# Patient Record
Sex: FEMALE | Race: WHITE | NOT HISPANIC OR LATINO | Employment: OTHER | URBAN - METROPOLITAN AREA
[De-identification: names, ages, dates, MRNs, and addresses within clinical notes are randomized per-mention and may not be internally consistent; named-entity substitution may affect disease eponyms.]

---

## 2017-01-09 RX ORDER — BUPROPION HYDROCHLORIDE 150 MG/1
TABLET ORAL
Qty: 30 TABLET | Refills: 11 | Status: SHIPPED | OUTPATIENT
Start: 2017-01-09 | End: 2017-12-11 | Stop reason: SDUPTHER

## 2017-01-19 ENCOUNTER — PATIENT MESSAGE (OUTPATIENT)
Dept: RHEUMATOLOGY | Facility: CLINIC | Age: 67
End: 2017-01-19

## 2017-01-19 DIAGNOSIS — M05.79 RHEUMATOID ARTHRITIS INVOLVING MULTIPLE SITES WITH POSITIVE RHEUMATOID FACTOR: Primary | ICD-10-CM

## 2017-01-19 DIAGNOSIS — T50.905A DRUG-INDUCED LUPUS ERYTHEMATOSUS: ICD-10-CM

## 2017-01-19 DIAGNOSIS — M35.00 SJOGREN'S SYNDROME: ICD-10-CM

## 2017-01-19 DIAGNOSIS — L93.2 DRUG-INDUCED LUPUS ERYTHEMATOSUS: ICD-10-CM

## 2017-01-19 DIAGNOSIS — M05.79 RHEUMATOID ARTHRITIS INVOLVING MULTIPLE SITES WITH POSITIVE RHEUMATOID FACTOR: ICD-10-CM

## 2017-01-19 RX ORDER — LORAZEPAM 1 MG/1
1 TABLET ORAL EVERY 6 HOURS PRN
Qty: 60 TABLET | Refills: 1 | OUTPATIENT
Start: 2017-01-19

## 2017-01-19 RX ORDER — DICLOFENAC SODIUM 10 MG/G
2 GEL TOPICAL 4 TIMES DAILY
Qty: 100 G | Refills: 3 | Status: SHIPPED | OUTPATIENT
Start: 2017-01-19 | End: 2019-06-10

## 2017-01-19 RX ORDER — MELOXICAM 15 MG/1
15 TABLET ORAL DAILY PRN
Qty: 30 TABLET | Refills: 6 | Status: SHIPPED | OUTPATIENT
Start: 2017-01-19 | End: 2017-07-11

## 2017-01-19 RX ORDER — METHOTREXATE 2.5 MG/1
15 TABLET ORAL
Qty: 30 TABLET | Refills: 3 | Status: SHIPPED | OUTPATIENT
Start: 2017-01-19 | End: 2017-08-09 | Stop reason: SDUPTHER

## 2017-01-23 DIAGNOSIS — T50.905A DRUG-INDUCED LUPUS ERYTHEMATOSUS: ICD-10-CM

## 2017-01-23 DIAGNOSIS — M35.00 SJOGREN'S SYNDROME: ICD-10-CM

## 2017-01-23 DIAGNOSIS — M05.79 RHEUMATOID ARTHRITIS INVOLVING MULTIPLE SITES WITH POSITIVE RHEUMATOID FACTOR: ICD-10-CM

## 2017-01-23 DIAGNOSIS — L93.2 DRUG-INDUCED LUPUS ERYTHEMATOSUS: ICD-10-CM

## 2017-01-23 RX ORDER — FOLIC ACID 1 MG/1
1 TABLET ORAL DAILY
Qty: 90 TABLET | Refills: 3 | Status: SHIPPED | OUTPATIENT
Start: 2017-01-23 | End: 2017-04-17 | Stop reason: SDUPTHER

## 2017-02-07 RX ORDER — LORAZEPAM 1 MG/1
1 TABLET ORAL EVERY 6 HOURS PRN
Qty: 60 TABLET | Refills: 1 | OUTPATIENT
Start: 2017-02-07

## 2017-03-04 RX ORDER — DULOXETIN HYDROCHLORIDE 60 MG/1
CAPSULE, DELAYED RELEASE ORAL
Qty: 60 CAPSULE | Refills: 10 | OUTPATIENT
Start: 2017-03-04

## 2017-03-06 ENCOUNTER — TELEPHONE (OUTPATIENT)
Dept: RHEUMATOLOGY | Facility: CLINIC | Age: 67
End: 2017-03-06

## 2017-03-06 NOTE — TELEPHONE ENCOUNTER
Spoke with Ms. Kelly and rescheduled her appointment for 8am with Dr. Alvarez on 03/08/2017 due to a meeting with Dr. Arguelles.

## 2017-03-06 NOTE — TELEPHONE ENCOUNTER
----- Message from Marilin Adams sent at 3/6/2017  9:12 AM CST -----  Is returning nurse call. Please call back at [phone]. Thanks//cdb

## 2017-03-08 ENCOUNTER — OFFICE VISIT (OUTPATIENT)
Dept: RHEUMATOLOGY | Facility: CLINIC | Age: 67
End: 2017-03-08

## 2017-03-08 ENCOUNTER — OFFICE VISIT (OUTPATIENT)
Dept: INTERNAL MEDICINE | Facility: CLINIC | Age: 67
End: 2017-03-08
Payer: COMMERCIAL

## 2017-03-08 ENCOUNTER — LAB VISIT (OUTPATIENT)
Dept: LAB | Facility: HOSPITAL | Age: 67
End: 2017-03-08
Attending: INTERNAL MEDICINE

## 2017-03-08 VITALS
DIASTOLIC BLOOD PRESSURE: 78 MMHG | HEIGHT: 65 IN | OXYGEN SATURATION: 95 % | TEMPERATURE: 96 F | BODY MASS INDEX: 23.95 KG/M2 | WEIGHT: 143.75 LBS | HEART RATE: 84 BPM | SYSTOLIC BLOOD PRESSURE: 134 MMHG

## 2017-03-08 VITALS
DIASTOLIC BLOOD PRESSURE: 70 MMHG | HEIGHT: 65 IN | SYSTOLIC BLOOD PRESSURE: 129 MMHG | WEIGHT: 144.19 LBS | BODY MASS INDEX: 24.02 KG/M2

## 2017-03-08 DIAGNOSIS — Z00.00 ENCOUNTER FOR PREVENTIVE HEALTH EXAMINATION: ICD-10-CM

## 2017-03-08 DIAGNOSIS — M05.79 RHEUMATOID ARTHRITIS INVOLVING MULTIPLE SITES WITH POSITIVE RHEUMATOID FACTOR: Primary | ICD-10-CM

## 2017-03-08 DIAGNOSIS — E55.9 VITAMIN D DEFICIENCY DISEASE: ICD-10-CM

## 2017-03-08 DIAGNOSIS — M35.00 SJOGREN'S SYNDROME: ICD-10-CM

## 2017-03-08 DIAGNOSIS — F51.01 PRIMARY INSOMNIA: ICD-10-CM

## 2017-03-08 DIAGNOSIS — Z51.81 MEDICATION MONITORING ENCOUNTER: Chronic | ICD-10-CM

## 2017-03-08 DIAGNOSIS — F41.8 MIXED ANXIETY AND DEPRESSIVE DISORDER: ICD-10-CM

## 2017-03-08 DIAGNOSIS — E03.9 ACQUIRED HYPOTHYROIDISM: ICD-10-CM

## 2017-03-08 DIAGNOSIS — M85.80 OSTEOPENIA: ICD-10-CM

## 2017-03-08 DIAGNOSIS — M05.79 RHEUMATOID ARTHRITIS INVOLVING MULTIPLE SITES WITH POSITIVE RHEUMATOID FACTOR: ICD-10-CM

## 2017-03-08 DIAGNOSIS — M05.772 RHEUMATOID ARTHRITIS INVOLVING LEFT FOOT WITH POSITIVE RHEUMATOID FACTOR: ICD-10-CM

## 2017-03-08 DIAGNOSIS — G57.62 MORTON'S NEUROMA OF THIRD INTERSPACE OF LEFT FOOT: ICD-10-CM

## 2017-03-08 LAB
25(OH)D3+25(OH)D2 SERPL-MCNC: 66 NG/ML
ALBUMIN SERPL BCP-MCNC: 3.7 G/DL
ALP SERPL-CCNC: 56 U/L
ALT SERPL W/O P-5'-P-CCNC: 13 U/L
ANION GAP SERPL CALC-SCNC: 9 MMOL/L
AST SERPL-CCNC: 24 U/L
BASOPHILS # BLD AUTO: 0.06 K/UL
BASOPHILS NFR BLD: 1.1 %
BILIRUB SERPL-MCNC: 0.4 MG/DL
BUN SERPL-MCNC: 22 MG/DL
CALCIUM SERPL-MCNC: 9.6 MG/DL
CHLORIDE SERPL-SCNC: 100 MMOL/L
CO2 SERPL-SCNC: 32 MMOL/L
CREAT SERPL-MCNC: 0.9 MG/DL
CRP SERPL-MCNC: 6.8 MG/L
DIFFERENTIAL METHOD: ABNORMAL
EOSINOPHIL # BLD AUTO: 0.2 K/UL
EOSINOPHIL NFR BLD: 2.7 %
ERYTHROCYTE [DISTWIDTH] IN BLOOD BY AUTOMATED COUNT: 12.6 %
ERYTHROCYTE [SEDIMENTATION RATE] IN BLOOD BY WESTERGREN METHOD: 25 MM/HR
EST. GFR  (AFRICAN AMERICAN): >60 ML/MIN/1.73 M^2
EST. GFR  (NON AFRICAN AMERICAN): >60 ML/MIN/1.73 M^2
GLUCOSE SERPL-MCNC: 91 MG/DL
HCT VFR BLD AUTO: 35.9 %
HGB BLD-MCNC: 11.8 G/DL
LYMPHOCYTES # BLD AUTO: 1 K/UL
LYMPHOCYTES NFR BLD: 18.1 %
MCH RBC QN AUTO: 29.8 PG
MCHC RBC AUTO-ENTMCNC: 32.9 %
MCV RBC AUTO: 91 FL
MONOCYTES # BLD AUTO: 0.3 K/UL
MONOCYTES NFR BLD: 4.8 %
NEUTROPHILS # BLD AUTO: 4.1 K/UL
NEUTROPHILS NFR BLD: 73.3 %
PLATELET # BLD AUTO: 276 K/UL
PMV BLD AUTO: 9.6 FL
POTASSIUM SERPL-SCNC: 4.3 MMOL/L
PROT SERPL-MCNC: 7.5 G/DL
RBC # BLD AUTO: 3.96 M/UL
SODIUM SERPL-SCNC: 141 MMOL/L
WBC # BLD AUTO: 5.64 K/UL

## 2017-03-08 PROCEDURE — 20550 NJX 1 TENDON SHEATH/LIGAMENT: CPT | Mod: 51,PBBFAC,PO | Performed by: INTERNAL MEDICINE

## 2017-03-08 PROCEDURE — 85025 COMPLETE CBC W/AUTO DIFF WBC: CPT | Mod: PO

## 2017-03-08 PROCEDURE — 99396 PREV VISIT EST AGE 40-64: CPT | Mod: S$PBB,,, | Performed by: INTERNAL MEDICINE

## 2017-03-08 PROCEDURE — 36415 COLL VENOUS BLD VENIPUNCTURE: CPT | Mod: PO

## 2017-03-08 PROCEDURE — 99214 OFFICE O/P EST MOD 30 MIN: CPT | Mod: 25,S$PBB,, | Performed by: INTERNAL MEDICINE

## 2017-03-08 PROCEDURE — 80053 COMPREHEN METABOLIC PANEL: CPT | Mod: PO

## 2017-03-08 PROCEDURE — 86140 C-REACTIVE PROTEIN: CPT

## 2017-03-08 PROCEDURE — 85651 RBC SED RATE NONAUTOMATED: CPT | Mod: PO

## 2017-03-08 PROCEDURE — 82306 VITAMIN D 25 HYDROXY: CPT

## 2017-03-08 PROCEDURE — 96372 THER/PROPH/DIAG INJ SC/IM: CPT | Mod: PBBFAC,PO | Performed by: INTERNAL MEDICINE

## 2017-03-08 PROCEDURE — 20600 DRAIN/INJ JOINT/BURSA W/O US: CPT | Mod: PBBFAC,PO | Performed by: INTERNAL MEDICINE

## 2017-03-08 PROCEDURE — 99999 PR PBB SHADOW E&M-EST. PATIENT-LVL III: CPT | Mod: PBBFAC,,, | Performed by: INTERNAL MEDICINE

## 2017-03-08 PROCEDURE — 20550 NJX 1 TENDON SHEATH/LIGAMENT: CPT | Mod: 51,S$PBB,, | Performed by: INTERNAL MEDICINE

## 2017-03-08 PROCEDURE — 20600 DRAIN/INJ JOINT/BURSA W/O US: CPT | Mod: S$PBB,,, | Performed by: INTERNAL MEDICINE

## 2017-03-08 RX ORDER — CEVIMELINE HYDROCHLORIDE 30 MG/1
CAPSULE ORAL
Qty: 120 CAPSULE | Refills: 5 | Status: SHIPPED | OUTPATIENT
Start: 2017-03-08 | End: 2018-03-20 | Stop reason: SDUPTHER

## 2017-03-08 RX ORDER — METHYLPREDNISOLONE ACETATE 80 MG/ML
10 INJECTION, SUSPENSION INTRA-ARTICULAR; INTRALESIONAL; INTRAMUSCULAR; SOFT TISSUE
Status: COMPLETED | OUTPATIENT
Start: 2017-03-08 | End: 2017-03-08

## 2017-03-08 RX ORDER — BETAMETHASONE SODIUM PHOSPHATE AND BETAMETHASONE ACETATE 3; 3 MG/ML; MG/ML
0.8 INJECTION, SUSPENSION INTRA-ARTICULAR; INTRALESIONAL; INTRAMUSCULAR; SOFT TISSUE
Status: COMPLETED | OUTPATIENT
Start: 2017-03-08 | End: 2017-03-08

## 2017-03-08 RX ORDER — METHYLPREDNISOLONE ACETATE 80 MG/ML
20 INJECTION, SUSPENSION INTRA-ARTICULAR; INTRALESIONAL; INTRAMUSCULAR; SOFT TISSUE
Status: COMPLETED | OUTPATIENT
Start: 2017-03-08 | End: 2017-03-08

## 2017-03-08 RX ORDER — BETAMETHASONE SODIUM PHOSPHATE AND BETAMETHASONE ACETATE 3; 3 MG/ML; MG/ML
1.5 INJECTION, SUSPENSION INTRA-ARTICULAR; INTRALESIONAL; INTRAMUSCULAR; SOFT TISSUE
Status: COMPLETED | OUTPATIENT
Start: 2017-03-08 | End: 2017-03-08

## 2017-03-08 RX ORDER — LORAZEPAM 1 MG/1
1 TABLET ORAL EVERY 6 HOURS PRN
Qty: 60 TABLET | Refills: 1 | Status: SHIPPED | OUTPATIENT
Start: 2017-03-08 | End: 2017-04-25 | Stop reason: SDUPTHER

## 2017-03-08 RX ORDER — DULOXETIN HYDROCHLORIDE 60 MG/1
CAPSULE, DELAYED RELEASE ORAL
Qty: 360 CAPSULE | Refills: 3 | Status: SHIPPED | OUTPATIENT
Start: 2017-03-08 | End: 2018-02-26 | Stop reason: DRUGHIGH

## 2017-03-08 RX ADMIN — BETAMETHASONE ACETATE AND BETAMETHASONE SODIUM PHOSPHATE 0.78 MG: 3; 3 INJECTION, SUSPENSION INTRA-ARTICULAR; INTRALESIONAL; INTRAMUSCULAR; SOFT TISSUE at 09:03

## 2017-03-08 RX ADMIN — METHYLPREDNISOLONE ACETATE 10.4 MG: 80 INJECTION, SUSPENSION INTRALESIONAL; INTRAMUSCULAR; INTRASYNOVIAL; SOFT TISSUE at 09:03

## 2017-03-08 RX ADMIN — METHYLPREDNISOLONE ACETATE 20 MG: 80 INJECTION, SUSPENSION INTRA-ARTICULAR; INTRALESIONAL; INTRAMUSCULAR; SOFT TISSUE at 09:03

## 2017-03-08 RX ADMIN — BETAMETHASONE ACETATE AND BETAMETHASONE SODIUM PHOSPHATE 1.5 MG: 3; 3 INJECTION, SUSPENSION INTRA-ARTICULAR; INTRALESIONAL; INTRAMUSCULAR; SOFT TISSUE at 09:03

## 2017-03-08 ASSESSMENT — ROUTINE ASSESSMENT OF PATIENT INDEX DATA (RAPID3): MDHAQ FUNCTION SCORE: 0

## 2017-03-08 NOTE — PATIENT INSTRUCTIONS
Ice pack for 15min to foot if pain post shot    Good shoes    No change n other meds    Thank you for choosing [facility] for your medical care. Please take a few moments to evaluate your experience in our visit.  Have a great day.    Click the link below.    [email or web address]

## 2017-03-08 NOTE — PROGRESS NOTES
"Subjective:       Patient ID: Leticia Piña is a 66 y.o. female.    Chief Complaint: Annual Exam    HPI Comments: Here for f/u medical problems and preventive exam.  To get [id number] in 2wk, due to cortisone inj today.  No f/c/sw/cough.  No cp/sob/palp.  BMs and urine normal.  Down to 1K D3 daily.  Rheum ordered vit D level.  Anxiety doing well on wellbutrin and cymbalta, ativan rarely.    Updated/ annual due 5/16:  HM: 10/16 fluvax, 12/13 [id number], 11/07 niqevs66, 4/13 TDaP, 4/13 zoster, 8/14 BMD, 12/11 Cscope rep 10y, 9/16 MMG and Gyn Dr. Jimenez.          Review of Systems   Constitutional: Negative for appetite change, chills, diaphoresis and fever.   HENT: Negative for congestion, ear pain, rhinorrhea, sinus pressure and sore throat.    Respiratory: Negative for cough, chest tightness and shortness of breath.    Cardiovascular: Negative for chest pain and palpitations.   Gastrointestinal: Negative for blood in stool, constipation, diarrhea, nausea and vomiting.   Genitourinary: Negative for dysuria, frequency, hematuria, menstrual problem, urgency and vaginal discharge.   Musculoskeletal: Negative for arthralgias.   Skin: Negative for rash.   Neurological: Negative for dizziness and headaches.   Psychiatric/Behavioral: Negative for sleep disturbance. The patient is not nervous/anxious.        Objective:   /78 (BP Location: Right arm, Patient Position: Sitting, BP Method: Manual)  Pulse 84  Temp 96.1 °F (35.6 °C) (Tympanic)   Ht 5' 5" (1.651 m)  Wt 65.2 kg (143 lb 11.8 oz)  SpO2 95%  BMI 23.92 kg/m2    Physical Exam   Constitutional: She is oriented to person, place, and time. She appears well-developed and well-nourished.   HENT:   Right Ear: External ear normal. Tympanic membrane is not injected.   Left Ear: External ear normal. Tympanic membrane is not injected.   Mouth/Throat: Oropharynx is clear and moist.   Eyes: Conjunctivae are normal.   Neck: Normal range of motion. Neck supple. No " thyromegaly present.   Cardiovascular: Normal rate, regular rhythm and intact distal pulses.  Exam reveals no gallop and no friction rub.    No murmur heard.  Pulmonary/Chest: Effort normal and breath sounds normal. She has no wheezes. She has no rales.   Abdominal: Soft. Bowel sounds are normal. She exhibits no mass. There is no tenderness.   Musculoskeletal: She exhibits no edema.   Lymphadenopathy:     She has no cervical adenopathy.   Neurological: She is alert and oriented to person, place, and time.   Skin: Skin is warm. No rash noted.   Psychiatric: She has a normal mood and affect.     Results for JAM TRIPP (MRN [id number]) as of 3/8/2017 13:06   Ref. Range 8/22/2016 10:56   Vit D, 25-Hydroxy Latest Ref Range: 30 - 96 ng/mL >96 (A)     Assessment:       1. Rheumatoid arthritis involving multiple sites with positive rheumatoid factor    2. Vitamin D deficiency disease    3. Mixed anxiety and depressive disorder    4. Primary insomnia    5. Acquired hypothyroidism    6. Encounter for preventive health examination        Plan:       Jam was seen today for annual exam.    Diagnoses and all orders for this visit:    Rheumatoid arthritis involving multiple sites with positive rheumatoid factor- doing well with pain.    Vitamin D deficiency disease- lab pending.    Mixed anxiety and depressive disorder- doing well,cont rx.  -     duloxetine (CYMBALTA) 60 MG capsule; TAKE 2 CAPSULES BY MOUTH ONCE A DAY  -     lorazepam (ATIVAN) 1 MG tablet; Take 1 tablet (1 mg total) by mouth every 6 (six) hours as needed.    Primary insomnia- doing well.    Acquired hypothyroidism- check lab.    Encounter for preventive health examination- utd.  [id number] with Rheum.  NC results.  RTC 6mo.  -     Lipid panel; Future  -     TSH; Future  -     Cancel: Vitamin D; Future  -     Hepatitis C antibody; Future

## 2017-03-08 NOTE — MR AVS SNAPSHOT
[facility]  [address]  [place] [address]  Phone: [phone]  Fax: [phone]                  Leticia Piña   3/8/2017 11:30 AM   Office Visit    Description:  Female : 1950   Provider:  Scooter Alvarez MD   Department:  [facility] - Rheumatology           Reason for Visit     Rheumatoid Arthritis           Diagnoses this Visit        Comments    Rheumatoid arthritis involving multiple sites with positive rheumatoid factor    -  Primary     Rheumatoid arthritis involving left foot with positive rheumatoid factor         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 [phone]    2017 10:05 AM LABORATORY, [facility] [phone]      Goals (5 Years of Data)     None      Follow-Up and Disposition     Return in about 4 months (around 2017) for lsa, reg lab Pneumovax in 2weeks.       These Medications        Disp Refills Start End    cevimeline (EVOXAC) 30 mg capsule 120 capsule 5 3/8/2017     TAKE 1 CAPSULE (30 MG TOTAL) BY MOUTH 4 TIMES DAILY.    Pharmacy: [facility] #[id number] - [place] - [address] AT [facility] Ph #: [phone]         [facility]     [facility] Nurse Care Line -  Assistance  Registered nurses in the [facility] provide clinical advisement, health education, appointment booking, and other advisory services.  Call for this free service at [phone].              Medications           Message regarding Medications     Verify the changes and/or additions to your medication regime listed below are the same as discussed with your clinician today.  If any of these changes or additions are incorrect, please notify your healthcare provider.        These medications were administered today        Dose Freq    betamethasone acetate-betamethasone sodium phosphate injection 0.78 mg 0.78 mg Clinic/HOD 1 time    Sig: Inject 0.13 mLs (0.78 mg total) into the articular space one time.    Class: Normal    Route: Intra-articular    methylPREDNISolone acetate injection 10.4 mg 10.4 mg Clinic/HOD 1 time    Sig: Inject 0.13 mLs (10.4 mg total) into the articular space one time.    Class: Normal    Route: Intra-articular    betamethasone acetate-betamethasone sodium phosphate injection 1.5 mg 1.5 mg Clinic/HOD 1 time    Sig: Inject 0.25 mLs (1.5 mg total) into the articular space one time.    Class: Normal    Route: Intra-articular    methylPREDNISolone acetate injection 20 mg 20 mg Clinic/HOD 1 time    Sig: Inject 0.25 mLs (20 mg total) into the articular space one time.    Class: Normal    Route: Intra-articular      CHANGE how you are taking these medications     Start Taking Instead of    cevimeline (EVOXAC) 30 mg capsule cevimeline (EVOXAC) 30 mg capsule    Dosage:  TAKE 1 CAPSULE (30 MG TOTAL) BY MOUTH 4 TIMES DAILY. Dosage:  TAKE 1 CAPSULE (30 MG TOTAL) BY MOUTH 3 (THREE) TIMES DAILY.    Reason for Change:  Reorder            Verify that the below list of medications is an accurate representation of the medications you are currently taking.  If none reported, the list may be blank. If incorrect, please contact your healthcare provider. Carry this list with you in case of emergency.           Current Medications     buPROPion (WELLBUTRIN XL) 150 MG TB24 tablet TAKE 1 TABLET EVERY DAY    cevimeline (EVOXAC) 30 mg capsule TAKE 1 CAPSULE (30 MG TOTAL) BY MOUTH 4 TIMES DAILY.     cholecalciferol, vitamin D3, 5,000 unit/mL Drop Take 1 drop by mouth Daily.    CIMZIA 400 mg/2 mL (200 mg/mL x 2) SyKt kit INJECT 400MG SUBCUTANEOUSLY ONCE MONTHLY    doxycycline (VIBRAMYCIN) 100 MG Cap TAKE 1 CAPSULE BY MOUTH EVERY 12 HOURS AS NEEDED    duloxetine (CYMBALTA) 60 MG capsule TAKE 2 CAPSULES BY MOUTH ONCE A DAY    estradiol (ESTRACE) 1 MG tablet Take 1 tablet by mouth Daily.    folic acid (FOLVITE) 1 MG tablet Take 1 tablet (1 mg total) by mouth once daily.    glucosamine-chondroitin 500-400 mg tablet Take 1 tablet by mouth Daily.     hydroxychloroquine (PLAQUENIL) 200 mg tablet TAKE 1 TABLET BY MOUTH TWICE A DAY    IBUPROFEN ORAL Use as needed.    levothyroxine (SYNTHROID) 75 MCG tablet Take 75 mcg by mouth once daily.    lorazepam (ATIVAN) 1 MG tablet TAKE 1 TABLET BY MOUTH EVERY 6 HOURS AS NEEDED    meloxicam (MOBIC) 15 MG tablet Take 1 tablet (15 mg total) by mouth daily as needed.    methotrexate 2.5 MG Tab Take 6 tablets (15 mg total) by mouth every 7 days.    metronidazole 0.75 % Lotn APPLY EVERY DAY AS NEEDED    MULTIVITAMIN ORAL Daily.    omeprazole (PRILOSEC) 20 MG capsule Take 20 mg by mouth once daily.    PCCA MBK BASE Wax     PODIAPN 35-5-2-300 mg Cap Take 1 capsule by mouth 2 (two) times daily.    progesterone (PROMETRIUM) 100 MG capsule Take 1 capsule by mouth Daily.    trazodone (DESYREL) 50 MG tablet TAKE 3 TABLETS BY MOUTH EVERY NIGHT AT BEDTIME AS NEEDED FOR INSOMNIA    amoxicillin (AMOXIL) 875 MG tablet TAKE 1 TABLET TWICE A DAY    diclofenac sodium 1 % Gel Apply 2 g topically 4 (four) times daily.    DOCOSAHEXANOIC ACID/EPA (FISH OIL ORAL) Daily.    methylPREDNISolone (MEDROL DOSEPACK) 4 mg tablet TAKE 6 TABLETS ON DAY 1 AS DIRECTED ON PACKAGE AND DECREASE BY 1 TAB EACH DAY FOR A TOTAL OF 6 DAYS    valacyclovir (VALTREX) 1000 MG tablet Take 1 tablet (1,000 mg total) by mouth 3 (three) times daily.    VITAMIN B COMPLEX ORAL Daily.           Clinical Reference Information          "  Your Vitals Were     BP Height Weight BMI       129/70 (BP Location: Right arm, Patient Position: Sitting, BP Method: Automatic) 5' 5" (1.651 m) 65.4 kg (144 lb 2.9 oz) 23.99 kg/m2       Blood Pressure          Most Recent Value    BP  129/70      Allergies as of 3/8/2017     Erythromycin    Humira  [Adalimumab]    Sulfa (Sulfonamide Antibiotics)      Immunizations Administered on Date of Encounter - 3/8/2017     None      Orders Placed During Today's Visit     Future Labs/Procedures Expected by Expires    Pneumococcal Polysaccharide Vaccine (23 Valent) (SQ/IM)  3/22/2017 3/8/2018    Recurring Lab Work Interval Expires    C-reactive protein   3/8/2018    CBC auto differential   3/8/2018    Comprehensive metabolic panel   3/8/2018    Sedimentation rate, manual   3/8/2018      Administrations This Visit     betamethasone acetate-betamethasone sodium phosphate injection 0.78 mg     Admin Date Action Dose Route Administered By             03/08/2017 Given 0.78 mg Intra-articular Scooter Alvarez MD                    betamethasone acetate-betamethasone sodium phosphate injection 1.5 mg     Admin Date Action Dose Route Administered By             03/08/2017 Given 1.5 mg Intra-articular Scooter Alvarez MD                    methylPREDNISolone acetate injection 10.4 mg     Admin Date Action Dose Route Administered By             03/08/2017 Given 10.4 mg Intra-articular Scooter Alvarez MD                    methylPREDNISolone acetate injection 20 mg     Admin Date Action Dose Route Administered By             03/08/2017 Given 20 mg Intra-articular Scooter Alvarez MD                      Instructions    Ice pack for 15min to foot if pain post shot    Good shoes    No change n other meds    Thank you for choosing [facility] for your medical care. Please take a few moments to evaluate your experience in our visit.  Have a great day.    Click the link " below.    [email or web address]         Language Assistance Services     ATTENTION: Language assistance services are available, free of charge. Please call [phone].      ATENCIÓN: Si habla español, tiene a menchaca disposición servicios gratuitos de asistencia lingüística. Llame al [phone].     CHÚ Ý: N?u b?n nói Ti?ng Vi?t, có các d?ch v? h? tr? ngôn ng? mi?n phí dành cho b?n. G?i s? [phone].         [facility] complies with applicable Federal civil rights laws and does not discriminate on the basis of race, color, national origin, age, disability, or sex.

## 2017-03-08 NOTE — PROGRESS NOTES
RHEUMATOLOGY FOLLOWUP    CHIEF COMPLAINT:  Left foot pain and inability to walk well.    PERTINENT HISTORY:  Leticia is followed here with chronic erosive rheumatoid   arthritis.  She was last seen by Leta in the end of November.  She had come off   Cimzia 2016 because she was doing so well and she felt this to be in remission   when [place] was [place] and kept off the biologic.  She remains on   methotrexate at six a week with folic acid and Plaquenil 200 mg twice daily.    She rates morning stiffness somewhere between 30 minutes to an hour.  She has   minimal discomfort in her hands.  It is usually just around the DIPs from OA.    She has a lot of trouble with dryness.  She is using Evoxac twice daily without   that much success.  She is using her serum eyedrops regularly and has helped her   eyes a lot.  She is on hormones now for her osteopenia and has had no falls or   fractures.  She is on vitamin D.  She has trouble walking under her left first   toe.  She has got swollen bursa area.  She was supposed to go to Orthopedics,   but did not.  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  Dr. Delgado has injected in the past that   helped.  She would like me to consider that.    REVIEW OF SYSTEMS:  GENERAL:  Her weight is steady.  She has had no infections.  Immunizations are   up-to-date other than Pneumovax 23 second booster.  No fevers, chills,   fatigability, change in appetite, weight loss.  SKIN:  No recent malar rashes.  No rashes, itching or changes in color or   texture of skin, no Raynaud's.  HEAD:  No headache or recent head trauma.  EYES:  Dryness to the eyes improved with her serum drops.  She has less eye   redness now than she used to.  Visual acuity fine.  No ocular pain.  EARS:  Denies ear pain, discharge or vertigo.  NOSE:  She has had allergies in the past, allergic rhinitis.  No loss of smell.    No epistaxis or postnasal  drip.  MOUTH AND THROAT:  No major mouth issues. No hoarseness or change in voice or   oral ulcers.  No difficulty swallowing or dryness.  NODES:  Denies swollen glands.  CHEST:  Lung issues negative.  No pleurisy.  Denies shortness of breath, HAUSER,   cyanosis, wheezing, cough and sputum production.  CARDIOVASCULAR:  Cardiac issues negative.  Denies chest pain, PND, orthopnea or   reduced exercise tolerance.  ABDOMEN:  GI tract is stable.  No weight loss.  Denies nausea, vomiting,   diarrhea, abdominal pain, hematemesis or blood in stool.  ENDOCRINE:  No thyroid disease and on thyroid replacement.  Sjogren's syndrome   with positive antibodies by history.  Drug-induced lupus by history.  URINARY:  No renal disease, etc.  No flank pain, dysuria or hematuria.  PERIPHERAL VASCULAR:  No claudication or cyanosis.  NEUROLOGIC:  No numbness, weakness or tingling.    PHYSICAL EXAMINATION:  VITAL SIGNS:  Normal today.  Her blood pressure is 129/70 with height 5 feet 5   inches, pain score 0, BSA 1.7, BMI 23.9.  APPEARANCE:  Well nourished, well developed, in no acute distress.  SKIN:  Clear.  No rashes, no wounds, no ecchymosis.  Nail beds pink.  No digital   ulcers.  No nodules or tightness noted.  HEENT:  Clear.  She has adenopathy in her neck.  Normocephalic, atraumatic,   alert and oriented x3.  PERRLA.  Conjunctivae clear, sclerae nonicteric.  No   tonsillar enlargement.  No pharyngeal erythema or exudate.  No ulcers or lesions   noted.  Mucous membranes moist and pink.  Oral pharynx clear.  Neck supple, no   JVD.  Normal ROM.  Thyroid normal.  No masses or tracheal deviation.  No   cervical, axillary or inguinal lymph node enlargement.  CHEST:  Clear.  Lungs clear to auscultation bilaterally.  No dullness to   percussion.  No accessory muscle use.  No intercostal retraction noted.  CARDIOVASCULAR:  Clear.  Normal S1, S2.  No rubs, murmurs or gallops.  No   peripheral edema.  ABDOMEN:  Bowel sounds normal, abdomen soft,  not distended.  No tenderness or   masses.  No hepatosplenomegaly.  EXTREMITIES:  No clubbing, cyanosis or edema noted.  Dorsalis and pedis pulses   2+ palpable.  NEUROLOGICAL:  Cranial nerves II-XII intact.  Motor 5/5 strength major   flexors/extensors.  No tremor.  GAIT AND POSTURE:  Normal gait.  No cerebellar signs.  MUSCULOSKELETAL:  Joints on her hands with DIP bony enlargement, not tender.    PIP, MPs and wrist have normal motion.  No redness, no heat.  Elbows and   shoulders move well.  Hips and knees move well with no fluid in the knees.    Ankles move well, no fluid.  The left third and fourth toe web is sound to have   dysresthesias and numbness, consistent with a neuroma.  Under the first MTP is a   large bursa about the size of a large grape.  This has moderate tenderness.  No   redness, no heat.  Blood vessels are normal with good pulses.    LABORATORY DATA:  Today, is pending.  Her lab from November with normal white   count and platelets, hemoglobin 11.5.  Sed rate is in the 20s.  Electrolytes   good.  CRP was elevated back in the 33.  Last SSA and SSB both positive with GIOVANNA   negative.  CCP antibodies a little positive in the past.    IMPRESSION:  1.  Rheumatoid arthritis - CDAI 0, MARGARITA score 0.  2.  Medication monitoring for toxicity on methotrexate, no toxicity observed.  3.  Left first MTP metatarsal cyst, bursitis, active.  4.  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  5.  Sjogren's, still with some dryness symptoms.  6.  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  7.  Osteopenia on hormones.    PLAN:  1.  Inject the left first MTP bursa.  See below.  2.  Inject the left Leonard neuroma -- see below.  3.  Methotrexate six a week with folic acid.  Note, she is taking two forms.  4.  Maintain Plaquenil twice daily with eye checks.  5.  Increase Evoxac to four times daily and continue her eye drops.  6.  Encourage exercises, on over-the-counter vitamin D and her hormones for her    bones.  See back in four months unless there is an issue.    PROCEDURE NOTE:  1.  After verbal consent, the left dorsal third and fourth interdigital web   space was entered.  This was injected with 8 mg of Celestone Soluspan, 8 mg of   Depo-Medrol 80 and 1/8 mL of lidocaine without difficulty using a 25 5/8 needle.  2.  After verbal consent, the lower aspect of the volar first MTP on the left was   prepped. After Using local topical anesthesia, injected it with 1.5 mg of Celestone,   20 mg of Depo-Medrol, and 0.25 mL of 1% lidocaine.  No complications, tolerated   well along with ice packs as needed and appropriate shoes.          /torsten [id number] blank(s)        ELLY/RAGHU  dd: 03/08/2017 09:36:51 (CST)  td: 03/08/2017 11:18:25 (CST)  Doc ID   #[id number]  Job ID #[id number]    CC:

## 2017-03-09 ENCOUNTER — PATIENT MESSAGE (OUTPATIENT)
Dept: INTERNAL MEDICINE | Facility: CLINIC | Age: 67
End: 2017-03-09

## 2017-03-22 ENCOUNTER — TELEPHONE (OUTPATIENT)
Dept: RHEUMATOLOGY | Facility: CLINIC | Age: 67
End: 2017-03-22

## 2017-03-22 NOTE — TELEPHONE ENCOUNTER
----- Message from Marilin Melgoza sent at 3/22/2017  1:19 PM CDT -----  Patient calling to r/s her nurse visit appt on today. States she would like to come in on tomorrow. Please adv/call [phone].//thanks. cw

## 2017-03-23 ENCOUNTER — CLINICAL SUPPORT (OUTPATIENT)
Dept: RHEUMATOLOGY | Facility: CLINIC | Age: 67
End: 2017-03-23

## 2017-03-23 ENCOUNTER — LAB VISIT (OUTPATIENT)
Dept: LAB | Facility: HOSPITAL | Age: 67
End: 2017-03-23
Attending: INTERNAL MEDICINE

## 2017-03-23 DIAGNOSIS — M05.79 RHEUMATOID ARTHRITIS INVOLVING MULTIPLE SITES WITH POSITIVE RHEUMATOID FACTOR: ICD-10-CM

## 2017-03-23 DIAGNOSIS — Z00.00 ENCOUNTER FOR PREVENTIVE HEALTH EXAMINATION: ICD-10-CM

## 2017-03-23 LAB
CHOLEST/HDLC SERPL: 2.4 {RATIO}
HDL/CHOLESTEROL RATIO: 42.3 %
HDLC SERPL-MCNC: 163 MG/DL
HDLC SERPL-MCNC: 69 MG/DL
LDLC SERPL CALC-MCNC: 63 MG/DL
NONHDLC SERPL-MCNC: 94 MG/DL
TRIGL SERPL-MCNC: 155 MG/DL
TSH SERPL DL<=0.005 MIU/L-ACNC: 0.72 UIU/ML

## 2017-03-23 PROCEDURE — 86803 HEPATITIS C AB TEST: CPT

## 2017-03-23 PROCEDURE — 36415 COLL VENOUS BLD VENIPUNCTURE: CPT | Mod: PO

## 2017-03-23 PROCEDURE — 90732 PPSV23 VACC 2 YRS+ SUBQ/IM: CPT | Mod: PBBFAC,PO

## 2017-03-23 PROCEDURE — 84443 ASSAY THYROID STIM HORMONE: CPT

## 2017-03-23 PROCEDURE — 80061 LIPID PANEL: CPT

## 2017-03-23 NOTE — PROGRESS NOTES
Administered 0.5cc Pneumococcal Polysaccharide Vaccination to Right Deltoid. Pt tolerated well. No acute reaction noted to site. Pt instructed on S/S to report. Pt verbalized understanding. Pt was instructed to hold her MTX for a week then resume. Ms. Piña states understanding.     Lot:[id number]  Exp:05/22/2018  Manu: [employer]

## 2017-03-24 LAB — HCV AB SERPL QL IA: NEGATIVE

## 2017-03-28 ENCOUNTER — TELEPHONE (OUTPATIENT)
Dept: RHEUMATOLOGY | Facility: CLINIC | Age: 67
End: 2017-03-28

## 2017-03-28 NOTE — TELEPHONE ENCOUNTER
Spoke with Ms. Leticia who asked if she can receive injections in her right foot like she did in her left foot. Pain rate 7/10 with ambulation. She is currently taking Mobic 15mg and using Voltaren gel 3x daily. Please advise.

## 2017-03-28 NOTE — TELEPHONE ENCOUNTER
----- Message from Brie Geller sent at 3/28/2017 10:33 AM CDT -----  Contact: pt   Pt calling to see if she can come in to get an injection in her foot,,, please call pt back [phone]

## 2017-03-29 ENCOUNTER — PROCEDURE VISIT (OUTPATIENT)
Dept: RHEUMATOLOGY | Facility: CLINIC | Age: 67
End: 2017-03-29

## 2017-03-29 DIAGNOSIS — M05.771 RHEUMATOID ARTHRITIS INVOLVING RIGHT FOOT WITH POSITIVE RHEUMATOID FACTOR: ICD-10-CM

## 2017-03-29 DIAGNOSIS — M77.51 BURSITIS OF HEEL, RIGHT: ICD-10-CM

## 2017-03-29 DIAGNOSIS — M05.79 RHEUMATOID ARTHRITIS INVOLVING MULTIPLE SITES WITH POSITIVE RHEUMATOID FACTOR: Primary | ICD-10-CM

## 2017-03-29 PROCEDURE — 20605 DRAIN/INJ JOINT/BURSA W/O US: CPT | Mod: 51,PBBFAC,PO | Performed by: INTERNAL MEDICINE

## 2017-03-29 PROCEDURE — 20605 DRAIN/INJ JOINT/BURSA W/O US: CPT | Mod: 51,S$PBB,, | Performed by: INTERNAL MEDICINE

## 2017-03-29 PROCEDURE — 20600 DRAIN/INJ JOINT/BURSA W/O US: CPT | Mod: PBBFAC,PO | Performed by: INTERNAL MEDICINE

## 2017-03-29 RX ORDER — METHYLPREDNISOLONE ACETATE 80 MG/ML
20 INJECTION, SUSPENSION INTRA-ARTICULAR; INTRALESIONAL; INTRAMUSCULAR; SOFT TISSUE
Status: COMPLETED | OUTPATIENT
Start: 2017-03-29 | End: 2017-03-29

## 2017-03-29 RX ORDER — BETAMETHASONE SODIUM PHOSPHATE AND BETAMETHASONE ACETATE 3; 3 MG/ML; MG/ML
1 INJECTION, SUSPENSION INTRA-ARTICULAR; INTRALESIONAL; INTRAMUSCULAR; SOFT TISSUE
Status: COMPLETED | OUTPATIENT
Start: 2017-03-29 | End: 2017-03-29

## 2017-03-29 RX ORDER — METHYLPREDNISOLONE ACETATE 80 MG/ML
10 INJECTION, SUSPENSION INTRA-ARTICULAR; INTRALESIONAL; INTRAMUSCULAR; SOFT TISSUE
Status: COMPLETED | OUTPATIENT
Start: 2017-03-29 | End: 2017-03-29

## 2017-03-29 RX ADMIN — METHYLPREDNISOLONE ACETATE 10.4 MG: 80 INJECTION, SUSPENSION INTRALESIONAL; INTRAMUSCULAR; INTRASYNOVIAL; SOFT TISSUE at 01:03

## 2017-03-29 RX ADMIN — METHYLPREDNISOLONE ACETATE 20 MG: 80 INJECTION, SUSPENSION INTRA-ARTICULAR; INTRALESIONAL; INTRAMUSCULAR; SOFT TISSUE at 01:03

## 2017-03-29 RX ADMIN — BETAMETHASONE SODIUM PHOSPHATE AND BETAMETHASONE ACETATE 1.02 MG: 3; 3 INJECTION, SUSPENSION INTRA-ARTICULAR; INTRALESIONAL; INTRAMUSCULAR; SOFT TISSUE at 01:03

## 2017-03-29 RX ADMIN — BETAMETHASONE SODIUM PHOSPHATE AND BETAMETHASONE ACETATE 1.02 MG: 3; 3 INJECTION, SUSPENSION INTRA-ARTICULAR; INTRALESIONAL; INTRAMUSCULAR at 01:03

## 2017-03-29 NOTE — PROCEDURES
Small Joint Aspiration/Injection  Date/Time: 3/29/2017 12:57 PM  Performed by: RADHA RENEE  Authorized by: RADHA RENEE     Consent Done?:  Yes (Verbal)  Indications:  Pain and joint swelling  Site marked: The procedure site was marked    Timeout: Prior to procedure the correct patient, procedure, and site was verified      Location:  Fourth toe  Site:  R fourth MTP  Prep: Patient was prepped and draped in usual sterile fashion    Ultrasonic Guidance for needle placement: No  Needle size:  25 G  Approach:  Dorsal  Aspirate amount (ml):  0  Patient tolerance:  Patient tolerated the procedure well with no immediate complications     Additional Comments: Procedure note: After verbal consent was obtained.  The R 4 th MTP was prepared with sterile prep.  The skin was anesthetized with 1% ethyl chloride.  The  joint was injected with 0.5 mL of 1% lidocaine to anesthetize the area.  The joint was then injected with 1 mg celestone/soluspan 6 mg/5 mL, 10 mgL Depo-Medrol 80 mg/mL and 0.125 mL of 1% lidocaine Hemostasis was obtained.  The patient tolerated procedure well with no complications.  Patient diischarged with icing instructions

## 2017-03-29 NOTE — PROCEDURES
PROCEDURE NOTE    DATE OF PROCEDURE:  03/29/2017    CHIEF COMPLAINT:  Right heel pain, inability to walk without tenderness.    Exam shows tender calcaneal bursa with swollen rheumatoid nodule in it.    Achilles is not involved.    IMPRESSION:  Rheumatoid nodule in the calcaneal bursa with associated bursitis.    INJECTION PROCEDURE NOTE:  After verbal consent, the area was prepared.  The   right calcaneal bursa with rheumatoid nodule was then entered using 25.5-inch   needle from a lateral approach.  A 1 mg of Celestone/Soluspan and 20 mg of   Depo-Medrol as well as 1 mL of lidocaine were injected into the bursa and nodule   without difficulty.  Follow with ice packs and wear appropriate shoes with heel   cushions.  There were no complications.  The patient instructed on icing and   appropriate shoes.      ELLY/RAGHU  dd: 03/29/2017 13:09:47 (CDT)  td: 03/30/2017 03:34:22 (CDT)  Doc ID   #[id number]  Job ID #[id number]    CC:

## 2017-03-31 ENCOUNTER — PATIENT MESSAGE (OUTPATIENT)
Dept: INTERNAL MEDICINE | Facility: CLINIC | Age: 67
End: 2017-03-31

## 2017-03-31 RX ORDER — CIPROFLOXACIN 250 MG/1
250 TABLET, FILM COATED ORAL 2 TIMES DAILY
Qty: 14 TABLET | Refills: 0 | Status: SHIPPED | OUTPATIENT
Start: 2017-03-31 | End: 2017-04-07

## 2017-04-01 ENCOUNTER — NURSE TRIAGE (OUTPATIENT)
Dept: ADMINISTRATIVE | Facility: CLINIC | Age: 67
End: 2017-04-01

## 2017-04-01 NOTE — TELEPHONE ENCOUNTER
Reason for Disposition   [1] Prescription not at pharmacy AND [2] was prescribed today by PCP    Protocols used: ST MEDICATION QUESTION CALL-A-    Pt calling due to prescription called into wrong pharmacy.  Pt stated the specialty pharmacy where most of her medication is called into is closed on the weekend.  Pt wanted medication called into the [address] at [phone].  Pt called and notified.

## 2017-04-17 DIAGNOSIS — M05.79 RHEUMATOID ARTHRITIS INVOLVING MULTIPLE SITES WITH POSITIVE RHEUMATOID FACTOR: ICD-10-CM

## 2017-04-17 DIAGNOSIS — T50.905A DRUG-INDUCED LUPUS ERYTHEMATOSUS: ICD-10-CM

## 2017-04-17 DIAGNOSIS — M35.00 SJOGREN'S SYNDROME: ICD-10-CM

## 2017-04-17 DIAGNOSIS — L93.2 DRUG-INDUCED LUPUS ERYTHEMATOSUS: ICD-10-CM

## 2017-04-18 RX ORDER — FOLIC ACID 1 MG/1
TABLET ORAL
Qty: 30 TABLET | Refills: 11 | Status: SHIPPED | OUTPATIENT
Start: 2017-04-18 | End: 2018-05-21 | Stop reason: SDUPTHER

## 2017-04-25 DIAGNOSIS — M05.79 RHEUMATOID ARTHRITIS INVOLVING MULTIPLE SITES WITH POSITIVE RHEUMATOID FACTOR: ICD-10-CM

## 2017-04-25 DIAGNOSIS — L93.2 DRUG-INDUCED LUPUS ERYTHEMATOSUS: ICD-10-CM

## 2017-04-25 DIAGNOSIS — F41.8 MIXED ANXIETY AND DEPRESSIVE DISORDER: ICD-10-CM

## 2017-04-25 DIAGNOSIS — T50.905A DRUG-INDUCED LUPUS ERYTHEMATOSUS: ICD-10-CM

## 2017-04-25 DIAGNOSIS — M35.00 SJOGREN'S SYNDROME: ICD-10-CM

## 2017-04-25 RX ORDER — METHOTREXATE 2.5 MG/1
TABLET ORAL
Qty: 24 TABLET | Refills: 4 | Status: SHIPPED | OUTPATIENT
Start: 2017-04-25 | End: 2017-07-11 | Stop reason: SDUPTHER

## 2017-04-26 RX ORDER — LORAZEPAM 1 MG/1
TABLET ORAL
Qty: 60 TABLET | Refills: 1 | Status: SHIPPED | OUTPATIENT
Start: 2017-04-26 | End: 2017-06-02 | Stop reason: SDUPTHER

## 2017-06-02 DIAGNOSIS — F41.8 MIXED ANXIETY AND DEPRESSIVE DISORDER: ICD-10-CM

## 2017-06-02 RX ORDER — LORAZEPAM 1 MG/1
TABLET ORAL
Qty: 60 TABLET | Refills: 2 | Status: SHIPPED | OUTPATIENT
Start: 2017-06-02 | End: 2017-07-30 | Stop reason: SDUPTHER

## 2017-06-12 RX ORDER — HYDROXYCHLOROQUINE SULFATE 200 MG/1
TABLET, FILM COATED ORAL
Qty: 180 TABLET | Refills: 2 | Status: SHIPPED | OUTPATIENT
Start: 2017-06-12 | End: 2018-07-31 | Stop reason: SDUPTHER

## 2017-06-12 RX ORDER — TRAZODONE HYDROCHLORIDE 50 MG/1
TABLET ORAL
Qty: 270 TABLET | Refills: 2 | Status: SHIPPED | OUTPATIENT
Start: 2017-06-12 | End: 2018-03-10 | Stop reason: SDUPTHER

## 2017-06-30 ENCOUNTER — LAB VISIT (OUTPATIENT)
Dept: LAB | Facility: HOSPITAL | Age: 67
End: 2017-06-30
Attending: INTERNAL MEDICINE
Payer: MEDICARE

## 2017-06-30 ENCOUNTER — PATIENT MESSAGE (OUTPATIENT)
Dept: RHEUMATOLOGY | Facility: CLINIC | Age: 67
End: 2017-06-30

## 2017-06-30 ENCOUNTER — OFFICE VISIT (OUTPATIENT)
Dept: INTERNAL MEDICINE | Facility: CLINIC | Age: 67
End: 2017-06-30
Payer: MEDICARE

## 2017-06-30 VITALS
OXYGEN SATURATION: 97 % | HEART RATE: 77 BPM | TEMPERATURE: 98 F | DIASTOLIC BLOOD PRESSURE: 70 MMHG | WEIGHT: 142.63 LBS | HEIGHT: 66 IN | SYSTOLIC BLOOD PRESSURE: 140 MMHG | BODY MASS INDEX: 22.92 KG/M2

## 2017-06-30 DIAGNOSIS — M79.672 FOOT PAIN, BILATERAL: Primary | ICD-10-CM

## 2017-06-30 DIAGNOSIS — M79.671 FOOT PAIN, BILATERAL: ICD-10-CM

## 2017-06-30 DIAGNOSIS — Z51.81 MEDICATION MONITORING ENCOUNTER: Chronic | ICD-10-CM

## 2017-06-30 DIAGNOSIS — M79.672 FOOT PAIN, BILATERAL: ICD-10-CM

## 2017-06-30 DIAGNOSIS — M79.671 FOOT PAIN, BILATERAL: Primary | ICD-10-CM

## 2017-06-30 LAB
ALBUMIN SERPL BCP-MCNC: 3.7 G/DL
ALP SERPL-CCNC: 59 U/L
ALT SERPL W/O P-5'-P-CCNC: 14 U/L
ANION GAP SERPL CALC-SCNC: 9 MMOL/L
AST SERPL-CCNC: 21 U/L
BASOPHILS # BLD AUTO: 0.05 K/UL
BASOPHILS NFR BLD: 0.9 %
BILIRUB SERPL-MCNC: 0.3 MG/DL
BUN SERPL-MCNC: 24 MG/DL
CALCIUM SERPL-MCNC: 9.2 MG/DL
CHLORIDE SERPL-SCNC: 101 MMOL/L
CO2 SERPL-SCNC: 28 MMOL/L
CREAT SERPL-MCNC: 0.8 MG/DL
CRP SERPL-MCNC: 8.2 MG/L
DIFFERENTIAL METHOD: ABNORMAL
EOSINOPHIL # BLD AUTO: 0.2 K/UL
EOSINOPHIL NFR BLD: 2.6 %
ERYTHROCYTE [DISTWIDTH] IN BLOOD BY AUTOMATED COUNT: 12.2 %
ERYTHROCYTE [SEDIMENTATION RATE] IN BLOOD BY WESTERGREN METHOD: 14 MM/HR
EST. GFR  (AFRICAN AMERICAN): >60 ML/MIN/1.73 M^2
EST. GFR  (NON AFRICAN AMERICAN): >60 ML/MIN/1.73 M^2
GLUCOSE SERPL-MCNC: 91 MG/DL
HCT VFR BLD AUTO: 35.8 %
HGB BLD-MCNC: 11.9 G/DL
LYMPHOCYTES # BLD AUTO: 1.4 K/UL
LYMPHOCYTES NFR BLD: 23.8 %
MCH RBC QN AUTO: 30.1 PG
MCHC RBC AUTO-ENTMCNC: 33.2 %
MCV RBC AUTO: 90 FL
MONOCYTES # BLD AUTO: 0.5 K/UL
MONOCYTES NFR BLD: 8.3 %
NEUTROPHILS # BLD AUTO: 3.7 K/UL
NEUTROPHILS NFR BLD: 64.4 %
PLATELET # BLD AUTO: 284 K/UL
PMV BLD AUTO: 9.8 FL
POTASSIUM SERPL-SCNC: 4.2 MMOL/L
PROT SERPL-MCNC: 7.2 G/DL
RBC # BLD AUTO: 3.96 M/UL
SODIUM SERPL-SCNC: 138 MMOL/L
URATE SERPL-MCNC: 3.6 MG/DL
WBC # BLD AUTO: 5.76 K/UL

## 2017-06-30 PROCEDURE — 36415 COLL VENOUS BLD VENIPUNCTURE: CPT | Mod: PO

## 2017-06-30 PROCEDURE — 85025 COMPLETE CBC W/AUTO DIFF WBC: CPT | Mod: PO

## 2017-06-30 PROCEDURE — 99999 PR PBB SHADOW E&M-EST. PATIENT-LVL V: CPT | Mod: PBBFAC,,, | Performed by: PHYSICIAN ASSISTANT

## 2017-06-30 PROCEDURE — 80053 COMPREHEN METABOLIC PANEL: CPT | Mod: PO

## 2017-06-30 PROCEDURE — 99213 OFFICE O/P EST LOW 20 MIN: CPT | Mod: S$PBB,,, | Performed by: PHYSICIAN ASSISTANT

## 2017-06-30 PROCEDURE — 85651 RBC SED RATE NONAUTOMATED: CPT | Mod: PO

## 2017-06-30 PROCEDURE — 84550 ASSAY OF BLOOD/URIC ACID: CPT | Mod: PO

## 2017-06-30 PROCEDURE — 86140 C-REACTIVE PROTEIN: CPT

## 2017-06-30 RX ORDER — PREDNISONE 20 MG/1
40 TABLET ORAL DAILY
Qty: 10 TABLET | Refills: 0 | Status: SHIPPED | OUTPATIENT
Start: 2017-06-30 | End: 2017-07-05

## 2017-06-30 NOTE — PROGRESS NOTES
"  Subjective:      Patient ID: Leticia Piña is a 67 y.o. female.    Chief Complaint: Other (possible gout)    Improved with feet elevated. See's rheumatology for RA. History of foot pain but this foot pain is different.       Foot Injury    There was no injury mechanism. The pain is present in the left foot and right foot (bilateral feet). The quality of the pain is described as burning. The pain is at a severity of 7/10. The pain is moderate. The pain has been worsening since onset. Associated symptoms include muscle weakness. Pertinent negatives include no inability to bear weight, loss of motion, loss of sensation, numbness or tingling. Associated symptoms comments: burning. She reports no foreign bodies present. The symptoms are aggravated by weight bearing. She has tried elevation (mobic) for the symptoms. The treatment provided mild relief.       Review of Systems   Constitutional: Positive for activity change. Negative for unexpected weight change.   HENT: Negative for hearing loss, rhinorrhea and trouble swallowing.    Eyes: Negative for discharge and visual disturbance.   Respiratory: Negative for chest tightness and wheezing.    Cardiovascular: Negative for chest pain and palpitations.   Gastrointestinal: Negative for blood in stool, constipation, diarrhea and vomiting.   Endocrine: Negative for polydipsia and polyuria.   Genitourinary: Negative for difficulty urinating, dysuria, hematuria and menstrual problem.   Musculoskeletal: Positive for arthralgias. Negative for joint swelling and neck pain.   Neurological: Negative for tingling, weakness, numbness and headaches.   Psychiatric/Behavioral: Positive for dysphoric mood. Negative for confusion.     Objective:   BP (!) 140/70   Pulse 77   Temp 97.5 °F (36.4 °C) (Tympanic)   Ht 5' 6" (1.676 m)   Wt 64.7 kg (142 lb 10.2 oz)   SpO2 97%   BMI 23.02 kg/m²     Physical Exam   Constitutional: She appears well-developed and well-nourished. No " distress.   Cardiovascular: Intact distal pulses.    Pulses:       Dorsalis pedis pulses are 2+ on the right side, and 2+ on the left side.        Posterior tibial pulses are 2+ on the right side, and 2+ on the left side.   Musculoskeletal:        Feet:    Feet:   Right Foot:   Protective Sensation: 10 sites tested. 10 sites sensed.   Skin Integrity: Positive for erythema and callus. Negative for ulcer, blister, skin breakdown, warmth or dry skin.   Left Foot:   Protective Sensation: 10 sites tested. 10 sites sensed.   Skin Integrity: Positive for erythema and callus. Negative for ulcer, blister, skin breakdown, warmth or dry skin.   Skin: Skin is warm. Capillary refill takes less than 2 seconds. No rash noted. She is not diaphoretic.   Psychiatric: She has a normal mood and affect. Her behavior is normal. Judgment and thought content normal.   Vitals reviewed.                      Assessment:     1. Foot pain, bilateral      Plan:   Foot pain, bilateral  -     Uric acid; Future; Expected date: 06/30/2017  -     predniSONE (DELTASONE) 20 MG tablet; Take 2 tablets (40 mg total) by mouth once daily.  Dispense: 10 tablet; Refill: 0    -follow up with rheumatology as scheduled next week. Advised pt to have labs ordered by Dr. Alvarez done today as well.   -RICE    Return if symptoms worsen or fail to improve.

## 2017-06-30 NOTE — PATIENT INSTRUCTIONS
Rheumatoid Arthritis  You have rheumatoid arthritis (RA). This is a chronic disease that mainly affects the joints. Sometimes, it also affects other parts of the body. RA is an autoimmune disease. This means that the bodys immune system, which normally protects the body, causes harm instead. With RA, the immune system attacks the joints. The reason for this is unknown.  In most cases, RA affects pairs of joints on both sides of the body. These can include joints in both elbows, wrists, hands, knees, feet, or ankles. The disease often starts slowly. Early symptoms include stiffness, muscle aches, weakness, and fatigue. Over time, the joints may begin to hurt. They may also become warm, swollen, or tender. Symptoms may feel worse in the morning after a nights rest and may get better with activity.  With RA, you may have periods of active disease (when symptoms worsen) followed by periods of remission (when symptoms improve or go away). There is no known cure for RA. But medical treatment can slow or stop the progress of the disease. It can also help relieve symptoms. For advanced disease, surgery, such as joint replacement, may be the best option.  Home care  · If you were prescribed a medication, take it as directed.  · To help control swelling and pain, acetaminophen, ibuprofen, or another NSAID (non-steroidal anti-inflammatory drug) may be recommended. Note: If you have chronic liver or kidney disease or ever had a stomach ulcer or GI bleeding, tell your health care provider before taking any of these medications.  · Some persons find relief with heat (hot shower, hot bath, or heating pad). Others prefer cold (ice in a plastic bag, wrapped in a towel). Try both. Then use the method you like best. Use heat or cold for about 20 minutes, a few times a day.  · Exercise is a key part of treatment for RA. It helps reduce pain. It may also improve flexibility. Do your best to be active daily. Move your joints through  their full range of motion each morning. Avoid staying in any one position for long periods of time. Take breaks throughout the day and move around. Also, ask your health care provider or physical therapist what exercises are best for you.  · If you are overweight, lose weight. Extra weight puts stress on your joints.  · If you smoke, quit. Smoking raises the risk of other problems linked to RA.  · No herbal product or nutritional supplement has been proven to help RA. But treatments such as acupuncture and massage may help relieve pain.  · Talk to your health care provider or occupational therapist about easier ways to perform daily tasks. This may include the use of assistive devices. These are special tools that can help with things like dressing, bathing, cooking, driving, and moving or getting around.  Follow-up care  Follow up with your health care provider, or as advised.   When to seek medical advice  Call your health care provider right away if any of these occur:  · Increasing weakness, pale color of the skin, fainting  · Chest pain or shortness of breath  · Blood in vomit or stool (black or red color)  · Changes in vision  · Skin ulcers  · Fever of 101ºF (38.3ºC) or higher, or as directed by your health care provider  · New joint pain  · New rash  Resources  To learn more about RA, contact:  · Arth[employer], 444.913.9129, www.arthritis.org  · National [place] of Arthritis and Musculoskeletal and Skin Diseases (NIAMS), 163.958.2726, www.niams.nih.gov     Date Last Reviewed: 4/13/2015  © 2492-0248 [employer]. [address] [place] [address], [place] [address]. All rights reserved. This information is not intended as a substitute for professional medical care. Always follow your healthcare professional's instructions.

## 2017-07-08 ENCOUNTER — NURSE TRIAGE (OUTPATIENT)
Dept: ADMINISTRATIVE | Facility: CLINIC | Age: 67
End: 2017-07-08

## 2017-07-08 NOTE — TELEPHONE ENCOUNTER
"    Reason for Disposition   Blurred vision    Answer Assessment - Initial Assessment Questions  1. EYE DISCHARGE: "Is the discharge in one or both eyes?" "What color is it?" "How much is there?" "When did the discharge start?"      Woke with crusting on lashes on left eye, lower lid puffy yest. Now right eye looking like sclera red   2. REDNESS OF SCLERA: "Is the redness in one or both eyes?" "When did the redness start?"      Red bilat   3. EYELIDS: "Are the eyelids red or swollen?" If so, ask: "How much?"     Bottom left lid sl puffy, top maybe sl swollen, redness of bottom of left lid, tearing.   4. VISION: "Is there any difficulty seeing clearly?"      Puffiness on bottom lid, blurry due to tearing   5. PAIN: "Is there any pain? If so, ask: "How bad is it?" (Scale 1-10; or mild, moderate, severe)     - MILD (1-3): doesn't interfere with normal activities      - MODERATE (4-7): interferes with normal activities or awakens from sleep     - SEVERE (8-10): excruciating pain, unable to do any normal activities       No   6. CONTACT LENS: "Do you wear contacts?"     Out since yest   7. OTHER SYMPTOMS: "Do you have any other symptoms?" (e.g., fever, runny nose, cough)     No    Protocols used: ST EYE - PUS OR [id number]  rec UC due to red, puffy lids. Call back with questions.     "

## 2017-07-11 ENCOUNTER — OFFICE VISIT (OUTPATIENT)
Dept: RHEUMATOLOGY | Facility: CLINIC | Age: 67
End: 2017-07-11
Payer: MEDICARE

## 2017-07-11 VITALS
HEART RATE: 83 BPM | HEIGHT: 65 IN | WEIGHT: 143.31 LBS | SYSTOLIC BLOOD PRESSURE: 134 MMHG | DIASTOLIC BLOOD PRESSURE: 82 MMHG | BODY MASS INDEX: 23.88 KG/M2

## 2017-07-11 DIAGNOSIS — M35.01 SJOGREN'S SYNDROME WITH KERATOCONJUNCTIVITIS SICCA: ICD-10-CM

## 2017-07-11 DIAGNOSIS — Z51.81 MEDICATION MONITORING ENCOUNTER: Chronic | ICD-10-CM

## 2017-07-11 DIAGNOSIS — M05.771 RHEUMATOID ARTHRITIS INVOLVING RIGHT FOOT WITH POSITIVE RHEUMATOID FACTOR: ICD-10-CM

## 2017-07-11 DIAGNOSIS — M05.79 RHEUMATOID ARTHRITIS INVOLVING MULTIPLE SITES WITH POSITIVE RHEUMATOID FACTOR: Primary | ICD-10-CM

## 2017-07-11 PROBLEM — M02.9: Status: ACTIVE | Noted: 2017-03-08

## 2017-07-11 PROCEDURE — 99214 OFFICE O/P EST MOD 30 MIN: CPT | Mod: 25,S$PBB,, | Performed by: PHYSICIAN ASSISTANT

## 2017-07-11 PROCEDURE — 20600 DRAIN/INJ JOINT/BURSA W/O US: CPT | Mod: PBBFAC,PO | Performed by: PHYSICIAN ASSISTANT

## 2017-07-11 PROCEDURE — 20600 DRAIN/INJ JOINT/BURSA W/O US: CPT | Mod: S$PBB,,, | Performed by: PHYSICIAN ASSISTANT

## 2017-07-11 PROCEDURE — 99999 PR PBB SHADOW E&M-EST. PATIENT-LVL V: CPT | Mod: PBBFAC,,, | Performed by: PHYSICIAN ASSISTANT

## 2017-07-11 PROCEDURE — 99215 OFFICE O/P EST HI 40 MIN: CPT | Mod: PBBFAC,PO | Performed by: PHYSICIAN ASSISTANT

## 2017-07-11 RX ORDER — METHYLPREDNISOLONE ACETATE 80 MG/ML
8 INJECTION, SUSPENSION INTRA-ARTICULAR; INTRALESIONAL; INTRAMUSCULAR; SOFT TISSUE
Status: COMPLETED | OUTPATIENT
Start: 2017-07-11 | End: 2017-07-11

## 2017-07-11 RX ORDER — BETAMETHASONE SODIUM PHOSPHATE AND BETAMETHASONE ACETATE 3; 3 MG/ML; MG/ML
0.6 INJECTION, SUSPENSION INTRA-ARTICULAR; INTRALESIONAL; INTRAMUSCULAR; SOFT TISSUE
Status: COMPLETED | OUTPATIENT
Start: 2017-07-11 | End: 2017-07-11

## 2017-07-11 RX ADMIN — METHYLPREDNISOLONE ACETATE 8 MG: 80 INJECTION, SUSPENSION INTRALESIONAL; INTRAMUSCULAR; INTRASYNOVIAL; SOFT TISSUE at 11:07

## 2017-07-11 RX ADMIN — BETAMETHASONE SODIUM PHOSPHATE AND BETAMETHASONE ACETATE 0.6 MG: 3; 3 INJECTION, SUSPENSION INTRA-ARTICULAR; INTRALESIONAL; INTRAMUSCULAR at 11:07

## 2017-07-11 ASSESSMENT — CLINICAL DISEASE ACTIVITY INDEX (CDAI)
TENDER_JOINTS_COUNT: 3
PHYSICIAN_ASSESSMENT: 0
PATIENT_ASSESSMENT: 3
TOTAL_SCORE: 8
SWOLLEN_JOINTS_COUNT: 2

## 2017-07-11 NOTE — PROGRESS NOTES
Subjective:       Patient ID: Leticia Piña is a 67 y.o. female.    Chief Complaint: Rheumatoid Arthritis; sjogrens; and Osteopenia      Leticia is here today for rheumatology followup.  She has chronic seropositive rheumatoid arthritis, Sjogren's syndrome, osteopenia, and Vitamin D deficiency.  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  For RA, she is currently on mtx 15 mg once weekly, folic acid daily, and Plaquenil 200 mg twice daily. She was tapered off Cimzia over 1 year ago since her RA was doing so well. [facility] hand and foot x-rays done in 5/2016 showed no new erosions and x-rays were overall stable. 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  Right second IP joint has some redness and swelling, left third IP joint of her toe also developed some swelling but this seems to be better.  Leonard's neuroma left foot as well.  Some loss of metatarsal pad.  She does have orthotics in place and wide box tendon issues.  At last visit Dr. Alvarez injected the fourth MTP joint on the right foot which did help but she reports the pain has returned.  The other areas that were injected or feeling better.  She does have some pain on her right knee as well with some mild redness along the patella.  She does have some topical Voltaren gel not using this currently.  Ibuprofen intermittently.  Meloxicam causes some GI upset.  She has doxycycline that she only uses for rosacea flareups.  No recent fevers or infections.  Minimal morning stiffness. Hands hurt worse with touch. Reports her pain level today 6/10.  Mostly in her feet and right knee    For Sjogren's, she has +SSB, +SSA, +GIOVANNA 1:160 speckled in the past. She takes Evoxac twice daily. Seen by ophthalmology and prescribed plasma serum eye drops, which have worked great. Dry eyes have gotten better with plasma serum eye drops, mouth dryness is about  "the same. No parotid tenderness or swelling.     Osteopenia diagnosed by Dexa. Last scan 8/2014. TF -1.0, FN -1.8, spine -0.2. Previously, we did trial of fosamax but had GI upset and stopped. No prednisone. Currently taking estradiol, progesterone, calcium by diet only, and vit D 4000 international units daily. Last Vitamin D level was elevated at >96 in 8/2016. Is due for repeat bone density. No falls or fractures since last visit.           Review of Systems   Constitutional: Negative.  Negative for activity change, appetite change, chills, fatigue and fever.   HENT: Negative for congestion, ear pain, mouth sores, rhinorrhea, sinus pressure, sore throat and trouble swallowing.         No dry mouth   Eyes: Negative.  Negative for photophobia, pain and redness.        No swollen or red eyes, no dry eye     Respiratory: Negative for cough, choking, chest tightness, shortness of breath, wheezing and stridor.    Cardiovascular: Negative.  Negative for chest pain.   Gastrointestinal: Negative.  Negative for abdominal pain, blood in stool, diarrhea, nausea and vomiting.   Genitourinary: Negative.  Negative for dysuria, frequency, hematuria and urgency.   Musculoskeletal: Positive for arthralgias and joint swelling. Negative for back pain, gait problem, myalgias, neck pain and neck stiffness.   Skin: Negative.  Negative for color change, pallor and rash.   Neurological: Negative.  Negative for weakness.   Hematological: Negative for adenopathy.   Psychiatric/Behavioral: Negative for suicidal ideas.         Objective:     /82   Pulse 83   Ht 5' 5" (1.651 m)   Wt 65 kg (143 lb 4.8 oz)   BMI 23.85 kg/m²      Physical Exam   Constitutional: She is oriented to person, place, and time and well-developed, well-nourished, and in no distress. No distress.   HENT:   Head: Normocephalic and atraumatic.   Right Ear: External ear normal.   Left Ear: External ear normal.   Mouth/Throat: No oropharyngeal exudate.   Eyes: " Conjunctivae and EOM are normal. Pupils are equal, round, and reactive to light. No scleral icterus.   Neck: Normal range of motion. Neck supple. No thyromegaly present.   Cardiovascular: Normal rate, regular rhythm and normal heart sounds.    No murmur heard.  Pulmonary/Chest: Effort normal and breath sounds normal. She exhibits no tenderness.   Abdominal: Soft. Bowel sounds are normal.       Right Side Rheumatological Exam     Examination finds the shoulder, elbow, wrist, 1st PIP, 1st MCP, 2nd PIP, 2nd MCP, 3rd PIP, 3rd MCP, 4th PIP, 4th MCP, 5th PIP and 5th MCP normal.    The patient is tender to palpation of the knee, 4th MTP and 2nd toe IP    She has swelling of the 2nd toe IP    Left Side Rheumatological Exam     Examination finds the shoulder, elbow, wrist, knee, 1st PIP, 1st MCP, 2nd PIP, 2nd MCP, 3rd PIP, 3rd MCP, 4th PIP, 4th MCP, 5th PIP and 5th MCP normal.    The patient is tender to palpation of the 3rd toe IP.    She has swelling of the 3rd toe IP      Lymphadenopathy:     She has no cervical adenopathy.   Neurological: She is alert and oriented to person, place, and time. She displays normal reflexes. No cranial nerve deficit. She exhibits normal muscle tone. Gait normal.   Skin: Skin is warm and dry. No rash noted.     Musculoskeletal: Normal range of motion. She exhibits edema and tenderness.   DIP enlargement BL hands, no synovitis, warmth. Some erythema to DIP joints.     Negative metatarsal squeeze BL.    Right foot: Shooting pains when squeezed between the heads of the 3rd and 4th metatarsals. 5th metatarsal head TTP and enlarged, no swelling or erythema.    Left foot: enlarged 1st metatarsal plantar pad. TTP of 1st metatarsal head. No swelling or erythema, no synovitis.                  Results for orders placed or performed in visit on 06/30/17   CBC auto differential   Result Value Ref Range    WBC 5.76 3.90 - 12.70 K/uL    RBC 3.96 (L) 4.00 - 5.40 M/uL    Hemoglobin 11.9 (L) 12.0 - 16.0 g/dL     Hematocrit 35.8 (L) 37.0 - 48.5 %    MCV 90 82 - 98 fL    MCH 30.1 27.0 - 31.0 pg    MCHC 33.2 32.0 - 36.0 %    RDW 12.2 11.5 - 14.5 %    Platelets 284 150 - 350 K/uL    MPV 9.8 9.2 - 12.9 fL    Gran # 3.7 1.8 - 7.7 K/uL    Lymph # 1.4 1.0 - 4.8 K/uL    Mono # 0.5 0.3 - 1.0 K/uL    Eos # 0.2 0.0 - 0.5 K/uL    Baso # 0.05 0.00 - 0.20 K/uL    Gran% 64.4 38.0 - 73.0 %    Lymph% 23.8 18.0 - 48.0 %    Mono% 8.3 4.0 - 15.0 %    Eosinophil% 2.6 0.0 - 8.0 %    Basophil% 0.9 0.0 - 1.9 %    Differential Method Automated    Comprehensive metabolic panel   Result Value Ref Range    Sodium 138 136 - 145 mmol/L    Potassium 4.2 3.5 - 5.1 mmol/L    Chloride 101 95 - 110 mmol/L    CO2 28 23 - 29 mmol/L    Glucose 91 70 - 110 mg/dL    BUN, Bld 24 (H) 8 - 23 mg/dL    Creatinine 0.8 0.5 - 1.4 mg/dL    Calcium 9.2 8.7 - 10.5 mg/dL    Total Protein 7.2 6.0 - 8.4 g/dL    Albumin 3.7 3.5 - 5.2 g/dL    Total Bilirubin 0.3 0.1 - 1.0 mg/dL    Alkaline Phosphatase 59 55 - 135 U/L    AST 21 10 - 40 U/L    ALT 14 10 - 44 U/L    Anion Gap 9 8 - 16 mmol/L    eGFR if African American >60 >60 mL/min/1.73 m^2    eGFR if non African American >60 >60 mL/min/1.73 m^2   C-reactive protein   Result Value Ref Range    CRP 8.2 0.0 - 8.2 mg/L   Sedimentation rate, manual   Result Value Ref Range    Sed Rate 14 0 - 20 mm/Hr   Uric acid   Result Value Ref Range    Uric Acid 3.6 2.4 - 5.7 mg/dL       X-ray BL hand and foot 5/2016: no new erosions - stable    DEXA 8/6/14 TF -1.0, FN -1.8, spine -0.2 osteopenia- improved bmd  DEXA 12/31/11 total femur T score -1.1, femur neck -1.6, spine -0.6  DEXA 8/18/09 total femur T score -1.2, femur neck -1.7, spine 1.0      Assessment:       1. Rheumatoid arthritis involving multiple sites with positive rheumatoid factor    2. Rheumatoid arthritis involving right foot with positive rheumatoid factor    3. Sjogren's syndrome with keratoconjunctivitis sicca    4. Medication monitoring encounter        1. RA in remission-  Currently taking mtx 15 mg once weekly, folic acid daily, and Plaquenil 200 mg twice daily. She was tapered off Cimzia about 1 year ago since her RA was doing so well. Today she has 0 tender/0 swollen joints. MARGARITA 0,  CDAI 8. hand and foot x-ray up to date due for repeat  Most of her symptoms predominantly in her feet    2. Sjogrens - dryness of eyes and mouth. On evoxac twice daily and plasma serum eye drops prescribed by ophthalmologist.     3. 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 Osteopenia - DEXA due now. Currently taking estradiol, progesterone, calcium by diet only, and vit D 4000 international units daily. Vitamin D level elevated to >96 in 8/2016. In the past failed a trial of oral bisphosphonate secondary GI intolerance    5. Vaccination status: up to date - except pneumovax will need booster last done 2007    6. Bilateral foot pain-- Left 1st mtp bursitis - and possible santana neuroma right foot- will refer to ortho- foot/ankle specialist     7. Allergic rhinitis and URI    8. Right 4th mtp joint pain and neuroma .     Plan:         Locally inject right 4th mtp joint today  Procedure note: After verbal consent was obtained.  The right 4th mtp joint was prepared with sterile prep.  The skin was anesthetized with 1% ethyl chloride.  The right 4th  joint was injected with  0.3 ml of 1% lidocaine to anesthestize the area. The joint was then injected  with combination of 0.1 mL celestone/soluspan 30 mg/5 mL, 0.1 mL Depo-Medrol 80 mg/mL and  0.1 mL of 0.5% ropivacaine.  Hemostasis was obtained. The patient tolerated procedure well with no complications.  Patient diischarged with icing instructions      Continue taking mtx 15 mg once weekly, folic acid daily, and Plaquenil 200 mg twice daily.   Hold off adding back Biologics just yet, certainly can go back to Cimzia if needed she did well with this    Obtained a "[employer] DA looking for disease activity  Repeat her bilateral hand and foot x-rays this week  looking for any RA changes in her feet  Continue with her wide box shoes, orthotics, maybe metatarsal padding would be an extra benefit     Continue taking evoxac twice daily and plasma serum eye drops. Can also try OTC Xylimelts for dry mouth.    Dexa  scanner.  2-3 years. Repeat dexa every 2 years.   Stop taking Vitamin D daily. Vitamin D was elevated, so no need to continue taking Vitamin D.  Check Vitamin D next visit     Use Voltaren gel and iburpfen prn, consider compound topical next visit     Return to clinic in 6-8 weeks with labs were reviewed her x-rays, Vectra DA and make therapeutic decisions    Call with any questions, changes or concerns.

## 2017-07-14 ENCOUNTER — HOSPITAL ENCOUNTER (OUTPATIENT)
Dept: RADIOLOGY | Facility: HOSPITAL | Age: 67
Discharge: HOME | End: 2017-07-14
Attending: INTERNAL MEDICINE
Payer: MEDICARE

## 2017-07-14 DIAGNOSIS — M05.79 RHEUMATOID ARTHRITIS INVOLVING MULTIPLE SITES WITH POSITIVE RHEUMATOID FACTOR: ICD-10-CM

## 2017-07-14 PROCEDURE — 73130 X-RAY EXAM OF HAND: CPT | Mod: 26,RT,, | Performed by: RADIOLOGY

## 2017-07-14 PROCEDURE — 73130 X-RAY EXAM OF HAND: CPT | Mod: 26,LT,, | Performed by: RADIOLOGY

## 2017-07-14 PROCEDURE — 73630 X-RAY EXAM OF FOOT: CPT | Mod: 26,LT,, | Performed by: RADIOLOGY

## 2017-07-14 PROCEDURE — 73630 X-RAY EXAM OF FOOT: CPT | Mod: 26,RT,, | Performed by: RADIOLOGY

## 2017-07-14 PROCEDURE — 73130 X-RAY EXAM OF HAND: CPT | Mod: 50,TC,PO

## 2017-07-14 PROCEDURE — 73630 X-RAY EXAM OF FOOT: CPT | Mod: 50,TC,PO

## 2017-07-19 LAB
ANNOTATION COMMENT IMP: NORMAL
CRP RESULT: 7.2 MG/L
EGF RESULT: 110 PG/ML
GAMMA INTERFERON BACKGROUND BLD IA-ACNC: 0.07 IU/ML
L-6 RESULT: 9 PG/ML
LEPTIN RESULT: 14 NG/ML
Lab: NORMAL
M TB IFN-G BLD-IMP: NEGATIVE
M TB IFN-G CD4+ BCKGRND COR BLD-ACNC: 0.03 IU/ML
M TB IFN-G CD4+ T-CELLS BLD-ACNC: 0.1 IU/ML
MITOGEN IGNF BLD-ACNC: >10 IU/ML
MMP-1 RESULT: 4.1 NG/ML
MMP-3 RESULT: 45 NG/ML
QUANTIFERON TB GOLD (INCUBATED): NORMAL
RANGE: NORMAL
RESISTIN RESULT: 8.2 NG/ML
SAA RESULT: 2.5 UG/ML
SERVICE CMNT-IMP: NORMAL
TNF-RI RESULT: 1.4 NG/ML
VCAM-1 RESULT: 0.4 UG/ML
VECTRA(R) DA LEVEL: NORMAL
VECTRA(R) DA SCORE: 39
VEGF-A RESULT: 480 PG/ML
YKL-40 RESULT: 38 NG/ML

## 2017-07-26 ENCOUNTER — PATIENT MESSAGE (OUTPATIENT)
Dept: RHEUMATOLOGY | Facility: CLINIC | Age: 67
End: 2017-07-26

## 2017-07-26 DIAGNOSIS — M79.671 BILATERAL FOOT PAIN: ICD-10-CM

## 2017-07-26 DIAGNOSIS — M79.672 BILATERAL FOOT PAIN: ICD-10-CM

## 2017-07-26 DIAGNOSIS — M05.79 RHEUMATOID ARTHRITIS INVOLVING MULTIPLE SITES WITH POSITIVE RHEUMATOID FACTOR: Primary | ICD-10-CM

## 2017-07-26 DIAGNOSIS — M02.9: ICD-10-CM

## 2017-07-27 NOTE — TELEPHONE ENCOUNTER
Please schedule whole body bone scan  Please obtain vectra DA results from [facility] done 7/14/17    Thanks  Leta

## 2017-07-30 DIAGNOSIS — F41.8 MIXED ANXIETY AND DEPRESSIVE DISORDER: ICD-10-CM

## 2017-08-01 ENCOUNTER — PATIENT MESSAGE (OUTPATIENT)
Dept: RHEUMATOLOGY | Facility: CLINIC | Age: 67
End: 2017-08-01

## 2017-08-01 RX ORDER — LORAZEPAM 1 MG/1
TABLET ORAL
Qty: 60 TABLET | Refills: 1 | Status: SHIPPED | OUTPATIENT
Start: 2017-08-01 | End: 2017-12-06 | Stop reason: SDUPTHER

## 2017-08-04 ENCOUNTER — HOSPITAL ENCOUNTER (OUTPATIENT)
Dept: RADIOLOGY | Facility: HOSPITAL | Age: 67
Discharge: HOME | End: 2017-08-04
Attending: INTERNAL MEDICINE
Payer: MEDICARE

## 2017-08-04 DIAGNOSIS — M05.79 RHEUMATOID ARTHRITIS INVOLVING MULTIPLE SITES WITH POSITIVE RHEUMATOID FACTOR: ICD-10-CM

## 2017-08-04 DIAGNOSIS — M79.672 BILATERAL FOOT PAIN: ICD-10-CM

## 2017-08-04 DIAGNOSIS — M79.671 BILATERAL FOOT PAIN: ICD-10-CM

## 2017-08-04 DIAGNOSIS — M02.9: ICD-10-CM

## 2017-08-04 PROCEDURE — A9503 TC99M MEDRONATE: HCPCS | Mod: PO

## 2017-08-04 PROCEDURE — 78306 BONE IMAGING WHOLE BODY: CPT | Mod: 26,,, | Performed by: RADIOLOGY

## 2017-08-08 ENCOUNTER — TELEPHONE (OUTPATIENT)
Dept: RHEUMATOLOGY | Facility: CLINIC | Age: 67
End: 2017-08-08

## 2017-08-08 ENCOUNTER — PATIENT MESSAGE (OUTPATIENT)
Dept: RHEUMATOLOGY | Facility: CLINIC | Age: 67
End: 2017-08-08

## 2017-08-08 NOTE — PROGRESS NOTES
Spoke with Leticia- she wishes to go back on Cimzia as was working well and she has some rather than try Orencia first.  Bone scan showed increase uptake in hands , feet, wrist , lumbar area.  Vectra elevated at 39- need to be more agressive than just Dmard

## 2017-08-08 NOTE — TELEPHONE ENCOUNTER
Vectra at 39- moderate activity- consider moving to [facility] with prior drug induced Lupus on humira.  Dxa improved on ert- frax moderate- no changes needed

## 2017-08-09 ENCOUNTER — TELEPHONE (OUTPATIENT)
Dept: RHEUMATOLOGY | Facility: CLINIC | Age: 67
End: 2017-08-09

## 2017-08-09 DIAGNOSIS — L93.2 DRUG-INDUCED LUPUS ERYTHEMATOSUS: ICD-10-CM

## 2017-08-09 DIAGNOSIS — T50.905A DRUG-INDUCED LUPUS ERYTHEMATOSUS: ICD-10-CM

## 2017-08-09 DIAGNOSIS — M05.79 RHEUMATOID ARTHRITIS INVOLVING MULTIPLE SITES WITH POSITIVE RHEUMATOID FACTOR: ICD-10-CM

## 2017-08-09 RX ORDER — METHOTREXATE 2.5 MG/1
15 TABLET ORAL
Qty: 30 TABLET | Refills: 3 | Status: SHIPPED | OUTPATIENT
Start: 2017-08-09 | End: 2018-01-10 | Stop reason: SDUPTHER

## 2017-08-23 ENCOUNTER — PATIENT MESSAGE (OUTPATIENT)
Dept: RHEUMATOLOGY | Facility: CLINIC | Age: 67
End: 2017-08-23

## 2017-08-23 RX ORDER — MELOXICAM 15 MG/1
15 TABLET ORAL DAILY
COMMUNITY
End: 2017-08-23 | Stop reason: SDUPTHER

## 2017-08-23 RX ORDER — MELOXICAM 15 MG/1
15 TABLET ORAL DAILY
Qty: 30 TABLET | Refills: 6 | Status: SHIPPED | OUTPATIENT
Start: 2017-08-23 | End: 2018-07-31 | Stop reason: SDUPTHER

## 2017-09-18 ENCOUNTER — OFFICE VISIT (OUTPATIENT)
Dept: RHEUMATOLOGY | Facility: CLINIC | Age: 67
End: 2017-09-18
Payer: MEDICARE

## 2017-09-18 ENCOUNTER — LAB VISIT (OUTPATIENT)
Dept: LAB | Facility: HOSPITAL | Age: 67
End: 2017-09-18
Attending: INTERNAL MEDICINE
Payer: MEDICARE

## 2017-09-18 VITALS
HEIGHT: 65 IN | BODY MASS INDEX: 24.43 KG/M2 | DIASTOLIC BLOOD PRESSURE: 85 MMHG | WEIGHT: 146.63 LBS | SYSTOLIC BLOOD PRESSURE: 144 MMHG | HEART RATE: 76 BPM

## 2017-09-18 DIAGNOSIS — M05.79 RHEUMATOID ARTHRITIS INVOLVING MULTIPLE SITES WITH POSITIVE RHEUMATOID FACTOR: Primary | ICD-10-CM

## 2017-09-18 DIAGNOSIS — Z51.81 MEDICATION MONITORING ENCOUNTER: Chronic | ICD-10-CM

## 2017-09-18 DIAGNOSIS — T50.905A DRUG-INDUCED LUPUS ERYTHEMATOSUS: ICD-10-CM

## 2017-09-18 DIAGNOSIS — E55.9 VITAMIN D DEFICIENCY: ICD-10-CM

## 2017-09-18 DIAGNOSIS — M89.9 DISORDER OF BONE: ICD-10-CM

## 2017-09-18 DIAGNOSIS — M35.01 SJOGREN'S SYNDROME WITH KERATOCONJUNCTIVITIS SICCA: ICD-10-CM

## 2017-09-18 DIAGNOSIS — R21 SKIN RASH: ICD-10-CM

## 2017-09-18 DIAGNOSIS — M85.80 OSTEOPENIA, UNSPECIFIED LOCATION: ICD-10-CM

## 2017-09-18 DIAGNOSIS — M05.772 RHEUMATOID ARTHRITIS INVOLVING LEFT FOOT WITH POSITIVE RHEUMATOID FACTOR: ICD-10-CM

## 2017-09-18 DIAGNOSIS — L93.2 DRUG-INDUCED LUPUS ERYTHEMATOSUS: ICD-10-CM

## 2017-09-18 LAB
ALBUMIN SERPL BCP-MCNC: 3.7 G/DL
ALP SERPL-CCNC: 48 U/L
ALT SERPL W/O P-5'-P-CCNC: 12 U/L
ANION GAP SERPL CALC-SCNC: 11 MMOL/L
AST SERPL-CCNC: 22 U/L
BASOPHILS # BLD AUTO: 0.08 K/UL
BASOPHILS NFR BLD: 1.4 %
BILIRUB SERPL-MCNC: 0.2 MG/DL
BUN SERPL-MCNC: 18 MG/DL
CALCIUM SERPL-MCNC: 9 MG/DL
CHLORIDE SERPL-SCNC: 99 MMOL/L
CO2 SERPL-SCNC: 28 MMOL/L
CREAT SERPL-MCNC: 0.8 MG/DL
DIFFERENTIAL METHOD: ABNORMAL
EOSINOPHIL # BLD AUTO: 0.3 K/UL
EOSINOPHIL NFR BLD: 4.8 %
ERYTHROCYTE [DISTWIDTH] IN BLOOD BY AUTOMATED COUNT: 12.3 %
ERYTHROCYTE [SEDIMENTATION RATE] IN BLOOD BY WESTERGREN METHOD: 8 MM/HR
EST. GFR  (AFRICAN AMERICAN): >60 ML/MIN/1.73 M^2
EST. GFR  (NON AFRICAN AMERICAN): >60 ML/MIN/1.73 M^2
GLUCOSE SERPL-MCNC: 101 MG/DL
HCT VFR BLD AUTO: 35.7 %
HGB BLD-MCNC: 11.8 G/DL
LYMPHOCYTES # BLD AUTO: 1.9 K/UL
LYMPHOCYTES NFR BLD: 33.6 %
MCH RBC QN AUTO: 29.6 PG
MCHC RBC AUTO-ENTMCNC: 33.1 G/DL
MCV RBC AUTO: 90 FL
MONOCYTES # BLD AUTO: 0.4 K/UL
MONOCYTES NFR BLD: 7.9 %
NEUTROPHILS # BLD AUTO: 2.9 K/UL
NEUTROPHILS NFR BLD: 52.3 %
PLATELET # BLD AUTO: 293 K/UL
PMV BLD AUTO: 10.4 FL
POTASSIUM SERPL-SCNC: 3.9 MMOL/L
PROT SERPL-MCNC: 7.4 G/DL
RBC # BLD AUTO: 3.98 M/UL
SODIUM SERPL-SCNC: 138 MMOL/L
WBC # BLD AUTO: 5.59 K/UL

## 2017-09-18 PROCEDURE — 85651 RBC SED RATE NONAUTOMATED: CPT | Mod: PO

## 2017-09-18 PROCEDURE — 99215 OFFICE O/P EST HI 40 MIN: CPT | Mod: PBBFAC,PO | Performed by: INTERNAL MEDICINE

## 2017-09-18 PROCEDURE — 20600 DRAIN/INJ JOINT/BURSA W/O US: CPT | Mod: PBBFAC,PO | Performed by: INTERNAL MEDICINE

## 2017-09-18 PROCEDURE — 85025 COMPLETE CBC W/AUTO DIFF WBC: CPT | Mod: PO

## 2017-09-18 PROCEDURE — 20600 DRAIN/INJ JOINT/BURSA W/O US: CPT | Mod: S$PBB,GC,, | Performed by: INTERNAL MEDICINE

## 2017-09-18 PROCEDURE — 99214 OFFICE O/P EST MOD 30 MIN: CPT | Mod: 25,S$PBB,, | Performed by: INTERNAL MEDICINE

## 2017-09-18 PROCEDURE — 86140 C-REACTIVE PROTEIN: CPT

## 2017-09-18 PROCEDURE — 36415 COLL VENOUS BLD VENIPUNCTURE: CPT | Mod: PO

## 2017-09-18 PROCEDURE — 99999 PR PBB SHADOW E&M-EST. PATIENT-LVL V: CPT | Mod: PBBFAC,,, | Performed by: INTERNAL MEDICINE

## 2017-09-18 PROCEDURE — 80053 COMPREHEN METABOLIC PANEL: CPT | Mod: PO

## 2017-09-18 RX ORDER — METHYLPREDNISOLONE ACETATE 80 MG/ML
10 INJECTION, SUSPENSION INTRA-ARTICULAR; INTRALESIONAL; INTRAMUSCULAR; SOFT TISSUE
Status: COMPLETED | OUTPATIENT
Start: 2017-09-18 | End: 2017-09-18

## 2017-09-18 RX ORDER — BETAMETHASONE SODIUM PHOSPHATE AND BETAMETHASONE ACETATE 3; 3 MG/ML; MG/ML
1 INJECTION, SUSPENSION INTRA-ARTICULAR; INTRALESIONAL; INTRAMUSCULAR; SOFT TISSUE
Status: COMPLETED | OUTPATIENT
Start: 2017-09-18 | End: 2017-09-18

## 2017-09-18 RX ORDER — TRIAMCINOLONE ACETONIDE 5 MG/G
CREAM TOPICAL 2 TIMES DAILY
Qty: 1 TUBE | Refills: 3 | Status: SHIPPED | OUTPATIENT
Start: 2017-09-18 | End: 2019-03-05 | Stop reason: SDUPTHER

## 2017-09-18 RX ADMIN — BETAMETHASONE SODIUM PHOSPHATE AND BETAMETHASONE ACETATE 1.02 MG: 3; 3 INJECTION, SUSPENSION INTRA-ARTICULAR; INTRALESIONAL; INTRAMUSCULAR at 06:09

## 2017-09-18 RX ADMIN — METHYLPREDNISOLONE ACETATE 10.4 MG: 80 INJECTION, SUSPENSION INTRALESIONAL; INTRAMUSCULAR; INTRASYNOVIAL; SOFT TISSUE at 06:09

## 2017-09-18 ASSESSMENT — CLINICAL DISEASE ACTIVITY INDEX (CDAI)
SWOLLEN_JOINTS_COUNT: 0
TENDER_JOINTS_COUNT: 1
PHYSICIAN_ASSESSMENT: 2
PATIENT_ASSESSMENT: 2
TOTAL_SCORE: 5

## 2017-09-18 ASSESSMENT — ROUTINE ASSESSMENT OF PATIENT INDEX DATA (RAPID3): MDHAQ FUNCTION SCORE: 0

## 2017-09-18 NOTE — PROGRESS NOTES
Subjective:       Patient ID: Leticia Piña is a 67 y.o. female.    Chief Complaint: Rheumatoid Arthritis    HPI 67YF here for f/u seropositive RA, Sjogrens, Osteopenia, Vit D def. Since last visit, pt has restarted Cimzia 400mg? q 4 weeks X2 doses since 0717 with last dose about 2 weeks ago. Pt states that she feels the difference with restarting the medication and she feels much improved. Pt still currently on MTX 15mg, plaquenil 200mg BID.  AM stiffness for about 30mins--> 1hr. Pt reports developing skin rash on RLE that initial started as a red bump but has gotten bigger with flaking of skin. Pt denies itchiness, fever,chills, worsening with sun exposure, n/v, CP, SOB, abd pain, changes in bowel/bladder habits, joint erythema or swelling.      Had drug-induced lupus in the past secondary to Humira which has resolved. Uses topical Voltaren gel , ibuprofen and mobic occasionally although not using this currently. Meloxicam causes some GI upset.  She has doxycycline that she only uses for rosacea flareups    Pt with feet discomfort where in the past her Right second IP joint has some redness and swelling, left third IP joint of her toe also developed some swelling but this seems to be better as well as her right fourth MTP joint which was been painful and s/p steroid inj which provided relief last visit. Leonard's neuroma on the plantar surface as well as left foot.  Pt c/o L 5th MTP joint pain and would like injection this visit.      Previous hx  For Sjogren's, she has +SSB, +SSA, +GIOVANNA 1:160 speckled in the past. She takes Evoxac twice daily. Seen by ophthalmology and prescribed plasma serum eye drops, which have worked great. Dry eyes have gotten better with plasma serum eye drops, mouth dryness is about the same. No parotid tenderness or swelling.     RA: currently on MTX 15mg weekly + folic acid, plaquenil 200mg BID, tapered off Cimzia ~ 1yr ago since RA was doing well and no new erosions were observed  "on imaging done 05/16    Osteopenia diagnosed by Dexa. Last scan 8/2014. TF -1.0, FN -1.8, spine -0.2. Previously, we did trial of fosamax but had GI upset and stopped. No prednisone. Currently taking estradiol, progesterone, calcium by diet only, and vit D 1000 international units daily except Sunday where she does 2000U. Last Vitamin D level 66. No falls or fractures since last visit    Review of Systems   Constitutional: Negative for chills and fever.   HENT: Negative for congestion and trouble swallowing.         Dry mouth   Eyes: Negative for redness and visual disturbance.   Respiratory: Negative for cough, chest tightness and shortness of breath.    Cardiovascular: Negative for chest pain, palpitations and leg swelling.   Gastrointestinal: Negative for abdominal pain, blood in stool, constipation, diarrhea, nausea and vomiting.   Endocrine: Negative for polyuria.   Genitourinary: Negative for difficulty urinating, frequency and urgency.   Musculoskeletal: Positive for arthralgias. Negative for joint swelling.   Skin: Positive for rash. Negative for color change.   Allergic/Immunologic: Positive for immunocompromised state.   Neurological: Negative for dizziness, tremors and light-headedness.   Hematological: Does not bruise/bleed easily.   Psychiatric/Behavioral: Negative for decreased concentration and sleep disturbance.         Objective:   BP (!) 144/85   Pulse 76   Ht 5' 5" (1.651 m)   Wt 66.5 kg (146 lb 9.7 oz)   BMI 24.40 kg/m²      Physical Exam   Constitutional: She is oriented to person, place, and time and well-developed, well-nourished, and in no distress.   HENT:   Head: Normocephalic and atraumatic.   No swollen glands  No dry eyes or dry mouth   Eyes: EOM are normal. Pupils are equal, round, and reactive to light.   Neck: Normal range of motion. Neck supple.   Cardiovascular: Normal rate and regular rhythm.    Pulmonary/Chest: Effort normal and breath sounds normal.   Abdominal: Soft. Bowel " sounds are normal.       Right Side Rheumatological Exam     Examination finds the shoulder, elbow, wrist, knee, 1st PIP, 1st MCP, 2nd PIP, 2nd MCP, 3rd PIP, 3rd MCP, 4th PIP, 4th MCP, 5th PIP and 5th MCP normal.    Knee Exam     Range of Motion   The patient has normal right knee ROM.    Foot Exam   Right foot exam exhibits signs of no podagra    Range of Motion   The patient has normal right ankle ROM.    Muscle Strength (0-5 scale):  Neck Flexion:  5  Neck Extension: 5  Deltoid:  5  Biceps: 4/5   Triceps:  5  : 5/5   Iliopsoas: 5  Quadriceps:  5   Distal Lower Extremity: 5    Left Side Rheumatological Exam     Examination finds the shoulder, elbow, wrist, knee, 1st PIP, 1st MCP, 2nd PIP, 2nd MCP, 3rd PIP, 3rd MCP, 4th PIP, 4th MCP, 5th PIP and 5th MCP normal.    Knee Exam     Range of Motion   The patient has normal left knee ROM.    Foot Exam   Left foot exam exhibits signs of no podagra and no plantar fasciitis    Range of Motion   The patient has normal left ankle ROM.     Muscle Strength (0-5 scale):  Neck Flexion:  5  Neck Extension: 5  Deltoid:  5  Biceps: 5/5   Triceps:  5  :  5/5   Iliopsoas: 5  Quadriceps:  5   Distal Lower Extremity: 5      Lymphadenopathy:     She has no cervical adenopathy.   Neurological: She is alert and oriented to person, place, and time.   Skin: Skin is warm and dry. Rash noted.     Plaque like Rash on R anterior shin of Lower extremity? psoarisis   Psychiatric: Affect normal.   Musculoskeletal: Normal range of motion. She exhibits tenderness. She exhibits no edema.   L foot:   TTP on 5th metatarsal head, no redness or swelling  Metatarsalgia    R foot : no signs of synovitis    + Herberdens nodes b/l         Results for JAM TRIPP (MRN [id number]) as of 9/18/2017 17:47   Ref. Range 6/30/2017 14:24   WBC Latest Ref Range: 3.90 - 12.70 K/uL 5.76   RBC Latest Ref Range: 4.00 - 5.40 M/uL 3.96 (L)   Hemoglobin Latest Ref Range: 12.0 - 16.0 g/dL 11.9 (L)   Hematocrit  Latest Ref Range: 37.0 - 48.5 % 35.8 (L)   MCV Latest Ref Range: 82 - 98 fL 90   MCH Latest Ref Range: 27.0 - 31.0 pg 30.1   MCHC Latest Ref Range: 32.0 - 36.0 % 33.2   RDW Latest Ref Range: 11.5 - 14.5 % 12.2   Platelets Latest Ref Range: 150 - 350 K/uL 284   MPV Latest Ref Range: 9.2 - 12.9 fL 9.8   Gran% Latest Ref Range: 38.0 - 73.0 % 64.4   Gran # Latest Ref Range: 1.8 - 7.7 K/uL 3.7   Lymph% Latest Ref Range: 18.0 - 48.0 % 23.8   Lymph # Latest Ref Range: 1.0 - 4.8 K/uL 1.4   Mono% Latest Ref Range: 4.0 - 15.0 % 8.3   Mono # Latest Ref Range: 0.3 - 1.0 K/uL 0.5   Eosinophil% Latest Ref Range: 0.0 - 8.0 % 2.6   Eos # Latest Ref Range: 0.0 - 0.5 K/uL 0.2   Basophil% Latest Ref Range: 0.0 - 1.9 % 0.9   Baso # Latest Ref Range: 0.00 - 0.20 K/uL 0.05     Results for JESSIE TRIPPSHAWN DEVINE (MRN [id number]) as of 9/18/2017 17:47   Ref. Range 6/30/2017 14:24   Sodium Latest Ref Range: 136 - 145 mmol/L 138   Potassium Latest Ref Range: 3.5 - 5.1 mmol/L 4.2   Chloride Latest Ref Range: 95 - 110 mmol/L 101   CO2 Latest Ref Range: 23 - 29 mmol/L 28   Anion Gap Latest Ref Range: 8 - 16 mmol/L 9   BUN, Bld Latest Ref Range: 8 - 23 mg/dL 24 (H)   Creatinine Latest Ref Range: 0.5 - 1.4 mg/dL 0.8   eGFR if non African American Latest Ref Range: >60 mL/min/1.73 m^2 >60   eGFR if  Latest Ref Range: >60 mL/min/1.73 m^2 >60   Glucose Latest Ref Range: 70 - 110 mg/dL 91   Calcium Latest Ref Range: 8.7 - 10.5 mg/dL 9.2   Alkaline Phosphatase Latest Ref Range: 55 - 135 U/L 59   Total Protein Latest Ref Range: 6.0 - 8.4 g/dL 7.2   Albumin Latest Ref Range: 3.5 - 5.2 g/dL 3.7   Uric Acid Latest Ref Range: 2.4 - 5.7 mg/dL 3.6   Total Bilirubin Latest Ref Range: 0.1 - 1.0 mg/dL 0.3   AST Latest Ref Range: 10 - 40 U/L 21   ALT Latest Ref Range: 10 - 44 U/L 14   CRP Latest Ref Range: 0.0 - 8.2 mg/L 8.2     Results for JAM TRIPP (MRN [id number]) as of 9/18/2017 17:47   Ref. Range 8/25/2010 10:35 5/27/2015 15:23   GIOVANNA  Latest Ref Range: Neg <1:160  Negative    Anti-SSA Antibody Latest Ref Range: 0.00 - 19.99 EU  104.81 (H)   Anti-SSA Interpretation Latest Ref Range: Negative   Positive (A)   Anti-SSB Antibody Latest Ref Range: 0.00 - 19.99 EU  53.94 (H)   Anti-SSB Interpretation Latest Ref Range: Negative   Positive (A)       Results for JAM TRIPP (MRN [id number]) as of 9/18/2017 17:47   Ref. Range 5/27/2015 15:23   Cytoplasmic Neutrophilic Ab Latest Ref Range: <1:20 Titer <1:20   Perinuclear (P-ANCA) Latest Ref Range: <1:20 Titer <1:20     Results for JAM TRIPP (MRN [id number]) as of 9/18/2017 17:47   Ref. Range 5/27/2015 15:23   Complement (C-3) Latest Ref Range: 50 - 180 mg/dL 98   Complement (C-4) Latest Ref Range: 11 - 44 mg/dL 22   Results for JAM TRIPP (MRN [id number]) as of 9/18/2017 17:47   Ref. Range 9/2/2004 14:29 2/10/2009 10:14   CCP Antibodies Latest Ref Range: <5.0 U/mL  21.6 (A)   Rheumatoid Factor Latest Ref Range: 0 - 15 IU/ml Negative Negative     Results for JAM TRIPP (MRN [id number]) as of 9/18/2017 17:47   Ref. Range 3/8/2017 10:09 6/30/2017 14:24   Sed Rate Latest Ref Range: 0 - 20 mm/Hr 25 (H) 14     Results for JAM TRIPP (MRN [id number]) as of 9/18/2017 17:47   Ref. Range 3/8/2017 10:09 6/30/2017 14:24   CRP Latest Ref Range: 0.0 - 8.2 mg/L 6.8 8.2     Vectra DA : moderate disease activity    Xray hands 07/17  There are some mild degenerative changes at the interphalangeal joints bilaterally.  No obvious new erosive changes are identified.  There is degenerative change at the radiocarpal joint on the left    Xray foot 07/17  Right foot: There are stable erosive changes associated with the 5th metatarsal head.  Stable lucencies are noted within the 1st metatarsal head.  Mild degenerative changes present in the region of the midfoot.  Left foot: Stable degenerative changes involving the interphalangeal joints.  No new erosive changes are suggested    Bone scan  08/17  Mild degenerative change noted bilaterally in the shoulders, elbows, hands, hips, knees and ankles.  Degenerative changes noted in the L-spine most prominent involving the L5-S1 level.  Increased tracer localized within the L5 vertebrae, slightly greater on the RIGHT.  Consideration should be given for L-spine series.    Coned-down images of the hands and feet demonstrate increased uptake in the wrists, ankles and mid foot regions bilaterally. Slight increased uptake within the LEFT radiocarpal joint.  Multiple foci of increased uptake identified involving the MTP and IP joints of both feet as well as the MCP, first CMC and bilateral IP joints in the hands    DEXA 8/6/14 TF -1.0, FN -1.8, spine -0.2 osteopenia- improved bmd  DEXA 12/31/11 total femur T score -1.1, femur neck -1.6, spine -0.6  DEXA 8/18/09 total femur T score -1.2, femur neck -1.7, spine 1.0  Assessment:       1. Rheumatoid arthritis involving multiple sites with positive rheumatoid factor    2. Sjogren's syndrome with keratoconjunctivitis sicca    3. Drug-induced lupus erythematosus    4. Medication monitoring encounter    5. Reactive arthropathy of metatarsophalangeal (MTP) joint of lesser toe    6. Osteopenia, unspecified location    7. Rheumatoid arthritis involving left foot with positive rheumatoid factor    8. Skin rash    9. Vitamin D deficiency    10. Disorder of bone         RA     Plan:     Leticia was seen today for rheumatoid arthritis.    Diagnoses and all orders for this visit:    Rheumatoid arthritis involving multiple sites with positive rheumatoid factor  CDAI 5 with 1 tender, 0 swollen joint  Continue MTX 15mg + folic acid  Continue plaquenil 200mg BID  Continue Cimzia 400mg q monthly  Need to discuss with Vishnu regarding possibility of future  Cimzia infusions @ the office    Sjogren's syndrome with keratoconjunctivitis sicca  Continue Evoxac BID and plasma eye drops as prescribed by Optho    Drug-induced lupus erythematosus  from Humira  Resolved    Medication monitoring encounter  Needs labs today, no evidence of toxicity based on previous labs    Reactive arthropathy of metatarsophalangeal (MTP) joint of lesser toe    Osteopenia, unspecified location  Continue Vit D supplements. Currently on estradiol,progesterone.  Failed oral bisphosphonate in the past 2/2 GI issues  Repeat DEXA due    Rheumatoid arthritis involving left foot with positive rheumatoid factor-5th Mtp bursa  -     betamethasone acetate-betamethasone sodium phosphate injection 1.02 mg; Inject 0.17 mLs (1.02 mg total) into the articular space one time.  -     methylPREDNISolone acetate injection 10.4 mg; Inject 0.13 mLs (10.4 mg total) into the articular space one time.    Skin rash  psoriatic skin rash? Could be 2/2 cimzia  Trial of triamcinolone cream to affected area  If no improvement, consider referral to Derm    Vitamin D deficiency  Vit D 66. Continue Vit D supplements  -     DXA Bone Density Spine And Hip; Future    Disorder of bone   -     DXA Bone Density Spine And Hip; Future    Other orders  -     triamcinolone acetonide 0.5% (KENALOG) 0.5 % Crea; Apply topically 2 (two) times daily. Apply to affected area    Small Joint Aspiration/Injection  Date/Time: 9/18/2017 6:13 PM  Performed by: MAR SAINZ  Authorized by: MAR SAINZ     Consent Done?:  Yes (Verbal)  Indications:  Pain  Site marked: The procedure site was marked    Timeout: Prior to procedure the correct patient, procedure, and site was verified      Location:  Small toe  Site:  L small MTP  Needle size:  25 G  Aspirate amount (ml):  0  Patient tolerance:  Patient tolerated the procedure well with no immediate complications        Needs flu shot , consider giving 2 weeks after monthly Cimzia dose  RTC in 3 months with labs  I have personally reviewed the history with the patient in the room, examined the patient's joints and pertinent organ systems in the room and developed the above  Impressions and Plan. The Imp: and Plan were discussed with the patient and family(if available) before leaving the room. Supervised 5th Mtp injection EUSEBIA Alvarez MD

## 2017-09-18 NOTE — PATIENT INSTRUCTIONS
Labs today    Continue Cimzia monthly, discuss with Nurse Mares before running out of Cimzia about coming in to get Cimzia infusions    Continue Methotrexate, folic acid and plaquenil    Continue Vit D 1000U daily    Send script for Triamcinolone cream to affected area    Flu injection about 2 weeks after Cimzia dose

## 2017-09-19 PROBLEM — M05.772: Status: ACTIVE | Noted: 2017-09-19

## 2017-09-19 PROBLEM — M02.9: Status: RESOLVED | Noted: 2017-03-08 | Resolved: 2017-09-19

## 2017-09-19 LAB — CRP SERPL-MCNC: 4.2 MG/L

## 2017-09-20 NOTE — PROGRESS NOTES
Hx reviewed personally  with pt. in room, examined pt.'s joints and pertinent organ areas in room, developed  Imp/Plan as follows and discussed same with patient and/or family. RA stable on Cimzia with only flare at L 5th Mtp bursa- locally injected. Lab good.  Flu shot in 2 weeks

## 2017-10-23 ENCOUNTER — PATIENT MESSAGE (OUTPATIENT)
Dept: INTERNAL MEDICINE | Facility: CLINIC | Age: 67
End: 2017-10-23

## 2017-10-24 ENCOUNTER — TELEPHONE (OUTPATIENT)
Dept: RHEUMATOLOGY | Facility: CLINIC | Age: 67
End: 2017-10-24

## 2017-10-24 DIAGNOSIS — Z12.39 BREAST CANCER SCREENING: Primary | ICD-10-CM

## 2017-11-01 ENCOUNTER — PATIENT MESSAGE (OUTPATIENT)
Dept: INTERNAL MEDICINE | Facility: CLINIC | Age: 67
End: 2017-11-01

## 2017-11-15 NOTE — PROGRESS NOTES
"Subjective:       Patient ID: Leticia Piña is a 67 y.o. female.    Chief Complaint: Annual Exam    Here for follow up of medical problems.  Having some foot neuroma pain, not exercising due to this.  Anxiety doing very well on rxs, rare ativan.  Taking HRT per Gyn.  Has no  sx.  Willing to taper off.  No f/c/sw/cough.  Taking vit D. No cp/sob/palp.  BMs and urine normal.    Updated/ annual due 3/18:  HM: 10/17 fluvax, 12/13 [id number], 3/17 booster [id number], 4/13 TDaP, 4/13 zoster, 8/14 BMD rep sched, 12/11 Cscope rep 10y, 11/17 [facility]/Gyn Dr. Jimenez, 3/17 HCV neg.      Review of Systems   Constitutional: Negative for activity change, chills, diaphoresis, fever and unexpected weight change.   HENT: Negative for hearing loss, rhinorrhea and trouble swallowing.    Eyes: Negative for discharge and visual disturbance.   Respiratory: Negative for cough, chest tightness, shortness of breath and wheezing.    Cardiovascular: Negative for chest pain, palpitations and leg swelling.   Gastrointestinal: Negative for blood in stool, constipation, diarrhea, nausea and vomiting.   Endocrine: Negative for polydipsia and polyuria.   Genitourinary: Negative for difficulty urinating, dysuria, frequency, hematuria and menstrual problem.   Musculoskeletal: Negative for arthralgias, joint swelling and neck pain.   Neurological: Negative for weakness and headaches.   Psychiatric/Behavioral: Negative for confusion and dysphoric mood. The patient is not nervous/anxious.        Objective:   /74 (BP Location: Right arm, Patient Position: Sitting)   Pulse 108   Temp 98.5 °F (36.9 °C) (Tympanic)   Ht 5' 5" (1.651 m)   Wt 65.2 kg (143 lb 11.8 oz)   SpO2 97%   BMI 23.92 kg/m²     Physical Exam   Constitutional: She is oriented to person, place, and time. She appears well-developed.   HENT:   Mouth/Throat: Oropharynx is clear and moist.   Neck: Neck supple. Carotid bruit is not present. No thyroid mass present.   Cardiovascular: " Normal rate, regular rhythm and intact distal pulses.  Exam reveals no gallop and no friction rub.    No murmur heard.  Pulmonary/Chest: Effort normal and breath sounds normal. She has no wheezes. She has no rales.   Abdominal: Soft. Bowel sounds are normal. She exhibits no mass. There is no hepatosplenomegaly. There is no tenderness.   Musculoskeletal: She exhibits no edema.   Lymphadenopathy:     She has no cervical adenopathy.   Neurological: She is alert and oriented to person, place, and time.   Psychiatric: She has a normal mood and affect.       Assessment:       1. Acquired hypothyroidism    2. Mixed anxiety and depressive disorder    3. Rheumatoid arthritis involving left foot with positive rheumatoid factor    4. Sjogren's syndrome, with unspecified organ involvement    5. Vitamin D deficiency disease    6. Primary insomnia    7. Preventive measure        Plan:       Leticia was seen today for annual exam.    Diagnoses and all orders for this visit:    Acquired hypothyroidism- recheck 6mo.    Mixed anxiety and depressive disorder- doing well, cont rx.    Rheumatoid arthritis involving left foot with positive rheumatoid factor  Sjogren's syndrome, with unspecified organ involvement- per Rheum.    Vitamin D deficiency disease  -     Vitamin D; Future    Primary insomnia- doing well on trazodone.    Preventive measure- will do in 6mo-  -     Lipid panel; Future  -     TSH; Future  -     Vitamin D; Future

## 2017-11-16 ENCOUNTER — PATIENT MESSAGE (OUTPATIENT)
Dept: INTERNAL MEDICINE | Facility: CLINIC | Age: 67
End: 2017-11-16

## 2017-11-16 ENCOUNTER — OFFICE VISIT (OUTPATIENT)
Dept: INTERNAL MEDICINE | Facility: CLINIC | Age: 67
End: 2017-11-16
Payer: MEDICARE

## 2017-11-16 ENCOUNTER — HOSPITAL ENCOUNTER (OUTPATIENT)
Dept: RADIOLOGY | Facility: HOSPITAL | Age: 67
Discharge: HOME | End: 2017-11-16
Attending: INTERNAL MEDICINE
Payer: MEDICARE

## 2017-11-16 VITALS
HEART RATE: 108 BPM | DIASTOLIC BLOOD PRESSURE: 74 MMHG | HEIGHT: 65 IN | TEMPERATURE: 99 F | BODY MASS INDEX: 23.95 KG/M2 | OXYGEN SATURATION: 97 % | SYSTOLIC BLOOD PRESSURE: 126 MMHG | WEIGHT: 143.75 LBS

## 2017-11-16 DIAGNOSIS — Z12.39 BREAST CANCER SCREENING: ICD-10-CM

## 2017-11-16 DIAGNOSIS — Z29.9 PREVENTIVE MEASURE: ICD-10-CM

## 2017-11-16 DIAGNOSIS — M35.00 SJOGREN'S SYNDROME, WITH UNSPECIFIED ORGAN INVOLVEMENT: ICD-10-CM

## 2017-11-16 DIAGNOSIS — F51.01 PRIMARY INSOMNIA: ICD-10-CM

## 2017-11-16 DIAGNOSIS — F41.8 MIXED ANXIETY AND DEPRESSIVE DISORDER: ICD-10-CM

## 2017-11-16 DIAGNOSIS — E55.9 VITAMIN D DEFICIENCY DISEASE: ICD-10-CM

## 2017-11-16 DIAGNOSIS — M05.772 RHEUMATOID ARTHRITIS INVOLVING LEFT FOOT WITH POSITIVE RHEUMATOID FACTOR: ICD-10-CM

## 2017-11-16 DIAGNOSIS — E03.9 ACQUIRED HYPOTHYROIDISM: Primary | ICD-10-CM

## 2017-11-16 PROCEDURE — 99999 PR PBB SHADOW E&M-EST. PATIENT-LVL III: CPT | Mod: PBBFAC,,, | Performed by: INTERNAL MEDICINE

## 2017-11-16 PROCEDURE — 77063 BREAST TOMOSYNTHESIS BI: CPT | Mod: 26,,, | Performed by: RADIOLOGY

## 2017-11-16 PROCEDURE — 99213 OFFICE O/P EST LOW 20 MIN: CPT | Mod: PBBFAC,PO | Performed by: INTERNAL MEDICINE

## 2017-11-16 PROCEDURE — 77067 SCR MAMMO BI INCL CAD: CPT | Mod: 26,,, | Performed by: RADIOLOGY

## 2017-11-16 PROCEDURE — 99214 OFFICE O/P EST MOD 30 MIN: CPT | Mod: S$PBB,,, | Performed by: INTERNAL MEDICINE

## 2017-11-16 PROCEDURE — 77067 SCR MAMMO BI INCL CAD: CPT | Mod: TC

## 2017-11-16 RX ORDER — PREDNISONE 20 MG/1
60 TABLET ORAL DAILY
Qty: 3 TABLET | Refills: 0 | Status: SHIPPED | OUTPATIENT
Start: 2017-11-16 | End: 2017-11-17 | Stop reason: ALTCHOICE

## 2017-11-17 ENCOUNTER — HOSPITAL ENCOUNTER (OUTPATIENT)
Dept: RADIOLOGY | Facility: HOSPITAL | Age: 67
Discharge: HOME | End: 2017-11-17
Attending: PHYSICIAN ASSISTANT
Payer: MEDICARE

## 2017-11-17 ENCOUNTER — OFFICE VISIT (OUTPATIENT)
Dept: INTERNAL MEDICINE | Facility: CLINIC | Age: 67
End: 2017-11-17
Payer: MEDICARE

## 2017-11-17 VITALS
HEART RATE: 91 BPM | WEIGHT: 142.44 LBS | BODY MASS INDEX: 23.73 KG/M2 | OXYGEN SATURATION: 97 % | TEMPERATURE: 99 F | HEIGHT: 65 IN | DIASTOLIC BLOOD PRESSURE: 84 MMHG | SYSTOLIC BLOOD PRESSURE: 138 MMHG

## 2017-11-17 DIAGNOSIS — J20.9 ACUTE BRONCHITIS, UNSPECIFIED ORGANISM: ICD-10-CM

## 2017-11-17 DIAGNOSIS — J20.9 ACUTE BRONCHITIS, UNSPECIFIED ORGANISM: Primary | ICD-10-CM

## 2017-11-17 PROCEDURE — 99999 PR PBB SHADOW E&M-EST. PATIENT-LVL V: CPT | Mod: PBBFAC,,, | Performed by: PHYSICIAN ASSISTANT

## 2017-11-17 PROCEDURE — 99215 OFFICE O/P EST HI 40 MIN: CPT | Mod: PBBFAC,25,PO | Performed by: PHYSICIAN ASSISTANT

## 2017-11-17 PROCEDURE — 96372 THER/PROPH/DIAG INJ SC/IM: CPT | Mod: PBBFAC,PO

## 2017-11-17 PROCEDURE — 99213 OFFICE O/P EST LOW 20 MIN: CPT | Mod: 25,S$PBB,, | Performed by: PHYSICIAN ASSISTANT

## 2017-11-17 PROCEDURE — 71020 XR CHEST PA AND LATERAL: CPT | Mod: 26,,, | Performed by: RADIOLOGY

## 2017-11-17 PROCEDURE — 71020 XR CHEST PA AND LATERAL: CPT | Mod: TC,PO

## 2017-11-17 RX ORDER — BETAMETHASONE SODIUM PHOSPHATE AND BETAMETHASONE ACETATE 3; 3 MG/ML; MG/ML
6 INJECTION, SUSPENSION INTRA-ARTICULAR; INTRALESIONAL; INTRAMUSCULAR; SOFT TISSUE
Status: COMPLETED | OUTPATIENT
Start: 2017-11-17 | End: 2017-11-17

## 2017-11-17 RX ORDER — PREDNISONE 20 MG/1
40 TABLET ORAL DAILY
Qty: 6 TABLET | Refills: 0 | Status: SHIPPED | OUTPATIENT
Start: 2017-11-17 | End: 2017-11-17 | Stop reason: SDUPTHER

## 2017-11-17 RX ORDER — PREDNISONE 20 MG/1
40 TABLET ORAL DAILY
Qty: 6 TABLET | Refills: 0 | Status: SHIPPED | OUTPATIENT
Start: 2017-11-17 | End: 2017-11-20

## 2017-11-17 RX ADMIN — BETAMETHASONE ACETATE AND BETAMETHASONE SODIUM PHOSPHATE 6 MG: 3; 3 INJECTION, SUSPENSION INTRA-ARTICULAR; INTRALESIONAL; INTRAMUSCULAR; SOFT TISSUE at 10:11

## 2017-11-17 NOTE — PATIENT INSTRUCTIONS
Acute Bronchitis  Your healthcare provider has told you that you have acute bronchitis. Bronchitis is infection or inflammation of the bronchial tubes (airways in the lungs). Normally, air moves easily in and out of the airways. Bronchitis narrows the airways, making it harder for air to flow in and out of the lungs. This causes symptoms such as shortness of breath, coughing up yellow or green mucus, and wheezing. Bronchitis can be acute or chronic. Acute means the condition comes on quickly and goes away in a short time, usually within 3 to 10 days. Chronic means a condition lasts a long time and often comes back.    What causes acute bronchitis?  Acute bronchitis almost always starts as a viral respiratory infection, such as a cold or the flu. Certain factors make it more likely for a cold or flu to turn into bronchitis. These include being very young, being elderly, having a heart or lung problem, or having a weak immune system. Cigarette smoking also makes bronchitis more likely.  When bronchitis develops, the airways become swollen. The airways may also become infected with bacteria. This is known as a secondary infection.  Diagnosing acute bronchitis  Your healthcare provider will examine you and ask about your symptoms and health history. You may also have a sputum culture to test the fluid in your lungs. Chest X-rays may be done to look for infection in the lungs.  Treating acute bronchitis  Bronchitis usually clears up as the cold or flu goes away. You can help feel better faster by doing the following:  · Take medicine as directed. You may be told to take ibuprofen or other over-the-counter medicines. These help relieve inflammation in your bronchial tubes. Your healthcare provider may prescribe an inhaler to help open up the bronchial tubes. Most of the time, acute bronchitis is caused by a viral infection. Antibiotics are usually not prescribed for viral infections.  · Drink plenty of fluids, such as  water, juice, or warm soup. Fluids loosen mucus so that you can cough it up. This helps you breathe more easily. Fluids also prevent dehydration.  · Make sure you get plenty of rest.  · Do not smoke. Do not allow anyone else to smoke in your home.  Recovery and follow-up  Follow up with your doctor as you are told. You will likely feel better in a week or two. But a dry cough can linger beyond that time. Let your doctor know if you still have symptoms (other than a dry cough) after 2 weeks, or if youre prone to getting bronchial infections. Take steps to protect yourself from future infections. These steps include stopping smoking and avoiding tobacco smoke, washing your hands often, and getting a yearly flu shot.  When to call your healthcare provider  Call the healthcare provider if you have any of the following:  · Fever of 100.4°F (38.0°C) or higher, or as advised  · Symptoms that get worse, or new symptoms  · Trouble breathing  · Symptoms that dont start to improve within a week, or within 3 days of taking antibiotics   Date Last Reviewed: 12/1/2016  © 3137-5225 [employer]. [address] [place] [address]. All rights reserved. This information is not intended as a substitute for professional medical care. Always follow your healthcare professional's instructions.        Bronchitis, Viral (Adult)    You have a viral bronchitis. Bronchitis is inflammation and swelling of the lining of the lungs. This is often caused by an infection. Symptoms include a dry, hacking cough that is worse at night. The cough may bring up yellow-green mucus. You may also feel short of breath or wheeze. Other symptoms may include tiredness, chest discomfort, and chills.  Bronchitis that is caused by a virus is not treated with antibiotics. Instead, medicines may be given to help relieve symptoms. Symptoms can last up to 2 weeks, although the cough may last much longer.  This illness is contagious during the  first few days and is spread through the air by coughing and sneezing, or by direct contact (touching the sick person and then touching your own eyes, nose, or mouth).  Most viral illnesses resolve within 10 to 14 days with rest and simple home remedies, although they may sometimes last for several weeks.  Home care  · If symptoms are severe, rest at home for the first 2 to 3 days. When you go back to your usual activities, don't let yourself get too tired.  · Do not smoke. Also avoid being exposed to secondhand smoke.  · You may use over-the-counter medicine to control fever or pain, unless another pain medicine was prescribed. (Note: If you have chronic liver or kidney disease or have ever had a stomach ulcer or gastrointestinal bleeding, talk with your healthcare provider before using these medicines. Also talk to your provider if you are taking medicine to prevent blood clots.) Aspirin should never be given to anyone younger than 18 years of age who is ill with a viral infection or fever. It may cause severe liver or brain damage.  · Your appetite may be poor, so a light diet is fine. Avoid dehydration by drinking 6 to 8 glasses of fluids per day (such as water, soft drinks, sports drinks, juices, tea, or soup). Extra fluids will help loosen secretions in the nose and lungs.  · Over-the-counter cough, cold, and sore-throat medicines will not shorten the length of the illness, but they may help to reduce symptoms. (Note: Do not use decongestants if you have high blood pressure.)  Follow-up care  Follow up with your healthcare provider, or as advised. If you had an X-ray or ECG (electrocardiogram), a specialist will review it. You will be notified of any new findings that may affect your care.  Note: If you are age 65 or older, or if you have a chronic lung disease or condition that affects your immune system, or you smoke, talk to your healthcare provider about having pneumococcal vaccinations and a yearly  influenza vaccination (flu shot).  When to seek medical advice  Call your healthcare provider right away if any of these occur:  · Fever of 100.4°F (38°C) or higher  · Coughing up increased amounts of colored sputum  · Weakness, drowsiness, headache, facial pain, ear pain, or a stiff neck  Call 911, or get immediate medical care  Contact emergency services right away if any of these occur:  · Coughing up blood  · Worsening weakness, drowsiness, headache, or stiff neck  · Trouble breathing, wheezing, or pain with breathing  Date Last Reviewed: 9/13/2015  © 8516-9319 [employer]. [address] [place] [address]. All rights reserved. This information is not intended as a substitute for professional medical care. Always follow your healthcare professional's instructions.        What Is Acute Bronchitis?  Acute bronchitis is when the airways in your lungs (bronchial tubes) become red and swollen (inflamed). It is usually caused by a viral infection. But it can also occur because of a bacteria or allergen. Symptoms include a cough that produces yellow or greenish mucus and can last for days or sometimes weeks.  Inside healthy lungs    Air travels in and out of the lungs through the airways. The linings of these airways produce sticky mucus. This mucus traps particles that enter the lungs. Tiny structures called cilia then sweep the particles out of the airways.     Healthy airway: Airways are normally open. Air moves in and out easily.      Healthy cilia: Tiny, hairlike cilia sweep mucus and particles up and out of the airways.   Lungs with bronchitis  Bronchitis often occurs with a cold or the flu virus. The airways become inflamed (red and swollen). There is a deep hacking cough from the extra mucus. Other symptoms may include:  · Wheezing  · Chest discomfort  · Shortness of breath  · Mild fever  A second infection, this time due to bacteria, may then occur. And airways irritated by allergens or  smoke are more likely to get infected.        Inflamed airway: Inflammation and extra mucus narrow the airway, causing shortness of breath.      Impaired cilia: Extra mucus impairs cilia, causing congestion and wheezing. Smoking makes the problem worse.   Making a diagnosis  A physical exam, health history, and certain tests help your healthcare provider make the diagnosis.  Health history  Your healthcare provider will ask you about your symptoms.  The exam  Your provider listens to your chest for signs of congestion. He or she may also check your ears, nose, and throat.  Possible tests  · A sputum test for bacteria. This requires a sample of mucus from your lungs.  · A nasal or throat swab. This tests to see if you have a bacterial infection.  · A chest X-ray. This is done if your healthcare provider thinks you have pneumonia.  · Tests to check for an underlying condition. Other tests may be done to check for things such as allergies, asthma, or COPD (chronic obstructive pulmonary disease). You may need to see a specialist for more lung function testing.  Treating a cough  The main treatment for bronchitis is easing symptoms. Avoiding smoke, allergens, and other things that trigger coughing can often help. If the infection is bacterial, you may be given antibiotics. During the illness, it's important to get plenty of sleep. To ease symptoms:  · Dont smoke. Also avoid secondhand smoke.  · Use a humidifier. Or try breathing in steam from a hot shower. This may help loosen mucus.  · Drink a lot of water and juice. They can soothe the throat and may help thin mucus.  · Sit up or use extra pillows when in bed. This helps to lessen coughing and congestion.  · Ask your provider about using medicine. Ask about using cough medicine, pain and fever medicine, or a decongestant.  Antibiotics  Most cases of bronchitis are caused by cold or flu viruses. They dont need antibiotics to treat them, even if your mucus is thick and  green or yellow. Antibiotics dont treat viral illness and antibiotics have not been shown to have any benefit in cases of acute bronchitis. Taking antibiotics when they are not needed increases your risk of getting an infection later that is antibiotic-resistant. Antibiotics can also cause severe cases of diarrhea that require other antibiotics to treat.  It is important that you accept your healthcare provider's opinion to not use antibiotics. Your provider will prescribe antibiotics if the infection is caused by bacteria. If they are prescribed:  · Take all of the medicine. Take the medicine until it is used up, even if symptoms have improved. If you dont, the bronchitis may come back.  · Take the medicines as directed. For instance, some medicines should be taken with food.  · Ask about side effects. Ask your provider or pharmacist what side effects are common, and what to do about them.  Follow-up care  You should see your provider again in 2 to 3 weeks. By this time, symptoms should have improved. An infection that lasts longer may mean you have a more serious problem.  Prevention  · Avoid tobacco smoke. If you smoke, quit. Stay away from smoky places. Ask friends and family not to smoke around you, or in your home or car.  · Get checked for allergies.  · Ask your provider about getting a yearly flu shot. Also ask about pneumococcal or pneumonia shots.  · Wash your hands often. This helps reduce the chance of picking up viruses that cause colds and flu.  Call your healthcare provider if:  · Symptoms worsen, or you have new symptoms  · Breathing problems worsen or  become severe  · Symptoms dont get better within a week, or within 3 days of taking antibiotics   Date Last Reviewed: 2/1/2017 © 2000-2017 [employer]. [address] [place] [address], [place] [address]. All rights reserved. This information is not intended as a substitute for professional medical care. Always follow your healthcare  professional's instructions.

## 2017-11-22 ENCOUNTER — PATIENT MESSAGE (OUTPATIENT)
Dept: INTERNAL MEDICINE | Facility: CLINIC | Age: 67
End: 2017-11-22

## 2017-11-22 RX ORDER — AMOXICILLIN AND CLAVULANATE POTASSIUM 875; 125 MG/1; MG/1
1 TABLET, FILM COATED ORAL 2 TIMES DAILY
Qty: 20 TABLET | Refills: 0 | Status: SHIPPED | OUTPATIENT
Start: 2017-11-22 | End: 2017-12-02

## 2017-12-06 ENCOUNTER — OFFICE VISIT (OUTPATIENT)
Dept: RHEUMATOLOGY | Facility: CLINIC | Age: 67
End: 2017-12-06
Payer: MEDICARE

## 2017-12-06 ENCOUNTER — LAB VISIT (OUTPATIENT)
Dept: LAB | Facility: HOSPITAL | Age: 67
End: 2017-12-06
Attending: INTERNAL MEDICINE
Payer: MEDICARE

## 2017-12-06 VITALS
BODY MASS INDEX: 24.03 KG/M2 | WEIGHT: 144.38 LBS | DIASTOLIC BLOOD PRESSURE: 73 MMHG | SYSTOLIC BLOOD PRESSURE: 122 MMHG | HEART RATE: 81 BPM

## 2017-12-06 DIAGNOSIS — Z51.81 MEDICATION MONITORING ENCOUNTER: Chronic | ICD-10-CM

## 2017-12-06 DIAGNOSIS — F41.8 MIXED ANXIETY AND DEPRESSIVE DISORDER: ICD-10-CM

## 2017-12-06 DIAGNOSIS — M05.79 RHEUMATOID ARTHRITIS INVOLVING MULTIPLE SITES WITH POSITIVE RHEUMATOID FACTOR: Primary | ICD-10-CM

## 2017-12-06 DIAGNOSIS — M35.01 SJOGREN'S SYNDROME WITH KERATOCONJUNCTIVITIS SICCA: ICD-10-CM

## 2017-12-06 LAB
ALBUMIN SERPL BCP-MCNC: 3.4 G/DL
ALP SERPL-CCNC: 51 U/L
ALT SERPL W/O P-5'-P-CCNC: 18 U/L
ANION GAP SERPL CALC-SCNC: 7 MMOL/L
AST SERPL-CCNC: 22 U/L
BASOPHILS # BLD AUTO: 0.05 K/UL
BASOPHILS NFR BLD: 0.8 %
BILIRUB SERPL-MCNC: 0.4 MG/DL
BUN SERPL-MCNC: 21 MG/DL
CALCIUM SERPL-MCNC: 9 MG/DL
CHLORIDE SERPL-SCNC: 99 MMOL/L
CO2 SERPL-SCNC: 32 MMOL/L
CREAT SERPL-MCNC: 1 MG/DL
CRP SERPL-MCNC: 14.4 MG/L
DIFFERENTIAL METHOD: ABNORMAL
EOSINOPHIL # BLD AUTO: 0.3 K/UL
EOSINOPHIL NFR BLD: 4.9 %
ERYTHROCYTE [DISTWIDTH] IN BLOOD BY AUTOMATED COUNT: 12.7 %
ERYTHROCYTE [SEDIMENTATION RATE] IN BLOOD BY WESTERGREN METHOD: 15 MM/HR
EST. GFR  (AFRICAN AMERICAN): >60 ML/MIN/1.73 M^2
EST. GFR  (NON AFRICAN AMERICAN): 59 ML/MIN/1.73 M^2
GLUCOSE SERPL-MCNC: 94 MG/DL
HCT VFR BLD AUTO: 34.7 %
HGB BLD-MCNC: 11.3 G/DL
LYMPHOCYTES # BLD AUTO: 1.5 K/UL
LYMPHOCYTES NFR BLD: 26.1 %
MCH RBC QN AUTO: 29.2 PG
MCHC RBC AUTO-ENTMCNC: 32.6 G/DL
MCV RBC AUTO: 90 FL
MONOCYTES # BLD AUTO: 0.4 K/UL
MONOCYTES NFR BLD: 7.3 %
NEUTROPHILS # BLD AUTO: 3.6 K/UL
NEUTROPHILS NFR BLD: 60.9 %
PLATELET # BLD AUTO: 317 K/UL
PMV BLD AUTO: 9.3 FL
POTASSIUM SERPL-SCNC: 3.7 MMOL/L
PROT SERPL-MCNC: 6.8 G/DL
RBC # BLD AUTO: 3.87 M/UL
SODIUM SERPL-SCNC: 138 MMOL/L
WBC # BLD AUTO: 5.9 K/UL

## 2017-12-06 PROCEDURE — 80053 COMPREHEN METABOLIC PANEL: CPT | Mod: PO

## 2017-12-06 PROCEDURE — 36415 COLL VENOUS BLD VENIPUNCTURE: CPT | Mod: PO

## 2017-12-06 PROCEDURE — 85025 COMPLETE CBC W/AUTO DIFF WBC: CPT | Mod: PO

## 2017-12-06 PROCEDURE — 99999 PR PBB SHADOW E&M-EST. PATIENT-LVL IV: CPT | Mod: PBBFAC,,, | Performed by: PHYSICIAN ASSISTANT

## 2017-12-06 PROCEDURE — 99214 OFFICE O/P EST MOD 30 MIN: CPT | Mod: S$PBB,,, | Performed by: PHYSICIAN ASSISTANT

## 2017-12-06 PROCEDURE — 86140 C-REACTIVE PROTEIN: CPT

## 2017-12-06 PROCEDURE — 99214 OFFICE O/P EST MOD 30 MIN: CPT | Mod: PBBFAC,25,PO | Performed by: PHYSICIAN ASSISTANT

## 2017-12-06 PROCEDURE — 85651 RBC SED RATE NONAUTOMATED: CPT | Mod: PO

## 2017-12-06 RX ORDER — LORAZEPAM 1 MG/1
TABLET ORAL
Qty: 60 TABLET | Refills: 0 | Status: SHIPPED | OUTPATIENT
Start: 2017-12-06 | End: 2018-03-03 | Stop reason: SDUPTHER

## 2017-12-06 ASSESSMENT — ROUTINE ASSESSMENT OF PATIENT INDEX DATA (RAPID3): MDHAQ FUNCTION SCORE: 0

## 2017-12-06 NOTE — PROGRESS NOTES
Subjective:       Patient ID: Leticia Piña is a 67 y.o. female.    Chief Complaint: Rheumatoid Arthritis      Leticia is here today for rheumatology followup.  She has chronic seropositive rheumatoid arthritis, Sjogren's syndrome, osteopenia, and Vitamin D deficiency.  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 Also failed enbrel due to IE.  For RA, she is currently on mtx 15 mg once weekly, folic acid daily, and Plaquenil 200 mg twice daily. Took cimzia for about 1 year then was off for a while RA activity became moderate so dr hamilton resumed her Cimzia 400 mg Q 28 days. Doing much better. Back on for more than 6 months now. Today she denies pain, pain 0/10. No RA exacerbations, no prolonged morning stiffness. No joint swelling BL hand and foot x-rays done in 7/2017 showed no new erosions and x-rays were overall stable.  Insurance change and will not be able to get cimzia at pharmacy any longer after Jan 1st.     For Sjogren's, she has +SSB, +SSA, +GIOVANNA 1:160 speckled in the past. She takes Evoxac twice daily. Seen by ophthalmology and prescribed plasma serum eye drops, which have worked great. Dry eyes have gotten better with plasma serum eye drops, mouth dryness is about the same. No parotid tenderness or swelling.     Osteopenia diagnosed by Dexa. Last scan 8/2014. TF -1.0, FN -1.8, spine -0.2. Previously, we did trial of fosamax but had GI upset and stopped. No prednisone. Currently taking estradiol, progesterone, calcium by diet only, and vit D 2000 IU daily. Last Vitamin D level was elevated at 66 .repeat bone density today 12/6/17. No falls or fractures since last visit.           Review of Systems   Constitutional: Negative.  Negative for activity change, appetite change, chills, fatigue and fever.   HENT: Negative for congestion, ear pain, mouth sores, rhinorrhea, sinus pressure, sore throat and trouble swallowing.         No dry mouth   Eyes: Negative.  Negative for  photophobia, pain and redness.        No swollen or red eyes, no dry eye     Respiratory: Negative for cough, choking, chest tightness, shortness of breath, wheezing and stridor.    Cardiovascular: Negative.  Negative for chest pain.   Gastrointestinal: Negative.  Negative for abdominal pain, blood in stool, diarrhea, nausea and vomiting.   Genitourinary: Negative.  Negative for dysuria, frequency, hematuria and urgency.   Musculoskeletal: Negative for arthralgias, back pain, gait problem, joint swelling, myalgias, neck pain and neck stiffness.   Skin: Negative.  Negative for color change, pallor and rash.   Neurological: Negative.  Negative for weakness.   Hematological: Negative for adenopathy.   Psychiatric/Behavioral: Negative for suicidal ideas.         Objective:     /73   Pulse 81   Wt 65.5 kg (144 lb 6.4 oz)   BMI 24.03 kg/m²      Physical Exam   Constitutional: She is oriented to person, place, and time and well-developed, well-nourished, and in no distress. No distress.   HENT:   Head: Normocephalic and atraumatic.   Right Ear: External ear normal.   Left Ear: External ear normal.   Mouth/Throat: No oropharyngeal exudate.   Eyes: Conjunctivae and EOM are normal. Pupils are equal, round, and reactive to light. No scleral icterus.   Neck: Normal range of motion. Neck supple. No thyromegaly present.   Cardiovascular: Normal rate, regular rhythm and normal heart sounds.    No murmur heard.  Pulmonary/Chest: Effort normal and breath sounds normal. She exhibits no tenderness.   Abdominal: Soft. Bowel sounds are normal.       Right Side Rheumatological Exam     Examination finds the shoulder, elbow, wrist, knee, 1st PIP, 1st MCP, 2nd PIP, 2nd MCP, 3rd PIP, 3rd MCP, 4th PIP, 4th MCP, 5th PIP and 5th MCP normal.    Left Side Rheumatological Exam     Examination finds the shoulder, elbow, wrist, knee, 1st PIP, 1st MCP, 2nd PIP, 2nd MCP, 3rd PIP, 3rd MCP, 4th PIP, 4th MCP, 5th PIP and 5th MCP  normal.      Lymphadenopathy:     She has no cervical adenopathy.   Neurological: She is alert and oriented to person, place, and time. She displays normal reflexes. No cranial nerve deficit. She exhibits normal muscle tone. Gait normal.   Skin: Skin is warm and dry. No rash noted.     Musculoskeletal: Normal range of motion. She exhibits no edema or tenderness.   DIP enlargement BL hands, no synovitis, warmth.   Negative metatarsal squeeze BL.               Results for orders placed or performed in visit on 12/06/17   CBC auto differential   Result Value Ref Range    WBC 5.90 3.90 - 12.70 K/uL    RBC 3.87 (L) 4.00 - 5.40 M/uL    Hemoglobin 11.3 (L) 12.0 - 16.0 g/dL    Hematocrit 34.7 (L) 37.0 - 48.5 %    MCV 90 82 - 98 fL    MCH 29.2 27.0 - 31.0 pg    MCHC 32.6 32.0 - 36.0 g/dL    RDW 12.7 11.5 - 14.5 %    Platelets 317 150 - 350 K/uL    MPV 9.3 9.2 - 12.9 fL    Gran # 3.6 1.8 - 7.7 K/uL    Lymph # 1.5 1.0 - 4.8 K/uL    Mono # 0.4 0.3 - 1.0 K/uL    Eos # 0.3 0.0 - 0.5 K/uL    Baso # 0.05 0.00 - 0.20 K/uL    Gran% 60.9 38.0 - 73.0 %    Lymph% 26.1 18.0 - 48.0 %    Mono% 7.3 4.0 - 15.0 %    Eosinophil% 4.9 0.0 - 8.0 %    Basophil% 0.8 0.0 - 1.9 %    Differential Method Automated    Comprehensive metabolic panel   Result Value Ref Range    Sodium 138 136 - 145 mmol/L    Potassium 3.7 3.5 - 5.1 mmol/L    Chloride 99 95 - 110 mmol/L    CO2 32 (H) 23 - 29 mmol/L    Glucose 94 70 - 110 mg/dL    BUN, Bld 21 8 - 23 mg/dL    Creatinine 1.0 0.5 - 1.4 mg/dL    Calcium 9.0 8.7 - 10.5 mg/dL    Total Protein 6.8 6.0 - 8.4 g/dL    Albumin 3.4 (L) 3.5 - 5.2 g/dL    Total Bilirubin 0.4 0.1 - 1.0 mg/dL    Alkaline Phosphatase 51 (L) 55 - 135 U/L    AST 22 10 - 40 U/L    ALT 18 10 - 44 U/L    Anion Gap 7 (L) 8 - 16 mmol/L    eGFR if African American >60 >60 mL/min/1.73 m^2    eGFR if non African American 59 (A) >60 mL/min/1.73 m^2       X-ray BL hand and foot 5/2016 and 7/2017 : no new erosions - stable      12/6/15 DEXA  Total femur  mean 0.860 g/cm² with a T score -1.2  Left femur neck 0.738 g/cm² T score -2.2  L1-L4 spine 1.152 g/cm² with a T score -0.2  Osteopenia with falling bone density at the left femur neck moderate to high fracture risk    8/6/14 DEXA  Total femur mean 0.880 g/cm² with a T score -1.0  Left femur neck 0.789 g/cm² with a T score -1.8  Spine L1-L4 1.153 g/cm² with a T score -0.2  Osteopenia with low to moderate risk of fracture        Assessment:       1. Rheumatoid arthritis involving multiple sites with positive rheumatoid factor    2. Medication monitoring encounter    3. Sjogren's syndrome with keratoconjunctivitis sicca        1. RA in remission- Currently taking mtx 15 mg once weekly, folic acid daily, and Plaquenil 200 mg twice daily and  Cimzia 400 mg Q 28 days  Today she has 0 tender/0 swollen joints. MARGARITA 0,  CDAI 0.     hand and foot x-ray up to date 5/2016 and 7/2017 stable no changes     In the past failed mtx monotherapy  Failed humira and enbrel    2. Sjogrens - dryness of eyes and mouth. On evoxac twice daily and plasma serum eye drops prescribed by ophthalmologist.     3. 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 Osteopenia - DEXA slight fall at FN, stable at femur mean and spine- mod-high rask- will follow  Currently taking estradiol, progesterone, calcium by diet only, and vit D 2000 international units daily.   In the past failed a trial of oral bisphosphonate secondary GI intolerance    5. Vaccination status: up to date     6. Medication Monitoring- no current issues, no evidence of toxicity    7. Immunocompromised no issues with recurrent infections    Plan:           Continue taking mtx 15 mg once weekly, folic acid daily, and Plaquenil 200 mg twice daily.     Move her to cimizia CAM liopholised 400 mg Q 28 days (mod severe RA failed mtx monotherapy, failed humira and enbrel) this is continuation of care RA well controlled on cimzia  1st dose due 1/3/17      Repeat her bilateral hand and foot  x-rays next year     Continue taking evoxac twice daily and plasma serum eye drops. Can also try OTC Xylimelts for dry mouth.    Dexa  Scan again .  2-3 years. Repeat dexa every 2 years.   At next dexa if drops any will need to treat  For now c/w low dose vit D 1021-8686 IU daily and calcium in diet,   C/w estradiol per gyn    Use Voltaren gel and iburpfen prn    rtc Q 28 days nurse visit for cimzia 400 mg sc and Return to clinic in 16 weeks for office visit with labs    Call with any questions, changes or concerns.

## 2017-12-07 ENCOUNTER — PATIENT MESSAGE (OUTPATIENT)
Dept: RHEUMATOLOGY | Facility: CLINIC | Age: 67
End: 2017-12-07

## 2017-12-07 ASSESSMENT — CLINICAL DISEASE ACTIVITY INDEX (CDAI)
SWOLLEN_JOINTS_COUNT: 0
TENDER_JOINTS_COUNT: 0
PATIENT_ASSESSMENT: 0
PHYSICIAN_ASSESSMENT: 0
TOTAL_SCORE: 0

## 2017-12-11 RX ORDER — BUPROPION HYDROCHLORIDE 150 MG/1
TABLET ORAL
Qty: 30 TABLET | Refills: 11 | Status: SHIPPED | OUTPATIENT
Start: 2017-12-11 | End: 2018-03-02 | Stop reason: SDUPTHER

## 2017-12-21 ENCOUNTER — TELEPHONE (OUTPATIENT)
Dept: RHEUMATOLOGY | Facility: CLINIC | Age: 67
End: 2017-12-21

## 2017-12-21 NOTE — TELEPHONE ENCOUNTER
----- Message from Johanna Currie sent at 12/21/2017 10:21 AM CST -----  Contact: self [phone]  States that she needs to speak to nurse regarding rescheduling her nurse appt on 01/03/17 to 01/02/17 anytime before 2pm. Please call back at [phone]//thank you acc

## 2017-12-27 ENCOUNTER — TELEPHONE (OUTPATIENT)
Dept: RHEUMATOLOGY | Facility: CLINIC | Age: 67
End: 2017-12-27

## 2018-01-10 ENCOUNTER — CLINICAL SUPPORT (OUTPATIENT)
Dept: RHEUMATOLOGY | Facility: CLINIC | Age: 68
End: 2018-01-10
Payer: MEDICARE

## 2018-01-10 DIAGNOSIS — L93.2 DRUG-INDUCED LUPUS ERYTHEMATOSUS: ICD-10-CM

## 2018-01-10 DIAGNOSIS — M05.79 RHEUMATOID ARTHRITIS INVOLVING MULTIPLE SITES WITH POSITIVE RHEUMATOID FACTOR: Primary | ICD-10-CM

## 2018-01-10 DIAGNOSIS — M05.79 RHEUMATOID ARTHRITIS INVOLVING MULTIPLE SITES WITH POSITIVE RHEUMATOID FACTOR: ICD-10-CM

## 2018-01-10 DIAGNOSIS — T50.905A DRUG-INDUCED LUPUS ERYTHEMATOSUS: ICD-10-CM

## 2018-01-10 PROCEDURE — 99213 OFFICE O/P EST LOW 20 MIN: CPT | Mod: PBBFAC,PO

## 2018-01-10 PROCEDURE — 99999 PR PBB SHADOW E&M-EST. PATIENT-LVL III: CPT | Mod: PBBFAC,,,

## 2018-01-10 RX ORDER — METHOTREXATE 2.5 MG/1
15 TABLET ORAL
Qty: 30 TABLET | Refills: 3 | Status: SHIPPED | OUTPATIENT
Start: 2018-01-10 | End: 2018-04-20 | Stop reason: SDUPTHER

## 2018-01-10 RX ADMIN — CERTOLIZUMAB PEGOL 2 ML: KIT at 03:01

## 2018-01-10 NOTE — PROGRESS NOTES
Administered 200 mg Cimzia to right leg sub q  and 200mg Cimzia to right leg sub q. Pt tolerated well. No acute reaction noted to site. Pt instructed on S/S to report. Advised patient to wait in lobby 15 minutes after receiving injection to monitor for any reactions.  Pt verbalized understanding.     Lot: [id number]  Exp: 03/20

## 2018-01-31 DIAGNOSIS — M05.79 RHEUMATOID ARTHRITIS INVOLVING MULTIPLE SITES WITH POSITIVE RHEUMATOID FACTOR: Primary | ICD-10-CM

## 2018-02-12 RX ORDER — METRONIDAZOLE 7.5 MG/G
LOTION TOPICAL
Qty: 59 ML | Refills: 3 | Status: SHIPPED | OUTPATIENT
Start: 2018-02-12 | End: 2018-10-23 | Stop reason: SDUPTHER

## 2018-02-14 ENCOUNTER — PATIENT MESSAGE (OUTPATIENT)
Dept: RHEUMATOLOGY | Facility: CLINIC | Age: 68
End: 2018-02-14

## 2018-02-14 ENCOUNTER — TELEPHONE (OUTPATIENT)
Dept: RHEUMATOLOGY | Facility: CLINIC | Age: 68
End: 2018-02-14

## 2018-02-14 NOTE — TELEPHONE ENCOUNTER
----- Message from Rod Dorantes sent at 2/14/2018  1:34 PM CST -----  Contact: pT  Please call pt at [phone] (home)   Pt is sick and wants to reschedule an injection

## 2018-02-22 ENCOUNTER — OFFICE VISIT (OUTPATIENT)
Dept: URGENT CARE | Facility: CLINIC | Age: 68
End: 2018-02-22
Payer: MEDICARE

## 2018-02-22 ENCOUNTER — PATIENT MESSAGE (OUTPATIENT)
Dept: INTERNAL MEDICINE | Facility: CLINIC | Age: 68
End: 2018-02-22

## 2018-02-22 VITALS
TEMPERATURE: 98 F | BODY MASS INDEX: 23.98 KG/M2 | DIASTOLIC BLOOD PRESSURE: 80 MMHG | WEIGHT: 143.94 LBS | SYSTOLIC BLOOD PRESSURE: 160 MMHG | HEART RATE: 90 BPM | OXYGEN SATURATION: 99 % | HEIGHT: 65 IN

## 2018-02-22 DIAGNOSIS — R03.0 ELEVATED BLOOD PRESSURE READING: Primary | ICD-10-CM

## 2018-02-22 PROCEDURE — 99213 OFFICE O/P EST LOW 20 MIN: CPT | Mod: PBBFAC,PO | Performed by: PHYSICIAN ASSISTANT

## 2018-02-22 PROCEDURE — 99213 OFFICE O/P EST LOW 20 MIN: CPT | Mod: S$PBB,,, | Performed by: PHYSICIAN ASSISTANT

## 2018-02-22 PROCEDURE — 99999 PR PBB SHADOW E&M-EST. PATIENT-LVL III: CPT | Mod: PBBFAC,,, | Performed by: PHYSICIAN ASSISTANT

## 2018-02-23 NOTE — PATIENT INSTRUCTIONS
Low-Salt Diet  This diet removes foods that are high in salt. It also limits the amount of salt you use when cooking. It is most often used for people with high blood pressure, edema (fluid retention), and kidney, liver, or heart disease.  Table salt contains the mineral sodium. Your body needs sodium to work normally. But too much sodium can make your health problems worse. Your healthcare provider is recommending a low-salt (also called low-sodium) diet for you. Your total daily allowance of salt is 1,500 to 2,300 milligrams (mg). It is less than 1 teaspoon of table salt. This means you can have only about 500 to 700 mg of sodium at each meal. People with certain health problems should limit salt intake to the lower end of the recommended range.    When you cook, dont add much salt. If you can cook without using salt, even better. Dont add salt to your food at the table.  When shopping, read food labels. Salt is often called sodium on the label. Choose foods that are salt-free, low salt, or very low salt. Note that foods with reduced salt may not lower your salt intake enough.    Beans, potatoes, and pasta  Ok: Dry beans, split peas, lentils, potatoes, rice, macaroni, pasta, spaghetti without added salt  Avoid: Potato chips, tortilla chips, and similar products  Breads and cereals  Ok: Low-sodium breads, rolls, cereals, and cakes; low-salt crackers, matzo crackers  Avoid: Salted crackers, pretzels, popcorn, Syriac toast, pancakes, muffins  Dairy  Ok: Milk, chocolate milk, hot chocolate mix, low-salt cheeses, and yogurt  Avoid: Processed cheese and cheese spreads; Roquefort, Camembert, and cottage cheese; buttermilk, instant breakfast drink  Desserts  Ok: Ice cream, frozen yogurt, juice bars, gelatin, cookies and pies, sugar, honey, jelly, hard candy  Avoid: Most pies, cakes and cookies prepared or processed with salt; instant pudding  Drinks  Ok: Tea, coffee, fizzy (carbonated) drinks, juices  Avoid: Flavored  coffees, electrolyte replacement drinks, sports drinks  Meats  Ok: All fresh meat, fish, poultry, low-salt tuna, eggs, egg substitute  Avoid: Smoked, pickled, brine-cured, or salted meats and fish. This includes ware, chipped beef, corned beef, hot dogs, deli meats, ham, kosher meats, salt pork, sausage, canned tuna, salted codfish, smoked salmon, herring, sardines, or anchovies.  Seasonings and spices  Ok: Most seasonings are okay. Good substitutes for salt include: fresh herb blends, hot sauce, lemon, garlic, broussard, vinegar, dry mustard, parsley, cilantro, horseradish, tomato paste, regular margarine, mayonnaise, unsalted butter, cream cheese, vegetable oil, cream, low-salt salad dressing and gravy.  Avoid: Regular ketchup, relishes, pickles, soy sauce, teriyaki sauce, Worcestershire sauce, BBQ sauce, tartar sauce, meat tenderizer, chili sauce, regular gravy, regular salad dressing, salted butter  Soups  Ok: Low-salt soups and broths made with allowed foods  Avoid: Bouillon cubes, soups with smoked or salted meats, regular soup and broth  Vegetables  Ok: Most vegetables are okay; also low-salt tomato and vegetable juices  Avoid: Sauerkraut and other brine-soaked vegetables; pickles and other pickled vegetables; tomato juice, olives  Date Last Reviewed: 8/1/2016 © 2000-2017 [employer]. [address] [place] [id number]. All rights reserved. This information is not intended as a substitute for professional medical care. Always follow your healthcare professional's instructions.        Taking Your Blood Pressure  Blood pressure is the force of blood against the artery wall as it moves from the heart through the blood vessels. You can take your own blood pressure reading using a digital monitor. Take your readings the same each time, using the same arm. Take readings as often as your healthcare provider instructs.  About blood pressure monitors  Blood pressure monitors are designed for certain  ages and cases. You can find monitors for older adults, for pregnant women, and for children. Make sure the one you choose is the right one for your age and situation.  The American Heart Association recommends an automatic cuff monitor that fits on your upper arm (bicep). The cuff should fit your arm size. A cuff thats too large or too small will not give an accurate reading. Measure around your upper arm to find your size.  Monitors that attach to your finger or wrist are not as accurate as monitors for your upper arm.  Ask your healthcare provider for help in choosing a monitor. Bring your monitor to your next provider visit if you need help in using it the correct way.  The steps below are general instructions for using an automatic digital monitor.  Step 1. Relax    · Take your blood pressure at the same time every day, such as in the morning or evening, or at the time your healthcare provider recommends.  · Wait at least a half-hour after smoking, eating, or exercising. Don't drink coffee, tea, soda, or other caffeinated beverages before checking your blood pressure.  · Sit comfortably at a table with both feet on the floor. Do not cross your legs or feet. Place the monitor near you.  · Rest for a few minutes before you begin.  Step 2. Wrap the cuff    · Place your arm on the table, palm up. Your arm should be at the level of your heart. Wrap the cuff around your upper arm, just above your elbow. Its best done on bare skin, not over clothing. Most cuffs will indicate where the brachial artery (the blood vessel in the middle of the arm at the inner side of the elbow) should line up with the cuff. Look in your monitor's instruction booklet for an illustration. You can also bring your cuff to your healthcare provider and have them show you how to correctly place the cuff.  Step 3. Inflate the cuff    · Push the button that starts the pump.  · The cuff will tighten, then loosen.  · The numbers will change. When  they stop changing, your blood pressure reading will appear.  · Take 2 or 3 readings one minute apart.  Step 4. Write down the results of each reading    · Write down your blood pressure numbers for each reading. Note the date and time. Keep your results in one place, such as a notebook. Even if your monitor has a built-in memory, keep a hard copy of the readings.  · Remove the cuff from your arm. Turn off the machine.  · Bring your blood pressure records with your healthcare providers at each visit.  · If you start a new blood pressure medicine, note the day you started the new medicine. Also note the day if you change the dose of your medicine. This information goes on your blood pressure recording sheet. This will help your healthcare provider monitor how well the medicine changes are working.  · Ask your healthcare provider what numbers should prompt you to call him or her. Also ask what numbers should prompt you to get help right away.  Date Last Reviewed: 11/1/2016  © 4067-2073 [employer]. [address] [place] [address], [place] [address]. All rights reserved. This information is not intended as a substitute for professional medical care. Always follow your healthcare professional's instructions.

## 2018-02-23 NOTE — PROGRESS NOTES
"Subjective:      Patient ID: Leticia Piña is a 67 y.o. female.    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 The current episode started 1 to 4 weeks ago (1wk). Pertinent negatives include no chest pain, headaches or palpitations.     Review of Systems   Constitutional: Negative for chills, diaphoresis and fever.   Eyes: Negative for visual disturbance.   Cardiovascular: Positive for leg swelling (ankle swelling, L > R). Negative for chest pain and palpitations.   Gastrointestinal: Negative for abdominal pain, constipation, diarrhea, nausea and vomiting.   Musculoskeletal: Negative for arthralgias and myalgias.   Neurological: Negative for weakness, numbness and headaches.       Objective:   BP (!) 160/80 (BP Location: Right arm, Patient Position: Sitting, BP Method: Medium (Automatic))   Pulse 90   Temp 98 °F (36.7 °C) (Tympanic)   Ht 5' 5" (1.651 m)   Wt 65.3 kg (143 lb 15.4 oz)   SpO2 99%   BMI 23.96 kg/m²   Physical Exam   Constitutional: She appears well-developed and well-nourished. She does not appear ill. No distress.   HENT:   Head: Normocephalic and atraumatic.   Cardiovascular: Normal rate, regular rhythm and normal heart sounds.    No murmur heard.  Mild swelling to bilateral ankles   Pulmonary/Chest: Effort normal and breath sounds normal. No respiratory distress. She has no decreased breath sounds. She has no wheezes. She has no rhonchi. She has no rales.   Skin: Skin is warm and dry. No rash noted. She is not diaphoretic.   Psychiatric: She " "has a normal mood and affect. Her speech is normal and behavior is normal. Thought content normal.     Assessment:      1. Elevated blood pressure reading       Plan:   Elevated blood pressure reading    Discussed elevated blood pressure with patient and advised of the following:  Get a blood pressure monitor from your local pharmacy and check your blood pressure every morning.   Follow a diet low in SODIUM.  This includes all fast food, canned foods, processed foods, and foods with preservatives such as cheese.  Eliminate caffeine and other "energy drinks".  Drink at least 64 ounces of water a day.  Exercise for at least 30 minutes 5 days per week.    Schedule a follow up appointment to re-check your blood pressure.  Bring your blood pressure diary with you to this appointment.    Gave handout on BP.  Printed AVS and reviewed treatment plan in detail.    Discussed worsening signs/symptoms and when to return to clinic or go to ED.   Patient expresses understanding and agrees with treatment plan.     "

## 2018-02-26 ENCOUNTER — OFFICE VISIT (OUTPATIENT)
Dept: INTERNAL MEDICINE | Facility: CLINIC | Age: 68
End: 2018-02-26
Payer: MEDICARE

## 2018-02-26 VITALS
OXYGEN SATURATION: 98 % | HEIGHT: 65 IN | RESPIRATION RATE: 18 BRPM | WEIGHT: 142.88 LBS | BODY MASS INDEX: 23.81 KG/M2 | HEART RATE: 92 BPM | DIASTOLIC BLOOD PRESSURE: 82 MMHG | TEMPERATURE: 98 F | SYSTOLIC BLOOD PRESSURE: 162 MMHG

## 2018-02-26 DIAGNOSIS — R03.0 ELEVATED BLOOD PRESSURE READING: Primary | ICD-10-CM

## 2018-02-26 DIAGNOSIS — F41.8 MIXED ANXIETY AND DEPRESSIVE DISORDER: ICD-10-CM

## 2018-02-26 PROCEDURE — 99215 OFFICE O/P EST HI 40 MIN: CPT | Mod: PBBFAC,PO | Performed by: NURSE PRACTITIONER

## 2018-02-26 PROCEDURE — 99213 OFFICE O/P EST LOW 20 MIN: CPT | Mod: S$PBB,,, | Performed by: NURSE PRACTITIONER

## 2018-02-26 PROCEDURE — 99999 PR PBB SHADOW E&M-EST. PATIENT-LVL V: CPT | Mod: PBBFAC,,, | Performed by: NURSE PRACTITIONER

## 2018-02-26 RX ORDER — DULOXETIN HYDROCHLORIDE 60 MG/1
120 CAPSULE, DELAYED RELEASE ORAL DAILY
Qty: 60 CAPSULE | Refills: 0 | Status: SHIPPED | OUTPATIENT
Start: 2018-02-26 | End: 2018-10-23 | Stop reason: SDUPTHER

## 2018-02-26 NOTE — PROGRESS NOTES
Subjective:       Patient ID: Leticia Piña is a 67 y.o. female.    Chief Complaint: No chief complaint on file.    Patient present to follow up with blood pressure.  Reports she started to feeling anxious and fatigue.  Started to monitor blood pressure at home.  Blood pressure would run 150-170's/80-90's.  Reports some headaches and some vision changes.  Became a  assistance in Dec.  Has been sick with URI/strep over the pass 2 months.   Reports dealing with some personal stressors.  Report PCP had informed her that she could increase Cymbalta to 2 tabs daily.      Review of Systems   Constitutional: Negative for chills and fatigue.   Respiratory: Negative for cough and shortness of breath.    Musculoskeletal: Negative for arthralgias and gait problem.   Skin: Negative for color change and rash.   Neurological: Positive for headaches (above left eye).   Psychiatric/Behavioral: Positive for dysphoric mood. The patient is nervous/anxious.        Objective:      Physical Exam   Constitutional: She is oriented to person, place, and time. Vital signs are normal. She appears well-developed and well-nourished.   HENT:   Head: Normocephalic and atraumatic.   Neck: Normal range of motion.   Cardiovascular: Normal rate and regular rhythm.    Pulmonary/Chest: Effort normal and breath sounds normal.   Musculoskeletal: Normal range of motion.   Neurological: She is alert and oriented to person, place, and time.   Skin: Skin is warm.   Psychiatric: She has a normal mood and affect. Her behavior is normal.   Tearful at the end of visit.       Assessment:       1. Elevated blood pressure reading    2. Mixed anxiety and depressive disorder        Plan:         Elevated blood pressure reading    Mixed anxiety and depressive disorder  -     Ambulatory referral to Psychiatry  -     DULoxetine (CYMBALTA) 60 MG capsule; Take 2 capsules (120 mg total) by mouth once daily.  Dispense: 60 capsule; Refill: 0          Patient wants to try increasing Cymbalta before starting blood pressure medications.  Instructed to continue to monitor blood pressure and document.  Will schedule follow up with PCP in one week.  Recommended counseling.  Agreed to an appointment.

## 2018-03-02 ENCOUNTER — OFFICE VISIT (OUTPATIENT)
Dept: INTERNAL MEDICINE | Facility: CLINIC | Age: 68
End: 2018-03-02
Payer: MEDICARE

## 2018-03-02 VITALS
SYSTOLIC BLOOD PRESSURE: 138 MMHG | OXYGEN SATURATION: 96 % | DIASTOLIC BLOOD PRESSURE: 76 MMHG | HEIGHT: 65 IN | TEMPERATURE: 97 F | HEART RATE: 75 BPM | WEIGHT: 143.75 LBS | BODY MASS INDEX: 23.95 KG/M2

## 2018-03-02 DIAGNOSIS — M05.772 RHEUMATOID ARTHRITIS INVOLVING LEFT FOOT WITH POSITIVE RHEUMATOID FACTOR: ICD-10-CM

## 2018-03-02 DIAGNOSIS — R03.0 ELEVATED BLOOD PRESSURE READING: ICD-10-CM

## 2018-03-02 DIAGNOSIS — F41.8 MIXED ANXIETY AND DEPRESSIVE DISORDER: Primary | ICD-10-CM

## 2018-03-02 DIAGNOSIS — E03.9 ACQUIRED HYPOTHYROIDISM: ICD-10-CM

## 2018-03-02 PROCEDURE — 99214 OFFICE O/P EST MOD 30 MIN: CPT | Mod: PBBFAC,PO | Performed by: INTERNAL MEDICINE

## 2018-03-02 PROCEDURE — 99214 OFFICE O/P EST MOD 30 MIN: CPT | Mod: S$PBB,,, | Performed by: INTERNAL MEDICINE

## 2018-03-02 PROCEDURE — 99999 PR PBB SHADOW E&M-EST. PATIENT-LVL IV: CPT | Mod: PBBFAC,,, | Performed by: INTERNAL MEDICINE

## 2018-03-02 RX ORDER — BUPROPION HYDROCHLORIDE 300 MG/1
300 TABLET ORAL DAILY
Qty: 30 TABLET | Refills: 6 | Status: SHIPPED | OUTPATIENT
Start: 2018-03-02 | End: 2019-02-04 | Stop reason: SDUPTHER

## 2018-03-02 NOTE — PROGRESS NOTES
"Subjective:       Patient ID: Leticia Piña is a 67 y.o. female.    Chief Complaint: Follow-up    Here for follow up of medical problems.  Works long hours.  Works and then goes to sleep right after she gets home.  RA pain is doing well.  Tendoy weird so started checking BP.  Has been monitoring BPs, range 130-174/ .  Has no past history of HTN.  No med change.  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  No exercise.  Sleeping ok with trazodone 100mg.    Updated/ annual due 6/18:  HM: 10/17 fluvax, 12/13 [id number], 3/17 booster venocc08, 4/13 TDaP, 4/13 zoster, 8/14 BMD rep sched, 12/11 Cscope rep 10y, 11/17 [facility]/Gyn Dr. Jimenez, 3/17 HCV neg.          Review of Systems   Constitutional: Negative for chills, diaphoresis and fever.   Respiratory: Negative for cough and shortness of breath.    Cardiovascular: Negative for chest pain, palpitations and leg swelling.   Gastrointestinal: Negative for blood in stool, constipation, diarrhea, nausea and vomiting.   Genitourinary: Negative for dysuria, frequency and hematuria.   Psychiatric/Behavioral: The patient is nervous/anxious.        Objective:   /76 (BP Location: Right arm, Patient Position: Sitting, BP Method: Medium (Manual))   Pulse 75   Temp 96.5 °F (35.8 °C) (Tympanic)   Ht 5' 5" (1.651 m)   Wt 65.2 kg (143 lb 11.8 oz)   SpO2 96%   BMI 23.92 kg/m²     Physical Exam   Constitutional: She is oriented to person, place, and time. She appears well-developed.   HENT:   Mouth/Throat: Oropharynx is clear and moist.   Neck: Neck supple. Carotid bruit is not present. No thyroid mass present.   Cardiovascular: Normal rate, regular rhythm and intact distal pulses.  Exam reveals no gallop and no friction rub.    No murmur heard.  Pulmonary/Chest: Effort normal and breath sounds normal. She has no wheezes. She has no rales.   Abdominal: Soft. Bowel sounds are normal. She exhibits no mass. There is no " hepatosplenomegaly. There is no tenderness.   Musculoskeletal: She exhibits no edema.   Lymphadenopathy:     She has no cervical adenopathy.   Neurological: She is alert and oriented to person, place, and time.   Psychiatric: She has a normal mood and affect.       Assessment:       1. Mixed anxiety and depressive disorder    2. Acquired hypothyroidism    3. Rheumatoid arthritis involving left foot with positive rheumatoid factor    4. Elevated blood pressure reading        Plan:       Leticia was seen today for follow-up.    Diagnoses and all orders for this visit:    Mixed anxiety and depressive disorder- incr wellbutrin to 300, cont  cymbalta 60mg, trazodone 100 hs, try wean ativan to half hs.  Refer for counseling, refer to Psychiatry if not better in 6 weeks.  RTC 6 weeks.  -     Ambulatory consult to Psychiatry  -     buPROPion (WELLBUTRIN XL) 300 MG 24 hr tablet; Take 1 tablet (300 mg total) by mouth once daily.    Acquired hypothyroidism- clin stable.    Elev BP- monitor BPs and email to you.    Rheumatoid arthritis involving left foot with positive rheumatoid factor- doing well on meds.

## 2018-03-03 DIAGNOSIS — F41.8 MIXED ANXIETY AND DEPRESSIVE DISORDER: ICD-10-CM

## 2018-03-06 RX ORDER — DOXYCYCLINE 100 MG/1
CAPSULE ORAL
Qty: 30 CAPSULE | Refills: 4 | Status: SHIPPED | OUTPATIENT
Start: 2018-03-06 | End: 2019-02-25 | Stop reason: SDUPTHER

## 2018-03-06 RX ORDER — LORAZEPAM 1 MG/1
TABLET ORAL
Qty: 60 TABLET | Refills: 1 | Status: SHIPPED | OUTPATIENT
Start: 2018-03-06 | End: 2018-06-12 | Stop reason: SDUPTHER

## 2018-03-10 RX ORDER — TRAZODONE HYDROCHLORIDE 50 MG/1
TABLET ORAL
Qty: 270 TABLET | Refills: 2 | Status: SHIPPED | OUTPATIENT
Start: 2018-03-10 | End: 2019-05-01 | Stop reason: SDUPTHER

## 2018-03-20 RX ORDER — CEVIMELINE HYDROCHLORIDE 30 MG/1
CAPSULE ORAL
Qty: 120 CAPSULE | Refills: 5 | Status: SHIPPED | OUTPATIENT
Start: 2018-03-20 | End: 2018-10-25

## 2018-03-21 ENCOUNTER — CLINICAL SUPPORT (OUTPATIENT)
Dept: RHEUMATOLOGY | Facility: CLINIC | Age: 68
End: 2018-03-21
Payer: MEDICARE

## 2018-03-21 DIAGNOSIS — M05.79 RHEUMATOID ARTHRITIS INVOLVING MULTIPLE SITES WITH POSITIVE RHEUMATOID FACTOR: ICD-10-CM

## 2018-03-21 PROCEDURE — 99999 PR PBB SHADOW E&M-EST. PATIENT-LVL III: CPT | Mod: PBBFAC,,,

## 2018-03-21 PROCEDURE — 99213 OFFICE O/P EST LOW 20 MIN: CPT | Mod: PBBFAC,PO

## 2018-03-21 RX ADMIN — CERTOLIZUMAB PEGOL 2 ML: KIT at 03:03

## 2018-03-21 NOTE — PROGRESS NOTES
Administered 200 mg Cimzia to right leg sub q  and 200mg Cimzia to right leg sub q. Pt tolerated well. No acute reaction noted to site. Pt instructed on S/S to report. Advised patient to wait in lobby 15 minutes after receiving injection to monitor for any reactions.  Pt verbalized understanding.      Lot: [id number]  Exp: 03/20

## 2018-04-04 ENCOUNTER — OFFICE VISIT (OUTPATIENT)
Dept: RHEUMATOLOGY | Facility: CLINIC | Age: 68
End: 2018-04-04
Payer: MEDICARE

## 2018-04-04 ENCOUNTER — LAB VISIT (OUTPATIENT)
Dept: LAB | Facility: HOSPITAL | Age: 68
End: 2018-04-04
Attending: PHYSICIAN ASSISTANT
Payer: MEDICARE

## 2018-04-04 VITALS
SYSTOLIC BLOOD PRESSURE: 146 MMHG | DIASTOLIC BLOOD PRESSURE: 87 MMHG | HEART RATE: 76 BPM | WEIGHT: 140.44 LBS | HEIGHT: 65 IN | BODY MASS INDEX: 23.4 KG/M2

## 2018-04-04 DIAGNOSIS — M35.01 SJOGREN'S SYNDROME WITH KERATOCONJUNCTIVITIS SICCA: ICD-10-CM

## 2018-04-04 DIAGNOSIS — M85.89 OSTEOPENIA OF MULTIPLE SITES: ICD-10-CM

## 2018-04-04 DIAGNOSIS — M35.9 XEROSTOMIA DUE TO AUTOIMMUNE DISEASE: ICD-10-CM

## 2018-04-04 DIAGNOSIS — M05.79 RHEUMATOID ARTHRITIS INVOLVING MULTIPLE SITES WITH POSITIVE RHEUMATOID FACTOR: ICD-10-CM

## 2018-04-04 DIAGNOSIS — M05.772 RHEUMATOID ARTHRITIS INVOLVING LEFT FOOT WITH POSITIVE RHEUMATOID FACTOR: ICD-10-CM

## 2018-04-04 DIAGNOSIS — Z51.81 MEDICATION MONITORING ENCOUNTER: Chronic | ICD-10-CM

## 2018-04-04 DIAGNOSIS — E55.9 VITAMIN D DEFICIENCY DISEASE: ICD-10-CM

## 2018-04-04 DIAGNOSIS — M05.79 RHEUMATOID ARTHRITIS INVOLVING MULTIPLE SITES WITH POSITIVE RHEUMATOID FACTOR: Primary | ICD-10-CM

## 2018-04-04 DIAGNOSIS — K11.7 XEROSTOMIA DUE TO AUTOIMMUNE DISEASE: ICD-10-CM

## 2018-04-04 LAB
25(OH)D3+25(OH)D2 SERPL-MCNC: 50 NG/ML
ALBUMIN SERPL BCP-MCNC: 3.7 G/DL
ALP SERPL-CCNC: 60 U/L
ALT SERPL W/O P-5'-P-CCNC: 13 U/L
ANION GAP SERPL CALC-SCNC: 11 MMOL/L
AST SERPL-CCNC: 19 U/L
BASOPHILS # BLD AUTO: 0.05 K/UL
BASOPHILS NFR BLD: 0.8 %
BILIRUB SERPL-MCNC: 0.3 MG/DL
BUN SERPL-MCNC: 22 MG/DL
CALCIUM SERPL-MCNC: 9.3 MG/DL
CHLORIDE SERPL-SCNC: 99 MMOL/L
CO2 SERPL-SCNC: 27 MMOL/L
CREAT SERPL-MCNC: 0.8 MG/DL
CRP SERPL-MCNC: 6.7 MG/L
DIFFERENTIAL METHOD: ABNORMAL
EOSINOPHIL # BLD AUTO: 0.2 K/UL
EOSINOPHIL NFR BLD: 2.6 %
ERYTHROCYTE [DISTWIDTH] IN BLOOD BY AUTOMATED COUNT: 13.2 %
ERYTHROCYTE [SEDIMENTATION RATE] IN BLOOD BY WESTERGREN METHOD: 15 MM/HR
EST. GFR  (AFRICAN AMERICAN): >60 ML/MIN/1.73 M^2
EST. GFR  (NON AFRICAN AMERICAN): >60 ML/MIN/1.73 M^2
GLUCOSE SERPL-MCNC: 93 MG/DL
HCT VFR BLD AUTO: 35.3 %
HGB BLD-MCNC: 11.5 G/DL
LYMPHOCYTES # BLD AUTO: 1.6 K/UL
LYMPHOCYTES NFR BLD: 25.7 %
MCH RBC QN AUTO: 28 PG
MCHC RBC AUTO-ENTMCNC: 32.6 G/DL
MCV RBC AUTO: 86 FL
MONOCYTES # BLD AUTO: 0.5 K/UL
MONOCYTES NFR BLD: 8.7 %
NEUTROPHILS # BLD AUTO: 3.8 K/UL
NEUTROPHILS NFR BLD: 62.2 %
PLATELET # BLD AUTO: 322 K/UL
PMV BLD AUTO: 9.4 FL
POTASSIUM SERPL-SCNC: 4 MMOL/L
PROT SERPL-MCNC: 7.6 G/DL
RBC # BLD AUTO: 4.1 M/UL
SODIUM SERPL-SCNC: 137 MMOL/L
WBC # BLD AUTO: 6.12 K/UL

## 2018-04-04 PROCEDURE — 80053 COMPREHEN METABOLIC PANEL: CPT | Mod: PO

## 2018-04-04 PROCEDURE — 85025 COMPLETE CBC W/AUTO DIFF WBC: CPT | Mod: PO

## 2018-04-04 PROCEDURE — 99999 PR PBB SHADOW E&M-EST. PATIENT-LVL V: CPT | Mod: PBBFAC,,, | Performed by: PHYSICIAN ASSISTANT

## 2018-04-04 PROCEDURE — 99215 OFFICE O/P EST HI 40 MIN: CPT | Mod: PBBFAC,PO | Performed by: PHYSICIAN ASSISTANT

## 2018-04-04 PROCEDURE — 85651 RBC SED RATE NONAUTOMATED: CPT | Mod: PO

## 2018-04-04 PROCEDURE — 99214 OFFICE O/P EST MOD 30 MIN: CPT | Mod: S$PBB,,, | Performed by: PHYSICIAN ASSISTANT

## 2018-04-04 PROCEDURE — 36415 COLL VENOUS BLD VENIPUNCTURE: CPT | Mod: PO

## 2018-04-04 PROCEDURE — 82306 VITAMIN D 25 HYDROXY: CPT

## 2018-04-04 PROCEDURE — 86140 C-REACTIVE PROTEIN: CPT

## 2018-04-04 RX ORDER — PILOCARPINE HYDROCHLORIDE 5 MG/1
5 TABLET, FILM COATED ORAL 4 TIMES DAILY
Qty: 120 TABLET | Refills: 11 | Status: SHIPPED | OUTPATIENT
Start: 2018-04-04 | End: 2018-04-20 | Stop reason: SDUPTHER

## 2018-04-04 NOTE — PROGRESS NOTES
Subjective:       Patient ID: Letiica Piña is a 67 y.o. female.    Chief Complaint: Rheumatoid Arthritis; Osteopenia; Vitamin D Deficiency; and sjogrens      Leticia is here today for rheumatology followup.  She has chronic seropositive rheumatoid arthritis, Sjogren's syndrome, osteopenia, and Vitamin D deficiency.  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 Also failed enbrel this was IE.  For RA, she is currently on mtx 15 mg once weekly, folic acid daily, and Plaquenil 200 mg twice daily. Took cimzia for about 1 year then was off for a while RA activity became moderate so dr hamilton resumed her Cimzia 400 mg Q 28 days. Did much better with adding cimzia. Last hear insurance change moved to  CAM liopholised cimzia 400 mg Q 28 days. Last injection done 3/21/18- next dose due 4/18/18.     She is doing well. RA under good control with no recent exacerbations. No joint pain or joint swelling. No Morning stiffness. Foot pain better with full length pressure relief insoles. today she denies pain, pain rated at 0/10. BL hand and foot x-rays done in 7/2017 showed no new erosions and x-rays were overall stable.      For Sjogren's, she has +SSB, +SSA, +GIOVANNA 1:160 speckled in the past. She takes Evoxac 3-4X daily cost $160/month.  Cannot do with out it. Using otc products. Seen by ophthalmology and prescribed plasma serum eye drops, which have worked great. Dry eyes have gotten better with plasma serum eye drops, mouth dryness is about the same. No parotid tenderness or swelling.     Osteopenia diagnosed by Dexa. Last scan 8/2014. TF -1.0, FN -1.8, spine -0.2. Previously, we did trial of fosamax but had GI upset and stopped. No prednisone. Currently taking estradiol, progesterone, calcium by diet only, and vit D 2000 IU daily. Last Vitamin D level was elevated at 66 .repeat bone density 12/6/17 stable. No changes. No falls or fractures since last visit.           Review of Systems  "  Constitutional: Negative.  Negative for activity change, appetite change, chills, fatigue and fever.   HENT: Negative for congestion, ear pain, mouth sores, rhinorrhea, sinus pressure, sore throat and trouble swallowing.         + dry mouth   Eyes: Negative.  Negative for photophobia, pain and redness.        No swollen or red eyes, + dry eye     Respiratory: Negative for cough, choking, chest tightness, shortness of breath, wheezing and stridor.    Cardiovascular: Negative.  Negative for chest pain.   Gastrointestinal: Negative.  Negative for abdominal pain, blood in stool, diarrhea, nausea and vomiting.   Genitourinary: Negative.  Negative for dysuria, frequency, hematuria and urgency.   Musculoskeletal: Negative for arthralgias, back pain, gait problem, joint swelling, myalgias, neck pain and neck stiffness.   Skin: Negative.  Negative for color change, pallor and rash.   Neurological: Negative.  Negative for weakness.   Hematological: Negative for adenopathy.   Psychiatric/Behavioral: Negative for suicidal ideas.         Objective:     BP (!) 142/85   Pulse 85   Ht 5' 5" (1.651 m)   Wt 63.7 kg (140 lb 6.9 oz)   BMI 23.37 kg/m²      Physical Exam   Constitutional: She is oriented to person, place, and time and well-developed, well-nourished, and in no distress. No distress.   HENT:   Head: Normocephalic and atraumatic.   Right Ear: External ear normal.   Left Ear: External ear normal.   Mouth/Throat: Mucous membranes are not pale, dry and not cyanotic. She does not have dentures. No oral lesions. No oropharyngeal exudate.   Eyes: Conjunctivae and EOM are normal. Pupils are equal, round, and reactive to light. Right conjunctiva is not injected. Right conjunctiva has no hemorrhage. Left conjunctiva is not injected. Left conjunctiva has no hemorrhage. No scleral icterus.       Neck: Normal range of motion. Neck supple. No thyromegaly present.   Cardiovascular: Normal rate, regular rhythm and normal heart " sounds.    No murmur heard.  Pulmonary/Chest: Effort normal and breath sounds normal. She exhibits no tenderness.   Abdominal: Soft. Bowel sounds are normal.       Right Side Rheumatological Exam     Examination finds the shoulder, elbow, wrist, knee, 1st PIP, 1st MCP, 2nd PIP, 2nd MCP, 3rd PIP, 3rd MCP, 4th PIP, 4th MCP, 5th PIP and 5th MCP normal.    Left Side Rheumatological Exam     Examination finds the shoulder, elbow, wrist, knee, 1st PIP, 1st MCP, 2nd PIP, 2nd MCP, 3rd PIP, 3rd MCP, 4th PIP, 4th MCP, 5th PIP and 5th MCP normal.      Lymphadenopathy:     She has no cervical adenopathy.   Neurological: She is alert and oriented to person, place, and time. She displays normal reflexes. No cranial nerve deficit. She exhibits normal muscle tone. Gait normal.   Skin: Skin is warm and dry. No rash noted.     Musculoskeletal: Normal range of motion. She exhibits no edema or tenderness.   DIP enlargement BL hands, no synovitis, warmth.   Negative metatarsal squeeze BL.               Results for orders placed or performed in visit on 12/06/17   CBC auto differential   Result Value Ref Range    WBC 5.90 3.90 - 12.70 K/uL    RBC 3.87 (L) 4.00 - 5.40 M/uL    Hemoglobin 11.3 (L) 12.0 - 16.0 g/dL    Hematocrit 34.7 (L) 37.0 - 48.5 %    MCV 90 82 - 98 fL    MCH 29.2 27.0 - 31.0 pg    MCHC 32.6 32.0 - 36.0 g/dL    RDW 12.7 11.5 - 14.5 %    Platelets 317 150 - 350 K/uL    MPV 9.3 9.2 - 12.9 fL    Gran # (ANC) 3.6 1.8 - 7.7 K/uL    Lymph # 1.5 1.0 - 4.8 K/uL    Mono # 0.4 0.3 - 1.0 K/uL    Eos # 0.3 0.0 - 0.5 K/uL    Baso # 0.05 0.00 - 0.20 K/uL    Gran% 60.9 38.0 - 73.0 %    Lymph% 26.1 18.0 - 48.0 %    Mono% 7.3 4.0 - 15.0 %    Eosinophil% 4.9 0.0 - 8.0 %    Basophil% 0.8 0.0 - 1.9 %    Differential Method Automated    Comprehensive metabolic panel   Result Value Ref Range    Sodium 138 136 - 145 mmol/L    Potassium 3.7 3.5 - 5.1 mmol/L    Chloride 99 95 - 110 mmol/L    CO2 32 (H) 23 - 29 mmol/L    Glucose 94 70 - 110 mg/dL     BUN, Bld 21 8 - 23 mg/dL    Creatinine 1.0 0.5 - 1.4 mg/dL    Calcium 9.0 8.7 - 10.5 mg/dL    Total Protein 6.8 6.0 - 8.4 g/dL    Albumin 3.4 (L) 3.5 - 5.2 g/dL    Total Bilirubin 0.4 0.1 - 1.0 mg/dL    Alkaline Phosphatase 51 (L) 55 - 135 U/L    AST 22 10 - 40 U/L    ALT 18 10 - 44 U/L    Anion Gap 7 (L) 8 - 16 mmol/L    eGFR if African American >60 >60 mL/min/1.73 m^2    eGFR if non African American 59 (A) >60 mL/min/1.73 m^2   C-reactive protein   Result Value Ref Range    CRP 14.4 (H) 0.0 - 8.2 mg/L   Sedimentation rate, manual   Result Value Ref Range    Sed Rate 15 0 - 20 mm/Hr       X-ray BL hand and foot 5/2016 and 7/2017 : no new erosions - stable    12/2017 DEXA  Total femur mean 0.855 g/cm² with a T score -1.2  Left femur neck 0.738 g/cm² T score -2.2  L1-L4 spine 1.152 g/cm² with a T score -0.2  Osteopenia with falling bone density at the left femur neck moderate to high fracture risk      12/6/15 DEXA  Total femur mean 0.860 g/cm² with a T score -1.2  Left femur neck 0.738 g/cm² T score -2.2  L1-L4 spine 1.152 g/cm² with a T score -0.2  Osteopenia with falling bone density at the left femur neck moderate to high fracture risk    8/6/14 DEXA  Total femur mean 0.880 g/cm² with a T score -1.0  Left femur neck 0.789 g/cm² with a T score -1.8  Spine L1-L4 1.153 g/cm² with a T score -0.2  Osteopenia with low to moderate risk of fracture        Assessment:       1. Rheumatoid arthritis involving multiple sites with positive rheumatoid factor    2. Rheumatoid arthritis involving left foot with positive rheumatoid factor    3. Sjogren's syndrome with keratoconjunctivitis sicca    4. Osteopenia of multiple sites    5. Vitamin D deficiency disease        1. RA in remission- Currently taking mtx 15 mg once weekly, folic acid daily, and Plaquenil 200 mg twice daily and  Cimzia 400 mg Q 28 days  Today she has 0 tender/0 swollen joints. MARGARITA 0,  CDAI 0.     hand and foot x-ray up to date 5/2016 and 7/2017 stable no  changes     In the past failed mtx monotherapy  Failed humira and enbrel    2. Sjogrens - dryness of eyes and mouth. On evoxac 3-4 X daily and plasma serum eye drops prescribed by ophthalmologist.   evoxac poorly covered by insurance cost $160/month    3. 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 Osteopenia - DEXA stable at both FN, femur mean and spine- mod-high rask- will follow  Currently taking estradiol, progesterone, calcium by diet only, and vit D 2000 international units daily.   In the past failed a trial of oral bisphosphonate secondary GI intolerance    5. Vaccination status: up to date     6. Medication Monitoring- no current issues, no evidence of toxicity    7. Immunocompromised no issues with recurrent infections    Plan:       Continue taking mtx 15 mg once weekly, folic acid daily, and Plaquenil 200 mg twice daily.   Next visit will cut back on plaquenil to once daily if continues to do well, will decrease long term risk    C/w cimizia CAM liopholised 400 mg Q 28 days next dose due 4/18/18  Repeat her bilateral hand and foot x-rays later this year or early next year      Trial of salagen 5 mg 3-4 X daily, good Rx coupon  Call insurance and ask for Tier exception for evoxac  Can also try OTC Xylimelts for dry mouth.    C/w eye doc, yearly eye checks for plaquenil and c/w her plasma serum eye drops.   Dexa  Scan again .  2-3 years. Repeat dexa every 2 years.   At next dexa if drops any will need to treat  For now c/w low dose vit D 5931-9656 IU daily and calcium in diet,   C/w estradiol per gyn    Use Voltaren gel and iburpfen prn    rtc Q 14 days nurse visit for cimzia 400 mg sc and nurse visit Q 28 days X 3 then see dr hamilton back at her 4th injection with labs cbc, cmp, esr, crp    Call with any questions, changes or concerns.

## 2018-04-11 ENCOUNTER — PATIENT MESSAGE (OUTPATIENT)
Dept: INTERNAL MEDICINE | Facility: CLINIC | Age: 68
End: 2018-04-11

## 2018-04-12 ENCOUNTER — OFFICE VISIT (OUTPATIENT)
Dept: PSYCHIATRY | Facility: CLINIC | Age: 68
End: 2018-04-12
Payer: MEDICARE

## 2018-04-12 DIAGNOSIS — F32.A CHRONIC DEPRESSIVE DISORDER: ICD-10-CM

## 2018-04-12 DIAGNOSIS — F41.9 ANXIETY DISORDER, UNSPECIFIED TYPE: Primary | ICD-10-CM

## 2018-04-12 DIAGNOSIS — Z63.9 RELATIONSHIP PROBLEMS: ICD-10-CM

## 2018-04-12 PROCEDURE — 90791 PSYCH DIAGNOSTIC EVALUATION: CPT | Mod: PBBFAC,PO | Performed by: SOCIAL WORKER

## 2018-04-12 PROCEDURE — 90791 PSYCH DIAGNOSTIC EVALUATION: CPT | Mod: S$PBB,,, | Performed by: SOCIAL WORKER

## 2018-04-12 SDOH — SOCIAL DETERMINANTS OF HEALTH (SDOH): PROBLEM RELATED TO PRIMARY SUPPORT GROUP, UNSPECIFIED: Z63.9

## 2018-04-12 NOTE — PROGRESS NOTES
"Psychiatry Initial Visit (PhD/LCSW)  Diagnostic Interview - CPT 57468    Date: 4/12/2018    Site: [place]    Referral source:   Nicole Jasmine MD, Internal Medicine    Clinical status of patient: Outpatient    Leticia Piña, a 67 y.o. female, for initial evaluation visit.  Met with patient.    Chief complaint/reason for encounter: depression, anxiety, sleep and interpersonal    History of present illness:  67 year old  female presented for initial evaluation, with chief complaint of "I've always been kind of anxious and depressed for as long as I can remember, but it just really started getting worse since around 2009..."   Patient described a few challenging stressful events starting then, that she feels have accumulate in her life.  These include financial strain,her own worsening Rhumatoid Arthritis--diagnosed in 1994, loss of home and the family business in 2013, 's life threatening health scare starting in 2009, legal troubles related to his business, and a feeling of social abandonment from mother, sister, some friends, and daughter-in-law (which means her son and his two kids don't see her much anymore.)  Patient described in recent months worsening symptoms of sudden uncontrolled tearfulness, increased muscle tension, being distracted, pervasive ruminating worries, loss of senthil, trouble sleeping unless she takes a sleeping aid--currently Trazodone, and tendency to socially isolate.  She stated she has never been very sociable.  She now feels more rejection and scrutiny from others and finds herself withdrawing.  She said much of the negativity from some family and friends relates to 's legal trouble, having been arrested for business related fraud from a period when he was juggling inadequate customer funds and misappropriated a customer's money to cover expenses of someone else's job; all that while he was battling cancer.  Patient said she marvels that her  seems " able to stay generally optimistic and pleasant and that he has a lot of friends.  Meanwhile, she lacks much social support and stated she is not comfortable reaching out to connect with people, so she has a small support network to begin with.  She feels devastated by judgment against her  by her own sister, mother, and daughter in law, with the result that she is often unable to spend time with two of her grandchildren; she noted those children visit with the daughter-in-law's parents virtually every weekend.  Patient described a certain pressure level of social expectation--material and financial standards, and she and her  have lose their home, as well as the business, and they are living with her parents.  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  She said they have a lot of financial backlog of bills to catch up on.  Patient denied any suicidal or homicidal ideation, denied any cognitive deficit other than poor focus at times, denied  psychosis,  Mood swings, rages, or substance abuse.  Identified therapeutic goals include reducing anxiety and depression and improving coping skills; possibly finding a way to somewhat reduce social isolation, perhaps by reconnecting with old friends.      Pain: not quantified but described as moderate RA pain    Symptoms:   · Mood: depressed mood, insomnia, worthlessness/guilt, poor concentration, tearfulness and social isolation  · Anxiety: excessive anxiety/worry, restlessness/keyed up, muscle tension and social phobia  · Substance abuse: denied  · Cognitive functioning: denied  · Health behaviors: noncontributory    Psychiatric history: none    Medical history: RA--diagnosed in 1994; recent hypertension; Hypothyroidism    Family history of psychiatric illness: mother  "chronically anxious; father apparently depressed--very pessimistic and irritable.     Social history (marriage, employment, etc.):  Grew up locally, the oldest of three sisters, the other two 7 and 8 and a half years younger than she.  Raised by both parents; recalled being markedly shy her entire life; not particularly close to her sisters, who were much younger and had each other.  Lifelong active [employer]; has a home [employer].  Not currently involved in any smaller groups within the [employer].  Graduated high school and attended 2 years of college;  at age 24.  Still with , Alpesh, today.  Mother of two, a son, age 42, and daughter, age 39.  Both each have two children of their own.  Described daughter as a close source of support; also talks with  a lot, processing the struggles they have come through together.  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  Patient described recent loss of a few friend connections she had; her middle sister verbally declared she would not associate with the patient and her , due to his legal "disgrace."   Denied any  experience.  No tobacco, light to moderate wine intake.  Minimal caffeine, no recreational drugs.      Substance use:   Alcohol: occasional   Drugs: none   Tobacco: none   Caffeine: a cup of green tea most mornings.     Current medications and drug reactions (include OTC, herbal): see medication list      Strengths and liabilities: Strength: Patient accepts guidance/feedback, Strength: Patient is expressive/articulate., Strength: Patient is motivated for change., Liability: Patient lacks social skills., Liability: Patient has poor health., Liability: Patient lacks coping skills.    Current Evaluation:     Mental Status Exam:  General Appearance:  unremarkable, age appropriate, normal weight, casually dressed, neatly groomed, overweight   Speech: normal tone, normal rate, normal pitch, normal " volume      Level of Cooperation: cooperative      Thought Processes: goal-directed   Mood: anxious, depressed, sad      Thought Content: normal, no suicidality, no homicidality, delusions, or paranoia   Affect: congruent and appropriate   Orientation: Oriented x3   Memory: recent and remote memory intact   Attention Span & Concentration: intact   Fund of General Knowledge: intact and appropriate to age and level of education   Abstract Reasoning: not formally assessed   Judgment & Insight: limited     Language  intact     Diagnostic Impression - Plan:       ICD-10-CM ICD-9-CM   1. Anxiety disorder, unspecified type F41.9 300.00   2. Chronic depressive disorder F32.9 301.12   3. Relationship problems Z63.9 313.3       Plan:individual psychotherapy, consult psychiatrist for medication evaluation and medication management by physician    Return to Clinic: as scheduled    Length of Service (minutes): 45

## 2018-04-16 ENCOUNTER — PATIENT MESSAGE (OUTPATIENT)
Dept: RHEUMATOLOGY | Facility: CLINIC | Age: 68
End: 2018-04-16

## 2018-04-17 ENCOUNTER — TELEPHONE (OUTPATIENT)
Dept: RHEUMATOLOGY | Facility: CLINIC | Age: 68
End: 2018-04-17

## 2018-04-20 ENCOUNTER — TELEPHONE (OUTPATIENT)
Dept: RHEUMATOLOGY | Facility: CLINIC | Age: 68
End: 2018-04-20

## 2018-04-20 DIAGNOSIS — K11.7 XEROSTOMIA DUE TO AUTOIMMUNE DISEASE: ICD-10-CM

## 2018-04-20 DIAGNOSIS — M35.01 SJOGREN'S SYNDROME WITH KERATOCONJUNCTIVITIS SICCA: ICD-10-CM

## 2018-04-20 DIAGNOSIS — L93.2 DRUG-INDUCED LUPUS ERYTHEMATOSUS: ICD-10-CM

## 2018-04-20 DIAGNOSIS — M35.9 XEROSTOMIA DUE TO AUTOIMMUNE DISEASE: ICD-10-CM

## 2018-04-20 DIAGNOSIS — T50.905A DRUG-INDUCED LUPUS ERYTHEMATOSUS: ICD-10-CM

## 2018-04-20 DIAGNOSIS — F41.8 MIXED ANXIETY AND DEPRESSIVE DISORDER: ICD-10-CM

## 2018-04-20 DIAGNOSIS — M05.79 RHEUMATOID ARTHRITIS INVOLVING MULTIPLE SITES WITH POSITIVE RHEUMATOID FACTOR: ICD-10-CM

## 2018-04-20 RX ORDER — PILOCARPINE HYDROCHLORIDE 5 MG/1
5 TABLET, FILM COATED ORAL 4 TIMES DAILY
Qty: 120 TABLET | Refills: 11 | Status: SHIPPED | OUTPATIENT
Start: 2018-04-20 | End: 2018-10-25 | Stop reason: SDUPTHER

## 2018-04-20 RX ORDER — METHOTREXATE 2.5 MG/1
15 TABLET ORAL
Qty: 30 TABLET | Refills: 3 | Status: SHIPPED | OUTPATIENT
Start: 2018-04-20 | End: 2018-07-31 | Stop reason: SDUPTHER

## 2018-04-20 RX ORDER — LORAZEPAM 1 MG/1
1 TABLET ORAL EVERY 6 HOURS PRN
Qty: 60 TABLET | Refills: 1 | Status: CANCELLED | OUTPATIENT
Start: 2018-04-20

## 2018-04-20 NOTE — TELEPHONE ENCOUNTER
Left a message for Ms. Piña per Leta Yuen PA-C to see if she needed a refill on her Salagen. Script was sent recently. If script is needed how and where would patient like for script to go.

## 2018-04-21 DIAGNOSIS — F41.8 MIXED ANXIETY AND DEPRESSIVE DISORDER: ICD-10-CM

## 2018-04-23 DIAGNOSIS — F41.8 MIXED ANXIETY AND DEPRESSIVE DISORDER: ICD-10-CM

## 2018-04-23 RX ORDER — LORAZEPAM 1 MG/1
1 TABLET ORAL EVERY 6 HOURS PRN
Qty: 60 TABLET | Refills: 1 | Status: CANCELLED | OUTPATIENT
Start: 2018-04-23

## 2018-04-23 NOTE — TELEPHONE ENCOUNTER
Spoke with Ms. Piña who states that she did not receive the first prescription of the Salagen. Records show that the script was printed but no documentation of where it was sent to. Salagen faxed to patient's [facility].

## 2018-04-24 RX ORDER — LORAZEPAM 1 MG/1
TABLET ORAL
Qty: 60 TABLET | Refills: 1 | OUTPATIENT
Start: 2018-04-24

## 2018-05-16 ENCOUNTER — CLINICAL SUPPORT (OUTPATIENT)
Dept: RHEUMATOLOGY | Facility: CLINIC | Age: 68
End: 2018-05-16
Payer: MEDICARE

## 2018-05-16 PROCEDURE — 96372 THER/PROPH/DIAG INJ SC/IM: CPT

## 2018-05-16 RX ADMIN — CERTOLIZUMAB PEGOL 2 ML: KIT at 04:05

## 2018-05-16 NOTE — PROGRESS NOTES
Administered cimzia 200mg (lyophilized powder) to back of both arms  for total dose of cimzia 400mg. Patient tolerataed well no acute reaction noted to site. Pt instructed on s/s to report. Pt verbalized understanding.    Lot: [id number]  Exp: 5/2020

## 2018-05-21 DIAGNOSIS — M05.79 RHEUMATOID ARTHRITIS INVOLVING MULTIPLE SITES WITH POSITIVE RHEUMATOID FACTOR: ICD-10-CM

## 2018-05-21 DIAGNOSIS — T50.905A DRUG-INDUCED LUPUS ERYTHEMATOSUS: ICD-10-CM

## 2018-05-21 DIAGNOSIS — M35.01 SJOGREN'S SYNDROME WITH KERATOCONJUNCTIVITIS SICCA: ICD-10-CM

## 2018-05-21 DIAGNOSIS — L93.2 DRUG-INDUCED LUPUS ERYTHEMATOSUS: ICD-10-CM

## 2018-05-21 RX ORDER — FOLIC ACID 1 MG/1
1000 TABLET ORAL DAILY
Qty: 30 TABLET | Refills: 11 | Status: SHIPPED | OUTPATIENT
Start: 2018-05-21 | End: 2018-05-28 | Stop reason: SDUPTHER

## 2018-05-28 ENCOUNTER — PATIENT MESSAGE (OUTPATIENT)
Dept: RHEUMATOLOGY | Facility: CLINIC | Age: 68
End: 2018-05-28

## 2018-05-28 DIAGNOSIS — M05.79 RHEUMATOID ARTHRITIS INVOLVING MULTIPLE SITES WITH POSITIVE RHEUMATOID FACTOR: ICD-10-CM

## 2018-05-28 DIAGNOSIS — L93.2 DRUG-INDUCED LUPUS ERYTHEMATOSUS: ICD-10-CM

## 2018-05-28 DIAGNOSIS — T50.905A DRUG-INDUCED LUPUS ERYTHEMATOSUS: ICD-10-CM

## 2018-05-28 DIAGNOSIS — M35.01 SJOGREN'S SYNDROME WITH KERATOCONJUNCTIVITIS SICCA: ICD-10-CM

## 2018-05-28 RX ORDER — FOLIC ACID 1 MG/1
1000 TABLET ORAL DAILY
Qty: 30 TABLET | Refills: 11 | Status: SHIPPED | OUTPATIENT
Start: 2018-05-28 | End: 2018-10-25 | Stop reason: SDUPTHER

## 2018-05-29 ENCOUNTER — PATIENT OUTREACH (OUTPATIENT)
Dept: ADMINISTRATIVE | Facility: HOSPITAL | Age: 68
End: 2018-05-29

## 2018-06-05 ENCOUNTER — OFFICE VISIT (OUTPATIENT)
Dept: INTERNAL MEDICINE | Facility: CLINIC | Age: 68
End: 2018-06-05
Payer: MEDICARE

## 2018-06-05 ENCOUNTER — LAB VISIT (OUTPATIENT)
Dept: LAB | Facility: HOSPITAL | Age: 68
End: 2018-06-05
Attending: INTERNAL MEDICINE
Payer: MEDICARE

## 2018-06-05 VITALS
HEART RATE: 80 BPM | TEMPERATURE: 97 F | OXYGEN SATURATION: 96 % | BODY MASS INDEX: 24.24 KG/M2 | SYSTOLIC BLOOD PRESSURE: 138 MMHG | WEIGHT: 145.5 LBS | HEIGHT: 65 IN | DIASTOLIC BLOOD PRESSURE: 78 MMHG

## 2018-06-05 DIAGNOSIS — F41.8 MIXED ANXIETY AND DEPRESSIVE DISORDER: ICD-10-CM

## 2018-06-05 DIAGNOSIS — L93.2 DRUG-INDUCED LUPUS ERYTHEMATOSUS: ICD-10-CM

## 2018-06-05 DIAGNOSIS — T50.905A DRUG-INDUCED LUPUS ERYTHEMATOSUS: ICD-10-CM

## 2018-06-05 DIAGNOSIS — M35.00 SJOGREN'S SYNDROME, WITH UNSPECIFIED ORGAN INVOLVEMENT: ICD-10-CM

## 2018-06-05 DIAGNOSIS — M05.79 RHEUMATOID ARTHRITIS INVOLVING MULTIPLE SITES WITH POSITIVE RHEUMATOID FACTOR: ICD-10-CM

## 2018-06-05 DIAGNOSIS — Z29.9 PREVENTIVE MEASURE: ICD-10-CM

## 2018-06-05 DIAGNOSIS — E03.9 ACQUIRED HYPOTHYROIDISM: ICD-10-CM

## 2018-06-05 DIAGNOSIS — E55.9 VITAMIN D DEFICIENCY DISEASE: ICD-10-CM

## 2018-06-05 DIAGNOSIS — E03.9 ACQUIRED HYPOTHYROIDISM: Primary | ICD-10-CM

## 2018-06-05 DIAGNOSIS — M85.80 OSTEOPENIA, UNSPECIFIED LOCATION: ICD-10-CM

## 2018-06-05 LAB
25(OH)D3+25(OH)D2 SERPL-MCNC: 55 NG/ML
CHOLEST SERPL-MCNC: 171 MG/DL
CHOLEST/HDLC SERPL: 2.2 {RATIO}
HDLC SERPL-MCNC: 77 MG/DL
HDLC SERPL: 45 %
LDLC SERPL CALC-MCNC: 69 MG/DL
NONHDLC SERPL-MCNC: 94 MG/DL
TRIGL SERPL-MCNC: 125 MG/DL
TSH SERPL DL<=0.005 MIU/L-ACNC: 0.53 UIU/ML

## 2018-06-05 PROCEDURE — 99215 OFFICE O/P EST HI 40 MIN: CPT | Mod: PBBFAC,PO | Performed by: INTERNAL MEDICINE

## 2018-06-05 PROCEDURE — 82306 VITAMIN D 25 HYDROXY: CPT

## 2018-06-05 PROCEDURE — 36415 COLL VENOUS BLD VENIPUNCTURE: CPT | Mod: PO

## 2018-06-05 PROCEDURE — 84443 ASSAY THYROID STIM HORMONE: CPT

## 2018-06-05 PROCEDURE — 80061 LIPID PANEL: CPT

## 2018-06-05 PROCEDURE — 99999 PR PBB SHADOW E&M-EST. PATIENT-LVL V: CPT | Mod: PBBFAC,,, | Performed by: INTERNAL MEDICINE

## 2018-06-05 PROCEDURE — 99214 OFFICE O/P EST MOD 30 MIN: CPT | Mod: S$PBB,,, | Performed by: INTERNAL MEDICINE

## 2018-06-05 NOTE — PROGRESS NOTES
"Subjective:      Patient ID: Leticia Piña is a 68 y.o. female.    Chief Complaint: Annual Exam and Fatigue      HPI  Here for f/u medical problems and preventive exam.  Didn't see Psychiatrist.  Not exercising because feels fatigued in general.  No f/c/cough.  Episode of sweating in [place] last week, attributes to anxiety attack.  Sleeping well with trazodone.  No cp/sob/palp.  BMs mostly normal with senna.  Dryness due to Sjogrens.  Urine normal.  Taking vit D3 daily.    HM: 10/17 fluvax, 12/13 [id number], 3/17 booster [id number], 4/13 TDaP, 4/13 zoster, 8/14 BMD rep sched, 12/11 Cscope rep 10y, 11/17 [facility]/Gyn Dr. Jimenez, 3/17 HCV neg.     Review of Systems   Constitutional: Negative for activity change, appetite change, chills, diaphoresis, fever and unexpected weight change.   HENT: Negative for congestion, ear pain, hearing loss, rhinorrhea, sinus pressure, sore throat and trouble swallowing.    Eyes: Negative for discharge and visual disturbance.   Respiratory: Negative for cough, chest tightness, shortness of breath and wheezing.    Cardiovascular: Negative for chest pain and palpitations.   Gastrointestinal: Positive for constipation. Negative for blood in stool, diarrhea, nausea and vomiting.   Endocrine: Negative for polydipsia and polyuria.   Genitourinary: Negative for difficulty urinating, dysuria, frequency, hematuria, menstrual problem, urgency and vaginal discharge.   Musculoskeletal: Negative for arthralgias, joint swelling and neck pain.   Skin: Negative for rash.   Neurological: Negative for dizziness, weakness and headaches.   Psychiatric/Behavioral: Negative for confusion, dysphoric mood and sleep disturbance. The patient is not nervous/anxious.          Objective:   /78 (BP Location: Right arm, Patient Position: Sitting)   Pulse 80   Temp 97 °F (36.1 °C) (Tympanic)   Ht 5' 5" (1.651 m)   Wt 66 kg (145 lb 8.1 oz)   SpO2 96%   BMI 24.21 kg/m²     Physical Exam   Constitutional: She " is oriented to person, place, and time. She appears well-developed and well-nourished.   HENT:   Right Ear: External ear normal. Tympanic membrane is not injected.   Left Ear: External ear normal. Tympanic membrane is not injected.   Mouth/Throat: Oropharynx is clear and moist.   Eyes: Conjunctivae are normal.   Neck: Normal range of motion. Neck supple. No thyromegaly present.   Cardiovascular: Normal rate, regular rhythm and intact distal pulses.  Exam reveals no gallop and no friction rub.    No murmur heard.  Pulmonary/Chest: Effort normal and breath sounds normal. She has no wheezes. She has no rales.   Abdominal: Soft. Bowel sounds are normal. She exhibits no mass. There is no tenderness.   Musculoskeletal: She exhibits no edema.   Lymphadenopathy:     She has no cervical adenopathy.   Neurological: She is alert and oriented to person, place, and time.   Skin: Skin is warm. No rash noted.   Psychiatric: She has a normal mood and affect.         Results for JAM TRIPP (MRN [id number]) as of 6/5/2018 14:02   Ref. Range 4/4/2018 15:35 4/4/2018 15:38   WBC Latest Ref Range: 3.90 - 12.70 K/uL  6.12   RBC Latest Ref Range: 4.00 - 5.40 M/uL  4.10   Hemoglobin Latest Ref Range: 12.0 - 16.0 g/dL  11.5 (L)   Hematocrit Latest Ref Range: 37.0 - 48.5 %  35.3 (L)   MCV Latest Ref Range: 82 - 98 fL  86   MCH Latest Ref Range: 27.0 - 31.0 pg  28.0   MCHC Latest Ref Range: 32.0 - 36.0 g/dL  32.6   RDW Latest Ref Range: 11.5 - 14.5 %  13.2   Platelets Latest Ref Range: 150 - 350 K/uL  322   MPV Latest Ref Range: 9.2 - 12.9 fL  9.4   Gran% Latest Ref Range: 38.0 - 73.0 %  62.2   Gran # (ANC) Latest Ref Range: 1.8 - 7.7 K/uL  3.8   Lymph% Latest Ref Range: 18.0 - 48.0 %  25.7   Lymph # Latest Ref Range: 1.0 - 4.8 K/uL  1.6   Mono% Latest Ref Range: 4.0 - 15.0 %  8.7   Mono # Latest Ref Range: 0.3 - 1.0 K/uL  0.5   Eosinophil% Latest Ref Range: 0.0 - 8.0 %  2.6   Eos # Latest Ref Range: 0.0 - 0.5 K/uL  0.2   Basophil%  Latest Ref Range: 0.0 - 1.9 %  0.8   Baso # Latest Ref Range: 0.00 - 0.20 K/uL  0.05   Sed Rate Latest Ref Range: 0 - 20 mm/Hr  15   Sodium Latest Ref Range: 136 - 145 mmol/L  137   Potassium Latest Ref Range: 3.5 - 5.1 mmol/L  4.0   Chloride Latest Ref Range: 95 - 110 mmol/L  99   CO2 Latest Ref Range: 23 - 29 mmol/L  27   Anion Gap Latest Ref Range: 8 - 16 mmol/L  11   BUN, Bld Latest Ref Range: 8 - 23 mg/dL  22   Creatinine Latest Ref Range: 0.5 - 1.4 mg/dL  0.8   eGFR if non African American Latest Ref Range: >60 mL/min/1.73 m^2  >60   eGFR if  Latest Ref Range: >60 mL/min/1.73 m^2  >60   Glucose Latest Ref Range: 70 - 110 mg/dL  93   Calcium Latest Ref Range: 8.7 - 10.5 mg/dL  9.3   Alkaline Phosphatase Latest Ref Range: 55 - 135 U/L  60   Total Protein Latest Ref Range: 6.0 - 8.4 g/dL  7.6   Albumin Latest Ref Range: 3.5 - 5.2 g/dL  3.7   Total Bilirubin Latest Ref Range: 0.1 - 1.0 mg/dL  0.3   AST Latest Ref Range: 10 - 40 U/L  19   ALT Latest Ref Range: 10 - 44 U/L  13   CRP Latest Ref Range: 0.0 - 8.2 mg/L 6.7    Vit D, 25-Hydroxy Latest Ref Range: 30 - 96 ng/mL 50      Assessment:       1. Acquired hypothyroidism    2. Drug-induced lupus erythematosus    3. Mixed anxiety and depressive disorder    4. Osteopenia, unspecified location    5. Rheumatoid arthritis involving multiple sites with positive rheumatoid factor    6. Sjogren's syndrome, with unspecified organ involvement    7. Vitamin D deficiency disease          Plan:     Acquired hypothyroidism- check lab.    Drug-induced lupus erythematosus now resolved.    Mixed anxiety and depressive disorder- cont counseling, see Psych.  -     Ambulatory consult to Psychiatry    Osteopenia, unspecified location    Rheumatoid arthritis involving multiple sites with positive rheumatoid factor, Sjogren's syndrome,per Rheum.    Vitamin D deficiency disease- cont supplement.    RTc 4mo.

## 2018-06-06 ENCOUNTER — OFFICE VISIT (OUTPATIENT)
Dept: PSYCHIATRY | Facility: CLINIC | Age: 68
End: 2018-06-06
Payer: MEDICARE

## 2018-06-06 DIAGNOSIS — Z63.9 FAMILY CIRCUMSTANCE: ICD-10-CM

## 2018-06-06 DIAGNOSIS — F41.8 ANXIOUS DEPRESSION: Primary | ICD-10-CM

## 2018-06-06 PROCEDURE — 90834 PSYTX W PT 45 MINUTES: CPT | Mod: S$PBB,,, | Performed by: SOCIAL WORKER

## 2018-06-06 PROCEDURE — 90834 PSYTX W PT 45 MINUTES: CPT | Mod: PBBFAC,PO | Performed by: SOCIAL WORKER

## 2018-06-06 SDOH — SOCIAL DETERMINANTS OF HEALTH (SDOH): PROBLEM RELATED TO PRIMARY SUPPORT GROUP, UNSPECIFIED: Z63.9

## 2018-06-12 ENCOUNTER — PATIENT MESSAGE (OUTPATIENT)
Dept: RHEUMATOLOGY | Facility: CLINIC | Age: 68
End: 2018-06-12

## 2018-06-12 DIAGNOSIS — F41.8 MIXED ANXIETY AND DEPRESSIVE DISORDER: ICD-10-CM

## 2018-06-12 RX ORDER — LORAZEPAM 1 MG/1
1 TABLET ORAL EVERY 6 HOURS PRN
Qty: 60 TABLET | Refills: 1 | Status: SHIPPED | OUTPATIENT
Start: 2018-06-12 | End: 2018-08-18 | Stop reason: SDUPTHER

## 2018-06-13 ENCOUNTER — CLINICAL SUPPORT (OUTPATIENT)
Dept: RHEUMATOLOGY | Facility: CLINIC | Age: 68
End: 2018-06-13
Payer: MEDICARE

## 2018-06-13 PROCEDURE — 96372 THER/PROPH/DIAG INJ SC/IM: CPT | Mod: PBBFAC

## 2018-06-13 RX ADMIN — CERTOLIZUMAB PEGOL 2 ML: KIT at 03:06

## 2018-06-13 NOTE — PROGRESS NOTES
Administered cimzia 200mg (lyophilized powder) to back of both arms  for total dose of cimzia 400mg. Patient tolerataed well no acute reaction noted to site. Pt instructed on s/s to report. Pt verbalized understanding. Pt waited 15 minutes.     Lot: [id number]  Exp: 5/2020

## 2018-06-14 ENCOUNTER — PATIENT MESSAGE (OUTPATIENT)
Dept: INTERNAL MEDICINE | Facility: CLINIC | Age: 68
End: 2018-06-14

## 2018-06-14 RX ORDER — AMOXICILLIN AND CLAVULANATE POTASSIUM 875; 125 MG/1; MG/1
1 TABLET, FILM COATED ORAL 2 TIMES DAILY
Qty: 20 TABLET | Refills: 0 | Status: SHIPPED | OUTPATIENT
Start: 2018-06-14 | End: 2018-06-24

## 2018-06-20 ENCOUNTER — OFFICE VISIT (OUTPATIENT)
Dept: PSYCHIATRY | Facility: CLINIC | Age: 68
End: 2018-06-20
Payer: MEDICARE

## 2018-06-20 DIAGNOSIS — Z63.9 RELATIONSHIP PROBLEMS: ICD-10-CM

## 2018-06-20 DIAGNOSIS — F41.8 ANXIOUS DEPRESSION: Primary | ICD-10-CM

## 2018-06-20 PROCEDURE — 90834 PSYTX W PT 45 MINUTES: CPT | Mod: PBBFAC,PO | Performed by: SOCIAL WORKER

## 2018-06-20 PROCEDURE — 90834 PSYTX W PT 45 MINUTES: CPT | Mod: S$PBB,,, | Performed by: SOCIAL WORKER

## 2018-06-20 SDOH — SOCIAL DETERMINANTS OF HEALTH (SDOH): PROBLEM RELATED TO PRIMARY SUPPORT GROUP, UNSPECIFIED: Z63.9

## 2018-06-21 NOTE — PROGRESS NOTES
Individual Psychotherapy (PhD/LCSW)    6/6/2018    Site:  [facility]         Therapeutic Intervention: Met with patient.  Outpatient - Insight oriented psychotherapy 45 min - CPT code 45502 and Outpatient - Supportive psychotherapy 45 min - CPT Code 99529    Chief complaint/reason for encounter: depression, anxiety and interpersonal     Interval history and content of current session:  68 year old female patient returned for follow up psychotherapy to address anxiety and depression symptoms.  Patient presented alert and oriented, casually and neatly dressed, tearful throughout the session.  Identified primary stressors include financial, aftermath of 's legal/business problems, and feeling of estrangement from some family, notably her mother, sister, daughter in law and son.  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  Patient talked about her sister continuing to be insulting and daughter in law and son continuing to ignore her.  Reported feeling more down in mood since the end of school, thinking that work served as a welcome distraction.  She said her anxiety now feels somewhat less prominent and her depression more prominent.  Said she realizes after consideration that she has been depressed most of her life, masked by anxiety.  The couple continues to reside in a house belonging to relatives, no immediate pressure to move out; that situation is stable, but she feels bad about needing that help.  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  Patient expressed feeling trapped financially, pessimistic about their income trajectory and expenses, though they are currently meeting all of their bills.  Her medication management appointment had to be reset for July, after she missed the scheduled June appointment.  Denied any si/hi, psychosis, mood swings, or  substance abuse.  Supportive therapy provided.  Clinic follow up on 6/20/18.      Treatment plan:  · Target symptoms: depression, anxiety , adjustment, interpersonal  · Why chosen therapy is appropriate versus another modality: relevant to diagnosis, patient responds to this modality  · Outcome monitoring methods: self-report, observation  · Therapeutic intervention type: insight oriented psychotherapy, supportive psychotherapy    Risk parameters:  Patient reports no suicidal ideation  Patient reports no homicidal ideation  Patient reports no self-injurious behavior  Patient reports no violent behavior    Verbal deficits: None    Patient's response to intervention:  The patient's response to intervention is accepting.    Progress toward goals and other mental status changes:  The patient's progress toward goals is limited.    Diagnosis:     ICD-10-CM ICD-9-CM   1. 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 Family circumstance Z63.9 V61.9       Plan:  individual psychotherapy, consult psychiatrist for medication evaluation and medication management by physician    Return to clinic: as scheduled    Length of Service (minutes): 45

## 2018-07-03 ENCOUNTER — OFFICE VISIT (OUTPATIENT)
Dept: PSYCHIATRY | Facility: CLINIC | Age: 68
End: 2018-07-03
Payer: MEDICARE

## 2018-07-03 DIAGNOSIS — F41.8 ANXIETY ASSOCIATED WITH DEPRESSION: Primary | ICD-10-CM

## 2018-07-03 DIAGNOSIS — F33.0 MILD EPISODE OF RECURRENT MAJOR DEPRESSIVE DISORDER: ICD-10-CM

## 2018-07-03 PROCEDURE — 99213 OFFICE O/P EST LOW 20 MIN: CPT | Mod: S$PBB,,, | Performed by: PSYCHIATRY & NEUROLOGY

## 2018-07-03 NOTE — PROGRESS NOTES
Outpatient Psychiatry Initial Visit (MD/NP)    7/3/2018    Leticia Piña, a 68 y.o. female, presenting for initial evaluation visit. Met with patient.    Reason for Encounter: Patient complains of anxiety, depression    History of Present Illness: 68 year old F with hx of anxiety and depression x ~20 years, with onset roughly corresponding to menopause in 1997. Describes following illness and treatment course:     Perimenopause - 1997 - insomnia roblems ; start ambien.   August 2002 - emotional distress  Can't accomplish anything (decreased functioning), feeling overwhelmed, going around in circles.   September '02 - first treatment - citalopram 20 mg. Thought it it was causing dry mouth (later dx'ed sjogrens). Feels helped symptoms present all her life that she later recognized as depression. Reduced citalopram.   November '03- more perimenopausal symptoms. Started muliti-hormonal treatment for menopausal symptoms. Had temporary benefit, stopped in '04 due to negligible benefit by that time. Remained on estradiol  Eventually went back to 20 mg citalopram.   Stress and fatigue in May '06. More dryness. Early sjogren's. Stopped citalopram, tried duloxetine.   2.07-stopped menses, 3.07 - back to progest/test, off est.   Ativan helped anxiety, caused lethargy.   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 Insomnia ongoing despite ambien.   Increased ambien to 15.   In '09 added trazodone 75 mg - helped in combo with ambien  2012 - cymbalta to 90 mg.   '13 - added wellbutrin 100 mg' diagnosed with sjogren's.   '15 - wellbutrin 150, trazodone 100 mg. Stopped ambien  Had a dip in moods in early summer.   Taking 225 mg bupropion - starting about 1 month ago. 300 mg was too intense. Tolerating this lower dose.     Worrying too much about different things   Trouble relaxing   SUSAN-7 = 2      Sleep Problems   Sad Mood  Appetite and weight changes  Concentration  "problems  Guilt  Thoughts of Emptiness/Death/Suicide  Anhedonia  Anergia  Slowing/PMR    QIDS = 7    Rested on rising. Wakes only briefly. Sometimes has trazodone hangover. Doesn't interfere with functioning. Interest is good.   Anxiety fluctuates. Recently taking ativan about 1-2x/week.     Psych Hx: as above.   No avh, no hospitalization, no serious SI, elaine  One previous psych assessment in [facility] - "horrible experience". She changed meds (to escitalopram), felt terrible.    No other interactions with psychiatrist.     Social history:   When younger - low self-esteem, dad was pessimistic and fault-finding. Dad critical. No nancy abuse. No serious maltreatment or trauma. Fewer than most friends (mostly because they were outgoing; thought poorly of one's self, inhibited. Denies teasing and bullying. Above average in school. Graduated HS, went to work x 1 year then went to [employer] [place]. Started dating . Didn't graduate. 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 Gets good feedback on performance. Good relationship with .  x 43 years. 2 grown kids, 4 grandkids. Kids live locally. Doesn't see her son much. Dtr-in-law leads them to not visit with them.  lost business as late consequence of bad economy and a "misappropriation of funds" issue and  health issues. Was primary caregiver to mother-in-law (now in nursing home). Was primary caregiver to granddaughter who had medical disabilities. "my lowest point".  now working, "see good future ahead".       From PCP note: 3.2.18 - Here for follow up of medical problems. Works long hours. Works & then goes to sleep right after she gets home. RA pain is doing well. Nenana weird so started checking BP. Has been monitoring BPs, range 130-174/ . Has no past history of HTN. No med change. Only major change is started working 3 months ago,  " ", & babysits. No exercise. Sleeping ok with trazodone 100mg. Mixed anxiety & depressive disorder- incr wellbutrin to 300, cont  cymbalta 60mg, trazodone 100 hs, try wean ativan to half hs.  Refer for counseling, refer to Psychiatry if not better in 6 weeks.  RTC 6 weeks.    From psychotherapy eval    Chief complaint/reason for encounter: depression, anxiety, sleep and interpersonal     History of present illness:  67 year old  female presented for initial evaluation, with chief complaint of "I've always been kind of anxious and depressed for as long as I can remember, but it just really started getting worse since around 2009..."  Pt described a few challenging stressful events starting then, that she feels have accumulate in her life. These include financial strain,her own worsening Rhumatoid Arthritis--diagnosed in 1994, loss of home & the family business in 2013, 's life threatening health scare starting in 2009, legal troubles related to his business, & a feeling of social abandonment from mother, sister, some friends, & daughter-in-law (which means her son & his 2 kids don't see her much anymore.) Pt described in recent months worsening symptoms of sudden uncontrolled tearfulness, increased muscle tension, being distracted, pervasive ruminating worries, loss of senthil, trouble sleeping unless she takes a sleeping aid--currently Trazodone, and tendency to socially isolate. She stated she has never been very sociable. She now feels more rejection and scrutiny from others and finds herself withdrawing.  She said much of the negativity from some family and friends relates to 's legal trouble, having been arrested for business related fraud from a period when he was juggling inadequate customer funds and misappropriated a customer's money to cover expenses of someone else's job; all that while he was battling cancer. Pt said she marvels that her  seems able to stay " generally optimistic & pleasant & that he has a lot of friends. Meanwhile, she lacks much social support & stated she isn;t comfortable reaching out to connect with people, so she has a small support network to begin with. She feels devastated by judgment against her  by her own sister, mother, & daughter in law, with the result that she is often unable to spend time with two of her grandchildren; she noted those children visit with the daughter-in-law's parents virtually every weekend. Pt described a certain pressure level of social expectation- -material and financial standards, & she & her  have lose their home, as well as the business, & they are living with her parents. 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 She said they have a lot of financial backlog of bills to catch up on. Pt denied any suicidal or homicidal ideation, denied any cognitive deficit other than poor focus at times, denied psychosis , mood swings, rages, or substance abuse. Identified therapeutic goals include reducing anxiety & depression & improving coping skills; possibly finding a way to somewhat reduce social isolation, perhaps by reconnecting with old friends.       Pain: not quantified but described as moderate RA pain     Symptoms:   ? Mood: depressed mood, insomnia, worthlessness/guilt, poor concentration, tearfulness and social isolation  ? Anxiety: excessive anxiety/worry, restlessness/keyed up, muscle tension and social phobia  ? Substance abuse: denied  ? Cognitive functioning: denied  ? Health behaviors: noncontributory     Psychiatric history: none     Medical history: RA--diagnosed in 1994; recent hypertension; Hypothyroidism     Family history of psychiatric illness: mother chronically anxious (starting when older); father  "apparently depressed--very pessimistic and irritable, doesn't acknowledge problem. Suspects paternal grandfather     Social history: Grew up locally, the oldest of 3 sisters, the other two 7 & 8 & 1/2 years younger than she.  Raised by both parents; recalled being markedly shy her entire life; not particularly close to her sisters, who were much younger and had each other. Lifelong active [employer]; has a home Judaism.  Not currently involved in any smaller groups within the Judaism.  Graduated high school and attended 2 years of college;  at age 24.  Still with , Alpesh, today.  Mother of two, a son, age 42, and daughter, age 39.  Both each have two children of their own.  Described daughter as a close source of support; also talks with  a lot, processing the struggles they have come through together.  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  Patient described recent loss of a few friend connections she had; her middle sister verbally declared she would not associate with the patient and her , due to his legal "disgrace."   Denied any  experience.  No tobacco, light to moderate wine intake.  Minimal caffeine, no recreational drugs.       Substance use:   Alcohol: occasional   Drugs: none   Tobacco: none   Caffeine: a cup of green tea most mornings.     Review Of Systems:     GENERAL:  No weight gain or loss  SKIN:  No rashes or lacerations  HEAD:  No headaches  EYES:  No exophthalmos, jaundice or blindness  EARS:  No dizziness, tinnitus or hearing loss  NOSE:  No changes in smell  MOUTH & THROAT:  No dyskinetic movements or obvious goiter  CHEST:  No shortness of breath, hyperventilation or cough  CARDIOVASCULAR:  No tachycardia or chest pain  ABDOMEN:  No nausea, vomiting, pain, constipation or diarrhea  URINARY:  No frequency, dysuria or sexual dysfunction  ENDOCRINE:  No polydipsia, polyuria  MUSCULOSKELETAL:  No pain or stiffness of " the joints  NEUROLOGIC:  No weakness, sensory changes, seizures, confusion, memory loss, tremor or other abnormal movements    Current Evaluation:     Nutritional Screening: Considering the patient's height and weight, medications, medical history and preferences, should a referral be made to the dietitian? no    Constitutional  Vitals:  Most recent vital signs, dated less than 90 days prior to this appointment, were not reviewed.    There were no vitals filed for this visit.     General:  unremarkable, age appropriate     Musculoskeletal  Muscle Strength/Tone:  no tremor, no tic   Gait & Station:  non-ataxic     Psychiatric  Appearance: casually dressed & groomed;   Behavior: calm,   Cooperation: cooperative with assessment  Speech: normal rate, volume, tone  Thought Process: linear, goal-directed  Thought Content: No suicidal or homicidal ideation; no delusions  Affect: reactive  Mood: euthymic  Perceptions: No auditory or visual hallucinations  Level of Consciousness: alert throughout interview  Insight: fair  Cognition: Oriented to person, place, time, & situation  Memory: no apparent deficits to general clinical interview; not formally assessed  Attention/Concentration: no apparent deficits to general clinical interview; not formally assessed  Fund of Knowledge: average by vocabulary/education    Laboratory Data  Lab Visit on 06/05/2018   Component Date Value Ref Range Status    Cholesterol 06/05/2018 171  120 - 199 mg/dL Final    Triglycerides 06/05/2018 125  30 - 150 mg/dL Final    HDL 06/05/2018 77* 40 - 75 mg/dL Final    LDL Cholesterol 06/05/2018 69.0  63.0 - 159.0 mg/dL Final    HDL/Chol Ratio 06/05/2018 45.0  20.0 - 50.0 % Final    Total Cholesterol/HDL Ratio 06/05/2018 2.2  2.0 - 5.0 Final    Non-HDL Cholesterol 06/05/2018 94  mg/dL Final    TSH 06/05/2018 0.531  0.400 - 4.000 uIU/mL Final    Vit D, 25-Hydroxy 06/05/2018 55  30 - 96 ng/mL Final     Medications  Outpatient Encounter  Prescriptions as of 7/3/2018   Medication Sig Dispense Refill    buPROPion (WELLBUTRIN XL) 300 MG 24 hr tablet Take 1 tablet (300 mg total) by mouth once daily. 30 tablet 6    cevimeline (EVOXAC) 30 mg capsule TAKE 1 CAPSULE (30 MG TOTAL) BY MOUTH 4 TIMES DAILY. 120 capsule 5    cholecalciferol, vitamin D3, 5,000 unit/mL Drop Take 1 drop by mouth Daily.      diclofenac sodium 1 % Gel Apply 2 g topically 4 (four) times daily. 100 g 3    doxycycline (VIBRAMYCIN) 100 MG Cap TAKE 1 CAPSULE BY MOUTH EVERY 12 HOURS AS NEEDED 30 capsule 4    DULoxetine (CYMBALTA) 60 MG capsule Take 2 capsules (120 mg total) by mouth once daily. 60 capsule 0    estradiol (ESTRACE) 1 MG tablet Take 1 tablet by mouth Daily.      folic acid (FOLVITE) 1 MG tablet Take 1 tablet (1,000 mcg total) by mouth once daily. 30 tablet 11    hydroxychloroquine (PLAQUENIL) 200 mg tablet TAKE 1 TABLET BY MOUTH TWICE A  tablet 2    IBUPROFEN ORAL Use as needed.      levothyroxine (SYNTHROID) 75 MCG tablet Take 75 mcg by mouth once daily.      LORazepam (ATIVAN) 1 MG tablet Take 1 tablet (1 mg total) by mouth every 6 (six) hours as needed. 60 tablet 1    meloxicam (MOBIC) 15 MG tablet Take 1 tablet (15 mg total) by mouth once daily. 30 tablet 6    methotrexate 2.5 MG Tab Take 6 tablets (15 mg total) by mouth every 7 days. 30 tablet 3    metroNIDAZOLE 0.75 % Lotn APPLY EVERY DAY AS NEEDED 59 mL 3    MULTIVITAMIN ORAL Daily.      omeprazole (PRILOSEC) 20 MG capsule Take 20 mg by mouth once daily.      pilocarpine (SALAGEN) 5 MG Tab Take 1 tablet (5 mg total) by mouth 4 (four) times daily. 120 tablet 11    PODIAPN 35-5-2-300 mg Cap Take 1 capsule by mouth 2 (two) times daily.  10    PRASTERONE, DHEA, VAGL Place vaginally.      progesterone (PROMETRIUM) 100 MG capsule Take 1 capsule by mouth Daily.      traZODone (DESYREL) 50 MG tablet TAKE 3 TABLETS BY MOUTH EVERY NIGHT AT BEDTIME AS NEEDED FOR INSOMNIA 270 tablet 2    triamcinolone  acetonide 0.5% (KENALOG) 0.5 % Crea Apply topically 2 (two) times daily. Apply to affected area 1 Tube 3    VITAMIN B COMPLEX ORAL Daily.       Facility-Administered Encounter Medications as of 7/3/2018   Medication Dose Route Frequency Provider Last Rate Last Dose    certolizumab pegol kit 2 mL  400 mg Subcutaneous Q28 Days Rolando Silva MD   2 mL at 06/13/18 1531     Assessment - Diagnosis - Goals:     Impression: 68 year old F with chronic anxiety and depression, RA and sjogren's. Had exacerbation of illness this past spring, currently doing somewhat better following medication changes, psychotherapy.     Treatment Goals:  Specify outcomes written in observable, behavioral terms:   Minimize recurrences    Treatment Plan/Recommendations:   · Duloxetine, bupropion, lorazepam prn.   · psychoeducation including behavioral activation, psychotherapy, meds.   · Discussed risks, benefits, and alternatives to treatment plan documented above with patient. I answered all patient questions related to this plan and patient expressed understanding and agreement.     Return to Clinic: 2 months    Counseling time: 10 minutes  Total time: 50 minutes    HEIDY Long MD  Psychiatry  [facility]  [address] , [place], [place] [id number] [phone]

## 2018-07-15 PROBLEM — F33.9 RECURRENT MAJOR DEPRESSIVE DISORDER: Status: ACTIVE | Noted: 2018-07-15

## 2018-07-19 ENCOUNTER — PATIENT MESSAGE (OUTPATIENT)
Dept: RHEUMATOLOGY | Facility: CLINIC | Age: 68
End: 2018-07-19

## 2018-07-19 ENCOUNTER — CLINICAL SUPPORT (OUTPATIENT)
Dept: RHEUMATOLOGY | Facility: CLINIC | Age: 68
End: 2018-07-19
Payer: MEDICARE

## 2018-07-19 PROCEDURE — 99213 OFFICE O/P EST LOW 20 MIN: CPT | Mod: PBBFAC,PO

## 2018-07-19 PROCEDURE — 99999 PR PBB SHADOW E&M-EST. PATIENT-LVL III: CPT | Mod: PBBFAC,,,

## 2018-07-19 RX ADMIN — CERTOLIZUMAB PEGOL 2 ML: KIT at 03:07

## 2018-07-19 NOTE — PROGRESS NOTES
Administered 200 mg Cimzia to left arm and 200mg Cimzia to right arm . Pt tolerated well. No acute reaction noted to site. Pt instructed on S/S to report. Advised patient to wait in lobby 15 minutes after receiving injection to monitor for any reactions.  Pt verbalized understanding.     Lot: [id number]  Exp: 10/20

## 2018-07-30 ENCOUNTER — LAB VISIT (OUTPATIENT)
Dept: LAB | Facility: HOSPITAL | Age: 68
End: 2018-07-30
Attending: INTERNAL MEDICINE
Payer: MEDICARE

## 2018-07-30 DIAGNOSIS — Z51.81 MEDICATION MONITORING ENCOUNTER: Chronic | ICD-10-CM

## 2018-07-30 LAB
ALBUMIN SERPL BCP-MCNC: 3.9 G/DL
ALP SERPL-CCNC: 54 U/L
ALT SERPL W/O P-5'-P-CCNC: 16 U/L
ANION GAP SERPL CALC-SCNC: 9 MMOL/L
AST SERPL-CCNC: 23 U/L
BASOPHILS # BLD AUTO: 0.09 K/UL
BASOPHILS NFR BLD: 1.5 %
BILIRUB SERPL-MCNC: 0.2 MG/DL
BUN SERPL-MCNC: 18 MG/DL
CALCIUM SERPL-MCNC: 9.5 MG/DL
CHLORIDE SERPL-SCNC: 99 MMOL/L
CO2 SERPL-SCNC: 28 MMOL/L
CREAT SERPL-MCNC: 1 MG/DL
CRP SERPL-MCNC: 2.8 MG/L
DIFFERENTIAL METHOD: ABNORMAL
EOSINOPHIL # BLD AUTO: 0.2 K/UL
EOSINOPHIL NFR BLD: 3.6 %
ERYTHROCYTE [DISTWIDTH] IN BLOOD BY AUTOMATED COUNT: 13 %
ERYTHROCYTE [SEDIMENTATION RATE] IN BLOOD BY WESTERGREN METHOD: 8 MM/HR
EST. GFR  (AFRICAN AMERICAN): >60 ML/MIN/1.73 M^2
EST. GFR  (NON AFRICAN AMERICAN): 59 ML/MIN/1.73 M^2
GLUCOSE SERPL-MCNC: 117 MG/DL
HCT VFR BLD AUTO: 34.5 %
HGB BLD-MCNC: 11.2 G/DL
LYMPHOCYTES # BLD AUTO: 2.3 K/UL
LYMPHOCYTES NFR BLD: 37.5 %
MCH RBC QN AUTO: 28.4 PG
MCHC RBC AUTO-ENTMCNC: 32.5 G/DL
MCV RBC AUTO: 88 FL
MONOCYTES # BLD AUTO: 0.5 K/UL
MONOCYTES NFR BLD: 7.9 %
NEUTROPHILS # BLD AUTO: 3 K/UL
NEUTROPHILS NFR BLD: 49.5 %
PLATELET # BLD AUTO: 262 K/UL
PMV BLD AUTO: 9.9 FL
POTASSIUM SERPL-SCNC: 4.1 MMOL/L
PROT SERPL-MCNC: 7.2 G/DL
RBC # BLD AUTO: 3.94 M/UL
SODIUM SERPL-SCNC: 136 MMOL/L
WBC # BLD AUTO: 6.11 K/UL

## 2018-07-30 PROCEDURE — 86140 C-REACTIVE PROTEIN: CPT

## 2018-07-30 PROCEDURE — 85025 COMPLETE CBC W/AUTO DIFF WBC: CPT | Mod: PO

## 2018-07-30 PROCEDURE — 85651 RBC SED RATE NONAUTOMATED: CPT | Mod: PO

## 2018-07-30 PROCEDURE — 80053 COMPREHEN METABOLIC PANEL: CPT | Mod: PO

## 2018-07-30 PROCEDURE — 36415 COLL VENOUS BLD VENIPUNCTURE: CPT | Mod: PO

## 2018-07-31 ENCOUNTER — OFFICE VISIT (OUTPATIENT)
Dept: RHEUMATOLOGY | Facility: CLINIC | Age: 68
End: 2018-07-31
Payer: MEDICARE

## 2018-07-31 VITALS
DIASTOLIC BLOOD PRESSURE: 76 MMHG | HEART RATE: 69 BPM | WEIGHT: 145.06 LBS | HEIGHT: 65 IN | SYSTOLIC BLOOD PRESSURE: 156 MMHG | BODY MASS INDEX: 24.17 KG/M2

## 2018-07-31 DIAGNOSIS — L93.2 DRUG-INDUCED LUPUS ERYTHEMATOSUS: ICD-10-CM

## 2018-07-31 DIAGNOSIS — Z51.81 MEDICATION MONITORING ENCOUNTER: Chronic | ICD-10-CM

## 2018-07-31 DIAGNOSIS — M05.79 RHEUMATOID ARTHRITIS INVOLVING MULTIPLE SITES WITH POSITIVE RHEUMATOID FACTOR: Primary | ICD-10-CM

## 2018-07-31 DIAGNOSIS — T50.905A DRUG-INDUCED LUPUS ERYTHEMATOSUS: ICD-10-CM

## 2018-07-31 DIAGNOSIS — M85.89 OSTEOPENIA OF MULTIPLE SITES: ICD-10-CM

## 2018-07-31 DIAGNOSIS — M35.01 SJOGREN'S SYNDROME WITH KERATOCONJUNCTIVITIS SICCA: ICD-10-CM

## 2018-07-31 PROCEDURE — 99214 OFFICE O/P EST MOD 30 MIN: CPT | Mod: PBBFAC,PO | Performed by: INTERNAL MEDICINE

## 2018-07-31 PROCEDURE — 99999 PR PBB SHADOW E&M-EST. PATIENT-LVL IV: CPT | Mod: PBBFAC,,, | Performed by: INTERNAL MEDICINE

## 2018-07-31 PROCEDURE — 99214 OFFICE O/P EST MOD 30 MIN: CPT | Mod: S$PBB,,, | Performed by: INTERNAL MEDICINE

## 2018-07-31 RX ORDER — METHOTREXATE 2.5 MG/1
15 TABLET ORAL
Qty: 30 TABLET | Refills: 3 | Status: SHIPPED | OUTPATIENT
Start: 2018-07-31 | End: 2018-08-23 | Stop reason: SDUPTHER

## 2018-07-31 RX ORDER — MELOXICAM 15 MG/1
15 TABLET ORAL DAILY
Qty: 30 TABLET | Refills: 6 | Status: SHIPPED | OUTPATIENT
Start: 2018-07-31 | End: 2018-10-12

## 2018-07-31 RX ORDER — HYDROXYCHLOROQUINE SULFATE 200 MG/1
200 TABLET, FILM COATED ORAL 2 TIMES DAILY
Qty: 180 TABLET | Refills: 2 | Status: SHIPPED | OUTPATIENT
Start: 2018-07-31 | End: 2018-10-25 | Stop reason: SDUPTHER

## 2018-07-31 ASSESSMENT — ROUTINE ASSESSMENT OF PATIENT INDEX DATA (RAPID3): MDHAQ FUNCTION SCORE: 0

## 2018-07-31 NOTE — PATIENT INSTRUCTIONS
Nordic Ultimate omega- consider up to 4000 mg day    No change in  RA meds    [email or web address]

## 2018-07-31 NOTE — PROGRESS NOTES
RHEUMATOLOGY FOLLOWUP    CHIEF COMPLAINT:  Rheumatoid arthritis and dry mouth.    PERTINENT HISTORY:  Leticia was last seen by Leta in April.  At that time, she   was doing well from RA with no activity-CDAI 0.  She continued on Cimzia 400 mg   parenterally every 28 days given here as well as methotrexate six a week, folic   acid, and Plaquenil 200 b.i.d.  She has had no flares of RA as far as joint   swelling or redness and denies any major morning stiffness.  She will   occasionally get horrible soreness in the left wrist around the ulnar styloid   when she is pushing up off of the wrist.    She continues to have a lot of trouble with Sjogren's and dry mouth.  She is on   various agents now, unfortunately cannot find the Evoxac at a reasonable price.    She is taking Salagen 1 or 2 several times daily with some help.  She does   drink water continually.  She has had no complication from Sjogren's as far as   progressive shortness of breath, any type of kidney disease or any severe weight   loss etc.  She has a positive SSA and SSB, low-titer GIOVANNA.    She has osteopenia with stable bone mass.  She does take hormones.  Her vitamin   D level recently done a month ago was normal.  She has had no infections or any   other type of reactions related to Cimzia.    REVIEW OF SYSTEMS:  GENERAL:  No weight loss.  No infections.   SKIN:  Good with no nodules or rashes.  HEENT:  See HPI with marked dryness.  Eyes are minimally dry.  No adenopathy.    No thyroid issues.  NODES:  Denies swollen glands.  CHEST:  Denies shortness of breath.  HAUSER, cyanosis, wheezing, cough and sputum   production.  CARDIOVASCULAR:  Denies chest pain, PND, orthopnea or reduced exercise   tolerance.  ABDOMEN:  No weight loss.  Denies nausea, vomiting, diarrhea, abdominal pain,   hematemesis or blood in stool.  URINARY:  No flank pain, dysuria or hematuria.  PERIPHERAL VASCULAR:  No claudication or cyanosis.  NEURO:  No numbness, weakness or  tingling.  VASCULAR:  Good with no Raynauds.  PSYCHIATRY:  She does have occasional anxiety.  She is taking chronic   Wellbutrin.  She also is on Cymbalta 120 mg daily.  She uses trazodone 50 mg to   help her rest.  When she gets extremely dry, she seems to get anxious.    PHYSICAL EXAMINATION:   VITAL SIGNS:  Her exam with her height 65 inches, weight 65.8 kilograms, BMI is   24, blood pressure 156/76, with a pulse in the 60s, pain score 0.   HEENT:  Significant dryness, but no stomatitis.  Eyes are not red or tender   today.  She has no parotid or submandibular swelling. There are no adenopathy.    Normocephalic, atraumatic, alert and oriented X3.  PERRLA.  Conjunctiva clear,   sclera  non icteric.  No tonsillar enlargement.  No pharyngeal erythema or   exudate.  No ulcers or lesions noted.  Mucous membranes moist and pink.  Oral   pharynx clear.  Neck supple, no JVD.  Normal ROM.  Thyroid normal.  No masses or   tracheal deviation.  No cervical, axillary or inguinal lymph node enlargement.  CHEST:  Lungs clear to auscultation bilaterally.  No dullness to percussion.  No   accessory muscle use.  No intercostal retraction noted.  CARDIOVASCULAR:  Normal S1, S2.  No rubs, murmurs or gallops.  No peripheral   edema.   ABDOMEN:  Bowel sounds normal, abdomen soft, not distended.  No tenderness or   masses. No hepatosplenomegaly.  MUSCULOSKELETAL:  Muscle strength is good.  JOINT EXAM:  With full range of motion in her DIPs, PIPs, MP joints bilaterally.    No redness, no heat.  There is minimal tenderness at the pisiform bone in the   left wrist volar side.  No swelling or redness.  Right wrist is normal.  Elbows,   shoulders, hips, knees all have normal range of motion without any tenderness.    Ankles move well.  No MTP tenderness.  She has no edema.  Pulses are normal.    She has no skin rashes or nodules.     LABORATORY DATA:  Her lab reviewed with her and she has a white count that is   normal at 6000 with  hemoglobin slightly low at 11.2, stable.  Platelets are   normal.  Sed rate is normal at 8.  Chemistries excellent.  Glucose minimally   elevated, nonfasting. Recent lipids, thyroid, vitamin D tests were all normal.    Note in the past positive SSA and SSB with negative rheumatoid factor, normal   complements.   IMPRESSION:  1.  Rheumatoid arthritis, seropositive by history and CCP-no activity, CDAI 0,   MARGARITA score 0.  2.  Sjogren's syndrome, seropositive.  Ongoing symptoms within the mouth   predominantly.  3.  Chronic osteopenia-last bone density 2017, -2.2 stable at the spine, slight   fall at the hip on hormones presently.  4.  Note she had prior drug-induced lupus-Humira-resolved.  No recurrence on   Cimzia.  5.  Immunization status.  We will like to consider her for the Shingrix study.    Other vaccines up to date.  6.  Systolic hypertension today.    PLAN:  1.  We will maintain Cimzia 400 mg once a month.  2.  Maintain methotrexate 50 mg a week with folic acid.  3.  Maintain Plaquenil 200 twice daily.  4.  I would like her to try a high-strength omega-3-consider ultimate Nordic   omega.  5.  Continue fluids- predominantly water.  6.  Maintain vitamin D and exercises.  Maintain hormones.  Bone density due   again in 2019.  Call if any other issues develop, otherwise see back in four   month intervals.  Give her a note about to being to wear tennis shoes and   avoiding sun exposure at work.            /torsten [id number] leonora(s)            ELLY/IVETH  dd: 07/31/2018 12:57:52 (CDT)  td: 08/01/2018 07:02:06 (CDT)  Doc ID   #[id number]  Job ID #[id number]    CC:

## 2018-08-07 NOTE — PROGRESS NOTES
"Individual Psychotherapy (PhD/LCSW)    6/20/2018    Site:  [facility]         Therapeutic Intervention: Met with patient.  Outpatient - Insight oriented psychotherapy 45 min - CPT code 95728 and Outpatient - Supportive psychotherapy 45 min - CPT Code 02718    Chief complaint/reason for encounter: depression, anxiety and interpersonal     Interval history and content of current session:   68 year old female patient returned for follow up psychotherapy, therapeutic target of anxiety and depression, with challenge of coping with situational stressors, including financial and work stress, and family dynamics. Patient identifying some personal insecurities about social status, "worried what the neighbors think."  She reported her  told her he sees a marked improvement in her anxiety level with her medication.  She said she thought maybe is is helping was was unsure.  Patient talked about deep lack of confidence.  Endorsed a life-long patern of desperately seeking affirmation from others.  Talked about feeling inferior to others who had more impressive careers or seemed to accomplish big things.  She talked about her disappointment in seeing her  son spending far more time socializing with his wife's family than with his own parents, the patient and her .  Feels unsure whether that is her son's preference or simply influence of expectation from his wife.  Discussed with patient some application of RET principles of challenging her negative assumptions about situations  Discussed a specific example in which she has assessed fairly low self-worth.  Patient denied any si/hi, cognitive deficit, psychosis, mood swings, or substance abuse.  Plan is to follow up in clinic as financially able.  tba    Treatment plan:  · Target symptoms: depression, anxiety , adjustment, work stress, poor self-esteem  · Why chosen therapy is appropriate versus another modality: relevant to diagnosis, patient responds to this " modality  · Outcome monitoring methods: self-report, observation  · Therapeutic intervention type: insight oriented psychotherapy, supportive psychotherapy    Risk parameters:  Patient reports no suicidal ideation  Patient reports no homicidal ideation  Patient reports no self-injurious behavior  Patient reports no violent behavior    Verbal deficits: None    Patient's response to intervention:  The patient's response to intervention is accepting.    Progress toward goals and other mental status changes:  The patient's progress toward goals is limited.    Diagnosis:     ICD-10-CM ICD-9-CM   1. 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 Relationship problems Z63.9 313.3       Plan:  individual psychotherapy and medication management by physician    Return to clinic: as needed    Length of Service (minutes): 45

## 2018-08-13 ENCOUNTER — PATIENT MESSAGE (OUTPATIENT)
Dept: RHEUMATOLOGY | Facility: CLINIC | Age: 68
End: 2018-08-13

## 2018-08-18 DIAGNOSIS — F41.8 MIXED ANXIETY AND DEPRESSIVE DISORDER: ICD-10-CM

## 2018-08-21 ENCOUNTER — CLINICAL SUPPORT (OUTPATIENT)
Dept: RHEUMATOLOGY | Facility: CLINIC | Age: 68
End: 2018-08-21
Payer: MEDICARE

## 2018-08-21 DIAGNOSIS — M05.79 RHEUMATOID ARTHRITIS INVOLVING MULTIPLE SITES WITH POSITIVE RHEUMATOID FACTOR: Primary | ICD-10-CM

## 2018-08-21 PROCEDURE — 96372 THER/PROPH/DIAG INJ SC/IM: CPT | Mod: PBBFAC

## 2018-08-21 RX ORDER — LORAZEPAM 1 MG/1
TABLET ORAL
Qty: 60 TABLET | Refills: 1 | Status: SHIPPED | OUTPATIENT
Start: 2018-08-21 | End: 2018-10-23 | Stop reason: SDUPTHER

## 2018-08-21 RX ADMIN — CERTOLIZUMAB PEGOL 2 ML: KIT at 04:08

## 2018-08-21 NOTE — PROGRESS NOTES
Administered Cimzia 200mg (Lyophilized Powder) to Rt arm and Left arm for total dose of Climzia 400mg. Patient tolerated well. No acute reaction noted to site. Pt instructed on S/S to report. Patient was able to verbalize understanding. Patient waited 15 minutes post injection.     Lot:[id number]  Exp:10/2020  Manu:[employer]

## 2018-08-23 DIAGNOSIS — L93.2 DRUG-INDUCED LUPUS ERYTHEMATOSUS: ICD-10-CM

## 2018-08-23 DIAGNOSIS — M05.79 RHEUMATOID ARTHRITIS INVOLVING MULTIPLE SITES WITH POSITIVE RHEUMATOID FACTOR: ICD-10-CM

## 2018-08-23 DIAGNOSIS — T50.905A DRUG-INDUCED LUPUS ERYTHEMATOSUS: ICD-10-CM

## 2018-08-23 RX ORDER — METHOTREXATE 2.5 MG/1
15 TABLET ORAL
Qty: 24 TABLET | Refills: 4 | Status: SHIPPED | OUTPATIENT
Start: 2018-08-23 | End: 2018-10-25 | Stop reason: SDUPTHER

## 2018-08-31 RX ORDER — TRAZODONE HYDROCHLORIDE 50 MG/1
100-150 TABLET ORAL NIGHTLY PRN
Qty: 270 TABLET | Refills: 2 | Status: SHIPPED | OUTPATIENT
Start: 2018-08-31 | End: 2018-10-23 | Stop reason: SDUPTHER

## 2018-09-28 NOTE — PROGRESS NOTES
"Subjective:      Patient ID: Leticia Piña is a 68 y.o. female.    Chief Complaint: Follow-up      HPI  Here for follow up of medical problems.  BPs at home running high, without pain.  140-150s/80s.  Anxiety doing well on changes by Psychiatry, but decreased wellbutrin to 225mg, and taking cymbalta 60mg.  Trazodone at hs is working well.  Ativan use is rare now.  Not exercising lately, but plans to start walking.    Updated/ annual due 6/19:  HM: 10/18 today fluvax, 12/13 [id number], 3/17 booster [id number], 4/13 TDaP, 4/13 zoster, 12/17 BMD rep 2y, 12/11 Cscope rep 10y, 11/17 MMG/Gyn Dr. Jimenez, 3/17 HCV neg.     Review of Systems   Constitutional: Negative for chills, diaphoresis and fever.   Respiratory: Negative for cough and shortness of breath.    Cardiovascular: Negative for chest pain, palpitations and leg swelling.   Gastrointestinal: Negative for blood in stool, constipation, diarrhea, nausea and vomiting.   Genitourinary: Negative for dysuria, frequency and hematuria.   Psychiatric/Behavioral: The patient is not nervous/anxious.          Objective:   BP (!) 150/80 (BP Location: Right arm, Patient Position: Sitting)   Pulse 76   Temp 96.8 °F (36 °C) (Tympanic)   Ht 5' 5" (1.651 m)   Wt 67.8 kg (149 lb 7.6 oz)   SpO2 98%   BMI 24.87 kg/m²     Physical Exam   Constitutional: She is oriented to person, place, and time. She appears well-developed.   HENT:   Mouth/Throat: Oropharynx is clear and moist.   Neck: Neck supple. Carotid bruit is not present. No thyroid mass present.   Cardiovascular: Normal rate, regular rhythm and intact distal pulses. Exam reveals no gallop and no friction rub.   No murmur heard.  Pulmonary/Chest: Effort normal and breath sounds normal. She has no wheezes. She has no rales.   Abdominal: Soft. Bowel sounds are normal. She exhibits no mass. There is no hepatosplenomegaly. There is no tenderness.   Musculoskeletal: She exhibits no edema.   Lymphadenopathy:     She has no " cervical adenopathy.   Neurological: She is alert and oriented to person, place, and time.   Psychiatric: She has a normal mood and affect.           Assessment:       1. Essential hypertension    2. Acquired hypothyroidism    3. Anxiety associated with depression    4. Primary insomnia    5. Rheumatoid arthritis involving multiple sites with positive rheumatoid factor          Plan:     Essential hypertension, new dx- start rx, monitor BPs.  Check EKG.  RTC 6wk.  -     Basic metabolic panel; Future; Expected date: 10/12/2018  -     losartan (COZAAR) 50 MG tablet; Take 1 tablet (50 mg total) by mouth once daily.  Dispense: 90 tablet; Refill: 3    Acquired hypothyroidism- Clinically stable, continue present treatment.    Anxiety associated with depression- cont cymbalta/wellbutrin.  -     buPROPion (WELLBUTRIN XL) 150 MG TB24 tablet; Take 2 tablets (300 mg total) by mouth once daily.  Dispense: 60 tablet; Refill: 11    Primary insomnia- cont trazodone.    Rheumatoid arthritis involving multiple sites with positive rheumatoid factor- doing well, per Rheum.

## 2018-10-07 ENCOUNTER — PATIENT MESSAGE (OUTPATIENT)
Dept: INTERNAL MEDICINE | Facility: CLINIC | Age: 68
End: 2018-10-07

## 2018-10-08 RX ORDER — AMOXICILLIN AND CLAVULANATE POTASSIUM 875; 125 MG/1; MG/1
1 TABLET, FILM COATED ORAL 2 TIMES DAILY
Qty: 20 TABLET | Refills: 0 | Status: SHIPPED | OUTPATIENT
Start: 2018-10-08 | End: 2018-10-18

## 2018-10-12 ENCOUNTER — OFFICE VISIT (OUTPATIENT)
Dept: INTERNAL MEDICINE | Facility: CLINIC | Age: 68
End: 2018-10-12
Payer: MEDICARE

## 2018-10-12 VITALS
OXYGEN SATURATION: 98 % | DIASTOLIC BLOOD PRESSURE: 80 MMHG | BODY MASS INDEX: 24.91 KG/M2 | HEIGHT: 65 IN | WEIGHT: 149.5 LBS | HEART RATE: 76 BPM | TEMPERATURE: 97 F | SYSTOLIC BLOOD PRESSURE: 150 MMHG

## 2018-10-12 DIAGNOSIS — I10 ESSENTIAL HYPERTENSION: Primary | ICD-10-CM

## 2018-10-12 DIAGNOSIS — F51.01 PRIMARY INSOMNIA: ICD-10-CM

## 2018-10-12 DIAGNOSIS — F41.8 ANXIETY ASSOCIATED WITH DEPRESSION: ICD-10-CM

## 2018-10-12 DIAGNOSIS — E03.9 ACQUIRED HYPOTHYROIDISM: ICD-10-CM

## 2018-10-12 DIAGNOSIS — M05.79 RHEUMATOID ARTHRITIS INVOLVING MULTIPLE SITES WITH POSITIVE RHEUMATOID FACTOR: ICD-10-CM

## 2018-10-12 PROCEDURE — 99215 OFFICE O/P EST HI 40 MIN: CPT | Mod: PBBFAC,PO,25 | Performed by: INTERNAL MEDICINE

## 2018-10-12 PROCEDURE — 90662 IIV NO PRSV INCREASED AG IM: CPT | Mod: PBBFAC,PO

## 2018-10-12 PROCEDURE — 99999 PR PBB SHADOW E&M-EST. PATIENT-LVL V: CPT | Mod: PBBFAC,,, | Performed by: INTERNAL MEDICINE

## 2018-10-12 PROCEDURE — 99214 OFFICE O/P EST MOD 30 MIN: CPT | Mod: S$PBB,,, | Performed by: INTERNAL MEDICINE

## 2018-10-12 RX ORDER — LOSARTAN POTASSIUM 50 MG/1
50 TABLET ORAL DAILY
Qty: 90 TABLET | Refills: 3 | Status: SHIPPED | OUTPATIENT
Start: 2018-10-12 | End: 2018-10-23 | Stop reason: SDUPTHER

## 2018-10-12 RX ORDER — MELOXICAM 15 MG/1
15 TABLET ORAL DAILY PRN
Qty: 30 TABLET | Refills: 6
Start: 2018-10-12 | End: 2018-10-25 | Stop reason: SDUPTHER

## 2018-10-12 RX ORDER — BUPROPION HYDROCHLORIDE 150 MG/1
300 TABLET ORAL DAILY
Qty: 60 TABLET | Refills: 11 | Status: SHIPPED | OUTPATIENT
Start: 2018-10-12 | End: 2018-10-24 | Stop reason: SDUPTHER

## 2018-10-21 ENCOUNTER — NURSE TRIAGE (OUTPATIENT)
Dept: ADMINISTRATIVE | Facility: CLINIC | Age: 68
End: 2018-10-21

## 2018-10-22 ENCOUNTER — TELEPHONE (OUTPATIENT)
Dept: INTERNAL MEDICINE | Facility: CLINIC | Age: 68
End: 2018-10-22

## 2018-10-22 NOTE — TELEPHONE ENCOUNTER
Pharmacy requesting change in Bupropion HCL SL 150mg.  It is written to take 2 tablets daily.  Insurance will only pay for 1 daily.  Can you change to 1-300mg tablet daily?/rpr

## 2018-10-22 NOTE — TELEPHONE ENCOUNTER
Pt called re BP meds. 168/105 HR= 65, palps- irregular. rec ED for palps and elevated HR. Call back with questions.       Reason for Disposition   [1] BP  >= 160 / 100 AND [2] cardiac or neurologic symptoms    (e.g., chest pain, difficulty breathing, unsteady gait, blurred vision)    Protocols used: ST HIGH BLOOD PRESSURE-A-AH

## 2018-10-23 ENCOUNTER — CLINICAL SUPPORT (OUTPATIENT)
Dept: CARDIOLOGY | Facility: CLINIC | Age: 68
End: 2018-10-23
Payer: MEDICARE

## 2018-10-23 ENCOUNTER — TELEPHONE (OUTPATIENT)
Dept: INTERNAL MEDICINE | Facility: CLINIC | Age: 68
End: 2018-10-23

## 2018-10-23 ENCOUNTER — LAB VISIT (OUTPATIENT)
Dept: LAB | Facility: HOSPITAL | Age: 68
End: 2018-10-23
Attending: INTERNAL MEDICINE
Payer: MEDICARE

## 2018-10-23 ENCOUNTER — PATIENT MESSAGE (OUTPATIENT)
Dept: INTERNAL MEDICINE | Facility: CLINIC | Age: 68
End: 2018-10-23

## 2018-10-23 DIAGNOSIS — F41.8 MIXED ANXIETY AND DEPRESSIVE DISORDER: ICD-10-CM

## 2018-10-23 DIAGNOSIS — I10 ESSENTIAL HYPERTENSION: ICD-10-CM

## 2018-10-23 PROCEDURE — 93005 ELECTROCARDIOGRAM TRACING: CPT | Mod: PBBFAC,PO | Performed by: INTERNAL MEDICINE

## 2018-10-23 PROCEDURE — 36415 COLL VENOUS BLD VENIPUNCTURE: CPT | Mod: PO

## 2018-10-23 PROCEDURE — 93010 ELECTROCARDIOGRAM REPORT: CPT | Mod: S$PBB,,, | Performed by: INTERNAL MEDICINE

## 2018-10-23 PROCEDURE — 80048 BASIC METABOLIC PNL TOTAL CA: CPT

## 2018-10-23 RX ORDER — LORAZEPAM 1 MG/1
1 TABLET ORAL EVERY 6 HOURS PRN
Qty: 60 TABLET | Refills: 1 | Status: SHIPPED | OUTPATIENT
Start: 2018-10-23 | End: 2019-01-07 | Stop reason: SDUPTHER

## 2018-10-23 RX ORDER — TRAZODONE HYDROCHLORIDE 50 MG/1
100-150 TABLET ORAL NIGHTLY PRN
Qty: 270 TABLET | Refills: 2 | Status: SHIPPED | OUTPATIENT
Start: 2018-10-23 | End: 2018-11-27 | Stop reason: SDUPTHER

## 2018-10-23 RX ORDER — METRONIDAZOLE 7.5 MG/G
LOTION TOPICAL
Qty: 59 ML | Refills: 3 | Status: SHIPPED | OUTPATIENT
Start: 2018-10-23 | End: 2019-06-10

## 2018-10-23 RX ORDER — DULOXETIN HYDROCHLORIDE 60 MG/1
120 CAPSULE, DELAYED RELEASE ORAL DAILY
Qty: 60 CAPSULE | Refills: 5 | Status: SHIPPED | OUTPATIENT
Start: 2018-10-23 | End: 2019-02-04 | Stop reason: SDUPTHER

## 2018-10-23 RX ORDER — LOSARTAN POTASSIUM 50 MG/1
50 TABLET ORAL DAILY
Qty: 90 TABLET | Refills: 3 | Status: SHIPPED | OUTPATIENT
Start: 2018-10-23 | End: 2018-11-20 | Stop reason: SDUPTHER

## 2018-10-23 NOTE — TELEPHONE ENCOUNTER
Please check with the patient- I feel like she asked for it to be written that way due to size of tablet??  SM

## 2018-10-23 NOTE — TELEPHONE ENCOUNTER
----- Message from Liss Stephens sent at 10/23/2018 10:21 AM CDT -----  Contact: self/[phone]  Returning call, please call back at [phone]. Thanks/ar

## 2018-10-23 NOTE — TELEPHONE ENCOUNTER
----- Message from Dari Thomas sent at 10/23/2018 10:16 AM CDT -----  Patient returning call..[phone] (home)

## 2018-10-24 ENCOUNTER — PATIENT MESSAGE (OUTPATIENT)
Dept: INTERNAL MEDICINE | Facility: CLINIC | Age: 68
End: 2018-10-24

## 2018-10-24 ENCOUNTER — PATIENT MESSAGE (OUTPATIENT)
Dept: RHEUMATOLOGY | Facility: CLINIC | Age: 68
End: 2018-10-24

## 2018-10-24 DIAGNOSIS — K11.7 XEROSTOMIA DUE TO AUTOIMMUNE DISEASE: ICD-10-CM

## 2018-10-24 DIAGNOSIS — M35.9 XEROSTOMIA DUE TO AUTOIMMUNE DISEASE: ICD-10-CM

## 2018-10-24 DIAGNOSIS — M05.79 RHEUMATOID ARTHRITIS INVOLVING MULTIPLE SITES WITH POSITIVE RHEUMATOID FACTOR: ICD-10-CM

## 2018-10-24 DIAGNOSIS — T50.905A DRUG-INDUCED LUPUS ERYTHEMATOSUS: ICD-10-CM

## 2018-10-24 DIAGNOSIS — Z79.631 METHOTREXATE, LONG TERM, CURRENT USE: Primary | ICD-10-CM

## 2018-10-24 DIAGNOSIS — M35.01 SJOGREN'S SYNDROME WITH KERATOCONJUNCTIVITIS SICCA: ICD-10-CM

## 2018-10-24 DIAGNOSIS — L93.2 DRUG-INDUCED LUPUS ERYTHEMATOSUS: ICD-10-CM

## 2018-10-24 DIAGNOSIS — F41.8 ANXIETY ASSOCIATED WITH DEPRESSION: ICD-10-CM

## 2018-10-24 LAB
ANION GAP SERPL CALC-SCNC: 8 MMOL/L
BUN SERPL-MCNC: 17 MG/DL
CALCIUM SERPL-MCNC: 9.6 MG/DL
CHLORIDE SERPL-SCNC: 103 MMOL/L
CO2 SERPL-SCNC: 29 MMOL/L
CREAT SERPL-MCNC: 0.8 MG/DL
EST. GFR  (AFRICAN AMERICAN): >60 ML/MIN/1.73 M^2
EST. GFR  (NON AFRICAN AMERICAN): >60 ML/MIN/1.73 M^2
GLUCOSE SERPL-MCNC: 74 MG/DL
POTASSIUM SERPL-SCNC: 4 MMOL/L
SODIUM SERPL-SCNC: 140 MMOL/L

## 2018-10-24 RX ORDER — BUPROPION HYDROCHLORIDE 150 MG/1
300 TABLET ORAL DAILY
Qty: 60 TABLET | Refills: 11 | Status: SHIPPED | OUTPATIENT
Start: 2018-10-24 | End: 2018-12-12 | Stop reason: SDUPTHER

## 2018-10-25 ENCOUNTER — PATIENT MESSAGE (OUTPATIENT)
Dept: RHEUMATOLOGY | Facility: CLINIC | Age: 68
End: 2018-10-25

## 2018-10-25 RX ORDER — METHOTREXATE 2.5 MG/1
15 TABLET ORAL
Qty: 72 TABLET | Refills: 3 | Status: SHIPPED | OUTPATIENT
Start: 2018-10-25 | End: 2019-06-10

## 2018-10-25 RX ORDER — MELOXICAM 15 MG/1
15 TABLET ORAL DAILY PRN
Qty: 90 TABLET | Refills: 3 | Status: SHIPPED | OUTPATIENT
Start: 2018-10-25 | End: 2019-06-04

## 2018-10-25 RX ORDER — LEVOTHYROXINE SODIUM 75 UG/1
75 TABLET ORAL DAILY
Qty: 90 TABLET | Refills: 1 | Status: CANCELLED | OUTPATIENT
Start: 2018-10-25

## 2018-10-25 RX ORDER — PILOCARPINE HYDROCHLORIDE 5 MG/1
5 TABLET, FILM COATED ORAL 4 TIMES DAILY
Qty: 360 TABLET | Refills: 3 | Status: SHIPPED | OUTPATIENT
Start: 2018-10-25 | End: 2019-07-02 | Stop reason: SDUPTHER

## 2018-10-25 RX ORDER — HYDROXYCHLOROQUINE SULFATE 200 MG/1
200 TABLET, FILM COATED ORAL 2 TIMES DAILY
Qty: 180 TABLET | Refills: 3 | Status: SHIPPED | OUTPATIENT
Start: 2018-10-25 | End: 2018-11-12 | Stop reason: SDUPTHER

## 2018-10-25 RX ORDER — FOLIC ACID 1 MG/1
1000 TABLET ORAL DAILY
Qty: 90 TABLET | Refills: 3 | Status: SHIPPED | OUTPATIENT
Start: 2018-10-25 | End: 2020-04-27

## 2018-10-25 NOTE — TELEPHONE ENCOUNTER
Leta Piña will need the following refills due to a change in her pharmacy benefit.  SHE WILL NEED ALL PRESCRIPTION PRINTED  1.) Folic Acid  2.) Levothyroxine  3.) Pilocarpine  4.) Prasterone, DHEA, VAGL

## 2018-10-25 NOTE — TELEPHONE ENCOUNTER
meds provided by our clinic  Mtx, hcq, folic acid, meloxicam, pilocarpine  These wer changed to 90 day supply iwht 3 refills and printed    The thyroid and dhea vag will need to get with her pcp or prescribing provider  Pt verbalized understanding  Will come by today to  scripts

## 2018-11-12 DIAGNOSIS — M35.01 SJOGREN'S SYNDROME WITH KERATOCONJUNCTIVITIS SICCA: ICD-10-CM

## 2018-11-12 DIAGNOSIS — M35.9 XEROSTOMIA DUE TO AUTOIMMUNE DISEASE: ICD-10-CM

## 2018-11-12 DIAGNOSIS — K11.7 XEROSTOMIA DUE TO AUTOIMMUNE DISEASE: ICD-10-CM

## 2018-11-12 DIAGNOSIS — M05.79 RHEUMATOID ARTHRITIS INVOLVING MULTIPLE SITES WITH POSITIVE RHEUMATOID FACTOR: ICD-10-CM

## 2018-11-12 RX ORDER — HYDROXYCHLOROQUINE SULFATE 200 MG/1
200 TABLET, FILM COATED ORAL 2 TIMES DAILY
Qty: 180 TABLET | Refills: 3 | Status: SHIPPED | OUTPATIENT
Start: 2018-11-12 | End: 2019-02-11 | Stop reason: SDUPTHER

## 2018-11-17 ENCOUNTER — PATIENT MESSAGE (OUTPATIENT)
Dept: INTERNAL MEDICINE | Facility: CLINIC | Age: 68
End: 2018-11-17

## 2018-11-17 DIAGNOSIS — I10 ESSENTIAL HYPERTENSION: ICD-10-CM

## 2018-11-20 ENCOUNTER — PATIENT MESSAGE (OUTPATIENT)
Dept: INTERNAL MEDICINE | Facility: CLINIC | Age: 68
End: 2018-11-20

## 2018-11-20 RX ORDER — LOSARTAN POTASSIUM 100 MG/1
100 TABLET ORAL DAILY
Qty: 90 TABLET | Refills: 3 | Status: SHIPPED | OUTPATIENT
Start: 2018-11-20 | End: 2019-11-27 | Stop reason: SDUPTHER

## 2018-11-20 RX ORDER — HYDROCHLOROTHIAZIDE 25 MG/1
25 TABLET ORAL DAILY
Qty: 90 TABLET | Refills: 3 | Status: SHIPPED | OUTPATIENT
Start: 2018-11-20 | End: 2019-11-03 | Stop reason: SDUPTHER

## 2018-11-27 ENCOUNTER — LAB VISIT (OUTPATIENT)
Dept: LAB | Facility: HOSPITAL | Age: 68
End: 2018-11-27
Attending: INTERNAL MEDICINE
Payer: MEDICARE

## 2018-11-27 DIAGNOSIS — Z51.81 MEDICATION MONITORING ENCOUNTER: Chronic | ICD-10-CM

## 2018-11-27 LAB
ALBUMIN SERPL BCP-MCNC: 3.8 G/DL
ALP SERPL-CCNC: 61 U/L
ALT SERPL W/O P-5'-P-CCNC: 13 U/L
ANION GAP SERPL CALC-SCNC: 8 MMOL/L
AST SERPL-CCNC: 18 U/L
BASOPHILS # BLD AUTO: 0.07 K/UL
BASOPHILS NFR BLD: 1.3 %
BILIRUB SERPL-MCNC: 0.3 MG/DL
BUN SERPL-MCNC: 18 MG/DL
CALCIUM SERPL-MCNC: 9.4 MG/DL
CHLORIDE SERPL-SCNC: 97 MMOL/L
CO2 SERPL-SCNC: 32 MMOL/L
CREAT SERPL-MCNC: 1.3 MG/DL
CRP SERPL-MCNC: 9 MG/L
DIFFERENTIAL METHOD: ABNORMAL
EOSINOPHIL # BLD AUTO: 0.3 K/UL
EOSINOPHIL NFR BLD: 5.9 %
ERYTHROCYTE [DISTWIDTH] IN BLOOD BY AUTOMATED COUNT: 13.9 %
ERYTHROCYTE [SEDIMENTATION RATE] IN BLOOD BY WESTERGREN METHOD: 14 MM/HR
EST. GFR  (AFRICAN AMERICAN): 49 ML/MIN/1.73 M^2
EST. GFR  (NON AFRICAN AMERICAN): 43 ML/MIN/1.73 M^2
GLUCOSE SERPL-MCNC: 99 MG/DL
HCT VFR BLD AUTO: 33 %
HGB BLD-MCNC: 10.6 G/DL
LYMPHOCYTES # BLD AUTO: 1.4 K/UL
LYMPHOCYTES NFR BLD: 25.6 %
MCH RBC QN AUTO: 27.9 PG
MCHC RBC AUTO-ENTMCNC: 32.1 G/DL
MCV RBC AUTO: 87 FL
MONOCYTES # BLD AUTO: 0.6 K/UL
MONOCYTES NFR BLD: 11.7 %
NEUTROPHILS # BLD AUTO: 3 K/UL
NEUTROPHILS NFR BLD: 55.5 %
PLATELET # BLD AUTO: 265 K/UL
PMV BLD AUTO: 9.3 FL
POTASSIUM SERPL-SCNC: 3.7 MMOL/L
PROT SERPL-MCNC: 7.1 G/DL
RBC # BLD AUTO: 3.8 M/UL
SODIUM SERPL-SCNC: 137 MMOL/L
WBC # BLD AUTO: 5.47 K/UL

## 2018-11-27 PROCEDURE — 85651 RBC SED RATE NONAUTOMATED: CPT | Mod: PO

## 2018-11-27 PROCEDURE — 85025 COMPLETE CBC W/AUTO DIFF WBC: CPT | Mod: PO

## 2018-11-27 PROCEDURE — 80053 COMPREHEN METABOLIC PANEL: CPT | Mod: PO

## 2018-11-27 PROCEDURE — 36415 COLL VENOUS BLD VENIPUNCTURE: CPT | Mod: PO

## 2018-11-27 PROCEDURE — 86140 C-REACTIVE PROTEIN: CPT

## 2018-11-27 RX ORDER — TRAZODONE HYDROCHLORIDE 50 MG/1
100-150 TABLET ORAL NIGHTLY PRN
Qty: 270 TABLET | Refills: 2 | Status: SHIPPED | OUTPATIENT
Start: 2018-11-27 | End: 2019-02-25 | Stop reason: SDUPTHER

## 2018-12-04 ENCOUNTER — OFFICE VISIT (OUTPATIENT)
Dept: RHEUMATOLOGY | Facility: CLINIC | Age: 68
End: 2018-12-04
Payer: MEDICARE

## 2018-12-04 VITALS
DIASTOLIC BLOOD PRESSURE: 61 MMHG | HEIGHT: 65 IN | WEIGHT: 148.13 LBS | HEART RATE: 76 BPM | SYSTOLIC BLOOD PRESSURE: 111 MMHG | BODY MASS INDEX: 24.68 KG/M2

## 2018-12-04 DIAGNOSIS — M05.79 RHEUMATOID ARTHRITIS INVOLVING MULTIPLE SITES WITH POSITIVE RHEUMATOID FACTOR: Primary | ICD-10-CM

## 2018-12-04 DIAGNOSIS — D84.9 IMMUNOCOMPROMISED: ICD-10-CM

## 2018-12-04 DIAGNOSIS — Z51.81 MEDICATION MONITORING ENCOUNTER: Chronic | ICD-10-CM

## 2018-12-04 DIAGNOSIS — T50.905A DRUG-INDUCED LUPUS ERYTHEMATOSUS: ICD-10-CM

## 2018-12-04 DIAGNOSIS — F41.8 ANXIETY ASSOCIATED WITH DEPRESSION: ICD-10-CM

## 2018-12-04 DIAGNOSIS — M85.89 OSTEOPENIA OF MULTIPLE SITES: ICD-10-CM

## 2018-12-04 DIAGNOSIS — L93.2 DRUG-INDUCED LUPUS ERYTHEMATOSUS: ICD-10-CM

## 2018-12-04 DIAGNOSIS — E55.9 VITAMIN D DEFICIENCY DISEASE: ICD-10-CM

## 2018-12-04 DIAGNOSIS — M35.01 SJOGREN'S SYNDROME WITH KERATOCONJUNCTIVITIS SICCA: ICD-10-CM

## 2018-12-04 PROBLEM — M05.772: Status: RESOLVED | Noted: 2017-09-19 | Resolved: 2018-12-04

## 2018-12-04 PROCEDURE — 99215 OFFICE O/P EST HI 40 MIN: CPT | Mod: PBBFAC,PO | Performed by: PHYSICIAN ASSISTANT

## 2018-12-04 PROCEDURE — 99999 PR PBB SHADOW E&M-EST. PATIENT-LVL V: CPT | Mod: PBBFAC,,, | Performed by: PHYSICIAN ASSISTANT

## 2018-12-04 PROCEDURE — 99214 OFFICE O/P EST MOD 30 MIN: CPT | Mod: S$PBB,,, | Performed by: PHYSICIAN ASSISTANT

## 2018-12-04 RX ADMIN — CERTOLIZUMAB PEGOL 2 ML: KIT at 08:12

## 2018-12-04 ASSESSMENT — CLINICAL DISEASE ACTIVITY INDEX (CDAI)
TOTAL_SCORE: 0
SWOLLEN_JOINTS_COUNT: 0
TENDER_JOINTS_COUNT: 0
PATIENT_ASSESSMENT: 0
PHYSICIAN_ASSESSMENT: 0

## 2018-12-04 ASSESSMENT — ROUTINE ASSESSMENT OF PATIENT INDEX DATA (RAPID3): MDHAQ FUNCTION SCORE: 0

## 2018-12-04 NOTE — PROGRESS NOTES
Administered Cimzia 200mg (Lyophilized Powder) to RLQ and LLQ of abdomen for total dose of Climzia 400mg. Patient tolerated well. No acute reaction noted to site. Pt instructed on S/S to report. Patient was able to verbalize understanding. Patient waited 15 minutes post injection    Lot:[id number]  Exp:01/2021  Manu:[employer]

## 2018-12-04 NOTE — PROGRESS NOTES
Subjective:       Patient ID: Leticia Piña is a 68 y.o. female.    Chief Complaint: Rheumatoid Arthritis      Leticia is here today for rheumatology followup.      She has chronic seropositive rheumatoid arthritis, Sjogren's syndrome, osteopenia, and Vitamin D deficiency.  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 Also failed enbrel this was IE.  For RA, she is currently on mtx 15 mg once weekly, folic acid daily, and Plaquenil 200 mg once daily. Took cimzia for about 1 year then was off for a while RA activity became moderate so dr hamilton resumed her Cimzia 400 mg Q 28 days she has worked herself out to about every 6-8 weeks. RA doing excellent. Would like to get off some meds if able. Cost are really high. CAM liopholised cimzia 400 mg Q 28 days. Last injection done  Aug 2018      no recent exacerbations. No joint pain or joint swelling. No Morning stiffness. Foot pain better with full length pressure relief insoles. today she denies pain, pain rated at 0/10. BL hand and foot x-rays done in 7/2017 showed no new erosions and x-rays were overall stable.      For Sjogren's, she has +SSB, +SSA, +GIOVANNA 1:160 speckled in the past. She takes Evoxac 3-4X daily cost $160/month.  Now on salagen using good rx coupon cost is better. Also  Using otc products nasal gel, lozenges, drinking water. . Seen by ophthalmology and prescribed plasma serum eye drops, which have worked great. No parotid tenderness or swelling.     Osteopenia diagnosed by Dexa. Last scan 8/2014. TF -1.0, FN -1.8, spine -0.2. Previously, we did trial of fosamax but had GI upset and stopped. No prednisone. Currently taking estradiol, progesterone, calcium by diet only, and vit D 2000 IU daily. Last Vitamin D level was elevated at 66 .repeat bone density 12/6/17 stable. No changes. No falls or fractures since last visit.     She is weaning off her hormones            Review of Systems   Constitutional: Negative.  Negative for  "activity change, appetite change, chills, fatigue and fever.   HENT: Negative for congestion, ear pain, mouth sores, rhinorrhea, sinus pressure, sore throat and trouble swallowing.         + dry mouth   Eyes: Negative.  Negative for photophobia, pain and redness.        No swollen or red eyes, + dry eye     Respiratory: Negative for cough, choking, chest tightness, shortness of breath, wheezing and stridor.    Cardiovascular: Negative.  Negative for chest pain.   Gastrointestinal: Negative.  Negative for abdominal pain, blood in stool, diarrhea, nausea and vomiting.   Genitourinary: Negative.  Negative for dysuria, frequency, hematuria and urgency.   Musculoskeletal: Negative for arthralgias, back pain, gait problem, joint swelling, myalgias, neck pain and neck stiffness.   Skin: Negative.  Negative for color change, pallor and rash.   Neurological: Negative.  Negative for weakness.   Hematological: Negative for adenopathy.   Psychiatric/Behavioral: Negative for suicidal ideas.         Objective:     /61   Pulse 76   Ht 5' 5" (1.651 m)   Wt 67.2 kg (148 lb 2.4 oz)   BMI 24.65 kg/m²      Physical Exam   Constitutional: She is oriented to person, place, and time and well-developed, well-nourished, and in no distress. No distress.   HENT:   Head: Normocephalic and atraumatic.   Right Ear: External ear normal.   Left Ear: External ear normal.   Mouth/Throat: Mucous membranes are not pale, dry and not cyanotic. She does not have dentures. No oral lesions. No oropharyngeal exudate.   Eyes: Conjunctivae and EOM are normal. Pupils are equal, round, and reactive to light. Right conjunctiva is not injected. Right conjunctiva has no hemorrhage. Left conjunctiva is not injected. Left conjunctiva has no hemorrhage. No scleral icterus.       Neck: Normal range of motion. Neck supple. No thyromegaly present.   Cardiovascular: Normal rate, regular rhythm and normal heart sounds.    No murmur heard.  Pulmonary/Chest: " Effort normal and breath sounds normal. She exhibits no tenderness.   Abdominal: Soft. Bowel sounds are normal.       Right Side Rheumatological Exam     Examination finds the shoulder, elbow, wrist, knee, 1st PIP, 1st MCP, 2nd PIP, 2nd MCP, 3rd PIP, 3rd MCP, 4th PIP, 4th MCP, 5th PIP and 5th MCP normal.    Left Side Rheumatological Exam     Examination finds the shoulder, elbow, wrist, knee, 1st PIP, 1st MCP, 2nd PIP, 2nd MCP, 3rd PIP, 3rd MCP, 4th PIP, 4th MCP, 5th PIP and 5th MCP normal.      Lymphadenopathy:     She has no cervical adenopathy.   Neurological: She is alert and oriented to person, place, and time. She displays normal reflexes. No cranial nerve deficit. She exhibits normal muscle tone. Gait normal.   Skin: Skin is warm and dry. No rash noted.     Musculoskeletal: Normal range of motion. She exhibits no edema or tenderness.   DIP enlargement BL hands, no synovitis, warmth.   Negative metatarsal squeeze BL.               Results for orders placed or performed in visit on 11/27/18   CBC auto differential   Result Value Ref Range    WBC 5.47 3.90 - 12.70 K/uL    RBC 3.80 (L) 4.00 - 5.40 M/uL    Hemoglobin 10.6 (L) 12.0 - 16.0 g/dL    Hematocrit 33.0 (L) 37.0 - 48.5 %    MCV 87 82 - 98 fL    MCH 27.9 27.0 - 31.0 pg    MCHC 32.1 32.0 - 36.0 g/dL    RDW 13.9 11.5 - 14.5 %    Platelets 265 150 - 350 K/uL    MPV 9.3 9.2 - 12.9 fL    Gran # (ANC) 3.0 1.8 - 7.7 K/uL    Lymph # 1.4 1.0 - 4.8 K/uL    Mono # 0.6 0.3 - 1.0 K/uL    Eos # 0.3 0.0 - 0.5 K/uL    Baso # 0.07 0.00 - 0.20 K/uL    Gran% 55.5 38.0 - 73.0 %    Lymph% 25.6 18.0 - 48.0 %    Mono% 11.7 4.0 - 15.0 %    Eosinophil% 5.9 0.0 - 8.0 %    Basophil% 1.3 0.0 - 1.9 %    Differential Method Automated    Comprehensive metabolic panel   Result Value Ref Range    Sodium 137 136 - 145 mmol/L    Potassium 3.7 3.5 - 5.1 mmol/L    Chloride 97 95 - 110 mmol/L    CO2 32 (H) 23 - 29 mmol/L    Glucose 99 70 - 110 mg/dL    BUN, Bld 18 8 - 23 mg/dL    Creatinine 1.3  0.5 - 1.4 mg/dL    Calcium 9.4 8.7 - 10.5 mg/dL    Total Protein 7.1 6.0 - 8.4 g/dL    Albumin 3.8 3.5 - 5.2 g/dL    Total Bilirubin 0.3 0.1 - 1.0 mg/dL    Alkaline Phosphatase 61 55 - 135 U/L    AST 18 10 - 40 U/L    ALT 13 10 - 44 U/L    Anion Gap 8 8 - 16 mmol/L    eGFR if African American 49 (A) >60 mL/min/1.73 m^2    eGFR if non African American 43 (A) >60 mL/min/1.73 m^2   C-reactive protein   Result Value Ref Range    CRP 9.0 (H) 0.0 - 8.2 mg/L   Sedimentation rate   Result Value Ref Range    Sed Rate 14 0 - 20 mm/Hr       X-ray BL hand and foot 5/2016 and 7/2017 : no new erosions - stable    12/2017 DEXA  Total femur mean 0.855 g/cm² with a T score -1.2  Left femur neck 0.738 g/cm² T score -2.2  L1-L4 spine 1.152 g/cm² with a T score -0.2  Osteopenia with falling bone density at the left femur neck moderate to high fracture risk      12/6/15 DEXA  Total femur mean 0.860 g/cm² with a T score -1.2  Left femur neck 0.738 g/cm² T score -2.2  L1-L4 spine 1.152 g/cm² with a T score -0.2  Osteopenia with falling bone density at the left femur neck moderate to high fracture risk    8/6/14 DEXA  Total femur mean 0.880 g/cm² with a T score -1.0  Left femur neck 0.789 g/cm² with a T score -1.8  Spine L1-L4 1.153 g/cm² with a T score -0.2  Osteopenia with low to moderate risk of fracture          Immunization History   Administered Date(s) Administered    Influenza - High Dose 11/07/2007, 09/30/2008, 09/23/2009, 10/27/2010, 10/31/2011, 10/05/2015, 10/12/2018    Influenza - Quadrivalent 11/13/2014, 10/27/2016    Influenza - Trivalent - Adjuvanted 10/23/2017    PPD Test 03/28/2011    Pneumococcal Conjugate 12/10/2013    Pneumococcal Conjugate - 13 Valent 12/10/2013    Pneumococcal Polysaccharide - 23 Valent 11/07/2007, 03/23/2017    Tdap 09/15/2010, 04/10/2013    Zoster 04/10/2013         Assessment:       1. Rheumatoid arthritis involving multiple sites with positive rheumatoid factor    2. Sjogren's syndrome  with keratoconjunctivitis sicca    3. Drug-induced lupus erythematosus    4. Osteopenia of multiple sites    5. Vitamin D deficiency disease    6. Medication monitoring encounter    7. Immunocompromised    8. Anxiety associated with depression        1. RA in remission- Currently taking mtx 15 mg once weekly, folic acid daily, and Plaquenil 200 mg twice daily and  Cimzia 400 mg Q 8 weeks   Today she has 0 tender/0 swollen joints. MARGARITA 0,  CDAI 0.     hand and foot x-ray up to date 5/2016 and 7/2017 stable no changes     In the past failed mtx monotherapy  Failed humira and enbrel    2. Sjogrens - dryness of eyes and mouth. On evoxac 3-4 X daily and plasma serum eye drops prescribed by ophthalmologist.   evoxac poorly covered by insurance cost $160/month- now on salagen doing fair, lower cost    3. 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 Osteopenia - DEXA stable at both FN, femur mean and spine- mod-high rask- will follow  Currently taking estradiol, progesterone, calcium by diet only, and vit D 2000 international units daily.   In the past failed a trial of oral bisphosphonate secondary GI intolerance    5. Vaccination status: up to date including flu vaccine     6. Medication Monitoring- no current issues, no evidence of toxicity    7. Immunocompromised no issues with recurrent infections    Plan:           Wean down on mtx to 10 mg/wk X 6 weeks then down to 5 mg/wk X 6 week then can trial off   Once off mtx then can stop folic acid daily    C/w Plaquenil 200 mg daily. This  will decrease long term risk    C/w cimizia CAM liopholised 400 mg Q 6 weeks   Repeat her bilateral hand and foot x-rays later this year or early next year      C/w salagen 5 mg 3-4 X daily, good Rx coupon  Can also try OTC Xylimelts or Thera Breath lozenges for dry mouth.  Nasogel  Cool mist humidier  Nighttime eye ointment, use sleep mask      C/w eye doc, yearly eye checks for plaquenil and c/w her plasma serum eye drops.       Dexa   Scan again .  2-3 years. Repeat dexa every 2 years.   At next dexa if drops any will need to treat  For now c/w low dose vit D 2755-6283 IU daily and calcium in diet,       Use Voltaren gel and iburpfen prn    rtc 6 weeks nurse visit for cimzia  rtc 12 week with giovanni and labs labs cbc, cmp, esr, crp    Call with any questions, changes or concerns.                  copy Pasha Estrada study

## 2018-12-12 DIAGNOSIS — F41.8 ANXIETY ASSOCIATED WITH DEPRESSION: ICD-10-CM

## 2018-12-12 RX ORDER — BUPROPION HYDROCHLORIDE 150 MG/1
300 TABLET ORAL DAILY
Qty: 60 TABLET | Refills: 11 | Status: SHIPPED | OUTPATIENT
Start: 2018-12-12 | End: 2018-12-17 | Stop reason: SDUPTHER

## 2018-12-13 ENCOUNTER — PATIENT MESSAGE (OUTPATIENT)
Dept: INTERNAL MEDICINE | Facility: CLINIC | Age: 68
End: 2018-12-13

## 2018-12-13 DIAGNOSIS — N95.1 VAGINAL DRYNESS, MENOPAUSAL: Primary | ICD-10-CM

## 2018-12-13 RX ORDER — PRASTERONE (DHEA), MICRONIZED 100 %
25 POWDER (GRAM) MISCELLANEOUS
COMMUNITY
End: 2018-12-13 | Stop reason: SDUPTHER

## 2018-12-13 RX ORDER — PRASTERONE (DHEA), MICRONIZED 100 %
25 POWDER (GRAM) MISCELLANEOUS
Qty: 1 G | Refills: 3 | Status: SHIPPED | OUTPATIENT
Start: 2018-12-14 | End: 2019-06-04

## 2018-12-13 NOTE — TELEPHONE ENCOUNTER
See image in mychart message. Pt requesting new rx since no longer seeing OB Dr. Jimenez and f/u with PCP for yearly pelvic exam. Pt reports uses rx for dx vaginal dryness, pt requesting rx to be sent to [facility] in [place]. Medication list updated and refill request sent to provider.

## 2018-12-13 NOTE — TELEPHONE ENCOUNTER
----- Message from Kell Geller sent at 12/13/2018  2:46 PM CST -----  Contact: [facility] ([facility])  Calling in regards to need clarifiction on Rx Dhea  they received ph# [phone] and fax # [phone]

## 2018-12-14 ENCOUNTER — TELEPHONE (OUTPATIENT)
Dept: INTERNAL MEDICINE | Facility: CLINIC | Age: 68
End: 2018-12-14

## 2018-12-14 NOTE — TELEPHONE ENCOUNTER
----- Message from Frank Salgado sent at 12/14/2018  2:08 PM CST -----  Contact: Maryann-[facility]  She is returning a missed call,please advise [phone]

## 2018-12-14 NOTE — TELEPHONE ENCOUNTER
----- Message from Shaila Abad sent at 12/14/2018  8:17 AM CST -----  Contact: Kristan marquez/ [facility] [phone]  Requesting a verbal order being that they are not set up for e-script.

## 2018-12-17 DIAGNOSIS — F41.8 ANXIETY ASSOCIATED WITH DEPRESSION: ICD-10-CM

## 2018-12-17 RX ORDER — BUPROPION HYDROCHLORIDE 150 MG/1
300 TABLET ORAL DAILY
Qty: 180 TABLET | Refills: 3 | OUTPATIENT
Start: 2018-12-17 | End: 2019-02-04 | Stop reason: SDUPTHER

## 2018-12-18 ENCOUNTER — TELEPHONE (OUTPATIENT)
Dept: INTERNAL MEDICINE | Facility: CLINIC | Age: 68
End: 2018-12-18

## 2018-12-18 NOTE — TELEPHONE ENCOUNTER
----- Message from Johanna Currie sent at 12/18/2018  8:57 AM CST -----  Contact: Maryann/[facility] [phone]  States that she is calling to get a verbal order for the rx dhea suppository. Please call back at [phone]//thank you acc

## 2018-12-19 ENCOUNTER — TELEPHONE (OUTPATIENT)
Dept: INTERNAL MEDICINE | Facility: CLINIC | Age: 68
End: 2018-12-19

## 2018-12-19 NOTE — TELEPHONE ENCOUNTER
----- Message from Yanely Champagne sent at 12/19/2018  8:38 AM CST -----  Contact: Liz marquez/[facility]  Caller request call back eed to call with approval does not accept escripts pt needs refill ASAP call back number: [phone]

## 2018-12-23 ENCOUNTER — OFFICE VISIT (OUTPATIENT)
Dept: URGENT CARE | Facility: CLINIC | Age: 68
End: 2018-12-23
Payer: MEDICARE

## 2018-12-23 VITALS
SYSTOLIC BLOOD PRESSURE: 124 MMHG | HEIGHT: 65 IN | BODY MASS INDEX: 24.36 KG/M2 | WEIGHT: 146.19 LBS | HEART RATE: 82 BPM | OXYGEN SATURATION: 98 % | DIASTOLIC BLOOD PRESSURE: 70 MMHG | TEMPERATURE: 97 F

## 2018-12-23 DIAGNOSIS — J32.9 SINUSITIS, UNSPECIFIED CHRONICITY, UNSPECIFIED LOCATION: Primary | ICD-10-CM

## 2018-12-23 PROCEDURE — 99999 PR PBB SHADOW E&M-EST. PATIENT-LVL V: CPT | Mod: PBBFAC,,, | Performed by: NURSE PRACTITIONER

## 2018-12-23 PROCEDURE — 99214 OFFICE O/P EST MOD 30 MIN: CPT | Mod: S$PBB,,, | Performed by: NURSE PRACTITIONER

## 2018-12-23 PROCEDURE — 99215 OFFICE O/P EST HI 40 MIN: CPT | Mod: PBBFAC,PO | Performed by: NURSE PRACTITIONER

## 2018-12-23 RX ORDER — AZITHROMYCIN 250 MG/1
TABLET, FILM COATED ORAL
Qty: 6 TABLET | Refills: 0 | Status: SHIPPED | OUTPATIENT
Start: 2018-12-23 | End: 2018-12-28

## 2018-12-23 RX ORDER — METHYLPREDNISOLONE 4 MG/1
TABLET ORAL
Qty: 1 PACKAGE | Refills: 0 | Status: SHIPPED | OUTPATIENT
Start: 2018-12-23 | End: 2019-06-04

## 2018-12-23 NOTE — PATIENT INSTRUCTIONS
Sinusitis (Antibiotic Treatment)    The sinuses are air-filled spaces within the bones of the face. They connect to the inside of the nose. Sinusitis is an inflammation of the tissue lining the sinus cavity. Sinus inflammation can occur during a cold. It can also be due to allergies to pollens and other particles in the air. Sinusitis can cause symptoms of sinus congestion and fullness. A sinus infection causes fever, headache and facial pain. There is often green or yellow drainage from the nose or into the back of the throat (post-nasal drip). You have been given antibiotics to treat this condition.  Home care:  · Take the full course of antibiotics as instructed. Do not stop taking them, even if you feel better.  · Drink plenty of water, hot tea, and other liquids. This may help thin mucus. It also may promote sinus drainage.  · Heat may help soothe painful areas of the face. Use a towel soaked in hot water. Or,  the shower and direct the hot spray onto your face. Using a vaporizer along with a menthol rub at night may also help.   · An expectorant containing guaifenesin may help thin the mucus and promote drainage from the sinuses.  · Over-the-counter decongestants may be used unless a similar medicine was prescribed. Nasal sprays work the fastest. Use one that contains phenylephrine or oxymetazoline. First blow the nose gently. Then use the spray. Do not use these medicines more often than directed on the label or symptoms may get worse. You may also use tablets containing pseudoephedrine. Avoid products that combine ingredients, because side effects may be increased. Read labels. You can also ask the pharmacist for help. (NOTE: Persons with high blood pressure should not use decongestants. They can raise blood pressure.)  · Over-the-counter antihistamines may help if allergies contributed to your sinusitis.    · Do not use nasal rinses or irrigation during an acute sinus infection, unless told to by  your health care provider. Rinsing may spread the infection to other sinuses.  · Use acetaminophen or ibuprofen to control pain, unless another pain medicine was prescribed. (If you have chronic liver or kidney disease or ever had a stomach ulcer, talk with your doctor before using these medicines. Aspirin should never be used in anyone under 18 years of age who is ill with a fever. It may cause severe liver damage.)  · Don't smoke. This can worsen symptoms.  Follow-up care  Follow up with your healthcare provider or our staff if you are not improving within the next week.  When to seek medical advice  Call your healthcare provider if any of these occur:  · Facial pain or headache becoming more severe  · Stiff neck  · Unusual drowsiness or confusion  · Swelling of the forehead or eyelids  · Vision problems, including blurred or double vision  · Fever of 100.4ºF (38ºC) or higher, or as directed by your healthcare provider  · Seizure  · Breathing problems  · Symptoms not resolving within 10 days  Date Last Reviewed: 4/13/2015  © 9307-2836 [employer]. [address] [place] [address], [place] [address]. All rights reserved. This information is not intended as a substitute for professional medical care. Always follow your healthcare professional's instructions.

## 2018-12-23 NOTE — PROGRESS NOTES
"Subjective:       Patient ID: Leticia Piña is a 68 y.o. female.    Chief Complaint: Sinus Problem    HPI  The complaint is unresolved sinus issues over the last month. Has taken a course of Augmentin but never really improved. Denies fever chills sweats.   /70 (BP Location: Right arm, Patient Position: Sitting)   Pulse 82   Temp 96.7 °F (35.9 °C) (Tympanic)   Ht 5' 5" (1.651 m)   Wt 66.3 kg (146 lb 2.6 oz)   SpO2 98%   BMI 24.32 kg/m²     Review of Systems   Constitutional: Negative for chills, diaphoresis and fever.   HENT: Positive for congestion, postnasal drip and sinus pressure. Negative for sore throat.    Respiratory: Positive for cough. Negative for chest tightness and shortness of breath.    Cardiovascular: Negative for chest pain and palpitations.   Gastrointestinal: Negative for abdominal distention, abdominal pain, diarrhea, nausea and vomiting.   Genitourinary: Negative for difficulty urinating.   Musculoskeletal: Negative for myalgias.   Skin: Negative for rash and wound.   Allergic/Immunologic: Positive for immunocompromised state.   Neurological: Negative for headaches.   Hematological: Does not bruise/bleed easily.   Psychiatric/Behavioral: Negative for behavioral problems and confusion.       Objective:      Physical Exam   Constitutional: She is oriented to person, place, and time. She appears well-developed and well-nourished. No distress.   HENT:   Head: Normocephalic and atraumatic.   Right Ear: Tympanic membrane and ear canal normal.   Left Ear: Tympanic membrane and ear canal normal.   Nose: Nose normal. No mucosal edema. Right sinus exhibits no maxillary sinus tenderness and no frontal sinus tenderness. Left sinus exhibits no maxillary sinus tenderness and no frontal sinus tenderness.   Mouth/Throat: Uvula is midline. No oropharyngeal exudate, posterior oropharyngeal edema or posterior oropharyngeal erythema.   Eyes: Conjunctivae and EOM are normal. Pupils are equal, " round, and reactive to light.   Neck: Neck supple.   Cardiovascular: Normal rate, regular rhythm and intact distal pulses.   No murmur heard.  Pulmonary/Chest: Breath sounds normal. No respiratory distress. She has no wheezes.   Abdominal: Soft. Bowel sounds are normal. There is no tenderness.   Musculoskeletal: Normal range of motion. She exhibits no edema or deformity.   Lymphadenopathy:     She has no cervical adenopathy.   Neurological: She is alert and oriented to person, place, and time.   Skin: Skin is warm and dry. No rash noted. She is not diaphoretic.   Psychiatric: She has a normal mood and affect. Her behavior is normal.   Vitals reviewed.      Assessment:       1. Sinusitis, unspecified chronicity, unspecified location        Plan:       Leticia was seen today for sinus problem.    Diagnoses and all orders for this visit:    Sinusitis, unspecified chronicity, unspecified location  -     methylPREDNISolone (MEDROL DOSEPACK) 4 mg tablet; use as directed  -     azithromycin (Z-LUCY) 250 MG tablet; Take 2 tablets by mouth on day 1; Take 1 tablet by mouth on days 2-5    Finish the entire course of antibiotics even if symptoms improve  Patient counseled on symptomatic treatment.   - Rest  - Hydration-- 64 ounces fluid per day  - Cool mist humidifier/vaporizer  - Nasal saline/steroids  - Antihistamines  - Mucinex  - Gargles and lozenges for sore throat  - OTC pain/fever relievers    Follow up with PCP or ER immediately for worsening, new symptoms or no improvement. Go to ER if you develop fever of 103 or higher, chest pain, shortness of breath, upper back pain, stiff neck or severe headache.      Diagnosis, treatment, AVS reviewed with patient. Patient understands and agrees with plan    Patient Instructions     Sinusitis (Antibiotic Treatment)    The sinuses are air-filled spaces within the bones of the face. They connect to the inside of the nose. Sinusitis is an inflammation of the tissue lining the sinus  cavity. Sinus inflammation can occur during a cold. It can also be due to allergies to pollens and other particles in the air. Sinusitis can cause symptoms of sinus congestion and fullness. A sinus infection causes fever, headache and facial pain. There is often green or yellow drainage from the nose or into the back of the throat (post-nasal drip). You have been given antibiotics to treat this condition.  Home care:  · Take the full course of antibiotics as instructed. Do not stop taking them, even if you feel better.  · Drink plenty of water, hot tea, and other liquids. This may help thin mucus. It also may promote sinus drainage.  · Heat may help soothe painful areas of the face. Use a towel soaked in hot water. Or,  the shower and direct the hot spray onto your face. Using a vaporizer along with a menthol rub at night may also help.   · An expectorant containing guaifenesin may help thin the mucus and promote drainage from the sinuses.  · Over-the-counter decongestants may be used unless a similar medicine was prescribed. Nasal sprays work the fastest. Use one that contains phenylephrine or oxymetazoline. First blow the nose gently. Then use the spray. Do not use these medicines more often than directed on the label or symptoms may get worse. You may also use tablets containing pseudoephedrine. Avoid products that combine ingredients, because side effects may be increased. Read labels. You can also ask the pharmacist for help. (NOTE: Persons with high blood pressure should not use decongestants. They can raise blood pressure.)  · Over-the-counter antihistamines may help if allergies contributed to your sinusitis.    · Do not use nasal rinses or irrigation during an acute sinus infection, unless told to by your health care provider. Rinsing may spread the infection to other sinuses.  · Use acetaminophen or ibuprofen to control pain, unless another pain medicine was prescribed. (If you have chronic liver  or kidney disease or ever had a stomach ulcer, talk with your doctor before using these medicines. Aspirin should never be used in anyone under 18 years of age who is ill with a fever. It may cause severe liver damage.)  · Don't smoke. This can worsen symptoms.  Follow-up care  Follow up with your healthcare provider or our staff if you are not improving within the next week.  When to seek medical advice  Call your healthcare provider if any of these occur:  · Facial pain or headache becoming more severe  · Stiff neck  · Unusual drowsiness or confusion  · Swelling of the forehead or eyelids  · Vision problems, including blurred or double vision  · Fever of 100.4ºF (38ºC) or higher, or as directed by your healthcare provider  · Seizure  · Breathing problems  · Symptoms not resolving within 10 days  Date Last Reviewed: 4/13/2015  © 4455-8539 [employer]. [address] [place] [address], [place] [address]. All rights reserved. This information is not intended as a substitute for professional medical care. Always follow your healthcare professional's instructions.

## 2018-12-28 DIAGNOSIS — F41.8 MIXED ANXIETY AND DEPRESSIVE DISORDER: ICD-10-CM

## 2018-12-28 RX ORDER — LORAZEPAM 1 MG/1
TABLET ORAL
Qty: 60 TABLET | Refills: 1 | OUTPATIENT
Start: 2018-12-28

## 2018-12-28 NOTE — TELEPHONE ENCOUNTER
Please tell pt if needing ativan again, will need to see Psychiatry again.  She was supposed to be weaning off, but has used #120 in the past 2 months.  SM

## 2018-12-29 ENCOUNTER — PATIENT MESSAGE (OUTPATIENT)
Dept: PSYCHIATRY | Facility: CLINIC | Age: 68
End: 2018-12-29

## 2019-01-03 ENCOUNTER — PATIENT MESSAGE (OUTPATIENT)
Dept: INTERNAL MEDICINE | Facility: CLINIC | Age: 69
End: 2019-01-03

## 2019-01-03 RX ORDER — CIPROFLOXACIN 250 MG/1
250 TABLET, FILM COATED ORAL 2 TIMES DAILY
Qty: 14 TABLET | Refills: 0 | Status: SHIPPED | OUTPATIENT
Start: 2019-01-03 | End: 2019-01-10

## 2019-01-04 ENCOUNTER — PATIENT MESSAGE (OUTPATIENT)
Dept: PSYCHIATRY | Facility: CLINIC | Age: 69
End: 2019-01-04

## 2019-01-04 DIAGNOSIS — F41.8 MIXED ANXIETY AND DEPRESSIVE DISORDER: ICD-10-CM

## 2019-01-07 RX ORDER — LORAZEPAM 1 MG/1
1 TABLET ORAL EVERY 6 HOURS PRN
Qty: 60 TABLET | Refills: 1 | Status: SHIPPED | OUTPATIENT
Start: 2019-01-07 | End: 2019-09-04 | Stop reason: SDUPTHER

## 2019-01-16 ENCOUNTER — CLINICAL SUPPORT (OUTPATIENT)
Dept: RHEUMATOLOGY | Facility: CLINIC | Age: 69
End: 2019-01-16
Payer: MEDICARE

## 2019-01-16 PROCEDURE — 96372 THER/PROPH/DIAG INJ SC/IM: CPT

## 2019-01-16 RX ADMIN — CERTOLIZUMAB PEGOL 2 ML: KIT at 04:01

## 2019-01-16 NOTE — PROGRESS NOTES
Administered Cimzia 200mg (Lyophilized Powder) to RLQ and LLQ of abdomen for total dose of Climzia 400mg. Patient tolerated well. No acute reaction noted to site. Pt instructed on S/S to report. Patient was able to verbalize understanding. Patient waited 15 minutes post injection     Lot:[id number]  Exp:03/2021  Manu:[employer]

## 2019-01-22 NOTE — PROGRESS NOTES
"Subjective:      Patient ID: Leticia Piña is a 68 y.o. female.    Chief Complaint: Follow-up      HPI  Here for follow up of medical problems.  Not exercising, attributes to time.  Anxiety doing well.  Ativan is "very rare."  Sleeping well with trazodone.  BMs normal.    Updated/ annual due 6/19:  HM: 10/18 fluvax, 12/13 [id number], 3/17 booster [id number], 4/13 TDaP, 4/13 zoster, 12/17 BMD rep 2y, 12/11 Cscope rep 10y, 11/17 MMG/Gyn Dr. Jimenez, 3/17 HCV neg.       Review of Systems   Constitutional: Negative for activity change, chills, diaphoresis, fever and unexpected weight change.   HENT: Negative for hearing loss, rhinorrhea and trouble swallowing.    Eyes: Negative for discharge and visual disturbance.   Respiratory: Negative for cough, chest tightness, shortness of breath and wheezing.    Cardiovascular: Negative for chest pain, palpitations and leg swelling.   Gastrointestinal: Negative for blood in stool, constipation, diarrhea, nausea and vomiting.   Endocrine: Negative for polydipsia and polyuria.   Genitourinary: Negative for difficulty urinating, dysuria, frequency, hematuria and menstrual problem.   Musculoskeletal: Negative for arthralgias, joint swelling and neck pain.   Neurological: Negative for weakness and headaches.   Psychiatric/Behavioral: Negative for confusion and dysphoric mood. The patient is not nervous/anxious.          Objective:   /80 (BP Location: Left arm, Patient Position: Sitting)   Pulse 94   Temp 97.7 °F (36.5 °C) (Tympanic)   Ht 5' 5" (1.651 m)   Wt 65.9 kg (145 lb 4.5 oz)   SpO2 95%   BMI 24.18 kg/m²     Physical Exam   Constitutional: She is oriented to person, place, and time. She appears well-developed.   HENT:   Mouth/Throat: Oropharynx is clear and moist.   Neck: Neck supple. Carotid bruit is not present. No thyroid mass present.   Cardiovascular: Normal rate, regular rhythm and intact distal pulses. Exam reveals no gallop and no friction rub.   No murmur " heard.  Pulmonary/Chest: Effort normal and breath sounds normal. She has no wheezes. She has no rales.   Abdominal: Soft. Bowel sounds are normal. She exhibits no mass. There is no hepatosplenomegaly. There is no tenderness.   Musculoskeletal: She exhibits no edema.   Lymphadenopathy:     She has no cervical adenopathy.   Neurological: She is alert and oriented to person, place, and time.   Psychiatric: She has a normal mood and affect.           Assessment:       1. Essential hypertension    2. Mixed anxiety and depressive disorder    3. Anxiety associated with depression    4. Preventive measure    5. Encounter for screening mammogram for malignant neoplasm of breast     6. CKD (chronic kidney disease) stage 3, GFR 30-59 ml/min    7. Chronic kidney disease     8. Acquired hypothyroidism    9. Vitamin D deficiency          Plan:     Essential hypertension- doing well on rx, cont.    Mixed anxiety and depressive disorder- cont cymbalta 60, change bupropion to 150mg, rare ativan.  -     buPROPion (WELLBUTRIN XL) 150 MG TB24 tablet; Take 1 tablet (150 mg total) by mouth once daily.  Dispense: 90 tablet; Refill: 3    Preventive measure- due in 4mo for annual.  Discussed pt needs to get Shingrix vaccination at pharmacy.  -     Mammo Digital Screening Bilat; Future; Expected date: 02/05/2019  -     Ambulatory consult to Gynecology  -     CBC auto differential; Future; Expected date: 02/05/2019  -     Comprehensive metabolic panel; Future; Expected date: 02/05/2019  -     Lipid panel; Future; Expected date: 02/05/2019  -     TSH; Future; Expected date: 02/05/2019  -     Vitamin D; Future    Chronic kidney disease   -     Lipid panel; Future; Expected date: 02/05/2019    Acquired hypothyroidism  -     TSH; Future; Expected date: 02/05/2019    Vitamin D deficiency  -     Vitamin D; Future

## 2019-02-03 DIAGNOSIS — F41.8 ANXIETY ASSOCIATED WITH DEPRESSION: ICD-10-CM

## 2019-02-03 DIAGNOSIS — F41.8 MIXED ANXIETY AND DEPRESSIVE DISORDER: ICD-10-CM

## 2019-02-04 ENCOUNTER — PATIENT MESSAGE (OUTPATIENT)
Dept: INTERNAL MEDICINE | Facility: CLINIC | Age: 69
End: 2019-02-04

## 2019-02-04 RX ORDER — BUPROPION HYDROCHLORIDE 150 MG/1
300 TABLET ORAL DAILY
Qty: 180 TABLET | Refills: 3 | Status: SHIPPED | OUTPATIENT
Start: 2019-02-04 | End: 2019-02-05 | Stop reason: SDUPTHER

## 2019-02-04 RX ORDER — DULOXETIN HYDROCHLORIDE 60 MG/1
120 CAPSULE, DELAYED RELEASE ORAL DAILY
Qty: 60 CAPSULE | Refills: 5 | Status: SHIPPED | OUTPATIENT
Start: 2019-02-04 | End: 2019-02-05 | Stop reason: SDUPTHER

## 2019-02-04 RX ORDER — BUPROPION HYDROCHLORIDE 300 MG/1
300 TABLET ORAL DAILY
Qty: 30 TABLET | Refills: 4 | Status: SHIPPED | OUTPATIENT
Start: 2019-02-04 | End: 2019-02-05

## 2019-02-05 ENCOUNTER — OFFICE VISIT (OUTPATIENT)
Dept: INTERNAL MEDICINE | Facility: CLINIC | Age: 69
End: 2019-02-05
Payer: MEDICARE

## 2019-02-05 VITALS
HEART RATE: 94 BPM | WEIGHT: 145.31 LBS | HEIGHT: 65 IN | TEMPERATURE: 98 F | DIASTOLIC BLOOD PRESSURE: 80 MMHG | OXYGEN SATURATION: 95 % | SYSTOLIC BLOOD PRESSURE: 124 MMHG | BODY MASS INDEX: 24.21 KG/M2

## 2019-02-05 DIAGNOSIS — N18.9 CHRONIC KIDNEY DISEASE: ICD-10-CM

## 2019-02-05 DIAGNOSIS — Z29.9 PREVENTIVE MEASURE: ICD-10-CM

## 2019-02-05 DIAGNOSIS — I10 ESSENTIAL HYPERTENSION: Primary | ICD-10-CM

## 2019-02-05 DIAGNOSIS — Z12.31 ENCOUNTER FOR SCREENING MAMMOGRAM FOR MALIGNANT NEOPLASM OF BREAST: ICD-10-CM

## 2019-02-05 DIAGNOSIS — E03.9 ACQUIRED HYPOTHYROIDISM: ICD-10-CM

## 2019-02-05 DIAGNOSIS — E55.9 VITAMIN D DEFICIENCY: ICD-10-CM

## 2019-02-05 DIAGNOSIS — F41.8 MIXED ANXIETY AND DEPRESSIVE DISORDER: ICD-10-CM

## 2019-02-05 DIAGNOSIS — N18.30 CKD (CHRONIC KIDNEY DISEASE) STAGE 3, GFR 30-59 ML/MIN: ICD-10-CM

## 2019-02-05 DIAGNOSIS — F41.8 ANXIETY ASSOCIATED WITH DEPRESSION: ICD-10-CM

## 2019-02-05 PROCEDURE — 99999 PR PBB SHADOW E&M-EST. PATIENT-LVL IV: CPT | Mod: PBBFAC,,, | Performed by: INTERNAL MEDICINE

## 2019-02-05 PROCEDURE — 99213 OFFICE O/P EST LOW 20 MIN: CPT | Mod: S$PBB,,, | Performed by: INTERNAL MEDICINE

## 2019-02-05 PROCEDURE — 99214 OFFICE O/P EST MOD 30 MIN: CPT | Mod: PBBFAC,PN | Performed by: INTERNAL MEDICINE

## 2019-02-05 PROCEDURE — 99213 PR OFFICE/OUTPT VISIT, EST, LEVL III, 20-29 MIN: ICD-10-PCS | Mod: S$PBB,,, | Performed by: INTERNAL MEDICINE

## 2019-02-05 PROCEDURE — 99999 PR PBB SHADOW E&M-EST. PATIENT-LVL IV: ICD-10-PCS | Mod: PBBFAC,,, | Performed by: INTERNAL MEDICINE

## 2019-02-05 RX ORDER — BUPROPION HYDROCHLORIDE 150 MG/1
150 TABLET ORAL DAILY
Qty: 90 TABLET | Refills: 3 | Status: SHIPPED | OUTPATIENT
Start: 2019-02-05 | End: 2019-07-02 | Stop reason: SDUPTHER

## 2019-02-05 RX ORDER — DULOXETIN HYDROCHLORIDE 60 MG/1
60 CAPSULE, DELAYED RELEASE ORAL DAILY
Qty: 90 CAPSULE | Refills: 3 | Status: SHIPPED | OUTPATIENT
Start: 2019-02-05 | End: 2019-07-02 | Stop reason: SDUPTHER

## 2019-02-08 DIAGNOSIS — N18.9 CHRONIC KIDNEY DISEASE, UNSPECIFIED CKD STAGE: ICD-10-CM

## 2019-02-11 DIAGNOSIS — K11.7 XEROSTOMIA DUE TO AUTOIMMUNE DISEASE: ICD-10-CM

## 2019-02-11 DIAGNOSIS — M35.01 SJOGREN'S SYNDROME WITH KERATOCONJUNCTIVITIS SICCA: ICD-10-CM

## 2019-02-11 DIAGNOSIS — M35.9 XEROSTOMIA DUE TO AUTOIMMUNE DISEASE: ICD-10-CM

## 2019-02-11 DIAGNOSIS — M05.79 RHEUMATOID ARTHRITIS INVOLVING MULTIPLE SITES WITH POSITIVE RHEUMATOID FACTOR: ICD-10-CM

## 2019-02-11 RX ORDER — HYDROXYCHLOROQUINE SULFATE 200 MG/1
200 TABLET, FILM COATED ORAL 2 TIMES DAILY
Qty: 180 TABLET | Refills: 3 | Status: SHIPPED | OUTPATIENT
Start: 2019-02-11 | End: 2020-04-13

## 2019-02-25 RX ORDER — TRAZODONE HYDROCHLORIDE 50 MG/1
100-150 TABLET ORAL NIGHTLY PRN
Qty: 270 TABLET | Refills: 2 | OUTPATIENT
Start: 2019-02-25 | End: 2019-04-30 | Stop reason: SDUPTHER

## 2019-02-25 RX ORDER — DOXYCYCLINE 100 MG/1
100 CAPSULE ORAL EVERY 12 HOURS PRN
Qty: 30 CAPSULE | Refills: 4 | Status: SHIPPED | OUTPATIENT
Start: 2019-02-25 | End: 2020-09-04

## 2019-03-01 ENCOUNTER — PATIENT MESSAGE (OUTPATIENT)
Dept: RHEUMATOLOGY | Facility: CLINIC | Age: 69
End: 2019-03-01

## 2019-03-01 DIAGNOSIS — R53.82 CHRONIC FATIGUE: Primary | ICD-10-CM

## 2019-03-01 DIAGNOSIS — T50.905A DRUG-INDUCED LUPUS ERYTHEMATOSUS: ICD-10-CM

## 2019-03-01 DIAGNOSIS — M35.01 SJOGREN'S SYNDROME WITH KERATOCONJUNCTIVITIS SICCA: ICD-10-CM

## 2019-03-01 DIAGNOSIS — L93.2 DRUG-INDUCED LUPUS ERYTHEMATOSUS: ICD-10-CM

## 2019-03-01 DIAGNOSIS — N18.30 CKD (CHRONIC KIDNEY DISEASE) STAGE 3, GFR 30-59 ML/MIN: ICD-10-CM

## 2019-03-01 DIAGNOSIS — M62.81 MUSCLE WEAKNESS (GENERALIZED): ICD-10-CM

## 2019-03-05 ENCOUNTER — OFFICE VISIT (OUTPATIENT)
Dept: RHEUMATOLOGY | Facility: CLINIC | Age: 69
End: 2019-03-05
Payer: MEDICARE

## 2019-03-05 ENCOUNTER — HOSPITAL ENCOUNTER (OUTPATIENT)
Dept: RADIOLOGY | Facility: HOSPITAL | Age: 69
Discharge: HOME | End: 2019-03-05
Attending: INTERNAL MEDICINE
Payer: MEDICARE

## 2019-03-05 VITALS
WEIGHT: 145.06 LBS | HEIGHT: 65 IN | HEART RATE: 79 BPM | BODY MASS INDEX: 24.17 KG/M2 | DIASTOLIC BLOOD PRESSURE: 67 MMHG | SYSTOLIC BLOOD PRESSURE: 135 MMHG

## 2019-03-05 VITALS — HEIGHT: 65 IN | WEIGHT: 145 LBS | BODY MASS INDEX: 24.16 KG/M2

## 2019-03-05 DIAGNOSIS — N18.30 CKD (CHRONIC KIDNEY DISEASE) STAGE 3, GFR 30-59 ML/MIN: ICD-10-CM

## 2019-03-05 DIAGNOSIS — Z29.9 PREVENTIVE MEASURE: ICD-10-CM

## 2019-03-05 DIAGNOSIS — R21 RASH OF NECK: ICD-10-CM

## 2019-03-05 DIAGNOSIS — E03.9 ACQUIRED HYPOTHYROIDISM: ICD-10-CM

## 2019-03-05 DIAGNOSIS — M05.79 RHEUMATOID ARTHRITIS INVOLVING MULTIPLE SITES WITH POSITIVE RHEUMATOID FACTOR: Primary | ICD-10-CM

## 2019-03-05 DIAGNOSIS — Z12.31 ENCOUNTER FOR SCREENING MAMMOGRAM FOR MALIGNANT NEOPLASM OF BREAST: ICD-10-CM

## 2019-03-05 DIAGNOSIS — E55.9 VITAMIN D DEFICIENCY DISEASE: ICD-10-CM

## 2019-03-05 DIAGNOSIS — M85.89 OSTEOPENIA OF MULTIPLE SITES: ICD-10-CM

## 2019-03-05 DIAGNOSIS — M35.01 SJOGREN'S SYNDROME WITH KERATOCONJUNCTIVITIS SICCA: ICD-10-CM

## 2019-03-05 DIAGNOSIS — M76.892 ENTHESOPATHY OF HIP REGION ON BOTH SIDES: ICD-10-CM

## 2019-03-05 DIAGNOSIS — M76.891 ENTHESOPATHY OF HIP REGION ON BOTH SIDES: ICD-10-CM

## 2019-03-05 DIAGNOSIS — D84.9 IMMUNOCOMPROMISED: ICD-10-CM

## 2019-03-05 DIAGNOSIS — M79.7 FIBROMYALGIA: ICD-10-CM

## 2019-03-05 DIAGNOSIS — M62.830 SPASM OF MUSCLE OF LOWER BACK: ICD-10-CM

## 2019-03-05 PROCEDURE — 77063 MAMMO DIGITAL SCREENING BILAT WITH TOMOSYNTHESIS_CAD: ICD-10-PCS | Mod: 26,,, | Performed by: RADIOLOGY

## 2019-03-05 PROCEDURE — 77063 BREAST TOMOSYNTHESIS BI: CPT | Mod: 26,,, | Performed by: RADIOLOGY

## 2019-03-05 PROCEDURE — 99214 OFFICE O/P EST MOD 30 MIN: CPT | Mod: S$PBB,,, | Performed by: PHYSICIAN ASSISTANT

## 2019-03-05 PROCEDURE — 99214 PR OFFICE/OUTPT VISIT, EST, LEVL IV, 30-39 MIN: ICD-10-PCS | Mod: S$PBB,,, | Performed by: PHYSICIAN ASSISTANT

## 2019-03-05 PROCEDURE — 77067 MAMMO DIGITAL SCREENING BILAT WITH TOMOSYNTHESIS_CAD: ICD-10-PCS | Mod: 26,,, | Performed by: RADIOLOGY

## 2019-03-05 PROCEDURE — 99999 PR PBB SHADOW E&M-EST. PATIENT-LVL V: CPT | Mod: PBBFAC,,, | Performed by: PHYSICIAN ASSISTANT

## 2019-03-05 PROCEDURE — 99215 OFFICE O/P EST HI 40 MIN: CPT | Mod: PBBFAC,PN | Performed by: PHYSICIAN ASSISTANT

## 2019-03-05 PROCEDURE — 77067 SCR MAMMO BI INCL CAD: CPT | Mod: 26,,, | Performed by: RADIOLOGY

## 2019-03-05 PROCEDURE — 99999 PR PBB SHADOW E&M-EST. PATIENT-LVL V: ICD-10-PCS | Mod: PBBFAC,,, | Performed by: PHYSICIAN ASSISTANT

## 2019-03-05 PROCEDURE — 77067 SCR MAMMO BI INCL CAD: CPT | Mod: TC

## 2019-03-05 RX ORDER — SODIUM CHLORIDE 0.9 % (FLUSH) 0.9 %
10 SYRINGE (ML) INJECTION
Status: CANCELLED | OUTPATIENT
Start: 2019-03-11

## 2019-03-05 RX ORDER — HEPARIN 100 UNIT/ML
500 SYRINGE INTRAVENOUS
Status: CANCELLED | OUTPATIENT
Start: 2019-03-11

## 2019-03-05 RX ORDER — POLYMYXIN B SULFATE AND TRIMETHOPRIM 1; 10000 MG/ML; [USP'U]/ML
SOLUTION OPHTHALMIC
Refills: 0 | COMMUNITY
Start: 2018-12-05 | End: 2019-06-04

## 2019-03-05 RX ORDER — ACETAMINOPHEN 325 MG/1
650 TABLET ORAL ONCE AS NEEDED
Status: CANCELLED | OUTPATIENT
Start: 2019-03-11 | End: 2019-03-11

## 2019-03-05 RX ORDER — TRIAMCINOLONE ACETONIDE 5 MG/G
CREAM TOPICAL 2 TIMES DAILY
Qty: 1 TUBE | Refills: 1 | Status: SHIPPED | OUTPATIENT
Start: 2019-03-05 | End: 2019-06-10

## 2019-03-05 RX ORDER — DIPHENHYDRAMINE HYDROCHLORIDE 50 MG/ML
12.5 INJECTION INTRAMUSCULAR; INTRAVENOUS ONCE AS NEEDED
Status: CANCELLED | OUTPATIENT
Start: 2019-03-11 | End: 2019-03-11

## 2019-03-05 RX ORDER — TIZANIDINE 4 MG/1
4 TABLET ORAL NIGHTLY PRN
Qty: 30 TABLET | Refills: 1 | Status: SHIPPED | OUTPATIENT
Start: 2019-03-05 | End: 2019-03-15

## 2019-03-05 ASSESSMENT — ROUTINE ASSESSMENT OF PATIENT INDEX DATA (RAPID3)
MDHAQ FUNCTION SCORE: .5
MDHAQ FUNCTION SCORE: .5

## 2019-03-05 NOTE — PROGRESS NOTES
Subjective:       Patient ID: Leticia Piña is a 68 y.o. female.    Chief Complaint: Follow-up; Low-back Pain; and Hip Pain      Leticia is here today for rheumatology followup.      She has chronic seropositive rheumatoid arthritis, Sjogren's syndrome, osteopenia, and Vitamin D deficiency.  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 Also failed enbrel this was IE.  For RA, she is currently on mtx 15 mg once weekly, folic acid daily, and Plaquenil 200 mg once daily. Took cimzia for about 1 year then was off for a while RA activity became moderate so dr hamilton resumed her Cimzia 400 mg Q 28 days she has worked herself out to about every 6 weeks. RA doing excellent. Would like to get off some meds if able. Cost are really high. CAM liopholised cimzia 400 mg Q 28 days. Last injection done  1/19/19    no recent RA exacerbations. But sleep issues, poor sleep, waking up a lot, increased myofascial pain, lower back, hips, thighs and legs mostly. No joint swelling. Pain today reported 5/10  On Cymbalta 60 mg/d, Wellbutrin 150 mg/d, tried 300 but went back down b/c she felt weird. Reports more fatigue, aching, foggy brain, unable to get thoughts together, just not herself. Also has overlapping depression, teary eye today.   Not exercising, still working daily. Reports feeling weak, on thyroid meds also    RA wise BL hand and foot x-rays done in 6/2018  showed no new erosions and x-rays were overall stable.    deveoped 1 small round red raised lesion on her right upper chest, present X 8 days    For Sjogren's, she has +SSB, +SSA, +GIOVANNA 1:160 speckled in the past. She takes Evoxac 3-4X daily cost $160/month.  Now on salagen using good rx coupon cost is better. Also  Using otc products nasal gel, lozenges, drinking water. Seen by ophthalmology and prescribed plasma serum eye drops, which have worked great. No parotid tenderness or swelling.     Osteopenia diagnosed by Dexa. Last scan 8/2014. TF  -1.0, FN -1.8, spine -0.2. Previously, we did trial of fosamax but had GI upset and stopped. No prednisone. Currently taking estradiol, progesterone, calcium by diet only, and vit D 2000 IU daily. Last Vitamin D level was elevated at 66 .repeat bone density 12/6/17 stable. No changes. No falls or fractures since last visit.     She is weaning off her hormones            Low-back Pain   Associated symptoms include myalgias and a rash. Pertinent negatives include no abdominal pain, arthralgias, chest pain, chills, congestion, coughing, fatigue, fever, joint swelling, nausea, neck pain, sore throat, vomiting or weakness.   Hip Pain   Associated symptoms include myalgias and a rash. Pertinent negatives include no abdominal pain, arthralgias, chest pain, chills, congestion, coughing, fatigue, fever, joint swelling, nausea, neck pain, sore throat, vomiting or weakness.     Review of Systems   Constitutional: Negative.  Negative for activity change, appetite change, chills, fatigue and fever.   HENT: Negative for congestion, ear pain, mouth sores, rhinorrhea, sinus pressure, sore throat and trouble swallowing.         + dry mouth   Eyes: Negative.  Negative for photophobia, pain and redness.        No swollen or red eyes, + dry eye     Respiratory: Negative for cough, choking, chest tightness, shortness of breath, wheezing and stridor.    Cardiovascular: Negative.  Negative for chest pain.   Gastrointestinal: Negative.  Negative for abdominal pain, blood in stool, diarrhea, nausea and vomiting.   Genitourinary: Negative.  Negative for dysuria, frequency, hematuria and urgency.   Musculoskeletal: Positive for myalgias. Negative for arthralgias, back pain, gait problem, joint swelling, neck pain and neck stiffness.   Skin: Positive for rash. Negative for color change and pallor.   Neurological: Negative.  Negative for weakness.   Hematological: Negative for adenopathy.   Psychiatric/Behavioral: Negative for suicidal ideas.  "        Objective:     /67   Pulse 79   Ht 5' 5" (1.651 m)   Wt 65.8 kg (145 lb 1 oz)   BMI 24.14 kg/m²      Physical Exam   Constitutional: She is oriented to person, place, and time and well-developed, well-nourished, and in no distress. No distress.   HENT:   Head: Normocephalic and atraumatic.   Right Ear: External ear normal.   Left Ear: External ear normal.   Mouth/Throat: Mucous membranes are not pale, dry and not cyanotic. She does not have dentures. No oral lesions. No oropharyngeal exudate.   Eyes: Conjunctivae and EOM are normal. Pupils are equal, round, and reactive to light. Right conjunctiva is not injected. Right conjunctiva has no hemorrhage. Left conjunctiva is not injected. Left conjunctiva has no hemorrhage. No scleral icterus.       Neck: Normal range of motion. Neck supple. No thyromegaly present.   Cardiovascular: Normal rate, regular rhythm and normal heart sounds.    No murmur heard.  Pulmonary/Chest: Effort normal and breath sounds normal. She exhibits no tenderness.   Abdominal: Soft. Bowel sounds are normal.       Right Side Rheumatological Exam     Examination finds the shoulder, elbow, wrist, knee, 1st PIP, 1st MCP, 2nd PIP, 2nd MCP, 3rd PIP, 3rd MCP, 4th PIP, 4th MCP, 5th PIP and 5th MCP normal.    Left Side Rheumatological Exam     Examination finds the shoulder, elbow, wrist, knee, 1st PIP, 1st MCP, 2nd PIP, 2nd MCP, 3rd PIP, 3rd MCP, 4th PIP, 4th MCP, 5th PIP and 5th MCP normal.      Lymphadenopathy:     She has no cervical adenopathy.   Neurological: She is alert and oriented to person, place, and time. She displays normal reflexes. No cranial nerve deficit. She exhibits normal muscle tone. Gait normal.   Skin: Skin is warm and dry. Rash noted. Rash is maculopapular. There is erythema.          Musculoskeletal: Normal range of motion. She exhibits tenderness. She exhibits no edema.   DIP enlargement BL hands, no synovitis, warmth.   Negative metatarsal squeeze BL.  No " synovitis on exam  ttp lower lumbar, romel hip bursa, medial knee, thighs, upper arms  No weakness on exam, muscle strenght 5/5 upper and lower ext              Results for orders placed or performed in visit on 03/05/19   C-reactive protein   Result Value Ref Range    CRP 2.3 0.0 - 8.2 mg/L   Comprehensive metabolic panel   Result Value Ref Range    Sodium 139 136 - 145 mmol/L    Potassium 3.5 3.5 - 5.1 mmol/L    Chloride 98 95 - 110 mmol/L    CO2 33 (H) 23 - 29 mmol/L    Glucose 98 70 - 110 mg/dL    BUN, Bld 23 8 - 23 mg/dL    Creatinine 0.9 0.5 - 1.4 mg/dL    Calcium 10.1 8.7 - 10.5 mg/dL    Total Protein 7.2 6.0 - 8.4 g/dL    Albumin 3.8 3.5 - 5.2 g/dL    Total Bilirubin 0.3 0.1 - 1.0 mg/dL    Alkaline Phosphatase 50 (L) 55 - 135 U/L    AST 23 10 - 40 U/L    ALT 20 10 - 44 U/L    Anion Gap 8 8 - 16 mmol/L    eGFR if African American >60 >60 mL/min/1.73 m^2    eGFR if non African American >60 >60 mL/min/1.73 m^2   CBC auto differential   Result Value Ref Range    WBC 5.83 3.90 - 12.70 K/uL    RBC 3.96 (L) 4.00 - 5.40 M/uL    Hemoglobin 10.6 (L) 12.0 - 16.0 g/dL    Hematocrit 34.3 (L) 37.0 - 48.5 %    MCV 87 82 - 98 fL    MCH 26.8 (L) 27.0 - 31.0 pg    MCHC 30.9 (L) 32.0 - 36.0 g/dL    RDW 13.7 11.5 - 14.5 %    Platelets 297 150 - 350 K/uL    MPV 9.2 9.2 - 12.9 fL    Immature Granulocytes 0.2 0.0 - 0.5 %    Gran # (ANC) 3.2 1.8 - 7.7 K/uL    Immature Grans (Abs) 0.01 0.00 - 0.04 K/uL    Lymph # 1.7 1.0 - 4.8 K/uL    Mono # 0.5 0.3 - 1.0 K/uL    Eos # 0.4 0.0 - 0.5 K/uL    Baso # 0.07 0.00 - 0.20 K/uL    nRBC 0 0 /100 WBC    Gran% 54.6 38.0 - 73.0 %    Lymph% 28.6 18.0 - 48.0 %    Mono% 8.9 4.0 - 15.0 %    Eosinophil% 6.7 0.0 - 8.0 %    Basophil% 1.2 0.0 - 1.9 %    Differential Method Automated    CK   Result Value Ref Range     20 - 180 U/L         X-ray BL hand and foot 5/2016 and 7/2017 : no new erosions - stable    12/2017 DEXA  Total femur mean 0.855 g/cm² with a T score -1.2  Left femur neck 0.738 g/cm²  T score -2.2  L1-L4 spine 1.152 g/cm² with a T score -0.2  Osteopenia with falling bone density at the left femur neck moderate to high fracture risk      12/6/15 DEXA  Total femur mean 0.860 g/cm² with a T score -1.2  Left femur neck 0.738 g/cm² T score -2.2  L1-L4 spine 1.152 g/cm² with a T score -0.2  Osteopenia with falling bone density at the left femur neck moderate to high fracture risk    8/6/14 DEXA  Total femur mean 0.880 g/cm² with a T score -1.0  Left femur neck 0.789 g/cm² with a T score -1.8  Spine L1-L4 1.153 g/cm² with a T score -0.2  Osteopenia with low to moderate risk of fracture          Immunization History   Administered Date(s) Administered    Influenza - High Dose 11/07/2007, 09/30/2008, 09/23/2009, 10/27/2010, 10/31/2011, 10/05/2015, 10/12/2018    Influenza - Quadrivalent 11/13/2014, 10/27/2016    Influenza - Trivalent - Adjuvanted 10/23/2017    PPD Test 03/28/2011    Pneumococcal Conjugate 12/10/2013    Pneumococcal Conjugate - 13 Valent 12/10/2013    Pneumococcal Polysaccharide - 23 Valent 11/07/2007, 03/23/2017    Tdap 09/15/2010, 04/10/2013    Zoster 04/10/2013         Assessment:       1. Rheumatoid arthritis involving multiple sites with positive rheumatoid factor    2. Sjogren's syndrome with keratoconjunctivitis sicca    3. Acquired hypothyroidism    4. Vitamin D deficiency disease    5. Osteopenia of multiple sites    6. Immunocompromised    7. CKD (chronic kidney disease) stage 3, GFR 30-59 ml/min    8. Fibromyalgia    9. Enthesopathy of hip region on both sides    10. Spasm of muscle of lower back    11. Rash of neck        1. RA well controlled on - Currently taking mtx 15 mg once weekly, folic acid daily, and Plaquenil 200 mg twice daily and  Cimzia 400 mg Q 8 weeks   Today she has 0 tender/0 swollen joints. MARGARITA 0,  CDAI 0.     hand and foot x-ray up to date 5/2016 and 7/2017 stable no changes     In the past failed mtx monotherapy  Failed humira and enbrel    2.  Sjogrens - dryness of eyes and mouth. On evoxac 3-4 X daily and plasma serum eye drops prescribed by ophthalmologist.   evoxac poorly covered by insurance cost $160/month- now on salagen doing fair, lower cost    3. 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 Osteopenia - DEXA stable at both FN, femur mean and spine- mod-high rask- will follow  Currently taking estradiol, progesterone, calcium by diet only, and vit D 2000 international units daily.   In the past failed a trial of oral bisphosphonate secondary GI intolerance    5. Vaccination status: up to date including flu vaccine     6. Medication Monitoring- no current issues, no evidence of toxicity    7. Immunocompromised no issues with recurrent infections    8. Fibromyalgia with ongoing fatigue, sleep disturbances, myofascial pain ongoing depression    9. Mid rash- etiology unclear       Plan:       Regarding RA meds  Wean down on mtx to 10 mg/wk X 6 weeks then down to 5 mg/wk X 6 week then can trial off   Once off mtx then can stop folic acid daily    C/w Plaquenil 200 mg daily. This  will decrease long term risk of eye issues     C/w cimizia CAM liopholised 400 mg Q 6 weeks - get approved for next week  Repeat her bilateral hand and foot x-rays  early next year      For fibromyalgia  Refer to PT for aquatic therapy   zanaflex 2-4 mg at bedtime, use wit caution, do not drive  Epsom warm bath soaks  Try going up on her wellbutrin to 300 mg Q other day and 150 mg the other days  C/w cymbalta 60 mg/w  If not able to tolerate increased Wellbutrin then consider gabapentin     C/w salagen 5 mg 3-4 X daily, good Rx coupon  Can also try OTC Xylimelts or Thera Breath lozenges for dry mouth.  Nasogel  Cool mist humidier  Nighttime eye ointment, use sleep mask      C/w eye doc, yearly eye checks for plaquenil and c/w her plasma serum eye drops.       Dexa  Scan again .  2-3 years. Repeat dexa every 2 years.   At next dexa if drops any will  need to treat  For now c/w low dose vit D 7982-4288 IU daily and calcium in diet,       Use Voltaren gel and iburpfen prn      topical kenalog bid for rash      rtc 1 week cimzia then c/w Q 6 weeks   rtc 7 weeks infusin visit for cimzia  rtc 13 week with giovanni and labs labs cbc, cmp, esr, crp    Call with any questions, changes or concerns.                  copy Pasha Estrada study

## 2019-03-05 NOTE — PATIENT INSTRUCTIONS
Look up cognitive behavior therapy  Meditation techniques    Epsom salt warm bath soaks    Aquatic therapy and regular exercise

## 2019-03-07 ENCOUNTER — PATIENT MESSAGE (OUTPATIENT)
Dept: RHEUMATOLOGY | Facility: CLINIC | Age: 69
End: 2019-03-07

## 2019-03-13 ENCOUNTER — INFUSION (OUTPATIENT)
Dept: RHEUMATOLOGY | Facility: HOSPITAL | Age: 69
End: 2019-03-13
Attending: PHYSICIAN ASSISTANT
Payer: MEDICARE

## 2019-03-13 VITALS
DIASTOLIC BLOOD PRESSURE: 81 MMHG | OXYGEN SATURATION: 98 % | TEMPERATURE: 97 F | HEART RATE: 78 BPM | WEIGHT: 145.75 LBS | SYSTOLIC BLOOD PRESSURE: 141 MMHG | BODY MASS INDEX: 24.25 KG/M2 | RESPIRATION RATE: 18 BRPM

## 2019-03-13 DIAGNOSIS — M05.79 RHEUMATOID ARTHRITIS INVOLVING MULTIPLE SITES WITH POSITIVE RHEUMATOID FACTOR: Primary | ICD-10-CM

## 2019-03-13 PROCEDURE — 96401 CHEMO ANTI-NEOPL SQ/IM: CPT

## 2019-03-13 RX ORDER — DIPHENHYDRAMINE HYDROCHLORIDE 50 MG/ML
12.5 INJECTION INTRAMUSCULAR; INTRAVENOUS ONCE AS NEEDED
Status: CANCELLED | OUTPATIENT
Start: 2019-03-13 | End: 2019-03-13

## 2019-03-13 RX ORDER — SODIUM CHLORIDE 0.9 % (FLUSH) 0.9 %
10 SYRINGE (ML) INJECTION
Status: CANCELLED | OUTPATIENT
Start: 2019-03-13

## 2019-03-13 RX ORDER — CERTOLIZUMAB PEGOL 200 MG/ML
400 INJECTION, SOLUTION SUBCUTANEOUS ONCE
Status: COMPLETED | OUTPATIENT
Start: 2019-03-13 | End: 2019-03-13

## 2019-03-13 RX ORDER — ACETAMINOPHEN 325 MG/1
650 TABLET ORAL ONCE AS NEEDED
Status: CANCELLED | OUTPATIENT
Start: 2019-03-13 | End: 2019-03-13

## 2019-03-13 RX ORDER — HEPARIN 100 UNIT/ML
500 SYRINGE INTRAVENOUS
Status: CANCELLED | OUTPATIENT
Start: 2019-03-13

## 2019-03-13 RX ADMIN — CERTOLIZUMAB PEGOL 400 MG: 200 INJECTION, SOLUTION SUBCUTANEOUS at 03:03

## 2019-03-13 NOTE — NURSING
Cimzia 400 mg q 6 weeks  Last dose-1/16/19    Any:  -recent illness, infection, or antibiotic use in past week- denies  -open wounds or mouth sores- denies  -invasive procedures or surgeries in past 4 weeks or in upcoming 4 weeks- denies  -vaccinations in past week- denies      Recent labs? 3/5/19  Last Rheumatology provider visit- Seen by AMADOR Gillette on 3/5/19     Premeds? None    Cimzia 200 mg/1 ml administered SQ to right posterior upper arm and Cimzia 200 mg/1 ml administered SQ to left posterior upper arm per orders; see MAR and vitals for more  details. Tolerated well without adverse events, discharged and ambulatory out of clinic.    Cimzia charge did not show on on charges. Harley Pharmacist aware and stated that charge will show up later and he made a ticket and sent it to the appropriate person to fix it.

## 2019-03-14 ENCOUNTER — PATIENT MESSAGE (OUTPATIENT)
Dept: RHEUMATOLOGY | Facility: CLINIC | Age: 69
End: 2019-03-14

## 2019-03-29 ENCOUNTER — TELEPHONE (OUTPATIENT)
Dept: RHEUMATOLOGY | Facility: CLINIC | Age: 69
End: 2019-03-29

## 2019-03-29 NOTE — TELEPHONE ENCOUNTER
----- Message from Zohreh Hernandez sent at 3/29/2019 12:45 PM CDT -----  Contact: pt   Call pt regarding referral that was sent to woman [facility]. Pt states that she haven't heard from them.    [phone] (home)

## 2019-03-29 NOTE — TELEPHONE ENCOUNTER
Left voice message stating I was returning her call, she can call back to clinic for further assistance

## 2019-04-04 ENCOUNTER — PATIENT MESSAGE (OUTPATIENT)
Dept: RHEUMATOLOGY | Facility: CLINIC | Age: 69
End: 2019-04-04

## 2019-04-23 ENCOUNTER — TELEPHONE (OUTPATIENT)
Dept: RHEUMATOLOGY | Facility: HOSPITAL | Age: 69
End: 2019-04-23

## 2019-04-23 NOTE — TELEPHONE ENCOUNTER
Call received from Ms. Kelly who states that she was diagnosed with pink eye and started on a 7 day course of antibiotics. Requesting to reschedule appointment. Appointment rescheduled per request. Also recommended to follow up with MD who prescribed antibiotics to get clearance since she wasn't initially seen by MD and antibiotics were called in. Verbalizes understanding.

## 2019-04-26 DIAGNOSIS — T50.905A DRUG-INDUCED LUPUS ERYTHEMATOSUS: ICD-10-CM

## 2019-04-26 DIAGNOSIS — M05.79 RHEUMATOID ARTHRITIS INVOLVING MULTIPLE SITES WITH POSITIVE RHEUMATOID FACTOR: ICD-10-CM

## 2019-04-26 DIAGNOSIS — L93.2 DRUG-INDUCED LUPUS ERYTHEMATOSUS: ICD-10-CM

## 2019-04-26 DIAGNOSIS — M35.01 SJOGREN'S SYNDROME WITH KERATOCONJUNCTIVITIS SICCA: ICD-10-CM

## 2019-04-26 RX ORDER — FOLIC ACID 1 MG/1
1000 TABLET ORAL DAILY
Qty: 30 TABLET | Refills: 11 | Status: SHIPPED | OUTPATIENT
Start: 2019-04-26 | End: 2019-06-04 | Stop reason: SDUPTHER

## 2019-04-30 DIAGNOSIS — F41.8 MIXED ANXIETY AND DEPRESSIVE DISORDER: ICD-10-CM

## 2019-04-30 RX ORDER — LORAZEPAM 1 MG/1
1 TABLET ORAL EVERY 6 HOURS PRN
Qty: 60 TABLET | Refills: 1 | OUTPATIENT
Start: 2019-04-30

## 2019-04-30 RX ORDER — TRAZODONE HYDROCHLORIDE 50 MG/1
100-150 TABLET ORAL NIGHTLY PRN
Qty: 270 TABLET | Refills: 2 | Status: CANCELLED | OUTPATIENT
Start: 2019-04-30

## 2019-04-30 RX ORDER — TRAZODONE HYDROCHLORIDE 50 MG/1
100-150 TABLET ORAL NIGHTLY PRN
Qty: 270 TABLET | Refills: 2 | Status: SHIPPED | OUTPATIENT
Start: 2019-04-30 | End: 2020-03-04 | Stop reason: SDUPTHER

## 2019-04-30 NOTE — TELEPHONE ENCOUNTER
LV: 02/05/2019  NV: 06/05/2019  [id number] - [place], [place] - [address] AT [facility].//

## 2019-05-01 ENCOUNTER — TELEPHONE (OUTPATIENT)
Dept: RHEUMATOLOGY | Facility: HOSPITAL | Age: 69
End: 2019-05-01

## 2019-05-01 RX ORDER — TRAZODONE HYDROCHLORIDE 50 MG/1
TABLET ORAL
Qty: 270 TABLET | Refills: 2 | Status: SHIPPED | OUTPATIENT
Start: 2019-05-01 | End: 2019-06-04 | Stop reason: SDUPTHER

## 2019-05-01 NOTE — TELEPHONE ENCOUNTER
Call placed regarding appointment scheduled for 4 pm today. No answer. Unable to leave a message due to mailbox being full and cannot accept any messages at this time.

## 2019-05-08 ENCOUNTER — TELEPHONE (OUTPATIENT)
Dept: RHEUMATOLOGY | Facility: HOSPITAL | Age: 69
End: 2019-05-08

## 2019-05-08 NOTE — TELEPHONE ENCOUNTER
Call placed to reschedule missed Cimzia appointment on 5/1/19. Mrs. Kelly requesting to not reschedule that appointment and leave every as is for 6/4/19 appointments. Informed Mrs. Kelly that 5/1/19 appointment will not be rescheduled per her request and appointments on 6/4/19 appointment will be kept as is per her request.

## 2019-05-22 NOTE — PROGRESS NOTES
"Subjective:      Patient ID: Leticia Piña is a 69 y.o. female.    Chief Complaint: Follow-up      HPI  Here for f/u medical problems and preventive exam.  Low energy.  Goes to water therapy for FM.  Seeing therapist, Psych Dr. LEIGH  No f/c/sw.  Lots nasal drainage, coughing up.  No exertional cp/sob/palp.  BMs little slow, stool softener helps.  Urine normal.  Taking vit D3, recent decrease to 3K from 4K units.  Sleeping ok with trazodone.  Having epigastric pain, to see Gastro.    HM: 10/18 fluvax, 6/19 today HAV, 12/13 [id number], 3/17 booster [id number], 4/13 TDaP, 4/13 zoster, 12/17 BMD rep 2y, 12/11 Cscope rep 10y, 3/19 MMG, 11/17 Gyn Dr. Jimenez, 3/17 HCV neg.     Review of Systems   Constitutional: Negative for appetite change, chills, diaphoresis and fever.   HENT: Negative for congestion, ear pain, rhinorrhea, sinus pressure and sore throat.    Respiratory: Negative for cough, chest tightness and shortness of breath.    Cardiovascular: Negative for chest pain and palpitations.   Gastrointestinal: Negative for blood in stool, constipation, diarrhea, nausea and vomiting.   Genitourinary: Negative for dysuria, frequency, hematuria, menstrual problem, urgency and vaginal discharge.   Musculoskeletal: Negative for arthralgias.   Skin: Negative for rash.   Neurological: Negative for dizziness and headaches.   Psychiatric/Behavioral: Negative for sleep disturbance. The patient is not nervous/anxious.          Objective:   /78 (BP Location: Right arm, Patient Position: Sitting, BP Method: Medium (Manual))   Pulse 82   Temp 98.8 °F (37.1 °C) (Oral)   Ht 5' 5" (1.651 m)   Wt 63.7 kg (140 lb 6.9 oz)   SpO2 97%   BMI 23.37 kg/m²     Physical Exam   Constitutional: She is oriented to person, place, and time. She appears well-developed and well-nourished.   HENT:   Right Ear: External ear normal. Tympanic membrane is not injected.   Left Ear: External ear normal. Tympanic membrane is not injected. "   Mouth/Throat: Oropharynx is clear and moist.   Eyes: Conjunctivae are normal.   Neck: Normal range of motion. Neck supple. No thyromegaly present.   Cardiovascular: Normal rate, regular rhythm and intact distal pulses. Exam reveals no gallop and no friction rub.   No murmur heard.  Pulmonary/Chest: Effort normal. She has no wheezes. She has rales (right base). Right breast exhibits no mass, no nipple discharge, no skin change and no tenderness. Left breast exhibits no mass, no nipple discharge, no skin change and no tenderness.   Abdominal: Soft. Bowel sounds are normal. She exhibits no mass. There is no tenderness.   Musculoskeletal: She exhibits no edema.   Lymphadenopathy:     She has no cervical adenopathy.        Right axillary: No lateral adenopathy present.        Left axillary: No lateral adenopathy present.  Neurological: She is alert and oriented to person, place, and time.   Skin: Skin is warm. No rash noted.   Psychiatric: She has a normal mood and affect.           Assessment:       1. Acquired hypothyroidism    2. Anxiety associated with depression    3. CKD (chronic kidney disease) stage 3, GFR 30-59 ml/min    4. Drug-induced lupus erythematosus    5. Essential hypertension    6. Rheumatoid arthritis involving multiple sites with positive rheumatoid factor    7. Sjogren's syndrome, with unspecified organ involvement    8. Vitamin D deficiency disease    9. Encounter for preventive health examination    10. Seasonal allergic rhinitis due to pollen          Plan:     Acquired hypothyroidism- Clinically stable, continue present treatment.    Anxiety associated with depression- per Psych.    CKD (chronic kidney disease) stage 3, GFR 30-59 ml/min- now better from 6mo ago.    Drug-induced lupus erythematosus    Essential hypertension- stable, cont rx.    Rheumatoid arthritis involving multiple sites with positive rheumatoid factor, Sjogren's syndrome, with unspecified organ involvement- per  Rheum.    Vitamin D deficiency disease- decrease to 2K daily.    Encounter for preventive health examination  -     Hepatitis A Vaccine (Adult) (IM)    Seasonal allergic rhinitis due to pollen  -     fluticasone propionate (FLONASE) 50 mcg/actuation nasal spray; 2 sprays (100 mcg total) by Each Nare route once daily.  Dispense: 16 g; Refill: 6

## 2019-05-25 DIAGNOSIS — F41.8 MIXED ANXIETY AND DEPRESSIVE DISORDER: ICD-10-CM

## 2019-05-28 ENCOUNTER — LAB VISIT (OUTPATIENT)
Dept: LAB | Facility: HOSPITAL | Age: 69
End: 2019-05-28
Attending: INTERNAL MEDICINE
Payer: MEDICARE

## 2019-05-28 ENCOUNTER — PATIENT MESSAGE (OUTPATIENT)
Dept: RHEUMATOLOGY | Facility: CLINIC | Age: 69
End: 2019-05-28

## 2019-05-28 DIAGNOSIS — E03.9 ACQUIRED HYPOTHYROIDISM: ICD-10-CM

## 2019-05-28 DIAGNOSIS — M35.01 SJOGREN'S SYNDROME WITH KERATOCONJUNCTIVITIS SICCA: ICD-10-CM

## 2019-05-28 DIAGNOSIS — M85.89 OSTEOPENIA OF MULTIPLE SITES: ICD-10-CM

## 2019-05-28 DIAGNOSIS — N18.9 CHRONIC KIDNEY DISEASE: ICD-10-CM

## 2019-05-28 DIAGNOSIS — Z51.81 MEDICATION MONITORING ENCOUNTER: Chronic | ICD-10-CM

## 2019-05-28 DIAGNOSIS — Z29.9 PREVENTIVE MEASURE: ICD-10-CM

## 2019-05-28 DIAGNOSIS — E55.9 VITAMIN D DEFICIENCY DISEASE: ICD-10-CM

## 2019-05-28 DIAGNOSIS — M05.79 RHEUMATOID ARTHRITIS INVOLVING MULTIPLE SITES WITH POSITIVE RHEUMATOID FACTOR: ICD-10-CM

## 2019-05-28 DIAGNOSIS — E55.9 VITAMIN D DEFICIENCY: ICD-10-CM

## 2019-05-28 LAB
25(OH)D3+25(OH)D2 SERPL-MCNC: 80 NG/ML (ref 30–96)
ALBUMIN SERPL BCP-MCNC: 3.7 G/DL (ref 3.5–5.2)
ALBUMIN SERPL BCP-MCNC: 3.7 G/DL (ref 3.5–5.2)
ALP SERPL-CCNC: 55 U/L (ref 55–135)
ALP SERPL-CCNC: 55 U/L (ref 55–135)
ALT SERPL W/O P-5'-P-CCNC: 18 U/L (ref 10–44)
ALT SERPL W/O P-5'-P-CCNC: 18 U/L (ref 10–44)
ANION GAP SERPL CALC-SCNC: 10 MMOL/L (ref 8–16)
ANION GAP SERPL CALC-SCNC: 10 MMOL/L (ref 8–16)
AST SERPL-CCNC: 18 U/L (ref 10–40)
AST SERPL-CCNC: 18 U/L (ref 10–40)
BASOPHILS # BLD AUTO: 0.06 K/UL (ref 0–0.2)
BASOPHILS # BLD AUTO: 0.06 K/UL (ref 0–0.2)
BASOPHILS NFR BLD: 1.1 % (ref 0–1.9)
BASOPHILS NFR BLD: 1.1 % (ref 0–1.9)
BILIRUB SERPL-MCNC: 0.3 MG/DL (ref 0.1–1)
BILIRUB SERPL-MCNC: 0.3 MG/DL (ref 0.1–1)
BUN SERPL-MCNC: 20 MG/DL (ref 8–23)
BUN SERPL-MCNC: 20 MG/DL (ref 8–23)
CALCIUM SERPL-MCNC: 9.8 MG/DL (ref 8.7–10.5)
CALCIUM SERPL-MCNC: 9.8 MG/DL (ref 8.7–10.5)
CHLORIDE SERPL-SCNC: 98 MMOL/L (ref 95–110)
CHLORIDE SERPL-SCNC: 98 MMOL/L (ref 95–110)
CHOLEST SERPL-MCNC: 179 MG/DL (ref 120–199)
CHOLEST/HDLC SERPL: 2.9 {RATIO} (ref 2–5)
CO2 SERPL-SCNC: 33 MMOL/L (ref 23–29)
CO2 SERPL-SCNC: 33 MMOL/L (ref 23–29)
CREAT SERPL-MCNC: 0.9 MG/DL (ref 0.5–1.4)
CREAT SERPL-MCNC: 0.9 MG/DL (ref 0.5–1.4)
CRP SERPL-MCNC: 2.7 MG/L (ref 0–8.2)
DIFFERENTIAL METHOD: ABNORMAL
DIFFERENTIAL METHOD: ABNORMAL
EOSINOPHIL # BLD AUTO: 0.3 K/UL (ref 0–0.5)
EOSINOPHIL # BLD AUTO: 0.3 K/UL (ref 0–0.5)
EOSINOPHIL NFR BLD: 5.6 % (ref 0–8)
EOSINOPHIL NFR BLD: 5.6 % (ref 0–8)
ERYTHROCYTE [DISTWIDTH] IN BLOOD BY AUTOMATED COUNT: 13.8 % (ref 11.5–14.5)
ERYTHROCYTE [DISTWIDTH] IN BLOOD BY AUTOMATED COUNT: 13.8 % (ref 11.5–14.5)
ERYTHROCYTE [SEDIMENTATION RATE] IN BLOOD BY WESTERGREN METHOD: 12 MM/HR (ref 0–36)
EST. GFR  (AFRICAN AMERICAN): >60 ML/MIN/1.73 M^2
EST. GFR  (AFRICAN AMERICAN): >60 ML/MIN/1.73 M^2
EST. GFR  (NON AFRICAN AMERICAN): >60 ML/MIN/1.73 M^2
EST. GFR  (NON AFRICAN AMERICAN): >60 ML/MIN/1.73 M^2
GLUCOSE SERPL-MCNC: 100 MG/DL (ref 70–110)
GLUCOSE SERPL-MCNC: 100 MG/DL (ref 70–110)
HCT VFR BLD AUTO: 35.9 % (ref 37–48.5)
HCT VFR BLD AUTO: 35.9 % (ref 37–48.5)
HDLC SERPL-MCNC: 62 MG/DL (ref 40–75)
HDLC SERPL: 34.6 % (ref 20–50)
HGB BLD-MCNC: 11.2 G/DL (ref 12–16)
HGB BLD-MCNC: 11.2 G/DL (ref 12–16)
IMM GRANULOCYTES # BLD AUTO: 0.01 K/UL (ref 0–0.04)
IMM GRANULOCYTES # BLD AUTO: 0.01 K/UL (ref 0–0.04)
IMM GRANULOCYTES NFR BLD AUTO: 0.2 % (ref 0–0.5)
IMM GRANULOCYTES NFR BLD AUTO: 0.2 % (ref 0–0.5)
LDLC SERPL CALC-MCNC: 94.2 MG/DL (ref 63–159)
LYMPHOCYTES # BLD AUTO: 1.4 K/UL (ref 1–4.8)
LYMPHOCYTES # BLD AUTO: 1.4 K/UL (ref 1–4.8)
LYMPHOCYTES NFR BLD: 26.1 % (ref 18–48)
LYMPHOCYTES NFR BLD: 26.1 % (ref 18–48)
MCH RBC QN AUTO: 27.2 PG (ref 27–31)
MCH RBC QN AUTO: 27.2 PG (ref 27–31)
MCHC RBC AUTO-ENTMCNC: 31.2 G/DL (ref 32–36)
MCHC RBC AUTO-ENTMCNC: 31.2 G/DL (ref 32–36)
MCV RBC AUTO: 87 FL (ref 82–98)
MCV RBC AUTO: 87 FL (ref 82–98)
MONOCYTES # BLD AUTO: 0.6 K/UL (ref 0.3–1)
MONOCYTES # BLD AUTO: 0.6 K/UL (ref 0.3–1)
MONOCYTES NFR BLD: 10.3 % (ref 4–15)
MONOCYTES NFR BLD: 10.3 % (ref 4–15)
NEUTROPHILS # BLD AUTO: 3 K/UL (ref 1.8–7.7)
NEUTROPHILS # BLD AUTO: 3 K/UL (ref 1.8–7.7)
NEUTROPHILS NFR BLD: 56.9 % (ref 38–73)
NEUTROPHILS NFR BLD: 56.9 % (ref 38–73)
NONHDLC SERPL-MCNC: 117 MG/DL
NRBC BLD-RTO: 0 /100 WBC
NRBC BLD-RTO: 0 /100 WBC
PLATELET # BLD AUTO: 312 K/UL (ref 150–350)
PLATELET # BLD AUTO: 312 K/UL (ref 150–350)
PMV BLD AUTO: 8.9 FL (ref 9.2–12.9)
PMV BLD AUTO: 8.9 FL (ref 9.2–12.9)
POTASSIUM SERPL-SCNC: 3.7 MMOL/L (ref 3.5–5.1)
POTASSIUM SERPL-SCNC: 3.7 MMOL/L (ref 3.5–5.1)
PROT SERPL-MCNC: 7.4 G/DL (ref 6–8.4)
PROT SERPL-MCNC: 7.4 G/DL (ref 6–8.4)
RBC # BLD AUTO: 4.12 M/UL (ref 4–5.4)
RBC # BLD AUTO: 4.12 M/UL (ref 4–5.4)
SODIUM SERPL-SCNC: 141 MMOL/L (ref 136–145)
SODIUM SERPL-SCNC: 141 MMOL/L (ref 136–145)
TRIGL SERPL-MCNC: 114 MG/DL (ref 30–150)
TSH SERPL DL<=0.005 MIU/L-ACNC: 0.83 UIU/ML (ref 0.4–4)
WBC # BLD AUTO: 5.33 K/UL (ref 3.9–12.7)
WBC # BLD AUTO: 5.33 K/UL (ref 3.9–12.7)

## 2019-05-28 PROCEDURE — 80061 LIPID PANEL: CPT

## 2019-05-28 PROCEDURE — 36415 COLL VENOUS BLD VENIPUNCTURE: CPT

## 2019-05-28 PROCEDURE — 85652 RBC SED RATE AUTOMATED: CPT

## 2019-05-28 PROCEDURE — 82306 VITAMIN D 25 HYDROXY: CPT

## 2019-05-28 PROCEDURE — 84443 ASSAY THYROID STIM HORMONE: CPT

## 2019-05-28 PROCEDURE — 86140 C-REACTIVE PROTEIN: CPT

## 2019-05-28 PROCEDURE — 80053 COMPREHEN METABOLIC PANEL: CPT

## 2019-05-28 PROCEDURE — 85025 COMPLETE CBC W/AUTO DIFF WBC: CPT

## 2019-05-28 RX ORDER — LORAZEPAM 1 MG/1
1 TABLET ORAL EVERY 6 HOURS PRN
Qty: 60 TABLET | Refills: 1 | OUTPATIENT
Start: 2019-05-28

## 2019-05-31 ENCOUNTER — PATIENT MESSAGE (OUTPATIENT)
Dept: FAMILY MEDICINE | Facility: CLINIC | Age: 69
End: 2019-05-31

## 2019-06-04 ENCOUNTER — INFUSION (OUTPATIENT)
Dept: RHEUMATOLOGY | Facility: HOSPITAL | Age: 69
End: 2019-06-04
Attending: PHYSICIAN ASSISTANT
Payer: MEDICARE

## 2019-06-04 ENCOUNTER — HOSPITAL ENCOUNTER (OUTPATIENT)
Dept: RADIOLOGY | Facility: HOSPITAL | Age: 69
Discharge: HOME | End: 2019-06-04
Attending: PHYSICIAN ASSISTANT
Payer: MEDICARE

## 2019-06-04 ENCOUNTER — OFFICE VISIT (OUTPATIENT)
Dept: RHEUMATOLOGY | Facility: CLINIC | Age: 69
End: 2019-06-04
Payer: MEDICARE

## 2019-06-04 VITALS
SYSTOLIC BLOOD PRESSURE: 139 MMHG | BODY MASS INDEX: 23.73 KG/M2 | HEIGHT: 65 IN | WEIGHT: 142.44 LBS | DIASTOLIC BLOOD PRESSURE: 76 MMHG | HEART RATE: 86 BPM

## 2019-06-04 DIAGNOSIS — M70.62 TROCHANTERIC BURSITIS OF BOTH HIPS: ICD-10-CM

## 2019-06-04 DIAGNOSIS — M05.79 RHEUMATOID ARTHRITIS INVOLVING MULTIPLE SITES WITH POSITIVE RHEUMATOID FACTOR: ICD-10-CM

## 2019-06-04 DIAGNOSIS — M05.79 RHEUMATOID ARTHRITIS INVOLVING MULTIPLE SITES WITH POSITIVE RHEUMATOID FACTOR: Primary | ICD-10-CM

## 2019-06-04 DIAGNOSIS — M70.61 TROCHANTERIC BURSITIS OF BOTH HIPS: ICD-10-CM

## 2019-06-04 DIAGNOSIS — D84.9 IMMUNOCOMPROMISED: ICD-10-CM

## 2019-06-04 DIAGNOSIS — M35.00 SJOGREN'S SYNDROME, WITH UNSPECIFIED ORGAN INVOLVEMENT: ICD-10-CM

## 2019-06-04 DIAGNOSIS — E55.9 VITAMIN D DEFICIENCY DISEASE: ICD-10-CM

## 2019-06-04 DIAGNOSIS — M05.771 RHEUMATOID ARTHRITIS INVOLVING RIGHT FOOT WITH POSITIVE RHEUMATOID FACTOR: ICD-10-CM

## 2019-06-04 DIAGNOSIS — M79.7 FIBROMYALGIA MUSCLE PAIN: ICD-10-CM

## 2019-06-04 DIAGNOSIS — M85.89 OSTEOPENIA OF MULTIPLE SITES: ICD-10-CM

## 2019-06-04 DIAGNOSIS — Z51.81 MEDICATION MONITORING ENCOUNTER: Chronic | ICD-10-CM

## 2019-06-04 PROCEDURE — 73630 XR FOOT COMPLETE 3 VIEW BILATERAL: ICD-10-PCS | Mod: 26,50,, | Performed by: RADIOLOGY

## 2019-06-04 PROCEDURE — 99215 OFFICE O/P EST HI 40 MIN: CPT | Mod: PBBFAC,25 | Performed by: PHYSICIAN ASSISTANT

## 2019-06-04 PROCEDURE — 99214 OFFICE O/P EST MOD 30 MIN: CPT | Mod: 25,S$PBB,, | Performed by: PHYSICIAN ASSISTANT

## 2019-06-04 PROCEDURE — 63600175 PHARM REV CODE 636 W HCPCS: Mod: JG | Performed by: PHYSICIAN ASSISTANT

## 2019-06-04 PROCEDURE — 96372 THER/PROPH/DIAG INJ SC/IM: CPT

## 2019-06-04 PROCEDURE — 20600 PR DRAIN/INJECT SMALL JOINT/BURSA: ICD-10-PCS | Mod: S$PBB,RT,, | Performed by: PHYSICIAN ASSISTANT

## 2019-06-04 PROCEDURE — 99999 PR PBB SHADOW E&M-EST. PATIENT-LVL V: ICD-10-PCS | Mod: PBBFAC,,, | Performed by: PHYSICIAN ASSISTANT

## 2019-06-04 PROCEDURE — 73130 XR HAND COMPLETE 3 VIEWS BILATERAL: ICD-10-PCS | Mod: 26,50,, | Performed by: RADIOLOGY

## 2019-06-04 PROCEDURE — 73630 X-RAY EXAM OF FOOT: CPT | Mod: 50,TC

## 2019-06-04 PROCEDURE — 99214 PR OFFICE/OUTPT VISIT, EST, LEVL IV, 30-39 MIN: ICD-10-PCS | Mod: 25,S$PBB,, | Performed by: PHYSICIAN ASSISTANT

## 2019-06-04 PROCEDURE — 73130 X-RAY EXAM OF HAND: CPT | Mod: 26,50,, | Performed by: RADIOLOGY

## 2019-06-04 PROCEDURE — 20600 DRAIN/INJ JOINT/BURSA W/O US: CPT | Mod: PBBFAC | Performed by: PHYSICIAN ASSISTANT

## 2019-06-04 PROCEDURE — 99999 PR PBB SHADOW E&M-EST. PATIENT-LVL V: CPT | Mod: PBBFAC,,, | Performed by: PHYSICIAN ASSISTANT

## 2019-06-04 PROCEDURE — 73130 X-RAY EXAM OF HAND: CPT | Mod: 50,TC

## 2019-06-04 PROCEDURE — 20600 DRAIN/INJ JOINT/BURSA W/O US: CPT | Mod: S$PBB,RT,, | Performed by: PHYSICIAN ASSISTANT

## 2019-06-04 PROCEDURE — 73630 X-RAY EXAM OF FOOT: CPT | Mod: 26,50,, | Performed by: RADIOLOGY

## 2019-06-04 RX ORDER — CERTOLIZUMAB PEGOL 200 MG/ML
400 INJECTION, SOLUTION SUBCUTANEOUS ONCE
Status: COMPLETED | OUTPATIENT
Start: 2019-06-04 | End: 2019-06-04

## 2019-06-04 RX ORDER — METHYLPREDNISOLONE ACETATE 80 MG/ML
8 INJECTION, SUSPENSION INTRA-ARTICULAR; INTRALESIONAL; INTRAMUSCULAR; SOFT TISSUE
Status: COMPLETED | OUTPATIENT
Start: 2019-06-04 | End: 2019-06-04

## 2019-06-04 RX ORDER — DIPHENHYDRAMINE HYDROCHLORIDE 50 MG/ML
12.5 INJECTION INTRAMUSCULAR; INTRAVENOUS ONCE AS NEEDED
Status: CANCELLED | OUTPATIENT
Start: 2019-07-01

## 2019-06-04 RX ORDER — ACETAMINOPHEN 325 MG/1
650 TABLET ORAL ONCE AS NEEDED
Status: CANCELLED | OUTPATIENT
Start: 2019-07-01

## 2019-06-04 RX ORDER — HEPARIN 100 UNIT/ML
500 SYRINGE INTRAVENOUS
Status: CANCELLED | OUTPATIENT
Start: 2019-07-01

## 2019-06-04 RX ORDER — SODIUM CHLORIDE 0.9 % (FLUSH) 0.9 %
10 SYRINGE (ML) INJECTION
Status: CANCELLED | OUTPATIENT
Start: 2019-07-01

## 2019-06-04 RX ORDER — ACETAMINOPHEN, DIPHENHYDRAMINE HCL, PHENYLEPHRINE HCL 325; 25; 5 MG/1; MG/1; MG/1
TABLET ORAL DAILY
COMMUNITY

## 2019-06-04 RX ORDER — BETAMETHASONE SODIUM PHOSPHATE AND BETAMETHASONE ACETATE 3; 3 MG/ML; MG/ML
0.6 INJECTION, SUSPENSION INTRA-ARTICULAR; INTRALESIONAL; INTRAMUSCULAR; SOFT TISSUE
Status: COMPLETED | OUTPATIENT
Start: 2019-06-04 | End: 2019-06-04

## 2019-06-04 RX ADMIN — CERTOLIZUMAB PEGOL 400 MG: 200 INJECTION, SOLUTION SUBCUTANEOUS at 04:06

## 2019-06-04 RX ADMIN — METHYLPREDNISOLONE ACETATE 8 MG: 80 INJECTION, SUSPENSION INTRALESIONAL; INTRAMUSCULAR; INTRASYNOVIAL; SOFT TISSUE at 05:06

## 2019-06-04 RX ADMIN — BETAMETHASONE SODIUM PHOSPHATE AND BETAMETHASONE ACETATE 0.6 MG: 3; 3 INJECTION, SUSPENSION INTRA-ARTICULAR; INTRALESIONAL; INTRAMUSCULAR at 05:06

## 2019-06-04 ASSESSMENT — ROUTINE ASSESSMENT OF PATIENT INDEX DATA (RAPID3): MDHAQ FUNCTION SCORE: .3

## 2019-06-04 NOTE — NURSING
Cimzia 400 mg administered subcutaneous in divided doses of 200 mg each in right and left arms posterior aspect.  Pt instructed on s/s to report to MD/RN. Instructed to wait in clinic x 15 min. Post injection.   Pt verbalized understanding and tolerated injection well without adverse events.

## 2019-06-04 NOTE — PROGRESS NOTES
Subjective:       Patient ID: Leticia Piña is a 69 y.o. female.    Chief Complaint: Rheumatoid Arthritis      Leticia is here today for rheumatology followup.      She has chronic seropositive rheumatoid arthritis, Sjogren's syndrome, osteopenia, and Vitamin D deficiency.  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 Also failed enbrel this was IE.  For RA on cimzia 400 mg every 5-6 weeks, now weaned down and off mtx over the past year, still on Plaquenil 200 mg once daily.  Missed her last 2 cimzia injections.  Last injection done  3/13/19    RA wise BL hand and foot x-rays done in 7/2017  showed no new erosions but right 5th mtp joint does have erosive changes which are chronic and x-rays were overall stable.      C/w right 5th mtp pain, swelling and stiffness for the past 2-3 weeks. Resting and icing helps, weight bearing aggravates it. In the past she would have  have more activity in her feet; has gotten mtp injections in the past. C/w  Left knee pain more lateral side experiences sharp pain when kneeling in [employer]. No swelling, redness or warmth. romel hip pain hurts to lay on side at night. Worse with sitting.     On Cymbalta 60 mg/d, Wellbutrin 300 mg/d Reports more fatigue, aching, foggy brain, unable to get thoughts together, just not herself. Also has overlapping depression. On thyroid meds,  We sent her to aquatic therapy at [facility] which really was helping but missed her last couple weeks. This really was helping her romel hip bursitis and back pain.     Seeing dr Jasmine tomorrow.     For Sjogren's, she has +SSB, +SSA, +GIOVANNA 1:160 speckled in the past. She takes Evoxac 3-4X daily cost $160/month.  Now on salagen using good rx coupon cost is better. Also  Using otc products nasal gel, lozenges, drinking water. Seen by ophthalmology and prescribed plasma serum eye drops, which have worked great. No parotid tenderness or swelling.     Osteopenia diagnosed by Dexa. Last  scan 8/2014. TF -1.0, FN -1.8, spine -0.2. Previously, we did trial of fosamax but had GI upset and stopped. No prednisone. Currently taking estradiol, progesterone, calcium by diet only, and vit D 2000 IU daily. Last Vitamin D level was elevated at 80 .repeat bone density 12/6/17 stable. No changes. No falls or fractures since last visit.     She is weaning off her hormones; reflux worsening, will see gi for evaluation          Low-back Pain   Associated symptoms include arthralgias, joint swelling and myalgias. Pertinent negatives include no abdominal pain, chest pain, chills, congestion, coughing, fatigue, fever, nausea, neck pain, rash, sore throat, vomiting or weakness.   Hip Pain   Associated symptoms include arthralgias, joint swelling and myalgias. Pertinent negatives include no abdominal pain, chest pain, chills, congestion, coughing, fatigue, fever, nausea, neck pain, rash, sore throat, vomiting or weakness.     Review of Systems   Constitutional: Negative.  Negative for activity change, appetite change, chills, fatigue and fever.   HENT: Negative for congestion, ear pain, mouth sores, rhinorrhea, sinus pressure, sore throat and trouble swallowing.         + dry mouth   Eyes: Negative.  Negative for photophobia, pain and redness.        No swollen or red eyes, + dry eye     Respiratory: Negative for cough, choking, chest tightness, shortness of breath, wheezing and stridor.    Cardiovascular: Negative.  Negative for chest pain.   Gastrointestinal: Negative.  Negative for abdominal pain, blood in stool, diarrhea, nausea and vomiting.   Genitourinary: Negative.  Negative for dysuria, frequency, hematuria and urgency.   Musculoskeletal: Positive for arthralgias, joint swelling and myalgias. Negative for back pain, gait problem, neck pain and neck stiffness.   Skin: Negative for color change, pallor and rash.   Neurological: Negative.  Negative for weakness.   Hematological: Negative for adenopathy.  "  Psychiatric/Behavioral: Negative for suicidal ideas.         Objective:     /76   Pulse 86   Ht 5' 5" (1.651 m)   Wt 64.6 kg (142 lb 6.7 oz)   BMI 23.70 kg/m²      Physical Exam   Constitutional: She is oriented to person, place, and time and well-developed, well-nourished, and in no distress. No distress.   HENT:   Head: Normocephalic and atraumatic.   Right Ear: External ear normal.   Left Ear: External ear normal.   Mouth/Throat: Mucous membranes are not pale, dry and not cyanotic. She does not have dentures. No oral lesions. No oropharyngeal exudate.   Eyes: Conjunctivae and EOM are normal. Pupils are equal, round, and reactive to light. Right conjunctiva is not injected. Right conjunctiva has no hemorrhage. Left conjunctiva is not injected. Left conjunctiva has no hemorrhage. No scleral icterus.       Neck: Normal range of motion. Neck supple. No thyromegaly present.   Cardiovascular: Normal rate, regular rhythm and normal heart sounds.    No murmur heard.  Pulmonary/Chest: Effort normal and breath sounds normal. She exhibits no tenderness.   Abdominal: Soft. Bowel sounds are normal.       Right Side Rheumatological Exam     Examination finds the shoulder, elbow, wrist, knee, 1st PIP, 1st MCP, 2nd PIP, 2nd MCP, 3rd PIP, 3rd MCP, 4th PIP, 4th MCP, 5th PIP and 5th MCP normal.    The patient is tender to palpation of the 5th MTP    She has swelling of the 5th MTP    Left Side Rheumatological Exam     Examination finds the shoulder, elbow, wrist, 1st PIP, 1st MCP, 2nd PIP, 2nd MCP, 3rd PIP, 3rd MCP, 4th PIP, 4th MCP, 5th PIP and 5th MCP normal.    The patient is tender to palpation of the knee.    Joint Exam Comments   Knee: Lateral side of knee at lateral collateral ligament  Some ttp noted            Lymphadenopathy:     She has no cervical adenopathy.   Neurological: She is alert and oriented to person, place, and time. She displays normal reflexes. No cranial nerve deficit. She exhibits normal " muscle tone. Gait normal.   Skin: Skin is warm and dry. No rash noted. No erythema.     Musculoskeletal: She exhibits edema, tenderness and deformity.   DIP enlargement BL hands, no synovitis, warmth.   +  metatarsal squeeze Right,neg left    Swelling and tenderness right 5th mtp joint  ttp romel hip bursa     No weakness on exam, muscle strenght 5/5 upper and lower ext              Results for orders placed or performed in visit on 05/28/19   Sedimentation rate, manual   Result Value Ref Range    Sed Rate 12 0 - 36 mm/Hr   C-reactive protein   Result Value Ref Range    CRP 2.7 0.0 - 8.2 mg/L   Comprehensive metabolic panel   Result Value Ref Range    Sodium 141 136 - 145 mmol/L    Potassium 3.7 3.5 - 5.1 mmol/L    Chloride 98 95 - 110 mmol/L    CO2 33 (H) 23 - 29 mmol/L    Glucose 100 70 - 110 mg/dL    BUN, Bld 20 8 - 23 mg/dL    Creatinine 0.9 0.5 - 1.4 mg/dL    Calcium 9.8 8.7 - 10.5 mg/dL    Total Protein 7.4 6.0 - 8.4 g/dL    Albumin 3.7 3.5 - 5.2 g/dL    Total Bilirubin 0.3 0.1 - 1.0 mg/dL    Alkaline Phosphatase 55 55 - 135 U/L    AST 18 10 - 40 U/L    ALT 18 10 - 44 U/L    Anion Gap 10 8 - 16 mmol/L    eGFR if African American >60 >60 mL/min/1.73 m^2    eGFR if non African American >60 >60 mL/min/1.73 m^2   CBC auto differential   Result Value Ref Range    WBC 5.33 3.90 - 12.70 K/uL    RBC 4.12 4.00 - 5.40 M/uL    Hemoglobin 11.2 (L) 12.0 - 16.0 g/dL    Hematocrit 35.9 (L) 37.0 - 48.5 %    Mean Corpuscular Volume 87 82 - 98 fL    Mean Corpuscular Hemoglobin 27.2 27.0 - 31.0 pg    Mean Corpuscular Hemoglobin Conc 31.2 (L) 32.0 - 36.0 g/dL    RDW 13.8 11.5 - 14.5 %    Platelets 312 150 - 350 K/uL    MPV 8.9 (L) 9.2 - 12.9 fL    Immature Granulocytes 0.2 0.0 - 0.5 %    Gran # (ANC) 3.0 1.8 - 7.7 K/uL    Immature Grans (Abs) 0.01 0.00 - 0.04 K/uL    Lymph # 1.4 1.0 - 4.8 K/uL    Mono # 0.6 0.3 - 1.0 K/uL    Eos # 0.3 0.0 - 0.5 K/uL    Baso # 0.06 0.00 - 0.20 K/uL    nRBC 0 0 /100 WBC    Gran% 56.9 38.0 - 73.0 %     Lymph% 26.1 18.0 - 48.0 %    Mono% 10.3 4.0 - 15.0 %    Eosinophil% 5.6 0.0 - 8.0 %    Basophil% 1.1 0.0 - 1.9 %    Differential Method Automated    CBC auto differential   Result Value Ref Range    WBC 5.33 3.90 - 12.70 K/uL    RBC 4.12 4.00 - 5.40 M/uL    Hemoglobin 11.2 (L) 12.0 - 16.0 g/dL    Hematocrit 35.9 (L) 37.0 - 48.5 %    Mean Corpuscular Volume 87 82 - 98 fL    Mean Corpuscular Hemoglobin 27.2 27.0 - 31.0 pg    Mean Corpuscular Hemoglobin Conc 31.2 (L) 32.0 - 36.0 g/dL    RDW 13.8 11.5 - 14.5 %    Platelets 312 150 - 350 K/uL    MPV 8.9 (L) 9.2 - 12.9 fL    Immature Granulocytes 0.2 0.0 - 0.5 %    Gran # (ANC) 3.0 1.8 - 7.7 K/uL    Immature Grans (Abs) 0.01 0.00 - 0.04 K/uL    Lymph # 1.4 1.0 - 4.8 K/uL    Mono # 0.6 0.3 - 1.0 K/uL    Eos # 0.3 0.0 - 0.5 K/uL    Baso # 0.06 0.00 - 0.20 K/uL    nRBC 0 0 /100 WBC    Gran% 56.9 38.0 - 73.0 %    Lymph% 26.1 18.0 - 48.0 %    Mono% 10.3 4.0 - 15.0 %    Eosinophil% 5.6 0.0 - 8.0 %    Basophil% 1.1 0.0 - 1.9 %    Differential Method Automated    Comprehensive metabolic panel   Result Value Ref Range    Sodium 141 136 - 145 mmol/L    Potassium 3.7 3.5 - 5.1 mmol/L    Chloride 98 95 - 110 mmol/L    CO2 33 (H) 23 - 29 mmol/L    Glucose 100 70 - 110 mg/dL    BUN, Bld 20 8 - 23 mg/dL    Creatinine 0.9 0.5 - 1.4 mg/dL    Calcium 9.8 8.7 - 10.5 mg/dL    Total Protein 7.4 6.0 - 8.4 g/dL    Albumin 3.7 3.5 - 5.2 g/dL    Total Bilirubin 0.3 0.1 - 1.0 mg/dL    Alkaline Phosphatase 55 55 - 135 U/L    AST 18 10 - 40 U/L    ALT 18 10 - 44 U/L    Anion Gap 10 8 - 16 mmol/L    eGFR if African American >60 >60 mL/min/1.73 m^2    eGFR if non African American >60 >60 mL/min/1.73 m^2   Lipid panel   Result Value Ref Range    Cholesterol 179 120 - 199 mg/dL    Triglycerides 114 30 - 150 mg/dL    HDL 62 40 - 75 mg/dL    LDL Cholesterol 94.2 63.0 - 159.0 mg/dL    Hdl/Cholesterol Ratio 34.6 20.0 - 50.0 %    Total Cholesterol/HDL Ratio 2.9 2.0 - 5.0    Non-HDL Cholesterol 117 mg/dL    TSH   Result Value Ref Range    TSH 0.833 0.400 - 4.000 uIU/mL   Vitamin D   Result Value Ref Range    Vit D, 25-Hydroxy 80 30 - 96 ng/mL         X-ray BL hand and foot 5/2016 and 7/2017 : no new erosions - stable    12/2017 DEXA  Total femur mean 0.855 g/cm² with a T score -1.2  Left femur neck 0.738 g/cm² T score -2.2  L1-L4 spine 1.152 g/cm² with a T score -0.2  Osteopenia with falling bone density at the left femur neck moderate to high fracture risk      12/6/15 DEXA  Total femur mean 0.860 g/cm² with a T score -1.2  Left femur neck 0.738 g/cm² T score -2.2  L1-L4 spine 1.152 g/cm² with a T score -0.2  Osteopenia with falling bone density at the left femur neck moderate to high fracture risk    8/6/14 DEXA  Total femur mean 0.880 g/cm² with a T score -1.0  Left femur neck 0.789 g/cm² with a T score -1.8  Spine L1-L4 1.153 g/cm² with a T score -0.2  Osteopenia with low to moderate risk of fracture          Immunization History   Administered Date(s) Administered    Influenza - High Dose 11/07/2007, 09/30/2008, 09/23/2009, 10/27/2010, 10/31/2011, 10/05/2015, 10/12/2018    Influenza - Quadrivalent 11/13/2014, 10/27/2016    Influenza - Trivalent - Adjuvanted 10/23/2017    PPD Test 03/28/2011    Pneumococcal Conjugate 12/10/2013    Pneumococcal Conjugate - 13 Valent 12/10/2013    Pneumococcal Polysaccharide - 23 Valent 11/07/2007, 03/23/2017    Tdap 09/15/2010, 04/10/2013    Zoster 04/10/2013         Assessment:       1. Rheumatoid arthritis involving multiple sites with positive rheumatoid factor    2. Sjogren's syndrome, with unspecified organ involvement    3. Osteopenia of multiple sites    4. Vitamin D deficiency disease    5. Immunocompromised    6. Medication monitoring encounter        1. RA well controlled on -     Now off  mtx - still on  Plaquenil 200 mg twice daily and  Cimzia 400 mg was Q 5-6 weeks- but missed last dose    Today she has 2 tender/1 swollen joints. MARGARITA 7,  CDAI 0.3     hand and  foot x-ray up to date 5/2016 and 7/2017 stable no changes     In the past failed mtx monotherapy  Failed humira and enbrel    2. Sjogrens - dryness of eyes and mouth. On evoxac 3-4 X daily and plasma serum eye drops prescribed by ophthalmologist.   evoxac poorly covered by insurance cost $160/month- now on salagen doing fair, lower cost    3. 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 Osteopenia - DEXA stable at both FN, femur mean and spine- mod-high rask- will follow  Currently taking estradiol, progesterone, calcium by diet only, and vit D 2000 international units daily.   In the past failed a trial of oral bisphosphonate secondary GI intolerance    5. Vaccination status: up to date including flu vaccine     6. Medication Monitoring- no current issues, no evidence of toxicity    7. Immunocompromised no issues with recurrent infections    8. Fibromyalgia with ongoing fatigue, sleep disturbances, myofascial pain ongoing depression- on Cymbalta, Wellbutrin, some help with aquatic therapy     9.  R 5th MTP       Plan:       Regarding RA meds  Stay off  mtx   C/w Plaquenil 200 mg daily. This  will decrease long term risk of eye issues     C/w cimizia CAM liopholised 400 mg Q 4-5 weeks - try to stay more consistent with her cimzia injections  Repeat her bilateral hand and foot x-rays today - looking for any erosive changes    Locally inject Right 5th mtp joint  Procedure note: After verbal consent was obtained.  The Right 5th MTP joint was prepared with sterile prep.  The skin was anesthetized with 1% ethyl chloride.  The  joint was injected with  0.1 mL celestone/soluspan 30 mg/5 mL, 0.1 mL Depo-Medrol 80 mg/mL and  0.1 mL of 1% lidocaine.  Hemostasis was obtained. The patient tolerated procedure well with no complications.  Patient diischarged with icing instructions  Discussed injection risk/potential side effects as described below    Risks of joint injections, steroid injections, and  aspirations include but are not limited to:   Allergic reaction, Infection, Bleeding, Bruising, Post injection flare of joint swelling and pain, Skin depigmentation, Local fat atrophy, Tendon rupture, and/or Failure of symptoms to improve    Icing instructions given to patient- ice area of injection for 15--20 min at least  3-4 X daily for the next 2-3 days.    If worsening and progressive pain develops please call the office         For fibromyalgia and romel hip bursitis   Refer to PT for aquatic therapy to complete her sessions, add pt with dry needling for her hip bursitis   zanaflex 2-4 mg at bedtime, use wit caution, do not drive  Epsom warm bath soaks  C/w  wellbutrin to 300 mg Q daily  C/w cymbalta 60 mg/d  consider gabapentin in future     C/w salagen 5 mg 3-4 X daily, good Rx coupon  Can also try OTC Xylimelts or Thera Breath lozenges for dry mouth.  Nasogel  Cool mist humidier  Nighttime eye ointment, use sleep mask      C/w eye doc, yearly eye checks for plaquenil and c/w her plasma serum eye drops.       Dexa  Scan again .  2-3 years. Repeat dexa every 2 years.   At next dexa if drops any will need to treat  For now c/w cut back on her vit D to no higher than 1000 IU daily; d level is 80 and calcium in diet,       Use Voltaren gel Right 5th mtp  and iburpfen prn not daily   See gi for her reflux issues       rtc 4-5 office visit with giovanni or dr hamilton for cimzia- no labs - just recheck to make sure doing better fabienne her right foot - review x-ray    Toxicity Labs every 3-4 months     Call with any questions, changes or concerns.                  copy Pasha Estrada study

## 2019-06-05 ENCOUNTER — OFFICE VISIT (OUTPATIENT)
Dept: FAMILY MEDICINE | Facility: CLINIC | Age: 69
End: 2019-06-05
Payer: MEDICARE

## 2019-06-05 VITALS
TEMPERATURE: 99 F | HEIGHT: 65 IN | BODY MASS INDEX: 23.4 KG/M2 | HEART RATE: 82 BPM | WEIGHT: 140.44 LBS | OXYGEN SATURATION: 97 % | SYSTOLIC BLOOD PRESSURE: 138 MMHG | DIASTOLIC BLOOD PRESSURE: 78 MMHG

## 2019-06-05 DIAGNOSIS — E03.9 ACQUIRED HYPOTHYROIDISM: Primary | ICD-10-CM

## 2019-06-05 DIAGNOSIS — M05.79 RHEUMATOID ARTHRITIS INVOLVING MULTIPLE SITES WITH POSITIVE RHEUMATOID FACTOR: ICD-10-CM

## 2019-06-05 DIAGNOSIS — E55.9 VITAMIN D DEFICIENCY DISEASE: ICD-10-CM

## 2019-06-05 DIAGNOSIS — R05.9 COUGH: ICD-10-CM

## 2019-06-05 DIAGNOSIS — Z00.00 ENCOUNTER FOR PREVENTIVE HEALTH EXAMINATION: ICD-10-CM

## 2019-06-05 DIAGNOSIS — I10 ESSENTIAL HYPERTENSION: ICD-10-CM

## 2019-06-05 DIAGNOSIS — F41.8 ANXIETY ASSOCIATED WITH DEPRESSION: ICD-10-CM

## 2019-06-05 DIAGNOSIS — M35.00 SJOGREN'S SYNDROME, WITH UNSPECIFIED ORGAN INVOLVEMENT: ICD-10-CM

## 2019-06-05 DIAGNOSIS — T50.905A DRUG-INDUCED LUPUS ERYTHEMATOSUS: ICD-10-CM

## 2019-06-05 DIAGNOSIS — L93.2 DRUG-INDUCED LUPUS ERYTHEMATOSUS: ICD-10-CM

## 2019-06-05 DIAGNOSIS — N18.30 CKD (CHRONIC KIDNEY DISEASE) STAGE 3, GFR 30-59 ML/MIN: ICD-10-CM

## 2019-06-05 DIAGNOSIS — J30.1 SEASONAL ALLERGIC RHINITIS DUE TO POLLEN: ICD-10-CM

## 2019-06-05 PROCEDURE — 99215 OFFICE O/P EST HI 40 MIN: CPT | Mod: PBBFAC,PO,25 | Performed by: INTERNAL MEDICINE

## 2019-06-05 PROCEDURE — 99214 OFFICE O/P EST MOD 30 MIN: CPT | Mod: 25,S$PBB,, | Performed by: INTERNAL MEDICINE

## 2019-06-05 PROCEDURE — 99214 PR OFFICE/OUTPT VISIT, EST, LEVL IV, 30-39 MIN: ICD-10-PCS | Mod: 25,S$PBB,, | Performed by: INTERNAL MEDICINE

## 2019-06-05 PROCEDURE — 90471 IMMUNIZATION ADMIN: CPT | Mod: PBBFAC,PO

## 2019-06-05 PROCEDURE — 99999 PR PBB SHADOW E&M-EST. PATIENT-LVL V: ICD-10-PCS | Mod: PBBFAC,,, | Performed by: INTERNAL MEDICINE

## 2019-06-05 PROCEDURE — 99999 PR PBB SHADOW E&M-EST. PATIENT-LVL V: CPT | Mod: PBBFAC,,, | Performed by: INTERNAL MEDICINE

## 2019-06-05 RX ORDER — FLUTICASONE PROPIONATE 50 MCG
2 SPRAY, SUSPENSION (ML) NASAL DAILY
Qty: 16 G | Refills: 6 | Status: SHIPPED | OUTPATIENT
Start: 2019-06-05 | End: 2019-07-02

## 2019-06-05 NOTE — PROGRESS NOTES
Your recent X-rays have been reviewed, images evaluated independently    findings are currently stable compared back to other films

## 2019-06-06 ENCOUNTER — HOSPITAL ENCOUNTER (OUTPATIENT)
Dept: RADIOLOGY | Facility: HOSPITAL | Age: 69
Discharge: HOME | End: 2019-06-06
Attending: INTERNAL MEDICINE
Payer: MEDICARE

## 2019-06-06 ENCOUNTER — PATIENT MESSAGE (OUTPATIENT)
Dept: FAMILY MEDICINE | Facility: CLINIC | Age: 69
End: 2019-06-06

## 2019-06-06 DIAGNOSIS — R05.9 COUGH: ICD-10-CM

## 2019-06-06 PROCEDURE — 71046 X-RAY EXAM CHEST 2 VIEWS: CPT | Mod: TC

## 2019-06-06 PROCEDURE — 71046 XR CHEST PA AND LATERAL: ICD-10-PCS | Mod: 26,,, | Performed by: RADIOLOGY

## 2019-06-06 PROCEDURE — 71046 X-RAY EXAM CHEST 2 VIEWS: CPT | Mod: 26,,, | Performed by: RADIOLOGY

## 2019-06-10 ENCOUNTER — PATIENT MESSAGE (OUTPATIENT)
Dept: FAMILY MEDICINE | Facility: CLINIC | Age: 69
End: 2019-06-10

## 2019-06-13 ENCOUNTER — OFFICE VISIT (OUTPATIENT)
Dept: OTOLARYNGOLOGY | Facility: CLINIC | Age: 69
End: 2019-06-13
Payer: MEDICARE

## 2019-06-13 VITALS
SYSTOLIC BLOOD PRESSURE: 120 MMHG | BODY MASS INDEX: 23.74 KG/M2 | WEIGHT: 142.63 LBS | DIASTOLIC BLOOD PRESSURE: 70 MMHG | TEMPERATURE: 98 F | HEART RATE: 79 BPM

## 2019-06-13 DIAGNOSIS — J32.9 RHINOSINUSITIS: Primary | ICD-10-CM

## 2019-06-13 DIAGNOSIS — J30.89 NON-SEASONAL ALLERGIC RHINITIS, UNSPECIFIED TRIGGER: ICD-10-CM

## 2019-06-13 PROCEDURE — 31575 DIAGNOSTIC LARYNGOSCOPY: CPT | Mod: PBBFAC | Performed by: OTOLARYNGOLOGY

## 2019-06-13 PROCEDURE — 99213 OFFICE O/P EST LOW 20 MIN: CPT | Mod: PBBFAC,25 | Performed by: OTOLARYNGOLOGY

## 2019-06-13 PROCEDURE — 31575 PR LARYNGOSCOPY, FLEXIBLE; DIAGNOSTIC: ICD-10-PCS | Mod: S$PBB,,, | Performed by: OTOLARYNGOLOGY

## 2019-06-13 PROCEDURE — 99999 PR PBB SHADOW E&M-EST. PATIENT-LVL III: ICD-10-PCS | Mod: PBBFAC,,, | Performed by: OTOLARYNGOLOGY

## 2019-06-13 PROCEDURE — 99204 OFFICE O/P NEW MOD 45 MIN: CPT | Mod: 25,S$PBB,, | Performed by: OTOLARYNGOLOGY

## 2019-06-13 PROCEDURE — 31575 DIAGNOSTIC LARYNGOSCOPY: CPT | Mod: S$PBB,,, | Performed by: OTOLARYNGOLOGY

## 2019-06-13 PROCEDURE — 99204 PR OFFICE/OUTPT VISIT, NEW, LEVL IV, 45-59 MIN: ICD-10-PCS | Mod: 25,S$PBB,, | Performed by: OTOLARYNGOLOGY

## 2019-06-13 PROCEDURE — 99999 PR PBB SHADOW E&M-EST. PATIENT-LVL III: CPT | Mod: PBBFAC,,, | Performed by: OTOLARYNGOLOGY

## 2019-06-13 RX ORDER — AZELASTINE 1 MG/ML
1 SPRAY, METERED NASAL 2 TIMES DAILY
Qty: 30 ML | Refills: 11 | Status: SHIPPED | OUTPATIENT
Start: 2019-06-13 | End: 2023-09-05

## 2019-06-13 RX ORDER — LEVOFLOXACIN 500 MG/1
500 TABLET, FILM COATED ORAL DAILY
Qty: 10 TABLET | Refills: 0 | Status: SHIPPED | OUTPATIENT
Start: 2019-06-13 | End: 2019-06-23

## 2019-06-13 RX ORDER — METHYLPREDNISOLONE 4 MG/1
TABLET ORAL
Qty: 1 PACKAGE | Refills: 0 | Status: SHIPPED | OUTPATIENT
Start: 2019-06-13 | End: 2019-07-02

## 2019-06-13 NOTE — PATIENT INSTRUCTIONS
PLEASE PERFORM SINUS RINSES 3-4 TIMES DAILY UNTIL YOUR NEXT VISIT.          DIRECTIONS FOR SINUS SALINE RINSE To see demonstration: Enter http://www.youtube.com/watch?v=[email or web address] into the browser address box, or go to You tube, and under the search box, enter sinus rinse. Click on [facility] Sinus Rinse Video    Step 1    Step 1 Please wash your hands. Fill the clean bottle with the designated volume of warm distilled water, filtered water or previously boiled water. You may warm the water in a microwave but we recommend that you warm it in increments of five seconds. This is to avoid excessive heating of the water and damage to the device or scalding your nasal passage.    Step 2    Step 2 Cut the SINUS RINSE mixture packet at the corner and pour its contents into the bottle. Tighten the cap & tube on the bottle securely, place one finger over the tip of the cap and shake the bottle gently to dissolve the mixture.      Step 3    Step 3 Standing in front of a sink, bend forward to your comfort level and tilt your head down. Keeping your mouth open without holding your breath, place cap snugly against your nasal passage and SQUEEZE BOTTLE GENTLY until the solution starts draining from the OPPOSITE nasal passage or from your mouth. Keep squeezing the bottle GENTLY until at least 1/4 to 1/2 (60 to 120 mL) of the bottle is used for a proper rinse. Do not swallow the solution.    Step 4    Blow your nose gently, without pinching your nose completely because this will apply pressure on the    eardrums. If tolerable, sniff in any residual solution remaining in the nasal passage once or twice prior to    blowing your nose as this may clean out the posterior nasopharyngeal area (the area at the back of your    nasal passage). Some solution will reach the back of the throat, so please spit it out. To help improve    drainage of any residual solution, blow your nose gently while tilting your head to the opposite side  of    the nasal passage that you just rinsed.    Step 5    Now repeat steps 3 & 4 for your other nasal passage.    Step 6 Air dry the Sinus Rinse bottle, cap, and tube on a clean paper towel, a glass plate to store the bottle cap and tube. If there is any solution leftover, please discard it. We recommend you make a fresh solution each time you rinse. Rinse 5 times each day, OR as directed by your physician. Warnings: DO NOT RINSE IF NASAL PASSAGE IS COMPLETELY BLOCKED OR IF YOU HAVE AN EAR INFECTION OR BLOCKED EARS. If you have had ear surgery, please contact your physician prior to irrigation. If you experience any pressure in your ears, stop the rinse and get further directions from your physician or contact our office during regular business hours. To avoid any ear discomfort: Heat the solution to lukewarm, do not use hot, boiling or cold water. Keep your mouth open. Do not hold your breath and if possible make the sound KEATON....KEATON... Make sure to take the position as shown. Gently squeeze 1/4 of the bottle at a time (60mL / 2 ounces). Stop the rinse if you feel any solution sensation near the ears. You may rinse with a partially blocked nasal passage. Please do not use for any other purposes. Please rinse at least ONE HOUR PRIOR to bedtime, in order to avoid any residual solution dripping in the throat.    >> Before using the SINUS RINSE kit, please inspect the cap, tube and bottle carefully for wear and    tear. If any of the components appear discolored or cracked, please contact [facility] to obtain a    replacement. You must follow the cleaning instructions provided in this brochure or cleaning instruction    card prior to each use.    >> The SINUS RINSE bottle and mixture are to be used only for nasalirrigation. Do not use for any other    purposes.    >> We recommend that you use the rinse ONE HOUR PRIOR to bedtime in order to avoid any residual    solution dripping in the throat.    Tips to avoid ear  discomfort while rinsing    If you have had ear surgery, please contact your physician prior to irrigation. Do not use if you have an    ear infection or blocked ears. Rinse with lukewarm water. Keep your mouth open. Do not hold your    breath while rinsing. While rinsing, make sure to tilt your. Gently squeeze the bottle while rinsing; do    not squeeze the bottle very forcefully. Stop the rinse if you feel a sensation of fluid near your ears.    Tips to avoid unexpected drainage after rinsing    In rare situations, especially if you have had sinus surgery, the saline solution can pool in the sinus    cavities and nasal passages and then drip from your nostrils hours after rinsing. To avoid this harmless    but annoying inconvenience, take one extra step after rinsing: lean forward, tilt your head sideways and    gently blow your nose. Then, tilt your head to the other side and blow again. You may need to repeat    this several times. This will help rid your nasal passages of any excess mucus and remaining saline    solution. If you find yourself experiencing delayed drainage often, do not rinse right before leaving your    house or going to bed.

## 2019-06-13 NOTE — PROGRESS NOTES
Referring Provider:    Ingrid Lassiter  No address on file  Subjective:   Patient: Leticia Piña [id number], :1950   Visit date:2019 3:42 PM    Chief Complaint:  Sinus Problem (bad over the last six weeks); Cough; and Other (c/o acid reflux)    HPI:  Leticia is a 69 y.o. female who I was asked to see in consultation for evaluation of the following issue(s):    Sinonasal / Allergy: Leticia has no nasal obstruction. She reports the the following sinonasal symptoms: allergies, epistaxis, facial pain, facial pressure, post-nasal drip, reduced sense of smell and rhinorrhea (runny nose).  She denies the the following sinonasal symptoms: significant history of dental procedure just prior to the onset of sinus problems and significant history of prior facial trauma or fractures. She is not  on medications for these symptoms. Medications: NASAL SALINE which has been helpful.    She has had 3 or 4 sinus infections in the past 12 months.     She has moderate allergic rhinitis with symptoms including coughing, nasal congestion, nasal itchiness, nasal rhinorrhea and sneezing.  She denies the following allergy symptoms:   eye itchiness and wheezing    Allergy testing for this patient was done many years ago and result was insignificant.    Current smoker:  No    Hx of Sjogren's Syndrome     There have been no recent imaging study of the sinuses (none).  No results found for this or any previous visit.  No results found for this or any previous visit.    Cough x 5-6 weeks      Review of Systems:  Negative unless checked off.  Gen:  []fever   [x]fatigue  HENT:  []nosebleeds  []dental problem   Eyes:  []photophobia  []visual disturbance  Resp:  []chest tightness []wheezing  Card:  []chest pain  []leg swelling  GI:  []abdominal pain []blood in stool  :  []dysuria  []hematuria  Musc:  []joint swelling  []gait problem  Skin:  []color change  []pallor  Neuro:  []seizures  []numbness  Hem:  []bruise/bleed  easily  Psych:  []hallucinations  []behavioral problems  Allergy/Imm: is allergic to erythromycin; humira  [adalimumab]; and sulfa (sulfonamide antibiotics).    Her meds, allergies, medical, surgical, social & family histories were reviewed & updated:  -     She has a current medication list which includes the following prescription(s): bupropion, cholecalciferol (vitamin d3), cyanocobalamin (vitamin b-12), doxycycline, duloxetine, fluticasone propionate, folic acid, hydrochlorothiazide, hydroxychloroquine, levothyroxine, lorazepam, losartan, omeprazole, pilocarpine, and trazodone, and the following Facility-Administered Medications: cimzia.  -     She  has a past medical history of Allergic rhinitis, Cutaneous lupus erythematosus, Essential hypertension (2/5/2019), GERD (gastroesophageal reflux disease), Hypothyroid, Insomnia, Mixed anxiety and depressive disorder, Normal cardiac stress test, Rheumatoid arthritis(714.0), and Sjogren's syndrome.   -     She does not have any pertinent problems on file.   -     She  has a past surgical history that includes breast implants; breast surgery for rupture; TONSILLECTOMY, ADENOIDECTOMY; Tubal ligation; and Augmentation of breast.  -     She  reports that she has never smoked. She has never used smokeless tobacco. She reports that she drinks alcohol. She reports that she does not use drugs.  -     Her family history is not on file.  -     She is allergic to erythromycin; humira  [adalimumab]; and sulfa (sulfonamide antibiotics).    Objective:     Physical Exam:  Vitals:  /70   Pulse 79   Temp 97.9 °F (36.6 °C) (Tympanic)   Wt 64.7 kg (142 lb 10.2 oz)   BMI 23.74 kg/m²   General appearance:  Well developed, well nourished    Eyes:  Extraocular motions intact, PERRL    Communication:  no hoarseness, no dysphonia    Ears:  Otoscopy of external auditory canals and tympanic membranes was normal, clinical speech reception thresholds grossly intact, no mass/lesion of  auricle.  Nose:  No masses/lesions of external nose, nasal mucosa, septum, and turbinates were within normal limits.  Mouth:  No mass/lesion of lips, teeth, gums, hard/soft palate, tongue, tonsils, or oropharynx. See endoscopic exam    Cardiovascular:  No pedal edema; Radial Pulses +2     Neck & Lymphatics:  No cervical lymphadenopathy, no neck mass/crepitus/ asymmetry, trachea is midline, no thyroid enlargement/tenderness/mass.    Psych: Oriented x3,  Alert with normal mood and affect.     Respiration/Chest:  Symmetric expansion during respiration, normal respiratory effort.    Skin:  Warm and intact. No ulcerations of face, scalp, neck.    Assessment & Plan:   Leticia was seen today for sinus problem, cough and other.    Diagnoses and all orders for this visit:    Rhinosinusitis    Non-seasonal allergic rhinitis, unspecified trigger    SINUSITIS/RHINITIS  Leticia presents today for an evaluation.  They have multiple sinonasal complaints and determination of the underlying etiology is a problem of moderate to high complexity.  Patients may present with sinonasal symptoms such as nasal obstruction as a primary anatomic disorder.  Patients may also present with recurrent or chronic inflammatory sinonasal symptoms.  Generally, patients can be stratified into one of several groups.      The first group represents patients who have frequent or recurrent upper respiratory infections, most frequently viral.  These patients most frequently have normally functioning immune system's but have high levels of exposure.  This is commonly seen in nurses and school teachers as well as other groups who spend large amounts of time around sick patients and children.      Other patients may have isolated rhinitis without evidence of sinusitis.  The majority of these patients have ALLERGIC rhinitis however other groups may have more rare forms of rhinitis such as nonallergic rhinitis with eosinophilia syndrome.  As a screening  evaluation, if the patient has had a normal endoscopy, from time to time a simple x-ray of the sinuses may be performed in combination with antigen specific ALLERGY testing.    The third group of patients present with significant evidence of chronic sinusitis.  Nasal endoscopy may reveal polyps or purulent drainage.  CT scan is an important aspect to determining the extent of disease.  Some patients with chronic sinusitis have an underlying etiology of ALLERGY leading to obstruction of the ostiomeatal units with furthering of the infection.  Others may have an acute infection that leads to stenosis of the sinonasal openings followed by a long, progressive course of infection.  Patients with unilateral disease who do not have inverting papilloma typically fall into this latter group.    CT scan of the sinuses has not been performed.      Antigen specific allergy testing  has been performed.    Allergy treatment with topical steroids and/or antihistamines has not been used with good compliance with symptoms unchanged.      By review of all data, history and exam, there does seem to be a clear distinction between allergic rhinitis versus rhinitis with a component of chronic sinusitis.   My impression is that Leticia presents with rhino sinusitis.      My recommendation is:    Levaquin and Prednisone  Astelin  Mucinex (regular only)    We discussed her medical conditions, treatments and plan.  Leticia should return to clinic if any issues arise (symptoms worsen or persist), otherwise we will see her back in the clinic In 2 weeks.    Thank you for allowing me to participate in the care of Leticia.    James Freeman MD   -----------------------------------------------------------------------------      Patient: Leticia Piña [id number], :1950  Procedure date:2019  Patient's medications, allergies, past medical, surgical, social and family histories were reviewed and updated as appropriate.  Chief Complaint:   Sinus Problem (bad over the last six weeks); Cough; and Other (c/o acid reflux)    HPI:  Leticia is a 69 y.o. female with the history of present illness as discussed in the clinic note from today.    Procedure: Risks, benefits, and alternatives of the procedure were discussed with the patient, and the patient consented to the nasal endoscopy.  The nasal cavity was sprayed with a topical decongestant and anesthetic (if needed). The endoscope was passed into each nostril and each nasal cavity was visualized.  On each side the nasal cavity, sinuses (if open), turbinates, and septum were examined with the findings described below. At the end of the examination, the scope was removed. The patient tolerated the procedure well with no complications.       Endoscopic Exam Findings:  -     Nasal secretions: thick glue-like mucous bilaterally  -     Nasal septum: no significant deviation and no perforation appreciated   -     Inferior turbinate: hypertrophy or edema (Moderate) bilaterally  -     Middle turbinate: hypertrophy or edema (Mild) bilaterally  -     Other findings: no polyps    -     Laryngeal mucosa is erythematous  -     Posterior commissure has no  hypertrophy  -     Lingual tonsils have no  hypertrophy  -     Adenoids have no  hypertrophy  -     Right vocal fold: normal mobility     mass/lesion: none  -     Left vocal fold: normal mobility     mass/lesion: none  -     Other findings: none    Assessment & Plan:  - see today's clinic note

## 2019-06-22 ENCOUNTER — PATIENT MESSAGE (OUTPATIENT)
Dept: FAMILY MEDICINE | Facility: CLINIC | Age: 69
End: 2019-06-22

## 2019-06-24 RX ORDER — LEVOTHYROXINE SODIUM 75 UG/1
75 TABLET ORAL DAILY
Qty: 90 TABLET | Refills: 3 | Status: SHIPPED | OUTPATIENT
Start: 2019-06-24 | End: 2020-08-16 | Stop reason: SDUPTHER

## 2019-06-27 ENCOUNTER — TELEPHONE (OUTPATIENT)
Dept: RHEUMATOLOGY | Facility: CLINIC | Age: 69
End: 2019-06-27

## 2019-06-27 NOTE — TELEPHONE ENCOUNTER
----- Message from Agatha Haro sent at 6/27/2019  3:24 PM CDT -----  Contact: [facility]  Requesting a call back regarding patient. She states that the patient will be transitioning from physical therapy to medical exercise program. She states that she sent over physician clearance on 06/20/19 and 06/24/19 and she has not received a response. Please call back at [phone]

## 2019-06-27 NOTE — TELEPHONE ENCOUNTER
Left a message for Holley Oliveira at [facility].  Physician clearance form for patient was signed by Dr. Alvarez and faxed to her today.

## 2019-06-28 ENCOUNTER — PATIENT MESSAGE (OUTPATIENT)
Dept: OTOLARYNGOLOGY | Facility: CLINIC | Age: 69
End: 2019-06-28

## 2019-07-01 ENCOUNTER — TELEPHONE (OUTPATIENT)
Dept: RHEUMATOLOGY | Facility: CLINIC | Age: 69
End: 2019-07-01

## 2019-07-02 ENCOUNTER — INFUSION (OUTPATIENT)
Dept: RHEUMATOLOGY | Facility: HOSPITAL | Age: 69
End: 2019-07-02
Attending: PHYSICIAN ASSISTANT
Payer: MEDICARE

## 2019-07-02 ENCOUNTER — OFFICE VISIT (OUTPATIENT)
Dept: RHEUMATOLOGY | Facility: CLINIC | Age: 69
End: 2019-07-02
Payer: MEDICARE

## 2019-07-02 VITALS — WEIGHT: 142 LBS | BODY MASS INDEX: 23.63 KG/M2

## 2019-07-02 VITALS
HEART RATE: 70 BPM | BODY MASS INDEX: 23.66 KG/M2 | SYSTOLIC BLOOD PRESSURE: 133 MMHG | WEIGHT: 142 LBS | DIASTOLIC BLOOD PRESSURE: 71 MMHG | HEIGHT: 65 IN

## 2019-07-02 DIAGNOSIS — M35.01 SJOGREN'S SYNDROME WITH KERATOCONJUNCTIVITIS SICCA: ICD-10-CM

## 2019-07-02 DIAGNOSIS — F41.8 MIXED ANXIETY AND DEPRESSIVE DISORDER: ICD-10-CM

## 2019-07-02 DIAGNOSIS — L93.2 DRUG-INDUCED LUPUS ERYTHEMATOSUS: ICD-10-CM

## 2019-07-02 DIAGNOSIS — M35.9 XEROSTOMIA DUE TO AUTOIMMUNE DISEASE: ICD-10-CM

## 2019-07-02 DIAGNOSIS — N18.30 CKD (CHRONIC KIDNEY DISEASE) STAGE 3, GFR 30-59 ML/MIN: ICD-10-CM

## 2019-07-02 DIAGNOSIS — M35.00 SJOGREN'S SYNDROME, WITH UNSPECIFIED ORGAN INVOLVEMENT: ICD-10-CM

## 2019-07-02 DIAGNOSIS — M05.79 RHEUMATOID ARTHRITIS INVOLVING MULTIPLE SITES WITH POSITIVE RHEUMATOID FACTOR: Primary | ICD-10-CM

## 2019-07-02 DIAGNOSIS — K11.7 XEROSTOMIA DUE TO AUTOIMMUNE DISEASE: ICD-10-CM

## 2019-07-02 DIAGNOSIS — M85.89 OSTEOPENIA OF MULTIPLE SITES: ICD-10-CM

## 2019-07-02 DIAGNOSIS — T50.905A DRUG-INDUCED LUPUS ERYTHEMATOSUS: ICD-10-CM

## 2019-07-02 DIAGNOSIS — F41.8 ANXIETY ASSOCIATED WITH DEPRESSION: ICD-10-CM

## 2019-07-02 DIAGNOSIS — D84.9 IMMUNOCOMPROMISED: ICD-10-CM

## 2019-07-02 PROCEDURE — 99213 OFFICE O/P EST LOW 20 MIN: CPT | Mod: PBBFAC,25 | Performed by: PHYSICIAN ASSISTANT

## 2019-07-02 PROCEDURE — 63600175 PHARM REV CODE 636 W HCPCS: Performed by: PHYSICIAN ASSISTANT

## 2019-07-02 PROCEDURE — 99999 PR PBB SHADOW E&M-EST. PATIENT-LVL III: ICD-10-PCS | Mod: PBBFAC,,, | Performed by: PHYSICIAN ASSISTANT

## 2019-07-02 PROCEDURE — 99214 PR OFFICE/OUTPT VISIT, EST, LEVL IV, 30-39 MIN: ICD-10-PCS | Mod: S$PBB,,, | Performed by: PHYSICIAN ASSISTANT

## 2019-07-02 PROCEDURE — 99999 PR PBB SHADOW E&M-EST. PATIENT-LVL III: CPT | Mod: PBBFAC,,, | Performed by: PHYSICIAN ASSISTANT

## 2019-07-02 PROCEDURE — 99214 OFFICE O/P EST MOD 30 MIN: CPT | Mod: S$PBB,,, | Performed by: PHYSICIAN ASSISTANT

## 2019-07-02 PROCEDURE — 96372 THER/PROPH/DIAG INJ SC/IM: CPT

## 2019-07-02 RX ORDER — DULOXETIN HYDROCHLORIDE 60 MG/1
60 CAPSULE, DELAYED RELEASE ORAL DAILY
Qty: 90 CAPSULE | Refills: 3 | Status: SHIPPED | OUTPATIENT
Start: 2019-07-02 | End: 2020-03-04 | Stop reason: SDUPTHER

## 2019-07-02 RX ORDER — HEPARIN 100 UNIT/ML
500 SYRINGE INTRAVENOUS
Status: CANCELLED | OUTPATIENT
Start: 2019-07-09

## 2019-07-02 RX ORDER — CERTOLIZUMAB PEGOL 200 MG/ML
400 INJECTION, SOLUTION SUBCUTANEOUS ONCE
Status: COMPLETED | OUTPATIENT
Start: 2019-07-02 | End: 2019-07-02

## 2019-07-02 RX ORDER — KETOROLAC TROMETHAMINE 30 MG/ML
30 INJECTION, SOLUTION INTRAMUSCULAR; INTRAVENOUS ONCE
Status: COMPLETED | OUTPATIENT
Start: 2019-07-02 | End: 2019-07-02

## 2019-07-02 RX ORDER — ACETAMINOPHEN 325 MG/1
650 TABLET ORAL ONCE AS NEEDED
Status: CANCELLED | OUTPATIENT
Start: 2019-07-09

## 2019-07-02 RX ORDER — BUPROPION HYDROCHLORIDE 150 MG/1
150 TABLET ORAL DAILY
Qty: 90 TABLET | Refills: 3 | Status: SHIPPED | OUTPATIENT
Start: 2019-07-02 | End: 2019-07-02

## 2019-07-02 RX ORDER — PILOCARPINE HYDROCHLORIDE 5 MG/1
5 TABLET, FILM COATED ORAL 4 TIMES DAILY
Qty: 360 TABLET | Refills: 3 | Status: SHIPPED | OUTPATIENT
Start: 2019-07-02 | End: 2019-09-16 | Stop reason: SDUPTHER

## 2019-07-02 RX ORDER — SODIUM CHLORIDE 0.9 % (FLUSH) 0.9 %
10 SYRINGE (ML) INJECTION
Status: CANCELLED | OUTPATIENT
Start: 2019-07-09

## 2019-07-02 RX ORDER — KETOROLAC TROMETHAMINE 30 MG/ML
30 INJECTION, SOLUTION INTRAMUSCULAR; INTRAVENOUS ONCE
Status: CANCELLED
Start: 2019-07-29

## 2019-07-02 RX ORDER — DIPHENHYDRAMINE HYDROCHLORIDE 50 MG/ML
12.5 INJECTION INTRAMUSCULAR; INTRAVENOUS ONCE AS NEEDED
Status: CANCELLED | OUTPATIENT
Start: 2019-07-09

## 2019-07-02 RX ORDER — KETOROLAC TROMETHAMINE 30 MG/ML
30 INJECTION, SOLUTION INTRAMUSCULAR; INTRAVENOUS ONCE
Status: CANCELLED
Start: 2019-07-02

## 2019-07-02 RX ORDER — BUPROPION HYDROCHLORIDE 300 MG/1
300 TABLET ORAL DAILY
Qty: 90 TABLET | Refills: 3 | Status: SHIPPED | OUTPATIENT
Start: 2019-07-02 | End: 2020-01-15

## 2019-07-02 RX ADMIN — KETOROLAC TROMETHAMINE 30 MG: 30 INJECTION, SOLUTION INTRAMUSCULAR at 04:07

## 2019-07-02 RX ADMIN — CERTOLIZUMAB PEGOL 400 MG: 200 INJECTION, SOLUTION SUBCUTANEOUS at 04:07

## 2019-07-02 ASSESSMENT — ROUTINE ASSESSMENT OF PATIENT INDEX DATA (RAPID3): MDHAQ FUNCTION SCORE: .6

## 2019-07-02 NOTE — PROGRESS NOTES
Subjective:       Patient ID: Leticia Piña is a 69 y.o. female.    Chief Complaint: Rheumatoid Arthritis      Leticia is here today for rheumatology followup.      She has chronic seropositive rheumatoid arthritis, Sjogren's syndrome, osteopenia, and Vitamin D deficiency.  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 Also failed enbrel this was IE.  For RA on cimzia 400 mg every 5-6 weeks, now weaned down and off mtx over the past year, still on Plaquenil 200 mg once daily.  Missed her last 2 cimzia injections.  Last injection done  4 weeks ago due again today     RA wise BL hand and foot x-rays done in 7/2017 and repeat 6/2019 showed no new erosions but right 5th mtp joint does have erosive changes which are chronic and x-rays were overall stable.      Last visit had persistent pain right 5th mtp pain- locally injected with steroid; this is better. No foot pain.   Today more generalized pain, ran out of Cymbalta 1 wk ago, having difficulty affording her meds, she is waiting to get pain  Some of her script are expensive. More generalized pain since stopping, difficulty concentrating    On Cymbalta 60 mg/d (off X 7 days), Wellbutrin 300 mg/d still taking   We sent her to aquatic therapy at [facility] which really was helping but missed her last couple weeks. This really was helping her romel hip bursitis and back pain.       For Sjogren's, she has +SSB, +SSA, +GIOVANNA 1:160 speckled in the past. She takes Evoxac 3-4X daily cost $160/month.  Now on salagen using good rx coupon cost is better. Also  Using otc products nasal gel, lozenges, drinking water. Seen by ophthalmology and prescribed plasma serum eye drops, which have worked great. No parotid tenderness or swelling.     Osteopenia diagnosed by Dexa. Last scan 8/2014. TF -1.0, FN -1.8, spine -0.2. Previously, we did trial of fosamax but had GI upset and stopped. No prednisone. Currently taking estradiol, progesterone, calcium by diet  "only, and vit D 2000 IU daily. Last Vitamin D level was elevated at 80 .repeat bone density 12/6/17 stable. No changes. No falls or fractures since last visit.     She is weaning off her hormones; reflux worsening, will see gi for evaluation          Low-back Pain   Associated symptoms include arthralgias and myalgias. Pertinent negatives include no abdominal pain, chest pain, chills, congestion, coughing, fatigue, fever, joint swelling, nausea, neck pain, rash, sore throat, vomiting or weakness.   Hip Pain   Associated symptoms include arthralgias and myalgias. Pertinent negatives include no abdominal pain, chest pain, chills, congestion, coughing, fatigue, fever, joint swelling, nausea, neck pain, rash, sore throat, vomiting or weakness.     Review of Systems   Constitutional: Negative.  Negative for activity change, appetite change, chills, fatigue and fever.   HENT: Negative for congestion, ear pain, mouth sores, rhinorrhea, sinus pressure, sore throat and trouble swallowing.         + dry mouth   Eyes: Negative.  Negative for photophobia, pain and redness.        No swollen or red eyes, + dry eye     Respiratory: Negative for cough, choking, chest tightness, shortness of breath, wheezing and stridor.    Cardiovascular: Negative.  Negative for chest pain.   Gastrointestinal: Negative.  Negative for abdominal pain, blood in stool, diarrhea, nausea and vomiting.   Genitourinary: Negative.  Negative for dysuria, frequency, hematuria and urgency.   Musculoskeletal: Positive for arthralgias and myalgias. Negative for back pain, gait problem, joint swelling, neck pain and neck stiffness.   Skin: Negative for color change, pallor and rash.   Neurological: Negative.  Negative for weakness.   Hematological: Negative for adenopathy.   Psychiatric/Behavioral: Negative for suicidal ideas.         Objective:     /71   Pulse 70   Ht 5' 5" (1.651 m)   Wt 64.4 kg (141 lb 15.6 oz)   BMI 23.63 kg/m²      Physical Exam "   Constitutional: She is oriented to person, place, and time and well-developed, well-nourished, and in no distress. No distress.   HENT:   Head: Normocephalic and atraumatic.   Right Ear: External ear normal.   Left Ear: External ear normal.   Mouth/Throat: Mucous membranes are not pale, dry and not cyanotic. She does not have dentures. No oral lesions. No oropharyngeal exudate.   Eyes: Conjunctivae and EOM are normal. Pupils are equal, round, and reactive to light. Right conjunctiva is not injected. Right conjunctiva has no hemorrhage. Left conjunctiva is not injected. Left conjunctiva has no hemorrhage. No scleral icterus.       Neck: Normal range of motion. Neck supple. No thyromegaly present.   Cardiovascular: Normal rate, regular rhythm and normal heart sounds.    No murmur heard.  Pulmonary/Chest: Effort normal and breath sounds normal. She exhibits no tenderness.   Abdominal: Soft. Bowel sounds are normal.       Right Side Rheumatological Exam     Examination finds the shoulder, elbow, wrist, knee, 1st PIP, 1st MCP, 2nd PIP, 2nd MCP, 3rd PIP, 3rd MCP, 4th PIP, 4th MCP, 5th PIP and 5th MCP normal.    The patient is tender to palpation of the 5th MTP    She has swelling of the 5th MTP    Left Side Rheumatological Exam     Examination finds the shoulder, elbow, wrist, 1st PIP, 1st MCP, 2nd PIP, 2nd MCP, 3rd PIP, 3rd MCP, 4th PIP, 4th MCP, 5th PIP and 5th MCP normal.    The patient is tender to palpation of the knee.    Joint Exam Comments   Knee: Lateral side of knee at lateral collateral ligament  Some ttp noted            Lymphadenopathy:     She has no cervical adenopathy.   Neurological: She is alert and oriented to person, place, and time. She displays normal reflexes. No cranial nerve deficit. She exhibits normal muscle tone. Gait normal.   Skin: Skin is warm and dry. No rash noted. No erythema.     Musculoskeletal: She exhibits tenderness and deformity. She exhibits no edema.   DIP enlargement BL hands,  no synovitis, warmth.   -  metatarsal squeeze romel    No Swelling or tenderness right 5th mtp joint  ttp romel hip bursa; lower back, upper arms, neck  Thighs etc- generalized tenderness     No synovitis     No weakness on exam, muscle strenght 5/5 upper and lower ext              Results for orders placed or performed in visit on 05/28/19   Sedimentation rate, manual   Result Value Ref Range    Sed Rate 12 0 - 36 mm/Hr   C-reactive protein   Result Value Ref Range    CRP 2.7 0.0 - 8.2 mg/L   Comprehensive metabolic panel   Result Value Ref Range    Sodium 141 136 - 145 mmol/L    Potassium 3.7 3.5 - 5.1 mmol/L    Chloride 98 95 - 110 mmol/L    CO2 33 (H) 23 - 29 mmol/L    Glucose 100 70 - 110 mg/dL    BUN, Bld 20 8 - 23 mg/dL    Creatinine 0.9 0.5 - 1.4 mg/dL    Calcium 9.8 8.7 - 10.5 mg/dL    Total Protein 7.4 6.0 - 8.4 g/dL    Albumin 3.7 3.5 - 5.2 g/dL    Total Bilirubin 0.3 0.1 - 1.0 mg/dL    Alkaline Phosphatase 55 55 - 135 U/L    AST 18 10 - 40 U/L    ALT 18 10 - 44 U/L    Anion Gap 10 8 - 16 mmol/L    eGFR if African American >60 >60 mL/min/1.73 m^2    eGFR if non African American >60 >60 mL/min/1.73 m^2   CBC auto differential   Result Value Ref Range    WBC 5.33 3.90 - 12.70 K/uL    RBC 4.12 4.00 - 5.40 M/uL    Hemoglobin 11.2 (L) 12.0 - 16.0 g/dL    Hematocrit 35.9 (L) 37.0 - 48.5 %    Mean Corpuscular Volume 87 82 - 98 fL    Mean Corpuscular Hemoglobin 27.2 27.0 - 31.0 pg    Mean Corpuscular Hemoglobin Conc 31.2 (L) 32.0 - 36.0 g/dL    RDW 13.8 11.5 - 14.5 %    Platelets 312 150 - 350 K/uL    MPV 8.9 (L) 9.2 - 12.9 fL    Immature Granulocytes 0.2 0.0 - 0.5 %    Gran # (ANC) 3.0 1.8 - 7.7 K/uL    Immature Grans (Abs) 0.01 0.00 - 0.04 K/uL    Lymph # 1.4 1.0 - 4.8 K/uL    Mono # 0.6 0.3 - 1.0 K/uL    Eos # 0.3 0.0 - 0.5 K/uL    Baso # 0.06 0.00 - 0.20 K/uL    nRBC 0 0 /100 WBC    Gran% 56.9 38.0 - 73.0 %    Lymph% 26.1 18.0 - 48.0 %    Mono% 10.3 4.0 - 15.0 %    Eosinophil% 5.6 0.0 - 8.0 %    Basophil% 1.1 0.0  - 1.9 %    Differential Method Automated    CBC auto differential   Result Value Ref Range    WBC 5.33 3.90 - 12.70 K/uL    RBC 4.12 4.00 - 5.40 M/uL    Hemoglobin 11.2 (L) 12.0 - 16.0 g/dL    Hematocrit 35.9 (L) 37.0 - 48.5 %    Mean Corpuscular Volume 87 82 - 98 fL    Mean Corpuscular Hemoglobin 27.2 27.0 - 31.0 pg    Mean Corpuscular Hemoglobin Conc 31.2 (L) 32.0 - 36.0 g/dL    RDW 13.8 11.5 - 14.5 %    Platelets 312 150 - 350 K/uL    MPV 8.9 (L) 9.2 - 12.9 fL    Immature Granulocytes 0.2 0.0 - 0.5 %    Gran # (ANC) 3.0 1.8 - 7.7 K/uL    Immature Grans (Abs) 0.01 0.00 - 0.04 K/uL    Lymph # 1.4 1.0 - 4.8 K/uL    Mono # 0.6 0.3 - 1.0 K/uL    Eos # 0.3 0.0 - 0.5 K/uL    Baso # 0.06 0.00 - 0.20 K/uL    nRBC 0 0 /100 WBC    Gran% 56.9 38.0 - 73.0 %    Lymph% 26.1 18.0 - 48.0 %    Mono% 10.3 4.0 - 15.0 %    Eosinophil% 5.6 0.0 - 8.0 %    Basophil% 1.1 0.0 - 1.9 %    Differential Method Automated    Comprehensive metabolic panel   Result Value Ref Range    Sodium 141 136 - 145 mmol/L    Potassium 3.7 3.5 - 5.1 mmol/L    Chloride 98 95 - 110 mmol/L    CO2 33 (H) 23 - 29 mmol/L    Glucose 100 70 - 110 mg/dL    BUN, Bld 20 8 - 23 mg/dL    Creatinine 0.9 0.5 - 1.4 mg/dL    Calcium 9.8 8.7 - 10.5 mg/dL    Total Protein 7.4 6.0 - 8.4 g/dL    Albumin 3.7 3.5 - 5.2 g/dL    Total Bilirubin 0.3 0.1 - 1.0 mg/dL    Alkaline Phosphatase 55 55 - 135 U/L    AST 18 10 - 40 U/L    ALT 18 10 - 44 U/L    Anion Gap 10 8 - 16 mmol/L    eGFR if African American >60 >60 mL/min/1.73 m^2    eGFR if non African American >60 >60 mL/min/1.73 m^2   Lipid panel   Result Value Ref Range    Cholesterol 179 120 - 199 mg/dL    Triglycerides 114 30 - 150 mg/dL    HDL 62 40 - 75 mg/dL    LDL Cholesterol 94.2 63.0 - 159.0 mg/dL    Hdl/Cholesterol Ratio 34.6 20.0 - 50.0 %    Total Cholesterol/HDL Ratio 2.9 2.0 - 5.0    Non-HDL Cholesterol 117 mg/dL   TSH   Result Value Ref Range    TSH 0.833 0.400 - 4.000 uIU/mL   Vitamin D   Result Value Ref Range    Vit  D, 25-Hydroxy 80 30 - 96 ng/mL     6/4/19   Foot X-ray -FINDINGS:  Allowing for positioning and technique there has been no interval development of new focal erosion or periosteal reaction.  Stable multi articular degenerative changes present throughout the feet bilaterally.  Left and borderline right pes planus.  Stable erosions on the right involving the 1st and 5th metatarsal heads with slight increased smooth soft tissue prominence along the lateral margin of the right 5th MTP joint.  Similar erosions on the left involving the 5th MTP with stable appearance the erosions and soft tissue prominence.  No acute fracture or dislocation.  No new soft tissue calcification or radiopaque foreign body.    BL Hand -FINDINGS:  Stable exam without acute fracture or subluxation noted.  No new or developing focal erosion or periosteal reaction.  Multi articular degenerative change noted in the wrist and hand bilaterally, greater on the left.        X-ray BL hand and foot 5/2016 and 7/2017 : no new erosions - stable    12/2017 DEXA  Total femur mean 0.855 g/cm² with a T score -1.2  Left femur neck 0.738 g/cm² T score -2.2  L1-L4 spine 1.152 g/cm² with a T score -0.2  Osteopenia with falling bone density at the left femur neck moderate to high fracture risk      12/6/15 DEXA  Total femur mean 0.860 g/cm² with a T score -1.2  Left femur neck 0.738 g/cm² T score -2.2  L1-L4 spine 1.152 g/cm² with a T score -0.2  Osteopenia with falling bone density at the left femur neck moderate to high fracture risk    8/6/14 DEXA  Total femur mean 0.880 g/cm² with a T score -1.0  Left femur neck 0.789 g/cm² with a T score -1.8  Spine L1-L4 1.153 g/cm² with a T score -0.2  Osteopenia with low to moderate risk of fracture          Immunization History   Administered Date(s) Administered    Hepatitis A, Adult 06/05/2019    Influenza - High Dose 11/07/2007, 09/30/2008, 09/23/2009, 10/27/2010, 10/31/2011, 10/05/2015, 10/12/2018    Influenza -  Quadrivalent 11/13/2014, 10/27/2016    Influenza - Trivalent - Adjuvanted 10/23/2017    PPD Test 03/28/2011    Pneumococcal Conjugate 12/10/2013    Pneumococcal Conjugate - 13 Valent 12/10/2013    Pneumococcal Polysaccharide - 23 Valent 11/07/2007, 03/23/2017    Tdap 09/15/2010, 04/10/2013    Zoster 04/10/2013         Assessment:       1. Rheumatoid arthritis involving multiple sites with positive rheumatoid factor    2. Osteopenia of multiple sites    3. CKD (chronic kidney disease) stage 3, GFR 30-59 ml/min    4. Drug-induced lupus erythematosus    5. Sjogren's syndrome, with unspecified organ involvement    6. Immunocompromised        1. RA well controlled on -       Plaquenil 200 mg twice daily and  Cimzia 400 mg was Q 4-5  weeks-   Today she has 0 tender/0 swollen joints. MARGARITA 7,  CDAI 0    hand and foot x-ray up to date 6/2019  stable no changes     In the past failed mtx monotherapy  Failed humira and enbrel    2. Sjogrens - dryness of eyes and mouth. On evoxac 3-4 X daily and plasma serum eye drops prescribed by ophthalmologist.   evoxac poorly covered by insurance cost $160/month- now on salagen doing fair, lower cost    3. 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 Osteopenia - DEXA stable at both FN, femur mean and spine- mod-high rask- will follow  Currently taking estradiol, progesterone, calcium by diet only, and vit D 2000 international units daily.   In the past failed a trial of oral bisphosphonate secondary GI intolerance    5. Vaccination status: up to date including flu vaccine     6. Medication Monitoring- no current issues, no evidence of toxicity    7. Immunocompromised no issues with recurrent infections    8. Fibromyalgia with ongoing fatigue, sleep disturbances, myofascial pain ongoing depression- out of  Cymbalta, on Wellbutrin, some help with aquatic therapy     9.  R 5th MTP - pain/swelling- resolved post IA injection 4 weeks ago       Plan:        Regarding RA meds  Stay off  mtx   C/w Plaquenil 200 mg daily. This  will decrease long term risk of eye issues     C/w cimizia CAM liopholised 400 mg Q 4-5 weeks - try to stay more consistent with her cimzia injections  toradol 30 mg IM today and add prn in her cimzia therapy plan  Can get when she comes in for cimzia if she needs it for pain relief    Repeat her bilateral hand and foot x-rays in 1 year last done 6/2019       For fibromyalgia and romel hip bursitis   Completed- PT, try to c/w exercise at home   Epsom warm bath soaks  C/w  wellbutrin to 300 mg Q daily  C/w cymbalta 60 mg/d, good Rx coupon is lower than cost with insurance  consider gabapentin in future     C/w salagen 5 mg 3-4 X daily, good Rx coupon  Can also try OTC Xylimelts or Thera Breath lozenges for dry mouth.  Nasogel  Cool mist humidier  Nighttime eye ointment, use sleep mask      C/w eye doc, yearly eye checks for plaquenil and c/w her plasma serum eye drops.       Dexa  Scan again .  2-3 years. Repeat dexa every 2 years.   At next dexa if drops any will need to treat  For now c/w cut back on her vit D to no higher than 1000 IU daily; d level is 80 and calcium in diet,       Use Voltaren gel Right 5th mtp  and iburpfen prn not daily   See gi for her reflux issues       rtc 4 and 8 weeks cimzia apt  rtc 12 weeks OV/ giovanni for cimzia- no labs -  Toxicity Labs every 3-4 months     Tb gold and hep panel later this year     Call with any questions, changes or concerns.                  copy Pasha Estrada study

## 2019-07-03 ENCOUNTER — OFFICE VISIT (OUTPATIENT)
Dept: OTOLARYNGOLOGY | Facility: CLINIC | Age: 69
End: 2019-07-03
Payer: MEDICARE

## 2019-07-03 ENCOUNTER — PATIENT MESSAGE (OUTPATIENT)
Dept: RHEUMATOLOGY | Facility: CLINIC | Age: 69
End: 2019-07-03

## 2019-07-03 VITALS
BODY MASS INDEX: 23.91 KG/M2 | HEIGHT: 65 IN | HEART RATE: 72 BPM | SYSTOLIC BLOOD PRESSURE: 155 MMHG | TEMPERATURE: 98 F | DIASTOLIC BLOOD PRESSURE: 84 MMHG | WEIGHT: 143.5 LBS

## 2019-07-03 DIAGNOSIS — J32.9 CHRONIC SINUSITIS, UNSPECIFIED LOCATION: Primary | ICD-10-CM

## 2019-07-03 PROCEDURE — 99213 PR OFFICE/OUTPT VISIT, EST, LEVL III, 20-29 MIN: ICD-10-PCS | Mod: S$PBB,,, | Performed by: PHYSICIAN ASSISTANT

## 2019-07-03 PROCEDURE — 99999 PR PBB SHADOW E&M-EST. PATIENT-LVL IV: ICD-10-PCS | Mod: PBBFAC,,, | Performed by: PHYSICIAN ASSISTANT

## 2019-07-03 PROCEDURE — 99213 OFFICE O/P EST LOW 20 MIN: CPT | Mod: S$PBB,,, | Performed by: PHYSICIAN ASSISTANT

## 2019-07-03 PROCEDURE — 99999 PR PBB SHADOW E&M-EST. PATIENT-LVL IV: CPT | Mod: PBBFAC,,, | Performed by: PHYSICIAN ASSISTANT

## 2019-07-03 PROCEDURE — 99214 OFFICE O/P EST MOD 30 MIN: CPT | Mod: PBBFAC | Performed by: PHYSICIAN ASSISTANT

## 2019-07-03 RX ORDER — FLUTICASONE PROPIONATE 50 MCG
2 SPRAY, SUSPENSION (ML) NASAL 2 TIMES DAILY
Qty: 9.9 ML | Refills: 11 | Status: SHIPPED | OUTPATIENT
Start: 2019-07-03 | End: 2019-07-03 | Stop reason: SDUPTHER

## 2019-07-03 RX ORDER — LEVOCETIRIZINE DIHYDROCHLORIDE 5 MG/1
5 TABLET, FILM COATED ORAL NIGHTLY
Qty: 30 TABLET | Refills: 11 | Status: SHIPPED | OUTPATIENT
Start: 2019-07-03 | End: 2019-07-03 | Stop reason: SDUPTHER

## 2019-07-03 RX ORDER — LEVOCETIRIZINE DIHYDROCHLORIDE 5 MG/1
5 TABLET, FILM COATED ORAL NIGHTLY
Qty: 30 TABLET | Refills: 11 | OUTPATIENT
Start: 2019-07-03 | End: 2019-09-18 | Stop reason: SDUPTHER

## 2019-07-03 RX ORDER — FLUTICASONE PROPIONATE 50 MCG
2 SPRAY, SUSPENSION (ML) NASAL 2 TIMES DAILY
Qty: 9.9 ML | Refills: 11 | OUTPATIENT
Start: 2019-07-03 | End: 2019-08-28 | Stop reason: SDUPTHER

## 2019-07-03 NOTE — PROGRESS NOTES
Subjective:   Patient: Leticia Piña [id number], :1950   Visit date:7/3/2019 9:26 AM    Chief Complaint:  Sinus Problem    HPI:  Leticia is a 69 y.o. female who is here for follow-up. She was last here on 19 for sinusitis tx with Levaquin x 10 days, Medrol, and Astelin. She rtc on 19 and reported little to no relief. She is tearful today. Stated she continues to have significant head pressure. Continues to have mucus draining from her nose and a post-nasal drip, this is discolored, yellow/green. No cough. No fevers. C/o persistent and severe HA that is not relieved w/ otc NSAID's. She continues to use Astelin. Had allergy testing in her 30's which was insignificant at the time. She has never smoked.     Hx of Sjogren's Syndrome      Review of Systems:  -     Allergic/Immunologic: is allergic to erythromycin; humira  [adalimumab]; and sulfa (sulfonamide antibiotics)..  -     Constitutional: Current temp: 97.5 °F (36.4 °C) (Tympanic)    Her meds, allergies, medical, surgical, social & family histories were reviewed & updated:  -     She has a current medication list which includes the following prescription(s): azelastine, bupropion, cholecalciferol (vitamin d3), cyanocobalamin (vitamin b-12), doxycycline, duloxetine, folic acid, hydrochlorothiazide, hydroxychloroquine, levothyroxine, lorazepam, losartan, omeprazole, pilocarpine, trazodone, fluticasone propionate, and levocetirizine, and the following Facility-Administered Medications: cimzia.  -     She  has a past medical history of Allergic rhinitis, Cutaneous lupus erythematosus, Essential hypertension (2019), GERD (gastroesophageal reflux disease), Hypothyroid, Insomnia, Mixed anxiety and depressive disorder, Normal cardiac stress test, Rheumatoid arthritis(714.0), and Sjogren's syndrome.   -     She does not have any pertinent problems on file.   -     She  has a past surgical history that includes breast implants; breast surgery for  "rupture; TONSILLECTOMY, ADENOIDECTOMY; Tubal ligation; and Augmentation of breast.  -     She  reports that she has never smoked. She has never used smokeless tobacco. She reports that she drinks alcohol. She reports that she does not use drugs.  -     Her family history is not on file.  -     She is allergic to erythromycin; humira  [adalimumab]; and sulfa (sulfonamide antibiotics).    Objective:     Physical Exam:  Vitals:  BP (!) 155/84 (BP Location: Right arm, Patient Position: Sitting, BP Method: Small (Automatic))   Pulse 72   Temp 97.5 °F (36.4 °C) (Tympanic)   Ht 5' 5" (1.651 m)   Wt 65.1 kg (143 lb 8.3 oz)   BMI 23.88 kg/m²   Appearance:  Well-developed, well-nourished.  Communication:  Able to communicate, no hoarseness.  Head & Face:  Normocephalic, atraumatic, no sinus tenderness, normal facial strength.  Eyes:  Extraocular motions intact.  Ears:  Otoscopy of external auditory canals and tympanic membranes was normal, clinical speech reception thresholds grossly intact, no mass/lesion of auricle.  Nose:  No masses/lesions of external nose, nasal mucosa, septum, and turbinates were within normal limits.  Mouth:  No mass/lesion of lips, teeth, gums, hard/soft palate, tongue, tonsils, or oropharynx.  Neck & Lymphatics:  No cervical lymphadenopathy, no neck mass/crepitus/ asymmetry, trachea is midline, no thyroid enlargement/tenderness/mass.  Neuro/Psych: Alert with normal mood and affect.   Abdominal: Normal appearance.   Respiration/Chest:  Symmetric expansion during respiration, normal respiratory effort.  Skin:  Warm and intact  Cardiovascular:  No peripheral vascular edema or varicosities.    Assessment & Plan:   Leticia was seen today for sinus problem.    Diagnoses and all orders for this visit:    Chronic sinusitis, unspecified location  -     CT eTask.ittronic Sinuses without; Future    Other orders  -     Discontinue: levocetirizine (XYZAL) 5 MG tablet; Take 1 tablet (5 mg total) by mouth every " evening.  -     Discontinue: fluticasone propionate (FLONASE) 50 mcg/actuation nasal spray; 2 sprays (100 mcg total) by Each Nare route 2 (two) times daily.  -     fluticasone propionate (FLONASE) 50 mcg/actuation nasal spray; 2 sprays (100 mcg total) by Each Nare route 2 (two) times daily.  -     levocetirizine (XYZAL) 5 MG tablet; Take 1 tablet (5 mg total) by mouth every evening.    Will obtain CT of sinuses and contact pt with further tx recommendations.   Continue Astelin, advised Xyzal QHS and Flonase BID. Printed Rx's per pt's wishes.         Agatha Guerrero PA-C  [facility]   [facility]  [address]  [place] [address]  P: [phone]  F: [phone]

## 2019-07-08 ENCOUNTER — PATIENT MESSAGE (OUTPATIENT)
Dept: FAMILY MEDICINE | Facility: CLINIC | Age: 69
End: 2019-07-08

## 2019-07-08 RX ORDER — OMEPRAZOLE 20 MG/1
20 CAPSULE, DELAYED RELEASE ORAL DAILY
Qty: 90 CAPSULE | Refills: 3 | Status: SHIPPED | OUTPATIENT
Start: 2019-07-08 | End: 2020-06-08

## 2019-07-09 ENCOUNTER — PATIENT MESSAGE (OUTPATIENT)
Dept: FAMILY MEDICINE | Facility: CLINIC | Age: 69
End: 2019-07-09

## 2019-07-10 ENCOUNTER — PATIENT MESSAGE (OUTPATIENT)
Dept: RHEUMATOLOGY | Facility: CLINIC | Age: 69
End: 2019-07-10

## 2019-07-10 ENCOUNTER — PATIENT MESSAGE (OUTPATIENT)
Dept: OTOLARYNGOLOGY | Facility: CLINIC | Age: 69
End: 2019-07-10

## 2019-07-10 RX ORDER — GUAIFENESIN 1200 MG/1
1200 TABLET, EXTENDED RELEASE ORAL 2 TIMES DAILY PRN
Qty: 60 TABLET | Refills: 0 | Status: SHIPPED | OUTPATIENT
Start: 2019-07-10 | End: 2019-08-09

## 2019-07-11 ENCOUNTER — PATIENT MESSAGE (OUTPATIENT)
Dept: RHEUMATOLOGY | Facility: CLINIC | Age: 69
End: 2019-07-11

## 2019-07-15 ENCOUNTER — PATIENT MESSAGE (OUTPATIENT)
Dept: RHEUMATOLOGY | Facility: CLINIC | Age: 69
End: 2019-07-15

## 2019-07-15 DIAGNOSIS — M05.79 RHEUMATOID ARTHRITIS INVOLVING MULTIPLE SITES WITH POSITIVE RHEUMATOID FACTOR: Primary | ICD-10-CM

## 2019-07-25 ENCOUNTER — PATIENT MESSAGE (OUTPATIENT)
Dept: FAMILY MEDICINE | Facility: CLINIC | Age: 69
End: 2019-07-25

## 2019-07-25 ENCOUNTER — PATIENT MESSAGE (OUTPATIENT)
Dept: PSYCHIATRY | Facility: CLINIC | Age: 69
End: 2019-07-25

## 2019-07-25 DIAGNOSIS — F41.8 MIXED ANXIETY AND DEPRESSIVE DISORDER: ICD-10-CM

## 2019-07-25 RX ORDER — LORAZEPAM 1 MG/1
1 TABLET ORAL EVERY 6 HOURS PRN
Qty: 60 TABLET | Refills: 0 | OUTPATIENT
Start: 2019-07-25

## 2019-07-30 ENCOUNTER — INFUSION (OUTPATIENT)
Dept: RHEUMATOLOGY | Facility: HOSPITAL | Age: 69
End: 2019-07-30
Attending: PHYSICIAN ASSISTANT
Payer: MEDICARE

## 2019-07-30 VITALS
HEART RATE: 79 BPM | OXYGEN SATURATION: 97 % | TEMPERATURE: 98 F | SYSTOLIC BLOOD PRESSURE: 122 MMHG | DIASTOLIC BLOOD PRESSURE: 71 MMHG | WEIGHT: 143.94 LBS | BODY MASS INDEX: 23.96 KG/M2 | RESPIRATION RATE: 18 BRPM

## 2019-07-30 DIAGNOSIS — M05.79 RHEUMATOID ARTHRITIS INVOLVING MULTIPLE SITES WITH POSITIVE RHEUMATOID FACTOR: Primary | ICD-10-CM

## 2019-07-30 PROCEDURE — 96372 THER/PROPH/DIAG INJ SC/IM: CPT

## 2019-07-30 PROCEDURE — 63600175 PHARM REV CODE 636 W HCPCS: Mod: JG | Performed by: PHYSICIAN ASSISTANT

## 2019-07-30 RX ORDER — DIPHENHYDRAMINE HYDROCHLORIDE 50 MG/ML
12.5 INJECTION INTRAMUSCULAR; INTRAVENOUS ONCE AS NEEDED
Status: CANCELLED | OUTPATIENT
Start: 2019-08-20

## 2019-07-30 RX ORDER — CERTOLIZUMAB PEGOL 200 MG/ML
400 INJECTION, SOLUTION SUBCUTANEOUS ONCE
Status: COMPLETED | OUTPATIENT
Start: 2019-07-30 | End: 2019-07-30

## 2019-07-30 RX ORDER — KETOROLAC TROMETHAMINE 30 MG/ML
30 INJECTION, SOLUTION INTRAMUSCULAR; INTRAVENOUS ONCE
Status: CANCELLED
Start: 2019-08-20

## 2019-07-30 RX ORDER — HEPARIN 100 UNIT/ML
500 SYRINGE INTRAVENOUS
Status: CANCELLED | OUTPATIENT
Start: 2019-08-20

## 2019-07-30 RX ORDER — SODIUM CHLORIDE 0.9 % (FLUSH) 0.9 %
10 SYRINGE (ML) INJECTION
Status: CANCELLED | OUTPATIENT
Start: 2019-08-20

## 2019-07-30 RX ORDER — ACETAMINOPHEN 325 MG/1
650 TABLET ORAL ONCE AS NEEDED
Status: CANCELLED | OUTPATIENT
Start: 2019-08-20

## 2019-07-30 RX ADMIN — CERTOLIZUMAB PEGOL 400 MG: 200 INJECTION, SOLUTION SUBCUTANEOUS at 03:07

## 2019-07-30 NOTE — NURSING
Cimzia 400 mg q 4 weeks  Last dose-7/2/19     Any:  -recent illness, infection, or antibiotic use in past week- denies  -open wounds or mouth sores- denies  -invasive procedures or surgeries in past 4 weeks or in upcoming 4 weeks- denies  -vaccinations in past week- denies        Recent labs? 5/28/19  Last Rheumatology provider visit- Seen by AMADOR Gillette on 7/2/19     Premeds? None     Cimzia 200 mg/1 ml administered SQ to right posterior upper arm and Cimzia 200 mg/1 ml administered SQ to left posterior upper arm per orders; see MAR and vitals for more details.. No redness, swelling, or drainage noted to site. Instructed to remain in clinic 15 minutes after administration to monitor for any s/sx of reaction. Pt instructed on signs and symptoms of reaction to report. Verbalizes understanding.

## 2019-08-26 ENCOUNTER — TELEPHONE (OUTPATIENT)
Dept: RHEUMATOLOGY | Facility: HOSPITAL | Age: 69
End: 2019-08-26

## 2019-08-26 NOTE — TELEPHONE ENCOUNTER
Called pt regarding missing visit for cimzia. Pt's mail box was full and unable to leave a message. Will try again tomorrow to reschedule appt.

## 2019-08-27 ENCOUNTER — PATIENT MESSAGE (OUTPATIENT)
Dept: FAMILY MEDICINE | Facility: CLINIC | Age: 69
End: 2019-08-27

## 2019-08-27 RX ORDER — AMOXICILLIN AND CLAVULANATE POTASSIUM 875; 125 MG/1; MG/1
1 TABLET, FILM COATED ORAL 2 TIMES DAILY
Qty: 20 TABLET | Refills: 0 | Status: SHIPPED | OUTPATIENT
Start: 2019-08-27 | End: 2019-10-15 | Stop reason: SDUPTHER

## 2019-08-28 ENCOUNTER — TELEPHONE (OUTPATIENT)
Dept: RHEUMATOLOGY | Facility: HOSPITAL | Age: 69
End: 2019-08-28

## 2019-08-28 RX ORDER — FLUTICASONE PROPIONATE 50 MCG
2 SPRAY, SUSPENSION (ML) NASAL 2 TIMES DAILY
Qty: 9.9 ML | Refills: 11 | Status: SHIPPED | OUTPATIENT
Start: 2019-08-28 | End: 2019-08-30 | Stop reason: SDUPTHER

## 2019-08-28 NOTE — TELEPHONE ENCOUNTER
Called patient about cimzia appt today; noticed she has sinus infection and is currently on antibiotics; offered to reschedule patient but she prefers to cancel and follow up with Leta at next scheduled appt on 9.24.19.

## 2019-08-30 RX ORDER — FLUTICASONE PROPIONATE 50 MCG
2 SPRAY, SUSPENSION (ML) NASAL 2 TIMES DAILY
Qty: 18.2 ML | Refills: 11 | Status: SHIPPED | OUTPATIENT
Start: 2019-08-30 | End: 2019-09-22 | Stop reason: SDUPTHER

## 2019-09-04 ENCOUNTER — OFFICE VISIT (OUTPATIENT)
Dept: PSYCHIATRY | Facility: CLINIC | Age: 69
End: 2019-09-04
Payer: MEDICARE

## 2019-09-04 VITALS
DIASTOLIC BLOOD PRESSURE: 82 MMHG | SYSTOLIC BLOOD PRESSURE: 156 MMHG | BODY MASS INDEX: 24.25 KG/M2 | WEIGHT: 145.75 LBS | HEART RATE: 81 BPM

## 2019-09-04 DIAGNOSIS — F33.0 MILD EPISODE OF RECURRENT MAJOR DEPRESSIVE DISORDER: Primary | ICD-10-CM

## 2019-09-04 DIAGNOSIS — F41.8 MIXED ANXIETY AND DEPRESSIVE DISORDER: ICD-10-CM

## 2019-09-04 DIAGNOSIS — F41.8 ANXIETY ASSOCIATED WITH DEPRESSION: ICD-10-CM

## 2019-09-04 PROCEDURE — 99214 OFFICE O/P EST MOD 30 MIN: CPT | Mod: S$PBB,,, | Performed by: PSYCHIATRY & NEUROLOGY

## 2019-09-04 PROCEDURE — 99999 PR PBB SHADOW E&M-EST. PATIENT-LVL II: ICD-10-PCS | Mod: PBBFAC,,, | Performed by: PSYCHIATRY & NEUROLOGY

## 2019-09-04 PROCEDURE — 99212 OFFICE O/P EST SF 10 MIN: CPT | Mod: PBBFAC | Performed by: PSYCHIATRY & NEUROLOGY

## 2019-09-04 PROCEDURE — 99999 PR PBB SHADOW E&M-EST. PATIENT-LVL II: CPT | Mod: PBBFAC,,, | Performed by: PSYCHIATRY & NEUROLOGY

## 2019-09-04 PROCEDURE — 99214 PR OFFICE/OUTPT VISIT, EST, LEVL IV, 30-39 MIN: ICD-10-PCS | Mod: S$PBB,,, | Performed by: PSYCHIATRY & NEUROLOGY

## 2019-09-04 RX ORDER — LORAZEPAM 1 MG/1
1 TABLET ORAL DAILY PRN
Qty: 30 TABLET | Refills: 5 | Status: SHIPPED | OUTPATIENT
Start: 2019-09-04 | End: 2019-12-18 | Stop reason: SDUPTHER

## 2019-09-04 NOTE — PROGRESS NOTES
Outpatient Psychiatry Follow-up Visit (MD/NP)    9/4/2019    Leticia Piña, a 69 y.o. female, presenting for follow-up visit. Met with patient.    Reason for Encounter: Patient complains of anxiety, depression    Interval Hx: Patient seen and interviewed for follow-up, last seen about 14 months ago. Reports symptoms are ongoing, generally well-managed with current treatment. Active and functioning ok with treatment. Working as . Having  sleep trouble - fibromyalgia dx'ed since last visit. Sees Dr. Alvarez & his NP. Does massage, water therapy.   Trazodone at bedtime. Health is generally good. No new meds. Duloxetine has gone up in dose. Moods are mostly ok. A little anxious. Occasional half an ativan at night-time. No side effects. wellbutrin xl ok.     Background: 68 year old F with hx of anxiety and depression x ~20 years, with onset roughly corresponding to menopause in 1997. Describes following illness and treatment course:     Perimenopause - 1997 - insomnia roblems ; start ambien.   August 2002 - emotional distress  Can't accomplish anything (decreased functioning), feeling overwhelmed, going around in circles.   September '02 - first treatment - citalopram 20 mg. Thought it it was causing dry mouth (later dx'ed sjogrens). Feels helped symptoms present all her life that she later recognized as depression. Reduced citalopram.   November '03- more perimenopausal symptoms. Started muliti-hormonal treatment for menopausal symptoms. Had temporary benefit, stopped in '04 due to negligible benefit by that time. Remained on estradiol  Eventually went back to 20 mg citalopram.   Stress and fatigue in May '06. More dryness. Early sjogren's. Stopped citalopram, tried duloxetine.   2.07-stopped menses, 3.07 - back to progest/test, off est.   Ativan helped anxiety, caused lethargy.   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 Insomnia ongoing  "despite ambien.   Increased ambien to 15.   In '09 added trazodone 75 mg - helped in combo with ambien  2012 - cymbalta to 90 mg.   '13 - added wellbutrin 100 mg' diagnosed with sjogren's.   '15 - wellbutrin 150, trazodone 100 mg. Stopped ambien  Had a dip in moods in early summer.   Taking 225 mg bupropion - starting about 1 month ago. 300 mg was too intense. Tolerating this lower dose.     Worrying too much about different things   Trouble relaxing   SUSAN-7 = 2    Sleep Problems   Sad Mood  Appetite and weight changes  Concentration problems  Guilt  Thoughts of Emptiness/Death/Suicide  Anhedonia  Anergia  Slowing/PMR    QIDS = 7    Rested on rising. Wakes only briefly. Sometimes has trazodone hangover. Doesn't interfere with functioning. Interest is good.   Anxiety fluctuates. Recently taking ativan about 1-2x/week.     Psych Hx: as above. No avh, no hospitalization, no serious SI, elaine. One previous psych assessment in [facility] - "horrible experience". She changed meds (to escitalopram), felt terrible.    No other interactions with psychiatrist.     Social history: When younger - low self-esteem, dad was pessimistic and fault-finding. Dad critical. No nancy abuse. No serious maltreatment or trauma. Fewer than most friends (mostly because they were outgoing; thought poorly of one's self, inhibited. Denies teasing and bullying. Above average in school. Graduated HS, went to work x 1 year then went to [employer]. Started dating . Didn't graduate. 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 Gets good feedback on performance. Good relationship with .  x 43 years. 2 grown kids, 4 grandkids. Kids live locally. Doesn't see her son much. Dtr-in-law leads them to not visit with them.  lost business as late consequence of bad economy & a "misappropriation of funds" issue &  health issues. Was primary " "caregiver to mother-in-law (now in [place]). Was primary caregiver to granddaughter who had medical disabilities. "my lowest point".  now working, "see good future ahead".       From PCP note: 3.2.18 - Here for follow up of medical problems. Works long hours. Works & then goes to sleep right after she gets home. RA pain is doing well. [place] weird so started checking BP. Has been monitoring BPs, range 130-174/ . Has no past history of HTN. No med change. Only major change is started working 3 months ago, , & babysits. No exercise. Sleeping ok with trazodone 100mg. Mixed anxiety & depressive disorder- incr wellbutrin to 300, cont  cymbalta 60mg, trazodone 100 hs, try wean ativan to half hs.  Refer for counseling, refer to Psychiatry if not better in 6 weeks.  RTC 6 weeks.    From psychotherapy eval    Chief complaint/reason for encounter: depression, anxiety, sleep and interpersonal     History of present illness:  67 year old  female presented for initial evaluation, with chief complaint of "I've always been kind of anxious & depressed for as long as I can remember, but it just really started getting worse since around 2009..." Pt described a few challenging stressful events starting then, that she feels have accumulate in her life. These include financial strain,her own worsening Rhumatoid Arthritis--diagnosed in 1994, loss of home & the family business in 2013, 's life threatening health scare starting in 2009, legal troubles related to his business, & a feeling of social abandonment from mother, sister, some friends, & daughter-in-law (which means her son & his 2 kids don't see her much anymore.) Pt described in recent months worsening symptoms of sudden uncontrolled tearfulness, increased muscle tension, being distracted, pervasive ruminating worries, loss of senthil, trouble sleeping unless she takes a sleeping aid--currently Trazodone, & tendency to socially " isolate. She stated she has never been very sociable. She now feels more rejection & scrutiny from others & finds herself withdrawing. She said much of the negativity from some family & friends relates to 's legal trouble, having been arrested for business related fraud from a period when he was juggling inadequate customer funds & misappropriated a customer's money to cover expenses of someone else's job; all that while he was battling cancer. Pt said she marvels that her  seems able to stay generally optimistic & pleasant & that he has a lot of friends. Meanwhile, she lacks much social support & stated she isn;t comfortable reaching out to connect with people, so she has a small support network to begin with. She feels devastated by judgment against her  by her own sister, mother, & daughter in law, with the result that she is often unable to spend time with 2 of her grandchildren; she noted those children visit with the daughter-in-law's parents virtually every weekend. Pt described a certain pressure level of social expectation- -material & financial standards, & she & her  have lose their home, as well as the business, & they are living with her parents. 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 She said they have a lot of financial backlog of bills to catch up on. Pt denied any suicidal or homicidal ideation, denied any cognitive deficit other than poor focus at times, denied psychosis, mood swings, rages, or substance abuse. Identified therapeutic goals include reducing anxiety & depression & improving coping skills; possibly finding a way to somewhat reduce social isolation, perhaps by reconnecting with old friends.       Pain: not quantified but described as moderate RA pain     Symptoms:   ? Mood:  "depressed mood, insomnia, worthlessness/guilt, poor concentration, tearfulness and social isolation  ? Anxiety: excessive anxiety/worry, restlessness/keyed up, muscle tension and social phobia  ? Substance abuse: denied  ? Cognitive functioning: denied  ? Health behaviors: noncontributory     Psychiatric history: none     Medical history: RA--diagnosed in 1994; recent hypertension; Hypothyroidism     Family history of psychiatric illness: mother chronically anxious (starting when older); father apparently depressed--very pessimistic & irritable, doesn't acknowledge problem. Suspects paternal grandfather     Social history: Grew up locally, the oldest of 3 sisters, the other two 7 & 8 & 1/2 years younger than she. Raised by both parents; recalled being markedly shy her entire life; not particularly close to her sisters, who were much younger & had each other. Lifelong active Mandaen; has a home Gnosticism. Not currently involved in any smaller groups within the Gnosticism. Graduated high school & attended 2 years of college;  at age 24. Still with , Alpesh, today.  Mother of 2, a son, age 42, & daughter, age 39. Both each have 2 children of their own. Described daughter as a close source of support; also talks with  a lot, processing the struggles they have come through together. 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 Pt described recent loss of a few friend connections she had; her middle sister verbally declared she wouldn't associate with the pt & her , due to his legal "disgrace."  Denied any  experience.  No tobacco, light to moderate wine intake. Minimal caffeine, no recreational drugs.       Substance use:   Alcohol: occasional   Drugs: none   Tobacco: none   Caffeine: a cup of green tea most mornings.     Review Of Systems:     GENERAL:  No weight gain or loss  SKIN:  No rashes or lacerations  HEAD:  No headaches  EYES:  No " exophthalmos, jaundice or blindness  EARS:  No dizziness, tinnitus or hearing loss  NOSE:  No changes in smell  MOUTH & THROAT:  No dyskinetic movements or obvious goiter  CHEST:  No shortness of breath, hyperventilation or cough  CARDIOVASCULAR:  No tachycardia or chest pain  ABDOMEN:  No nausea, vomiting, pain, constipation or diarrhea  URINARY:  No frequency, dysuria or sexual dysfunction  ENDOCRINE:  No polydipsia, polyuria  MUSCULOSKELETAL:  No pain or stiffness of the joints  NEUROLOGIC:  No weakness, sensory changes, seizures, confusion, memory loss, tremor or other abnormal movements    Current Evaluation:     Nutritional Screening: Considering the patient's height and weight, medications, medical history and preferences, should a referral be made to the dietitian? no    Constitutional  Vitals:  Most recent vital signs, dated less than 90 days prior to this appointment, were not reviewed.    There were no vitals filed for this visit.     General:  unremarkable, age appropriate     Musculoskeletal  Muscle Strength/Tone:  no tremor, no tic   Gait & Station:  non-ataxic     Psychiatric  Appearance: casually dressed & groomed;   Behavior: calm,   Cooperation: cooperative with assessment  Speech: normal rate, volume, tone  Thought Process: linear, goal-directed  Thought Content: No suicidal or homicidal ideation; no delusions  Affect: reactive  Mood: euthymic  Perceptions: No auditory or visual hallucinations  Level of Consciousness: alert throughout interview  Insight: fair  Cognition: Oriented to person, place, time, & situation  Memory: no apparent deficits to general clinical interview; not formally assessed  Attention/Concentration: no apparent deficits to general clinical interview; not formally assessed  Fund of Knowledge: average by vocabulary/education    Laboratory Data  No visits with results within 1 Month(s) from this visit.   Latest known visit with results is:   Lab Visit on 05/28/2019   Component  Date Value Ref Range Status    Sed Rate 05/28/2019 12  0 - 36 mm/Hr Final    CRP 05/28/2019 2.7  0.0 - 8.2 mg/L Final    Sodium 05/28/2019 141  136 - 145 mmol/L Final    Potassium 05/28/2019 3.7  3.5 - 5.1 mmol/L Final    Chloride 05/28/2019 98  95 - 110 mmol/L Final    CO2 05/28/2019 33* 23 - 29 mmol/L Final    Glucose 05/28/2019 100  70 - 110 mg/dL Final    BUN, Bld 05/28/2019 20  8 - 23 mg/dL Final    Creatinine 05/28/2019 0.9  0.5 - 1.4 mg/dL Final    Calcium 05/28/2019 9.8  8.7 - 10.5 mg/dL Final    Total Protein 05/28/2019 7.4  6.0 - 8.4 g/dL Final    Albumin 05/28/2019 3.7  3.5 - 5.2 g/dL Final    Total Bilirubin 05/28/2019 0.3  0.1 - 1.0 mg/dL Final    Alkaline Phosphatase 05/28/2019 55  55 - 135 U/L Final    AST 05/28/2019 18  10 - 40 U/L Final    ALT 05/28/2019 18  10 - 44 U/L Final    Anion Gap 05/28/2019 10  8 - 16 mmol/L Final    eGFR if African American 05/28/2019 >60  >60 mL/min/1.73 m^2 Final    eGFR if non African American 05/28/2019 >60  >60 mL/min/1.73 m^2 Final    WBC 05/28/2019 5.33  3.90 - 12.70 K/uL Final    RBC 05/28/2019 4.12  4.00 - 5.40 M/uL Final    Hemoglobin 05/28/2019 11.2* 12.0 - 16.0 g/dL Final    Hematocrit 05/28/2019 35.9* 37.0 - 48.5 % Final    Mean Corpuscular Volume 05/28/2019 87  82 - 98 fL Final    Mean Corpuscular Hemoglobin 05/28/2019 27.2  27.0 - 31.0 pg Final    Mean Corpuscular Hemoglobin Conc 05/28/2019 31.2* 32.0 - 36.0 g/dL Final    RDW 05/28/2019 13.8  11.5 - 14.5 % Final    Platelets 05/28/2019 312  150 - 350 K/uL Final    MPV 05/28/2019 8.9* 9.2 - 12.9 fL Final    Immature Granulocytes 05/28/2019 0.2  0.0 - 0.5 % Final    Gran # (ANC) 05/28/2019 3.0  1.8 - 7.7 K/uL Final    Immature Grans (Abs) 05/28/2019 0.01  0.00 - 0.04 K/uL Final    Lymph # 05/28/2019 1.4  1.0 - 4.8 K/uL Final    Mono # 05/28/2019 0.6  0.3 - 1.0 K/uL Final    Eos # 05/28/2019 0.3  0.0 - 0.5 K/uL Final    Baso # 05/28/2019 0.06  0.00 - 0.20 K/uL Final    nRBC  05/28/2019 0  0 /100 WBC Final    Gran% 05/28/2019 56.9  38.0 - 73.0 % Final    Lymph% 05/28/2019 26.1  18.0 - 48.0 % Final    Mono% 05/28/2019 10.3  4.0 - 15.0 % Final    Eosinophil% 05/28/2019 5.6  0.0 - 8.0 % Final    Basophil% 05/28/2019 1.1  0.0 - 1.9 % Final    Differential Method 05/28/2019 Automated   Final    WBC 05/28/2019 5.33  3.90 - 12.70 K/uL Final    RBC 05/28/2019 4.12  4.00 - 5.40 M/uL Final    Hemoglobin 05/28/2019 11.2* 12.0 - 16.0 g/dL Final    Hematocrit 05/28/2019 35.9* 37.0 - 48.5 % Final    Mean Corpuscular Volume 05/28/2019 87  82 - 98 fL Final    Mean Corpuscular Hemoglobin 05/28/2019 27.2  27.0 - 31.0 pg Final    Mean Corpuscular Hemoglobin Conc 05/28/2019 31.2* 32.0 - 36.0 g/dL Final    RDW 05/28/2019 13.8  11.5 - 14.5 % Final    Platelets 05/28/2019 312  150 - 350 K/uL Final    MPV 05/28/2019 8.9* 9.2 - 12.9 fL Final    Immature Granulocytes 05/28/2019 0.2  0.0 - 0.5 % Final    Gran # (ANC) 05/28/2019 3.0  1.8 - 7.7 K/uL Final    Immature Grans (Abs) 05/28/2019 0.01  0.00 - 0.04 K/uL Final    Lymph # 05/28/2019 1.4  1.0 - 4.8 K/uL Final    Mono # 05/28/2019 0.6  0.3 - 1.0 K/uL Final    Eos # 05/28/2019 0.3  0.0 - 0.5 K/uL Final    Baso # 05/28/2019 0.06  0.00 - 0.20 K/uL Final    nRBC 05/28/2019 0  0 /100 WBC Final    Gran% 05/28/2019 56.9  38.0 - 73.0 % Final    Lymph% 05/28/2019 26.1  18.0 - 48.0 % Final    Mono% 05/28/2019 10.3  4.0 - 15.0 % Final    Eosinophil% 05/28/2019 5.6  0.0 - 8.0 % Final    Basophil% 05/28/2019 1.1  0.0 - 1.9 % Final    Differential Method 05/28/2019 Automated   Final    Sodium 05/28/2019 141  136 - 145 mmol/L Final    Potassium 05/28/2019 3.7  3.5 - 5.1 mmol/L Final    Chloride 05/28/2019 98  95 - 110 mmol/L Final    CO2 05/28/2019 33* 23 - 29 mmol/L Final    Glucose 05/28/2019 100  70 - 110 mg/dL Final    BUN, Bld 05/28/2019 20  8 - 23 mg/dL Final    Creatinine 05/28/2019 0.9  0.5 - 1.4 mg/dL Final    Calcium 05/28/2019  9.8  8.7 - 10.5 mg/dL Final    Total Protein 05/28/2019 7.4  6.0 - 8.4 g/dL Final    Albumin 05/28/2019 3.7  3.5 - 5.2 g/dL Final    Total Bilirubin 05/28/2019 0.3  0.1 - 1.0 mg/dL Final    Alkaline Phosphatase 05/28/2019 55  55 - 135 U/L Final    AST 05/28/2019 18  10 - 40 U/L Final    ALT 05/28/2019 18  10 - 44 U/L Final    Anion Gap 05/28/2019 10  8 - 16 mmol/L Final    eGFR if African American 05/28/2019 >60  >60 mL/min/1.73 m^2 Final    eGFR if non African American 05/28/2019 >60  >60 mL/min/1.73 m^2 Final    Cholesterol 05/28/2019 179  120 - 199 mg/dL Final    Triglycerides 05/28/2019 114  30 - 150 mg/dL Final    HDL 05/28/2019 62  40 - 75 mg/dL Final    LDL Cholesterol 05/28/2019 94.2  63.0 - 159.0 mg/dL Final    Hdl/Cholesterol Ratio 05/28/2019 34.6  20.0 - 50.0 % Final    Total Cholesterol/HDL Ratio 05/28/2019 2.9  2.0 - 5.0 Final    Non-HDL Cholesterol 05/28/2019 117  mg/dL Final    TSH 05/28/2019 0.833  0.400 - 4.000 uIU/mL Final    Vit D, 25-Hydroxy 05/28/2019 80  30 - 96 ng/mL Final     Medications  Outpatient Encounter Medications as of 9/4/2019   Medication Sig Dispense Refill    amoxicillin-clavulanate 875-125mg (AUGMENTIN) 875-125 mg per tablet Take 1 tablet by mouth 2 (two) times daily. for 10 days 20 tablet 0    azelastine (ASTELIN) 137 mcg (0.1 %) nasal spray 1 spray (137 mcg total) by Nasal route 2 (two) times daily. 30 mL 11    buPROPion (WELLBUTRIN XL) 300 MG 24 hr tablet Take 1 tablet (300 mg total) by mouth once daily. 90 tablet 3    cholecalciferol, vitamin D3, 5,000 unit/mL Drop Take 1 drop by mouth Daily.      cyanocobalamin, vitamin B-12, (VITAMIN B-12) 5,000 mcg Subl Place under the tongue once daily.      doxycycline (VIBRAMYCIN) 100 MG Cap Take 1 capsule (100 mg total) by mouth every 12 (twelve) hours as needed. 30 capsule 4    DULoxetine (CYMBALTA) 60 MG capsule Take 1 capsule (60 mg total) by mouth once daily. 90 capsule 3    fluticasone propionate  (FLONASE) 50 mcg/actuation nasal spray 2 sprays (100 mcg total) by Each Nostril route 2 (two) times daily. 18.2 mL 11    folic acid (FOLVITE) 1 MG tablet Take 1 tablet (1,000 mcg total) by mouth once daily. 90 tablet 3    hydroCHLOROthiazide (HYDRODIURIL) 25 MG tablet Take 1 tablet (25 mg total) by mouth once daily. 90 tablet 3    hydroxychloroquine (PLAQUENIL) 200 mg tablet Take 1 tablet (200 mg total) by mouth 2 (two) times daily. 180 tablet 3    levocetirizine (XYZAL) 5 MG tablet Take 1 tablet (5 mg total) by mouth every evening. 30 tablet 11    levothyroxine (SYNTHROID) 75 MCG tablet Take 1 tablet (75 mcg total) by mouth once daily. 90 tablet 3    LORazepam (ATIVAN) 1 MG tablet Take 1 tablet (1 mg total) by mouth every 6 (six) hours as needed. 60 tablet 1    losartan (COZAAR) 100 MG tablet Take 1 tablet (100 mg total) by mouth once daily. 90 tablet 3    omeprazole (PRILOSEC) 20 MG capsule Take 1 capsule (20 mg total) by mouth once daily. 90 capsule 3    pilocarpine (SALAGEN) 5 MG Tab Take 1 tablet (5 mg total) by mouth 4 (four) times daily. 360 tablet 3    traZODone (DESYREL) 50 MG tablet Take 2-3 tablets (100-150 mg total) by mouth nightly as needed for Insomnia. 270 tablet 2     Facility-Administered Encounter Medications as of 9/4/2019   Medication Dose Route Frequency Provider Last Rate Last Dose    CIMZIA SyKt 400 mg  400 mg Subcutaneous Once Scooter Alvarez MD         Assessment - Diagnosis - Goals:     Impression: 69 year old F with chronic anxiety and depression, RA and sjogren's. Had exacerbation of illness in spring '18, currently doing somewhat better following medication changes, psychotherapy.     Treatment Goals:  Specify outcomes written in observable, behavioral terms:   Minimize recurrences    Treatment Plan/Recommendations:   · Duloxetine, bupropion, lorazepam prn. Takes trazodone prn sleep.   · psychoeducation including behavioral activation, psychotherapy, meds.   · Discussed  risks, benefits, and alternatives to treatment plan documented above with patient. I answered all patient questions related to this plan and patient expressed understanding and agreement.     Return to Clinic: 2 months    Counseling time: 10 minutes  Total time: 25 minutes    HEIDY Long MD  Psychiatry  [facility]  [address] , [place] [address]  [phone]

## 2019-09-05 ENCOUNTER — PATIENT MESSAGE (OUTPATIENT)
Dept: FAMILY MEDICINE | Facility: CLINIC | Age: 69
End: 2019-09-05

## 2019-09-05 DIAGNOSIS — D64.9 ANEMIA, UNSPECIFIED TYPE: Primary | ICD-10-CM

## 2019-09-09 ENCOUNTER — PATIENT MESSAGE (OUTPATIENT)
Dept: HEMATOLOGY/ONCOLOGY | Facility: CLINIC | Age: 69
End: 2019-09-09

## 2019-09-16 DIAGNOSIS — M35.9 XEROSTOMIA DUE TO AUTOIMMUNE DISEASE: ICD-10-CM

## 2019-09-16 DIAGNOSIS — M35.01 SJOGREN'S SYNDROME WITH KERATOCONJUNCTIVITIS SICCA: ICD-10-CM

## 2019-09-16 DIAGNOSIS — K11.7 XEROSTOMIA DUE TO AUTOIMMUNE DISEASE: ICD-10-CM

## 2019-09-16 RX ORDER — PILOCARPINE HYDROCHLORIDE 5 MG/1
5 TABLET, FILM COATED ORAL 4 TIMES DAILY
Qty: 360 TABLET | Refills: 3 | Status: SHIPPED | OUTPATIENT
Start: 2019-09-16 | End: 2021-01-26

## 2019-09-18 RX ORDER — LEVOCETIRIZINE DIHYDROCHLORIDE 5 MG/1
5 TABLET, FILM COATED ORAL NIGHTLY
Qty: 90 TABLET | Refills: 3 | Status: SHIPPED | OUTPATIENT
Start: 2019-09-18 | End: 2021-01-26

## 2019-09-19 ENCOUNTER — LAB VISIT (OUTPATIENT)
Dept: LAB | Facility: HOSPITAL | Age: 69
End: 2019-09-19
Attending: PHYSICIAN ASSISTANT
Payer: MEDICARE

## 2019-09-19 DIAGNOSIS — M85.89 OSTEOPENIA OF MULTIPLE SITES: ICD-10-CM

## 2019-09-19 DIAGNOSIS — Z51.81 MEDICATION MONITORING ENCOUNTER: Chronic | ICD-10-CM

## 2019-09-19 DIAGNOSIS — E55.9 VITAMIN D DEFICIENCY DISEASE: ICD-10-CM

## 2019-09-19 DIAGNOSIS — M35.01 SJOGREN'S SYNDROME WITH KERATOCONJUNCTIVITIS SICCA: ICD-10-CM

## 2019-09-19 DIAGNOSIS — M05.79 RHEUMATOID ARTHRITIS INVOLVING MULTIPLE SITES WITH POSITIVE RHEUMATOID FACTOR: ICD-10-CM

## 2019-09-19 LAB
ALBUMIN SERPL BCP-MCNC: 3.8 G/DL (ref 3.5–5.2)
ALP SERPL-CCNC: 60 U/L (ref 55–135)
ALT SERPL W/O P-5'-P-CCNC: 22 U/L (ref 10–44)
ANION GAP SERPL CALC-SCNC: 11 MMOL/L (ref 8–16)
AST SERPL-CCNC: 27 U/L (ref 10–40)
BASOPHILS # BLD AUTO: 0.06 K/UL (ref 0–0.2)
BASOPHILS NFR BLD: 1.1 % (ref 0–1.9)
BILIRUB SERPL-MCNC: 0.3 MG/DL (ref 0.1–1)
BUN SERPL-MCNC: 21 MG/DL (ref 8–23)
CALCIUM SERPL-MCNC: 9.7 MG/DL (ref 8.7–10.5)
CHLORIDE SERPL-SCNC: 95 MMOL/L (ref 95–110)
CO2 SERPL-SCNC: 31 MMOL/L (ref 23–29)
CREAT SERPL-MCNC: 1 MG/DL (ref 0.5–1.4)
CRP SERPL-MCNC: 2.3 MG/L (ref 0–8.2)
DIFFERENTIAL METHOD: ABNORMAL
EOSINOPHIL # BLD AUTO: 0.4 K/UL (ref 0–0.5)
EOSINOPHIL NFR BLD: 7.5 % (ref 0–8)
ERYTHROCYTE [DISTWIDTH] IN BLOOD BY AUTOMATED COUNT: 12.4 % (ref 11.5–14.5)
ERYTHROCYTE [SEDIMENTATION RATE] IN BLOOD BY WESTERGREN METHOD: 12 MM/HR (ref 0–36)
EST. GFR  (AFRICAN AMERICAN): >60 ML/MIN/1.73 M^2
EST. GFR  (NON AFRICAN AMERICAN): 58 ML/MIN/1.73 M^2
GLUCOSE SERPL-MCNC: 93 MG/DL (ref 70–110)
HCT VFR BLD AUTO: 32.6 % (ref 37–48.5)
HGB BLD-MCNC: 11 G/DL (ref 12–16)
IMM GRANULOCYTES # BLD AUTO: 0.02 K/UL (ref 0–0.04)
IMM GRANULOCYTES NFR BLD AUTO: 0.4 % (ref 0–0.5)
LYMPHOCYTES # BLD AUTO: 1.9 K/UL (ref 1–4.8)
LYMPHOCYTES NFR BLD: 34.1 % (ref 18–48)
MCH RBC QN AUTO: 29.2 PG (ref 27–31)
MCHC RBC AUTO-ENTMCNC: 33.7 G/DL (ref 32–36)
MCV RBC AUTO: 87 FL (ref 82–98)
MONOCYTES # BLD AUTO: 0.6 K/UL (ref 0.3–1)
MONOCYTES NFR BLD: 11.3 % (ref 4–15)
NEUTROPHILS # BLD AUTO: 2.6 K/UL (ref 1.8–7.7)
NEUTROPHILS NFR BLD: 46 % (ref 38–73)
NRBC BLD-RTO: 0 /100 WBC
PLATELET # BLD AUTO: 266 K/UL (ref 150–350)
PMV BLD AUTO: 9.3 FL (ref 9.2–12.9)
POTASSIUM SERPL-SCNC: 4 MMOL/L (ref 3.5–5.1)
PROT SERPL-MCNC: 7.2 G/DL (ref 6–8.4)
RBC # BLD AUTO: 3.77 M/UL (ref 4–5.4)
SODIUM SERPL-SCNC: 137 MMOL/L (ref 136–145)
WBC # BLD AUTO: 5.58 K/UL (ref 3.9–12.7)

## 2019-09-19 PROCEDURE — 80053 COMPREHEN METABOLIC PANEL: CPT

## 2019-09-19 PROCEDURE — 86140 C-REACTIVE PROTEIN: CPT

## 2019-09-19 PROCEDURE — 36415 COLL VENOUS BLD VENIPUNCTURE: CPT

## 2019-09-19 PROCEDURE — 85652 RBC SED RATE AUTOMATED: CPT

## 2019-09-19 PROCEDURE — 85025 COMPLETE CBC W/AUTO DIFF WBC: CPT

## 2019-09-22 ENCOUNTER — OFFICE VISIT (OUTPATIENT)
Dept: URGENT CARE | Facility: CLINIC | Age: 69
End: 2019-09-22
Payer: MEDICARE

## 2019-09-22 VITALS
BODY MASS INDEX: 24.36 KG/M2 | TEMPERATURE: 97 F | HEIGHT: 65 IN | SYSTOLIC BLOOD PRESSURE: 159 MMHG | WEIGHT: 146.19 LBS | HEART RATE: 74 BPM | DIASTOLIC BLOOD PRESSURE: 72 MMHG

## 2019-09-22 DIAGNOSIS — J30.9 ALLERGIC RHINITIS, UNSPECIFIED SEASONALITY, UNSPECIFIED TRIGGER: Primary | ICD-10-CM

## 2019-09-22 DIAGNOSIS — I10 ESSENTIAL HYPERTENSION: ICD-10-CM

## 2019-09-22 PROCEDURE — 99999 PR PBB SHADOW E&M-EST. PATIENT-LVL III: ICD-10-PCS | Mod: PBBFAC,,, | Performed by: FAMILY MEDICINE

## 2019-09-22 PROCEDURE — 99213 PR OFFICE/OUTPT VISIT, EST, LEVL III, 20-29 MIN: ICD-10-PCS | Mod: S$PBB,,, | Performed by: FAMILY MEDICINE

## 2019-09-22 PROCEDURE — 99213 OFFICE O/P EST LOW 20 MIN: CPT | Mod: PBBFAC,PO | Performed by: FAMILY MEDICINE

## 2019-09-22 PROCEDURE — 99213 OFFICE O/P EST LOW 20 MIN: CPT | Mod: S$PBB,,, | Performed by: FAMILY MEDICINE

## 2019-09-22 PROCEDURE — 99999 PR PBB SHADOW E&M-EST. PATIENT-LVL III: CPT | Mod: PBBFAC,,, | Performed by: FAMILY MEDICINE

## 2019-09-22 RX ORDER — FLUTICASONE PROPIONATE 50 MCG
2 SPRAY, SUSPENSION (ML) NASAL 2 TIMES DAILY
Qty: 18.2 ML | Refills: 11 | Status: SHIPPED | OUTPATIENT
Start: 2019-09-22 | End: 2019-09-22 | Stop reason: ALTCHOICE

## 2019-09-22 RX ORDER — TRIAMCINOLONE ACETONIDE 55 UG/1
2 SPRAY, METERED NASAL DAILY
Qty: 10.8 ML | Refills: 3 | Status: SHIPPED | OUTPATIENT
Start: 2019-09-22 | End: 2021-07-22 | Stop reason: ALTCHOICE

## 2019-09-22 NOTE — PROGRESS NOTES
"Dictation #1  MRN:[id number]  CSN:[id number]  Subjective:       Patient ID: Leticia Piña is a 69 y.o. female.    Chief Complaint: Sinus Problem and Headache    HPI  Here for above  Similar complaints in June and july   Has been to ent in past  CT sinuses was ordered in July but not obtained  Was prescribed zpack levaquin amoxil and steroids within last 3 months  Denies fever  Reports chills this AM  Has tried mucinex  Performs saline rinses  Bloody d/c today    Also reports having fibromyalgia  Review of Systems   Constitutional: Negative for fever.   HENT: Positive for nosebleeds, rhinorrhea and sinus pressure.         Objective:   BP (!) 159/72 (BP Location: Right arm, Patient Position: Sitting, BP Method: Large (Automatic))   Pulse 74   Temp 97.3 °F (36.3 °C) (Tympanic)   Ht 5' 5" (1.651 m)   Wt 66.3 kg (146 lb 2.6 oz)   BMI 24.32 kg/m²     BP Readings from Last 3 Encounters:   09/22/19 (!) 159/72   07/30/19 122/71   07/03/19 (!) 155/84       No results found for: LABA1C, HGBA1C    Physical Exam   Constitutional: She is oriented to person, place, and time. She appears well-nourished. No distress.   HENT:   Head: Normocephalic and atraumatic.   Right Ear: Tympanic membrane normal.   Left Ear: Tympanic membrane normal.   Nose: Mucosal edema and rhinorrhea present. Epistaxis is observed.   Mouth/Throat: Oropharynx is clear and moist.   Eyes: Conjunctivae and EOM are normal. No scleral icterus.   Neck: Normal range of motion. Neck supple.   Cardiovascular: Normal rate, regular rhythm and normal heart sounds.   No murmur heard.  Pulmonary/Chest: Effort normal and breath sounds normal. No respiratory distress. She has no wheezes.   Abdominal: Soft. Bowel sounds are normal. There is no tenderness.   Musculoskeletal: She exhibits no edema or deformity.   Neurological: She is alert and oriented to person, place, and time.   Skin: Skin is warm and dry.   Psychiatric: She has a normal mood and affect. Her behavior " is normal.   Vitals reviewed.    Assessment:     1. Allergic rhinitis, unspecified seasonality, unspecified trigger    2. Essential hypertension      Plan:     Problem List Items Addressed This Visit        Cardiac/Vascular    Essential hypertension       Other    Allergic rhinitis - Primary    Relevant Medications    triamcinolone (NASACORT) 55 mcg nasal inhaler      Advised correct techinque for nasal spray/flonase/nasacort  No abx indicated at this time based on H&P  Facilitate f/u with ent for chronic sinusitis, pcp for htn and appt for ct sinuses      Follow up if symptoms worsen or fail to improve.

## 2019-09-22 NOTE — PATIENT INSTRUCTIONS
Fluticasone nasal spray  What is this medicine?  FLUTICASONE (floo TIK a sone) is a corticosteroid. This medicine is used to treat the symptoms of allergies like sneezing, itchy red eyes, and itchy, runny, or stuffy nose.  How should I use this medicine?  This medicine is for use in the nose. Follow the directions on your product or prescription label. This medicine works best if used at regular intervals. Do not use more often than directed. Make sure that you are using your nasal spray correctly. After 6 months of daily use without a prescription, talk to your doctor or health care professional before using it for a longer time. Ask your doctor or health care professional if you have any questions.  Talk to your pediatrician regarding the use of this medicine in children. Special care may be needed. This medicine has been used in children as young as 2 years. After two months of daily use without a prescription in a child, talk to your pediatrician before using it for a longer time.  What side effects may I notice from receiving this medicine?  Side effects that you should report to your doctor or health care professional as soon as possible:  · allergic reactions like skin rash, itching or hives, swelling of the face, lips, or tongue  · changes in vision  · flu-like symptoms  · white patches or sores in the mouth or nose  Side effects that usually do not require medical attention (report to your doctor or health care professional if they continue or are bothersome):  · burning or irritation inside the nose or throat  · cough  · headache  · nosebleed  · unusual taste or smell  What may interact with this medicine?  · ketoconazole  · metyrapone  · some medicines for HIV  · vaccines  What if I miss a dose?  If you miss a dose, use it as soon as you remember. If it is almost time for your next dose, use only that dose and continue with your regular schedule. Do not use double or extra doses.  Where should I keep my  medicine?  Keep out of the reach of children.  Store at room temperature between 15 and 30 degrees C (59 and 86 degrees F). Throw away any unused medicine after the expiration date.  What should I tell my health care provider before I take this medicine?  They need to know if you have any of these conditions:  · infection, like tuberculosis, herpes, or fungal infection  · recent surgery on nose or sinuses  · taking corticosteroid by mouth  · an unusual or allergic reaction to fluticasone, steroids, other medicines, foods, dyes, or preservatives  · pregnant or trying to get pregnant  · breast-feeding  What should I watch for while using this medicine?  Visit your doctor or health care professional for regular checks on your progress. Some symptoms may improve within 12 hours after starting use. Check with your doctor or health care professional if there is no improvement in your condition after 3 weeks of use.  Do not come in contact with people who have chickenpox or the measles while you are taking this medicine. If you do, call your doctor right away.  NOTE:This sheet is a summary. It may not cover all possible information. If you have questions about this medicine, talk to your doctor, pharmacist, or health care provider. Copyright© 2017 [employer]          Sinusitis (No Antibiotics)    The sinuses are air-filled spaces within the bones of the face. They connect to the inside of the nose. Sinusitis is an inflammation of the tissue lining the sinus cavity. Sinus inflammation can occur during a cold. It can also be due to allergies to pollens and other particles in the air. It can cause symptoms such as sinus congestion, headache, sore throat, facial swelling and fullness. It may also cause a low-grade fever. No infection is present, and no antibiotic treatment is needed.  Home care  · Drink plenty of water, hot tea, and other liquids. This may help thin mucus. It also may promote sinus drainage.  · Heat may  help soothe painful areas of the face. Use a towel soaked in hot water. Or,  the shower and direct the hot spray onto your face. Using a vaporizer along with a menthol rub at night may also help.   · An expectorant containing guaifenesin may help thin the mucus and promote drainage from the sinuses.  · Over-the-counter decongestants may be used unless a similar medicine was prescribed. Nasal sprays work the fastest. Use one that contains phenylephrine or oxymetazoline. First blow the nose gently. Then use the spray. Do not use these medicines more often than directed on the label or symptoms may get worse. You may also use tablets containing pseudoephedrine. Avoid products that combine ingredients, because side effects may be increased. Read labels. You can also ask the pharmacist for help. (NOTE: Persons with high blood pressure should not use decongestants. They can raise blood pressure.)  · Over-the-counter antihistamines may help if allergies contributed to your sinusitis.    · Use acetaminophen or ibuprofen to control pain, unless another pain medicine was prescribed. (If you have chronic liver or kidney disease or ever had a stomach ulcer, talk with your doctor before using these medicines. Aspirin should never be used in anyone under 18 years of age who is ill with a fever. It may cause severe liver damage.)  · Use nasal rinses or irrigation as instructed by your health care provider.  · Don't smoke. This can worsen symptoms.  Follow-up care  Follow up with your healthcare provider or our staff if you are not improving within the next week.  When to seek medical advice  Call your healthcare provider if any of these occur:  · Green or yellow discharge from the nose or into the throat  · Facial pain or headache becoming more severe  · Stiff neck  · Unusual drowsiness or confusion  · Swelling of the forehead or eyelids  · Vision problems, including blurred or double vision  · Fever of 100.4ºF (38ºC) or  higher, or as directed by your healthcare provider  · Seizure  · Breathing problems  · Symptoms not resolving within 10 days  Date Last Reviewed: 4/13/2015  © 9933-9276 [employer]. [address] [place] [address], [place] [address]. All rights reserved. This information is not intended as a substitute for professional medical care. Always follow your healthcare professional's instructions.        Technique for using nasal spray:  1. Blow nose  2. Lean forward  3. Angle spray outward, using opposite hand to opposite nostril  4. Hold breath and spray  5. Massage nasal fold to retain medicine in nose  6. Avoid blowing nose after administration of medicine    Wrong techniques include:  1. Sitting upright  2. Inhaling quickly when administering medicine  3. Blowing nose after administering medicine  4. Feeling medicine trickle down throat

## 2019-09-23 ENCOUNTER — OFFICE VISIT (OUTPATIENT)
Dept: OTOLARYNGOLOGY | Facility: CLINIC | Age: 69
End: 2019-09-23
Payer: MEDICARE

## 2019-09-23 VITALS
WEIGHT: 145.5 LBS | DIASTOLIC BLOOD PRESSURE: 70 MMHG | TEMPERATURE: 99 F | HEIGHT: 65 IN | SYSTOLIC BLOOD PRESSURE: 140 MMHG | BODY MASS INDEX: 24.24 KG/M2 | HEART RATE: 79 BPM

## 2019-09-23 DIAGNOSIS — J32.9 CHRONIC SINUSITIS, UNSPECIFIED LOCATION: Primary | ICD-10-CM

## 2019-09-23 DIAGNOSIS — J30.89 NON-SEASONAL ALLERGIC RHINITIS, UNSPECIFIED TRIGGER: ICD-10-CM

## 2019-09-23 PROCEDURE — 99214 OFFICE O/P EST MOD 30 MIN: CPT | Mod: S$PBB,,, | Performed by: PHYSICIAN ASSISTANT

## 2019-09-23 PROCEDURE — 99999 PR PBB SHADOW E&M-EST. PATIENT-LVL IV: CPT | Mod: PBBFAC,,, | Performed by: PHYSICIAN ASSISTANT

## 2019-09-23 PROCEDURE — 99214 PR OFFICE/OUTPT VISIT, EST, LEVL IV, 30-39 MIN: ICD-10-PCS | Mod: S$PBB,,, | Performed by: PHYSICIAN ASSISTANT

## 2019-09-23 PROCEDURE — 99999 PR PBB SHADOW E&M-EST. PATIENT-LVL IV: ICD-10-PCS | Mod: PBBFAC,,, | Performed by: PHYSICIAN ASSISTANT

## 2019-09-23 PROCEDURE — 99214 OFFICE O/P EST MOD 30 MIN: CPT | Mod: PBBFAC | Performed by: PHYSICIAN ASSISTANT

## 2019-09-23 NOTE — PROGRESS NOTES
Subjective:   Patient: Leticia Piña [id number], :1950   Visit date:2019 9:10 AM    Chief Complaint:  Other (headache x 1 month on and off 1 week constant)    HPI:  Leticia is a 69 y.o. female who is here for follow-up on a chronic HA. She was last seen in clinic in  for sinusitis tx with Levaquin x 10 days. Pt c/o persistent HA and CT of sinus was ordered in July, not completed. She was seen in UC yesterday, has been tx with z-pack, levaquin, amoxil and steroids within last 3 months. She rtc on 19 and c/o severe pain in her frontal lobe of head. HA is persistent. This is with pressure in her eyes. Pt is scheduled to have her vision checked, which she does annually. No known vision changes. She does have a hx of mild TMJ on left side, however, she denied any TMJ pain/ cracking. No discolored mucus. No cough or fevers. No otalgia. She is currently on Xyzal, Flonase, and Astelin. No other relieving factors.     Hx of Sjogren's Syndrome    Review of Systems:  -     Allergic/Immunologic: is allergic to erythromycin; humira  [adalimumab]; and sulfa (sulfonamide antibiotics)..  -     Constitutional: Current temp: 98.6 °F (37 °C)    Her meds, allergies, medical, surgical, social & family histories were reviewed & updated:  -     She has a current medication list which includes the following prescription(s): azelastine, bupropion, cholecalciferol (vitamin d3), cyanocobalamin (vitamin b-12), doxycycline, duloxetine, folic acid, hydrochlorothiazide, hydroxychloroquine, levocetirizine, levothyroxine, lorazepam, losartan, omeprazole, pilocarpine, trazodone, and triamcinolone, and the following Facility-Administered Medications: cimzia.  -     She  has a past medical history of Allergic rhinitis, Cutaneous lupus erythematosus, Essential hypertension (2019), GERD (gastroesophageal reflux disease), Hypothyroid, Insomnia, Mixed anxiety and depressive disorder, Normal cardiac stress test, Rheumatoid  "arthritis(714.0), and Sjogren's syndrome.   -     She does not have any pertinent problems on file.   -     She  has a past surgical history that includes breast implants; breast surgery for rupture; TONSILLECTOMY, ADENOIDECTOMY; Tubal ligation; and Augmentation of breast.  -     She  reports that she has never smoked. She has never used smokeless tobacco. She reports that she drinks alcohol. She reports that she does not use drugs.  -     Her family history is not on file.  -     She is allergic to erythromycin; humira  [adalimumab]; and sulfa (sulfonamide antibiotics).    Objective:     Physical Exam:  Vitals:  BP (!) 140/70   Pulse 79   Temp 98.6 °F (37 °C)   Ht 5' 5" (1.651 m)   Wt 66 kg (145 lb 8.1 oz)   BMI 24.21 kg/m²   Appearance:  Well-developed, well-nourished.  Communication:  Able to communicate, no hoarseness.  Head & Face:  Normocephalic, atraumatic, no sinus tenderness, normal facial strength.  Eyes:  Extraocular motions intact.  Ears:  Otoscopy of external auditory canals and tympanic membranes was normal, clinical speech reception thresholds grossly intact, no mass/lesion of auricle.  Nose:  No masses/lesions of external nose, nasal mucosa, septum, and turbinates were within normal limits.  Mouth:  No mass/lesion of lips, teeth, gums, hard/soft palate, tongue, tonsils, or oropharynx.  Neck & Lymphatics:  No cervical lymphadenopathy, no neck mass/crepitus/ asymmetry, trachea is midline, no thyroid enlargement/tenderness/mass.  Neuro/Psych: Alert with normal mood and affect.   Abdominal: Normal appearance.   Respiration/Chest:  Symmetric expansion during respiration, normal respiratory effort.  Skin:  Warm and intact  Cardiovascular:  No peripheral vascular edema or varicosities.    Assessment & Plan:   Leticia was seen today for other.    Diagnoses and all orders for this visit:    Chronic sinusitis, unspecified location    Non-seasonal allergic rhinitis, unspecified trigger    CT sinus - pt is " scheduled for this on Wednesday. I will contact her with results and further recommendations. If insignificant for sinus disease, consider consult to neurology for HA's.   Continue Xyzal, Flonase, and Astelin    Over 25 minutes were spent in face to face contact with the patient with greater than 50% spent discussing their diagnosis and coordination of care.       Agatha Guerrero PA-C  [facility]   [facility]  [address]  [place] [id number]  P: [phone]  F: [phone]

## 2019-09-24 ENCOUNTER — PATIENT OUTREACH (OUTPATIENT)
Dept: ADMINISTRATIVE | Facility: HOSPITAL | Age: 69
End: 2019-09-24

## 2019-09-24 ENCOUNTER — INFUSION (OUTPATIENT)
Dept: RHEUMATOLOGY | Facility: HOSPITAL | Age: 69
End: 2019-09-24
Attending: PHYSICIAN ASSISTANT
Payer: MEDICARE

## 2019-09-24 ENCOUNTER — OFFICE VISIT (OUTPATIENT)
Dept: RHEUMATOLOGY | Facility: CLINIC | Age: 69
End: 2019-09-24
Payer: MEDICARE

## 2019-09-24 VITALS
HEART RATE: 75 BPM | WEIGHT: 145.94 LBS | DIASTOLIC BLOOD PRESSURE: 75 MMHG | BODY MASS INDEX: 24.32 KG/M2 | SYSTOLIC BLOOD PRESSURE: 147 MMHG | HEIGHT: 65 IN

## 2019-09-24 DIAGNOSIS — Z79.899 HIGH RISK MEDICATION USE: ICD-10-CM

## 2019-09-24 DIAGNOSIS — L93.2 DRUG-INDUCED LUPUS ERYTHEMATOSUS: ICD-10-CM

## 2019-09-24 DIAGNOSIS — M05.79 RHEUMATOID ARTHRITIS INVOLVING MULTIPLE SITES WITH POSITIVE RHEUMATOID FACTOR: ICD-10-CM

## 2019-09-24 DIAGNOSIS — M35.00 SJOGREN'S SYNDROME, WITH UNSPECIFIED ORGAN INVOLVEMENT: Primary | ICD-10-CM

## 2019-09-24 DIAGNOSIS — D84.9 IMMUNOCOMPROMISED: ICD-10-CM

## 2019-09-24 DIAGNOSIS — T50.905A DRUG-INDUCED LUPUS ERYTHEMATOSUS: ICD-10-CM

## 2019-09-24 DIAGNOSIS — M05.79 RHEUMATOID ARTHRITIS INVOLVING MULTIPLE SITES WITH POSITIVE RHEUMATOID FACTOR: Primary | ICD-10-CM

## 2019-09-24 DIAGNOSIS — Z11.1 SCREENING-PULMONARY TB: ICD-10-CM

## 2019-09-24 PROCEDURE — 99214 PR OFFICE/OUTPT VISIT, EST, LEVL IV, 30-39 MIN: ICD-10-PCS | Mod: S$PBB,25,, | Performed by: PHYSICIAN ASSISTANT

## 2019-09-24 PROCEDURE — 96372 THER/PROPH/DIAG INJ SC/IM: CPT

## 2019-09-24 PROCEDURE — G0008 ADMIN INFLUENZA VIRUS VAC: HCPCS | Mod: PBBFAC

## 2019-09-24 PROCEDURE — 90662 IIV NO PRSV INCREASED AG IM: CPT | Mod: PBBFAC

## 2019-09-24 PROCEDURE — G0008 ADMIN INFLUENZA VIRUS VAC: HCPCS

## 2019-09-24 PROCEDURE — 99214 OFFICE O/P EST MOD 30 MIN: CPT | Mod: S$PBB,25,, | Performed by: PHYSICIAN ASSISTANT

## 2019-09-24 PROCEDURE — 99213 OFFICE O/P EST LOW 20 MIN: CPT | Mod: PBBFAC,25 | Performed by: PHYSICIAN ASSISTANT

## 2019-09-24 PROCEDURE — 99999 PR PBB SHADOW E&M-EST. PATIENT-LVL III: ICD-10-PCS | Mod: PBBFAC,,, | Performed by: PHYSICIAN ASSISTANT

## 2019-09-24 PROCEDURE — 63600175 PHARM REV CODE 636 W HCPCS: Mod: JG | Performed by: PHYSICIAN ASSISTANT

## 2019-09-24 PROCEDURE — 99999 PR PBB SHADOW E&M-EST. PATIENT-LVL III: CPT | Mod: PBBFAC,,, | Performed by: PHYSICIAN ASSISTANT

## 2019-09-24 RX ORDER — HEPARIN 100 UNIT/ML
500 SYRINGE INTRAVENOUS
Status: CANCELLED | OUTPATIENT
Start: 2019-10-01

## 2019-09-24 RX ORDER — SODIUM CHLORIDE 0.9 % (FLUSH) 0.9 %
10 SYRINGE (ML) INJECTION
Status: CANCELLED | OUTPATIENT
Start: 2019-10-01

## 2019-09-24 RX ORDER — CERTOLIZUMAB PEGOL 200 MG/ML
400 INJECTION, SOLUTION SUBCUTANEOUS ONCE
Status: COMPLETED | OUTPATIENT
Start: 2019-09-24 | End: 2019-09-24

## 2019-09-24 RX ORDER — ACETAMINOPHEN 325 MG/1
650 TABLET ORAL ONCE AS NEEDED
Status: CANCELLED | OUTPATIENT
Start: 2019-10-01

## 2019-09-24 RX ORDER — DIPHENHYDRAMINE HYDROCHLORIDE 50 MG/ML
12.5 INJECTION INTRAMUSCULAR; INTRAVENOUS ONCE AS NEEDED
Status: CANCELLED | OUTPATIENT
Start: 2019-10-01

## 2019-09-24 RX ORDER — KETOROLAC TROMETHAMINE 30 MG/ML
30 INJECTION, SOLUTION INTRAMUSCULAR; INTRAVENOUS ONCE
Status: CANCELLED
Start: 2019-10-01

## 2019-09-24 RX ADMIN — CERTOLIZUMAB PEGOL 400 MG: 200 INJECTION, SOLUTION SUBCUTANEOUS at 03:09

## 2019-09-24 ASSESSMENT — ROUTINE ASSESSMENT OF PATIENT INDEX DATA (RAPID3): MDHAQ FUNCTION SCORE: .2

## 2019-09-24 NOTE — NURSING
Cimzia 400 mg administered subcutaneous in divided doses of 200 mg each in right and left upper arms, posterior aspect.  Pt instructed on s/s to report to MD/RN. Instructed to wait in clinic x 15 min. Post injection.   Pt verbalized understanding and tolerated injection well without adverse events.

## 2019-09-24 NOTE — PROGRESS NOTES
Subjective:       Patient ID: Leticia Piña is a 69 y.o. female.    Chief Complaint: Rheumatoid Arthritis      Leticia is here today for rheumatology followup.      She has chronic seropositive rheumatoid arthritis, Sjogren's syndrome, osteopenia, and Vitamin D deficiency.  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 Also failed enbrel this was IE.  For RA on cimzia 400 mg every 5-6 weeks, now weaned down and off mtx over the past year, still on Plaquenil 200 mg once daily.  Missed her last 2 cimzia injections.  Last injection done  4 weeks ago due again today     RA wise BL hand and foot x-rays done in 7/2017 and repeat 6/2019 showed no new erosions but right 5th mtp joint does have erosive changes which are chronic and x-rays were overall stable.      Several months ago she had persistent pain right 5th mtp pain- locally injected with steroid; this is better. No foot pain.   Last visit a lot of  generalized pain, she had run out of Cymbalta , having difficulty affording her meds, Some of her script are expensive. More generalized pain since stopping, difficulty concentrating    On Cymbalta 60 mg/d (off X 7 days), Wellbutrin 300 mg/d still taking   Now back on cymbalata (was able to get good Rx coupon for low cost)  We sent her to aquatic therapy at [facility] which really helped a lot. The focused on both her fibromyalgia and  romel hip bursitis/ back pain.     Today she rates her pain level 0/10    For Sjogren's, she has +SSB, +SSA, +GIOVANNA 1:160 speckled in the past. She takes Evoxac 3-4X daily cost $160/month.  Now on salagen using good rx coupon cost is better. Also  Using otc products nasal gel, lozenges, drinking water. Seen by ophthalmology and prescribed plasma serum eye drops, which have worked great. No parotid tenderness or swelling.     Osteopenia diagnosed by Dexa. Last scan 8/2014. TF -1.0, FN -1.8, spine -0.2. Previously, we did trial of fosamax but had GI upset and  "stopped. No prednisone. Currently taking estradiol, progesterone, calcium by diet only, and vit D 2000 IU daily. Last Vitamin D level was elevated at 80 .repeat bone density 12/6/17 stable. No changes. No falls or fractures since last visit.     She is weaning off her hormones; reflux worsening, will see gi for evaluation          Low-back Pain   Pertinent negatives include no abdominal pain, arthralgias, chest pain, chills, congestion, coughing, fatigue, fever, joint swelling, myalgias, nausea, neck pain, rash, sore throat, vomiting or weakness.   Hip Pain   Pertinent negatives include no abdominal pain, arthralgias, chest pain, chills, congestion, coughing, fatigue, fever, joint swelling, myalgias, nausea, neck pain, rash, sore throat, vomiting or weakness.     Review of Systems   Constitutional: Negative.  Negative for activity change, appetite change, chills, fatigue and fever.   HENT: Negative for congestion, ear pain, mouth sores, rhinorrhea, sinus pressure, sore throat and trouble swallowing.         + dry mouth   Eyes: Negative.  Negative for photophobia, pain and redness.        No swollen or red eyes, + dry eye     Respiratory: Negative for cough, choking, chest tightness, shortness of breath, wheezing and stridor.    Cardiovascular: Negative.  Negative for chest pain.   Gastrointestinal: Negative.  Negative for abdominal pain, blood in stool, diarrhea, nausea and vomiting.   Genitourinary: Negative.  Negative for dysuria, frequency, hematuria and urgency.   Musculoskeletal: Negative for arthralgias, back pain, gait problem, joint swelling, myalgias, neck pain and neck stiffness.   Skin: Negative for color change, pallor and rash.   Neurological: Negative.  Negative for weakness.   Hematological: Negative for adenopathy.   Psychiatric/Behavioral: Negative for suicidal ideas.         Objective:     BP (!) 147/75   Pulse 75   Ht 5' 5" (1.651 m)   Wt 66.2 kg (145 lb 15.1 oz)   BMI 24.29 kg/m²    "   Physical Exam   Constitutional: She is oriented to person, place, and time and well-developed, well-nourished, and in no distress. No distress.   HENT:   Head: Normocephalic and atraumatic.   Right Ear: External ear normal.   Left Ear: External ear normal.   Mouth/Throat: Mucous membranes are not pale, dry and not cyanotic. She does not have dentures. No oral lesions. No oropharyngeal exudate.   Eyes: Conjunctivae and EOM are normal. Pupils are equal, round, and reactive to light. Right conjunctiva is not injected. Right conjunctiva has no hemorrhage. Left conjunctiva is not injected. Left conjunctiva has no hemorrhage. No scleral icterus.       Neck: Normal range of motion. Neck supple. No thyromegaly present.   Cardiovascular: Normal rate, regular rhythm and normal heart sounds.    No murmur heard.  Pulmonary/Chest: Effort normal and breath sounds normal. She exhibits no tenderness.   Abdominal: Soft. Bowel sounds are normal.       Right Side Rheumatological Exam     Examination finds the shoulder, elbow, wrist, knee, 1st PIP, 1st MCP, 2nd PIP, 2nd MCP, 3rd PIP, 3rd MCP, 4th PIP, 4th MCP, 5th PIP and 5th MCP normal.    The patient is tender to palpation of the 5th MTP    She has swelling of the 5th MTP    Left Side Rheumatological Exam     Examination finds the shoulder, elbow, wrist, knee, 1st PIP, 1st MCP, 2nd PIP, 2nd MCP, 3rd PIP, 3rd MCP, 4th PIP, 4th MCP, 5th PIP and 5th MCP normal.    Joint Exam Comments   Knee: Lateral side of knee at lateral collateral ligament  Some ttp noted            Lymphadenopathy:     She has no cervical adenopathy.   Neurological: She is alert and oriented to person, place, and time. She displays normal reflexes. No cranial nerve deficit. She exhibits normal muscle tone. Gait normal.   Skin: Skin is warm and dry. No rash noted. No erythema.     Musculoskeletal: She exhibits deformity. She exhibits no edema or tenderness.   DIP enlargement BL hands, no synovitis, warmth.   -   metatarsal squeeze romel    No Swelling or tenderness right 5th mtp joint  ttp romel hip bursa; lower back, upper arms, neck  Thighs etc- generalized tenderness     No synovitis     No weakness on exam, muscle strenght 5/5 upper and lower ext              Results for orders placed or performed in visit on 09/19/19   Sedimentation rate, manual   Result Value Ref Range    Sed Rate 12 0 - 36 mm/Hr   C-reactive protein   Result Value Ref Range    CRP 2.3 0.0 - 8.2 mg/L   Comprehensive metabolic panel   Result Value Ref Range    Sodium 137 136 - 145 mmol/L    Potassium 4.0 3.5 - 5.1 mmol/L    Chloride 95 95 - 110 mmol/L    CO2 31 (H) 23 - 29 mmol/L    Glucose 93 70 - 110 mg/dL    BUN, Bld 21 8 - 23 mg/dL    Creatinine 1.0 0.5 - 1.4 mg/dL    Calcium 9.7 8.7 - 10.5 mg/dL    Total Protein 7.2 6.0 - 8.4 g/dL    Albumin 3.8 3.5 - 5.2 g/dL    Total Bilirubin 0.3 0.1 - 1.0 mg/dL    Alkaline Phosphatase 60 55 - 135 U/L    AST 27 10 - 40 U/L    ALT 22 10 - 44 U/L    Anion Gap 11 8 - 16 mmol/L    eGFR if African American >60 >60 mL/min/1.73 m^2    eGFR if non African American 58 (A) >60 mL/min/1.73 m^2   CBC auto differential   Result Value Ref Range    WBC 5.58 3.90 - 12.70 K/uL    RBC 3.77 (L) 4.00 - 5.40 M/uL    Hemoglobin 11.0 (L) 12.0 - 16.0 g/dL    Hematocrit 32.6 (L) 37.0 - 48.5 %    Mean Corpuscular Volume 87 82 - 98 fL    Mean Corpuscular Hemoglobin 29.2 27.0 - 31.0 pg    Mean Corpuscular Hemoglobin Conc 33.7 32.0 - 36.0 g/dL    RDW 12.4 11.5 - 14.5 %    Platelets 266 150 - 350 K/uL    MPV 9.3 9.2 - 12.9 fL    Immature Granulocytes 0.4 0.0 - 0.5 %    Gran # (ANC) 2.6 1.8 - 7.7 K/uL    Immature Grans (Abs) 0.02 0.00 - 0.04 K/uL    Lymph # 1.9 1.0 - 4.8 K/uL    Mono # 0.6 0.3 - 1.0 K/uL    Eos # 0.4 0.0 - 0.5 K/uL    Baso # 0.06 0.00 - 0.20 K/uL    nRBC 0 0 /100 WBC    Gran% 46.0 38.0 - 73.0 %    Lymph% 34.1 18.0 - 48.0 %    Mono% 11.3 4.0 - 15.0 %    Eosinophil% 7.5 0.0 - 8.0 %    Basophil% 1.1 0.0 - 1.9 %    Differential  Method Automated            6/4/19   Foot X-ray -FINDINGS:  Allowing for positioning and technique there has been no interval development of new focal erosion or periosteal reaction.  Stable multi articular degenerative changes present throughout the feet bilaterally.  Left and borderline right pes planus.  Stable erosions on the right involving the 1st and 5th metatarsal heads with slight increased smooth soft tissue prominence along the lateral margin of the right 5th MTP joint.  Similar erosions on the left involving the 5th MTP with stable appearance the erosions and soft tissue prominence.  No acute fracture or dislocation.  No new soft tissue calcification or radiopaque foreign body.     Hand -FINDINGS:  Stable exam without acute fracture or subluxation noted.  No new or developing focal erosion or periosteal reaction.  Multi articular degenerative change noted in the wrist and hand bilaterally, greater on the left.        X-ray  hand and foot 5/2016 and 7/2017 : no new erosions - stable    12/2017 DEXA  Total femur mean 0.855 g/cm² with a T score -1.2  Left femur neck 0.738 g/cm² T score -2.2  L1-L4 spine 1.152 g/cm² with a T score -0.2  Osteopenia with falling bone density at the left femur neck moderate to high fracture risk      12/6/15 DEXA  Total femur mean 0.860 g/cm² with a T score -1.2  Left femur neck 0.738 g/cm² T score -2.2  L1-L4 spine 1.152 g/cm² with a T score -0.2  Osteopenia with falling bone density at the left femur neck moderate to high fracture risk    8/6/14 DEXA  Total femur mean 0.880 g/cm² with a T score -1.0  Left femur neck 0.789 g/cm² with a T score -1.8  Spine L1-L4 1.153 g/cm² with a T score -0.2  Osteopenia with low to moderate risk of fracture          Immunization History   Administered Date(s) Administered    Hepatitis A, Adult 06/05/2019    Influenza - High Dose - PF (65 years and older) 11/07/2007, 09/30/2008, 09/23/2009, 10/27/2010, 10/31/2011, 10/05/2015,  10/12/2018    Influenza - Quadrivalent 11/13/2014, 10/27/2016    Influenza - Trivalent - Adjuvanted - PF 10/23/2017    PPD Test 03/28/2011    Pneumococcal Conjugate 12/10/2013    Pneumococcal Conjugate - 13 Valent 12/10/2013    Pneumococcal Polysaccharide - 23 Valent 11/07/2007, 03/23/2017    Tdap 09/15/2010, 04/10/2013    Zoster 04/10/2013         Assessment:       1. Sjogren's syndrome, with unspecified organ involvement    2. Rheumatoid arthritis involving multiple sites with positive rheumatoid factor    3. High risk medication use    4. Drug-induced lupus erythematosus    5. Immunocompromised        1. RA well controlled on -       Plaquenil 200 mg twice daily and  Cimzia 400 mg was Q 4-5  weeks-   Today she has 0 tender/0 swollen joints. MARGARITA 0,  CDAI 0    hand and foot x-ray up to date 6/2019  stable no changes     In the past failed mtx monotherapy  Failed humira and enbrel    2. Sjogrens - dryness of eyes and mouth. On evoxac 3-4 X daily and plasma serum eye drops prescribed by ophthalmologist.   evoxac poorly covered by insurance cost $160/month- now on salagen doing fair, lower cost    3. 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 Osteopenia - DEXA stable at both FN, femur mean and spine- mod-high rask- will follow  Currently taking estradiol, progesterone, calcium by diet only, and vit D 2000 international units daily.   In the past failed a trial of oral bisphosphonate secondary GI intolerance    5. Vaccination status: up to date including flu vaccine     6. Medication Monitoring- no current issues, no evidence of toxicity    7. Immunocompromised no issues with recurrent infections    8. Fibromyalgia with ongoing fatigue, sleep disturbances, myofascial pain ongoing depression- out of  Cymbalta, on Wellbutrin, some help with aquatic therapy     9.  R 5th MTP - pain/swelling- resolved post IA injection 4 weeks ago       Plan:         Orders Placed This Encounter    Procedures    Hepatitis B core antibody, total    Hepatitis B surface antigen    Hepatitis B surface antibody    Quantiferon Gold TB       Regarding RA meds  Stay off  mtx   C/w Plaquenil 200 mg daily. This  will decrease long term risk of eye issues     C/w cimizia CAM liopholised 400 mg Q 4 weeks - due today ok to administer   toradol 30 mg IM today and add prn in her cimzia therapy plan- no need for this today   Can get when she comes in for cimzia if she needs it for pain relief    Repeat her bilateral hand and foot x-rays in 1 year last done 6/2019     HD flu shot today     For fibromyalgia and romel hip bursitis   Completed- PT, try to c/w exercise at home   Epsom warm bath soaks  C/w  wellbutrin to 300 mg Q daily  C/w cymbalta 60 mg/d, good Rx coupon is lower than cost with insurance  consider gabapentin in future     C/w salagen 5 mg 3-4 X daily, good Rx coupon  Can also try OTC Xylimelts or Thera Breath lozenges for dry mouth.  Nasogel  Cool mist humidier  Nighttime eye ointment, use sleep mask      C/w eye doc, yearly eye checks for plaquenil and c/w her plasma serum eye drops.       Dexa  Scan again .  2-3 years. Repeat dexa every 2 years.   At next dexa if drops any will need to treat  For now c/w cut back on her vit D to no higher than 1000 IU daily; d level is 80 and calcium in diet,       Use Voltaren gel Right 5th mtp  and iburpfen prn not daily   See gi for her reflux issues       rtc 4 and 8 weeks cimzia apt  rtc 12 weeks OV/ giovanni for cimzia- no labs -  Toxicity Labs every 3-4 months     Tb gold and hep panel later this year     Call with any questions, changes or concerns.                  copy Pasha Estrada study

## 2019-09-25 ENCOUNTER — HOSPITAL ENCOUNTER (OUTPATIENT)
Dept: RADIOLOGY | Facility: HOSPITAL | Age: 69
Discharge: HOME | End: 2019-09-25
Attending: PHYSICIAN ASSISTANT
Payer: MEDICARE

## 2019-09-25 DIAGNOSIS — J32.9 CHRONIC SINUSITIS, UNSPECIFIED LOCATION: ICD-10-CM

## 2019-09-25 PROCEDURE — 70486 CT MEDTRONIC SINUSES WITHOUT: ICD-10-PCS | Mod: 26,,, | Performed by: RADIOLOGY

## 2019-09-25 PROCEDURE — 70486 CT MAXILLOFACIAL W/O DYE: CPT | Mod: 26,,, | Performed by: RADIOLOGY

## 2019-09-25 PROCEDURE — 70486 CT MAXILLOFACIAL W/O DYE: CPT | Mod: TC

## 2019-09-26 ENCOUNTER — TELEPHONE (OUTPATIENT)
Dept: OTOLARYNGOLOGY | Facility: CLINIC | Age: 69
End: 2019-09-26

## 2019-09-26 ENCOUNTER — PATIENT MESSAGE (OUTPATIENT)
Dept: OTOLARYNGOLOGY | Facility: CLINIC | Age: 69
End: 2019-09-26

## 2019-09-26 RX ORDER — LEVOFLOXACIN 500 MG/1
500 TABLET, FILM COATED ORAL DAILY
Qty: 21 TABLET | Refills: 0 | Status: SHIPPED | OUTPATIENT
Start: 2019-09-26 | End: 2019-10-17

## 2019-09-26 RX ORDER — PREDNISONE 20 MG/1
TABLET ORAL
Qty: 21 TABLET | Refills: 0 | Status: SHIPPED | OUTPATIENT
Start: 2019-09-26 | End: 2019-12-12 | Stop reason: ALTCHOICE

## 2019-09-26 NOTE — TELEPHONE ENCOUNTER
Pt informed of CT Results and Medications that have been called in. Pt verbalized understanding.        ----- Message from Agatha Guerrero PA-C sent at 9/26/2019  9:14 AM CDT -----  Please let pt know her CT of her sinuses returned positive for significant sinus infection, worse on the right side. I sent an Rx for Levaquin x 21 days and prednisone taper,pt should start this and f/u after completing with Dr. Freeman. Thank you!

## 2019-10-08 ENCOUNTER — PATIENT MESSAGE (OUTPATIENT)
Dept: OTOLARYNGOLOGY | Facility: CLINIC | Age: 69
End: 2019-10-08

## 2019-10-14 ENCOUNTER — PATIENT MESSAGE (OUTPATIENT)
Dept: FAMILY MEDICINE | Facility: CLINIC | Age: 69
End: 2019-10-14

## 2019-10-14 RX ORDER — OSELTAMIVIR PHOSPHATE 75 MG/1
75 CAPSULE ORAL 2 TIMES DAILY
Qty: 10 CAPSULE | Refills: 0 | Status: SHIPPED | OUTPATIENT
Start: 2019-10-14 | End: 2019-10-19

## 2019-10-15 RX ORDER — AMOXICILLIN AND CLAVULANATE POTASSIUM 875; 125 MG/1; MG/1
1 TABLET, FILM COATED ORAL 2 TIMES DAILY
Qty: 20 TABLET | Refills: 0 | Status: SHIPPED | OUTPATIENT
Start: 2019-10-15 | End: 2019-10-15

## 2019-10-15 RX ORDER — PREDNISONE 20 MG/1
TABLET ORAL
Qty: 21 TABLET | Refills: 0 | OUTPATIENT
Start: 2019-10-15

## 2019-10-15 RX ORDER — LEVOFLOXACIN 500 MG/1
500 TABLET, FILM COATED ORAL DAILY
Qty: 21 TABLET | Refills: 0 | OUTPATIENT
Start: 2019-10-15 | End: 2019-11-05

## 2019-10-21 ENCOUNTER — OFFICE VISIT (OUTPATIENT)
Dept: FAMILY MEDICINE | Facility: CLINIC | Age: 69
End: 2019-10-21
Payer: MEDICARE

## 2019-10-21 VITALS
WEIGHT: 147.06 LBS | TEMPERATURE: 98 F | BODY MASS INDEX: 24.5 KG/M2 | SYSTOLIC BLOOD PRESSURE: 138 MMHG | HEIGHT: 65 IN | HEART RATE: 80 BPM | DIASTOLIC BLOOD PRESSURE: 84 MMHG | OXYGEN SATURATION: 95 %

## 2019-10-21 DIAGNOSIS — F41.8 ANXIETY ASSOCIATED WITH DEPRESSION: ICD-10-CM

## 2019-10-21 DIAGNOSIS — I10 ESSENTIAL HYPERTENSION: Primary | ICD-10-CM

## 2019-10-21 DIAGNOSIS — M35.00 SJOGREN'S SYNDROME, WITH UNSPECIFIED ORGAN INVOLVEMENT: ICD-10-CM

## 2019-10-21 DIAGNOSIS — E03.9 ACQUIRED HYPOTHYROIDISM: ICD-10-CM

## 2019-10-21 DIAGNOSIS — D64.9 ANEMIA, UNSPECIFIED TYPE: ICD-10-CM

## 2019-10-21 DIAGNOSIS — M05.79 RHEUMATOID ARTHRITIS INVOLVING MULTIPLE SITES WITH POSITIVE RHEUMATOID FACTOR: ICD-10-CM

## 2019-10-21 DIAGNOSIS — Z78.0 ASYMPTOMATIC POSTMENOPAUSAL STATE: ICD-10-CM

## 2019-10-21 DIAGNOSIS — D84.9 IMMUNOCOMPROMISED: ICD-10-CM

## 2019-10-21 DIAGNOSIS — Z29.9 PREVENTIVE MEASURE: ICD-10-CM

## 2019-10-21 PROCEDURE — 99214 OFFICE O/P EST MOD 30 MIN: CPT | Mod: S$PBB,,, | Performed by: INTERNAL MEDICINE

## 2019-10-21 PROCEDURE — 99214 PR OFFICE/OUTPT VISIT, EST, LEVL IV, 30-39 MIN: ICD-10-PCS | Mod: S$PBB,,, | Performed by: INTERNAL MEDICINE

## 2019-10-21 PROCEDURE — 99999 PR PBB SHADOW E&M-EST. PATIENT-LVL III: CPT | Mod: PBBFAC,,, | Performed by: INTERNAL MEDICINE

## 2019-10-21 PROCEDURE — 99999 PR PBB SHADOW E&M-EST. PATIENT-LVL III: ICD-10-PCS | Mod: PBBFAC,,, | Performed by: INTERNAL MEDICINE

## 2019-10-21 PROCEDURE — 99213 OFFICE O/P EST LOW 20 MIN: CPT | Mod: PBBFAC,PO | Performed by: INTERNAL MEDICINE

## 2019-10-21 RX ORDER — FLUTICASONE PROPIONATE 50 MCG
SPRAY, SUSPENSION (ML) NASAL
COMMUNITY
Start: 2019-09-28 | End: 2021-02-23 | Stop reason: SDUPTHER

## 2019-10-21 NOTE — PROGRESS NOTES
"Subjective:      Patient ID: Leticia Piña is a 69 y.o. female.    Chief Complaint: Follow-up      HPI  Pt here for follow up of medical problems and treated for flu last week.  Max fever 102, achy/HA.  This occurred after prednisone/levaquin for sinusitis.  [facility].  FM better lately, so less anxiety.  Sinus sx good with flonase/astelin.  No f/c/sw/cough.  No cp/sob/palp.  No n/v/d.    No abd pain.  No black or blood in stool.  No hx ulcer.  NSAIDs occasionally to rare.    Updated/ annual due 6/20:  HM: 10/19 fluvax, 6/19 HAV, 12/13 [id number], 3/17 booster [id number], 4/13 TDaP, 4/13 zoster, 12/17 BMD rep 2y, 12/11 Cscope rep 10y, 3/19 MMG, 11/17 Gyn Dr. Jimenez, 3/17 HCV neg.     Review of Systems   Constitutional: Negative for chills, diaphoresis and fever.   Respiratory: Negative for cough and shortness of breath.    Cardiovascular: Negative for chest pain, palpitations and leg swelling.   Gastrointestinal: Negative for blood in stool, constipation, diarrhea, nausea and vomiting.   Genitourinary: Negative for dysuria, frequency and hematuria.   Psychiatric/Behavioral: The patient is not nervous/anxious.          Objective:   /84 (BP Location: Right arm, Patient Position: Sitting, BP Method: Medium (Manual))   Pulse 80   Temp 98.3 °F (36.8 °C) (Tympanic)   Ht 5' 5" (1.651 m)   Wt 66.7 kg (147 lb 0.8 oz)   SpO2 95%   BMI 24.47 kg/m²     Physical Exam   Constitutional: She is oriented to person, place, and time. She appears well-developed.   HENT:   Mouth/Throat: Oropharynx is clear and moist.   Neck: Neck supple. Carotid bruit is not present. No thyroid mass present.   Cardiovascular: Normal rate, regular rhythm and intact distal pulses. Exam reveals no gallop and no friction rub.   No murmur heard.  Pulmonary/Chest: Effort normal and breath sounds normal. She has no wheezes. She has no rales.   Abdominal: Soft. Bowel sounds are normal. She exhibits no mass. There is no " hepatosplenomegaly. There is no tenderness.   Musculoskeletal: She exhibits no edema.   Lymphadenopathy:     She has no cervical adenopathy.   Neurological: She is alert and oriented to person, place, and time.   Psychiatric: She has a normal mood and affect.           Assessment:       1. Essential hypertension    2. Anemia, unspecified type    3. Asymptomatic postmenopausal state    4. Acquired hypothyroidism    5. Anxiety associated with depression    6. Sjogren's syndrome, with unspecified organ involvement    7. Rheumatoid arthritis involving multiple sites with positive rheumatoid factor    8. Immunocompromised    9. Preventive measure          Plan:     Asymptomatic postmenopausal state  -     DXA Bone Density Spine And Hip; Future; Expected date: 10/21/2019    Essential hypertension- stable, cont rx.    Acquired hypothyroidism- Clinically stable, continue present treatment.    Anxiety associated with depression- doing much better, added CBD oil.    Sjogren's syndrome, with unspecified organ involvement, Rheumatoid arthritis involving multiple sites with positive rheumatoid factor, Immunocompromised    Anemia, unspecified type  -     Ambulatory consult to Hematology / Oncology    Preventive measure  -     Mammo Digital Screening Bilat; Future; Expected date: 10/21/2019    RTC 5 mo for female exam and f/u.

## 2019-10-22 ENCOUNTER — INFUSION (OUTPATIENT)
Dept: RHEUMATOLOGY | Facility: HOSPITAL | Age: 69
End: 2019-10-22
Attending: PHYSICIAN ASSISTANT
Payer: MEDICARE

## 2019-10-22 VITALS
HEART RATE: 76 BPM | DIASTOLIC BLOOD PRESSURE: 83 MMHG | TEMPERATURE: 97 F | BODY MASS INDEX: 24.65 KG/M2 | WEIGHT: 148.13 LBS | OXYGEN SATURATION: 97 % | RESPIRATION RATE: 16 BRPM | SYSTOLIC BLOOD PRESSURE: 136 MMHG

## 2019-10-22 DIAGNOSIS — M05.79 RHEUMATOID ARTHRITIS INVOLVING MULTIPLE SITES WITH POSITIVE RHEUMATOID FACTOR: Primary | ICD-10-CM

## 2019-10-22 PROCEDURE — 63600175 PHARM REV CODE 636 W HCPCS: Mod: JG | Performed by: PHYSICIAN ASSISTANT

## 2019-10-22 PROCEDURE — 96372 THER/PROPH/DIAG INJ SC/IM: CPT

## 2019-10-22 RX ORDER — SODIUM CHLORIDE 0.9 % (FLUSH) 0.9 %
10 SYRINGE (ML) INJECTION
Status: CANCELLED | OUTPATIENT
Start: 2019-11-12

## 2019-10-22 RX ORDER — DIPHENHYDRAMINE HYDROCHLORIDE 50 MG/ML
12.5 INJECTION INTRAMUSCULAR; INTRAVENOUS ONCE AS NEEDED
Status: CANCELLED | OUTPATIENT
Start: 2019-11-12

## 2019-10-22 RX ORDER — KETOROLAC TROMETHAMINE 30 MG/ML
30 INJECTION, SOLUTION INTRAMUSCULAR; INTRAVENOUS ONCE
Status: CANCELLED
Start: 2019-11-12

## 2019-10-22 RX ORDER — HEPARIN 100 UNIT/ML
500 SYRINGE INTRAVENOUS
Status: CANCELLED | OUTPATIENT
Start: 2019-11-12

## 2019-10-22 RX ORDER — CERTOLIZUMAB PEGOL 200 MG/ML
400 INJECTION, SOLUTION SUBCUTANEOUS ONCE
Status: COMPLETED | OUTPATIENT
Start: 2019-10-22 | End: 2019-10-22

## 2019-10-22 RX ORDER — ACETAMINOPHEN 325 MG/1
650 TABLET ORAL ONCE AS NEEDED
Status: CANCELLED | OUTPATIENT
Start: 2019-11-12

## 2019-10-22 RX ADMIN — CERTOLIZUMAB PEGOL 400 MG: 200 INJECTION, SOLUTION SUBCUTANEOUS at 03:10

## 2019-10-27 ENCOUNTER — NURSE TRIAGE (OUTPATIENT)
Dept: ADMINISTRATIVE | Facility: CLINIC | Age: 69
End: 2019-10-27

## 2019-10-27 ENCOUNTER — OFFICE VISIT (OUTPATIENT)
Dept: URGENT CARE | Facility: CLINIC | Age: 69
End: 2019-10-27
Payer: MEDICARE

## 2019-10-27 VITALS
DIASTOLIC BLOOD PRESSURE: 74 MMHG | BODY MASS INDEX: 24.4 KG/M2 | TEMPERATURE: 100 F | OXYGEN SATURATION: 97 % | HEART RATE: 91 BPM | WEIGHT: 146.63 LBS | SYSTOLIC BLOOD PRESSURE: 128 MMHG

## 2019-10-27 DIAGNOSIS — Z79.2 PROPHYLACTIC ANTIBIOTIC: ICD-10-CM

## 2019-10-27 DIAGNOSIS — D84.9 IMMUNOCOMPROMISED: ICD-10-CM

## 2019-10-27 DIAGNOSIS — J06.9 UPPER RESPIRATORY TRACT INFECTION, UNSPECIFIED TYPE: Primary | ICD-10-CM

## 2019-10-27 DIAGNOSIS — R50.9 FEVER, UNSPECIFIED FEVER CAUSE: ICD-10-CM

## 2019-10-27 PROCEDURE — 99214 OFFICE O/P EST MOD 30 MIN: CPT | Mod: S$PBB,,, | Performed by: NURSE PRACTITIONER

## 2019-10-27 PROCEDURE — 99999 PR PBB SHADOW E&M-EST. PATIENT-LVL III: ICD-10-PCS | Mod: PBBFAC,,, | Performed by: NURSE PRACTITIONER

## 2019-10-27 PROCEDURE — 99999 PR PBB SHADOW E&M-EST. PATIENT-LVL III: CPT | Mod: PBBFAC,,, | Performed by: NURSE PRACTITIONER

## 2019-10-27 PROCEDURE — 99213 OFFICE O/P EST LOW 20 MIN: CPT | Mod: PBBFAC,PO | Performed by: NURSE PRACTITIONER

## 2019-10-27 PROCEDURE — 99214 PR OFFICE/OUTPT VISIT, EST, LEVL IV, 30-39 MIN: ICD-10-PCS | Mod: S$PBB,,, | Performed by: NURSE PRACTITIONER

## 2019-10-27 RX ORDER — LEVOFLOXACIN 250 MG/1
250 TABLET ORAL DAILY
Qty: 7 TABLET | Refills: 0 | Status: SHIPPED | OUTPATIENT
Start: 2019-10-27 | End: 2019-11-03

## 2019-10-27 NOTE — PATIENT INSTRUCTIONS
Viral Upper Respiratory Illness (Adult)  You have a viral upper respiratory illness (URI), which is another term for the common cold. This illness is contagious during the first few days. It is spread through the air by coughing and sneezing. It may also be spread by direct contact (touching the sick person and then touching your own eyes, nose, or mouth). Frequent handwashing will decrease risk of spread. Most viral illnesses go away within 7 to 10 days with rest and simple home remedies. Sometimes the illness may last for several weeks. Antibiotics will not kill a virus, and they are generally not prescribed for this condition.    Home care  · If symptoms are severe, rest at home for the first 2 to 3 days. When you resume activity, don't let yourself get too tired.  · Avoid being exposed to cigarette smoke (yours or others).  · You may use acetaminophen or ibuprofen to control pain and fever, unless another medicine was prescribed. (Note: If you have chronic liver or kidney disease, have ever had a stomach ulcer or gastrointestinal bleeding, or are taking blood-thinning medicines, talk with your healthcare provider before using these medicines.) Aspirin should never be given to anyone under 18 years of age who is ill with a viral infection or fever. It may cause severe liver or brain damage.  · Your appetite may be poor, so a light diet is fine. Avoid dehydration by drinking 6 to 8 glasses of fluids per day (water, soft drinks, juices, tea, or soup). Extra fluids will help loosen secretions in the nose and lungs.  · Over-the-counter cold medicines will not shorten the length of time youre sick, but they may be helpful for the following symptoms: cough, sore throat, and nasal and sinus congestion. (Note: Do not use decongestants if you have high blood pressure.)  Follow-up care  Follow up with your healthcare provider, or as advised.  When to seek medical advice  Call your healthcare provider right away if any  of these occur:  · Cough with lots of colored sputum (mucus)  · Severe headache; face, neck, or ear pain  · Difficulty swallowing due to throat pain  · Fever of 100.4°F (38°C)  Call 911, or get immediate medical care  Call emergency services right away if any of these occur:  · Chest pain, shortness of breath, wheezing, or difficulty breathing  · Coughing up blood  · Inability to swallow due to throat pain  Date Last Reviewed: 9/13/2015 © 2000-2017 [employer]. [address] [place] [id number]. All rights reserved. This information is not intended as a substitute for professional medical care. Always follow your healthcare professional's instructions.          Viral Upper Respiratory Illness (Adult)  You have a viral upper respiratory illness (URI), which is another term for the common cold. This illness is contagious during the first few days. It is spread through the air by coughing and sneezing. It may also be spread by direct contact (touching the sick person and then touching your own eyes, nose, or mouth). Frequent handwashing will decrease risk of spread. Most viral illnesses go away within 7 to 10 days with rest and simple home remedies. Sometimes the illness may last for several weeks. Antibiotics will not kill a virus, and they are generally not prescribed for this condition.    Home care  · If symptoms are severe, rest at home for the first 2 to 3 days. When you resume activity, don't let yourself get too tired.  · Avoid being exposed to cigarette smoke (yours or others).  · You may use acetaminophen or ibuprofen to control pain and fever, unless another medicine was prescribed. (Note: If you have chronic liver or kidney disease, have ever had a stomach ulcer or gastrointestinal bleeding, or are taking blood-thinning medicines, talk with your healthcare provider before using these medicines.) Aspirin should never be given to anyone under 18 years of age who is ill with a viral  infection or fever. It may cause severe liver or brain damage.  · Your appetite may be poor, so a light diet is fine. Avoid dehydration by drinking 6 to 8 glasses of fluids per day (water, soft drinks, juices, tea, or soup). Extra fluids will help loosen secretions in the nose and lungs.  · Over-the-counter cold medicines will not shorten the length of time youre sick, but they may be helpful for the following symptoms: cough, sore throat, and nasal and sinus congestion. (Note: Do not use decongestants if you have high blood pressure.)  Follow-up care  Follow up with your healthcare provider, or as advised.  When to seek medical advice  Call your healthcare provider right away if any of these occur:  · Cough with lots of colored sputum (mucus)  · Severe headache; face, neck, or ear pain  · Difficulty swallowing due to throat pain  · Fever of 100.4°F (38°C)  Call 911, or get immediate medical care  Call emergency services right away if any of these occur:  · Chest pain, shortness of breath, wheezing, or difficulty breathing  · Coughing up blood  · Inability to swallow due to throat pain  Date Last Reviewed: 9/13/2015  © 8121-0501 [employer]. [address] [place] [address], [place] [address]. All rights reserved. This information is not intended as a substitute for professional medical care. Always follow your healthcare professional's instructions.          Preventing Common Respiratory Infections  Respiratory infections such as colds and influenza (the flu) are common in winter. These infections are often caused by viruses. They may share some symptoms, but not all respiratory infections are the same. Some make you more sick than others. You can take steps to prevent common respiratory infections. And if you get sick, you can take care of yourself to keep the infection from getting worse.    What is a cold?  · Symptoms include runny nose, coughing and sneezing, and sore throat. Cold symptoms tend to be  milder than flu symptoms.  · Symptoms tend to come on slowly. They last for a few days to about a week.  · With a cold, you can still do most of the things you usually do.  What is the flu?  · Symptoms include fever, headache, fatigue, cough, sore throat, runny nose, and muscle aches. Children may have upset stomach and vomiting, but adults usually dont.  · Symptoms tend to come on quickly. Some, such as fatigue and cough, can last a few weeks.  · With the flu, you may feel worn out and not able to do normal activities.  · Its most likely NOT the flu if an adult has vomiting or diarrhea for a day or two. This so-called stomach flu is probably a GI (gastrointestinal) infection.  When the infection gets worse  Without proper care, a respiratory infection can get worse. It can lead to serious complications and death. If you arent getting better, call your healthcare provider. Complications can include:  · Bronchitis (infection of the airways that leads to shortness of breath and coughing up thick yellow or green mucus)  · Pneumonia (infection of the lungs in which fluid and mucus settle in the lungs, making breathing difficult)  · Worsening of chronic conditions such as heart failure, chronic lung disease, asthma, or diabetes  · Severe dehydration (loss of fluids)  · Sinus problems  · Ear infections   Get a flu vaccine  A flu vaccine protects you from influenza (but not other colds or infections). Get a vaccine each fall, before flu season starts. This can be done at a clinic, healthcare providers office, drugstore, senior center, or through your workplace.  Get pneumococcal vaccines  Pneumonia can be a complication of influenza. There are 2 pneumococcal pneumonia vaccines that protect against many types of pneumonia. Talk with your healthcare provider about these important vaccines.   Keep germs from spreading  No one likes getting sick. To protect yourself and others from cold and flu germs:  · Wash your hands  often. Use alcohol-based hand  when you dont have access to soap and water.  · Dont touch your eyes, nose, and mouth. This may help you keep germs out of your body.  · Try to avoid people with respiratory infections. You may want to stay out of crowds during flu season (winter).  · Ask your healthcare provider if you should get a pneumonia vaccination.  How to wash your hands  · Use warm water and plenty of soap. Work up a good lather.  · Clean your whole hand, under your nails, between your fingers, and up your wrists. Wash for at least 15 to 20 seconds. Dont just wipe--rub well.  · Rinse. Let the water run down your fingertips, not up your wrists.  · In a public restroom, use a paper towel to turn off the faucet and open the door.   Date Last Reviewed: 12/1/2016  © 3127-1815 [employer]. [address] [place] [address], [place] [address]. All rights reserved. This information is not intended as a substitute for professional medical care. Always follow your healthcare professional's instructions.

## 2019-10-27 NOTE — PROGRESS NOTES
Subjective:       Patient ID: Leticia Piña is a 69 y.o. female.    Vitals:  weight is 66.5 kg (146 lb 9.7 oz). Her temperature is 99.6 °F (37.6 °C). Her blood pressure is 128/74 and her pulse is 91. Her oxygen saturation is 97%.     Chief Complaint: Dizziness; Fever (just got over flu and sinus infection); and Chills    Fever    This is a new problem. The current episode started yesterday. The problem occurs intermittently. The problem has been waxing and waning. Associated symptoms include headaches (occasional frontal). Associated symptoms comments: Dizzy, achy, chills, weakness, PND. She has tried nothing for the symptoms.   Risk factors: recent sickness and sick contacts        Constitution: Positive for chills, fatigue, fever and generalized weakness.   HENT: Positive for postnasal drip.    Musculoskeletal: Positive for muscle ache.   Neurological: Positive for dizziness and headaches (occasional frontal).       Objective:      Physical Exam   Constitutional: She is oriented to person, place, and time. Vital signs are normal. She appears well-developed and well-nourished. She is cooperative. No distress.   HENT:   Head: Normocephalic.   Right Ear: Hearing, tympanic membrane, external ear and ear canal normal.   Left Ear: Hearing, tympanic membrane, external ear and ear canal normal.   Nose: Right sinus exhibits no maxillary sinus tenderness and no frontal sinus tenderness. Left sinus exhibits no maxillary sinus tenderness and no frontal sinus tenderness.   Mouth/Throat: Uvula is midline and mucous membranes are normal. No oral lesions. No uvula swelling. Posterior oropharyngeal erythema (mild) present. No oropharyngeal exudate.   Scant amount of dried bloody drainage inner right nostril   Eyes: Conjunctivae and lids are normal. Right eye exhibits no discharge. Left eye exhibits no discharge.   Neck: Normal range of motion and full passive range of motion without pain. Neck supple. No tracheal tenderness  present.   Cardiovascular: Normal rate, regular rhythm and normal heart sounds.   Pulmonary/Chest: Effort normal and breath sounds normal. No accessory muscle usage. No tachypnea and no bradypnea. No respiratory distress.   Musculoskeletal: Normal range of motion.   Lymphadenopathy:        Head (right side): No submandibular and no tonsillar adenopathy present.        Head (left side): No submandibular and no tonsillar adenopathy present.     She has no cervical adenopathy.   Neurological: She is alert and oriented to person, place, and time.   Skin: Skin is warm, dry, intact, not diaphoretic, not pale and no rash. Capillary refill takes less than 2 seconds.   Nursing note and vitals reviewed.        Assessment:       1. Upper respiratory tract infection, unspecified type    2. Fever, unspecified fever cause    3. Prophylactic antibiotic    4. Immunocompromised        Plan:         Upper respiratory tract infection, unspecified type    Fever, unspecified fever cause  -     levoFLOXacin (LEVAQUIN) 250 MG tablet; Take 1 tablet (250 mg total) by mouth once daily. for 7 days  Dispense: 7 tablet; Refill: 0    Prophylactic antibiotic  -     levoFLOXacin (LEVAQUIN) 250 MG tablet; Take 1 tablet (250 mg total) by mouth once daily. for 7 days  Dispense: 7 tablet; Refill: 0    Immunocompromised  -     levoFLOXacin (LEVAQUIN) 250 MG tablet; Take 1 tablet (250 mg total) by mouth once daily. for 7 days  Dispense: 7 tablet; Refill: 0            · Getting plenty of rest is very important to fighting infections.  · Increase fluids.   · May apply warm compresses as needed for facial pain and congestion.   · Saline nasal spray to loosen nasal congestion.  · Flonase or Nasacort to reduce inflammation in the sinus cavities.  · You may take an over the counter antihistamine for allergy symptoms such as sneezing, itchy/watery eyes, scratchy throat, or congestion.  · Take Tylenol or Ibuprofen as needed for sore throat, body aches, or  fever.  · Follow up with your primary care provider if symptoms persist >10 days or sooner for any new or worsening symptoms.   Go to the ER for any fever that does not improve with Tylenol/Ibuprofen, neck stiffness, rash, severe headache, vision changes, shortness of breath, chest pain, facial swelling, severe facial pain, or any other new and concerning symptoms.

## 2019-10-27 NOTE — TELEPHONE ENCOUNTER
Reason for Disposition   [1] HIGH RISK (e.g., age > 64 years, pregnant, HIV+, or chronic medical condition) AND [2]  > 72 hours (3 days) since evaluated by HCP AND [3] symptoms not improved   [1] Fever > 100.0 F (37.8 C) AND [2] diabetes mellitus or weak immune system (e.g., HIV positive, cancer chemo, splenectomy, chronic steroids)    Additional Information   Negative: Severe difficulty breathing (e.g., struggling for each breath, speaks in single words)   Negative: Bluish (or gray) lips or face now   Negative: Shock suspected (e.g., cold/pale/clammy skin, too weak to stand, low BP, rapid pulse)   Negative: Sounds like a life-threatening emergency to the triager   Negative: [1] Asthma attack (coughing, wheezing) is main concern AND [2] previously diagnosed with asthma OR using asthma medicines   Negative: [1] Sinus infection AND [2] taking an antibiotic   Negative: Taking antibiotics for an ear infection   Negative: Chest pain  (Exception: MILD central chest pain, present only when coughing)   Negative: Headache and stiff neck (can't touch chin to chest)   Negative: [1] Difficulty breathing AND [2] not severe AND [3] not from stuffy nose (e.g., not relieved by cleaning out the nose)   Negative: Fever > 104 F (40 C)   Negative: Patient sounds very sick or weak to the triager   Negative: Fever present > 3 days (72 hours)   Negative: [1] Fever returns after gone for over 24 hours AND [2] symptoms worse or not improved   Negative: [1] Using nasal washes and pain medicine > 24 hours AND [2] sinus pain (around cheekbone or eye) persists   Negative: Earache   Negative: Severe difficulty breathing (e.g., struggling for each breath, speaks in single words)   Negative: [1] Difficulty breathing or swallowing AND [2] started suddenly after medicine, an allergic food or bee sting   Negative: Shock suspected (e.g., cold/pale/clammy skin, too weak to stand, low BP, rapid pulse)   Negative: Difficult to  "awaken or acting confused (e.g., disoriented, slurred speech)   Negative: [1] Weakness (i.e., paralysis, loss of muscle strength) of the face, arm or leg on one side of the body AND [2] sudden onset AND [3] present now   Negative: [1] Numbness (i.e., loss of sensation) of the face, arm or leg on one side of the body AND [2] sudden onset AND [3] present now   Negative: [1] Loss of speech or garbled speech AND [2] sudden onset AND [3] present now   Negative: Overdose (accidental or intentional) of medications   Negative: [1] Fainted > 15 minutes ago AND [2] still feels too weak or dizzy to stand   Negative: Heart beating < 50 beats per minute OR > 140 beats per minute   Negative: Sounds like a life-threatening emergency to the triager   Negative: Chest pain   Negative: Rectal bleeding, bloody stool, or tarry-black stool   Negative: [1] Vomiting AND [2] contains red blood or black ("coffee ground") material   Negative: Vomiting is main symptom   Negative: Diarrhea is main symptom   Negative: Headache is main symptom   Negative: Patient states that he/she is having an anxiety/panic attack   Negative: Dizziness from low blood sugar (i.e., < 60 mg/dl or 3.5 mmol/l)   Negative: Dizziness is described as a spinning sensation (i.e., vertigo)   Negative: Heat exhaustion suspected (i.e., dehydration from heat exposure)   Negative: Difficulty breathing   Negative: SEVERE dizziness (e.g., unable to stand, requires support to walk, feels like passing out now)   Negative: Extra heart beats OR irregular heart beating  (i.e., "palpitations")   Negative: [1] Drinking very little AND [2] dehydration suspected (e.g., no urine > 12 hours, very dry mouth, very lightheaded)   Negative: Patient sounds very sick or weak to the triager   Negative: [1] Dizziness caused by heat exposure, sudden standing, or poor fluid intake AND [2] no improvement after 2 hours of rest and fluids   Negative: [1] Fever > 103 F (39.4 C) " AND [2] not able to get the fever down using Fever Care Advice   Negative: [1] Fever > 101 F (38.3 C) AND [2] age > 60   Negative: [1] Fever > 100.0 F (37.8 C) AND [2] bedridden (e.g., nursing home patient, CVA, chronic illness, recovering from surgery)    Protocols used: INFLUENZA FOLLOW-UP CALL-A-, DIZZINESS - [id number]    Pt stated she was recently treated for the flu a week ago. Pt stated she feels light headed and dizzy and has a temp of 100.1 Pt stated she still feels bad, but not as bad as she felt a week ago. Per triage protocol, Pt advised to see Physician within 4 hours. Pt verbalized understanding and asked for address to [facility].

## 2019-10-28 ENCOUNTER — TELEPHONE (OUTPATIENT)
Dept: OTOLARYNGOLOGY | Facility: CLINIC | Age: 69
End: 2019-10-28

## 2019-10-28 ENCOUNTER — OFFICE VISIT (OUTPATIENT)
Dept: OTOLARYNGOLOGY | Facility: CLINIC | Age: 69
End: 2019-10-28
Payer: MEDICARE

## 2019-10-28 VITALS — HEIGHT: 65 IN | WEIGHT: 144.19 LBS | BODY MASS INDEX: 24.02 KG/M2 | TEMPERATURE: 99 F

## 2019-10-28 DIAGNOSIS — J32.4 CHRONIC PANSINUSITIS: Primary | ICD-10-CM

## 2019-10-28 DIAGNOSIS — R05.9 COUGH: ICD-10-CM

## 2019-10-28 LAB
INFLUENZA A, MOLECULAR: NEGATIVE
INFLUENZA B, MOLECULAR: NEGATIVE
SPECIMEN SOURCE: NORMAL

## 2019-10-28 PROCEDURE — 99213 OFFICE O/P EST LOW 20 MIN: CPT | Mod: PBBFAC | Performed by: PHYSICIAN ASSISTANT

## 2019-10-28 PROCEDURE — 99213 PR OFFICE/OUTPT VISIT, EST, LEVL III, 20-29 MIN: ICD-10-PCS | Mod: S$PBB,,, | Performed by: PHYSICIAN ASSISTANT

## 2019-10-28 PROCEDURE — 99999 PR PBB SHADOW E&M-EST. PATIENT-LVL III: CPT | Mod: PBBFAC,,, | Performed by: PHYSICIAN ASSISTANT

## 2019-10-28 PROCEDURE — 99999 PR PBB SHADOW E&M-EST. PATIENT-LVL III: ICD-10-PCS | Mod: PBBFAC,,, | Performed by: PHYSICIAN ASSISTANT

## 2019-10-28 PROCEDURE — 87502 INFLUENZA DNA AMP PROBE: CPT

## 2019-10-28 PROCEDURE — 99213 OFFICE O/P EST LOW 20 MIN: CPT | Mod: S$PBB,,, | Performed by: PHYSICIAN ASSISTANT

## 2019-10-28 NOTE — TELEPHONE ENCOUNTER
Attempted to contact pt no answer left message to return call.      ----- Message from Gayle Love sent at 10/28/2019 10:23 AM CDT -----  Contact: patient   .Type:  Patient Returning Call    Who Called:Leticia Piña   Who Left Message for Patient:unsure  Does the patient know what this is regarding?:dealing w/ today's appointment   Would the patient rather a call back or a response via [facility]? call  Best Call Back Number:[phone]  Additional Information: n/a

## 2019-10-28 NOTE — PROGRESS NOTES
Subjective:   Patient: Leticia Piña [id number], :1950   Visit date:10/28/2019 12:06 PM    Chief Complaint:  Other (Pt states that she is having headach and fever. She finished up some antiboitics and was dx with the flu aprox 2 weeks ago.)    HPI:  Leticia is a 69 y.o. female who is here for follow-up. She was last here in September, w/u w/ CT at time was significant for sinus disease & pt was tx with Levaquin x 21 days and prednisone taper. After completing this, she reported flu like symptoms to both her PCP and rheumatologist approximately 2 weeks ago and was tx with Tamiflu. This did help. Pt was not seen in clinic/ no swab. She c/o increase in congestion and HA resuming this weekend. No significant discolored drainage. +productive cough. She follows with Dr. Alvarez in rheumatology and is on certolizumab injection. 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 Hx of Sjogren's. Currently also on Xyzal, Flonase, and Astelin. No relieving factors.         Review of Systems:  -     Allergic/Immunologic: is allergic to erythromycin; humira  [adalimumab]; and sulfa (sulfonamide antibiotics)..  -     Constitutional: Current temp: 98.7 °F (37.1 °C) (Tympanic)    Her meds, allergies, medical, surgical, social & family histories were reviewed & updated:  -     She has a current medication list which includes the following prescription(s): azelastine, bupropion, cholecalciferol (vitamin d3), cyanocobalamin (vitamin b-12), doxycycline, duloxetine, fluticasone propionate, folic acid, hydrochlorothiazide, hydroxychloroquine, levocetirizine, levofloxacin, levothyroxine, lorazepam, losartan, omeprazole, pilocarpine, trazodone, triamcinolone, and prednisone, and the following Facility-Administered Medications: cimzia.  -     She  has a past medical history of Allergic rhinitis, Cutaneous lupus erythematosus, Essential hypertension (2019), GERD (gastroesophageal reflux disease),  "Hypothyroid, Insomnia, Mixed anxiety and depressive disorder, Normal cardiac stress test, Rheumatoid arthritis(714.0), and Sjogren's syndrome.   -     She does not have any pertinent problems on file.   -     She  has a past surgical history that includes breast implants; breast surgery for rupture; TONSILLECTOMY, ADENOIDECTOMY; Tubal ligation; and Augmentation of breast.  -     She  reports that she has never smoked. She has never used smokeless tobacco. She reports that she drinks alcohol. She reports that she does not use drugs.  -     Her family history is not on file.  -     She is allergic to erythromycin; humira  [adalimumab]; and sulfa (sulfonamide antibiotics).    Objective:     Physical Exam:  Vitals:  Temp 98.7 °F (37.1 °C) (Tympanic)   Ht 5' 5" (1.651 m)   Wt 65.4 kg (144 lb 2.9 oz)   BMI 23.99 kg/m²   Appearance:  Well-developed, well-nourished.  Communication:  Able to communicate, no hoarseness.  Head & Face:  Normocephalic, atraumatic, no sinus tenderness, normal facial strength.  Eyes:  Extraocular motions intact.  Ears:  Otoscopy of external auditory canals and tympanic membranes was normal, clinical speech reception thresholds grossly intact, no mass/lesion of auricle.  Nose:  No masses/lesions of external nose, nasal mucosa, septum, and turbinates were within normal limits.  Mouth:  No mass/lesion of lips, teeth, gums, hard/soft palate, tongue, tonsils, or oropharynx.  Neck & Lymphatics:  No cervical lymphadenopathy, no neck mass/crepitus/ asymmetry, trachea is midline, no thyroid enlargement/tenderness/mass.  Neuro/Psych: Alert with normal mood and affect.   Abdominal: Normal appearance.   Respiration/Chest:  Symmetric expansion during respiration, normal respiratory effort.  Skin:  Warm and intact  Cardiovascular:  No peripheral vascular edema or varicosities.      CT [employer] SINUSES WITHOUT    CLINICAL HISTORY:  Sinusitis, recurring, possible surgery;  Chronic sinusitis, " unspecified    TECHNIQUE:  Axial low-dose images CT of the paranasal sinuses was performed.  Coronal reconstructions were obtained and reviewed.  Contrast was not administered.  A[facility]henscorner protocol was utilized.    COMPARISON:  Maxillofacial CT dated 05/22/2013    FINDINGS:  The frontal sinuses are clear bilaterally.    Small amount of pneumatized debris noted within the posterior most right ethmoid cell.  Ethmoid sinuses are otherwise clear bilaterally.    There is mild mucosal thickening present in the bilateral sphenoid sinuses.  Small osteoma noted in the right sphenoid sinus measuring up to 8 mm.  Bilateral sphenoethmoidal recesses appear somewhat narrowed secondary to thickened mucosa.    There is moderate mucosal thickening throughout the right maxillary sinus.  Right-sided ostiomeatal complex appears narrowed at the ostium secondary to thickened mucosa.  Left maxillary sinus appears clear.  Left ostiomeatal complex appears widely patent.    No air-fluid levels visualized.    Nasal cavity: No soft tissue masses visualized.  There is a small shannon bullosa involving the left middle turbinate.    The lamina papyracea and roof of the ethmoids are grossly intact bilaterally. No evidence of carotid canal dehiscence.      Impression       Paranasal sinus disease as above, predominately involving the right maxillary and bilateral sphenoid sinuses.    Small osteoma noted in the right sphenoid sinus.    Small left-sided shannon bullosa.           Assessment & Plan:   Leticia was seen today for other.    Diagnoses and all orders for this visit:    Chronic pansinusitis  -     CT [employer] Sinuses without; Future    Cough  -     Influenza A & B by Molecular    Flu swab and repeat CT - I will contact pt with results. If both are normal (insignificant), I advised her symptoms may be attributed to immunosuppressant medication(s) and I will refer her to immunologist to assess.         Tiffany Vincent, PA-C [facility]   [facility]  [address] [place] [address]  P: [phone]  F: [phone]

## 2019-11-03 DIAGNOSIS — I10 ESSENTIAL HYPERTENSION: ICD-10-CM

## 2019-11-03 RX ORDER — BUPROPION HYDROCHLORIDE 300 MG/1
TABLET ORAL
Qty: 60 TABLET | Refills: 0 | Status: SHIPPED | OUTPATIENT
Start: 2019-11-03 | End: 2019-12-09 | Stop reason: SDUPTHER

## 2019-11-03 RX ORDER — HYDROCHLOROTHIAZIDE 25 MG/1
TABLET ORAL
Qty: 90 TABLET | Refills: 3 | Status: SHIPPED | OUTPATIENT
Start: 2019-11-03 | End: 2019-12-16 | Stop reason: SDUPTHER

## 2019-11-04 ENCOUNTER — INITIAL CONSULT (OUTPATIENT)
Dept: HEMATOLOGY/ONCOLOGY | Facility: CLINIC | Age: 69
End: 2019-11-04
Payer: MEDICARE

## 2019-11-04 ENCOUNTER — HOSPITAL ENCOUNTER (OUTPATIENT)
Dept: RADIOLOGY | Facility: HOSPITAL | Age: 69
Discharge: HOME | End: 2019-11-04
Attending: PHYSICIAN ASSISTANT
Payer: MEDICARE

## 2019-11-04 VITALS
WEIGHT: 148.81 LBS | HEART RATE: 75 BPM | BODY MASS INDEX: 24.76 KG/M2 | OXYGEN SATURATION: 98 % | TEMPERATURE: 99 F | DIASTOLIC BLOOD PRESSURE: 81 MMHG | SYSTOLIC BLOOD PRESSURE: 157 MMHG

## 2019-11-04 DIAGNOSIS — J32.4 CHRONIC PANSINUSITIS: ICD-10-CM

## 2019-11-04 DIAGNOSIS — D64.9 ANEMIA, UNSPECIFIED TYPE: ICD-10-CM

## 2019-11-04 DIAGNOSIS — M05.79 RHEUMATOID ARTHRITIS INVOLVING MULTIPLE SITES WITH POSITIVE RHEUMATOID FACTOR: Primary | ICD-10-CM

## 2019-11-04 DIAGNOSIS — M35.00 SJOGREN'S SYNDROME, WITH UNSPECIFIED ORGAN INVOLVEMENT: ICD-10-CM

## 2019-11-04 PROCEDURE — 70486 CT MAXILLOFACIAL W/O DYE: CPT | Mod: TC

## 2019-11-04 PROCEDURE — 99999 PR PBB SHADOW E&M-EST. PATIENT-LVL III: CPT | Mod: PBBFAC,,, | Performed by: INTERNAL MEDICINE

## 2019-11-04 PROCEDURE — 99213 OFFICE O/P EST LOW 20 MIN: CPT | Mod: PBBFAC,25 | Performed by: INTERNAL MEDICINE

## 2019-11-04 PROCEDURE — 70486 CT MAXILLOFACIAL W/O DYE: CPT | Mod: 26,,, | Performed by: RADIOLOGY

## 2019-11-04 PROCEDURE — 99999 PR PBB SHADOW E&M-EST. PATIENT-LVL III: ICD-10-PCS | Mod: PBBFAC,,, | Performed by: INTERNAL MEDICINE

## 2019-11-04 PROCEDURE — 70486 CT MEDTRONIC SINUSES WITHOUT: ICD-10-PCS | Mod: 26,,, | Performed by: RADIOLOGY

## 2019-11-04 PROCEDURE — 99214 OFFICE O/P EST MOD 30 MIN: CPT | Mod: S$PBB,,, | Performed by: INTERNAL MEDICINE

## 2019-11-04 PROCEDURE — 99214 PR OFFICE/OUTPT VISIT, EST, LEVL IV, 30-39 MIN: ICD-10-PCS | Mod: S$PBB,,, | Performed by: INTERNAL MEDICINE

## 2019-11-04 RX ORDER — FERROUS GLUCONATE 324(38)MG
324 TABLET ORAL
COMMUNITY
End: 2021-04-06

## 2019-11-04 NOTE — LETTER
November 5, 2019      Nicole Jasmine MD  [address] [place] [address]           [facility]  [address]  [place] [address]  Phone: [phone]  Fax: [phone]          Patient: Leticia Piña   MR Number: [id number]   YOB: 1950   Date of Visit: 11/4/2019       Dear Dr. Nicole Jasmine:    Thank you for referring Leticia Piña to me for evaluation. Attached you will find relevant portions of my assessment and plan of care.    If you have questions, please do not hesitate to call me. I look forward to following Leticia Piña along with you.    Sincerely,    Maryuri Grace MD    Enclosure  CC:  No Recipients    If you would like to receive this communication electronically, please contact [email or web address] or [phone] to request more information on [facility] Link access.    For providers and/or their staff who would like to refer a patient to [facility], please contact us through our one-stop-shop provider referral line, [facility], at [phone].    If you feel you have received this communication in error or would no longer like to receive these types of communications, please e-mail [email or web address]

## 2019-11-05 ENCOUNTER — PATIENT MESSAGE (OUTPATIENT)
Dept: OTOLARYNGOLOGY | Facility: CLINIC | Age: 69
End: 2019-11-05

## 2019-11-05 ENCOUNTER — TELEPHONE (OUTPATIENT)
Dept: OTOLARYNGOLOGY | Facility: CLINIC | Age: 69
End: 2019-11-05

## 2019-11-05 DIAGNOSIS — J32.4 CHRONIC PANSINUSITIS: ICD-10-CM

## 2019-11-05 DIAGNOSIS — J30.89 NON-SEASONAL ALLERGIC RHINITIS, UNSPECIFIED TRIGGER: Primary | ICD-10-CM

## 2019-11-05 NOTE — TELEPHONE ENCOUNTER
Pt didn't answer left message to return call if her scheduled 2 mo follow up didn't work. Left call back number for pt to return call.      ----- Message from Agatha Guerrero PA-C sent at 11/5/2019  9:34 AM CST -----  I sent a message regarding the above pt to see Dr. Ruby in allergy (referral in). In addition, Dr. Freeman recommended a 2 mo f/u with us. Please schedule her a 2 mo f/u with Dr. Freeman. Thank you!

## 2019-11-05 NOTE — PROGRESS NOTES
Subjective:      DATE OF VISIT: 11/4/19     ?  Patient ID:?Leticia Piña is a 69 y.o. female.?? MR#: [id number]   ?   REFERRING PROVIDER: Nicole Jasmine MD  [address] [address] [place]    ? Primary Care Providers:  Nicole Jasmine MD, MD (General)     CHIEF COMPLAINT: ?Anemia??   ?   HPI    I had the pleasure meeting Ms. Piña, a 69-year-old woman with history notable for Sjogren's disease.  She was referred due to anemia. She notes hemoglobin greater than 12.5 is a requirement before she can donate her own blood/plasma to produce compounded medication for her dry eyes.    She has longstanding history of a mild anemia with hemoglobin 10-11.5.  She denies any notable change in energy level/fatigue, Pica.  She has no evidence of bleeding and denies melena, hematochezia, postmenopausal vaginal bleeding.  She is up-to-date on colonoscopy and Pap smears as well as mammography.  She had colonoscopy last in 2011 but has not had EGD.  She does note history of reflux and is taking PPI.  She has been using supplementation with oral iron ferrous sulfate 324 mg 1 tablet daily over the last 6 months.  She also takes a daily vitamin B12 supplement.    Review of Systems    ?   A comprehensive 14-point review of systems was reviewed with patient and was negative other than as specified above.   ?   PAST MEDICAL HISTORY:   Past Medical History:   Diagnosis Date    Allergic rhinitis     Cutaneous lupus erythematosus     drug induced.    Essential hypertension 2/5/2019    GERD (gastroesophageal reflux disease)     Hypothyroid     Insomnia     Mixed anxiety and depressive disorder     Normal cardiac stress test     11/07    Rheumatoid arthritis(714.0)     Sjogren's syndrome     ?     PAST SURGICAL HISTORY:   Past Surgical History:   Procedure Laterality Date    AUGMENTATION OF BREAST      breast implants      breast surgery for rupture      TONSILLECTOMY, ADENOIDECTOMY      TUBAL  LIGATION        ?   ALLERGIES:   Allergies as of 11/04/2019 - Reviewed 11/04/2019   Allergen Reaction Noted    Erythromycin  05/31/2012    Humira  [adalimumab]  05/31/2012    Sulfa (sulfonamide antibiotics)  05/31/2012      ?   MEDICATIONS:?   Outpatient Medications Marked as Taking for the 11/4/19 encounter (Initial consult) with Maryuri Grace MD   Medication Sig Dispense Refill    ferrous gluconate (FERGON) 324 MG tablet Take 324 mg by mouth daily with breakfast.        ?   SOCIAL HISTORY:?   Social History     Tobacco Use    Smoking status: Never Smoker    Smokeless tobacco: Never Used   Substance Use Topics    Alcohol use: Yes     Frequency: 2-3 times a week     Drinks per session: 1 or 2     Binge frequency: Never     Comment: socially       ?      ?   FAMILY HISTORY:   family history is not on file.   ?        Objective:      Physical Exam      ?   Vitals:    11/04/19 1531   BP: (!) 157/81   Pulse: 75   Temp: 98.5 °F (36.9 °C)      ?   ECOG:?0   General appearance: Generally well appearing, in no acute distress.   Head, eyes, ears, nose, and throat: moist mucous membranes.   Respiratory:  Normal work of breathing  Abdomen:soft, nontender, nondistended.  Extremities: without edema.   Neurologic: Alert and oriented. Grossly normal strength, coordination, and gait.   Skin: No rashes, ecchymoses or petechial lesion.   Psychiatric:  Normal mood and affect      ?   Laboratory:  ?   Lab Results   Component Value Date    WBC 5.58 09/19/2019    HGB 11.0 (L) 09/19/2019    HCT 32.6 (L) 09/19/2019    MCV 87 09/19/2019     09/19/2019           ?   Assessment/Plan:       1. Rheumatoid arthritis involving multiple sites with positive rheumatoid factor    2. Sjogren's syndrome, with unspecified organ involvement    3. Anemia, unspecified type          Plan:     # anemia:  She has mild stable anemia over the last several years with hemoglobin ranging 10-11.5.  She is asymptomatic from her mild anemia  although of note she does require hemoglobin 12.5 in order to pursue blood donation for plasma compounding to make medication for eyes.  She did have evidence of iron deficiency in 2015.  She is taking oral iron daily along with vitamin B12 and folate supplements.  Recommend rechecking iron indices with ferritin as well as CBC.  Should she have iron deficiency would recommend EGD to complete workup, she does have history of reflux using PPI.  Last colonoscopy 2011 recommended 10 year follow-up.  No other evidence of GI or postmenopausal bleeding.  Recommended staying up-to-date on age-appropriate cancer screening including Pap smears, colonoscopy and mammography.    # Sjogren syndrome:  Including dry eyes, continue follow-up with Rheumatology.      Follow-Up:   prn

## 2019-11-05 NOTE — TELEPHONE ENCOUNTER
Contacted patient to interpret CT scan results sent via Agatha Guerrero with verbal understanding.

## 2019-11-06 ENCOUNTER — PATIENT MESSAGE (OUTPATIENT)
Dept: HEMATOLOGY/ONCOLOGY | Facility: CLINIC | Age: 69
End: 2019-11-06

## 2019-11-06 ENCOUNTER — OFFICE VISIT (OUTPATIENT)
Dept: ALLERGY | Facility: CLINIC | Age: 69
End: 2019-11-06
Payer: MEDICARE

## 2019-11-06 ENCOUNTER — LAB VISIT (OUTPATIENT)
Dept: LAB | Facility: HOSPITAL | Age: 69
End: 2019-11-06
Attending: ALLERGY & IMMUNOLOGY
Payer: MEDICARE

## 2019-11-06 VITALS
BODY MASS INDEX: 24.06 KG/M2 | HEIGHT: 66 IN | DIASTOLIC BLOOD PRESSURE: 78 MMHG | SYSTOLIC BLOOD PRESSURE: 152 MMHG | WEIGHT: 149.69 LBS | HEART RATE: 76 BPM | TEMPERATURE: 98 F

## 2019-11-06 DIAGNOSIS — J32.9 RECURRENT SINUS INFECTIONS: ICD-10-CM

## 2019-11-06 DIAGNOSIS — J31.0 RHINITIS, UNSPECIFIED TYPE: ICD-10-CM

## 2019-11-06 DIAGNOSIS — J31.0 RHINITIS, UNSPECIFIED TYPE: Primary | ICD-10-CM

## 2019-11-06 PROCEDURE — 99999 PR PBB SHADOW E&M-EST. PATIENT-LVL IV: ICD-10-PCS | Mod: PBBFAC,,, | Performed by: ALLERGY & IMMUNOLOGY

## 2019-11-06 PROCEDURE — 86317 IMMUNOASSAY INFECTIOUS AGENT: CPT

## 2019-11-06 PROCEDURE — 82784 ASSAY IGA/IGD/IGG/IGM EACH: CPT | Mod: 59

## 2019-11-06 PROCEDURE — 99999 PR PBB SHADOW E&M-EST. PATIENT-LVL IV: CPT | Mod: PBBFAC,,, | Performed by: ALLERGY & IMMUNOLOGY

## 2019-11-06 PROCEDURE — 99203 OFFICE O/P NEW LOW 30 MIN: CPT | Mod: S$PBB,,, | Performed by: ALLERGY & IMMUNOLOGY

## 2019-11-06 PROCEDURE — 86003 ALLG SPEC IGE CRUDE XTRC EA: CPT

## 2019-11-06 PROCEDURE — 36415 COLL VENOUS BLD VENIPUNCTURE: CPT

## 2019-11-06 PROCEDURE — 86003 ALLG SPEC IGE CRUDE XTRC EA: CPT | Mod: 59

## 2019-11-06 PROCEDURE — 82785 ASSAY OF IGE: CPT

## 2019-11-06 PROCEDURE — 82784 ASSAY IGA/IGD/IGG/IGM EACH: CPT

## 2019-11-06 PROCEDURE — 99214 OFFICE O/P EST MOD 30 MIN: CPT | Mod: PBBFAC | Performed by: ALLERGY & IMMUNOLOGY

## 2019-11-06 PROCEDURE — 99203 PR OFFICE/OUTPT VISIT, NEW, LEVL III, 30-44 MIN: ICD-10-PCS | Mod: S$PBB,,, | Performed by: ALLERGY & IMMUNOLOGY

## 2019-11-06 PROCEDURE — 85025 COMPLETE CBC W/AUTO DIFF WBC: CPT

## 2019-11-06 RX ORDER — AMOXICILLIN AND CLAVULANATE POTASSIUM 875; 125 MG/1; MG/1
1 TABLET, FILM COATED ORAL EVERY 12 HOURS
Qty: 20 TABLET | Refills: 0 | Status: SHIPPED | OUTPATIENT
Start: 2019-11-06 | End: 2019-12-12 | Stop reason: ALTCHOICE

## 2019-11-06 RX ORDER — PREDNISONE 20 MG/1
20 TABLET ORAL DAILY
Qty: 10 TABLET | Refills: 0 | Status: SHIPPED | OUTPATIENT
Start: 2019-11-06 | End: 2019-12-12 | Stop reason: ALTCHOICE

## 2019-11-06 NOTE — LETTER
November 7, 2019      Agatha Guerrero PA-C  [address]  [place] [address]           [facility]  [address] [facility] [address]  Phone: [phone]  Fax: [phone]          Patient: Leticia Piña   MR Number: [id number]   YOB: 1950   Date of Visit: 11/6/2019       Dear Agatha Guerrero:    Thank you for referring Leticia Piña to me for evaluation. Attached you will find relevant portions of my assessment and plan of care.    If you have questions, please do not hesitate to call me. I look forward to following Leticia Piña along with you.    Sincerely,    Amber Ruby MD    Enclosure  CC:  No Recipients    If you would like to receive this communication electronically, please contact [email or web address] or [phone] to request more information on [facility] Link access.    For providers and/or their staff who would like to refer a patient to [facility], please contact us through our one-stop-shop provider referral line, [facility], at [phone].    If you feel you have received this communication in error or would no longer like to receive these types of communications, please e-mail [email or web address]

## 2019-11-06 NOTE — PROGRESS NOTES
Subjective:       Patient ID: Leticia Piña is a 69 y.o. female.    Chief Complaint:  Allergies      HPI: 69 year old female complaining of a persistent sinus infection. She reports a persistent headache, since August. She reports two sinus infections every year. She has RA, fibromyalgia and Sjogren's disease. She reports a post nasal drip and intermittent nasal congestion. Saline mist helps nasal congestion, but sinus pressure over her brow, nasal bridge and gum. She has taken tylenol that has not helped. Oral antihistamines do not help and they increase her dryness. She takes Astelin, but only minimally improves symptoms. She stopped Nasacort due to nose bleeds. She has not picked up Flonase, which was prescribed in place of the Nasacort. She had a Prevnar in September.  She denies asthma or atopic dermatitis.        Past Medical History:   Diagnosis Date    Allergic rhinitis     Cutaneous lupus erythematosus     drug induced.    Essential hypertension 2/5/2019    GERD (gastroesophageal reflux disease)     Hypothyroid     Insomnia     Mixed anxiety and depressive disorder     Normal cardiac stress test     11/07    Rheumatoid arthritis(714.0)     Sjogren's syndrome      Family History:  Paternal Grandmother- atopy  Maternal Grandfather- atopy    Review of patient's allergies indicates:   Allergen Reactions    Erythromycin      Other reaction(s): Nausea  Other reaction(s): Nausea    Humira  [adalimumab]      Other reaction(s): drug-induced lupus  Other reaction(s): drug-induced lupus    Sulfa (sulfonamide antibiotics)      Other reaction(s): Hives  Other reaction(s): Rash  Other reaction(s): Hives     No food or insect sting allergy      Environmental History: No pets. Non-smoker, no smoke exposure. She is an assistant Kindergarden Teacher.  Review of Systems   Constitutional: Negative for chills and fever.   HENT: Positive for congestion and postnasal drip.    Eyes: Negative for discharge and  itching.   Respiratory: Negative for shortness of breath and wheezing.    Cardiovascular: Negative for chest pain.   Gastrointestinal: Negative for nausea and vomiting.   Endocrine: Negative for cold intolerance, heat intolerance and polyuria.   Genitourinary: Negative for dysuria and frequency.   Musculoskeletal: Negative for arthralgias and back pain.   Allergic/Immunologic: Positive for environmental allergies. Negative for food allergies.   Neurological: Positive for headaches. Negative for dizziness.   Psychiatric/Behavioral: Negative for agitation and confusion.        Objective:    Physical Exam    Laboratory:   none performed Unable to skin prick test, as medications can affect skin test results  Assessment:       1. Rhinitis, unspecified type    2. Recurrent sinus infections         Plan:       Rhinitis, unspecified type  -     IgE; Future; Expected date: 11/06/2019  -     Bahia grass IgE; Future; Expected date: 11/06/2019  -     Aspergillus fumagatus IgE; Future; Expected date: 11/06/2019  -     Chaetomium globosum IgE; Future; Expected date: 11/06/2019  -     Cockroach, American IgE; Future; Expected date: 11/06/2019  -     Cladosporium IgE; Future; Expected date: 11/06/2019  -     Curvularia lunata IgE; Future; Expected date: 11/06/2019  -     D. farinae IgE; Future; Expected date: 11/06/2019  -     D. pteronyssinus IgE; Future; Expected date: 11/06/2019  -     Dog dander IgE; Future; Expected date: 11/06/2019  -     Plantain, English IgE; Future; Expected date: 11/06/2019  -     Eucalyptus IgE; Future; Expected date: 11/06/2019  -     Marsh elder, rough IgE; Future; Expected date: 11/06/2019  -     Mugwort IgE; Future; Expected date: 11/06/2019  -     Nettle IgE; Future; Expected date: 11/06/2019  -     Orchard grass IgE; Future; Expected date: 11/06/2019  -     [place], western white IgE; Future; Expected date: 11/06/2019  -     Privet, common IgE; Future; Expected date: 11/06/2019  -     Ragweed, short,  common IgE; Future; Expected date: 11/06/2019  -     Red top grass IgE; Future; Expected date: 11/06/2019  -     Rye grass, cultivated IgE; Future; Expected date: 11/06/2019  -     Thistle, Russian IgE; Future; Expected date: 11/06/2019  -     Stemphyllium IgE; Future; Expected date: 11/06/2019  -     Chirag IgE; Future; Expected date: 11/06/2019  -     Dylan grass IgE; Future; Expected date: 11/06/2019  -     ALLERGEN CAT EPITHELLIUM; Future; Expected date: 11/06/2019    Recurrent sinus infections  -     S.PNEUMONIAE IGG SEROTYPES; Future; Expected date: 11/06/2019  -     IgA; Future; Expected date: 11/06/2019  -     IgM; Future; Expected date: 11/06/2019  -     IgG; Future; Expected date: 11/06/2019  -     DIPHTHERIA / TETANUS ANTIBODY PANEL; Future; Expected date: 11/06/2019  -     CBC auto differential; Future; Expected date: 11/06/2019  -     predniSONE (DELTASONE) 20 MG tablet; Take 1 tablet (20 mg total) by mouth once daily.  Dispense: 10 tablet; Refill: 0  -     amoxicillin-clavulanate 875-125mg (AUGMENTIN) 875-125 mg per tablet; Take 1 tablet by mouth every 12 (twelve) hours.  Dispense: 20 tablet; Refill: 0    Suspect inability to resolve sinus infections are related to Sjogren's.  Will contact with lab results, once they become available.  RTC prn

## 2019-11-07 LAB
BASOPHILS # BLD AUTO: 0.1 K/UL (ref 0–0.2)
BASOPHILS NFR BLD: 2.2 % (ref 0–1.9)
DIFFERENTIAL METHOD: ABNORMAL
EOSINOPHIL # BLD AUTO: 0.3 K/UL (ref 0–0.5)
EOSINOPHIL NFR BLD: 6.2 % (ref 0–8)
ERYTHROCYTE [DISTWIDTH] IN BLOOD BY AUTOMATED COUNT: 12.3 % (ref 11.5–14.5)
HCT VFR BLD AUTO: 34.8 % (ref 37–48.5)
HGB BLD-MCNC: 10.8 G/DL (ref 12–16)
IGA SERPL-MCNC: 300 MG/DL (ref 40–350)
IGE SERPL-ACNC: <35 IU/ML (ref 0–100)
IGG SERPL-MCNC: 896 MG/DL (ref 650–1600)
IGM SERPL-MCNC: 134 MG/DL (ref 50–300)
IMM GRANULOCYTES # BLD AUTO: 0.01 K/UL (ref 0–0.04)
IMM GRANULOCYTES NFR BLD AUTO: 0.2 % (ref 0–0.5)
LYMPHOCYTES # BLD AUTO: 1.8 K/UL (ref 1–4.8)
LYMPHOCYTES NFR BLD: 40 % (ref 18–48)
MCH RBC QN AUTO: 28.3 PG (ref 27–31)
MCHC RBC AUTO-ENTMCNC: 31 G/DL (ref 32–36)
MCV RBC AUTO: 91 FL (ref 82–98)
MONOCYTES # BLD AUTO: 0.5 K/UL (ref 0.3–1)
MONOCYTES NFR BLD: 10.2 % (ref 4–15)
NEUTROPHILS # BLD AUTO: 1.9 K/UL (ref 1.8–7.7)
NEUTROPHILS NFR BLD: 41.2 % (ref 38–73)
NRBC BLD-RTO: 0 /100 WBC
PLATELET # BLD AUTO: 302 K/UL (ref 150–350)
PMV BLD AUTO: 11 FL (ref 9.2–12.9)
RBC # BLD AUTO: 3.81 M/UL (ref 4–5.4)
WBC # BLD AUTO: 4.5 K/UL (ref 3.9–12.7)

## 2019-11-11 LAB
A FUMIGATUS IGE QN: <0.1 KU/L
ALLERGEN CHAETOMIUM GLOBOSUM IGE: <0.1 KU/L
ALLERGEN WHITE PINE TREE IGE: <0.1 KU/L
BAHIA GRASS IGE QN: <0.1 KU/L
C HERBARUM IGE QN: <0.1 KU/L
C LUNATA IGE QN: <0.1 KU/L
CAT DANDER IGE QN: <0.1 KU/L
CHAETOMIUM GLOB. CLASS: NORMAL
COCKSFOOT IGE QN: <0.1 KU/L
COMMON RAGWEED IGE QN: <0.1 KU/L
D FARINAE IGE QN: <0.1 KU/L
D PTERONYSS IGE QN: <0.1 KU/L
DEPRECATED A FUMIGATUS IGE RAST QL: NORMAL
DEPRECATED BAHIA GRASS IGE RAST QL: NORMAL
DEPRECATED C HERBARUM IGE RAST QL: NORMAL
DEPRECATED C LUNATA IGE RAST QL: NORMAL
DEPRECATED CAT DANDER IGE RAST QL: NORMAL
DEPRECATED COCKSFOOT IGE RAST QL: NORMAL
DEPRECATED COMMON RAGWEED IGE RAST QL: NORMAL
DEPRECATED D FARINAE IGE RAST QL: NORMAL
DEPRECATED D PTERONYSS IGE RAST QL: NORMAL
DEPRECATED DOG DANDER IGE RAST QL: NORMAL
DEPRECATED ELDER IGE RAST QL: NORMAL
DEPRECATED ENGL PLANTAIN IGE RAST QL: NORMAL
DEPRECATED JOHNSON GRASS IGE RAST QL: NORMAL
DEPRECATED MUGWORT IGE RAST QL: NORMAL
DEPRECATED NETTLE IGE RAST QL: NORMAL
DEPRECATED PER RYE GRASS IGE RAST QL: NORMAL
DEPRECATED RED TOP GRASS IGE RAST QL: NORMAL
DEPRECATED ROACH IGE RAST QL: NORMAL
DEPRECATED SALTWORT IGE RAST QL: NORMAL
DEPRECATED TIMOTHY IGE RAST QL: NORMAL
DIPHTHERIA IGG VALUE: 0.1 IU/ML
DIPHTHERIA TOXOID IGG ANTIBODY: POSITIVE
DOG DANDER IGE QN: <0.1 KU/L
ELDER IGE QN: <0.1 KU/L
ENGL PLANTAIN IGE QN: <0.1 KU/L
JOHNSON GRASS IGE QN: <0.1 KU/L
MUGWORT IGE QN: <0.1 KU/L
NETTLE IGE QN: <0.1 KU/L
PER RYE GRASS IGE QN: <0.1 KU/L
RED TOP GRASS IGE QN: <0.1 KU/L
ROACH IGE QN: <0.1 KU/L
SALTWORT IGE QN: <0.1 KU/L
TETANUS TOXOID IGG AB: POSITIVE
TETANUS TOXOID IGG VALUE: 1.51 IU/ML
TIMOTHY IGE QN: <0.1 KU/L
WHITE PINE CLASS: NORMAL

## 2019-11-13 LAB
DEPRECATED GUM-TREE IGE RAST QL: NORMAL
DEPRECATED PRIVET IGE RAST QL: NORMAL
GUM-TREE IGE QN: <0.1 KU/L
PRIVET IGE QN: <0.1 KU/L
STEMPHYLIUM HERBARUM CLASS: NORMAL
STEMPHYLLIUM, IGE: <0.1 KU/L

## 2019-11-14 ENCOUNTER — PATIENT MESSAGE (OUTPATIENT)
Dept: ALLERGY | Facility: CLINIC | Age: 69
End: 2019-11-14

## 2019-11-14 LAB
DEPRECATED S PNEUM 1 IGG SER-MCNC: <0.3 MCG/ML
DEPRECATED S PNEUM12 IGG SER-MCNC: 0.4 MCG/ML
DEPRECATED S PNEUM14 IGG SER-MCNC: 4.1 MCG/ML
DEPRECATED S PNEUM19 IGG SER-MCNC: 4.2 MCG/ML
DEPRECATED S PNEUM23 IGG SER-MCNC: 1.8 MCG/ML
DEPRECATED S PNEUM3 IGG SER-MCNC: <0.3 MCG/ML
DEPRECATED S PNEUM4 IGG SER-MCNC: <0.3 MCG/ML
DEPRECATED S PNEUM5 IGG SER-MCNC: 0.7 MCG/ML
DEPRECATED S PNEUM8 IGG SER-MCNC: 0.4 MCG/ML
DEPRECATED S PNEUM9 IGG SER-MCNC: <0.3 MCG/ML
S PNEUM DA 18C IGG SER-MCNC: <0.3 MCG/ML
S PNEUM DA 6B IGG SER-MCNC: 0.8 MCG/ML
S PNEUM DA 7F IGG SER-MCNC: <0.3 MCG/ML
S PNEUM DA 9V IGG SER-MCNC: <0.3 MCG/ML

## 2019-11-19 ENCOUNTER — PATIENT MESSAGE (OUTPATIENT)
Dept: RHEUMATOLOGY | Facility: CLINIC | Age: 69
End: 2019-11-19

## 2019-11-19 DIAGNOSIS — Z23 NEED FOR PNEUMOCOCCAL VACCINATION: Primary | ICD-10-CM

## 2019-11-20 ENCOUNTER — PATIENT MESSAGE (OUTPATIENT)
Dept: OTOLARYNGOLOGY | Facility: CLINIC | Age: 69
End: 2019-11-20

## 2019-11-20 ENCOUNTER — PATIENT MESSAGE (OUTPATIENT)
Dept: ALLERGY | Facility: CLINIC | Age: 69
End: 2019-11-20

## 2019-11-22 ENCOUNTER — PATIENT MESSAGE (OUTPATIENT)
Dept: OTOLARYNGOLOGY | Facility: CLINIC | Age: 69
End: 2019-11-22

## 2019-11-25 ENCOUNTER — TELEPHONE (OUTPATIENT)
Dept: RHEUMATOLOGY | Facility: CLINIC | Age: 69
End: 2019-11-25

## 2019-11-25 ENCOUNTER — PATIENT MESSAGE (OUTPATIENT)
Dept: OTOLARYNGOLOGY | Facility: CLINIC | Age: 69
End: 2019-11-25

## 2019-11-25 NOTE — TELEPHONE ENCOUNTER
Pt called about missed apt on 11/20  She only wanted to sched her Prevnar and will likely keep her 12.18 Cimiza appt due to on going sinus issues

## 2019-11-26 ENCOUNTER — OFFICE VISIT (OUTPATIENT)
Dept: OTOLARYNGOLOGY | Facility: CLINIC | Age: 69
End: 2019-11-26
Payer: MEDICARE

## 2019-11-26 VITALS — HEIGHT: 65 IN | TEMPERATURE: 98 F | WEIGHT: 147 LBS | BODY MASS INDEX: 24.49 KG/M2

## 2019-11-26 DIAGNOSIS — J32.3 CHRONIC SPHENOIDAL SINUSITIS: ICD-10-CM

## 2019-11-26 DIAGNOSIS — J32.2 CHRONIC ETHMOIDAL SINUSITIS: Primary | ICD-10-CM

## 2019-11-26 DIAGNOSIS — R51.9 CHRONIC INTRACTABLE HEADACHE, UNSPECIFIED HEADACHE TYPE: ICD-10-CM

## 2019-11-26 DIAGNOSIS — G89.29 CHRONIC INTRACTABLE HEADACHE, UNSPECIFIED HEADACHE TYPE: ICD-10-CM

## 2019-11-26 DIAGNOSIS — R04.0 EPISTAXIS: ICD-10-CM

## 2019-11-26 DIAGNOSIS — J32.0 CHRONIC MAXILLARY SINUSITIS: ICD-10-CM

## 2019-11-26 PROCEDURE — 99214 OFFICE O/P EST MOD 30 MIN: CPT | Mod: PBBFAC | Performed by: OTOLARYNGOLOGY

## 2019-11-26 PROCEDURE — 1125F AMNT PAIN NOTED PAIN PRSNT: CPT | Mod: ,,, | Performed by: OTOLARYNGOLOGY

## 2019-11-26 PROCEDURE — 99999 PR PBB SHADOW E&M-EST. PATIENT-LVL IV: CPT | Mod: PBBFAC,,, | Performed by: OTOLARYNGOLOGY

## 2019-11-26 PROCEDURE — 99214 PR OFFICE/OUTPT VISIT, EST, LEVL IV, 30-39 MIN: ICD-10-PCS | Mod: S$PBB,,, | Performed by: OTOLARYNGOLOGY

## 2019-11-26 PROCEDURE — 99214 OFFICE O/P EST MOD 30 MIN: CPT | Mod: S$PBB,,, | Performed by: OTOLARYNGOLOGY

## 2019-11-26 PROCEDURE — 1159F PR MEDICATION LIST DOCUMENTED IN MEDICAL RECORD: ICD-10-PCS | Mod: ,,, | Performed by: OTOLARYNGOLOGY

## 2019-11-26 PROCEDURE — 1159F MED LIST DOCD IN RCRD: CPT | Mod: ,,, | Performed by: OTOLARYNGOLOGY

## 2019-11-26 PROCEDURE — 1125F PR PAIN SEVERITY QUANTIFIED, PAIN PRESENT: ICD-10-PCS | Mod: ,,, | Performed by: OTOLARYNGOLOGY

## 2019-11-26 PROCEDURE — 99999 PR PBB SHADOW E&M-EST. PATIENT-LVL IV: ICD-10-PCS | Mod: PBBFAC,,, | Performed by: OTOLARYNGOLOGY

## 2019-11-26 RX ORDER — NORTRIPTYLINE HYDROCHLORIDE 25 MG/1
25 CAPSULE ORAL NIGHTLY
Qty: 30 CAPSULE | Refills: 2 | Status: SHIPPED | OUTPATIENT
Start: 2019-11-26 | End: 2019-12-16

## 2019-11-26 NOTE — PROGRESS NOTES
Subjective:   Patient: Leticia Piña [id number], :1950   Visit date:2019 12:06 PM    Chief Complaint:  Other (Pt states that she is here for a follow up. She is also c/o sinus pain on the upper brow area, bridge of the nose and upper jaw.)    HPI:  Leticia is a 69 y.o. female who is here for follow-up. She was last here in September, w/u w/ CT at time was significant for sinus disease & pt was tx with Levaquin x 21 days and prednisone taper. After completing this, she reported flu like symptoms to both her PCP and rheumatologist approximately 2 weeks ago and was tx with Tamiflu. This did help. Pt was not seen in clinic/ no swab. She c/o increase in congestion and HA resuming this weekend. No significant discolored drainage. +productive cough. She follows with Dr. Alvarez in rheumatology and is on certolizumab injection. 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 Hx of Sjogren's. Currently also on Xyzal, Flonase, and Astelin. No relieving factors.   RAST negative.  Immunology workup significant for low pneumococcal titers.  Has since been given Prevnar. C/o epistaxis right sided, light, lasting minute(s). Persistent pain, dull, across forehead, nose, and over her top teeth/gums.         Review of Systems:  -     Allergic/Immunologic: is allergic to erythromycin; humira  [adalimumab]; and sulfa (sulfonamide antibiotics)..  -     Constitutional: Current temp: 97.8 °F (36.6 °C) (Tympanic)    Her meds, allergies, medical, surgical, social & family histories were reviewed & updated:  -     She has a current medication list which includes the following prescription(s): azelastine, bupropion, bupropion, cholecalciferol (vitamin d3), cyanocobalamin (vitamin b-12), doxycycline, duloxetine, ferrous gluconate, fluticasone propionate, folic acid, hydrochlorothiazide, hydroxychloroquine, levocetirizine, levothyroxine, lorazepam, losartan, omeprazole, pilocarpine, trazodone,  "triamcinolone, amoxicillin-clavulanate 875-125mg, nortriptyline, prednisone, and prednisone, and the following Facility-Administered Medications: cimzia.  -     She  has a past medical history of Allergic rhinitis, Cutaneous lupus erythematosus, Essential hypertension (2/5/2019), GERD (gastroesophageal reflux disease), Hypothyroid, Insomnia, Mixed anxiety and depressive disorder, Normal cardiac stress test, Rheumatoid arthritis(714.0), and Sjogren's syndrome.   -     She does not have any pertinent problems on file.   -     She  has a past surgical history that includes breast implants; breast surgery for rupture; TONSILLECTOMY, ADENOIDECTOMY; Tubal ligation; and Augmentation of breast.  -     She  reports that she has never smoked. She has never used smokeless tobacco. She reports that she drinks alcohol. She reports that she does not use drugs.  -     Her family history is not on file.  -     She is allergic to erythromycin; humira  [adalimumab]; and sulfa (sulfonamide antibiotics).    Objective:     Physical Exam:  Vitals:  Temp 97.8 °F (36.6 °C) (Tympanic)   Ht 5' 5" (1.651 m)   Wt 66.7 kg (147 lb)   BMI 24.46 kg/m²   Appearance:  Well-developed, well-nourished.  Communication:  Able to communicate, no hoarseness.  Head & Face:  Normocephalic, atraumatic, no sinus tenderness, normal facial strength.  Eyes:  Extraocular motions intact.  Ears:  Otoscopy of external auditory canals and tympanic membranes was normal, clinical speech reception thresholds grossly intact, no mass/lesion of auricle.  Nose:  Small dilated blood vessel on right anterior septum (cauterized today)  Mouth:  No mass/lesion of lips, teeth, gums, hard/soft palate, tongue, tonsils, or oropharynx.  Neck & Lymphatics:  No cervical lymphadenopathy, no neck mass/crepitus/ asymmetry, trachea is midline, no thyroid enlargement/tenderness/mass.  Neuro/Psych: Alert with normal mood and affect.   Abdominal: Normal appearance.   Respiration/Chest:  " Symmetric expansion during respiration, normal respiratory effort.  Skin:  Warm and intact  Cardiovascular:  No peripheral vascular edema or varicosities.      CT MEDTRONIC SINUSES WITHOUT    CLINICAL HISTORY:  Sinusitis, recurring, possible surgery;  Chronic sinusitis, unspecified    TECHNIQUE:  Axial low-dose images CT of the paranasal sinuses was performed.  Coronal reconstructions were obtained and reviewed.  Contrast was not administered.  Musiwave protocol was utilized.    COMPARISON:  Maxillofacial CT dated 05/22/2013    FINDINGS:  The frontal sinuses are clear bilaterally.    Small amount of pneumatized debris noted within the posterior most right ethmoid cell.  Ethmoid sinuses are otherwise clear bilaterally.    There is mild mucosal thickening present in the bilateral sphenoid sinuses.  Small osteoma noted in the right sphenoid sinus measuring up to 8 mm.  Bilateral sphenoethmoidal recesses appear somewhat narrowed secondary to thickened mucosa.    There is moderate mucosal thickening throughout the right maxillary sinus.  Right-sided ostiomeatal complex appears narrowed at the ostium secondary to thickened mucosa.  Left maxillary sinus appears clear.  Left ostiomeatal complex appears widely patent.    No air-fluid levels visualized.    Nasal cavity: No soft tissue masses visualized.  There is a small shannon bullosa involving the left middle turbinate.    The lamina papyracea and roof of the ethmoids are grossly intact bilaterally. No evidence of carotid canal dehiscence.      Impression       Paranasal sinus disease as above, predominately involving the right maxillary and bilateral sphenoid sinuses.    Small osteoma noted in the right sphenoid sinus.    Small left-sided shannon bullosa.           Assessment & Plan:   Leticia was seen today for other.    Diagnoses and all orders for this visit:    Chronic ethmoidal sinusitis    Chronic sphenoidal sinusitis    Chronic maxillary  sinusitis    Epistaxis    Chronic intractable headache, unspecified headache type    Other orders  -     nortriptyline (PAMELOR) 25 MG capsule; Take 1 capsule (25 mg total) by mouth every evening.       We had a lengthy discussion about the etiology of pt's symptoms. At this time, pain is her main complaint, however, this is general head pain and not localized to right sinus(s). Re-assured her she does not have any sinus infection. CT is significant for chronic inflammation in R ethmoid/ sphenoid/ and maxillary. Discussed possible FESS, though, unclear if this will truly relieve her symptoms and pt wishes to avoid surgery if possible.     Advised a trial with Pamelor in place of her trazodone (d/c today). Counseled pt on giving this time to become effective and possible adverse s/e of daytime grogginess, to contact us if she experiences this.     Cauterized right septum with silver nitrate w/o difficulty.     Recheck in 2 months.        James Freeman MD, FACS  [facility]   [facility]  [address]  [place] [address]  P: [phone]  F: [phone]

## 2019-11-27 ENCOUNTER — CLINICAL SUPPORT (OUTPATIENT)
Dept: RHEUMATOLOGY | Facility: CLINIC | Age: 69
End: 2019-11-27
Payer: MEDICARE

## 2019-11-27 DIAGNOSIS — I10 ESSENTIAL HYPERTENSION: ICD-10-CM

## 2019-11-27 PROCEDURE — G0009 ADMIN PNEUMOCOCCAL VACCINE: HCPCS | Mod: PBBFAC

## 2019-11-27 PROCEDURE — 99999 PR PBB SHADOW E&M-EST. PATIENT-LVL III: ICD-10-PCS | Mod: PBBFAC,,,

## 2019-11-27 PROCEDURE — 99213 OFFICE O/P EST LOW 20 MIN: CPT | Mod: PBBFAC,25

## 2019-11-27 PROCEDURE — 99999 PR PBB SHADOW E&M-EST. PATIENT-LVL III: CPT | Mod: PBBFAC,,,

## 2019-11-27 RX ORDER — LOSARTAN POTASSIUM 100 MG/1
100 TABLET ORAL DAILY
Qty: 90 TABLET | Refills: 3 | Status: SHIPPED | OUTPATIENT
Start: 2019-11-27 | End: 2019-12-16 | Stop reason: SDUPTHER

## 2019-12-09 RX ORDER — BUPROPION HYDROCHLORIDE 300 MG/1
300 TABLET ORAL DAILY
Qty: 90 TABLET | Refills: 3 | Status: SHIPPED | OUTPATIENT
Start: 2019-12-09 | End: 2020-01-15

## 2019-12-11 ENCOUNTER — TELEPHONE (OUTPATIENT)
Dept: FAMILY MEDICINE | Facility: CLINIC | Age: 69
End: 2019-12-11

## 2019-12-11 NOTE — TELEPHONE ENCOUNTER
Patient said she went to the hospital on yesterday, and was told to follow-up with [facility] in 2 days. She refused to see another provider. I advised her that she's out of the office until Monday.   Scheduled patient on Monday @ 1pm    ----- Message from Hannah Minaya sent at 12/11/2019  8:59 AM CST -----  Contact: pt  .Type:  Sooner Apoointment Request    Caller is requesting a sooner appointment.  Caller declined first available appointment listed below.  Caller will not accept being placed on the waitlist and is requesting a message be sent to doctor.  Name of Caller:pt  When is the first available appointment?12/24/19  Symptoms:hospital f/u due to chest pains   Would the patient rather a call back or a response via [facility]?call back  Best Call Back Number:[phone]  Additional Information: Physician at hospital told her she need to be seen as soon as possible

## 2019-12-12 ENCOUNTER — PATIENT MESSAGE (OUTPATIENT)
Dept: OTOLARYNGOLOGY | Facility: CLINIC | Age: 69
End: 2019-12-12

## 2019-12-12 RX ORDER — CLINDAMYCIN HYDROCHLORIDE 300 MG/1
300 CAPSULE ORAL EVERY 8 HOURS
Qty: 30 CAPSULE | Refills: 0 | Status: SHIPPED | OUTPATIENT
Start: 2019-12-12 | End: 2019-12-16

## 2019-12-12 RX ORDER — PREDNISONE 20 MG/1
TABLET ORAL
Qty: 21 TABLET | Refills: 0 | Status: SHIPPED | OUTPATIENT
Start: 2019-12-12 | End: 2019-12-20

## 2019-12-12 RX ORDER — PREDNISONE 20 MG/1
TABLET ORAL
Qty: 21 TABLET | Refills: 0 | Status: SHIPPED | OUTPATIENT
Start: 2019-12-12 | End: 2020-01-15 | Stop reason: ALTCHOICE

## 2019-12-12 RX ORDER — CLINDAMYCIN HYDROCHLORIDE 300 MG/1
300 CAPSULE ORAL EVERY 8 HOURS
Qty: 30 CAPSULE | Refills: 0 | Status: SHIPPED | OUTPATIENT
Start: 2019-12-12 | End: 2019-12-22

## 2019-12-12 NOTE — PROGRESS NOTES
Patient called stating she is having thick, green drainage this week and as of today is now with a fever. Significant pressure in her head. She is using her nasal saline rinses.     Issued Clindamycin and prednisone. If symptoms do not improve or resolve advised pt to f/u in clinic.

## 2019-12-13 ENCOUNTER — PATIENT MESSAGE (OUTPATIENT)
Dept: RHEUMATOLOGY | Facility: CLINIC | Age: 69
End: 2019-12-13

## 2019-12-16 ENCOUNTER — LAB VISIT (OUTPATIENT)
Dept: LAB | Facility: HOSPITAL | Age: 69
End: 2019-12-16
Attending: INTERNAL MEDICINE
Payer: MEDICARE

## 2019-12-16 ENCOUNTER — OFFICE VISIT (OUTPATIENT)
Dept: FAMILY MEDICINE | Facility: CLINIC | Age: 69
End: 2019-12-16
Payer: MEDICARE

## 2019-12-16 VITALS
HEIGHT: 65 IN | HEART RATE: 86 BPM | BODY MASS INDEX: 24.1 KG/M2 | SYSTOLIC BLOOD PRESSURE: 152 MMHG | TEMPERATURE: 98 F | WEIGHT: 144.63 LBS | OXYGEN SATURATION: 96 % | DIASTOLIC BLOOD PRESSURE: 82 MMHG

## 2019-12-16 DIAGNOSIS — R07.9 CHEST PAIN AT REST: ICD-10-CM

## 2019-12-16 DIAGNOSIS — E03.9 ACQUIRED HYPOTHYROIDISM: ICD-10-CM

## 2019-12-16 DIAGNOSIS — M35.00 SJOGREN'S SYNDROME, WITH UNSPECIFIED ORGAN INVOLVEMENT: ICD-10-CM

## 2019-12-16 DIAGNOSIS — M05.79 RHEUMATOID ARTHRITIS INVOLVING MULTIPLE SITES WITH POSITIVE RHEUMATOID FACTOR: ICD-10-CM

## 2019-12-16 DIAGNOSIS — I10 ESSENTIAL HYPERTENSION: ICD-10-CM

## 2019-12-16 DIAGNOSIS — R07.9 CHEST PAIN AT REST: Primary | ICD-10-CM

## 2019-12-16 DIAGNOSIS — F33.1 MODERATE EPISODE OF RECURRENT MAJOR DEPRESSIVE DISORDER: ICD-10-CM

## 2019-12-16 PROCEDURE — 84443 ASSAY THYROID STIM HORMONE: CPT

## 2019-12-16 PROCEDURE — 36415 COLL VENOUS BLD VENIPUNCTURE: CPT | Mod: PO

## 2019-12-16 PROCEDURE — 99213 OFFICE O/P EST LOW 20 MIN: CPT | Mod: PBBFAC,PO | Performed by: INTERNAL MEDICINE

## 2019-12-16 PROCEDURE — 99999 PR PBB SHADOW E&M-EST. PATIENT-LVL III: ICD-10-PCS | Mod: PBBFAC,,, | Performed by: INTERNAL MEDICINE

## 2019-12-16 PROCEDURE — 99999 PR PBB SHADOW E&M-EST. PATIENT-LVL III: CPT | Mod: PBBFAC,,, | Performed by: INTERNAL MEDICINE

## 2019-12-16 PROCEDURE — 99214 OFFICE O/P EST MOD 30 MIN: CPT | Mod: S$PBB,,, | Performed by: INTERNAL MEDICINE

## 2019-12-16 PROCEDURE — 99214 PR OFFICE/OUTPT VISIT, EST, LEVL IV, 30-39 MIN: ICD-10-PCS | Mod: S$PBB,,, | Performed by: INTERNAL MEDICINE

## 2019-12-16 PROCEDURE — 84439 ASSAY OF FREE THYROXINE: CPT

## 2019-12-16 RX ORDER — POTASSIUM CHLORIDE 1500 MG/1
TABLET, EXTENDED RELEASE ORAL
COMMUNITY
Start: 2019-11-26 | End: 2020-01-15 | Stop reason: SDUPTHER

## 2019-12-16 RX ORDER — HYDROCHLOROTHIAZIDE 25 MG/1
25 TABLET ORAL DAILY
Qty: 90 TABLET | Refills: 3 | Status: SHIPPED | OUTPATIENT
Start: 2019-12-16 | End: 2021-02-19 | Stop reason: SDUPTHER

## 2019-12-16 RX ORDER — BUTALBITAL, ACETAMINOPHEN AND CAFFEINE 50; 325; 40 MG/1; MG/1; MG/1
1 TABLET ORAL EVERY 4 HOURS PRN
Refills: 0 | COMMUNITY
Start: 2019-11-24 | End: 2019-12-20

## 2019-12-16 RX ORDER — LOSARTAN POTASSIUM 100 MG/1
100 TABLET ORAL DAILY
Qty: 90 TABLET | Refills: 3 | Status: SHIPPED | OUTPATIENT
Start: 2019-12-16 | End: 2021-02-23 | Stop reason: SDUPTHER

## 2019-12-16 RX ORDER — LOSARTAN POTASSIUM AND HYDROCHLOROTHIAZIDE 25; 100 MG/1; MG/1
1 TABLET ORAL
COMMUNITY
Start: 2019-12-10 | End: 2021-01-26

## 2019-12-16 RX ORDER — POTASSIUM CHLORIDE 20 MEQ/1
20 TABLET, EXTENDED RELEASE ORAL DAILY
Qty: 90 TABLET | Refills: 3 | Status: SHIPPED | OUTPATIENT
Start: 2019-12-16 | End: 2021-01-08 | Stop reason: SDUPTHER

## 2019-12-16 NOTE — PROGRESS NOTES
Subjective:      Patient ID: Leticia Piña is a 69 y.o. female.    Chief Complaint: Follow-up (hospital for chest pain )      HPI  Here for follow up of medical problems and f/u of 12/10/19 [facility] ER visit for chest pain, determined non-cardiac.  Nuclear stress test last month negative, with Dr. Connor Tejada.  He started pt on KCl.  Last week K level 3.5.  Has been having recurring sinus infections, now getting better after clinda and prednisone 20mg TID taper down, started a few days ago.  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  Has had ongoing fibromyalgia pain.  Last chest pain attack, took ativan and sx resolved after 30-45min.  Sx are not related to food, but going to Gastro to r/o gallbladder disease.      Updated/ annual due 6/20:  HM: 10/19 fluvax, 6/19 HAV, 11/19 [id number], 3/17 booster [id number], 4/13 TDaP, 4/13 zoster, 12/17 BMD rep 2y, 12/11 Cscope rep 10y, 3/19 MMG, 11/17 Gyn Dr. Jimenez, 3/17 HCV neg.     Review of Systems   Constitutional: Negative for activity change, chills, diaphoresis, fever and unexpected weight change.   HENT: Negative for hearing loss, rhinorrhea and trouble swallowing.    Eyes: Negative for discharge and visual disturbance.   Respiratory: Positive for chest tightness. Negative for cough, shortness of breath and wheezing.    Cardiovascular: Positive for chest pain. Negative for palpitations and leg swelling.   Gastrointestinal: Negative for blood in stool, constipation, diarrhea, nausea and vomiting.   Endocrine: Negative for polydipsia and polyuria.   Genitourinary: Negative for difficulty urinating, dysuria, frequency, hematuria and menstrual problem.   Musculoskeletal: Negative for arthralgias, joint swelling and neck pain.   Neurological: Negative for weakness and headaches.   Psychiatric/Behavioral: Negative for confusion and dysphoric mood. The patient is not nervous/anxious.          Objective:   BP (!) 152/82 (BP Location: Left arm, Patient  "Position: Sitting, BP Method: Medium (Manual))   Pulse 86   Temp 97.7 °F (36.5 °C) (Oral)   Ht 5' 5" (1.651 m)   Wt 65.6 kg (144 lb 10 oz)   SpO2 96%   BMI 24.07 kg/m²     Physical Exam   Constitutional: She is oriented to person, place, and time. She appears well-developed.   HENT:   Mouth/Throat: Oropharynx is clear and moist.   Neck: Neck supple. Carotid bruit is not present. No thyroid mass present.   Cardiovascular: Normal rate, regular rhythm and intact distal pulses. Exam reveals no gallop and no friction rub.   No murmur heard.  Pulmonary/Chest: Effort normal and breath sounds normal. She has no wheezes. She has no rales.   Abdominal: Soft. Bowel sounds are normal. She exhibits no mass. There is no hepatosplenomegaly. There is no tenderness.   Musculoskeletal: She exhibits no edema.   Lymphadenopathy:     She has no cervical adenopathy.   Neurological: She is alert and oriented to person, place, and time.   Psychiatric: She has a normal mood and affect.           Assessment:       1. Chest pain at rest    2. Essential hypertension    3. Acquired hypothyroidism    4. Moderate episode of recurrent major depressive disorder    5. Rheumatoid arthritis involving multiple sites with positive rheumatoid factor    6. Sjogren's syndrome, with unspecified organ involvement          Plan:     Chest pain at rest- likely stress related, or costochondritis which may be treated by steroids that currently taking.    Essential hypertension- monitor BPs for f/u 6wk.    Acquired hypothyroidism- check lab now in case too high.    Moderate episode of recurrent major depressive disorder- needs f/u with Psych in case all due to anxiety/depression.    Rheumatoid arthritis involving multiple sites with positive rheumatoid factor  Sjogren's syndrome, with unspecified organ involvement      "

## 2019-12-17 ENCOUNTER — PATIENT MESSAGE (OUTPATIENT)
Dept: FAMILY MEDICINE | Facility: CLINIC | Age: 69
End: 2019-12-17

## 2019-12-17 DIAGNOSIS — E03.9 ACQUIRED HYPOTHYROIDISM: Primary | ICD-10-CM

## 2019-12-17 LAB
T4 FREE SERPL-MCNC: 1.06 NG/DL (ref 0.71–1.51)
TSH SERPL DL<=0.005 MIU/L-ACNC: 0.34 UIU/ML (ref 0.4–4)

## 2019-12-17 RX ORDER — LEVOTHYROXINE SODIUM 50 UG/1
50 TABLET ORAL EVERY OTHER DAY
Qty: 15 TABLET | Refills: 11 | Status: SHIPPED | OUTPATIENT
Start: 2019-12-17 | End: 2020-12-08

## 2019-12-18 ENCOUNTER — TELEPHONE (OUTPATIENT)
Dept: RHEUMATOLOGY | Facility: HOSPITAL | Age: 69
End: 2019-12-18

## 2019-12-18 ENCOUNTER — PATIENT MESSAGE (OUTPATIENT)
Dept: PSYCHIATRY | Facility: CLINIC | Age: 69
End: 2019-12-18

## 2019-12-18 DIAGNOSIS — F41.8 MIXED ANXIETY AND DEPRESSIVE DISORDER: ICD-10-CM

## 2019-12-18 RX ORDER — LORAZEPAM 1 MG/1
1 TABLET ORAL 2 TIMES DAILY PRN
Qty: 60 TABLET | Refills: 1 | Status: SHIPPED | OUTPATIENT
Start: 2019-12-18 | End: 2020-03-04 | Stop reason: SDUPTHER

## 2019-12-18 NOTE — TELEPHONE ENCOUNTER
Called pt to inquire about missed appt today.  Pt states she is on antibx and prednisone for an infection.  Said due to the holidays she would just like to cancel this month and keep next month's appt as scheduled.

## 2019-12-20 ENCOUNTER — INITIAL CONSULT (OUTPATIENT)
Dept: PSYCHIATRY | Facility: CLINIC | Age: 69
End: 2019-12-20
Payer: MEDICARE

## 2019-12-20 DIAGNOSIS — F32.A DEPRESSION, UNSPECIFIED DEPRESSION TYPE: ICD-10-CM

## 2019-12-20 DIAGNOSIS — F41.0 GENERALIZED ANXIETY DISORDER WITH PANIC ATTACKS: Primary | ICD-10-CM

## 2019-12-20 DIAGNOSIS — F41.1 GENERALIZED ANXIETY DISORDER WITH PANIC ATTACKS: Primary | ICD-10-CM

## 2019-12-20 PROCEDURE — 90791 PR PSYCHIATRIC DIAGNOSTIC EVALUATION: ICD-10-PCS | Mod: ,,, | Performed by: SOCIAL WORKER

## 2019-12-20 PROCEDURE — 90791 PSYCH DIAGNOSTIC EVALUATION: CPT | Mod: ,,, | Performed by: SOCIAL WORKER

## 2019-12-20 NOTE — PROGRESS NOTES
"  Chloe Owens, MSW, LCSW  Outpatient Psychiatry  [facility]  [address], [place] [address]  [phone]        Psychiatry Initial Visit (PhD/LCSW)  Diagnostic Interview - CPT 18184    Date: 12/20/2019    Site: [place]  Referral source: Zeus Holden, Psychiatry  Primary care provider: Nicole Castellon MD  Clinical status of patient: Outpatient  MRN: [id number]    Leticia Piña, a 69 y.o. female, for initial evaluation visit. Met with patient.      Subjective:     Chief complaint/reason for encounter: depression, anxiety and interpersonal    History of present illness: "Currently I am having anxiety due to work-related stressors. 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 She's a chronic complainer, would yell at the kids, throw their books and pencils on the ground and tell them they weren't doing it right. I had to go to the ER twice for chest pain, had trouble breathing. First time was 4-5 months ago, but has been happening a lot recently. Lasts about 40 minutes. Yesterday it happened and I got sick, threw up." 90 y/o father, with whom pt and hsb live along with pt's mother, fell last week and sustained a concussion. He has a previous hx of dementia, which has worsened since his fall. Pt and hsb moved in with her parents four years ago after hsb lost his construction business. He also had prostate cancer and heart surgery around the same time. They were forced to sell the home they'd lived in for 39 years. Pt experienced a lot of anxiety and depression during that time. She feels that DIL keeps son away, more so since pt and hsb "lost status" when they lost the business. Only sees teenage grandchildren if she attends their ball games or other events. Per son, DIL has never felt like pt and hsb like her. Very close to dtr, who has two dtrs of her own. Marriage of " "45 years is "better than it should be." Doesn't have any coping skills other than medications. Has RA, fibromyalgia and sjogren's. Gets up at 4am and spends an hour reading her bible, drinking tea. At work for 7am and gets home around 5pm; has little energy, can't afford a gym membership.  Pt also has social anxiety, which has worsened over the past few years. Feels she disappoints her hsb, who is extraverted, because she doesn't attend social functions.     Symptoms:   · Depression: depressed mood, diminished interest, insomnia, fatigue, worthlessness/guilt, poor concentration, tearfulness and social isolation  · Anxiety: excessive anxiety/worry, restlessness/keyed up, muscle tension and panic attacks  · Substance abuse: denied  · Cognitive functioning: denied  · Brianda: none noted  · Psychosis: none noted      Psychiatric history: has participated in counseling/psychotherapy on an outpatient basis in the past and currently under psychiatric care    Medical history:   Patient Active Problem List   Diagnosis    Atrophy of vulva    Facial nerve disorder, unspecified    Rheumatoid arthritis involving multiple sites with positive rheumatoid factor    Anxiety associated with depression    Insomnia    Sjogren's syndrome    Acquired hypothyroidism    Vitamin D deficiency disease    Drug-induced lupus erythematosus    Osteopenia    Allergic rhinitis    Lymphocytic vasculitis of skin    High risk medication use    Bursitis of left foot    Recurrent major depressive disorder    Immunocompromised    Essential hypertension    CKD (chronic kidney disease) stage 3, GFR 30-59 ml/min    Trochanteric bursitis of both hips        Family history of psychiatric illness: none    Social history (marriage, employment, etc.): Grew up in [place]. Pt is the oldest of three girls. Pt is 7 years older, and two younger sisters are 19 months apart. Both live in [place] and have always been close. "Led a very protected " "childhood. My dad was very strict on me, would yell a lot, was very critical, would tell me I was doing things wrong." Middle sister pulled away from her when pt and hsb lost their business. She has never apologized. Pt is very uncomfortable and hurt when she sees her to deal with their parents' issues.Graduated HS and had two years of college, where she met hsb. Hsb travels a lot, sells [employer].     Trauma/Abuse history: Denies      Substance use:  Alcohol: occasional  Drugs: none  Tobacco: none  Caffeine: tea in the morning    Current medications and drug reactions (include OTC, herbal):   Outpatient Encounter Medications as of 12/20/2019   Medication Sig Dispense Refill    azelastine (ASTELIN) 137 mcg (0.1 %) nasal spray 1 spray (137 mcg total) by Nasal route 2 (two) times daily. 30 mL 11    buPROPion (WELLBUTRIN XL) 300 MG 24 hr tablet Take 1 tablet (300 mg total) by mouth once daily. 90 tablet 3    buPROPion (WELLBUTRIN XL) 300 MG 24 hr tablet Take 1 tablet (300 mg total) by mouth once daily. TAKE ONE TABLET BY MOUTH ONE TIME DAILY 90 tablet 3    cholecalciferol, vitamin D3, 5,000 unit/mL Drop Take 1 drop by mouth Daily.      clindamycin (CLEOCIN) 300 MG capsule Take 1 capsule (300 mg total) by mouth every 8 (eight) hours. for 10 days 30 capsule 0    cyanocobalamin, vitamin B-12, (VITAMIN B-12) 5,000 mcg Subl Place under the tongue once daily.      doxycycline (VIBRAMYCIN) 100 MG Cap Take 1 capsule (100 mg total) by mouth every 12 (twelve) hours as needed. 30 capsule 4    DULoxetine (CYMBALTA) 60 MG capsule Take 1 capsule (60 mg total) by mouth once daily. 90 capsule 3    ferrous gluconate (FERGON) 324 MG tablet Take 324 mg by mouth daily with breakfast.      fluticasone propionate (FLONASE) 50 mcg/actuation nasal spray       folic acid (FOLVITE) 1 MG tablet Take 1 tablet (1,000 mcg total) by mouth once daily. 90 tablet 3    hydroCHLOROthiazide (HYDRODIURIL) 25 MG tablet " Take 1 tablet (25 mg total) by mouth once daily. 90 tablet 3    hydroxychloroquine (PLAQUENIL) 200 mg tablet Take 1 tablet (200 mg total) by mouth 2 (two) times daily. 180 tablet 3    levocetirizine (XYZAL) 5 MG tablet Take 1 tablet (5 mg total) by mouth every evening. 90 tablet 3    levothyroxine (SYNTHROID) 50 MCG tablet Take 1 tablet (50 mcg total) by mouth every other day. Alternate with 75mcg tab qod. 15 tablet 11    levothyroxine (SYNTHROID) 75 MCG tablet Take 1 tablet (75 mcg total) by mouth once daily. 90 tablet 3    LORazepam (ATIVAN) 1 MG tablet Take 1 tablet (1 mg total) by mouth 2 (two) times daily as needed. 60 tablet 1    losartan (COZAAR) 100 MG tablet Take 1 tablet (100 mg total) by mouth once daily. 90 tablet 3    losartan-hydrochlorothiazide 100-25 mg (HYZAAR) 100-25 mg per tablet Take 1 tablet by mouth.      omeprazole (PRILOSEC) 20 MG capsule Take 1 capsule (20 mg total) by mouth once daily. 90 capsule 3    pilocarpine (SALAGEN) 5 MG Tab Take 1 tablet (5 mg total) by mouth 4 (four) times daily. 360 tablet 3    potassium chloride (K-TAB) 20 mEq       potassium chloride SA (K-DUR,KLOR-CON) 20 MEQ tablet Take 1 tablet (20 mEq total) by mouth once daily. 90 tablet 3    predniSONE (DELTASONE) 20 MG tablet Take 3 tab in am x 3 days, then 2 tab in am x 3 days, then 1 tab in am x 3 days, then 1/2 tab in am x 3 days 21 tablet 0    traZODone (DESYREL) 50 MG tablet Take 2-3 tablets (100-150 mg total) by mouth nightly as needed for Insomnia. 270 tablet 2    triamcinolone (NASACORT) 55 mcg nasal inhaler 2 sprays by Nasal route once daily. 10.8 mL 3    [DISCONTINUED] butalbital-acetaminophen-caffeine -40 mg (FIORICET, ESGIC) -40 mg per tablet Take 1 tablet by mouth every 4 (four) hours as needed. for pain.  0    [DISCONTINUED] predniSONE (DELTASONE) 20 MG tablet Take 3 tab in am x 3 days, then 2 tab in am x 3 days, then 1 tab in am x 3 days, then 1/2 tab in am x 3 days 21 tablet 0      Facility-Administered Encounter Medications as of 12/20/2019   Medication Dose Route Frequency Provider Last Rate Last Dose    CIMZIA SyKt 400 mg  400 mg Subcutaneous Once Scooter Alvarez MD              Objective - Current Evaluation:     Mental Status Evaluation  Appearance: unremarkable, age appropriate  Behavior: normal, cooperative  Speech: normal tone, normal rate, normal pitch, normal volume  Mood: anxious, sad  Affect: congruent and appropriate, sad, anxious  Thought Process: normal and logical  Thought Content: normal, no suicidality, no homicidality, delusions, or paranoia, ruminations  Sensorium: grossly intact  Cognition: grossly intact  Insight: fair  Judgment: adequate to circumstances    Strengths and liabilities: Strength: Patient accepts guidance/feedback, Strength: Patient is expressive/articulate, Strength: Patient is intelligent, Strength: Patient is motivated for change, Strength: Patient has reasonable judgment and Liability: Patient lacks coping skills      Diagnostic Impression - Plan:     1. Generalized anxiety disorder with panic attacks    2. Depression, unspecified depression type        Plan:individual psychotherapy and medication management by physician    Return to Clinic: 2 weeks    Length of Service (minutes): 60

## 2019-12-25 ENCOUNTER — PATIENT MESSAGE (OUTPATIENT)
Dept: FAMILY MEDICINE | Facility: CLINIC | Age: 69
End: 2019-12-25

## 2019-12-26 ENCOUNTER — TELEPHONE (OUTPATIENT)
Dept: FAMILY MEDICINE | Facility: CLINIC | Age: 69
End: 2019-12-26

## 2020-01-03 ENCOUNTER — LAB VISIT (OUTPATIENT)
Dept: LAB | Facility: HOSPITAL | Age: 70
End: 2020-01-03
Attending: PHYSICIAN ASSISTANT
Payer: MEDICARE

## 2020-01-03 ENCOUNTER — PATIENT MESSAGE (OUTPATIENT)
Dept: RHEUMATOLOGY | Facility: CLINIC | Age: 70
End: 2020-01-03

## 2020-01-03 DIAGNOSIS — Z79.899 HIGH RISK MEDICATION USE: ICD-10-CM

## 2020-01-03 DIAGNOSIS — Z51.81 MEDICATION MONITORING ENCOUNTER: Chronic | ICD-10-CM

## 2020-01-03 DIAGNOSIS — D84.9 IMMUNOCOMPROMISED: ICD-10-CM

## 2020-01-03 DIAGNOSIS — M85.89 OSTEOPENIA OF MULTIPLE SITES: ICD-10-CM

## 2020-01-03 DIAGNOSIS — E55.9 VITAMIN D DEFICIENCY DISEASE: ICD-10-CM

## 2020-01-03 DIAGNOSIS — Z11.1 SCREENING-PULMONARY TB: ICD-10-CM

## 2020-01-03 DIAGNOSIS — M05.79 RHEUMATOID ARTHRITIS INVOLVING MULTIPLE SITES WITH POSITIVE RHEUMATOID FACTOR: ICD-10-CM

## 2020-01-03 DIAGNOSIS — M35.01 SJOGREN'S SYNDROME WITH KERATOCONJUNCTIVITIS SICCA: ICD-10-CM

## 2020-01-03 LAB
ALBUMIN SERPL BCP-MCNC: 4 G/DL (ref 3.5–5.2)
ALP SERPL-CCNC: 89 U/L (ref 55–135)
ALT SERPL W/O P-5'-P-CCNC: 110 U/L (ref 10–44)
ANION GAP SERPL CALC-SCNC: 11 MMOL/L (ref 8–16)
AST SERPL-CCNC: 41 U/L (ref 10–40)
BASOPHILS # BLD AUTO: 0.07 K/UL (ref 0–0.2)
BASOPHILS NFR BLD: 1.5 % (ref 0–1.9)
BILIRUB SERPL-MCNC: 0.4 MG/DL (ref 0.1–1)
BUN SERPL-MCNC: 19 MG/DL (ref 8–23)
CALCIUM SERPL-MCNC: 9.6 MG/DL (ref 8.7–10.5)
CHLORIDE SERPL-SCNC: 96 MMOL/L (ref 95–110)
CO2 SERPL-SCNC: 33 MMOL/L (ref 23–29)
CREAT SERPL-MCNC: 0.9 MG/DL (ref 0.5–1.4)
CRP SERPL-MCNC: 7.2 MG/L (ref 0–8.2)
DIFFERENTIAL METHOD: ABNORMAL
EOSINOPHIL # BLD AUTO: 0.3 K/UL (ref 0–0.5)
EOSINOPHIL NFR BLD: 5.8 % (ref 0–8)
ERYTHROCYTE [DISTWIDTH] IN BLOOD BY AUTOMATED COUNT: 12.2 % (ref 11.5–14.5)
ERYTHROCYTE [SEDIMENTATION RATE] IN BLOOD BY WESTERGREN METHOD: 5 MM/HR (ref 0–36)
EST. GFR  (AFRICAN AMERICAN): >60 ML/MIN/1.73 M^2
EST. GFR  (NON AFRICAN AMERICAN): >60 ML/MIN/1.73 M^2
GLUCOSE SERPL-MCNC: 98 MG/DL (ref 70–110)
HCT VFR BLD AUTO: 34.4 % (ref 37–48.5)
HGB BLD-MCNC: 11.4 G/DL (ref 12–16)
IMM GRANULOCYTES # BLD AUTO: 0.01 K/UL (ref 0–0.04)
IMM GRANULOCYTES NFR BLD AUTO: 0.2 % (ref 0–0.5)
LYMPHOCYTES # BLD AUTO: 1.5 K/UL (ref 1–4.8)
LYMPHOCYTES NFR BLD: 31.9 % (ref 18–48)
MCH RBC QN AUTO: 29.4 PG (ref 27–31)
MCHC RBC AUTO-ENTMCNC: 33.1 G/DL (ref 32–36)
MCV RBC AUTO: 89 FL (ref 82–98)
MONOCYTES # BLD AUTO: 0.5 K/UL (ref 0.3–1)
MONOCYTES NFR BLD: 11.4 % (ref 4–15)
NEUTROPHILS # BLD AUTO: 2.3 K/UL (ref 1.8–7.7)
NEUTROPHILS NFR BLD: 49.4 % (ref 38–73)
NRBC BLD-RTO: 0 /100 WBC
PLATELET # BLD AUTO: 248 K/UL (ref 150–350)
PMV BLD AUTO: 8.7 FL (ref 9.2–12.9)
POTASSIUM SERPL-SCNC: 2.8 MMOL/L (ref 3.5–5.1)
PROT SERPL-MCNC: 7.3 G/DL (ref 6–8.4)
RBC # BLD AUTO: 3.88 M/UL (ref 4–5.4)
SODIUM SERPL-SCNC: 140 MMOL/L (ref 136–145)
WBC # BLD AUTO: 4.64 K/UL (ref 3.9–12.7)

## 2020-01-03 PROCEDURE — 86140 C-REACTIVE PROTEIN: CPT

## 2020-01-03 PROCEDURE — 86480 TB TEST CELL IMMUN MEASURE: CPT

## 2020-01-03 PROCEDURE — 85025 COMPLETE CBC W/AUTO DIFF WBC: CPT

## 2020-01-03 PROCEDURE — 36415 COLL VENOUS BLD VENIPUNCTURE: CPT

## 2020-01-03 PROCEDURE — 87340 HEPATITIS B SURFACE AG IA: CPT

## 2020-01-03 PROCEDURE — 86704 HEP B CORE ANTIBODY TOTAL: CPT

## 2020-01-03 PROCEDURE — 85652 RBC SED RATE AUTOMATED: CPT

## 2020-01-03 PROCEDURE — 86706 HEP B SURFACE ANTIBODY: CPT

## 2020-01-03 PROCEDURE — 80053 COMPREHEN METABOLIC PANEL: CPT

## 2020-01-06 LAB
HBV CORE AB SERPL QL IA: NEGATIVE
HBV SURFACE AB SER-ACNC: NEGATIVE M[IU]/ML
HBV SURFACE AG SERPL QL IA: NEGATIVE

## 2020-01-06 NOTE — TELEPHONE ENCOUNTER
Did not pend medication because Volatren Gel is not on current med list but patient is requesting it be sent to [facility]

## 2020-01-07 LAB
GAMMA INTERFERON BACKGROUND BLD IA-ACNC: 0.04 IU/ML
M TB IFN-G CD4+ BCKGRND COR BLD-ACNC: 0.06 IU/ML
MITOGEN IGNF BCKGRD COR BLD-ACNC: 6.13 IU/ML
TB GOLD PLUS: NEGATIVE
TB2 - NIL: 0.05 IU/ML

## 2020-01-07 RX ORDER — DICLOFENAC SODIUM 10 MG/G
2 GEL TOPICAL 4 TIMES DAILY
Qty: 300 G | Refills: 3 | Status: SHIPPED | OUTPATIENT
Start: 2020-01-07 | End: 2023-11-06

## 2020-01-15 ENCOUNTER — LAB VISIT (OUTPATIENT)
Dept: LAB | Facility: HOSPITAL | Age: 70
End: 2020-01-15
Attending: PHYSICIAN ASSISTANT
Payer: MEDICARE

## 2020-01-15 ENCOUNTER — OFFICE VISIT (OUTPATIENT)
Dept: RHEUMATOLOGY | Facility: CLINIC | Age: 70
End: 2020-01-15
Payer: MEDICARE

## 2020-01-15 VITALS
BODY MASS INDEX: 24.43 KG/M2 | DIASTOLIC BLOOD PRESSURE: 77 MMHG | HEIGHT: 65 IN | HEART RATE: 83 BPM | SYSTOLIC BLOOD PRESSURE: 157 MMHG | WEIGHT: 146.63 LBS

## 2020-01-15 DIAGNOSIS — K75.89 OTHER SPECIFIED INFLAMMATORY LIVER DISEASES: ICD-10-CM

## 2020-01-15 DIAGNOSIS — M35.00 SJOGREN'S SYNDROME, WITH UNSPECIFIED ORGAN INVOLVEMENT: Primary | ICD-10-CM

## 2020-01-15 DIAGNOSIS — M35.00 SJOGREN'S SYNDROME, WITH UNSPECIFIED ORGAN INVOLVEMENT: ICD-10-CM

## 2020-01-15 DIAGNOSIS — Z79.899 HIGH RISK MEDICATION USE: ICD-10-CM

## 2020-01-15 DIAGNOSIS — E87.6 LOW POTASSIUM SYNDROME: ICD-10-CM

## 2020-01-15 DIAGNOSIS — L93.2 DRUG-INDUCED LUPUS ERYTHEMATOSUS: ICD-10-CM

## 2020-01-15 DIAGNOSIS — M05.79 RHEUMATOID ARTHRITIS INVOLVING MULTIPLE SITES WITH POSITIVE RHEUMATOID FACTOR: ICD-10-CM

## 2020-01-15 DIAGNOSIS — M85.89 OSTEOPENIA OF MULTIPLE SITES: ICD-10-CM

## 2020-01-15 DIAGNOSIS — K76.9 LIVER DISEASE, UNSPECIFIED: ICD-10-CM

## 2020-01-15 DIAGNOSIS — R94.5 ABNORMAL RESULTS OF LIVER FUNCTION STUDIES: ICD-10-CM

## 2020-01-15 DIAGNOSIS — R74.8 ELEVATED LIVER ENZYMES: ICD-10-CM

## 2020-01-15 DIAGNOSIS — D64.9 ANEMIA, UNSPECIFIED TYPE: ICD-10-CM

## 2020-01-15 DIAGNOSIS — T50.905A DRUG-INDUCED LUPUS ERYTHEMATOSUS: ICD-10-CM

## 2020-01-15 DIAGNOSIS — D84.9 IMMUNOCOMPROMISED: ICD-10-CM

## 2020-01-15 DIAGNOSIS — N18.30 CKD (CHRONIC KIDNEY DISEASE) STAGE 3, GFR 30-59 ML/MIN: ICD-10-CM

## 2020-01-15 DIAGNOSIS — I10 ESSENTIAL HYPERTENSION: ICD-10-CM

## 2020-01-15 LAB
ANION GAP SERPL CALC-SCNC: 11 MMOL/L (ref 8–16)
APTT BLDCRRT: 25.4 SEC (ref 21–32)
BUN SERPL-MCNC: 17 MG/DL (ref 8–23)
CALCIUM SERPL-MCNC: 10 MG/DL (ref 8.7–10.5)
CHLORIDE SERPL-SCNC: 97 MMOL/L (ref 95–110)
CO2 SERPL-SCNC: 30 MMOL/L (ref 23–29)
CREAT SERPL-MCNC: 0.9 MG/DL (ref 0.5–1.4)
EST. GFR  (AFRICAN AMERICAN): >60 ML/MIN/1.73 M^2
EST. GFR  (NON AFRICAN AMERICAN): >60 ML/MIN/1.73 M^2
GLUCOSE SERPL-MCNC: 101 MG/DL (ref 70–110)
INR PPP: 0.9 (ref 0.8–1.2)
POTASSIUM SERPL-SCNC: 3.6 MMOL/L (ref 3.5–5.1)
PROTHROMBIN TIME: 9.8 SEC (ref 9–12.5)
SODIUM SERPL-SCNC: 138 MMOL/L (ref 136–145)

## 2020-01-15 PROCEDURE — 99214 OFFICE O/P EST MOD 30 MIN: CPT | Mod: S$PBB,,, | Performed by: PHYSICIAN ASSISTANT

## 2020-01-15 PROCEDURE — 99215 OFFICE O/P EST HI 40 MIN: CPT | Mod: PBBFAC | Performed by: PHYSICIAN ASSISTANT

## 2020-01-15 PROCEDURE — 80048 BASIC METABOLIC PNL TOTAL CA: CPT

## 2020-01-15 PROCEDURE — 80076 HEPATIC FUNCTION PANEL: CPT

## 2020-01-15 PROCEDURE — 80074 ACUTE HEPATITIS PANEL: CPT

## 2020-01-15 PROCEDURE — 1126F PR PAIN SEVERITY QUANTIFIED, NO PAIN PRESENT: ICD-10-PCS | Mod: ,,, | Performed by: PHYSICIAN ASSISTANT

## 2020-01-15 PROCEDURE — 85610 PROTHROMBIN TIME: CPT

## 2020-01-15 PROCEDURE — 36415 COLL VENOUS BLD VENIPUNCTURE: CPT

## 2020-01-15 PROCEDURE — 1159F PR MEDICATION LIST DOCUMENTED IN MEDICAL RECORD: ICD-10-PCS | Mod: ,,, | Performed by: PHYSICIAN ASSISTANT

## 2020-01-15 PROCEDURE — 99999 PR PBB SHADOW E&M-EST. PATIENT-LVL V: CPT | Mod: PBBFAC,,, | Performed by: PHYSICIAN ASSISTANT

## 2020-01-15 PROCEDURE — 99999 PR PBB SHADOW E&M-EST. PATIENT-LVL V: ICD-10-PCS | Mod: PBBFAC,,, | Performed by: PHYSICIAN ASSISTANT

## 2020-01-15 PROCEDURE — 99214 PR OFFICE/OUTPT VISIT, EST, LEVL IV, 30-39 MIN: ICD-10-PCS | Mod: S$PBB,,, | Performed by: PHYSICIAN ASSISTANT

## 2020-01-15 PROCEDURE — 1159F MED LIST DOCD IN RCRD: CPT | Mod: ,,, | Performed by: PHYSICIAN ASSISTANT

## 2020-01-15 PROCEDURE — 86235 NUCLEAR ANTIGEN ANTIBODY: CPT | Mod: 91

## 2020-01-15 PROCEDURE — 86256 FLUORESCENT ANTIBODY TITER: CPT

## 2020-01-15 PROCEDURE — 82728 ASSAY OF FERRITIN: CPT

## 2020-01-15 PROCEDURE — 85730 THROMBOPLASTIN TIME PARTIAL: CPT

## 2020-01-15 PROCEDURE — 1126F AMNT PAIN NOTED NONE PRSNT: CPT | Mod: ,,, | Performed by: PHYSICIAN ASSISTANT

## 2020-01-15 RX ORDER — POTASSIUM CHLORIDE 1500 MG/1
20 TABLET, EXTENDED RELEASE ORAL 2 TIMES DAILY
Qty: 180 TABLET | Refills: 3 | Status: SHIPPED | OUTPATIENT
Start: 2020-01-15 | End: 2020-09-29 | Stop reason: SDUPTHER

## 2020-01-15 NOTE — PROGRESS NOTES
Subjective:       Patient ID: Leticia Piña is a 69 y.o. female.    Chief Complaint: sjogrens syndrome      Leticia is here today for rheumatology followup.      She has chronic seropositive rheumatoid arthritis, Sjogren's syndrome, osteopenia, and Vitamin D deficiency.  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 Also failed enbrel this was IE.  For RA on cimzia 400 mg every 5-6 weeks, now weaned down and off mtx over the past year, still on Plaquenil 200 mg once daily.      RA wise BL hand and foot x-rays done in 7/2017 and repeat 6/2019 showed no new erosions but right 5th mtp joint does have erosive changes which are chronic and x-rays were overall stable.      Fibro On Cymbalta 60 mg/d, We sent her to aquatic therapy at [facility] which really helped a lot. The focused on both her fibromyalgia and  romel hip bursitis/ back pain.     Today she rates her pain level 0/10    For Sjogren's, she has +SSB, +SSA, +GIOVANNA 1:160 speckled in the past. She takes Evoxac 3-4X daily cost $160/month.  Now on salagen using good rx coupon cost is better. Also  Using otc products nasal gel, lozenges, drinking water. Seen by ophthalmology and prescribed plasma serum eye drops, which have worked great. No parotid tenderness or swelling.     Osteopenia diagnosed by Dexa. Last scan 8/2014. TF -1.0, FN -1.8, spine -0.2. Previously, we did trial of fosamax but had GI upset and stopped. No prednisone. Currently taking estradiol, progesterone, calcium by diet only, and vit D 2000 IU daily. Last Vitamin D level was elevated at 80 .repeat bone density 12/6/17 stable. No changes. No falls or fractures since last visit.     She is weaning off her hormones; reflux worsening, will see gi for evaluation in march    Took clindamycin around [place] for persistent infection of sinuses  Labs done last week showed elevated lft and low K   Prior Low K on KCL 20 mEq once a day   No easy bruising or bleeding   Chronic  "mild anemia on iron but counts unchanged, tibc labs good in nov          Low-back Pain   Pertinent negatives include no abdominal pain, arthralgias, chest pain, chills, congestion, coughing, fatigue, fever, joint swelling, myalgias, nausea, neck pain, rash, sore throat, vomiting or weakness.   Hip Pain   Pertinent negatives include no abdominal pain, arthralgias, chest pain, chills, congestion, coughing, fatigue, fever, joint swelling, myalgias, nausea, neck pain, rash, sore throat, vomiting or weakness.     Review of Systems   Constitutional: Negative.  Negative for activity change, appetite change, chills, fatigue and fever.   HENT: Negative for congestion, ear pain, mouth sores, rhinorrhea, sinus pressure, sore throat and trouble swallowing.         + dry mouth   Eyes: Negative.  Negative for photophobia, pain and redness.        No swollen or red eyes, + dry eye     Respiratory: Negative for cough, choking, chest tightness, shortness of breath, wheezing and stridor.    Cardiovascular: Negative.  Negative for chest pain.   Gastrointestinal: Negative.  Negative for abdominal pain, blood in stool, diarrhea, nausea and vomiting.   Genitourinary: Negative.  Negative for dysuria, frequency, hematuria and urgency.   Musculoskeletal: Negative for arthralgias, back pain, gait problem, joint swelling, myalgias, neck pain and neck stiffness.   Skin: Negative for color change, pallor and rash.   Neurological: Negative.  Negative for weakness.   Hematological: Negative for adenopathy.   Psychiatric/Behavioral: Negative for suicidal ideas.         Objective:     BP (!) 157/77   Pulse 83   Ht 5' 5" (1.651 m)   Wt 66.5 kg (146 lb 9.7 oz)   BMI 24.40 kg/m²      Physical Exam   Constitutional: She is oriented to person, place, and time and well-developed, well-nourished, and in no distress. No distress.   HENT:   Head: Normocephalic and atraumatic.   Right Ear: External ear normal.   Left Ear: External ear normal. "   Mouth/Throat: Mucous membranes are not pale, dry and not cyanotic. She does not have dentures. No oral lesions. No oropharyngeal exudate.   Eyes: Conjunctivae and EOM are normal. Pupils are equal, round, and reactive to light. Right conjunctiva is not injected. Right conjunctiva has no hemorrhage. Left conjunctiva is not injected. Left conjunctiva has no hemorrhage. No scleral icterus.       Neck: Normal range of motion. Neck supple. No thyromegaly present.   Cardiovascular: Normal rate, regular rhythm and normal heart sounds.    No murmur heard.  Pulmonary/Chest: Effort normal and breath sounds normal. She exhibits no tenderness.   Abdominal: Soft. Bowel sounds are normal.       Right Side Rheumatological Exam     Examination finds the shoulder, elbow, wrist, knee, 1st PIP, 1st MCP, 2nd PIP, 2nd MCP, 3rd PIP, 3rd MCP, 4th PIP, 4th MCP, 5th PIP and 5th MCP normal.    The patient is tender to palpation of the 5th MTP    She has swelling of the 5th MTP    Left Side Rheumatological Exam     Examination finds the shoulder, elbow, wrist, knee, 1st PIP, 1st MCP, 2nd PIP, 2nd MCP, 3rd PIP, 3rd MCP, 4th PIP, 4th MCP, 5th PIP and 5th MCP normal.    Joint Exam Comments   Knee: Lateral side of knee at lateral collateral ligament  Some ttp noted            Lymphadenopathy:     She has no cervical adenopathy.   Neurological: She is alert and oriented to person, place, and time. She displays normal reflexes. No cranial nerve deficit. She exhibits normal muscle tone. Gait normal.   Skin: Skin is warm and dry. No rash noted. No erythema.     Musculoskeletal: She exhibits deformity. She exhibits no edema or tenderness.   DIP enlargement BL hands, no synovitis, warmth.   -  metatarsal squeeze romel    No Swelling or tenderness right 5th mtp joint  ttp romel hip bursa; lower back, upper arms, neck  Thighs etc- generalized tenderness     No synovitis     No weakness on exam, muscle strenght 5/5 upper and lower ext                     Results for orders placed or performed in visit on 01/03/20   Sedimentation rate, manual   Result Value Ref Range    Sed Rate 5 0 - 36 mm/Hr   C-reactive protein   Result Value Ref Range    CRP 7.2 0.0 - 8.2 mg/L   Comprehensive metabolic panel   Result Value Ref Range    Sodium 140 136 - 145 mmol/L    Potassium 2.8 (L) 3.5 - 5.1 mmol/L    Chloride 96 95 - 110 mmol/L    CO2 33 (H) 23 - 29 mmol/L    Glucose 98 70 - 110 mg/dL    BUN, Bld 19 8 - 23 mg/dL    Creatinine 0.9 0.5 - 1.4 mg/dL    Calcium 9.6 8.7 - 10.5 mg/dL    Total Protein 7.3 6.0 - 8.4 g/dL    Albumin 4.0 3.5 - 5.2 g/dL    Total Bilirubin 0.4 0.1 - 1.0 mg/dL    Alkaline Phosphatase 89 55 - 135 U/L    AST 41 (H) 10 - 40 U/L     (H) 10 - 44 U/L    Anion Gap 11 8 - 16 mmol/L    eGFR if African American >60 >60 mL/min/1.73 m^2    eGFR if non African American >60 >60 mL/min/1.73 m^2   CBC auto differential   Result Value Ref Range    WBC 4.64 3.90 - 12.70 K/uL    RBC 3.88 (L) 4.00 - 5.40 M/uL    Hemoglobin 11.4 (L) 12.0 - 16.0 g/dL    Hematocrit 34.4 (L) 37.0 - 48.5 %    Mean Corpuscular Volume 89 82 - 98 fL    Mean Corpuscular Hemoglobin 29.4 27.0 - 31.0 pg    Mean Corpuscular Hemoglobin Conc 33.1 32.0 - 36.0 g/dL    RDW 12.2 11.5 - 14.5 %    Platelets 248 150 - 350 K/uL    MPV 8.7 (L) 9.2 - 12.9 fL    Immature Granulocytes 0.2 0.0 - 0.5 %    Gran # (ANC) 2.3 1.8 - 7.7 K/uL    Immature Grans (Abs) 0.01 0.00 - 0.04 K/uL    Lymph # 1.5 1.0 - 4.8 K/uL    Mono # 0.5 0.3 - 1.0 K/uL    Eos # 0.3 0.0 - 0.5 K/uL    Baso # 0.07 0.00 - 0.20 K/uL    nRBC 0 0 /100 WBC    Gran% 49.4 38.0 - 73.0 %    Lymph% 31.9 18.0 - 48.0 %    Mono% 11.4 4.0 - 15.0 %    Eosinophil% 5.8 0.0 - 8.0 %    Basophil% 1.5 0.0 - 1.9 %    Differential Method Automated    Hepatitis B core antibody, total   Result Value Ref Range    Hep B Core Total Ab Negative    Hepatitis B surface antigen   Result Value Ref Range    Hepatitis B Surface Ag Negative Negative   Hepatitis B surface  antibody   Result Value Ref Range    Hep B S Ab Negative    Quantiferon Gold TB   Result Value Ref Range    NIL 0.040 See text IU/mL    TB1 - Nil 0.060 See text IU/mL    TB2 - Nil 0.050 See text IU/mL    Mitogen - Nil 6.130 See text IU/mL    TB Gold Plus Negative            6/4/19   Foot X-ray -FINDINGS:  Allowing for positioning and technique there has been no interval development of new focal erosion or periosteal reaction.  Stable multi articular degenerative changes present throughout the feet bilaterally.  Left and borderline right pes planus.  Stable erosions on the right involving the 1st and 5th metatarsal heads with slight increased smooth soft tissue prominence along the lateral margin of the right 5th MTP joint.  Similar erosions on the left involving the 5th MTP with stable appearance the erosions and soft tissue prominence.  No acute fracture or dislocation.  No new soft tissue calcification or radiopaque foreign body.    BL Hand -FINDINGS:  Stable exam without acute fracture or subluxation noted.  No new or developing focal erosion or periosteal reaction.  Multi articular degenerative change noted in the wrist and hand bilaterally, greater on the left.        X-ray  hand and foot 5/2016 and 7/2017 : no new erosions - stable    12/2017 DEXA  Total femur mean 0.855 g/cm² with a T score -1.2  Left femur neck 0.738 g/cm² T score -2.2  L1-L4 spine 1.152 g/cm² with a T score -0.2  Osteopenia with falling bone density at the left femur neck moderate to high fracture risk      12/6/15 DEXA  Total femur mean 0.860 g/cm² with a T score -1.2  Left femur neck 0.738 g/cm² T score -2.2  L1-L4 spine 1.152 g/cm² with a T score -0.2  Osteopenia with falling bone density at the left femur neck moderate to high fracture risk    8/6/14 DEXA  Total femur mean 0.880 g/cm² with a T score -1.0  Left femur neck 0.789 g/cm² with a T score -1.8  Spine L1-L4 1.153 g/cm² with a T score -0.2  Osteopenia with low to moderate  risk of fracture          Immunization History   Administered Date(s) Administered    Hepatitis A, Adult 06/05/2019    Influenza - High Dose - PF (65 years and older) 11/07/2007, 09/30/2008, 09/23/2009, 10/27/2010, 10/31/2011, 10/05/2015, 10/12/2018, 09/24/2019    Influenza - Quadrivalent 11/13/2014, 10/27/2016    Influenza - Trivalent - Adjuvanted - PF 10/23/2017    PPD Test 03/28/2011    Pneumococcal Conjugate - 13 Valent 12/10/2013, 12/10/2013, 11/27/2019    Pneumococcal Polysaccharide - 23 Valent 11/07/2007, 03/23/2017    Tdap 09/15/2010, 04/10/2013    Zoster 04/10/2013         Assessment:       1. Sjogren's syndrome, with unspecified organ involvement    2. Drug-induced lupus erythematosus    3. Immunocompromised    4. CKD (chronic kidney disease) stage 3, GFR 30-59 ml/min    5. Rheumatoid arthritis involving multiple sites with positive rheumatoid factor    6. High risk medication use    7. Osteopenia of multiple sites    8. Essential hypertension    9. Low potassium syndrome    10. Elevated liver enzymes    11. Anemia, unspecified type    12. Abnormal results of liver function studies     13. Other specified inflammatory liver diseases     14. Liver disease, unspecified         1. RA well controlled on -       Plaquenil 200 mg twice daily and  Cimzia 400 mg was Q 4-5  weeks-       ALEMAN-28 (CRP): 1.72 (Remission)    MARGARITA 0,  CDAI 0    hand and foot x-ray up to date 6/2019  stable no changes     In the past failed mtx monotherapy  Failed humira and enbrel    2. Sjogrens - dryness of eyes and mouth. On evoxac 3-4 X daily and plasma serum eye drops prescribed by ophthalmologist.   evoxac poorly covered by insurance cost $160/month- now on salagen doing fair, lower cost    3. 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 Osteopenia - DEXA stable at both FN, femur mean and spine- mod-high rask- will follow  Currently taking estradiol, progesterone, calcium by diet only, and vit D  2000 international units daily.   In the past failed a trial of oral bisphosphonate secondary GI intolerance    5. Vaccination status: up to date including flu vaccine     6. Medication Monitoring- no current issues, no evidence of toxicity    7. Immunocompromised no issues with recurrent infections    8. Fibromyalgia with ongoing fatigue, sleep disturbances, myofascial pain ongoing depression- out of  Cymbalta, on Wellbutrin, some help with aquatic therapy     9.  R 5th MTP - pain/swelling- resolved post IA injection last year    10. Acute elevated lft  Hep b studies neg   Recent clindamycin course for infection   No other new meds  Not on mtx, but on cimzia for RA which can rarely aggravate the liver     Plan:         Orders Placed This Encounter   Procedures    Protime-INR    Ferritin    Hepatic function panel    Hepatitis panel, acute    APTT    Anti-smooth muscle antibody    ANTIMITOCHONDRIAL ANTIBODY    Basic metabolic panel   labs above and hold cimzia today   If lft are better then can come back later this week or next week for dose    Regarding RA meds  Stay off  mtx   C/w Plaquenil 200 mg daily. This  will decrease long term risk of eye issues     C/w cimizia CAM liopholised 400 mg Q 4 weeks - will hold today and repeat labs 1st      Repeat her bilateral hand and foot x-rays in 1 year last done 6/2019     HD flu shot today     For fibromyalgia and romel hip bursitis   Completed- PT, try to c/w exercise at home   Epsom warm bath soaks  C/w cymbalta 60 mg/d, good Rx coupon is lower than cost with insurance  consider gabapentin in future     C/w salagen 5 mg 3-4 X daily, good Rx coupon  Can also try OTC Xylimelts or Thera Breath lozenges for dry mouth.  Nasogel  Cool mist humidier  Nighttime eye ointment, use sleep mask      C/w eye doc, yearly eye checks for plaquenil and c/w her plasma serum eye drops.       Dexa  Scan again .  2-3 years. Repeat dexa every 2 years.   At next dexa if drops any will  need to treat  For now c/w cut back on her vit D to no higher than 1000 IU daily; d level is 80 and calcium in diet,       Use Voltaren gel Right 5th mtp  and iburpfen prn not daily   See gi for her reflux issues     Watch Potassium if not better then supplement       Tentatively   rtc 4 and 8 weeks cimzia apt  rtc 12 weeks OV/ giovanni for cimzia- no labs -  Toxicity Labs every 3-4 months     Tb gold and hep panel later this year     Call with any questions, changes or concerns.                  copy Pasha Estrada study

## 2020-01-17 ENCOUNTER — OFFICE VISIT (OUTPATIENT)
Dept: HEMATOLOGY/ONCOLOGY | Facility: CLINIC | Age: 70
End: 2020-01-17
Payer: MEDICARE

## 2020-01-17 VITALS
HEIGHT: 65 IN | BODY MASS INDEX: 24.79 KG/M2 | DIASTOLIC BLOOD PRESSURE: 82 MMHG | RESPIRATION RATE: 18 BRPM | OXYGEN SATURATION: 97 % | SYSTOLIC BLOOD PRESSURE: 160 MMHG | HEART RATE: 69 BPM | WEIGHT: 148.81 LBS | TEMPERATURE: 98 F

## 2020-01-17 DIAGNOSIS — D50.8 OTHER IRON DEFICIENCY ANEMIA: Primary | ICD-10-CM

## 2020-01-17 DIAGNOSIS — M35.00 SJOGREN'S SYNDROME, WITH UNSPECIFIED ORGAN INVOLVEMENT: ICD-10-CM

## 2020-01-17 PROBLEM — D50.9 IRON DEFICIENCY ANEMIA: Status: ACTIVE | Noted: 2020-01-17

## 2020-01-17 LAB
ALBUMIN SERPL BCP-MCNC: 4.1 G/DL (ref 3.5–5.2)
ALP SERPL-CCNC: 72 U/L (ref 55–135)
ALT SERPL W/O P-5'-P-CCNC: 40 U/L (ref 10–44)
AST SERPL-CCNC: 33 U/L (ref 10–40)
BILIRUB DIRECT SERPL-MCNC: 0.2 MG/DL (ref 0.1–0.3)
BILIRUB SERPL-MCNC: 0.3 MG/DL (ref 0.1–1)
FERRITIN SERPL-MCNC: 15 NG/ML (ref 20–300)
HAV IGM SERPL QL IA: NEGATIVE
HBV CORE IGM SERPL QL IA: NEGATIVE
HBV SURFACE AG SERPL QL IA: NEGATIVE
HCV AB SERPL QL IA: NEGATIVE
MITOCHONDRIA AB TITR SER IF: NORMAL {TITER}
PROT SERPL-MCNC: 7.3 G/DL (ref 6–8.4)
SMOOTH MUSCLE AB TITR SER IF: NORMAL {TITER}

## 2020-01-17 PROCEDURE — 1159F MED LIST DOCD IN RCRD: CPT | Mod: ,,, | Performed by: INTERNAL MEDICINE

## 2020-01-17 PROCEDURE — 99999 PR PBB SHADOW E&M-EST. PATIENT-LVL IV: CPT | Mod: PBBFAC,,, | Performed by: INTERNAL MEDICINE

## 2020-01-17 PROCEDURE — 99999 PR PBB SHADOW E&M-EST. PATIENT-LVL IV: ICD-10-PCS | Mod: PBBFAC,,, | Performed by: INTERNAL MEDICINE

## 2020-01-17 PROCEDURE — 1159F PR MEDICATION LIST DOCUMENTED IN MEDICAL RECORD: ICD-10-PCS | Mod: ,,, | Performed by: INTERNAL MEDICINE

## 2020-01-17 PROCEDURE — 1126F PR PAIN SEVERITY QUANTIFIED, NO PAIN PRESENT: ICD-10-PCS | Mod: ,,, | Performed by: INTERNAL MEDICINE

## 2020-01-17 PROCEDURE — 99214 OFFICE O/P EST MOD 30 MIN: CPT | Mod: PBBFAC | Performed by: INTERNAL MEDICINE

## 2020-01-17 PROCEDURE — 99214 OFFICE O/P EST MOD 30 MIN: CPT | Mod: S$PBB,,, | Performed by: INTERNAL MEDICINE

## 2020-01-17 PROCEDURE — 1126F AMNT PAIN NOTED NONE PRSNT: CPT | Mod: ,,, | Performed by: INTERNAL MEDICINE

## 2020-01-17 PROCEDURE — 99214 PR OFFICE/OUTPT VISIT, EST, LEVL IV, 30-39 MIN: ICD-10-PCS | Mod: S$PBB,,, | Performed by: INTERNAL MEDICINE

## 2020-01-17 RX ORDER — HEPARIN 100 UNIT/ML
500 SYRINGE INTRAVENOUS
Status: CANCELLED | OUTPATIENT
Start: 2020-01-24

## 2020-01-17 RX ORDER — SODIUM CHLORIDE 0.9 % (FLUSH) 0.9 %
10 SYRINGE (ML) INJECTION
Status: CANCELLED | OUTPATIENT
Start: 2020-02-18

## 2020-01-17 RX ORDER — SODIUM CHLORIDE 0.9 % (FLUSH) 0.9 %
10 SYRINGE (ML) INJECTION
Status: CANCELLED | OUTPATIENT
Start: 2020-01-24

## 2020-01-17 RX ORDER — HEPARIN 100 UNIT/ML
500 SYRINGE INTRAVENOUS
Status: CANCELLED | OUTPATIENT
Start: 2020-02-18

## 2020-01-17 NOTE — PATIENT INSTRUCTIONS
- iv iron feraheme x 2 doses 1 week apart   - RV in 2 months with labs 1 week prior  - GI referral for EGD/colonoscopy

## 2020-01-17 NOTE — PROGRESS NOTES
Subjective:      DATE OF VISIT: 1/17/20     ?  Patient ID:?Leticia Piña is a 69 y.o. female.?? MR#: [id number]   ?   REFERRING PROVIDER: No referring provider defined for this encounter. the    ? Primary Care Providers:  Nicole Jasmine MD, MD (General)     CHIEF COMPLAINT: ?Anemia??   ?   HPI    I had the pleasure seeing again in follow-up Ms. Piña.  She is in stable medical condition with dry eyes associated her Sjogren's disease.  She denies any evidence of melena, hematochezia, postmenopausal vaginal bleeding.  She is up-to-date on colonoscopy and Pap smears as well as mammography.  She had colonoscopy last in 2011 but has not had EGD.  She does note history of reflux and is taking PPI.  She has been using supplementation with oral iron ferrous sulfate 324 mg 1 tablet daily over the last 6 months.      Review of Systems    ?   A comprehensive 14-point review of systems was reviewed with patient and was negative other than as specified above.   ?     Objective:      Physical Exam      ?   Vitals:    01/17/20 1629   BP: (!) 167/84   Pulse: 72   Resp: 18   Temp: 98.2 °F (36.8 °C)      ?   ECOG:?0   General appearance: Generally well appearing, in no acute distress.   Head, eyes, ears, nose, and throat: moist mucous membranes.   Respiratory:  Normal work of breathing  Abdomen:soft, nontender, nondistended.  Extremities: without edema.   Neurologic: Alert and oriented. Grossly normal strength, coordination, and gait.   Skin: No rashes, ecchymoses or petechial lesion.   Psychiatric:  Normal mood and affect      ?   Laboratory:  ?   Lab Results   Component Value Date    WBC 5.58 09/19/2019    HGB 11.0 (L) 09/19/2019    HCT 32.6 (L) 09/19/2019    MCV 87 09/19/2019     09/19/2019       Lab Results   Component Value Date    IRON 51 11/04/2019    TIBC 534 (H) 11/04/2019    FERRITIN 15 (L) 01/15/2020         ?   Assessment/Plan:       1. Other iron deficiency anemia    2. Sjogren's syndrome, with  unspecified organ involvement          Plan:     # iron deficiency anemia:  She has mild stable anemia over the last several years with hemoglobin ranging 10-11.5.  Previous iron indices in November 2019 consistent with iron deficiency anemia for which I had recommended oral iron trial.  She was able to take oral iron 3 times daily however repeat labs show no notable change in iron indices.  She continues vitamin B12 and folate supplements.  Given on response to oral iron we will arrange for IV iron.  I have also encouraged her to pursue GI workup with EGD and colonoscopy.    # Sjogren syndrome:  Including dry eyes, continue follow-up with Rheumatology.      Follow-Up:   - iv iron feraheme x 2 doses 1 week apart   - RV in 2 months with labs 1 week prior  - GI referral for EGD/colonoscopy

## 2020-01-20 ENCOUNTER — DOCUMENTATION ONLY (OUTPATIENT)
Dept: RHEUMATOLOGY | Facility: CLINIC | Age: 70
End: 2020-01-20

## 2020-01-20 ENCOUNTER — TELEPHONE (OUTPATIENT)
Dept: ENDOSCOPY | Facility: HOSPITAL | Age: 70
End: 2020-01-20

## 2020-01-20 ENCOUNTER — PATIENT MESSAGE (OUTPATIENT)
Dept: FAMILY MEDICINE | Facility: CLINIC | Age: 70
End: 2020-01-20

## 2020-01-20 ENCOUNTER — PATIENT MESSAGE (OUTPATIENT)
Dept: RHEUMATOLOGY | Facility: CLINIC | Age: 70
End: 2020-01-20

## 2020-01-20 NOTE — PROGRESS NOTES
All the liver work up neg  LFT now back to normal    Ok for her to come in this week for her cimzia injection         Component      Latest Ref Rng & Units 1/15/2020   Sodium      136 - 145 mmol/L 138   Potassium      3.5 - 5.1 mmol/L 3.6   Chloride      95 - 110 mmol/L 97   CO2      23 - 29 mmol/L 30 (H)   Glucose      70 - 110 mg/dL 101   BUN, Bld      8 - 23 mg/dL 17   Creatinine      0.5 - 1.4 mg/dL 0.9   Calcium      8.7 - 10.5 mg/dL 10.0   Anion Gap      8 - 16 mmol/L 11   eGFR if African American      >60 mL/min/1.73 m:2 >60   eGFR if non African American      >60 mL/min/1.73 m:2 >60   PROTEIN TOTAL      6.0 - 8.4 g/dL 7.3   Albumin      3.5 - 5.2 g/dL 4.1   BILIRUBIN TOTAL      0.1 - 1.0 mg/dL 0.3   Bilirubin, Direct      0.1 - 0.3 mg/dL 0.2   AST      10 - 40 U/L 33   ALT      10 - 44 U/L 40   Alkaline Phosphatase      55 - 135 U/L 72   Hepatitis B Surface Ag      Negative Negative   Hep B C IgM      Negative Negative   Hep A IgM      Negative Negative   Hepatitis C Ab      Negative Negative   Protime      9.0 - 12.5 sec 9.8   Coumadin Monitoring INR      0.8 - 1.2 0.9   Ferritin      20.0 - 300.0 ng/mL 15 (L)   aPTT      21.0 - 32.0 sec 25.4   Smooth Muscle Ab      Negative Negative 1:40   Anti-Mitochon Ab IFA      Negative Negative 1:40

## 2020-01-20 NOTE — TELEPHONE ENCOUNTER
Endoscopy Scheduling Questionnaire:    1. Have you been admitted overnight to the hospital in the past 3 months? yes, has had appt with pcp  2. Have you had a cardiac stent placed in the past 12 months? no  3. Have you had a stroke or heart attack in the past 6 months? no  4. Have you had any chest pain in the past 3 months? no      If so, have you been evaluated by your PCP or Cardiologist? no  5. Do you take prescription weight loss medication? no  6. Have you been diagnosed with Diverticulitis within the past 3 months? no  7. Are you on dialysis? no  8. Are you diabetic? no Do you have an insulin pump? no  9. Do you have any other health issues that you feel might limit your ability to safely have the procedure and/or sedation? no  10. Is the patient over 81 yo? no        If so, has the patient been seen by their PCP or GI in the last 6 months? N/A       -I have reviewed the last colonoscopy for recommendations regarding surveillance and bowel prep  Yes  -I have reviewed the patient's medications and allergies. She is not on high risk medication, A clearance request NA  -I have verified the pharmacy information. The prep being used is Suprep. The patient's prep instructions were sent via [facility] patient portal..    Patient stated she needed to speak with her pcp regarding lab results before scheduling the procedures.  Patient will call office when ready to schedule.

## 2020-01-20 NOTE — Clinical Note
All the liver work up negLFT now back to normalOk for her to come in this week for her cimzia injectionPlease call pt to get her scheduledLIsa

## 2020-01-21 ENCOUNTER — TELEPHONE (OUTPATIENT)
Dept: INFUSION THERAPY | Facility: HOSPITAL | Age: 70
End: 2020-01-21

## 2020-01-23 ENCOUNTER — PATIENT MESSAGE (OUTPATIENT)
Dept: OTOLARYNGOLOGY | Facility: CLINIC | Age: 70
End: 2020-01-23

## 2020-02-07 ENCOUNTER — PATIENT MESSAGE (OUTPATIENT)
Dept: HEMATOLOGY/ONCOLOGY | Facility: CLINIC | Age: 70
End: 2020-02-07

## 2020-02-10 ENCOUNTER — OFFICE VISIT (OUTPATIENT)
Dept: PSYCHIATRY | Facility: CLINIC | Age: 70
End: 2020-02-10
Payer: MEDICARE

## 2020-02-10 DIAGNOSIS — F41.0 GENERALIZED ANXIETY DISORDER WITH PANIC ATTACKS: Primary | ICD-10-CM

## 2020-02-10 DIAGNOSIS — F41.1 GENERALIZED ANXIETY DISORDER WITH PANIC ATTACKS: Primary | ICD-10-CM

## 2020-02-10 DIAGNOSIS — F32.A DEPRESSION, UNSPECIFIED DEPRESSION TYPE: ICD-10-CM

## 2020-02-10 PROCEDURE — 90834 PSYTX W PT 45 MINUTES: CPT | Mod: ,,, | Performed by: SOCIAL WORKER

## 2020-02-10 PROCEDURE — 90834 PR PSYCHOTHERAPY W/PATIENT, 45 MIN: ICD-10-PCS | Mod: ,,, | Performed by: SOCIAL WORKER

## 2020-02-10 NOTE — PROGRESS NOTES
"  Chloe Owens, MSW, LCSW  Outpatient Psychiatry  [facility]  [address] [place], [place] [address] [place] [address]  [phone]            Individual Psychotherapy Progress Note (PhD/LCSW)     Outpatient - Insight oriented psychotherapy 45 min - CPT code 39020    2/10/2020  MRN: [id number]  Primary Care Provider: Nicole Castellon MD    Leticia Piña is a 69 y.o. female who presents today for follow-up of depression and anxiety. Met with patient.        Subjective:       Patient report: "My mother-in-law just , and my father's in the hospital. It's been kind of rough. I've had to take off work for the past three weeks. I'm just overwhelmed." Experiencing caregiver burnout; takes 88 y/o mother to hospital to stay with 89 y/o father in the hospital. He will transfer to a SNF unit in the next day or so. Worries constantly. Doesn't have any energy. Sisters and son have helped sit with father; also hired a sitter for overnights. 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       Current symptoms:   · Depression: depressed mood, diminished interest and tearfulness  · Anxiety: excessive anxiety/worry, restlessness/keyed up, irritability and muscle tension  · Substance abuse: denied  · Cognitive functioning: denied  · Brianda: none noted  · Psychosis: none noted    Psychosocial stressors and topics discussed: identifying feelings, symptom recognition/mgmt, ADLs, self care, goals, coping strategies, physical health issues, adjustment problems, grief, managing anxiety/panic/stress and motivation for change      Objective:       Mental Status Evaluation  Appearance: unremarkable, age appropriate  Behavior: cooperative, tearful  Speech: normal tone, normal rate, normal pitch, normal volume  Mood: anxious  Affect: congruent and appropriate, blunted  Thought Process: normal and logical  Thought Content: normal, no suicidality, no homicidality, delusions, or paranoia  Sensorium: grossly " intact  Cognition: grossly intact  Insight: fair  Judgment: adequate to circumstances    Risk parameters:  Patient reports no suicidal ideation  Patient reports no homicidal ideation  Patient reports no self-injurious behavior  Patient reports no violent behavior    Current medications and drug reactions (include OTC, herbal):   Outpatient Encounter Medications as of 2/10/2020   Medication Sig Dispense Refill    azelastine (ASTELIN) 137 mcg (0.1 %) nasal spray 1 spray (137 mcg total) by Nasal route 2 (two) times daily. 30 mL 11    cholecalciferol, vitamin D3, 5,000 unit/mL Drop Take 1 drop by mouth Daily.      cyanocobalamin, vitamin B-12, (VITAMIN B-12) 5,000 mcg Subl Place under the tongue once daily.      diclofenac sodium (VOLTAREN) 1 % Gel Apply 2 g topically 4 (four) times daily. 300 g 3    doxycycline (VIBRAMYCIN) 100 MG Cap Take 1 capsule (100 mg total) by mouth every 12 (twelve) hours as needed. 30 capsule 4    DULoxetine (CYMBALTA) 60 MG capsule Take 1 capsule (60 mg total) by mouth once daily. 90 capsule 3    ferrous gluconate (FERGON) 324 MG tablet Take 324 mg by mouth daily with breakfast.      fluticasone propionate (FLONASE) 50 mcg/actuation nasal spray       folic acid (FOLVITE) 1 MG tablet Take 1 tablet (1,000 mcg total) by mouth once daily. 90 tablet 3    hydroCHLOROthiazide (HYDRODIURIL) 25 MG tablet Take 1 tablet (25 mg total) by mouth once daily. 90 tablet 3    hydroxychloroquine (PLAQUENIL) 200 mg tablet Take 1 tablet (200 mg total) by mouth 2 (two) times daily. 180 tablet 3    levocetirizine (XYZAL) 5 MG tablet Take 1 tablet (5 mg total) by mouth every evening. 90 tablet 3    levothyroxine (SYNTHROID) 50 MCG tablet Take 1 tablet (50 mcg total) by mouth every other day. Alternate with 75mcg tab qod. 15 tablet 11    levothyroxine (SYNTHROID) 75 MCG tablet Take 1 tablet (75 mcg total) by mouth once daily. 90 tablet 3    LORazepam (ATIVAN) 1 MG tablet Take 1 tablet (1 mg total) by  mouth 2 (two) times daily as needed. 60 tablet 1    losartan (COZAAR) 100 MG tablet Take 1 tablet (100 mg total) by mouth once daily. 90 tablet 3    losartan-hydrochlorothiazide 100-25 mg (HYZAAR) 100-25 mg per tablet Take 1 tablet by mouth.      omeprazole (PRILOSEC) 20 MG capsule Take 1 capsule (20 mg total) by mouth once daily. 90 capsule 3    pilocarpine (SALAGEN) 5 MG Tab Take 1 tablet (5 mg total) by mouth 4 (four) times daily. 360 tablet 3    potassium chloride (K-TAB) 20 mEq Take 1 tablet (20 mEq total) by mouth 2 (two) times daily. 180 tablet 3    potassium chloride SA (K-DUR,KLOR-CON) 20 MEQ tablet Take 1 tablet (20 mEq total) by mouth once daily. 90 tablet 3    traZODone (DESYREL) 50 MG tablet Take 2-3 tablets (100-150 mg total) by mouth nightly as needed for Insomnia. 270 tablet 2    triamcinolone (NASACORT) 55 mcg nasal inhaler 2 sprays by Nasal route once daily. 10.8 mL 3     Facility-Administered Encounter Medications as of 2/10/2020   Medication Dose Route Frequency Provider Last Rate Last Dose    CIMZIA SyKt 400 mg  400 mg Subcutaneous Once Scooter Alvarez MD             Assessment & Plan:       Therapeutic interventions used: Educated pt re: how anxious fears are maintained by a cycle of unwarranted fear and avoidance that precludes positive, corrective experiences with the feared object/situation  Discussed how treatment targets worry, anxiety symptoms, and avoidance to help pt manage worry effectively  Pt was taught relaxation skills  Pt was taught progressive muscle relaxation and slow diaphragmatic breathing  Assigned pt to practice relaxation on a daily basis  Assessed pt's current and past mood episodes, including the features, frequency, intensity and duration  Monitored the effectiveness and side effects of antidepressant medication prescribed by physician/NP/MP  Assessed the severity of pt's impairment in social, relational, vocational and occupational endeavors   Used 4  minute guided breathing mediation to teach relaxation     The patient's response to the interventions is accepting    The patient's progress toward treatment goals is fair     Homework assigned: practice relaxation skills daily     Treatment plan:   A. Target symptoms: Depression, Anxiety and Poor Coping Skills   B. Therapeutic modalities: insight oriented psychotherapy, behavior modifying psychotherapy, supportive psychotherapy  C. Why chosen therapy is appropriate versus another modality: relevant to diagnosis, evidence based practice   D. Outcome monitoring methods: self-report, observation, feedback from clinical staff     Visit Diagnosis:   1. Generalized anxiety disorder with panic attacks    2. Depression, unspecified depression type        Follow-up: individual psychotherapy and medication management by physician    Return to Clinic: 2 weeks    Length of Service (minutes): 45

## 2020-02-17 ENCOUNTER — INFUSION (OUTPATIENT)
Dept: INFUSION THERAPY | Facility: HOSPITAL | Age: 70
End: 2020-02-17
Attending: INTERNAL MEDICINE
Payer: MEDICARE

## 2020-02-17 VITALS
DIASTOLIC BLOOD PRESSURE: 76 MMHG | OXYGEN SATURATION: 95 % | SYSTOLIC BLOOD PRESSURE: 148 MMHG | TEMPERATURE: 98 F | RESPIRATION RATE: 18 BRPM | HEART RATE: 80 BPM

## 2020-02-17 DIAGNOSIS — D50.8 OTHER IRON DEFICIENCY ANEMIA: Primary | ICD-10-CM

## 2020-02-17 PROCEDURE — 96375 TX/PRO/DX INJ NEW DRUG ADDON: CPT

## 2020-02-17 PROCEDURE — 96365 THER/PROPH/DIAG IV INF INIT: CPT

## 2020-02-17 PROCEDURE — 63600175 PHARM REV CODE 636 W HCPCS: Mod: JG | Performed by: INTERNAL MEDICINE

## 2020-02-17 RX ORDER — METHYLPREDNISOLONE SOD SUCC 125 MG
80 VIAL (EA) INJECTION
Status: COMPLETED | OUTPATIENT
Start: 2020-02-17 | End: 2020-02-17

## 2020-02-17 RX ADMIN — METHYLPREDNISOLONE SODIUM SUCCINATE 80 MG: 125 INJECTION, POWDER, FOR SOLUTION INTRAMUSCULAR; INTRAVENOUS at 02:02

## 2020-02-17 RX ADMIN — SODIUM CHLORIDE: 9 INJECTION, SOLUTION INTRAVENOUS at 02:02

## 2020-02-17 RX ADMIN — FERRIC CARBOXYMALTOSE INJECTION 750 MG: 50 INJECTION, SOLUTION INTRAVENOUS at 02:02

## 2020-02-17 NOTE — NURSING
Pt tolerated infusion well. No adverse reaction noted. Pt education reinforced on Injectafer , side effects, what to expect, and when to call Dr. Grace. Pt verbalized understanding. I reviewed pt calendar w/ pt and understanding verbalized. IV flushed w/ NS and D/C per protocol.

## 2020-02-17 NOTE — PLAN OF CARE
Patient denies pain or concerns today. Pillow and blanket provided for comfort. NAD noted at time of visit today.

## 2020-02-17 NOTE — DISCHARGE INSTRUCTIONS
[facility]  [address] [facility]  [address]  [phone] phone     [phone] fax  Hours of Operation: Monday- Friday 8:00am- 5:00pm  After hours phone  [phone]  Hematology / Oncology Physicians on call      RITIKA Medina Dr., Dr., Dr., Dr., NP Sydney Prescott, NP Tyesha Taylor, NP    Please call with any concerns regarding your appointment today.        FALL PREVENTION   Falls often occur due to slipping, tripping or losing your balance. Here are ways to reduce your risk of falling again.   Was there anything that caused your fall that can be fixed, removed or replaced?   Make your home safe by keeping walkways clear of objects you may trip over.   Use non-slip pads under rugs.   Do not walk in poorly lit areas.   Do not stand on chairs or wobbly ladders.   Use caution when reaching overhead or looking upward. This position can cause a loss of balance.   Be sure your shoes fit properly, have non-slip bottoms and are in good condition.   Be cautious when going up and down stairs, curbs, and when walking on uneven sidewalks.   If your balance is poor, consider using a cane or walker.   If your fall was related to alcohol use, stop or limit alcohol intake.   If your fall was related to use of sleeping medicines, talk to your doctor about this. You may need to reduce your dosage at bedtime if you awaken during the night to go to the bathroom.   To reduce the need for nighttime bathroom trips:   Avoid drinking fluids for several hours before going to bed   Empty your bladder before going to bed   Men can keep a urinal at the bedside   © 4794-8530 [facility], [address] [place] [address], [place] [address]. All rights reserved. This information is not intended as a substitute for professional medical care. Always follow your healthcare professional's instructions.      IRON DEFICIENCY  ANEMIA:  Anemia is a condition where the size or number of red blood cells in the body is reduced. Iron is needed in the diet to make red cells. The red blood cells carry oxygen to all parts of the body. Anemia limits the delivery of oxygen to where it is needed. This causes a feeling of being tired and run down. When anemia becomes severe, the skin becomes pale and there is shortness of breath with exertion, headaches, dizziness, drowsiness and fatigue.  The cause of your anemia is lack of iron in your body. This may occur due to blood loss (for example, heavy menstrual periods or bleeding from the stomach or intestines) or a poor diet (not eating enough iron-containing foods), inability to absorb iron from your diet, or pregnancy.  If the blood count is low enough, an IRON SUPPLEMENT will be prescribed. It usually takes about 2-3 months of treatment with iron supplements to correct an anemia. Severe cases of anemia requires a blood transfusion to rapidly correct symptoms and deliver more oxygen to the cells.  HOME CARE:  1) Increase the iron stores in your body by eating foods high in iron content. This is a natural way of building your blood cells back up again. Beef, liver, spinach and other dark green leafy vegetables, whole grain products, beans and nuts are all natural sources of iron.  2) If you are having symptoms of anemia listed above:  -- Do not overexert yourself.  -- Talk to your doctor before flying on an airplane or travelling to high altitudes.  FOLLOW UP with your doctor in 2 months for a repeat red blood cell count, or as recommended by our staff, to be sure that the anemia has been corrected.  GET PROMPT MEDICAL ATTENTION if any of the following occur or worsen:  -- Shortness of breath or chest pain  -- Dizziness or fainting  -- Vomiting blood or passing red or black-colored stool  © 2110-2884 [employer], [address] [facility], [place] [address]. All rights reserved. This information is not  intended as a substitute for professional medical care. Always follow your healthcare professional's instructions.

## 2020-02-24 RX ORDER — METHYLPREDNISOLONE SOD SUCC 125 MG
80 VIAL (EA) INJECTION
Status: CANCELLED
Start: 2020-02-24

## 2020-02-26 ENCOUNTER — PATIENT MESSAGE (OUTPATIENT)
Dept: OTOLARYNGOLOGY | Facility: CLINIC | Age: 70
End: 2020-02-26

## 2020-02-26 ENCOUNTER — OFFICE VISIT (OUTPATIENT)
Dept: OTOLARYNGOLOGY | Facility: CLINIC | Age: 70
End: 2020-02-26
Payer: MEDICARE

## 2020-02-26 VITALS
DIASTOLIC BLOOD PRESSURE: 73 MMHG | TEMPERATURE: 99 F | HEART RATE: 85 BPM | BODY MASS INDEX: 23.66 KG/M2 | SYSTOLIC BLOOD PRESSURE: 141 MMHG | WEIGHT: 142.19 LBS

## 2020-02-26 DIAGNOSIS — R05.9 COUGH: Primary | ICD-10-CM

## 2020-02-26 DIAGNOSIS — J06.9 UPPER RESPIRATORY TRACT INFECTION, UNSPECIFIED TYPE: ICD-10-CM

## 2020-02-26 PROCEDURE — 99999 PR PBB SHADOW E&M-EST. PATIENT-LVL III: ICD-10-PCS | Mod: PBBFAC,,, | Performed by: PHYSICIAN ASSISTANT

## 2020-02-26 PROCEDURE — 99214 OFFICE O/P EST MOD 30 MIN: CPT | Mod: 25,S$PBB,, | Performed by: PHYSICIAN ASSISTANT

## 2020-02-26 PROCEDURE — 87070 CULTURE OTHR SPECIMN AEROBIC: CPT

## 2020-02-26 PROCEDURE — 99213 OFFICE O/P EST LOW 20 MIN: CPT | Mod: PBBFAC | Performed by: PHYSICIAN ASSISTANT

## 2020-02-26 PROCEDURE — 87186 SC STD MICRODIL/AGAR DIL: CPT

## 2020-02-26 PROCEDURE — 31231 NASAL ENDOSCOPY DX: CPT | Mod: S$PBB,,, | Performed by: PHYSICIAN ASSISTANT

## 2020-02-26 PROCEDURE — 87077 CULTURE AEROBIC IDENTIFY: CPT

## 2020-02-26 PROCEDURE — 31231 PR NASAL ENDOSCOPY, DX: ICD-10-PCS | Mod: S$PBB,,, | Performed by: PHYSICIAN ASSISTANT

## 2020-02-26 PROCEDURE — 99214 PR OFFICE/OUTPT VISIT, EST, LEVL IV, 30-39 MIN: ICD-10-PCS | Mod: 25,S$PBB,, | Performed by: PHYSICIAN ASSISTANT

## 2020-02-26 PROCEDURE — 31231 NASAL ENDOSCOPY DX: CPT | Mod: PBBFAC | Performed by: PHYSICIAN ASSISTANT

## 2020-02-26 PROCEDURE — 99999 PR PBB SHADOW E&M-EST. PATIENT-LVL III: CPT | Mod: PBBFAC,,, | Performed by: PHYSICIAN ASSISTANT

## 2020-02-26 RX ORDER — PROMETHAZINE HYDROCHLORIDE AND DEXTROMETHORPHAN HYDROBROMIDE 6.25; 15 MG/5ML; MG/5ML
5 SYRUP ORAL EVERY 8 HOURS PRN
Qty: 100 ML | Refills: 0 | OUTPATIENT
Start: 2020-02-26 | End: 2020-02-26 | Stop reason: SDUPTHER

## 2020-02-26 RX ORDER — LEVOFLOXACIN 500 MG/1
500 TABLET, FILM COATED ORAL DAILY
Qty: 14 TABLET | Refills: 0 | OUTPATIENT
Start: 2020-02-26 | End: 2020-02-26 | Stop reason: SDUPTHER

## 2020-02-26 RX ORDER — PREDNISONE 20 MG/1
TABLET ORAL
Qty: 21 TABLET | Refills: 0 | OUTPATIENT
Start: 2020-02-26 | End: 2020-02-26 | Stop reason: SDUPTHER

## 2020-02-26 RX ORDER — PREDNISONE 20 MG/1
TABLET ORAL
Qty: 21 TABLET | Refills: 0 | Status: SHIPPED | OUTPATIENT
Start: 2020-02-26 | End: 2020-06-04

## 2020-02-26 RX ORDER — PROMETHAZINE HYDROCHLORIDE AND DEXTROMETHORPHAN HYDROBROMIDE 6.25; 15 MG/5ML; MG/5ML
5 SYRUP ORAL EVERY 8 HOURS PRN
Qty: 100 ML | Refills: 0 | Status: SHIPPED | OUTPATIENT
Start: 2020-02-26 | End: 2020-02-27 | Stop reason: SDUPTHER

## 2020-02-26 RX ORDER — LEVOFLOXACIN 500 MG/1
500 TABLET, FILM COATED ORAL DAILY
Qty: 14 TABLET | Refills: 0 | Status: SHIPPED | OUTPATIENT
Start: 2020-02-26 | End: 2020-03-11

## 2020-02-26 NOTE — PROGRESS NOTES
Subjective:   Patient: Leticia Piña [id number], :1950   Visit date:2020 11:01 AM    Chief Complaint:  Headache and Sinus Problem    HPI:  Leticia is a 69 y.o. female who is here for follow-up. She was last here in 2019 for chronic sinusitis, previously failed tx with Levaquin x 21 days/ prednisone. CT is significant for chronic inflammation in R ethmoid/ sphenoid/ and maxillary. At the time, discussed possibility of FESS with Dr. Freeman but ultimately trying to avoid. RAST insignificant. Follows with Dr. Alvarez in rheumatology, hx of Sjogren's, drug induced lupus(certolizumab), RA, and osteopenia. At last appt, decided to try a trial with Pamelor in place of her trazodone. Was tx with Clindamycin ([facility]?) for persistent sinus ssx. She rtc on 20 and reported significant significant relief with Clindamycin, however, this was raising her liver enzymes and pt had to d/c. Since, she has had the return of severe facial pressure, productive cough, and nasal congestion. No fevers. Cough worse at night. Pt feels miserable. No relieving factors.       Allergic/Immunologic: is allergic to erythromycin; humira  [adalimumab]; and sulfa (sulfonamide antibiotics)..  -     Constitutional: Current temp: 99.3 °F (37.4 °C)  -     She has a current medication list which includes the following prescription(s): azelastine, cholecalciferol (vitamin d3), cyanocobalamin (vitamin b-12), diclofenac sodium, doxycycline, duloxetine, ferrous gluconate, fluticasone propionate, folic acid, hydrochlorothiazide, hydroxychloroquine, levocetirizine, levothyroxine, lorazepam, losartan, omeprazole, pilocarpine, potassium chloride, trazodone, triamcinolone, levofloxacin, levothyroxine, losartan-hydrochlorothiazide 100-25 mg, potassium chloride sa, prednisone, and promethazine-dextromethorphan, and the following Facility-Administered Medications: cimzia.  -     She  has a past medical history of Allergic rhinitis,  Cutaneous lupus erythematosus, Essential hypertension (2/5/2019), GERD (gastroesophageal reflux disease), Hypothyroid, Insomnia, Mixed anxiety and depressive disorder, Normal cardiac stress test, Rheumatoid arthritis(714.0), and Sjogren's syndrome. aaaaaaa  -     She  has a past surgical history that includes breast implants; breast surgery for rupture; TONSILLECTOMY, ADENOIDECTOMY; Tubal ligation; and Augmentation of breast.  -     She  reports that she has never smoked. She has never used smokeless tobacco. She reports that she drinks alcohol. She reports that she does not use drugs.  -     Her family history is not on file.  -     She is allergic to erythromycin; humira  [adalimumab]; and sulfa (sulfonamide antibiotics).    Objective:     Physical Exam:  Vitals:  BP (!) 141/73   Pulse 85   Temp 99.3 °F (37.4 °C)   Wt 64.5 kg (142 lb 3.2 oz)   BMI 23.66 kg/m²   Appearance:  Well-developed, well-nourished.  Communication:  Able to communicate, no hoarseness.  Head & Face:  Normocephalic, atraumatic, no sinus tenderness, normal facial strength.  Eyes:  Extraocular motions intact.  Ears:  Otoscopy of external auditory canals and tympanic membranes was normal, clinical speech reception thresholds grossly intact, no mass/lesion of auricle.  Nose: see endoscopic exam  Mouth:  No mass/lesion of lips, teeth, gums, hard/soft palate, tongue, tonsils, or oropharynx.  Neck & Lymphatics:  No cervical lymphadenopathy, no neck mass/crepitus/ asymmetry, trachea is midline, no thyroid enlargement/tenderness/mass.  Neuro/Psych: Alert with normal mood and affect.   Abdominal: Normal appearance.   Respiration/Chest:  Symmetric expansion during respiration, normal respiratory effort. On auscultation, +crackles in RLL. No wheezing.   Skin:  Warm and intact  Cardiovascular:  No peripheral vascular edema or varicosities.      CT MEDTRONIC SINUSES WITHOUT 11/2019    CLINICAL HISTORY:  Sinusitis, <=12w, uncomplicated;  Chronic  pansinusitis    TECHNIQUE:  Noncontrast axial CT of the sinuses performed per navigational protocol with images obtained superiorly through the cranial vertex and reformatted into the coronal plane.    COMPARISON:  None    FINDINGS:  Imaging performed for preoperative planning purposes.  There is moderate mucosal thickening in the right maxillary sinus.  Mild mucoperiosteal thickening in the sphenoid and ethmoid sinuses.  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  Frontal sinuses are clear.  Visualized orbits and mastoids are unremarkable.  Included intracranial contents are age-appropriate.      Impression       As above.         Assessment & Plan:   Leticia was seen today for headache and sinus problem.    Diagnoses and all orders for this visit:    Cough  -     Aerobic culture    Upper respiratory tract infection, unspecified type    Other orders  -     Discontinue: levoFLOXacin (LEVAQUIN) 500 MG tablet; Take 1 tablet (500 mg total) by mouth once daily. for 14 days  -     Discontinue: predniSONE (DELTASONE) 20 MG tablet; Take 3 tab in am x 3 days, then 2 tab in am x 3 days, then 1 tab in am x 3 days, then 1/2 tab in am x 3 days  -     Discontinue: promethazine-dextromethorphan (PROMETHAZINE-DM) 6.25-15 mg/5 mL Syrp; Take 5 mLs by mouth every 8 (eight) hours as needed.  -     Discontinue: predniSONE (DELTASONE) 20 MG tablet; Take 3 tab in am x 3 days, then 2 tab in am x 3 days, then 1 tab in am x 3 days, then 1/2 tab in am x 3 days  -     Discontinue: promethazine-dextromethorphan (PROMETHAZINE-DM) 6.25-15 mg/5 mL Syrp; Take 5 mLs by mouth every 8 (eight) hours as needed.  -     Discontinue: levoFLOXacin (LEVAQUIN) 500 MG tablet; Take 1 tablet (500 mg total) by mouth once daily. for 14 days  -     Discontinue: levoFLOXacin (LEVAQUIN) 500 MG tablet; Take 1 tablet (500 mg total) by mouth once daily. for 14 days  -     Discontinue: promethazine-dextromethorphan (PROMETHAZINE-DM) 6.25-15 mg/5 mL Syrp; Take 5 mLs by  mouth every 8 (eight) hours as needed.  -     Discontinue: predniSONE (DELTASONE) 20 MG tablet; Take 3 tab in am x 3 days, then 2 tab in am x 3 days, then 1 tab in am x 3 days, then 1/2 tab in am x 3 days  -     Discontinue: predniSONE (DELTASONE) 20 MG tablet; Take 3 tab in am x 3 days, then 2 tab in am x 3 days, then 1 tab in am x 3 days, then 1/2 tab in am x 3 days  -     Discontinue: promethazine-dextromethorphan (PROMETHAZINE-DM) 6.25-15 mg/5 mL Syrp; Take 5 mLs by mouth every 8 (eight) hours as needed.  -     Discontinue: levoFLOXacin (LEVAQUIN) 500 MG tablet; Take 1 tablet (500 mg total) by mouth once daily. for 14 days  -     levoFLOXacin (LEVAQUIN) 500 MG tablet; Take 1 tablet (500 mg total) by mouth once daily. for 14 days  -     promethazine-dextromethorphan (PROMETHAZINE-DM) 6.25-15 mg/5 mL Syrp; Take 5 mLs by mouth every 8 (eight) hours as needed.  -     predniSONE (DELTASONE) 20 MG tablet; Take 3 tab in am x 3 days, then 2 tab in am x 3 days, then 1 tab in am x 3 days, then 1/2 tab in am x 3 days    Culture taken on L side, will contact pt with results and if we need to change abx.   Auscultation significant for RLL crackles.     Start Levaquin x 14 days, prednisone. Issued cough syrup QHS.   Plan to recheck in 3 weeks with Dr. Freeman. If no significant relief, consider repeat CT sinus.     Over 25 minutes were spent in face to face contact with the patient with greater than 50% spent discussing their diagnosis and coordination of care.         Agatha Guerrero PA-C  [facility]   [facility]  [address]  [place] [address]  P: [phone]  F: [phone]      Patient: Leticia Piña [id number], :1950  Procedure date:2020  Patient's medications, allergies, past medical, surgical, social and family histories were reviewed and updated as appropriate.  Chief Complaint:  Headache and Sinus Problem    HPI:  Leticia is a 69 y.o. female with the history  of present illness as discussed in the clinic note from today.  Anterior rhinoscopy was insufficient to explain symptoms and findings.     Procedure: Risks, benefits, and alternatives of the procedure were discussed with the patient, and the patient consented to the nasal endoscopy.  The nasal cavity was sprayed with a topical decongestant and anesthetic (if needed). The endoscope was passed into each nostril and each nasal cavity was visualized.  On each side the nasal cavity, sinuses (if open), turbinates, middle and superior meatus, sphenoethmoidal recess and septum were examined with the findings described below. At the end of the examination, the scope was removed. The patient tolerated the procedure well with no complications.       Endoscopic Sinonasal Exam Findings:  -     The right side has normal mucosa  -     The left side has normal mucosa  -     Nasal secretions: purulent secretions on the left, thick, green  -     Nasal septum: no significant deviation or perforation appreciated   -     Inferior turbinate: hypertrophy or edema (Mild) bilaterally  -     Middle turbinate: Normal mucosa without significant hypertrophy bilaterally  -     Other findings: No mass or obstructive lesion    Assessment & Plan:  - see today's clinic note           >>of note, pt requested Rx's to be printed, printer was not working. Resent via computer to [facility] of choice.

## 2020-02-27 ENCOUNTER — PATIENT MESSAGE (OUTPATIENT)
Dept: OTOLARYNGOLOGY | Facility: CLINIC | Age: 70
End: 2020-02-27

## 2020-02-27 RX ORDER — PROMETHAZINE HYDROCHLORIDE AND DEXTROMETHORPHAN HYDROBROMIDE 6.25; 15 MG/5ML; MG/5ML
5 SYRUP ORAL EVERY 8 HOURS PRN
Qty: 100 ML | Refills: 0 | Status: SHIPPED | OUTPATIENT
Start: 2020-02-27 | End: 2020-03-08

## 2020-02-28 ENCOUNTER — PATIENT MESSAGE (OUTPATIENT)
Dept: OTOLARYNGOLOGY | Facility: CLINIC | Age: 70
End: 2020-02-28

## 2020-02-28 ENCOUNTER — TELEPHONE (OUTPATIENT)
Dept: OTOLARYNGOLOGY | Facility: CLINIC | Age: 70
End: 2020-02-28

## 2020-02-28 LAB — BACTERIA SPEC AEROBE CULT: ABNORMAL

## 2020-02-28 NOTE — TELEPHONE ENCOUNTER
Spoke with patient regarding her symptoms and advised of the message below per Breonna:    She is on a strong antibiotic that should treat a pneumonia. If she is having any difficulty breathing, she may need to be seen in the ER. A chest xray would be warranted.      Patient understood and said she will go to the ER if symptoms get worse, also let her know to please contact us to be seen again next week if she is feeling up to it.    YC

## 2020-02-29 ENCOUNTER — NURSE TRIAGE (OUTPATIENT)
Dept: ADMINISTRATIVE | Facility: CLINIC | Age: 70
End: 2020-02-29

## 2020-02-29 RX ORDER — DOXYCYCLINE HYCLATE 100 MG
100 TABLET ORAL 2 TIMES DAILY
Qty: 20 TABLET | Refills: 0 | Status: SHIPPED | OUTPATIENT
Start: 2020-02-29 | End: 2020-03-10

## 2020-02-29 NOTE — TELEPHONE ENCOUNTER
Spoke with pt:  Saw ENT Tuesday sinus pressure with headache and cough. Did biopsy/swab thru nostril and run C&S and got results back today and said has MRSA- is taking levaquin. Wants to make sure this is correct med for culture.     S/s follow up:Still has low grade temp since Tuesday-high of 100.2F Headache is gone.  S/s are same.   Does not feel SOB but does feel has to cough up stuff and nothing comes up--cough is improving and infrequent. But is deep and nothing comes up.     1407: Cassandra Carter MD-sent secure chat re above and that ENT not on duty today.   I called in new  Rx. To [facility].   notified pt of new Rx. S/s to go to ED for given including return of fever to 100.5FSOB or weakness. Pt verbalizes understanding.       Reason for Disposition   [1] Diabetes mellitus or weak immune system (e.g., HIV positive, cancer chemo, splenectomy, organ transplant, chronic steroids) AND [2] new onset of fever > 100.0 F (37.8 C)   [1] Sinus infection AND [2] taking an antibiotic   [1] Reasonable improvement on antibiotic AND [2] no fever or pain    Additional Information   Negative: Severe difficulty breathing (e.g., struggling for each breath, speaks in single words)   Negative: Sounds like a life-threatening emergency to the triager   Negative: MODERATE difficulty breathing (e.g., speaks in phrases, SOB even at rest, pulse 100-120)   Negative: Fever > 103 F (39.4 C)   Negative: Patient sounds very sick or weak to the triager   Negative: [1] Taking antibiotics > 24 hours AND [2] symptoms WORSE   Negative: [1] Recent hospitalization AND [2] symptoms WORSE   Negative: Severe difficulty breathing (e.g., struggling for each breath, speaks in single words)   Negative: Sounds like a life-threatening emergency to the triager   Negative: [1] Difficulty breathing AND [2] not from stuffy nose (e.g., not relieved by cleaning out the nose)   Negative: [1] SEVERE headache AND [2] fever   Negative: [1] Taking antibiotic >  24 hours AND [2] fever > 103 F (39.4 C)   Negative: [1] Redness or swelling on the cheek, forehead or around the eye AND [2] fever   Negative: Patient sounds very sick or weak to the triager   Negative: [1] SEVERE sinus pain AND [2] not improved 2 hours after pain medicine   Negative: [1] Redness or swelling on the cheek, forehead or around the eye AND [2] new since starting antibiotics   Negative: [1] Taking antibiotic > 48 hours (2 days) AND [2] fever persists   Negative: [1] Taking antibiotic > 72 hours (3 days) AND [2] sinus pain not improved   Negative: [1] Taking antibiotic > 7 days AND [2] nasal discharge not improved   Negative: [1] Taking antibiotic AND [2] nose still blocked   Negative: [1] Taking antibiotic < 72 hours (3 days) AND [2] sinus pain not improved   Negative: [1] Taking antibiotic < 48 hours AND [2] fever persists    Protocols used: INFECTION ON ANTIBIOTIC FOLLOW-UP CALL-A-AH, SINUS INFECTION ON ANTIBIOTIC FOLLOW-UP CALL-A-AH

## 2020-03-02 ENCOUNTER — OFFICE VISIT (OUTPATIENT)
Dept: OTOLARYNGOLOGY | Facility: CLINIC | Age: 70
End: 2020-03-02
Payer: MEDICARE

## 2020-03-02 VITALS
BODY MASS INDEX: 23.33 KG/M2 | WEIGHT: 140.19 LBS | SYSTOLIC BLOOD PRESSURE: 126 MMHG | HEART RATE: 102 BPM | DIASTOLIC BLOOD PRESSURE: 80 MMHG

## 2020-03-02 DIAGNOSIS — D84.9 IMMUNOCOMPROMISED: ICD-10-CM

## 2020-03-02 DIAGNOSIS — J32.4 CHRONIC PANSINUSITIS: Primary | ICD-10-CM

## 2020-03-02 PROCEDURE — 99999 PR PBB SHADOW E&M-EST. PATIENT-LVL IV: CPT | Mod: PBBFAC,,, | Performed by: ORTHOPAEDIC SURGERY

## 2020-03-02 PROCEDURE — 99999 PR PBB SHADOW E&M-EST. PATIENT-LVL IV: ICD-10-PCS | Mod: PBBFAC,,, | Performed by: ORTHOPAEDIC SURGERY

## 2020-03-02 PROCEDURE — 99214 PR OFFICE/OUTPT VISIT, EST, LEVL IV, 30-39 MIN: ICD-10-PCS | Mod: S$PBB,,, | Performed by: ORTHOPAEDIC SURGERY

## 2020-03-02 PROCEDURE — 99214 OFFICE O/P EST MOD 30 MIN: CPT | Mod: PBBFAC | Performed by: ORTHOPAEDIC SURGERY

## 2020-03-02 PROCEDURE — 99214 OFFICE O/P EST MOD 30 MIN: CPT | Mod: S$PBB,,, | Performed by: ORTHOPAEDIC SURGERY

## 2020-03-02 NOTE — LETTER
March 2, 2020      [facility]  [address] [place] [address]  [place] [address]  Phone: [phone]  Fax: [phone]       Patient: Leticia Piña   YOB: 1950  Date of Visit: 03/02/2020    To Whom It May Concern:    Rodri Piña  was at [facility] on 03/02/2020. She may return to work/school on 03/03/2020 with no restrictions. If you have any questions or concerns, or if I can be of further assistance, please do not hesitate to contact me.    Sincerely,      Kandy Salazar MA

## 2020-03-02 NOTE — LETTER
March 2, 2020      Nicole Jasmine MD  [address]  [facility] [address]           [facility]  [address] [place] [address]  [place] [address]  Phone: [phone]  Fax: [phone]          Patient: Leticia Piña   MR Number: [id number]   YOB: 1950   Date of Visit: 3/2/2020       Dear Dr. Nicole Jasmine:    Thank you for referring Leticia Piña to me for evaluation. Attached you will find relevant portions of my assessment and plan of care.    If you have questions, please do not hesitate to call me. I look forward to following Leticia Piña along with you.    Sincerely,    Johanna Merino MD    Enclosure  CC:  No Recipients    If you would like to receive this communication electronically, please contact [email or web address] or [phone] to request more information on [employer] Link access.    For providers and/or their staff who would like to refer a patient to [facility], please contact us through our one-stop-shop provider referral line, [facility], at [phone].    If you feel you have received this communication in error or would no longer like to receive these types of communications, please e-mail [email or web address]

## 2020-03-02 NOTE — PROGRESS NOTES
Subjective:       Patient ID: Leticia Piña is a 69 y.o. female.    Chief Complaint: Other (sinus infection. Pt is just wanting to be cleared to return to work tomorrow.)    Patient is a very pleasant 69 year old female here to see me today for evaluation of chronic sinusitis. She has a history of chronic sinusitis, and at her last visit here she had an endoscopic culture showing MRSA.  She has been on Levaquin, and is subjectively feeling better.  She has noted an improvement in her cough, and her facial pain and pressure has also improved.  She continues to feel fatigued, has not had any fevers.    Review of Systems   Constitutional: Negative for chills, fatigue, fever and unexpected weight change.   HENT: Positive for congestion, postnasal drip, rhinorrhea and sinus pressure (improved). Negative for dental problem, ear discharge, ear pain, facial swelling, hearing loss, nosebleeds, sneezing, sore throat, tinnitus, trouble swallowing and voice change.    Eyes: Negative for redness, itching and visual disturbance.   Respiratory: Positive for cough. Negative for choking, shortness of breath and wheezing.    Cardiovascular: Negative for chest pain and palpitations.   Gastrointestinal: Negative for abdominal pain.        No reflux.   Musculoskeletal: Negative for gait problem.   Skin: Negative for rash.   Neurological: Positive for headaches. Negative for dizziness and light-headedness.       Objective:      Physical Exam   Constitutional: She is oriented to person, place, and time. She appears well-developed and well-nourished. No distress.   HENT:   Head: Normocephalic and atraumatic.   Right Ear: Tympanic membrane, external ear and ear canal normal.   Left Ear: Tympanic membrane, external ear and ear canal normal.   Nose: Nose normal. No mucosal edema, rhinorrhea, nasal deformity or septal deviation. No epistaxis. Right sinus exhibits no maxillary sinus tenderness and no frontal sinus tenderness. Left sinus  exhibits no maxillary sinus tenderness and no frontal sinus tenderness.   Mouth/Throat: Uvula is midline, oropharynx is clear and moist and mucous membranes are normal. Mucous membranes are not pale and not dry. No dental caries. No oropharyngeal exudate or posterior oropharyngeal erythema.   No purulent drainage seen on anterior rhinoscopy today   Eyes: Pupils are equal, round, and reactive to light. Conjunctivae, EOM and lids are normal. Right eye exhibits no chemosis. Left eye exhibits no chemosis. Right conjunctiva is not injected. Left conjunctiva is not injected. No scleral icterus. Right eye exhibits normal extraocular motion and no nystagmus. Left eye exhibits normal extraocular motion and no nystagmus.   Neck: Trachea normal and phonation normal. No tracheal tenderness present. No tracheal deviation present. No thyroid mass and no thyromegaly present.   Cardiovascular: Intact distal pulses.   Pulmonary/Chest: Effort normal. No stridor. No respiratory distress.   Abdominal: She exhibits no distension.   Lymphadenopathy:        Head (right side): No submental, no submandibular, no preauricular, no posterior auricular and no occipital adenopathy present.        Head (left side): No submental, no submandibular, no preauricular, no posterior auricular and no occipital adenopathy present.     She has no cervical adenopathy.   Neurological: She is alert and oriented to person, place, and time. No cranial nerve deficit.   Skin: Skin is warm and dry. No rash noted. No erythema.   Psychiatric: She has a normal mood and affect. Her behavior is normal.       Assessment:       1. Chronic pansinusitis    2. Immunocompromised        Plan:       1.  Chronic pansinusitis:  Symptomatically, she is improved with her current regimen of Levaquin, has over a week remaining on that antibiotic.  I would recommend she complete her antibiotic, and keep her scheduled followup with Dr. Freeman.  Discussed that with her antibiotic  allergies as well as her immunocompromised state, she may benefit from FESS to decrease future sinusitis and need for antibiotic therapy.  She is now feeling better and afebrile, cleared to return to work as long as she feels she has enough energy.  Call for sooner appointment if symptoms worsen in any way.

## 2020-03-03 ENCOUNTER — TELEPHONE (OUTPATIENT)
Dept: INFUSION THERAPY | Facility: HOSPITAL | Age: 70
End: 2020-03-03

## 2020-03-04 ENCOUNTER — OFFICE VISIT (OUTPATIENT)
Dept: PSYCHIATRY | Facility: CLINIC | Age: 70
End: 2020-03-04
Payer: MEDICARE

## 2020-03-04 VITALS
DIASTOLIC BLOOD PRESSURE: 78 MMHG | SYSTOLIC BLOOD PRESSURE: 144 MMHG | BODY MASS INDEX: 23.48 KG/M2 | WEIGHT: 141.13 LBS | HEART RATE: 87 BPM

## 2020-03-04 DIAGNOSIS — F41.8 MIXED ANXIETY AND DEPRESSIVE DISORDER: ICD-10-CM

## 2020-03-04 PROCEDURE — 99213 OFFICE O/P EST LOW 20 MIN: CPT | Mod: S$PBB,,, | Performed by: PSYCHIATRY & NEUROLOGY

## 2020-03-04 PROCEDURE — 99213 PR OFFICE/OUTPT VISIT, EST, LEVL III, 20-29 MIN: ICD-10-PCS | Mod: S$PBB,,, | Performed by: PSYCHIATRY & NEUROLOGY

## 2020-03-04 PROCEDURE — 99999 PR PBB SHADOW E&M-EST. PATIENT-LVL II: ICD-10-PCS | Mod: PBBFAC,,, | Performed by: PSYCHIATRY & NEUROLOGY

## 2020-03-04 PROCEDURE — 99999 PR PBB SHADOW E&M-EST. PATIENT-LVL II: CPT | Mod: PBBFAC,,, | Performed by: PSYCHIATRY & NEUROLOGY

## 2020-03-04 PROCEDURE — 99212 OFFICE O/P EST SF 10 MIN: CPT | Mod: PBBFAC | Performed by: PSYCHIATRY & NEUROLOGY

## 2020-03-04 RX ORDER — BUPROPION HYDROCHLORIDE 300 MG/1
300 TABLET ORAL DAILY
Qty: 30 TABLET | Refills: 3 | Status: SHIPPED | OUTPATIENT
Start: 2020-03-04 | End: 2020-07-01 | Stop reason: SDUPTHER

## 2020-03-04 RX ORDER — LORAZEPAM 1 MG/1
1 TABLET ORAL 2 TIMES DAILY PRN
Qty: 60 TABLET | Refills: 2 | Status: SHIPPED | OUTPATIENT
Start: 2020-03-04 | End: 2020-09-28

## 2020-03-04 RX ORDER — TRAZODONE HYDROCHLORIDE 50 MG/1
100-150 TABLET ORAL NIGHTLY PRN
Qty: 270 TABLET | Refills: 3 | Status: SHIPPED | OUTPATIENT
Start: 2020-03-04 | End: 2020-07-01 | Stop reason: SDUPTHER

## 2020-03-04 RX ORDER — DULOXETIN HYDROCHLORIDE 60 MG/1
60 CAPSULE, DELAYED RELEASE ORAL DAILY
Qty: 90 CAPSULE | Refills: 3 | Status: SHIPPED | OUTPATIENT
Start: 2020-03-04 | End: 2020-04-05 | Stop reason: SDUPTHER

## 2020-03-04 NOTE — PROGRESS NOTES
"Outpatient Psychiatry Follow-up Visit (MD/NP)    3/4/2020    Leticia Piña, a 69 y.o. female, presenting for follow-up visit. Met with patient.    Reason for Encounter: Patient complains of anxiety, depression    Interval Hx: Patient seen and interviewed for follow-up, last seen about 6 months ago.     From recent therapy note: "My mother-in-law just , & my father's in the hospital. It's been kind of rough. I've had to take off work for the past 3 weeks. I'm just overwhelmed." Experiencing caregiver burnout; takes 88 y/o mother to hospital to stay with 89 y/o father in the hospital. He will transfer to a SNF unit in the next day or so. Worries constantly. Doesn't have any energy. Sisters & son have helped sit with father; also hired a sitter for overnights. 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      Confirmed above today, but reports father didn't end up going to rehab. Patient & mother are caring for him. He's having symptoms possibly related to cardiac illness. Had had some panic attacks in December amidst conflict with a coworker. No further panic attacks. Stressed in caregiver role.  is supportive. Having chronic sinus infections, had lft elevation post abx. Ativan daily, trazodone daily. Adherent to medication. Medication continues to help. Denies side effects.     Reports symptoms are ongoing, generally well-managed with current treatment. Active and functioning ok with treatment. Working as . Having  sleep trouble - fibromyalgia dx'ed since last visit. Sees Dr. Alvarez & his NP. Does massage, water therapy.   Trazodone at bedtime. Health is generally good. No new meds. Duloxetine has gone up in dose. Moods are mostly ok. A little anxious. Occasional half an ativan at night-time. No side effects. wellbutrin xl ok.     Background: 68 year old F with hx of anxiety and depression x ~20 years, with onset roughly corresponding to menopause in . Describes " following illness and treatment course:     Perimenopause - 1997 - insomnia roblems ; start ambien.   August 2002 - emotional distress  Can't accomplish anything (decreased functioning), feeling overwhelmed, going around in circles.   September '02 - first treatment - citalopram 20 mg. Thought it it was causing dry mouth (later dx'ed sjogrens). Feels helped symptoms present all her life that she later recognized as depression. Reduced citalopram.   November '03- more perimenopausal symptoms. Started muliti-hormonal treatment for menopausal symptoms. Had temporary benefit, stopped in '04 due to negligible benefit by that time. Remained on estradiol  Eventually went back to 20 mg citalopram.   Stress and fatigue in May '06. More dryness. Early sjogren's. Stopped citalopram, tried duloxetine.   2.07-stopped menses, 3.07 - back to progest/test, off est.   Ativan helped anxiety, caused lethargy.   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 Insomnia ongoing despite ambien.   Increased ambien to 15.   In '09 added trazodone 75 mg - helped in combo with ambien  2012 - cymbalta to 90 mg.   '13 - added wellbutrin 100 mg' diagnosed with sjogren's.   '15 - wellbutrin 150, trazodone 100 mg. Stopped ambien  Had a dip in moods in early summer.   Taking 225 mg bupropion - starting about 1 month ago. 300 mg was too intense. Tolerating this lower dose.     Worrying too much about different things   Trouble relaxing   SUSAN-7 = 2    Sleep Problems   Sad Mood  Appetite and weight changes  Concentration problems  Guilt  Thoughts of Emptiness/Death/Suicide  Anhedonia  Anergia  Slowing/PMR    QIDS = 7    Rested on rising. Wakes only briefly. Sometimes has trazodone hangover. Doesn't interfere with functioning. Interest is good.   Anxiety fluctuates. Recently taking ativan about 1-2x/week.     Psych Hx: as above. No avh, no hospitalization, no serious SI, elaine. One previous psych  "assessment in [place] - "horrible experience". She changed meds (to escitalopram), felt terrible.    No other interactions with psychiatrist.     Social history: When younger - low self-esteem, dad was pessimistic and fault-finding. Dad critical. No nancy abuse. No serious maltreatment or trauma. Fewer than most friends (mostly because they were outgoing; thought poorly of one's self, inhibited. Denies teasing and bullying. Above average in school. Graduated HS, went to work x 1 year then went to [employer]. Started dating . Didn't graduate. 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 Gets good feedback on performance. Good relationship with .  x 43 years. 2 grown kids, 4 grandkids. Kids live locally. Doesn't see her son much. Dtr-in-law leads them to not visit with them.  lost business as late consequence of bad economy & a "misappropriation of funds" issue &  health issues. Was primary caregiver to mother-in-law (now in [place]). Was primary caregiver to granddaughter who had medical disabilities. "my lowest point".  now working, "see good future ahead".       From PCP note: 3.2.18 - Here for follow up of medical problems. Works long hours. Works & then goes to sleep right after she gets home. RA pain is doing well. [place] weird so started checking BP. Has been monitoring BPs, range 130-174/ . Has no past history of HTN. No med change. Only major change is started working 3 months ago, , & babysits. No exercise. Sleeping ok with trazodone 100mg. Mixed anxiety & depressive disorder- incr wellbutrin to 300, cont  cymbalta 60mg, trazodone 100 hs, try wean ativan to half hs.  Refer for counseling, refer to Psychiatry if not better in 6 weeks.  RTC 6 weeks.    From psychotherapy eval    Chief complaint/reason for encounter: depression, anxiety, sleep and " "interpersonal     History of present illness:  67 year old  female presented for initial evaluation, with chief complaint of "I've always been kind of anxious & depressed for as long as I can remember, but it just really started getting worse since around 2009..." Pt described a few challenging stressful events starting then, that she feels have accumulate in her life. These include financial strain,her own worsening Rhumatoid Arthritis--diagnosed in 1994, loss of home & the family business in 2013, 's life threatening health scare starting in 2009, legal troubles related to his business, & a feeling of social abandonment from mother, sister, some friends, & daughter-in-law (which means her son & his 2 kids don't see her much anymore.) Pt described in recent months worsening symptoms of sudden uncontrolled tearfulness, increased muscle tension, being distracted, pervasive ruminating worries, loss of senthil, trouble sleeping unless she takes a sleeping aid--currently Trazodone, & tendency to socially isolate. She stated she has never been very sociable. She now feels more rejection & scrutiny from others & finds herself withdrawing. She said much of the negativity from some family & friends relates to 's legal trouble, having been arrested for business related fraud from a period when he was juggling inadequate customer funds & misappropriated a customer's money to cover expenses of someone else's job; all that while he was battling cancer. Pt said she marvels that her  seems able to stay generally optimistic & pleasant & that he has a lot of friends. Meanwhile, she lacks much social support & stated she isn;t comfortable reaching out to connect with people, so she has a small support network to begin with. She feels devastated by judgment against her  by her own sister, mother, & daughter in law, with the result that she is often unable to spend time with 2 of her grandchildren; she " noted those children visit with the daughter-in-law's parents virtually every weekend. Pt described a certain pressure level of social expectation- -material & financial standards, & she & her  have lose their home, as well as the business, & they are living with her parents. 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 She said they have a lot of financial backlog of bills to catch up on. Pt denied any suicidal or homicidal ideation, denied any cognitive deficit other than poor focus at times, denied psychosis, mood swings, rages, or substance abuse. Identified therapeutic goals include reducing anxiety & depression & improving coping skills; possibly finding a way to somewhat reduce social isolation, perhaps by reconnecting with old friends.       Pain: not quantified but described as moderate RA pain     Symptoms:   ? Mood: depressed mood, insomnia, worthlessness/guilt, poor concentration, tearfulness and social isolation  ? Anxiety: excessive anxiety/worry, restlessness/keyed up, muscle tension and social phobia  ? Substance abuse: denied  ? Cognitive functioning: denied  ? Health behaviors: noncontributory     Psychiatric history: none     Medical history: RA--diagnosed in 1994; recent hypertension; Hypothyroidism     Family history of psychiatric illness: mother chronically anxious (starting when older); father apparently depressed--very pessimistic & irritable, doesn't acknowledge problem. Suspects paternal grandfather     Social history: Grew up locally, the oldest of 3 sisters, the other two 7 & 8 & 1/2 years younger than she. Raised by both parents; recalled being markedly shy her entire life; not particularly close to her sisters, who were much younger & had each other. Lifelong active Muslim; has a home Shinto. Not  "currently involved in any smaller groups within the Confucianist. Graduated high school & attended 2 years of college;  at age 24. Still with , Alpesh, today.  Mother of 2, a son, age 42, & daughter, age 39. Both each have 2 children of their own. Described daughter as a close source of support; also talks with  a lot, processing the struggles they have come through together. 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 Pt described recent loss of a few friend connections she had; her middle sister verbally declared she wouldn't associate with the pt & her , due to his legal "disgrace."  Denied any  experience.  No tobacco, light to moderate wine intake. Minimal caffeine, no recreational drugs.       Substance use:   Alcohol: occasional   Drugs: none   Tobacco: none   Caffeine: a cup of green tea most mornings.     Review Of Systems:     GENERAL:  No weight gain or loss  SKIN:  No rashes or lacerations  HEAD:  No headaches  EYES:  No exophthalmos, jaundice or blindness  EARS:  No dizziness, tinnitus or hearing loss  NOSE:  No changes in smell  MOUTH & THROAT:  No dyskinetic movements or obvious goiter  CHEST:  No shortness of breath, hyperventilation or cough  CARDIOVASCULAR:  No tachycardia or chest pain  ABDOMEN:  No nausea, vomiting, pain, constipation or diarrhea  URINARY:  No frequency, dysuria or sexual dysfunction  ENDOCRINE:  No polydipsia, polyuria  MUSCULOSKELETAL:  No pain or stiffness of the joints  NEUROLOGIC:  No weakness, sensory changes, seizures, confusion, memory loss, tremor or other abnormal movements    Current Evaluation:     Nutritional Screening: Considering the patient's height and weight, medications, medical history and preferences, should a referral be made to the dietitian? no    Constitutional  Vitals:  Most recent vital signs, dated less than 90 days prior to this appointment, were not reviewed.    Vitals:    03/04/20 " 1532   BP: (!) 144/78   Pulse: 87   Weight: 64 kg (141 lb 1.5 oz)        General:  unremarkable, age appropriate     Musculoskeletal  Muscle Strength/Tone:  no tremor, no tic   Gait & Station:  non-ataxic     Psychiatric  Appearance: casually dressed & groomed;   Behavior: calm,   Cooperation: cooperative with assessment  Speech: normal rate, volume, tone  Thought Process: linear, goal-directed  Thought Content: No suicidal or homicidal ideation; no delusions  Affect: reactive  Mood: euthymic  Perceptions: No auditory or visual hallucinations  Level of Consciousness: alert throughout interview  Insight: fair  Cognition: Oriented to person, place, time, & situation  Memory: no apparent deficits to general clinical interview; not formally assessed  Attention/Concentration: no apparent deficits to general clinical interview; not formally assessed  Fund of Knowledge: average by vocabulary/education    Laboratory Data  Office Visit on 02/26/2020   Component Date Value Ref Range Status    Aerobic Bacterial Culture 02/26/2020 *  Final                    Value:METHICILLIN RESISTANT STAPHYLOCOCCUS AUREUS  Moderate       Medications  Outpatient Encounter Medications as of 3/4/2020   Medication Sig Dispense Refill    azelastine (ASTELIN) 137 mcg (0.1 %) nasal spray 1 spray (137 mcg total) by Nasal route 2 (two) times daily. 30 mL 11    cholecalciferol, vitamin D3, 5,000 unit/mL Drop Take 1 drop by mouth Daily.      cyanocobalamin, vitamin B-12, (VITAMIN B-12) 5,000 mcg Subl Place under the tongue once daily.      diclofenac sodium (VOLTAREN) 1 % Gel Apply 2 g topically 4 (four) times daily. 300 g 3    doxycycline (VIBRA-TABS) 100 MG tablet Take 1 tablet (100 mg total) by mouth 2 (two) times daily. for 10 days 20 tablet 0    doxycycline (VIBRAMYCIN) 100 MG Cap Take 1 capsule (100 mg total) by mouth every 12 (twelve) hours as needed. 30 capsule 4    DULoxetine (CYMBALTA) 60 MG capsule Take 1 capsule (60 mg total) by  mouth once daily. 90 capsule 3    ferrous gluconate (FERGON) 324 MG tablet Take 324 mg by mouth daily with breakfast.      fluticasone propionate (FLONASE) 50 mcg/actuation nasal spray       folic acid (FOLVITE) 1 MG tablet Take 1 tablet (1,000 mcg total) by mouth once daily. 90 tablet 3    hydroCHLOROthiazide (HYDRODIURIL) 25 MG tablet Take 1 tablet (25 mg total) by mouth once daily. 90 tablet 3    hydroxychloroquine (PLAQUENIL) 200 mg tablet Take 1 tablet (200 mg total) by mouth 2 (two) times daily. 180 tablet 3    levocetirizine (XYZAL) 5 MG tablet Take 1 tablet (5 mg total) by mouth every evening. 90 tablet 3    levoFLOXacin (LEVAQUIN) 500 MG tablet Take 1 tablet (500 mg total) by mouth once daily. for 14 days (Patient not taking: Reported on 3/2/2020) 14 tablet 0    levothyroxine (SYNTHROID) 50 MCG tablet Take 1 tablet (50 mcg total) by mouth every other day. Alternate with 75mcg tab qod. 15 tablet 11    levothyroxine (SYNTHROID) 75 MCG tablet Take 1 tablet (75 mcg total) by mouth once daily. 90 tablet 3    LORazepam (ATIVAN) 1 MG tablet Take 1 tablet (1 mg total) by mouth 2 (two) times daily as needed. 60 tablet 1    losartan (COZAAR) 100 MG tablet Take 1 tablet (100 mg total) by mouth once daily. 90 tablet 3    losartan-hydrochlorothiazide 100-25 mg (HYZAAR) 100-25 mg per tablet Take 1 tablet by mouth.      omeprazole (PRILOSEC) 20 MG capsule Take 1 capsule (20 mg total) by mouth once daily. 90 capsule 3    pilocarpine (SALAGEN) 5 MG Tab Take 1 tablet (5 mg total) by mouth 4 (four) times daily. 360 tablet 3    potassium chloride (K-TAB) 20 mEq Take 1 tablet (20 mEq total) by mouth 2 (two) times daily. 180 tablet 3    potassium chloride SA (K-DUR,KLOR-CON) 20 MEQ tablet Take 1 tablet (20 mEq total) by mouth once daily. 90 tablet 3    predniSONE (DELTASONE) 20 MG tablet Take 3 tab in am x 3 days, then 2 tab in am x 3 days, then 1 tab in am x 3 days, then 1/2 tab in am x 3 days 21 tablet 0     promethazine-dextromethorphan (PROMETHAZINE-DM) 6.25-15 mg/5 mL Syrp Take 5 mLs by mouth every 8 (eight) hours as needed. 100 mL 0    traZODone (DESYREL) 50 MG tablet Take 2-3 tablets (100-150 mg total) by mouth nightly as needed for Insomnia. 270 tablet 2    triamcinolone (NASACORT) 55 mcg nasal inhaler 2 sprays by Nasal route once daily. 10.8 mL 3     Facility-Administered Encounter Medications as of 3/4/2020   Medication Dose Route Frequency Provider Last Rate Last Dose    CIMZIA SyKt 400 mg  400 mg Subcutaneous Once Scooter Alvarez MD         Assessment - Diagnosis - Goals:     Impression: 69 year old F with chronic anxiety and depression, RA and sjogren's. Had exacerbation of illness in spring '18, currently doing somewhat better following medication changes, psychotherapy. Recent stresses in context of father's illness.     Treatment Goals:  Specify outcomes written in observable, behavioral terms:   Minimize recurrences    Treatment Plan/Recommendations:   · Duloxetine, bupropion, lorazepam prn. Takes trazodone prn sleep.   · psychoeducation including behavioral activation, psychotherapy, meds.   · Discussed services for father including hospice that may help.    · Discussed risks, benefits, and alternatives to treatment plan documented above with patient. I answered all patient questions related to this plan and patient expressed understanding and agreement.     Return to Clinic: 2 months    Counseling time: 5 minutes  Total time: 20 minutes    HEIDY Long MD  Psychiatry  [facility]  [address] , [place] [address]  [phone]

## 2020-03-10 ENCOUNTER — INFUSION (OUTPATIENT)
Dept: INFUSION THERAPY | Facility: HOSPITAL | Age: 70
End: 2020-03-10
Attending: INTERNAL MEDICINE
Payer: MEDICARE

## 2020-03-10 VITALS
OXYGEN SATURATION: 96 % | DIASTOLIC BLOOD PRESSURE: 77 MMHG | RESPIRATION RATE: 16 BRPM | TEMPERATURE: 98 F | HEART RATE: 76 BPM | SYSTOLIC BLOOD PRESSURE: 143 MMHG

## 2020-03-10 DIAGNOSIS — D50.8 OTHER IRON DEFICIENCY ANEMIA: Primary | ICD-10-CM

## 2020-03-10 PROCEDURE — 63600175 PHARM REV CODE 636 W HCPCS: Performed by: INTERNAL MEDICINE

## 2020-03-10 PROCEDURE — 96365 THER/PROPH/DIAG IV INF INIT: CPT

## 2020-03-10 PROCEDURE — 96375 TX/PRO/DX INJ NEW DRUG ADDON: CPT

## 2020-03-10 RX ORDER — METHYLPREDNISOLONE SOD SUCC 125 MG
80 VIAL (EA) INJECTION
Status: COMPLETED | OUTPATIENT
Start: 2020-03-10 | End: 2020-03-10

## 2020-03-10 RX ADMIN — METHYLPREDNISOLONE SODIUM SUCCINATE 80 MG: 125 INJECTION, POWDER, FOR SOLUTION INTRAMUSCULAR; INTRAVENOUS at 03:03

## 2020-03-10 RX ADMIN — FERRIC CARBOXYMALTOSE INJECTION 750 MG: 50 INJECTION, SOLUTION INTRAVENOUS at 03:03

## 2020-03-10 RX ADMIN — SODIUM CHLORIDE: 9 INJECTION, SOLUTION INTRAVENOUS at 02:03

## 2020-03-10 NOTE — DISCHARGE INSTRUCTIONS
[facility]  [address]  [address]  [phone] phone     [phone] fax  Hours of Operation: Monday- Friday 8:00am- 5:00pm  After hours phone  [phone]  Hematology / Oncology Physicians on call      RITIKA Medina Dr., Dr., Dr., Dr., NP Sydney Prescott, NP Tyesha Taylor, NP    Please call with any concerns regarding your appointment today.

## 2020-03-15 ENCOUNTER — PATIENT MESSAGE (OUTPATIENT)
Dept: PSYCHIATRY | Facility: CLINIC | Age: 70
End: 2020-03-15

## 2020-03-17 ENCOUNTER — PATIENT MESSAGE (OUTPATIENT)
Dept: OTOLARYNGOLOGY | Facility: CLINIC | Age: 70
End: 2020-03-17

## 2020-03-23 ENCOUNTER — TELEPHONE (OUTPATIENT)
Dept: RADIOLOGY | Facility: HOSPITAL | Age: 70
End: 2020-03-23

## 2020-03-27 ENCOUNTER — PATIENT MESSAGE (OUTPATIENT)
Dept: RADIOLOGY | Facility: HOSPITAL | Age: 70
End: 2020-03-27

## 2020-04-05 DIAGNOSIS — F41.8 MIXED ANXIETY AND DEPRESSIVE DISORDER: ICD-10-CM

## 2020-04-05 RX ORDER — DULOXETIN HYDROCHLORIDE 60 MG/1
CAPSULE, DELAYED RELEASE ORAL
Qty: 90 CAPSULE | Refills: 0 | Status: SHIPPED | OUTPATIENT
Start: 2020-04-05 | End: 2020-07-05 | Stop reason: SDUPTHER

## 2020-04-08 ENCOUNTER — TELEPHONE (OUTPATIENT)
Dept: FAMILY MEDICINE | Facility: CLINIC | Age: 70
End: 2020-04-08

## 2020-04-08 NOTE — TELEPHONE ENCOUNTER
----- Message from Chelo Colby sent at 4/8/2020 10:56 AM CDT -----  Contact: Marianela/[facility]  Type:  Pharmacy Calling to Clarify an RX    Name of Caller:Marianela  Pharmacy Name:[facility]  Prescription Name:dehydroetiandrosterone 25 sup  What do they need to clarify?:auth to fill it since it had no refills remaining  Best Call Back Number:[phone]  Additional Information: #    Thank you

## 2020-04-09 ENCOUNTER — TELEPHONE (OUTPATIENT)
Dept: FAMILY MEDICINE | Facility: CLINIC | Age: 70
End: 2020-04-09

## 2020-04-09 NOTE — TELEPHONE ENCOUNTER
----- Message from Kavin Lam sent at 4/9/2020  8:50 AM CDT -----  Contact: PT  Type:  RX Refill Request    Who Called: JAM TRIPP   Refill or New Rx: refill refill   RX Name and Strength: DHEA 25 mg  How is the patient currently taking it? (ex. 1XDay): 3 time a week  Is this a 30 day or 90 day RX: 36 day  Preferred Pharmacy with phone number:     [facility] - [address]  [address] [place] [address]  Phone: [phone] Fax: [phone]    Local or Mail Order: local  Ordering Provider: Kenroy  Would the patient rather a call back or a response via [facility]? call  Best Call Back Number: [phone] (home)   Additional Information:

## 2020-04-09 NOTE — TELEPHONE ENCOUNTER
Pharmacy calling for refill of DHEA 25mg Supp.  (Hormone type rx)   States Dr. Jasmine was the last prescribing provider.  Asked them to fax a copy of the rx since it is not listed in med card.  Fax # clarified./rpr

## 2020-04-09 NOTE — TELEPHONE ENCOUNTER
----- Message from Yanely Champagne sent at 4/9/2020  8:18 AM CDT -----  Contact: pharmacy  Pharmacy did not have address listed on file for pt.  .Type:  Pharmacy Calling to Clarify an RX    Name of Caller: pharmacy  Pharmacy Name:   Prescription Name: data suppository   What do they need to clarify?: refill   Best Call Back Number: [phone]   Additional Information:  Pharmacy fax request and called no response

## 2020-04-09 NOTE — TELEPHONE ENCOUNTER
I have requested a copy of the refill order from [facility].  DHEA supp  3 x weekly.  You are listed as most recent prescriber.  Is this ok to give VO on phone to refill?  Please advise./rpr

## 2020-04-11 DIAGNOSIS — K11.7 XEROSTOMIA DUE TO AUTOIMMUNE DISEASE: ICD-10-CM

## 2020-04-11 DIAGNOSIS — M05.79 RHEUMATOID ARTHRITIS INVOLVING MULTIPLE SITES WITH POSITIVE RHEUMATOID FACTOR: ICD-10-CM

## 2020-04-11 DIAGNOSIS — M35.01 SJOGREN'S SYNDROME WITH KERATOCONJUNCTIVITIS SICCA: ICD-10-CM

## 2020-04-11 DIAGNOSIS — M35.9 XEROSTOMIA DUE TO AUTOIMMUNE DISEASE: ICD-10-CM

## 2020-04-13 RX ORDER — HYDROXYCHLOROQUINE SULFATE 200 MG/1
TABLET, FILM COATED ORAL
Qty: 180 TABLET | Refills: 1 | Status: SHIPPED | OUTPATIENT
Start: 2020-04-13 | End: 2020-10-01

## 2020-04-16 ENCOUNTER — NURSE TRIAGE (OUTPATIENT)
Dept: ADMINISTRATIVE | Facility: CLINIC | Age: 70
End: 2020-04-16

## 2020-04-16 ENCOUNTER — PATIENT MESSAGE (OUTPATIENT)
Dept: OTOLARYNGOLOGY | Facility: CLINIC | Age: 70
End: 2020-04-16

## 2020-04-16 NOTE — TELEPHONE ENCOUNTER
Spoke with patient she stated that she has sinus congestion.  States she has had a headache, sinus pressure, and post nasal drip that has been lasting for about 3 days.  Denies having any fever, cough, or difficulty breathing.  Advised patient to follow up with PCP states she has not heard from he office after sending a message.  Offered [facility] patient states that she does not have compatible device and she prefers to hear back from ENT or PCP.  Will send message.  Advised patient to call back if symptoms worsen of if she is unable to get in contact with office.     Reason for Disposition   [1] Using nasal washes and pain medicine > 24 hours AND [2] sinus pain (around cheekbone or eye) persists    Additional Information   Negative: Severe difficulty breathing (e.g., struggling for each breath, speaks in single words)   Negative: Sounds like a life-threatening emergency to the triager   Negative: [1] Difficulty breathing AND [2] not from stuffy nose (e.g., not relieved by cleaning out the nose)   Negative: [1] SEVERE headache AND [2] fever   Negative: [1] Redness or swelling on the cheek, forehead or around the eye AND [2] fever   Negative: Fever > 104 F (40 C)   Negative: Patient sounds very sick or weak to the triager   Negative: [1] SEVERE pain AND [2] not improved 2 hours after pain medicine   Negative: [1] Redness or swelling on the cheek, forehead or around the eye AND [2] no fever   Negative: [1] Fever > 101 F (38.3 C) AND [2] age > 60   Negative: [1] Fever > 100.0 F (37.8 C) AND [2] bedridden (e.g., nursing home patient, CVA, chronic illness, recovering from surgery)   Negative: [1] Fever > 100.0 F (37.8 C) AND [2] diabetes mellitus or weak immune system (e.g., HIV positive, cancer chemo, splenectomy, chronic steroids)   Negative: Fever present > 3 days (72 hours)   Negative: [1] Fever returns after gone for over 24 hours AND [2] symptoms worse or not improved   Negative: [1] Sinus pain  (not just congestion) AND [2] fever   Negative: Earache   Negative: [1] Sinus congestion (pressure, fullness) AND [2] present > 10 days   Negative: [1] Nasal discharge AND [2] present > 10 days    Protocols used: SINUS PAIN OR CONGESTION-A-AH

## 2020-04-17 RX ORDER — AMOXICILLIN AND CLAVULANATE POTASSIUM 875; 125 MG/1; MG/1
1 TABLET, FILM COATED ORAL 2 TIMES DAILY
Qty: 20 TABLET | Refills: 0 | Status: SHIPPED | OUTPATIENT
Start: 2020-04-17 | End: 2020-04-27

## 2020-04-17 NOTE — TELEPHONE ENCOUNTER
I called and spoke with patient.  She says it's her usual sinusitis symptoms with pain and pressure around her eye and above brow; congestion with yellow nasal drainage.  She denies fever; denies cough or shortness of breath.  Feels as though it's worsening over past 5 days and she is unable to do virtual visit.  Will send in course of Augmentin (says she's tolerated this in past) and asked that she call us next week with her progress.  Continue sinus rinse, Flonase and Astelin per home routine.

## 2020-04-21 ENCOUNTER — PATIENT MESSAGE (OUTPATIENT)
Dept: ADMINISTRATIVE | Facility: OTHER | Age: 70
End: 2020-04-21

## 2020-04-22 ENCOUNTER — PATIENT MESSAGE (OUTPATIENT)
Dept: ADMINISTRATIVE | Facility: OTHER | Age: 70
End: 2020-04-22

## 2020-04-27 DIAGNOSIS — M05.79 RHEUMATOID ARTHRITIS INVOLVING MULTIPLE SITES WITH POSITIVE RHEUMATOID FACTOR: ICD-10-CM

## 2020-04-27 DIAGNOSIS — Z79.631 METHOTREXATE, LONG TERM, CURRENT USE: ICD-10-CM

## 2020-04-27 DIAGNOSIS — M35.01 SJOGREN'S SYNDROME WITH KERATOCONJUNCTIVITIS SICCA: ICD-10-CM

## 2020-04-27 RX ORDER — FOLIC ACID 1 MG/1
TABLET ORAL
Qty: 150 TABLET | Refills: 3 | Status: SHIPPED | OUTPATIENT
Start: 2020-04-27 | End: 2021-06-28

## 2020-04-29 ENCOUNTER — TELEPHONE (OUTPATIENT)
Dept: RADIOLOGY | Facility: HOSPITAL | Age: 70
End: 2020-04-29

## 2020-05-01 ENCOUNTER — TELEPHONE (OUTPATIENT)
Dept: RADIOLOGY | Facility: HOSPITAL | Age: 70
End: 2020-05-01

## 2020-05-15 ENCOUNTER — PATIENT MESSAGE (OUTPATIENT)
Dept: RHEUMATOLOGY | Facility: CLINIC | Age: 70
End: 2020-05-15

## 2020-05-15 DIAGNOSIS — M05.79 RHEUMATOID ARTHRITIS INVOLVING MULTIPLE SITES WITH POSITIVE RHEUMATOID FACTOR: Primary | ICD-10-CM

## 2020-05-18 ENCOUNTER — PATIENT MESSAGE (OUTPATIENT)
Dept: OTOLARYNGOLOGY | Facility: CLINIC | Age: 70
End: 2020-05-18

## 2020-05-19 ENCOUNTER — LAB VISIT (OUTPATIENT)
Dept: LAB | Facility: HOSPITAL | Age: 70
End: 2020-05-19
Attending: PHYSICIAN ASSISTANT
Payer: MEDICARE

## 2020-05-19 DIAGNOSIS — R05.3 CHRONIC COUGH: Primary | ICD-10-CM

## 2020-05-19 DIAGNOSIS — R05.3 CHRONIC COUGH: ICD-10-CM

## 2020-05-19 LAB — SARS-COV-2 IGG SERPLBLD QL IA.RAPID: NEGATIVE

## 2020-05-19 PROCEDURE — 36415 COLL VENOUS BLD VENIPUNCTURE: CPT

## 2020-05-19 PROCEDURE — 86769 SARS-COV-2 COVID-19 ANTIBODY: CPT

## 2020-05-19 RX ORDER — MELOXICAM 15 MG/1
15 TABLET ORAL DAILY
Qty: 30 TABLET | Refills: 6 | Status: SHIPPED | OUTPATIENT
Start: 2020-05-19 | End: 2021-09-18 | Stop reason: SDUPTHER

## 2020-05-20 ENCOUNTER — TELEPHONE (OUTPATIENT)
Dept: OTOLARYNGOLOGY | Facility: CLINIC | Age: 70
End: 2020-05-20

## 2020-05-20 NOTE — TELEPHONE ENCOUNTER
----- Message from Agatha Guerrero PA-C sent at 5/20/2020  7:47 AM CDT -----  Please let pt know her COVID antibody test is negative. This means she has not been exposed to the virus nor has had it in the recent past. Thank you!

## 2020-06-03 ENCOUNTER — PATIENT MESSAGE (OUTPATIENT)
Dept: RHEUMATOLOGY | Facility: CLINIC | Age: 70
End: 2020-06-03

## 2020-06-12 ENCOUNTER — LAB VISIT (OUTPATIENT)
Dept: LAB | Facility: HOSPITAL | Age: 70
End: 2020-06-12
Attending: INTERNAL MEDICINE
Payer: MEDICARE

## 2020-06-12 DIAGNOSIS — E55.9 VITAMIN D DEFICIENCY DISEASE: ICD-10-CM

## 2020-06-12 DIAGNOSIS — M85.89 OSTEOPENIA OF MULTIPLE SITES: ICD-10-CM

## 2020-06-12 DIAGNOSIS — M35.01 SJOGREN'S SYNDROME WITH KERATOCONJUNCTIVITIS SICCA: ICD-10-CM

## 2020-06-12 DIAGNOSIS — D50.8 OTHER IRON DEFICIENCY ANEMIA: ICD-10-CM

## 2020-06-12 DIAGNOSIS — Z51.81 MEDICATION MONITORING ENCOUNTER: Chronic | ICD-10-CM

## 2020-06-12 DIAGNOSIS — M05.79 RHEUMATOID ARTHRITIS INVOLVING MULTIPLE SITES WITH POSITIVE RHEUMATOID FACTOR: ICD-10-CM

## 2020-06-12 LAB
ALBUMIN SERPL BCP-MCNC: 3.9 G/DL (ref 3.5–5.2)
ALP SERPL-CCNC: 65 U/L (ref 55–135)
ALT SERPL W/O P-5'-P-CCNC: 27 U/L (ref 10–44)
ANION GAP SERPL CALC-SCNC: 10 MMOL/L (ref 8–16)
AST SERPL-CCNC: 23 U/L (ref 10–40)
BASOPHILS # BLD AUTO: 0.07 K/UL (ref 0–0.2)
BASOPHILS # BLD AUTO: 0.07 K/UL (ref 0–0.2)
BASOPHILS NFR BLD: 1.2 % (ref 0–1.9)
BASOPHILS NFR BLD: 1.2 % (ref 0–1.9)
BILIRUB SERPL-MCNC: 0.3 MG/DL (ref 0.1–1)
BUN SERPL-MCNC: 21 MG/DL (ref 8–23)
CALCIUM SERPL-MCNC: 9.2 MG/DL (ref 8.7–10.5)
CHLORIDE SERPL-SCNC: 100 MMOL/L (ref 95–110)
CO2 SERPL-SCNC: 30 MMOL/L (ref 23–29)
CREAT SERPL-MCNC: 0.9 MG/DL (ref 0.5–1.4)
CRP SERPL-MCNC: 6.8 MG/L (ref 0–8.2)
DIFFERENTIAL METHOD: ABNORMAL
DIFFERENTIAL METHOD: ABNORMAL
EOSINOPHIL # BLD AUTO: 0.3 K/UL (ref 0–0.5)
EOSINOPHIL # BLD AUTO: 0.3 K/UL (ref 0–0.5)
EOSINOPHIL NFR BLD: 5.3 % (ref 0–8)
EOSINOPHIL NFR BLD: 5.3 % (ref 0–8)
ERYTHROCYTE [DISTWIDTH] IN BLOOD BY AUTOMATED COUNT: 11.4 % (ref 11.5–14.5)
ERYTHROCYTE [DISTWIDTH] IN BLOOD BY AUTOMATED COUNT: 11.4 % (ref 11.5–14.5)
ERYTHROCYTE [SEDIMENTATION RATE] IN BLOOD BY WESTERGREN METHOD: 5 MM/HR (ref 0–36)
EST. GFR  (AFRICAN AMERICAN): >60 ML/MIN/1.73 M^2
EST. GFR  (NON AFRICAN AMERICAN): >60 ML/MIN/1.73 M^2
FERRITIN SERPL-MCNC: 243 NG/ML (ref 20–300)
GLUCOSE SERPL-MCNC: 106 MG/DL (ref 70–110)
HCT VFR BLD AUTO: 36.8 % (ref 37–48.5)
HCT VFR BLD AUTO: 36.8 % (ref 37–48.5)
HGB BLD-MCNC: 12.2 G/DL (ref 12–16)
HGB BLD-MCNC: 12.2 G/DL (ref 12–16)
IMM GRANULOCYTES # BLD AUTO: 0.01 K/UL (ref 0–0.04)
IMM GRANULOCYTES # BLD AUTO: 0.01 K/UL (ref 0–0.04)
IMM GRANULOCYTES NFR BLD AUTO: 0.2 % (ref 0–0.5)
IMM GRANULOCYTES NFR BLD AUTO: 0.2 % (ref 0–0.5)
IRON SERPL-MCNC: 96 UG/DL (ref 30–160)
LYMPHOCYTES # BLD AUTO: 1.4 K/UL (ref 1–4.8)
LYMPHOCYTES # BLD AUTO: 1.4 K/UL (ref 1–4.8)
LYMPHOCYTES NFR BLD: 24.9 % (ref 18–48)
LYMPHOCYTES NFR BLD: 24.9 % (ref 18–48)
MCH RBC QN AUTO: 30.5 PG (ref 27–31)
MCH RBC QN AUTO: 30.5 PG (ref 27–31)
MCHC RBC AUTO-ENTMCNC: 33.2 G/DL (ref 32–36)
MCHC RBC AUTO-ENTMCNC: 33.2 G/DL (ref 32–36)
MCV RBC AUTO: 92 FL (ref 82–98)
MCV RBC AUTO: 92 FL (ref 82–98)
MONOCYTES # BLD AUTO: 0.5 K/UL (ref 0.3–1)
MONOCYTES # BLD AUTO: 0.5 K/UL (ref 0.3–1)
MONOCYTES NFR BLD: 9.1 % (ref 4–15)
MONOCYTES NFR BLD: 9.1 % (ref 4–15)
NEUTROPHILS # BLD AUTO: 3.4 K/UL (ref 1.8–7.7)
NEUTROPHILS # BLD AUTO: 3.4 K/UL (ref 1.8–7.7)
NEUTROPHILS NFR BLD: 59.5 % (ref 38–73)
NEUTROPHILS NFR BLD: 59.5 % (ref 38–73)
NRBC BLD-RTO: 0 /100 WBC
NRBC BLD-RTO: 0 /100 WBC
PLATELET # BLD AUTO: 252 K/UL (ref 150–350)
PLATELET # BLD AUTO: 252 K/UL (ref 150–350)
PMV BLD AUTO: 9.3 FL (ref 9.2–12.9)
PMV BLD AUTO: 9.3 FL (ref 9.2–12.9)
POTASSIUM SERPL-SCNC: 3.9 MMOL/L (ref 3.5–5.1)
PROT SERPL-MCNC: 7 G/DL (ref 6–8.4)
RBC # BLD AUTO: 4 M/UL (ref 4–5.4)
RBC # BLD AUTO: 4 M/UL (ref 4–5.4)
SATURATED IRON: 23 % (ref 20–50)
SODIUM SERPL-SCNC: 140 MMOL/L (ref 136–145)
TOTAL IRON BINDING CAPACITY: 410 UG/DL (ref 250–450)
TRANSFERRIN SERPL-MCNC: 277 MG/DL (ref 200–375)
WBC # BLD AUTO: 5.7 K/UL (ref 3.9–12.7)
WBC # BLD AUTO: 5.7 K/UL (ref 3.9–12.7)

## 2020-06-12 PROCEDURE — 86140 C-REACTIVE PROTEIN: CPT

## 2020-06-12 PROCEDURE — 80053 COMPREHEN METABOLIC PANEL: CPT

## 2020-06-12 PROCEDURE — 85652 RBC SED RATE AUTOMATED: CPT

## 2020-06-12 PROCEDURE — 82728 ASSAY OF FERRITIN: CPT

## 2020-06-12 PROCEDURE — 36415 COLL VENOUS BLD VENIPUNCTURE: CPT

## 2020-06-12 PROCEDURE — 85025 COMPLETE CBC W/AUTO DIFF WBC: CPT

## 2020-06-12 PROCEDURE — 83540 ASSAY OF IRON: CPT

## 2020-06-15 ENCOUNTER — PATIENT OUTREACH (OUTPATIENT)
Dept: ADMINISTRATIVE | Facility: OTHER | Age: 70
End: 2020-06-15

## 2020-06-15 ENCOUNTER — OFFICE VISIT (OUTPATIENT)
Dept: RHEUMATOLOGY | Facility: CLINIC | Age: 70
End: 2020-06-15
Payer: MEDICARE

## 2020-06-15 VITALS
HEART RATE: 83 BPM | BODY MASS INDEX: 22.52 KG/M2 | HEIGHT: 68 IN | WEIGHT: 148.56 LBS | DIASTOLIC BLOOD PRESSURE: 69 MMHG | SYSTOLIC BLOOD PRESSURE: 149 MMHG

## 2020-06-15 DIAGNOSIS — M35.00 SJOGREN'S SYNDROME, WITH UNSPECIFIED ORGAN INVOLVEMENT: ICD-10-CM

## 2020-06-15 DIAGNOSIS — T50.905A DRUG-INDUCED LUPUS ERYTHEMATOSUS: ICD-10-CM

## 2020-06-15 DIAGNOSIS — M05.79 RHEUMATOID ARTHRITIS INVOLVING MULTIPLE SITES WITH POSITIVE RHEUMATOID FACTOR: Primary | ICD-10-CM

## 2020-06-15 DIAGNOSIS — L93.2 DRUG-INDUCED LUPUS ERYTHEMATOSUS: ICD-10-CM

## 2020-06-15 DIAGNOSIS — M85.89 OSTEOPENIA OF MULTIPLE SITES: ICD-10-CM

## 2020-06-15 PROCEDURE — 99214 OFFICE O/P EST MOD 30 MIN: CPT | Mod: S$PBB,,, | Performed by: PHYSICIAN ASSISTANT

## 2020-06-15 PROCEDURE — 99999 PR PBB SHADOW E&M-EST. PATIENT-LVL V: CPT | Mod: PBBFAC,,, | Performed by: PHYSICIAN ASSISTANT

## 2020-06-15 PROCEDURE — 99215 OFFICE O/P EST HI 40 MIN: CPT | Mod: PBBFAC | Performed by: PHYSICIAN ASSISTANT

## 2020-06-15 PROCEDURE — 99999 PR PBB SHADOW E&M-EST. PATIENT-LVL V: ICD-10-PCS | Mod: PBBFAC,,, | Performed by: PHYSICIAN ASSISTANT

## 2020-06-15 PROCEDURE — 99214 PR OFFICE/OUTPT VISIT, EST, LEVL IV, 30-39 MIN: ICD-10-PCS | Mod: S$PBB,,, | Performed by: PHYSICIAN ASSISTANT

## 2020-06-15 NOTE — PATIENT INSTRUCTIONS
[email or web address]      [email or web address]        Trochanteric Bursitis    Symptoms   Causes   Treatment   Exercises   Complications   [place]  Overview  Trochanteric bursitis is hip pain caused by inflammation of the fluid-filled sac, or bursa, on the outer edge of your hip.   You have about 160 bursae around your body. Bursae provide a cushion between bones and soft tissues. They prevent bones from rubbing against tendons and muscles. Bursitis can affect any of the bursae in your body.  Trochanteric bursitis affects the outer point of the thighbone, the femur, at the edge of the hip. This bony point is called the greater trochanter. Another bursa called the iliopsoas bursa is on the inside of the hip. Inflammation of the iliopsoas bursa causes pain in the groin.   Bursitis is the leading cause of hip pain.  Repetitive activities like climbing stairs or surgery to the hip can cause the bursa to become inflamed.  Many doctors now call trochanteric bursitis greater trochanteric pain syndrome.  What are the symptoms?  The main symptom of trochanteric bursitis is pain in the outer part of the hip. You may feel soreness when you press on the outside of your hip or lie on that side. The pain will get worse with activities such as walking or climbing stairs. Pain can also spread, or radiate, down your thigh.  At first, the pain may be sharp. Eventually, it can fade into an ache.   You might also have swelling in the affected leg.  What are the causes?  Causes of trochanteric bursitis include:  injuries from a fall, a hard hit to your hipbone, or from lying on one side for a long time   overuse from repetitive activities such as running, bicycling, climbing stairs, or standing for long periods of time   hip surgery or prosthetic implants in the hips      How is this treated?  Avoiding the activity that caused trochanteric bursitis will give  your hip time to heal. You can also try one of these treatments to bring down inflammation and relieve pain:  Nonsteroidal anti-inflammatory drugs (NSAIDs).Ibuprofen (Motrin, Advil) and naproxen (Naprosyn) can help control inflammation and pain. Because NSAIDs can cause side effects like stomach pain and bleeding, use them for the shortest possible amount of time needed.   Steroid injections.Your doctor can give you injections of a corticosteroid medicine to bring down inflammation and control pain.   Physical therapy.A physical therapist can teach you exercises to maintain strength and flexibility in your hip. The therapist might also use other treatments, such as massage, ultrasound, ice, or heat.   Assistive devices.Use a cane or crutches to take weight off your hip while it heals.  Surgery  If pain relievers, physical therapy, or other noninvasive treatments dont work for you, your doctor might recommend surgery to remove the bursa. This procedure can be done laparoscopically, through very small incisions using a camera to guide the surgeon. Recovery takes only a few days.  Preventing further injury   To prevent further injury to your hip while you heal:  Avoid falls. Wear rubber-soled shoes, keep your eyeglass or contact lens prescription up-to-date, and use a cane or walker if you have mobility issues.   Dont overuse the hip. Avoid repetitive activities like jogging and excess stair climbing.   Lose weight if youre overweight. This can help relieve pressure on your joints.   Use shoe inserts. Get a shoe insert or foot orthotic to compensate for height differences in your legs.        Preventative exercises  Doing exercises to strengthen your thighs can help stabilize your hip joint and protect it from injury. Here are a few exercises you might try for trochanteric bursitis:  Hip bridges  1. Lie on your back with your feet flat on the ground and your knees bent.   2. Raise your hips until they line up with  your shoulders and knees.  3. Slowly lower your hips to the ground.  4. Perform 5 sets of 20 repetitions.  Lying lateral leg raises  1. Lie on your right side.  2. Extend your right arm out for balance.  3. Lift your left leg as far as you can, and then bring it down.  4. Do 4 sets of 15 repetitions on each leg.  Lying leg circles  1. Lie flat on your back with your legs extended.  2. Raise your left leg about 3 inches off the ground and make small circles with it.  3. Perform 3 sets of 5 rotations on each leg.  Are there any complications?  Complications of trochanteric bursitis can include:  continuing pain that interferes with your daily activities   loss of movement in your hip   Disability        Whats the outlook?  Noninvasive treatments, such as exercise and physical therapy, relieve trochanteric bursitis in more than 90 percent of people who try them, according to a 2011 review. If these treatments dont help you, surgery may correct the problem.  + 6 Sources  Bursitis of the hip.(2017).   [email or web address]   Hip bursitis. (2014).   orthoinfo.[email or web address]/topic.cfm?[email or web address]   Hip bursitis (trochanteric bursitis). (n.d.).   [email or web address]/[email or web address]-[email or web address]#Diagnosis-and-Treatment   [place]. (2011). Efficacy of treatment of trochanteric bursitis: A systematic review. DOI:   10.1097/[facility].0b842d684462185a   Trochanteric bursitis. (n.d.).   med.[email or web address]/fammed/[facility]ammedcenter/about-us/services/TrochantericBursitis.pdf   Trochanteric bursitis. (2014).   [email or web address]/[email or web address]-bursitis

## 2020-06-15 NOTE — PROGRESS NOTES
Subjective:       Patient ID: Leticia Piña is a 70 y.o. female.    Chief Complaint: Sjogrens Syndrome and Rheumatoid Arthritis      Leticia is here today for rheumatology followup.      She has chronic seropositive rheumatoid arthritis, Sjogren's syndrome, osteopenia, and Vitamin D deficiency.  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 Also failed enbrel this was IE.  For RA on cimzia 400 mg every 5-6 weeks, now weaned down and off mtx over the past year, still on Plaquenil 200 mg once daily.      RA wise BL hand and foot x-rays done in 7/2017 and repeat 6/2019 showed no new erosions but right 5th mtp joint does have erosive changes which are chronic and x-rays were overall stable.      Last cimzia injection 10/22/2019- had elevated lft in early jan, we held; was from some otc cough meds  Stopped this and returned to normal  RA doing well she did not reschedule her apt then covid pandemic occurred  She continues to do well now 6.5 month off cimzia  RA- no prolonged AM stiffness, no swelling some mild aches and pain fabienne since she started working out again   Right hip bursa hurts form time to tome  Right elbow    Today pain 4/10      Fibro On Cymbalta 60 mg/d, We sent her to aquatic therapy at [facility] which really helped a lot. The focused on both her fibromyalgia and  romel hip bursitis/ back pain.     For Sjogren's, she has +SSB, +SSA, +GIOVANNA 1:160 speckled in the past. She takes Evoxac 3-4X daily cost $160/month.  Now on salagen using good rx coupon cost is better. Also  Using otc products nasal gel, lozenges, drinking water. Seen by ophthalmology and prescribed plasma serum eye drops, which have worked great. No parotid tenderness or swelling.     Osteopenia diagnosed by Dexa. Last scan 8/2014. TF -1.0, FN -1.8, spine -0.2. Previously, we did trial of fosamax but had GI upset and stopped. No prednisone. Currently taking estradiol, progesterone, calcium by diet only, and vit D  was 5000 IU per day but D level high at 80, I cut her dose back to no more than 2000 IU daily.   last bone density 12/6/17 stable. No changes. No falls or fractures since last visit.   Needs up to take dexa     She is weaning off her hormones; reflux worsening, will see gi for evaluation in march IV iron dec and jan- counts now wnl.       Low-back Pain  Associated symptoms include arthralgias. Pertinent negatives include no abdominal pain, chest pain, chills, congestion, coughing, fatigue, fever, joint swelling, myalgias, nausea, neck pain, rash, sore throat, vomiting or weakness.   Hip Pain  Associated symptoms include arthralgias. Pertinent negatives include no abdominal pain, chest pain, chills, congestion, coughing, fatigue, fever, joint swelling, myalgias, nausea, neck pain, rash, sore throat, vomiting or weakness.     Review of Systems   Constitutional: Negative.  Negative for activity change, appetite change, chills, fatigue and fever.   HENT: Negative for congestion, ear pain, mouth sores, rhinorrhea, sinus pressure, sore throat and trouble swallowing.         + dry mouth   Eyes: Negative.  Negative for photophobia, pain and redness.        No swollen or red eyes, + dry eye     Respiratory: Negative for cough, choking, chest tightness, shortness of breath, wheezing and stridor.    Cardiovascular: Negative.  Negative for chest pain.   Gastrointestinal: Negative.  Negative for abdominal pain, blood in stool, diarrhea, nausea and vomiting.   Genitourinary: Negative.  Negative for dysuria, frequency, hematuria and urgency.   Musculoskeletal: Positive for arthralgias. Negative for back pain, gait problem, joint swelling, myalgias, neck pain and neck stiffness.   Skin: Negative for color change, pallor and rash.   Neurological: Negative.  Negative for weakness.   Hematological: Negative for adenopathy.   Psychiatric/Behavioral: Negative for suicidal ideas.         Objective:     BP (!) 149/69   Pulse 83   Ht  "5' 8" (1.727 m)   Wt 67.4 kg (148 lb 9.4 oz)   BMI 22.59 kg/m²      Physical Exam   Constitutional: She is oriented to person, place, and time and well-developed, well-nourished, and in no distress. No distress.   HENT:   Head: Normocephalic and atraumatic.   Right Ear: External ear normal.   Left Ear: External ear normal.   Mouth/Throat: Mucous membranes are not pale, dry and not cyanotic. She does not have dentures. No oral lesions. No oropharyngeal exudate.   Eyes: Conjunctivae and EOM are normal. Pupils are equal, round, and reactive to light. Right conjunctiva is not injected. Right conjunctiva has no hemorrhage. Left conjunctiva is not injected. Left conjunctiva has no hemorrhage. No scleral icterus.       Neck: Normal range of motion. Neck supple. No thyromegaly present.   Cardiovascular: Normal rate, regular rhythm and normal heart sounds.    No murmur heard.  Pulmonary/Chest: Effort normal and breath sounds normal. She exhibits no tenderness.   Abdominal: Soft. Bowel sounds are normal.       Right Side Rheumatological Exam     Examination finds the shoulder, elbow, wrist, knee, 1st PIP, 1st MCP, 2nd PIP, 2nd MCP, 3rd PIP, 3rd MCP, 4th PIP, 4th MCP, 5th PIP and 5th MCP normal.    The patient is tender to palpation of the 5th MTP    She has swelling of the 5th MTP    Left Side Rheumatological Exam     Examination finds the shoulder, elbow, wrist, knee, 1st PIP, 1st MCP, 2nd PIP, 2nd MCP, 3rd PIP, 3rd MCP, 4th PIP, 4th MCP, 5th PIP and 5th MCP normal.    Joint Exam Comments   Knee: Lateral side of knee at lateral collateral ligament  Some ttp noted            Lymphadenopathy:     She has no cervical adenopathy.   Neurological: She is alert and oriented to person, place, and time. She displays normal reflexes. No cranial nerve deficit. She exhibits normal muscle tone. Gait normal.   Skin: Skin is warm and dry. No rash noted. No erythema.     Musculoskeletal: Tenderness and deformity present. No edema.      " Comments: DIP enlargement BL hands,   cmp and pip joints look good  no synovitis, warmth.   Right elbow ttp lateral epicondyle consistent w/tennis elbow     metatarsal squeeze right foot, neg left foot  Right 5th mtp enlargement, djd   No swelling/synovitis  romel mtp      ttp right  hip bursa;  upper arms, neck  Thighs etc- generalized tenderness     No synovitis     No weakness on exam, muscle strenght 5/5 upper and lower ext                  Results for orders placed or performed in visit on 06/12/20   Sedimentation rate, manual   Result Value Ref Range    Sed Rate 5 0 - 36 mm/Hr   C-reactive protein   Result Value Ref Range    CRP 6.8 0.0 - 8.2 mg/L   Comprehensive metabolic panel   Result Value Ref Range    Sodium 140 136 - 145 mmol/L    Potassium 3.9 3.5 - 5.1 mmol/L    Chloride 100 95 - 110 mmol/L    CO2 30 (H) 23 - 29 mmol/L    Glucose 106 70 - 110 mg/dL    BUN, Bld 21 8 - 23 mg/dL    Creatinine 0.9 0.5 - 1.4 mg/dL    Calcium 9.2 8.7 - 10.5 mg/dL    Total Protein 7.0 6.0 - 8.4 g/dL    Albumin 3.9 3.5 - 5.2 g/dL    Total Bilirubin 0.3 0.1 - 1.0 mg/dL    Alkaline Phosphatase 65 55 - 135 U/L    AST 23 10 - 40 U/L    ALT 27 10 - 44 U/L    Anion Gap 10 8 - 16 mmol/L    eGFR if African American >60 >60 mL/min/1.73 m^2    eGFR if non African American >60 >60 mL/min/1.73 m^2   CBC auto differential   Result Value Ref Range    WBC 5.70 3.90 - 12.70 K/uL    RBC 4.00 4.00 - 5.40 M/uL    Hemoglobin 12.2 12.0 - 16.0 g/dL    Hematocrit 36.8 (L) 37.0 - 48.5 %    Mean Corpuscular Volume 92 82 - 98 fL    Mean Corpuscular Hemoglobin 30.5 27.0 - 31.0 pg    Mean Corpuscular Hemoglobin Conc 33.2 32.0 - 36.0 g/dL    RDW 11.4 (L) 11.5 - 14.5 %    Platelets 252 150 - 350 K/uL    MPV 9.3 9.2 - 12.9 fL    Immature Granulocytes 0.2 0.0 - 0.5 %    Gran # (ANC) 3.4 1.8 - 7.7 K/uL    Immature Grans (Abs) 0.01 0.00 - 0.04 K/uL    Lymph # 1.4 1.0 - 4.8 K/uL    Mono # 0.5 0.3 - 1.0 K/uL    Eos # 0.3 0.0 - 0.5 K/uL    Baso # 0.07 0.00 - 0.20  K/uL    nRBC 0 0 /100 WBC    Gran% 59.5 38.0 - 73.0 %    Lymph% 24.9 18.0 - 48.0 %    Mono% 9.1 4.0 - 15.0 %    Eosinophil% 5.3 0.0 - 8.0 %    Basophil% 1.2 0.0 - 1.9 %    Differential Method Automated    CBC auto differential   Result Value Ref Range    WBC 5.70 3.90 - 12.70 K/uL    RBC 4.00 4.00 - 5.40 M/uL    Hemoglobin 12.2 12.0 - 16.0 g/dL    Hematocrit 36.8 (L) 37.0 - 48.5 %    Mean Corpuscular Volume 92 82 - 98 fL    Mean Corpuscular Hemoglobin 30.5 27.0 - 31.0 pg    Mean Corpuscular Hemoglobin Conc 33.2 32.0 - 36.0 g/dL    RDW 11.4 (L) 11.5 - 14.5 %    Platelets 252 150 - 350 K/uL    MPV 9.3 9.2 - 12.9 fL    Immature Granulocytes 0.2 0.0 - 0.5 %    Gran # (ANC) 3.4 1.8 - 7.7 K/uL    Immature Grans (Abs) 0.01 0.00 - 0.04 K/uL    Lymph # 1.4 1.0 - 4.8 K/uL    Mono # 0.5 0.3 - 1.0 K/uL    Eos # 0.3 0.0 - 0.5 K/uL    Baso # 0.07 0.00 - 0.20 K/uL    nRBC 0 0 /100 WBC    Gran% 59.5 38.0 - 73.0 %    Lymph% 24.9 18.0 - 48.0 %    Mono% 9.1 4.0 - 15.0 %    Eosinophil% 5.3 0.0 - 8.0 %    Basophil% 1.2 0.0 - 1.9 %    Differential Method Automated    Ferritin   Result Value Ref Range    Ferritin 243 20.0 - 300.0 ng/mL   Iron and TIBC   Result Value Ref Range    Iron 96 30 - 160 ug/dL    Transferrin 277 200 - 375 mg/dL    TIBC 410 250 - 450 ug/dL    Saturated Iron 23 20 - 50 %       6/4/19  BL Foot X-ray -FINDINGS:  Allowing for positioning and technique there has been no interval development of new focal erosion or periosteal reaction.  Stable multi articular degenerative changes present throughout the feet bilaterally.  Left and borderline right pes planus.  Stable erosions on the right involving the 1st and 5th metatarsal heads with slight increased smooth soft tissue prominence along the lateral margin of the right 5th MTP joint.  Similar erosions on the left involving the 5th MTP with stable appearance the erosions and soft tissue prominence.  No acute fracture or dislocation.  No new soft tissue calcification or  radiopaque foreign body.    BL Hand -FINDINGS:  Stable exam without acute fracture or subluxation noted.  No new or developing focal erosion or periosteal reaction.  Multi articular degenerative change noted in the wrist and hand bilaterally, greater on the left.        X-ray BL hand and foot 5/2016 and 7/2017 : no new erosions - stable    12/2017 DEXA  Total femur mean 0.855 g/cm² with a T score -1.2  Left femur neck 0.738 g/cm² T score -2.2  L1-L4 spine 1.152 g/cm² with a T score -0.2  Osteopenia with falling bone density at the left femur neck moderate to high fracture risk      12/6/15 DEXA  Total femur mean 0.860 g/cm² with a T score -1.2  Left femur neck 0.738 g/cm² T score -2.2  L1-L4 spine 1.152 g/cm² with a T score -0.2  Osteopenia with falling bone density at the left femur neck moderate to high fracture risk    8/6/14 DEXA  Total femur mean 0.880 g/cm² with a T score -1.0  Left femur neck 0.789 g/cm² with a T score -1.8  Spine L1-L4 1.153 g/cm² with a T score -0.2  Osteopenia with low to moderate risk of fracture          Immunization History   Administered Date(s) Administered    Hepatitis A, Adult 06/05/2019    Influenza - High Dose - PF (65 years and older) 11/07/2007, 09/30/2008, 09/23/2009, 10/27/2010, 10/31/2011, 10/05/2015, 10/12/2018, 09/24/2019    Influenza - Quadrivalent 11/13/2014, 10/27/2016    Influenza - Trivalent - Adjuvanted - PF 10/23/2017    PPD Test 03/28/2011    Pneumococcal Conjugate - 13 Valent 12/10/2013, 12/10/2013, 11/27/2019    Pneumococcal Polysaccharide - 23 Valent 11/07/2007, 03/23/2017    Tdap 09/15/2010, 04/10/2013    Zoster 04/10/2013         Assessment:       1. Rheumatoid arthritis involving multiple sites with positive rheumatoid factor    2. Sjogren's syndrome, with unspecified organ involvement    3. Drug-induced lupus erythematosus    4. Osteopenia of multiple sites        1. RA well controlled on Plaquenil 200 mg daily  Off  Cimzia 400 mg was Q 5-6  weeks-  last done 10/22/2019    ALEMAN-28 (CRP): 2.4 (Remission)  MARGARITA 4,  CDAI 0.2    hand and foot x-ray up to date 6/2019  stable no changes     In the past failed mtx monotherapy  Failed humira and enbrel    2. Sjogrens - dryness of eyes and mouth. On evoxac 3-4 X daily and plasma serum eye drops prescribed by ophthalmologist.   evoxac poorly covered by insurance cost $160/month- now on salagen doing fair, lower cost    3. 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 Osteopenia - DEXA stable at both FN, femur mean and spine- mod-high rask- will follow  Currently taking estradiol, progesterone, calcium by diet only, and vit D 2000 international units daily. In the past failed a trial of oral bisphosphonate secondary GI intolerance  Needs repeat dexa     5. Vaccination status: up to date    6. Medication Monitoring- no current issues, no evidence of toxicity    7. Immunocompromised no issues with recurrent infections    8. Fibromyalgia with ongoing fatigue, sleep disturbances, myofascial pain ongoing depression- out of  Cymbalta, on Wellbutrin, some help with aquatic therapy     9.  R 5th MTP - pain/swelling- resolved post IA injection last year  Has djd present       Plan:         Orders Placed This Encounter   Procedures    DXA Bone Density Spine And Hip     Regarding RA meds  Stay off  mtx   C/w Plaquenil 200 mg daily. This  will decrease long term risk of eye issues     Can keep her  cimizia CAM liopholised 400 mg Q 4 weeks on hold     Repeat her bilateral hand and foot x-rays in 1 year last done 6/2019       For fibromyalgia and romel hip bursitis   Completed- PT, try to c/w exercise at home   Epsom warm bath soaks  C/w cymbalta 60 mg/d, good Rx coupon is lower than cost with insurance  consider gabapentin in future     Right elbow tennis elbow strap, ice and topical voltaren gel    Can add back prn mobic for a few days     C/w salagen 5 mg 3-4 X daily, good Rx coupon  Can also try OTC  Xylimelts or Thera Breath lozenges for dry mouth.  Nasogel  Cool mist humidier  Nighttime eye ointment, use sleep mask      C/w eye doc, yearly eye checks for plaquenil and c/w her plasma serum eye drops.       Dexa  Scan next visit has been almost 3 years  At next dexa if drops any will need to treat  Keep her  vit D to no higher than 1-2000 IU daily; d level is 80 and calcium in diet,     rtc 5 mon giovanni w/reg 4 labs, vit D and dexa   Call with any questions, changes or concerns.

## 2020-06-16 ENCOUNTER — PATIENT MESSAGE (OUTPATIENT)
Dept: OTOLARYNGOLOGY | Facility: CLINIC | Age: 70
End: 2020-06-16

## 2020-06-17 RX ORDER — DEXBROMPHENIRAMINE MALEATE 2 MG/1
1 TABLET ORAL DAILY
Qty: 30 TABLET | Refills: 12 | COMMUNITY
Start: 2020-06-17 | End: 2021-01-04

## 2020-07-01 ENCOUNTER — TELEPHONE (OUTPATIENT)
Dept: PSYCHIATRY | Facility: CLINIC | Age: 70
End: 2020-07-01

## 2020-07-01 ENCOUNTER — OFFICE VISIT (OUTPATIENT)
Dept: PSYCHIATRY | Facility: CLINIC | Age: 70
End: 2020-07-01
Payer: MEDICARE

## 2020-07-01 VITALS
DIASTOLIC BLOOD PRESSURE: 78 MMHG | WEIGHT: 147.5 LBS | HEART RATE: 85 BPM | SYSTOLIC BLOOD PRESSURE: 133 MMHG | BODY MASS INDEX: 22.43 KG/M2

## 2020-07-01 DIAGNOSIS — F41.8 MIXED ANXIETY AND DEPRESSIVE DISORDER: Primary | ICD-10-CM

## 2020-07-01 PROCEDURE — 99999 PR PBB SHADOW E&M-EST. PATIENT-LVL II: ICD-10-PCS | Mod: PBBFAC,,, | Performed by: PSYCHIATRY & NEUROLOGY

## 2020-07-01 PROCEDURE — 99999 PR PBB SHADOW E&M-EST. PATIENT-LVL II: CPT | Mod: PBBFAC,,, | Performed by: PSYCHIATRY & NEUROLOGY

## 2020-07-01 PROCEDURE — 99213 OFFICE O/P EST LOW 20 MIN: CPT | Mod: S$PBB,,, | Performed by: PSYCHIATRY & NEUROLOGY

## 2020-07-01 PROCEDURE — 99212 OFFICE O/P EST SF 10 MIN: CPT | Mod: PBBFAC | Performed by: PSYCHIATRY & NEUROLOGY

## 2020-07-01 PROCEDURE — 99213 PR OFFICE/OUTPT VISIT, EST, LEVL III, 20-29 MIN: ICD-10-PCS | Mod: S$PBB,,, | Performed by: PSYCHIATRY & NEUROLOGY

## 2020-07-01 RX ORDER — TRAZODONE HYDROCHLORIDE 50 MG/1
100-150 TABLET ORAL NIGHTLY PRN
Qty: 270 TABLET | Refills: 0 | Status: SHIPPED | OUTPATIENT
Start: 2020-07-01 | End: 2020-07-08 | Stop reason: SDUPTHER

## 2020-07-01 RX ORDER — BUPROPION HYDROCHLORIDE 300 MG/1
300 TABLET ORAL DAILY
Qty: 30 TABLET | Refills: 1 | Status: SHIPPED | OUTPATIENT
Start: 2020-07-01 | End: 2020-08-03

## 2020-07-01 NOTE — PROGRESS NOTES
"Outpatient Psychiatry Follow-up Visit (MD/NP)    2020    Leticia Piña, a 70 y.o. female, presenting for follow-up visit. Met with patient.    Reason for Encounter: Patient complains of anxiety, depression    Interval Hx: Patient seen and interviewed for follow-up, last seen about 4 months ago. Has moved out. No new problems. Now has 24 hour care for parents. Is a relief to her. No new problems. Moved into an apartment, likes it. Feeling more down nevertheless in recent weeks. Low interest. No new illnesses. Decreased interest. Health is generally good. No new illnesses or complaints. Off of injections for RA. No worse symptoms despite this. Family doing well. Money is tight. All bills being paid. Adherent to medication. Daily with duloxetine.   Takes trazodone nightly. Infrequently taking lorazepam.     From recent therapy note: "My mother-in-law just , & my father's in the hospital. It's been kind of rough. I've had to take off work for the past 3 weeks. I'm just overwhelmed." Experiencing caregiver burnout; takes 86 y/o mother to hospital to stay with 89 y/o father in the hospital. He will transfer to a SNF unit in the next day or so. Worries constantly. Doesn't have any energy. Sisters & son have helped sit with father; also hired a sitter for overnights. 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      Confirmed above today, but reports father didn't end up going to rehab. Patient & mother are caring for him. He's having symptoms possibly related to cardiac illness. Had had some panic attacks in December amidst conflict with a coworker. No further panic attacks. Stressed in caregiver role.  is supportive. Having chronic sinus infections, had lft elevation post abx. Ativan daily, trazodone daily. Adherent to medication. Medication continues to help. Denies side effects.     Reports symptoms are ongoing, generally well-managed with current treatment. Active and functioning ok with " treatment. Working as . Having  sleep trouble - fibromyalgia dx'ed since last visit. Sees Dr. Alvarez & his NP. Does massage, water therapy.   Trazodone at bedtime. Health is generally good. No new meds. Duloxetine has gone up in dose. Moods are mostly ok. A little anxious. Occasional half an ativan at night-time. No side effects. wellbutrin xl ok.     Background: 68 year old F with hx of anxiety and depression x ~20 years, with onset roughly corresponding to menopause in 1997. Describes following illness and treatment course:     Perimenopause - 1997 - insomnia roblems ; start ambien.   August 2002 - emotional distress  Can't accomplish anything (decreased functioning), feeling overwhelmed, going around in circles.   September '02 - first treatment - citalopram 20 mg. Thought it it was causing dry mouth (later dx'ed sjogrens). Feels helped symptoms present all her life that she later recognized as depression. Reduced citalopram.   November '03- more perimenopausal symptoms. Started muliti-hormonal treatment for menopausal symptoms. Had temporary benefit, stopped in '04 due to negligible benefit by that time. Remained on estradiol  Eventually went back to 20 mg citalopram.   Stress and fatigue in May '06. More dryness. Early sjogren's. Stopped citalopram, tried duloxetine.   2.07-stopped menses, 3.07 - back to progest/test, off est.   Ativan helped anxiety, caused lethargy.   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 Insomnia ongoing despite ambien.   Increased ambien to 15.   In '09 added trazodone 75 mg - helped in combo with ambien  2012 - cymbalta to 90 mg.   '13 - added wellbutrin 100 mg' diagnosed with sjogren's.   '15 - wellbutrin 150, trazodone 100 mg. Stopped ambien  Had a dip in moods in early summer.   Taking 225 mg bupropion - starting about 1 month ago. 300 mg was too intense. Tolerating this lower dose.     Worrying too much  "about different things   Trouble relaxing   SUSAN-7 = 2    Sleep Problems   Sad Mood  Appetite and weight changes  Concentration problems  Guilt  Thoughts of Emptiness/Death/Suicide  Anhedonia  Anergia  Slowing/PMR    QIDS = 7    Rested on rising. Wakes only briefly. Sometimes has trazodone hangover. Doesn't interfere with functioning. Interest is good.   Anxiety fluctuates. Recently taking ativan about 1-2x/week.     Psych Hx: as above. No avh, no hospitalization, no serious SI, elaine. One previous psych assessment in [facility] - "horrible experience". She changed meds (to escitalopram), felt terrible.    No other interactions with psychiatrist.     Social history: When younger - low self-esteem, dad was pessimistic and fault-finding. Dad critical. No nancy abuse. No serious maltreatment or trauma. Fewer than most friends (mostly because they were outgoing; thought poorly of one's self, inhibited. Denies teasing and bullying. Above average in school. Graduated HS, went to work x 1 year then went to \[employer]\"". Started dating . Didn't graduate. 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 Gets good feedback on performance. Good relationship with .  x 43 years. 2 grown kids, 4 grandkids. Kids live locally. Doesn't see her son much. Dtr-in-law leads them to not visit with them.  lost business as late consequence of bad economy & a "misappropriation of funds" issue &  health issues. Was primary caregiver to mother-in-law (now in [place]). Was primary caregiver to granddaughter who had medical disabilities. "my lowest point".  now working, "see good future ahead".       From PCP note: 3.2.18 - Here for follow up of medical problems. Works long hours. Works & then goes to sleep right after she gets home. RA pain is doing well. Burnside weird so started checking BP. Has been monitoring BPs, range 130-174/ . " "Has no past history of HTN. No med change. Only major change is started working 3 months ago, , & babysits. No exercise. Sleeping ok with trazodone 100mg. Mixed anxiety & depressive disorder- incr wellbutrin to 300, cont  cymbalta 60mg, trazodone 100 hs, try wean ativan to half hs.  Refer for counseling, refer to Psychiatry if not better in 6 weeks.  RTC 6 weeks.    From psychotherapy eval    Chief complaint/reason for encounter: depression, anxiety, sleep and interpersonal     History of present illness:  67 year old  female presented for initial evaluation, with chief complaint of "I've always been kind of anxious & depressed for as long as I can remember, but it just really started getting worse since around 2009..." Pt described a few challenging stressful events starting then, that she feels have accumulate in her life. These include financial strain,her own worsening Rhumatoid Arthritis--diagnosed in 1994, loss of home & the family business in 2013, 's life threatening health scare starting in 2009, legal troubles related to his business, & a feeling of social abandonment from mother, sister, some friends, & daughter-in-law (which means her son & his 2 kids don't see her much anymore.) Pt described in recent months worsening symptoms of sudden uncontrolled tearfulness, increased muscle tension, being distracted, pervasive ruminating worries, loss of senthil, trouble sleeping unless she takes a sleeping aid--currently Trazodone, & tendency to socially isolate. She stated she has never been very sociable. She now feels more rejection & scrutiny from others & finds herself withdrawing. She said much of the negativity from some family & friends relates to 's legal trouble, having been arrested for business related fraud from a period when he was juggling inadequate customer funds & misappropriated a customer's money to cover expenses of someone else's job; all that " while he was battling cancer. Pt said she marvels that her  seems able to stay generally optimistic & pleasant & that he has a lot of friends. Meanwhile, she lacks much social support & stated she isn;t comfortable reaching out to connect with people, so she has a small support network to begin with. She feels devastated by judgment against her  by her own sister, mother, & daughter in law, with the result that she is often unable to spend time with 2 of her grandchildren; she noted those children visit with the daughter-in-law's parents virtually every weekend. Pt described a certain pressure level of social expectation- -material & financial standards, & she & her  have lose their home, as well as the business, & they are living with her parents. 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 She said they have a lot of financial backlog of bills to catch up on. Pt denied any suicidal or homicidal ideation, denied any cognitive deficit other than poor focus at times, denied psychosis, mood swings, rages, or substance abuse. Identified therapeutic goals include reducing anxiety & depression & improving coping skills; possibly finding a way to somewhat reduce social isolation, perhaps by reconnecting with old friends.       Pain: not quantified but described as moderate RA pain     Symptoms:   ? Mood: depressed mood, insomnia, worthlessness/guilt, poor concentration, tearfulness and social isolation  ? Anxiety: excessive anxiety/worry, restlessness/keyed up, muscle tension and social phobia  ? Substance abuse: denied  ? Cognitive functioning: denied  ? Health behaviors: noncontributory     Psychiatric history: none     Medical history: RA--diagnosed in 1994; recent hypertension; Hypothyroidism     Family history of  "psychiatric illness: mother chronically anxious (starting when older); father apparently depressed--very pessimistic & irritable, doesn't acknowledge problem. Suspects paternal grandfather     Social history: Grew up locally, the oldest of 3 sisters, the other two 7 & 8 & 1/2 years younger than she. Raised by both parents; recalled being markedly shy her entire life; not particularly close to her sisters, who were much younger & had each other. Lifelong active Alevism; has a home Scientologist. Not currently involved in any smaller groups within the Scientologist. Graduated high school & attended 2 years of college;  at age 24. Still with , Alpesh, today.  Mother of 2, a son, age 42, & daughter, age 39. Both each have 2 children of their own. Described daughter as a close source of support; also talks with  a lot, processing the struggles they have come through together. 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 Pt described recent loss of a few friend connections she had; her middle sister verbally declared she wouldn't associate with the pt & her , due to his legal "disgrace."  Denied any  experience.  No tobacco, light to moderate wine intake. Minimal caffeine, no recreational drugs.       Substance use:   Alcohol: occasional   Drugs: none   Tobacco: none   Caffeine: a cup of green tea most mornings.     Review Of Systems:     GENERAL:  No weight gain or loss  SKIN:  No rashes or lacerations  HEAD:  No headaches  EYES:  No exophthalmos, jaundice or blindness  EARS:  No dizziness, tinnitus or hearing loss  NOSE:  No changes in smell  MOUTH & THROAT:  No dyskinetic movements or obvious goiter  CHEST:  No shortness of breath, hyperventilation or cough  CARDIOVASCULAR:  No tachycardia or chest pain  ABDOMEN:  No nausea, vomiting, pain, constipation or diarrhea  URINARY:  No frequency, dysuria or sexual dysfunction  ENDOCRINE:  No polydipsia, " polyuria  MUSCULOSKELETAL:  No pain or stiffness of the joints  NEUROLOGIC:  No weakness, sensory changes, seizures, confusion, memory loss, tremor or other abnormal movements    Current Evaluation:     Nutritional Screening: Considering the patient's height and weight, medications, medical history and preferences, should a referral be made to the dietitian? no    Constitutional  Vitals:  Most recent vital signs, dated less than 90 days prior to this appointment, were not reviewed.    Vitals:    07/01/20 1413   BP: 133/78   Pulse: 85   Weight: 66.9 kg (147 lb 7.8 oz)        General:  unremarkable, age appropriate     Musculoskeletal  Muscle Strength/Tone:  no tremor, no tic   Gait & Station:  non-ataxic     Psychiatric  Appearance: casually dressed & groomed;   Behavior: calm,   Cooperation: cooperative with assessment  Speech: normal rate, volume, tone  Thought Process: linear, goal-directed  Thought Content: No suicidal or homicidal ideation; no delusions  Affect: reactive  Mood: euthymic  Perceptions: No auditory or visual hallucinations  Level of Consciousness: alert throughout interview  Insight: fair  Cognition: Oriented to person, place, time, & situation  Memory: no apparent deficits to general clinical interview; not formally assessed  Attention/Concentration: no apparent deficits to general clinical interview; not formally assessed  Fund of Knowledge: average by vocabulary/education    Laboratory Data  Lab Visit on 06/12/2020   Component Date Value Ref Range Status    Sed Rate 06/12/2020 5  0 - 36 mm/Hr Final    CRP 06/12/2020 6.8  0.0 - 8.2 mg/L Final    Sodium 06/12/2020 140  136 - 145 mmol/L Final    Potassium 06/12/2020 3.9  3.5 - 5.1 mmol/L Final    Chloride 06/12/2020 100  95 - 110 mmol/L Final    CO2 06/12/2020 30* 23 - 29 mmol/L Final    Glucose 06/12/2020 106  70 - 110 mg/dL Final    BUN, Bld 06/12/2020 21  8 - 23 mg/dL Final    Creatinine 06/12/2020 0.9  0.5 - 1.4 mg/dL Final    Calcium  06/12/2020 9.2  8.7 - 10.5 mg/dL Final    Total Protein 06/12/2020 7.0  6.0 - 8.4 g/dL Final    Albumin 06/12/2020 3.9  3.5 - 5.2 g/dL Final    Total Bilirubin 06/12/2020 0.3  0.1 - 1.0 mg/dL Final    Alkaline Phosphatase 06/12/2020 65  55 - 135 U/L Final    AST 06/12/2020 23  10 - 40 U/L Final    ALT 06/12/2020 27  10 - 44 U/L Final    Anion Gap 06/12/2020 10  8 - 16 mmol/L Final    eGFR if African American 06/12/2020 >60  >60 mL/min/1.73 m^2 Final    eGFR if non African American 06/12/2020 >60  >60 mL/min/1.73 m^2 Final    WBC 06/12/2020 5.70  3.90 - 12.70 K/uL Final    RBC 06/12/2020 4.00  4.00 - 5.40 M/uL Final    Hemoglobin 06/12/2020 12.2  12.0 - 16.0 g/dL Final    Hematocrit 06/12/2020 36.8* 37.0 - 48.5 % Final    Mean Corpuscular Volume 06/12/2020 92  82 - 98 fL Final    Mean Corpuscular Hemoglobin 06/12/2020 30.5  27.0 - 31.0 pg Final    Mean Corpuscular Hemoglobin Conc 06/12/2020 33.2  32.0 - 36.0 g/dL Final    RDW 06/12/2020 11.4* 11.5 - 14.5 % Final    Platelets 06/12/2020 252  150 - 350 K/uL Final    MPV 06/12/2020 9.3  9.2 - 12.9 fL Final    Immature Granulocytes 06/12/2020 0.2  0.0 - 0.5 % Final    Gran # (ANC) 06/12/2020 3.4  1.8 - 7.7 K/uL Final    Immature Grans (Abs) 06/12/2020 0.01  0.00 - 0.04 K/uL Final    Lymph # 06/12/2020 1.4  1.0 - 4.8 K/uL Final    Mono # 06/12/2020 0.5  0.3 - 1.0 K/uL Final    Eos # 06/12/2020 0.3  0.0 - 0.5 K/uL Final    Baso # 06/12/2020 0.07  0.00 - 0.20 K/uL Final    nRBC 06/12/2020 0  0 /100 WBC Final    Gran% 06/12/2020 59.5  38.0 - 73.0 % Final    Lymph% 06/12/2020 24.9  18.0 - 48.0 % Final    Mono% 06/12/2020 9.1  4.0 - 15.0 % Final    Eosinophil% 06/12/2020 5.3  0.0 - 8.0 % Final    Basophil% 06/12/2020 1.2  0.0 - 1.9 % Final    Differential Method 06/12/2020 Automated   Final    WBC 06/12/2020 5.70  3.90 - 12.70 K/uL Final    RBC 06/12/2020 4.00  4.00 - 5.40 M/uL Final    Hemoglobin 06/12/2020 12.2  12.0 - 16.0 g/dL Final     Hematocrit 06/12/2020 36.8* 37.0 - 48.5 % Final    Mean Corpuscular Volume 06/12/2020 92  82 - 98 fL Final    Mean Corpuscular Hemoglobin 06/12/2020 30.5  27.0 - 31.0 pg Final    Mean Corpuscular Hemoglobin Conc 06/12/2020 33.2  32.0 - 36.0 g/dL Final    RDW 06/12/2020 11.4* 11.5 - 14.5 % Final    Platelets 06/12/2020 252  150 - 350 K/uL Final    MPV 06/12/2020 9.3  9.2 - 12.9 fL Final    Immature Granulocytes 06/12/2020 0.2  0.0 - 0.5 % Final    Gran # (ANC) 06/12/2020 3.4  1.8 - 7.7 K/uL Final    Immature Grans (Abs) 06/12/2020 0.01  0.00 - 0.04 K/uL Final    Lymph # 06/12/2020 1.4  1.0 - 4.8 K/uL Final    Mono # 06/12/2020 0.5  0.3 - 1.0 K/uL Final    Eos # 06/12/2020 0.3  0.0 - 0.5 K/uL Final    Baso # 06/12/2020 0.07  0.00 - 0.20 K/uL Final    nRBC 06/12/2020 0  0 /100 WBC Final    Gran% 06/12/2020 59.5  38.0 - 73.0 % Final    Lymph% 06/12/2020 24.9  18.0 - 48.0 % Final    Mono% 06/12/2020 9.1  4.0 - 15.0 % Final    Eosinophil% 06/12/2020 5.3  0.0 - 8.0 % Final    Basophil% 06/12/2020 1.2  0.0 - 1.9 % Final    Differential Method 06/12/2020 Automated   Final    Ferritin 06/12/2020 243  20.0 - 300.0 ng/mL Final    Iron 06/12/2020 96  30 - 160 ug/dL Final    Transferrin 06/12/2020 277  200 - 375 mg/dL Final    TIBC 06/12/2020 410  250 - 450 ug/dL Final    Saturated Iron 06/12/2020 23  20 - 50 % Final     Medications  Outpatient Encounter Medications as of 7/1/2020   Medication Sig Dispense Refill    azelastine (ASTELIN) 137 mcg (0.1 %) nasal spray 1 spray (137 mcg total) by Nasal route 2 (two) times daily. 30 mL 11    buPROPion (WELLBUTRIN XL) 300 MG 24 hr tablet Take 1 tablet (300 mg total) by mouth once daily. 30 tablet 3    cholecalciferol, vitamin D3, 5,000 unit/mL Drop Take 1 drop by mouth Daily.      cyanocobalamin, vitamin B-12, (VITAMIN B-12) 5,000 mcg Subl Place under the tongue once daily.      dexbrompheniramine maleate (ALA-HIST IR) 2 mg Tab Take 1 tablet by mouth once  daily. 30 tablet 12    diclofenac sodium (VOLTAREN) 1 % Gel Apply 2 g topically 4 (four) times daily. 300 g 3    doxycycline (VIBRAMYCIN) 100 MG Cap Take 1 capsule (100 mg total) by mouth every 12 (twelve) hours as needed. 30 capsule 4    DULoxetine (CYMBALTA) 60 MG capsule TAKE ONE CAPSULE BY MOUTH ONE TIME DAILY 90 capsule 0    ferrous gluconate (FERGON) 324 MG tablet Take 324 mg by mouth daily with breakfast.      fluticasone propionate (FLONASE) 50 mcg/actuation nasal spray       folic acid (FOLVITE) 1 MG tablet TAKE ONE TABLET BY MOUTH ONE TIME DAILY 150 tablet 3    hydroCHLOROthiazide (HYDRODIURIL) 25 MG tablet Take 1 tablet (25 mg total) by mouth once daily. 90 tablet 3    hydroxychloroquine (PLAQUENIL) 200 mg tablet TAKE ONE TABLET BY MOUTH TWICE DAILY 180 tablet 1    levocetirizine (XYZAL) 5 MG tablet Take 1 tablet (5 mg total) by mouth every evening. 90 tablet 3    levothyroxine (SYNTHROID) 50 MCG tablet Take 1 tablet (50 mcg total) by mouth every other day. Alternate with 75mcg tab qod. 15 tablet 11    levothyroxine (SYNTHROID) 75 MCG tablet Take 1 tablet (75 mcg total) by mouth once daily. 90 tablet 3    LORazepam (ATIVAN) 1 MG tablet Take 1 tablet (1 mg total) by mouth 2 (two) times daily as needed. 60 tablet 2    losartan (COZAAR) 100 MG tablet Take 1 tablet (100 mg total) by mouth once daily. 90 tablet 3    losartan-hydrochlorothiazide 100-25 mg (HYZAAR) 100-25 mg per tablet Take 1 tablet by mouth.      meloxicam (MOBIC) 15 MG tablet Take 1 tablet (15 mg total) by mouth once daily. 30 tablet 6    omeprazole (PRILOSEC) 20 MG capsule TAKE ONE CAPSULE BY MOUTH ONE TIME DAILY 90 capsule 2    pilocarpine (SALAGEN) 5 MG Tab Take 1 tablet (5 mg total) by mouth 4 (four) times daily. 360 tablet 3    potassium chloride (K-TAB) 20 mEq Take 1 tablet (20 mEq total) by mouth 2 (two) times daily. 180 tablet 3    potassium chloride SA (K-DUR,KLOR-CON) 20 MEQ tablet Take 1 tablet (20 mEq total) by  mouth once daily. 90 tablet 3    traZODone (DESYREL) 50 MG tablet Take 2-3 tablets (100-150 mg total) by mouth nightly as needed for Insomnia. 270 tablet 3    triamcinolone (NASACORT) 55 mcg nasal inhaler 2 sprays by Nasal route once daily. 10.8 mL 3     Facility-Administered Encounter Medications as of 7/1/2020   Medication Dose Route Frequency Provider Last Rate Last Dose    CIMZIA SyKt 400 mg  400 mg Subcutaneous Once Scooter Alvarez MD         Assessment - Diagnosis - Goals:     Impression: 70 year old F with chronic anxiety and depression, RA and sjogren's. Had exacerbation of illness in spring '18, currently doing somewhat better following medication changes, psychotherapy. Recent stresses in context of father's illness.     Treatment Goals:  Specify outcomes written in observable, behavioral terms:   Minimize recurrences    Treatment Plan/Recommendations:   · Duloxetine, bupropion, lorazepam prn. Takes trazodone prn sleep.  *consider antidepressant adjustment - duloxetine up   · psychoeducation including behavioral activation, psychotherapy, meds.   · Discussed services for father including hospice that may help.    · Discussed risks, benefits, and alternatives to treatment plan documented above with patient. I answered all patient questions related to this plan and patient expressed understanding and agreement.     Return to Clinic: 2 months    HEIDY Long MD  Psychiatry  [facility]  [address] , [place] [address]  [phone]

## 2020-07-08 ENCOUNTER — OFFICE VISIT (OUTPATIENT)
Dept: PSYCHIATRY | Facility: CLINIC | Age: 70
End: 2020-07-08
Payer: MEDICARE

## 2020-07-08 ENCOUNTER — PATIENT MESSAGE (OUTPATIENT)
Dept: OTOLARYNGOLOGY | Facility: CLINIC | Age: 70
End: 2020-07-08

## 2020-07-08 DIAGNOSIS — F41.8 MIXED ANXIETY AND DEPRESSIVE DISORDER: Primary | ICD-10-CM

## 2020-07-08 PROCEDURE — 90834 PSYTX W PT 45 MINUTES: CPT | Mod: 95,,, | Performed by: SOCIAL WORKER

## 2020-07-08 PROCEDURE — 90834 PR PSYCHOTHERAPY W/PATIENT, 45 MIN: ICD-10-PCS | Mod: 95,,, | Performed by: SOCIAL WORKER

## 2020-07-08 RX ORDER — TRAZODONE HYDROCHLORIDE 50 MG/1
100-150 TABLET ORAL NIGHTLY PRN
Qty: 270 TABLET | Refills: 0 | Status: SHIPPED | OUTPATIENT
Start: 2020-07-08 | End: 2020-07-13 | Stop reason: SDUPTHER

## 2020-07-08 NOTE — PROGRESS NOTES
"  Individual Psychotherapy Progress Note (PhD/LCSW)     Outpatient - Insight oriented psychotherapy 45 min - CPT code 17222, Virtual visit with synchronous audio/video; Location of patient: home in [place]    Each patient provided medical services by telemedicine is: (1) informed of the relationship between the provider and patient and the respective role of any other health care provider with respect to management of the patient; and (2) notified that he or she may decline to receive medical services by telemedicine and may withdraw from such care at any time.    2020  MRN: [id number]  Primary Care Provider: Nicole Castellon MD    Leticia Piña is a 70 y.o. female who presents today for follow-up of depression and anxiety. Met with patient.        Subjective:       Patient report: Struggling with missing her grandchildren and the ongoing tension with her daughter-in-law, Susana. She admits that she has lashed out at DIL in the past. Son has been distant. Pt feels awkward when they spend time together and that DIL is very superficial with her. She also states she has to take an Ativan every time she is around her sister, who apparently has pushed her away, won't respond to pt's attempts to repair their relationship. 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 Sister told her she is not interested in having a closer relationship. Pt and hsb are living in their own apartment now and no longer with parents. 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    From previous visit on 2/10/2020: "My mother-in-law just , and my father's in the hospital. It's been kind of rough. I've had to take off work for the past three weeks. I'm just overwhelmed." Experiencing caregiver burnout; takes 86 y/o mother to hospital to stay with 91 y/o father in the hospital. He will transfer to a SNF unit in the next day or so. Worries constantly. " Doesn't have any energy. Sisters and son have helped sit with father; also hired a sitter for overnights. 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       Current symptoms:   · Depression: depressed mood, diminished interest and tearfulness  · Anxiety: excessive anxiety/worry, restlessness/keyed up, irritability and muscle tension  · Substance abuse: denied  · Cognitive functioning: denied  · Brianda: none noted  · Psychosis: none noted    Psychosocial stressors and topics discussed: identifying feelings, symptom recognition/mgmt, ADLs, self care, goals, coping strategies, physical health issues, adjustment problems, grief, managing anxiety/panic/stress and motivation for change      Objective:       Mental Status Evaluation  Appearance: unremarkable, age appropriate  Behavior: cooperative, tearful  Speech: normal tone, normal rate, normal pitch, normal volume  Mood: anxious  Affect: congruent and appropriate, blunted  Thought Process: normal and logical  Thought Content: normal, no suicidality, no homicidality, delusions, or paranoia  Sensorium: grossly intact  Cognition: grossly intact  Insight: fair  Judgment: adequate to circumstances    Risk parameters:  Patient reports no suicidal ideation  Patient reports no homicidal ideation  Patient reports no self-injurious behavior  Patient reports no violent behavior        Assessment & Plan:       Therapeutic interventions used: Educated pt re: mindfulness strategies to manage anxious thoughts and feelings  Pt was taught how to apply relaxation skills to specific situations  Helped pt to identify distorted schemas and related automatic thoughts that mediate anxiety responses  Pt was assigned to engage in behavioral activation by scheduling activities that have a high likelihood for pleasure and mastery  Utilized acceptance and commitment therapy (ACT) to help pt accept uncomfortable realities  Monitored the effectiveness and side effects of antidepressant  medication prescribed by physician/NP/MP  Taught pt conflict resolution skills such as practicing empathy, active listening, respectful communication, assertiveness and compromise  Assigned pt to keep a daily gratitude journal      The patient's response to the interventions is accepting    The patient's progress toward treatment goals is fair     Homework assigned: practice relaxation skills daily, journaling, make a list of things she can control vs things she cannot control     Treatment plan:   A. Target symptoms: Depression, Anxiety and Poor Coping Skills   B. Therapeutic modalities: insight oriented psychotherapy, behavior modifying psychotherapy, supportive psychotherapy  C. Why chosen therapy is appropriate versus another modality: relevant to diagnosis, evidence based practice   D. Outcome monitoring methods: self-report, observation, feedback from clinical staff     Visit Diagnosis:   1. Mixed anxiety and depressive disorder        Follow-up: individual psychotherapy and medication management by physician    Return to Clinic: 2 weeks    Length of Service (minutes): 45    LOGAN Smallwood, [facility]  Outpatient Psychiatry  [facility] - [address] [address]  [phone]

## 2020-07-13 RX ORDER — TRAZODONE HYDROCHLORIDE 50 MG/1
100-150 TABLET ORAL NIGHTLY PRN
Qty: 270 TABLET | Refills: 0 | Status: SHIPPED | OUTPATIENT
Start: 2020-07-13 | End: 2020-07-14 | Stop reason: SDUPTHER

## 2020-07-14 RX ORDER — TRAZODONE HYDROCHLORIDE 50 MG/1
100-150 TABLET ORAL NIGHTLY PRN
Qty: 270 TABLET | Refills: 0 | Status: SHIPPED | OUTPATIENT
Start: 2020-07-14 | End: 2020-09-29 | Stop reason: SDUPTHER

## 2020-07-15 DIAGNOSIS — Z71.89 COMPLEX CARE COORDINATION: ICD-10-CM

## 2020-07-22 ENCOUNTER — OFFICE VISIT (OUTPATIENT)
Dept: PSYCHIATRY | Facility: CLINIC | Age: 70
End: 2020-07-22
Payer: MEDICARE

## 2020-07-22 DIAGNOSIS — F41.8 MIXED ANXIETY AND DEPRESSIVE DISORDER: Primary | ICD-10-CM

## 2020-07-22 PROCEDURE — 90834 PSYTX W PT 45 MINUTES: CPT | Mod: 95,,, | Performed by: SOCIAL WORKER

## 2020-07-22 PROCEDURE — 90834 PR PSYCHOTHERAPY W/PATIENT, 45 MIN: ICD-10-PCS | Mod: 95,,, | Performed by: SOCIAL WORKER

## 2020-07-22 NOTE — PROGRESS NOTES
"  Individual Psychotherapy Progress Note (PhD/LCSW)     Outpatient - Insight oriented psychotherapy 45 min - CPT code 67883, Virtual visit with synchronous audio/video; Location of patient: home in [place]    Each patient provided medical services by telemedicine is: (1) informed of the relationship between the provider and patient and the respective role of any other health care provider with respect to management of the patient; and (2) notified that he or she may decline to receive medical services by telemedicine and may withdraw from such care at any time.    7/22/2020  MRN: [id number]  Primary Care Provider: Nicole Castellon MD    Leticia Piña is a 70 y.o. female who presents today for follow-up of depression and anxiety. Met with patient.        Subjective:       Patient report: Feeling lonely, unmotivated and "empty." She has been isolating herself, pulling away from her friends over the past few years since silvia lost his business. She feels she and silvia couldn't "go and do like we used to." She feels that people don't know her that well and that she doesn't open herself up to others. She discussed a lack of self confidence and recalls never feeling good enough. 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 She was initially reluctant to go into detail but eventually stated that [facility] had acquired a lot of debt and was actually sued by a client for "misappropriation of funds", which was settled. [facility] also purchased a truck without pt's consent, forging her signature on the loan. She is tearful throughout this session.    From previous visit on 7/8/2020: Struggling with missing her grandchildren and the ongoing tension with her daughter-in-law, Susana. She admits that she has lashed out at DIL in the past. Son has been distant. Pt feels awkward when they spend time together and that DIL is very superficial with her. She also states she has to take an " "Ativan every time she is around her sister, who apparently has pushed her away, won't respond to pt's attempts to repair their relationship. 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 Sister told her she is not interested in having a closer relationship. Pt and hsb are living in their own apartment now and no longer with parents. 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    From previous visit on 2/10/2020: "My mother-in-law just , and my father's in the hospital. It's been kind of rough. I've had to take off work for the past three weeks. I'm just overwhelmed." Experiencing caregiver burnout; takes 88 y/o mother to hospital to stay with 91 y/o father in the hospital. He will transfer to a SNF unit in the next day or so. Worries constantly. Doesn't have any energy. Sisters and son have helped sit with father; also hired a sitter for overnights. 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       Current symptoms:   · Depression: depressed mood, diminished interest and tearfulness  · Anxiety: excessive anxiety/worry, restlessness/keyed up, irritability and muscle tension  · Substance abuse: denied  · Cognitive functioning: denied  · Brianda: none noted  · Psychosis: none noted    Psychosocial stressors and topics discussed: identifying feelings, symptom recognition/mgmt, ADLs, self care, treatment progress, goals, coping strategies, conflict with partner, relationship loss, social isolation, adjustment problems, relationship patterns, managing anxiety/panic/stress, identifying barriers to progress, motivation for change and challenging thought distortions      Objective:       Mental Status Evaluation  Appearance: unremarkable, age appropriate  Behavior: cooperative, tearful  Speech: normal tone, normal rate, normal pitch, normal volume  Mood: anxious  Affect: congruent and appropriate, sad, anxious  Thought Process: normal " and logical  Thought Content: normal, no suicidality, no homicidality, delusions, or paranoia  Sensorium: grossly intact  Cognition: grossly intact  Insight: fair  Judgment: adequate to circumstances    Risk parameters:  Patient reports no suicidal ideation  Patient reports no homicidal ideation  Patient reports no self-injurious behavior  Patient reports no violent behavior        Assessment & Plan:       Therapeutic interventions used: Pt was taught how to apply relaxation skills to specific situations  Assigned pt to practice relaxation on a daily basis  Helped pt to identify distorted schemas and related automatic thoughts that mediate anxiety responses  Assisted pt in replacing distorted messages with positive, realistic cognitions  Pt was assisted in gaining insight into how worry is a form of avoidance of a feared problem and how it creates chronic tension  Helped pt to identify and accept situations in which she does not have complete control, has imperfection or needs to tolerate uncertainty and unpleasant emotions  Assessed pt's level of insight toward the presenting problems  Monitored the effectiveness and side effects of antidepressant medication prescribed by physician/NP/MP  Encouraged pt to share feelings of anger re: painful childhood experiences that contributed to the current depressed state  Taught pt the possible connection between unexpressed feelings of anger and helplessness and the current depressed state      The patient's response to the interventions is accepting    The patient's progress toward treatment goals is fair     Homework assigned: practice relaxation skills daily, journaling, increase physical activity, self affirmations    Treatment plan:   A. Target symptoms: Depression, Anxiety and Poor Coping Skills   B. Therapeutic modalities: insight oriented psychotherapy, behavior modifying psychotherapy, supportive psychotherapy  C. Why chosen therapy is appropriate versus another  modality: relevant to diagnosis, evidence based practice   D. Outcome monitoring methods: self-report, observation, feedback from clinical staff     Visit Diagnosis:   1. Mixed anxiety and depressive disorder        Follow-up: individual psychotherapy and medication management by physician    Return to Clinic: 2 weeks    Length of Service (minutes): 45    Chloe Owens MSW, hospitalsW  Outpatient Psychiatry  [facility] - [address] [address]  [phone]

## 2020-07-27 ENCOUNTER — OFFICE VISIT (OUTPATIENT)
Dept: HEMATOLOGY/ONCOLOGY | Facility: CLINIC | Age: 70
End: 2020-07-27
Payer: MEDICARE

## 2020-07-27 VITALS
HEIGHT: 65 IN | OXYGEN SATURATION: 96 % | DIASTOLIC BLOOD PRESSURE: 77 MMHG | HEART RATE: 78 BPM | WEIGHT: 150.81 LBS | TEMPERATURE: 99 F | SYSTOLIC BLOOD PRESSURE: 149 MMHG | BODY MASS INDEX: 25.13 KG/M2

## 2020-07-27 DIAGNOSIS — D50.8 OTHER IRON DEFICIENCY ANEMIA: Primary | ICD-10-CM

## 2020-07-27 DIAGNOSIS — M35.00 SJOGREN'S SYNDROME, WITH UNSPECIFIED ORGAN INVOLVEMENT: ICD-10-CM

## 2020-07-27 PROCEDURE — 99213 PR OFFICE/OUTPT VISIT, EST, LEVL III, 20-29 MIN: ICD-10-PCS | Mod: S$PBB,,, | Performed by: INTERNAL MEDICINE

## 2020-07-27 PROCEDURE — 99213 OFFICE O/P EST LOW 20 MIN: CPT | Mod: S$PBB,,, | Performed by: INTERNAL MEDICINE

## 2020-07-27 PROCEDURE — 99999 PR PBB SHADOW E&M-EST. PATIENT-LVL V: ICD-10-PCS | Mod: PBBFAC,,, | Performed by: INTERNAL MEDICINE

## 2020-07-27 PROCEDURE — 99215 OFFICE O/P EST HI 40 MIN: CPT | Mod: PBBFAC | Performed by: INTERNAL MEDICINE

## 2020-07-27 PROCEDURE — 99999 PR PBB SHADOW E&M-EST. PATIENT-LVL V: CPT | Mod: PBBFAC,,, | Performed by: INTERNAL MEDICINE

## 2020-07-27 NOTE — PROGRESS NOTES
Subjective:      DATE OF VISIT: 7/27/20     ?  Patient ID:?Leticia Piña is a 70 y.o. female.?? MR#: [id number]   ?   PRIMARY HEMATOLOGIST: Dr. Grace    ? Primary Care Providers:  Nicole Jasmine MD, MD (General)     CHIEF COMPLAINT: ?Anemia??   ?   Anemia        I had the pleasure seeing again in follow-up Ms. Piña.  She is in stable medical condition with dry eyes associated her Sjogren's disease.  She denies any evidence of melena, hematochezia, postmenopausal vaginal bleeding.  She is up-to-date on colonoscopy and Pap smears as well as mammography.  She had colonoscopy last in 2011 but has not had EGD.  She does note history of reflux and is taking PPI.      She last received IV iron therapy on 03/10/2020.  She has not been continuing oral iron therapy.  Her Sjogren's has been well controlled since our last visit and has not required compounding medication for ice.  She does drink plenty of fluids to assist in dry eyes.    Review of Systems    ?   A comprehensive 14-point review of systems was reviewed with patient and was negative other than as specified above.   ?     Objective:      Physical Exam      ?   Vitals:    07/27/20 1104   BP: (!) 149/77   Pulse: 78   Temp: 98.7 °F (37.1 °C)      ?   ECOG:?0   General appearance: Generally well appearing, in no acute distress.   Head, eyes, ears, nose, and throat: moist mucous membranes.   Respiratory:  Normal work of breathing  Abdomen:soft, nontender, nondistended.  Extremities: without edema.   Neurologic: Alert and oriented. Grossly normal strength, coordination, and gait.   Skin: No rashes, ecchymoses or petechial lesion.   Psychiatric:  Normal mood and affect      ?   Laboratory:  Lab Results   Component Value Date    WBC 5.70 06/12/2020    WBC 5.70 06/12/2020    HGB 12.2 06/12/2020    HGB 12.2 06/12/2020    HCT 36.8 (L) 06/12/2020    HCT 36.8 (L) 06/12/2020    MCV 92 06/12/2020    MCV 92 06/12/2020     06/12/2020     06/12/2020        Hemoglobin   Date Value Ref Range Status   06/12/2020 12.2 12.0 - 16.0 g/dL Final   06/12/2020 12.2 12.0 - 16.0 g/dL Final   01/03/2020 11.4 (L) 12.0 - 16.0 g/dL Final   11/06/2019 10.8 (L) 12.0 - 16.0 g/dL Final   11/04/2019 11.3 (L) 12.0 - 16.0 g/dL Final   09/19/2019 11.0 (L) 12.0 - 16.0 g/dL Final   05/28/2019 11.2 (L) 12.0 - 16.0 g/dL Final   05/28/2019 11.2 (L) 12.0 - 16.0 g/dL Final   03/05/2019 10.6 (L) 12.0 - 16.0 g/dL Final   11/27/2018 10.6 (L) 12.0 - 16.0 g/dL Final         Lab Results   Component Value Date    IRON 96 06/12/2020    TIBC 410 06/12/2020    FERRITIN 243 06/12/2020         ?   Assessment/Plan:       1. Other iron deficiency anemia    2. Sjogren's syndrome, with unspecified organ involvement          Plan:     # iron deficiency anemia:  She has mild stable anemia over the last several years with hemoglobin ranging 10-11.5.  Iron deficiency anemia not improved with oral iron therapy and pursued IV iron therapy completed 03/10/2020.  I reviewed her repeat labs in June 2020 showing excellent response with ferritin 243 and resolution of anemia with hemoglobin 12.2.  She continues on vitamin B12 and folate supplements.  I had encouraged her to pursue GI workup and she notes this is pending/planned.  No clinical evidence of bleeding.    # Sjogren syndrome: In the past has looked into compounded medication for dry eyes requires hemoglobin 12.4 per patient report.  She notes Sjogren's well-controlled does not require at this point, hemoglobin 12.2.  Continue follow-up with rheumatology p.r.n..      Follow-Up:   - RV in 6 months with labs 1 week prior  - GI referral for EGD/colonoscopy previously made, pending

## 2020-08-03 ENCOUNTER — TELEPHONE (OUTPATIENT)
Dept: ENDOSCOPY | Facility: HOSPITAL | Age: 70
End: 2020-08-03

## 2020-08-03 ENCOUNTER — TELEPHONE (OUTPATIENT)
Dept: FAMILY MEDICINE | Facility: CLINIC | Age: 70
End: 2020-08-03

## 2020-08-03 DIAGNOSIS — D50.8 OTHER IRON DEFICIENCY ANEMIA: ICD-10-CM

## 2020-08-03 RX ORDER — POLYETHYLENE GLYCOL 3350, SODIUM SULFATE ANHYDROUS, SODIUM BICARBONATE, SODIUM CHLORIDE, POTASSIUM CHLORIDE 236; 22.74; 6.74; 5.86; 2.97 G/4L; G/4L; G/4L; G/4L; G/4L
4 POWDER, FOR SOLUTION ORAL ONCE
Qty: 4000 ML | Refills: 0 | Status: SHIPPED | OUTPATIENT
Start: 2020-08-03 | End: 2020-08-03

## 2020-08-03 RX ORDER — SODIUM, POTASSIUM,MAG SULFATES 17.5-3.13G
1 SOLUTION, RECONSTITUTED, ORAL ORAL DAILY
Qty: 1 KIT | Refills: 0 | Status: SHIPPED | OUTPATIENT
Start: 2020-08-03 | End: 2020-08-05

## 2020-08-03 NOTE — TELEPHONE ENCOUNTER
----- Message from Audrey Nichole sent at 8/3/2020 10:12 AM CDT -----  Pt called to get her mammo rescheduled to 2020 they will  on 10/20/2020 please reissue the order and schedule for 2020 pt can be reached at [phone]

## 2020-08-03 NOTE — TELEPHONE ENCOUNTER
----- Message from Asiya Castillo sent at 8/3/2020 10:05 AM CDT -----  Location Screening:    If answers yes to any of the following, schedule at [facility] ONLY. If No, OK for either location.    1. Is there a diagnosis of heart failure, severe heart valve disease (aortic stenosis) or mechanical valve? no  a. Is the Left Ventricle Ejection Fraction <30% ? not applicable    2. Does the pt have pulmonary hypertension? no   a. Is pulmonary arterial pressure gradient >50mmHg? not applicable   b. Is there evidence of right ventricular dysfunction? not applicable    3. Does the pt have achalasia? no    4. Any history of negative reaction to anesthesia? no   a. Myasthenia gravis? not applicable   b. Malignant hyperthermia? n/a   c. Other? not applicable    5. Is procedure for esophageal banding? no      COVID Screening    1. Have you had a fever in the last 7 days or have you used fever reducing medicines for a fever in the last 7 days?  no    2. Are you experiencing shortness of breath, cough, muscle aches, loss of taste or loss of smell?  no    If answered yes to questions 1 and 2, the patient must seek medical attention with their PCP.  Do not schedule their procedure.     3. Are you residing with anyone who has tested positive for Covid?  no    If answered yes to question 3, recommend 14 day self-quarantine from the date of relative's positive test and place special needs note in the depot.  Wait to schedule.     ENDO screening    Have you been admitted for cardiac, kidney or pulmonary causes to the hospital in the past 3 months? no   If yes, schedule an appointment with PCP before scheduling endoscopic procedure.     2. Have you had a stent placed in the last 12 months? no   If yes, for a screening visit, cancel and message the ordering provider.  The patient will need a new order when the time is appropriate.     3. Have you had a stroke or heart attack in the past 6 months? no   If yes, cancel and refer patient to  "ordering provider for clearance, also message ordering provider to inform.     4. Have you had any chest pain in the past 3 months? no   If yes, Have you been evaluated by your PCP and/or cardiologist and it was determined to not be heart related? not applicable   If No, Pt needs to be seen by PCP or Cardiologist .  Pt can be scheduled once clearance obtained by either of those providers.     5. Do you take prescription weight loss medications?  no   If yes, must stop for 2 weeks prior to procedure.     6. Have you been diagnosed with diverticulitis within the past 3 months? no   If yes, must have been seen by GI within the last 3 months, if not schedule with GI RONNY.    If pt has been seen by GI, schedule procedure 8-12 weeks post antibiotic treatment.     7. Are you on Dialysis? no  If yes, schedule procedure for the day AFTER dialysis.  Appt time should be 9am or later, patient arrival time is 2 hours prior.  Nulytely or miralax prep for all patients with kidney disease.     8. Are you diabetic?  no   If yes, schedule morning appt. Advise pt to hold all diabetic meds day of procedure.     9. If pt is older than 80 years of age and HAS NOT been seen by GI or PCP within the last 6 months, needs appt with GI RONNY.   If pt has been seen by the GI provider or PCP within the past 6  months AND meets criteria, schedule procedure AND send message to the endoscopist.     10. Is patient on a "high risk" medication (blood thinner/antiplatelet agent)?  no   If yes, has cardiac clearance been obtained within the last 60 days? N/A   If no, a new clearance needs to be obtained.     Final Questions:    1.I have reviewed the last colonoscopy for recommendations regarding next procedure bowel prep.  yes  2. I have reviewed medications and allergies.  yes  3. I have verified the pharmacy information and appropriate prep sent if needed. yes  4. Prep instructions have been mailed or sent to portal per patient request. yes        All " schedulers will have ability to reach out to the ordering GI provider to clarify any issues.

## 2020-08-05 ENCOUNTER — TELEPHONE (OUTPATIENT)
Dept: FAMILY MEDICINE | Facility: CLINIC | Age: 70
End: 2020-08-05

## 2020-08-05 DIAGNOSIS — R10.10 UPPER ABDOMINAL PAIN: Primary | ICD-10-CM

## 2020-08-05 NOTE — TELEPHONE ENCOUNTER
----- Message from Agatha Haro sent at 8/5/2020  1:53 PM CDT -----  Contact: pt  Type:  Patient Requesting Referral    Who Called: Yadira  Does the patient already have the specialty appointment scheduled?:no  If yes, what is the date of that appointment?:  Referral to What Specialty:Gastro  Reason for Referral:poss gallbladder   Does the patient want the referral with a specific physician?:yes, Dr. Chakraborty, [place]  Is the specialist an [facility]?:non  Patient Requesting a Response?:yes  Would the patient rather a call back or a response via [facility]? Call  Best Call Back Number:[phone] or Yadira @ [phone]  Additional Information: emt and nurse are there at house..requesting a emergent referral

## 2020-08-05 NOTE — TELEPHONE ENCOUNTER
Pt's daughter is requesting urgent referral to Dr. Diggs for recurring upper gastric pain.  EMS is currently with pt d/t severe pain after bending over.  States cardiac eval was wnl.      Pt has a friend that is an RN that told her it may be her gallbladder and should see GI ASAP.  Please advise./rpr

## 2020-08-06 ENCOUNTER — TELEPHONE (OUTPATIENT)
Dept: RADIOLOGY | Facility: HOSPITAL | Age: 70
End: 2020-08-06

## 2020-08-18 ENCOUNTER — PATIENT MESSAGE (OUTPATIENT)
Dept: PSYCHIATRY | Facility: CLINIC | Age: 70
End: 2020-08-18

## 2020-08-18 ENCOUNTER — OFFICE VISIT (OUTPATIENT)
Dept: PSYCHIATRY | Facility: CLINIC | Age: 70
End: 2020-08-18
Payer: MEDICARE

## 2020-08-18 DIAGNOSIS — F41.8 MIXED ANXIETY AND DEPRESSIVE DISORDER: Primary | ICD-10-CM

## 2020-08-18 PROCEDURE — 90832 PSYTX W PT 30 MINUTES: CPT | Mod: 95,,, | Performed by: SOCIAL WORKER

## 2020-08-18 PROCEDURE — 90832 PR PSYCHOTHERAPY W/PATIENT, 30 MIN: ICD-10-PCS | Mod: 95,,, | Performed by: SOCIAL WORKER

## 2020-08-18 RX ORDER — BUPROPION HYDROCHLORIDE 300 MG/1
300 TABLET ORAL DAILY
Qty: 90 TABLET | Refills: 0 | Status: SHIPPED | OUTPATIENT
Start: 2020-08-18 | End: 2020-09-14

## 2020-08-18 NOTE — PROGRESS NOTES
"  Individual Psychotherapy Progress Note (PhD/LCSW)     Outpatient - Insight oriented psychotherapy 30 min - CPT code 93875, Virtual visit with synchronous audio/video; Location of patient: home in [place]    Each patient provided medical services by telemedicine is: (1) informed of the relationship between the provider and patient and the respective role of any other health care provider with respect to management of the patient; and (2) notified that he or she may decline to receive medical services by telemedicine and may withdraw from such care at any time.    8/18/2020  MRN: [id number]  Primary Care Provider: Nicole Castellon MD    Leticia Piña is a 70 y.o. female who presents today for follow-up of depression and anxiety. Met with patient.        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 States she has a good attitude and feels hopeful. 46th wedding anniversary was yesterday. States she does not have anything to talk about but kept her appointment today because she missed the last one. States she is "content" and trying to keep busy; doesn't want to talk about anything that upsets her.     From previous visit on 7/22/2020: Feeling lonely, unmotivated and "empty." She has been isolating herself, pulling away from her friends over the past few years since \[facility]\"" lost his business. She feels she and b couldn't "go and do like we used to." She feels that people don't know her that well and that she doesn't open herself up to others. She discussed a lack of self confidence and recalls never feeling good enough. 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 She was initially reluctant to go into detail but eventually stated that \[facility]\"" had acquired a lot of debt and was actually sued by a client for "misappropriation of funds", which was settled. [facility] also purchased a " "truck without pt's consent, forging her signature on the loan. She is tearful throughout this session.    From previous visit on 2020: Struggling with missing her grandchildren and the ongoing tension with her daughter-in-law, Susana. She admits that she has lashed out at DIL in the past. Son has been distant. Pt feels awkward when they spend time together and that DIL is very superficial with her. She also states she has to take an Ativan every time she is around her sister, who apparently has pushed her away, won't respond to pt's attempts to repair their relationship. 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 Sister told her she is not interested in having a closer relationship. Pt and hsb are living in their own apartment now and no longer with parents. 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    From previous visit on 2/10/2020: "My mother-in-law just , and my father's in the hospital. It's been kind of rough. I've had to take off work for the past three weeks. I'm just overwhelmed." Experiencing caregiver burnout; takes 88 y/o mother to hospital to stay with 89 y/o father in the hospital. He will transfer to a SNF unit in the next day or so. Worries constantly. Doesn't have any energy. Sisters and son have helped sit with father; also hired a sitter for overnights. 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       Current symptoms:   · Depression: depressed mood, diminished interest and tearfulness  · Anxiety: excessive anxiety/worry, restlessness/keyed up, irritability and muscle tension  · Substance abuse: denied  · Cognitive functioning: denied  · Brianda: none noted  · Psychosis: none noted    Psychosocial stressors and topics discussed: identifying feelings, symptom recognition/mgmt, treatment progress and motivation for change      Objective:       Mental Status Evaluation  Appearance: unremarkable, age " appropriate  Behavior: cooperative, tearful  Speech: normal tone, normal rate, normal pitch, normal volume  Mood: anxious  Affect: congruent and appropriate, sad, anxious  Thought Process: normal and logical  Thought Content: normal, no suicidality, no homicidality, delusions, or paranoia  Sensorium: grossly intact  Cognition: grossly intact  Insight: poor  Judgment: adequate to circumstances    Risk parameters:  Patient reports no suicidal ideation  Patient reports no homicidal ideation  Patient reports no self-injurious behavior  Patient reports no violent behavior        Assessment & Plan:       Therapeutic interventions used:   Assessed pt's level of insight toward the presenting problems  Monitored the effectiveness and side effects of antidepressant medication prescribed by physician/NP/MP   Attempted to engage pt and establish rapport  Discussed motivation for continued therapy      The patient's response to the interventions is reluctant    The patient's progress toward treatment goals is limited    Homework assigned: practice relaxation skills daily, journaling, increase physical activity, self affirmations    Treatment plan:   A. Target symptoms: Depression, Anxiety and Poor Coping Skills   B. Therapeutic modalities: insight oriented psychotherapy, behavior modifying psychotherapy  C. Why chosen therapy is appropriate versus another modality: relevant to diagnosis, evidence based practice   D. Outcome monitoring methods: self-report, observation, feedback from clinical staff     Visit Diagnosis:   1. Mixed anxiety and depressive disorder        Follow-up: individual psychotherapy and medication management by physician    Return to Clinic: 1 month    Length of Service (minutes): 30    LOGAN Smallwood, [facility]  Outpatient Psychiatry  [facility] - [address] [address]  [phone]

## 2020-08-23 ENCOUNTER — NURSE TRIAGE (OUTPATIENT)
Dept: ADMINISTRATIVE | Facility: CLINIC | Age: 70
End: 2020-08-23

## 2020-08-23 NOTE — TELEPHONE ENCOUNTER
Patient reports fatigue with chills. Patient denies fever. Patient wants to do COVID-19 testing. The patient was advised per protocol and was advised to call back with questions, concerns or symptoms. The patient verbalized understanding.     Reason for Disposition   COVID-19 Testing, questions about    Additional Information   Negative: SEVERE difficulty breathing (e.g., struggling for each breath, speaks in single words)   Negative: Difficult to awaken or acting confused (e.g., disoriented, slurred speech)   Negative: Bluish (or gray) lips or face now   Negative: Shock suspected (e.g., cold/pale/clammy skin, too weak to stand, low BP, rapid pulse)   Negative: Sounds like a life-threatening emergency to the triager   Negative: [1] COVID-19 exposure AND [2] NO symptoms   Negative: COVID-19 and Breastfeeding, questions about   Negative: [1] Adult with possible COVID-19 symptoms AND [2] triager concerned about severity of symptoms or other causes   Negative: SEVERE or constant chest pain or pressure (Exception: mild central chest pain, present only when coughing)   Negative: MODERATE difficulty breathing (e.g., speaks in phrases, SOB even at rest, pulse 100-120)   Negative: Patient sounds very sick or weak to the triager   Negative: MILD difficulty breathing (e.g., minimal/no SOB at rest, SOB with walking, pulse <100)   Negative: Chest pain or pressure   Negative: Fever > 103 F (39.4 C)   Negative: [1] Fever > 101 F (38.3 C) AND [2] age > 60   Negative: [1] Fever > 100.0 F (37.8 C) AND [2] bedridden (e.g., nursing home patient, CVA, chronic illness, recovering from surgery)   Negative: HIGH RISK patient (e.g., age > 64 years, diabetes, heart or lung disease, weak immune system)   Negative: Fever present > 3 days (72 hours)   Negative: [1] Fever returns after gone for over 24 hours AND [2] symptoms worse or not improved   Negative: [1] Continuous (nonstop) coughing interferes with work or school  AND [2] no improvement using cough treatment per protocol   Negative: [1] COVID-19 infection suspected by caller or triager AND [2] mild symptoms (cough, fever, or others) AND [3] no complications or SOB   Negative: Cough present > 3 weeks   Negative: [1] COVID-19 diagnosed by positive lab test AND [2] mild symptoms (e.g., cough, fever, others) AND [3] no complications or SOB   Negative: [1] COVID-19 diagnosed by HCP (doctor, NP or PA) AND [2] mild symptoms (e.g., cough, fever, others) AND [3] no complications or SOB   Negative: COVID-19 Home Isolation, questions about    Protocols used: CORONAVIRUS (COVID-19) DIAGNOSED OR [id number]

## 2020-08-24 ENCOUNTER — LAB VISIT (OUTPATIENT)
Dept: INTERNAL MEDICINE | Facility: CLINIC | Age: 70
End: 2020-08-24
Payer: MEDICARE

## 2020-08-24 ENCOUNTER — PATIENT MESSAGE (OUTPATIENT)
Dept: FAMILY MEDICINE | Facility: CLINIC | Age: 70
End: 2020-08-24

## 2020-08-24 DIAGNOSIS — M79.10 MUSCLE PAIN: ICD-10-CM

## 2020-08-24 PROCEDURE — U0003 INFECTIOUS AGENT DETECTION BY NUCLEIC ACID (DNA OR RNA); SEVERE ACUTE RESPIRATORY SYNDROME CORONAVIRUS 2 (SARS-COV-2) (CORONAVIRUS DISEASE [COVID-19]), AMPLIFIED PROBE TECHNIQUE, MAKING USE OF HIGH THROUGHPUT TECHNOLOGIES AS DESCRIBED BY CMS-2020-01-R: HCPCS

## 2020-08-25 LAB — SARS-COV-2 RNA RESP QL NAA+PROBE: NOT DETECTED

## 2020-08-26 ENCOUNTER — OFFICE VISIT (OUTPATIENT)
Dept: PODIATRY | Facility: CLINIC | Age: 70
End: 2020-08-26
Payer: MEDICARE

## 2020-08-26 ENCOUNTER — HOSPITAL ENCOUNTER (OUTPATIENT)
Dept: RADIOLOGY | Facility: HOSPITAL | Age: 70
Discharge: HOME | End: 2020-08-26
Attending: PODIATRIST
Payer: MEDICARE

## 2020-08-26 VITALS
WEIGHT: 146.38 LBS | SYSTOLIC BLOOD PRESSURE: 137 MMHG | DIASTOLIC BLOOD PRESSURE: 70 MMHG | HEART RATE: 75 BPM | BODY MASS INDEX: 24.39 KG/M2 | HEIGHT: 65 IN

## 2020-08-26 DIAGNOSIS — M72.2 PLANTAR FASCIITIS: ICD-10-CM

## 2020-08-26 DIAGNOSIS — M79.671 RIGHT FOOT PAIN: Primary | ICD-10-CM

## 2020-08-26 DIAGNOSIS — M79.671 RIGHT FOOT PAIN: ICD-10-CM

## 2020-08-26 DIAGNOSIS — G57.91 NEURITIS OF ANKLE, RIGHT: ICD-10-CM

## 2020-08-26 PROCEDURE — 99203 PR OFFICE/OUTPT VISIT, NEW, LEVL III, 30-44 MIN: ICD-10-PCS | Mod: S$PBB,,, | Performed by: PODIATRIST

## 2020-08-26 PROCEDURE — 99999 PR PBB SHADOW E&M-EST. PATIENT-LVL III: ICD-10-PCS | Mod: PBBFAC,,, | Performed by: PODIATRIST

## 2020-08-26 PROCEDURE — 99203 OFFICE O/P NEW LOW 30 MIN: CPT | Mod: S$PBB,,, | Performed by: PODIATRIST

## 2020-08-26 PROCEDURE — 99213 OFFICE O/P EST LOW 20 MIN: CPT | Mod: PBBFAC,25 | Performed by: PODIATRIST

## 2020-08-26 PROCEDURE — 73630 X-RAY EXAM OF FOOT: CPT | Mod: 26,RT,, | Performed by: RADIOLOGY

## 2020-08-26 PROCEDURE — 73630 XR FOOT COMPLETE 3 VIEW RIGHT: ICD-10-PCS | Mod: 26,RT,, | Performed by: RADIOLOGY

## 2020-08-26 PROCEDURE — 73630 X-RAY EXAM OF FOOT: CPT | Mod: TC,RT

## 2020-08-26 PROCEDURE — 99999 PR PBB SHADOW E&M-EST. PATIENT-LVL III: CPT | Mod: PBBFAC,,, | Performed by: PODIATRIST

## 2020-08-26 NOTE — PROGRESS NOTES
Subjective:       Patient ID: Leticia Piña is a 70 y.o. female.    Chief Complaint: Ankle Pain (Pt c/o right ankle burning and tingling sensation upon ambulation. Non diabetic pt. Wears sandals w/o socks. PCP Dr Castellon.)      HPI: Leticia Piña complains of burning and tingling sensations to lateral aspect the right ankle.  She states this is been ongoing for some time.  She denies any recent injury or trauma.  She relates symptoms have been on going.  States that she works as a  for a  class.  Reports that she is up and down most of the day.  Relates her pain currently as a 0/10 but does increase with ambulation.  Also complains of pain to the plantar aspect the right foot.  Reports that this does increase in the morning and states constant.  Relates any recent injury.  Patient does not have a history of plantar fasciitis. States walking and standing causes and/or exacerbates the symptoms.  Pain is worsened with Patient's Primary Care Provider is Nicole Castellon MD.     Review of patient's allergies indicates:   Allergen Reactions    Humira  [adalimumab]      Other reaction(s): drug-induced lupus  Other reaction(s): drug-induced lupus    Sulfa (sulfonamide antibiotics)      Other reaction(s): Hives  Other reaction(s): Rash  Other reaction(s): Hives       Past Medical History:   Diagnosis Date    Allergic rhinitis     Cutaneous lupus erythematosus     drug induced.    Essential hypertension 2/5/2019    GERD (gastroesophageal reflux disease)     Hypothyroid     Insomnia     Mixed anxiety and depressive disorder     Normal cardiac stress test     11/07    Rheumatoid arthritis(714.0)     Sjogren's syndrome        History reviewed. No pertinent family history.    Social History     Socioeconomic History    Marital status:      Spouse name: Not on file    Number of children: Not on file    Years of education: Not on file    Highest education level:  Not on file   Occupational History    Not on file   Social Needs    Financial resource strain: Hard    Food insecurity     Worry: Sometimes true     Inability: Never true    Transportation needs     Medical: No     Non-medical: No   Tobacco Use    Smoking status: Never Smoker    Smokeless tobacco: Never Used   Substance and Sexual Activity    Alcohol use: Yes     Frequency: 2-3 times a week     Drinks per session: 1 or 2     Binge frequency: Never     Comment: socially     Drug use: No    Sexual activity: Not on file   Lifestyle    Physical activity     Days per week: 0 days     Minutes per session: 0 min    Stress: Only a little   Relationships    Social connections     Talks on phone: More than three times a week     Gets together: Patient refused     Attends [employer] service: More than 4 times per year     Active member of club or organization: No     Attends meetings of clubs or organizations: Never     Relationship status:    Other Topics Concern    Not on file   Social History Narrative    Not on file       Past Surgical History:   Procedure Laterality Date    AUGMENTATION OF BREAST      breast implants      breast surgery for rupture      TONSILLECTOMY, ADENOIDECTOMY      TUBAL LIGATION         Review of Systems   Constitutional: Negative for activity change, appetite change, chills and fever.   HENT: Negative for sinus pain, sore throat and voice change.    Eyes: Negative for pain, redness and visual disturbance.   Respiratory: Negative for cough and shortness of breath.    Cardiovascular: Negative for chest pain and palpitations.   Gastrointestinal: Negative for diarrhea, nausea and vomiting.   Musculoskeletal: Negative for back pain and joint swelling.   Skin: Negative for color change and wound.   Neurological: Positive for numbness. Negative for dizziness and weakness.   Psychiatric/Behavioral: The patient is not nervous/anxious.          Objective:   /70   Pulse 75    "Ht 5' 5" (1.651 m)   Wt 66.4 kg (146 lb 6.2 oz)   BMI 24.36 kg/m²     X-Ray Foot Complete Right  Narrative: EXAMINATION:  XR FOOT COMPLETE 3 VIEW RIGHT    CLINICAL HISTORY:  . Pain in right foot    TECHNIQUE:  AP, lateral, and oblique views of the right foot were performed.    COMPARISON:  June 4, 2019    FINDINGS:  Persistent soft tissue swelling along the lateral margin of the 5th MTP joint.  Cortical irregularity with the sclerotic margins noted along the lateral margin of the 5th metatarsal head.  No significant change from previous exam.  Articulation maintained at the TMT and MTP joints.  Mild generalized osteopenia and multi articular degenerative changes throughout the remainder of the foot.  Early calcaneal spurs.  Small radiopaque foreign body again noted in the plantar soft tissues, best visualized on the lateral view.  Mild pes planus.  Impression: Stable exam as detailed above.  No acute fracture or dislocation.    Electronically signed by: Anuel Mae MD  Date:    08/26/2020  Time:    12:05      Physical Exam  LOWER EXTREMITY PHYSICAL EXAMINATION    VASCULAR: The right DP pulse is 2/4 and the left DP is 2/4. The right PT pulse is 2/4 and the left PT pulse is 2/4. Proximal to distal, warm to warm. No dependent rubor or elevation palor is noted. Capillary refill time is less than 3 seconds. Hair growth is appreciated to the dorsal foot and digits.    NEUROLOGY: Proprioception is intact, bilateral. Sensation to light touch is intact. Negative Tinel's Sign and negative Valleix Sign. No neurological sensations with compression of the area of Stacy's Nerve in the area of the Abductor Hallucis muscle belly.    ORTHOPEDIC:  Moderate tenderness to palpation of the area around the plantar medial calcaneal tubercle on the right foot. Pains are characterized as sharp and stabbing-like with direct palpation of the area. There is no pain on palpating the intermediate or to the lateral band of the fascia. No " edema is noted. No fullness is noted. No pains or defects are noted within the plantar fascia at the arch. No plantar fibromas are noted. No defects are noted within the ligament. Dorsiflexion of the MTPJs with simultaneous palpation of the fascia at the arch, does worsen and exacerbate the pains.  Pain with palpating the lateral aspect of the right ankle along the track of the sural nerve.  Pain is increased with eversion Equinus contracture is noted. Antalgic gait pattern is noted.     DERMATOLOGY:  No evidence of puncture wounds.  No ecchymosis is noted.  Skin is supple, dry and intact. Skin is supple.  No hyperkeratosis noted. No calluses.  No open wounds or ulcerations are noted.  No palpable plantar fibromas noted.    Assessment:     1. Right foot pain    2. Neuritis of ankle, right    3. Plantar fasciitis          Plan:     Right foot pain  -     X-Ray Foot Complete Right; Future; Expected date: 08/26/2020    Neuritis of ankle, right    Plantar fasciitis        Thorough discussion is had with the patient this afternoon, concerning the diagnosis, its etiology, and the treatment algorithm at present.  Patient have x-rays taken and will review with patient.    Recommend patient to utilize Voltaren gel 2-3 times per day which she has previously been prescribed to the lateral aspect of the right ankle as well as the plantar aspect the right foot due to history of CKD stage 3.  Her labs were reviewed today to show normal creatinine and BUN although with history of CKD, will avoid anti-inflammatories.    I explained to the patient that etiology and all treatment options for heel pain including rest,  ice messages, stretching exercises, strappings/tappings, NSAID's, injections, new shoegear with orthotic inserts, and/or surgical treatment. I also discussed a possible injection of steroid to help calm down the inflammation sooner. I expressed the importance of wearing the orthotics in culmination of other treatment  modalities. Patient agreed to stretching exercises and inserts. I gave written and verbal instructions on heel cord stretching and this was demonstrated for the patient. Patient expressed understanding and had the patient teach back the instructions.  Patient instructed on adequate icing techniques which should be done for acute pain and inflammation.    Patient is unable to take anti-inflammatories due to chronic kidney disease, so I discussed the importance of utilizing Tylenol as needed for pain and stretching/icing techniques to help with the acute inflammation.        Future Appointments   Date Time Provider Department Center   9/3/2020  4:00 PM Zeus Johnson MD [facility]   9/8/2020  4:00 PM Chloe Owens LCSW [facility]   9/28/2020  9:00 AM Janet Thomas DPM [facility]   10/1/2020  3:40 PM Janet Thomas DPM [facility]   10/6/2020  3:50 PM COVID TESTING, [facility]   10/9/2020  9:30 AM [facility] US3 [facility] ULSOUND [facility]   10/9/2020  4:00 PM Chloe Owens LCSW [facility]   11/23/2020 10:00 AM Chloe Owens LCSW [facility]   11/24/2020  9:40 AM [facility] BMD1 [facility] DEXABMD [facility]   11/24/2020 10:00 AM LABORATORY, [facility]   11/24/2020 10:30 AM Leta Yuen PA-C [facility] RHEUM [facility]   11/24/2020 11:20 AM [facility] MAMMO3-BX [facility] MAMMO [facility]   11/24/2020  2:20 PM Nicole Jasmine MD [facility]   12/23/2020  4:00 PM Chloe Owens LCSW [facility]   1/18/2021  3:00 PM Chloe Owens LCSW [facility]

## 2020-09-04 ENCOUNTER — PATIENT MESSAGE (OUTPATIENT)
Dept: OTOLARYNGOLOGY | Facility: CLINIC | Age: 70
End: 2020-09-04

## 2020-09-04 ENCOUNTER — TELEPHONE (OUTPATIENT)
Dept: OTOLARYNGOLOGY | Facility: CLINIC | Age: 70
End: 2020-09-04

## 2020-09-04 NOTE — TELEPHONE ENCOUNTER
Called in medication Moxifloxacin 400mg by mouth every day for 10days per Dr Freeman. [facility]. Pt informed that medication has been called in.

## 2020-09-20 ENCOUNTER — PATIENT MESSAGE (OUTPATIENT)
Dept: OTOLARYNGOLOGY | Facility: CLINIC | Age: 70
End: 2020-09-20

## 2020-09-29 ENCOUNTER — PATIENT MESSAGE (OUTPATIENT)
Dept: RHEUMATOLOGY | Facility: CLINIC | Age: 70
End: 2020-09-29

## 2020-09-29 ENCOUNTER — PATIENT MESSAGE (OUTPATIENT)
Dept: PODIATRY | Facility: CLINIC | Age: 70
End: 2020-09-29

## 2020-09-29 DIAGNOSIS — E87.6 LOW POTASSIUM SYNDROME: ICD-10-CM

## 2020-09-29 RX ORDER — POTASSIUM CHLORIDE 1500 MG/1
20 TABLET, EXTENDED RELEASE ORAL 2 TIMES DAILY
Qty: 180 TABLET | Refills: 3 | Status: SHIPPED | OUTPATIENT
Start: 2020-09-29 | End: 2020-12-08

## 2020-09-29 RX ORDER — POTASSIUM CHLORIDE 1500 MG/1
20 TABLET, EXTENDED RELEASE ORAL 2 TIMES DAILY
Qty: 180 TABLET | Refills: 3 | Status: SHIPPED | OUTPATIENT
Start: 2020-09-29 | End: 2020-09-29 | Stop reason: CLARIF

## 2020-09-29 RX ORDER — TRAZODONE HYDROCHLORIDE 50 MG/1
100-150 TABLET ORAL NIGHTLY PRN
Qty: 270 TABLET | Refills: 0 | Status: SHIPPED | OUTPATIENT
Start: 2020-09-29 | End: 2020-10-26

## 2020-10-06 ENCOUNTER — LAB VISIT (OUTPATIENT)
Dept: OTOLARYNGOLOGY | Facility: CLINIC | Age: 70
End: 2020-10-06
Payer: MEDICARE

## 2020-10-06 ENCOUNTER — TELEPHONE (OUTPATIENT)
Dept: GASTROENTEROLOGY | Facility: CLINIC | Age: 70
End: 2020-10-06

## 2020-10-06 ENCOUNTER — IMMUNIZATION (OUTPATIENT)
Dept: INTERNAL MEDICINE | Facility: CLINIC | Age: 70
End: 2020-10-06
Payer: MEDICARE

## 2020-10-06 DIAGNOSIS — D50.8 OTHER IRON DEFICIENCY ANEMIA: ICD-10-CM

## 2020-10-06 PROCEDURE — 90694 VACC AIIV4 NO PRSRV 0.5ML IM: CPT | Mod: PBBFAC

## 2020-10-06 PROCEDURE — G0008 ADMIN INFLUENZA VIRUS VAC: HCPCS | Mod: PBBFAC

## 2020-10-06 PROCEDURE — U0003 INFECTIOUS AGENT DETECTION BY NUCLEIC ACID (DNA OR RNA); SEVERE ACUTE RESPIRATORY SYNDROME CORONAVIRUS 2 (SARS-COV-2) (CORONAVIRUS DISEASE [COVID-19]), AMPLIFIED PROBE TECHNIQUE, MAKING USE OF HIGH THROUGHPUT TECHNOLOGIES AS DESCRIBED BY CMS-2020-01-R: HCPCS

## 2020-10-07 ENCOUNTER — PATIENT MESSAGE (OUTPATIENT)
Dept: RHEUMATOLOGY | Facility: CLINIC | Age: 70
End: 2020-10-07

## 2020-10-07 LAB — SARS-COV-2 RNA RESP QL NAA+PROBE: NOT DETECTED

## 2020-10-08 ENCOUNTER — TELEPHONE (OUTPATIENT)
Dept: ENDOSCOPY | Facility: HOSPITAL | Age: 70
End: 2020-10-08

## 2020-10-08 ENCOUNTER — TELEPHONE (OUTPATIENT)
Dept: RADIOLOGY | Facility: HOSPITAL | Age: 70
End: 2020-10-08

## 2020-10-08 NOTE — TELEPHONE ENCOUNTER
Attempted to contact patient to confirm cancelling procedure, no answer. Unable to leave message because mailbox is full and can't receive messages. Message left to call office sent through patient portal, number provided.

## 2020-10-30 ENCOUNTER — TELEPHONE (OUTPATIENT)
Dept: PSYCHIATRY | Facility: CLINIC | Age: 70
End: 2020-10-30

## 2020-10-30 NOTE — TELEPHONE ENCOUNTER
Good Morning, [facility] was down on 10.26.20. The pharmacist can you resend . Ms Laronreuben Trazodone 50mg tablet.

## 2020-10-31 ENCOUNTER — PATIENT MESSAGE (OUTPATIENT)
Dept: RHEUMATOLOGY | Facility: CLINIC | Age: 70
End: 2020-10-31

## 2020-10-31 DIAGNOSIS — M79.7 FIBROMYALGIA: Primary | ICD-10-CM

## 2020-10-31 DIAGNOSIS — M79.10 MUSCLE PAIN: ICD-10-CM

## 2020-11-02 ENCOUNTER — PATIENT MESSAGE (OUTPATIENT)
Dept: FAMILY MEDICINE | Facility: CLINIC | Age: 70
End: 2020-11-02

## 2020-11-02 RX ORDER — CYCLOBENZAPRINE HCL 5 MG
5 TABLET ORAL NIGHTLY
Qty: 90 TABLET | Refills: 1 | Status: SHIPPED | OUTPATIENT
Start: 2020-11-02 | End: 2020-11-24 | Stop reason: SDUPTHER

## 2020-11-05 NOTE — PROGRESS NOTES
Subjective:      Patient ID: Leticia Piña is a 67 y.o. female.    Chief Complaint: Cough and Chest Congestion    Cough   This is a new problem. The current episode started 1 to 4 weeks ago. The problem has been gradually worsening. The cough is non-productive. Associated symptoms include chest pain, nasal congestion and postnasal drip. Pertinent negatives include no chills, ear congestion, ear pain, fever, headaches, heartburn, hemoptysis, myalgias, rash, rhinorrhea, sore throat, shortness of breath, sweats, weight loss or wheezing. Nothing aggravates the symptoms. She has tried OTC cough suppressant (has taken an antibiotic (think it was amoxicillin) for 10 days, improvement with upper respiratory symptoms but now in her chest) for the symptoms. The treatment provided mild relief. There is no history of asthma, bronchiectasis, bronchitis, COPD, emphysema, environmental allergies or pneumonia.   Patient reports that 3 weeks ago took an oral form of prednisone and amoxicillin. Her upper respiratory symptoms improved but now she feels like it is in her lungs. Saw her PCP yesterday and was prescribed an oral steroid but has not started it. States that she would prefer a shot.     Review of Systems   Constitutional: Negative for activity change, appetite change, chills, diaphoresis, fatigue, fever, unexpected weight change and weight loss.   HENT: Positive for postnasal drip. Negative for congestion, ear pain, hearing loss, rhinorrhea, sinus pain, sinus pressure, sore throat, trouble swallowing and voice change.    Eyes: Negative.  Negative for visual disturbance.   Respiratory: Positive for cough. Negative for hemoptysis, choking, chest tightness, shortness of breath and wheezing.    Cardiovascular: Positive for chest pain. Negative for palpitations and leg swelling.   Gastrointestinal: Negative for abdominal distention, abdominal pain, blood in stool, constipation, diarrhea, heartburn, nausea and vomiting.  Patient "  Endocrine: Negative for cold intolerance, heat intolerance, polydipsia and polyuria.   Genitourinary: Negative.  Negative for difficulty urinating and frequency.   Musculoskeletal: Negative for arthralgias, back pain, gait problem, joint swelling and myalgias.   Skin: Negative for color change, pallor, rash and wound.   Allergic/Immunologic: Negative for environmental allergies.   Neurological: Negative for dizziness, tremors, weakness, light-headedness, numbness and headaches.   Hematological: Negative for adenopathy.   Psychiatric/Behavioral: Negative for behavioral problems, confusion, self-injury, sleep disturbance and suicidal ideas. The patient is not nervous/anxious.      Objective:   /84   Pulse 91   Temp 99 °F (37.2 °C) (Tympanic)   Ht 5' 5" (1.651 m)   Wt 64.6 kg (142 lb 6.7 oz)   SpO2 97%   BMI 23.70 kg/m²     Physical Exam   Constitutional: She is oriented to person, place, and time. She appears well-developed and well-nourished.   HENT:   Head: Normocephalic and atraumatic.   Right Ear: Hearing, tympanic membrane, external ear and ear canal normal. No tenderness.   Left Ear: Hearing, tympanic membrane, external ear and ear canal normal. No tenderness.   Nose: Mucosal edema and rhinorrhea present. No sinus tenderness. Right sinus exhibits no maxillary sinus tenderness and no frontal sinus tenderness. Left sinus exhibits no maxillary sinus tenderness and no frontal sinus tenderness.   Mouth/Throat: Uvula is midline and mucous membranes are normal. No oral lesions. Normal dentition. No dental abscesses, uvula swelling or dental caries. Posterior oropharyngeal erythema present. No oropharyngeal exudate, posterior oropharyngeal edema or tonsillar abscesses. No tonsillar exudate.   Eyes: Conjunctivae and EOM are normal. Pupils are equal, round, and reactive to light. Right eye exhibits no discharge. Left eye exhibits no discharge.   Neck: Normal range of motion. Neck supple.   Cardiovascular: " Normal rate, regular rhythm and normal heart sounds.  Exam reveals no gallop and no friction rub.    No murmur heard.  Pulmonary/Chest: Effort normal. No accessory muscle usage. No respiratory distress. She has decreased breath sounds. She has wheezes. She has rales.   Lymphadenopathy:     She has no cervical adenopathy.   Neurological: She is alert and oriented to person, place, and time.   Skin: Skin is warm. No rash noted. No erythema. No pallor.   Psychiatric: She has a normal mood and affect. Her behavior is normal. Judgment and thought content normal.   Nursing note and vitals reviewed.    CXR results:   Findings: Heart size and mediastinal contour are normal.  Lungs are essentially clear and free of active infiltrate or effusion.  Mild thoracic spondylosis.  Breast implants.          Assessment:     1. Acute bronchitis, unspecified organism      Plan:   Acute bronchitis, unspecified organism  -     X-Ray Chest PA And Lateral; Future; Expected date: 11/17/2017  -     betamethasone acetate-betamethasone sodium phosphate injection 6 mg; Inject 1 mL (6 mg total) into the muscle one time.    -discard prednisone script. Will administer steroid injection today.   -cepacol max sore throat/cough drops. Mucinex DM. Increase fluids.   -Educational handout on over-the-counter medications and at-home conservative care, pertinent to the patients diagnosis today, was handed to the patient and discussed in detail.    Return if symptoms worsen or fail to improve.

## 2020-11-15 DIAGNOSIS — E03.9 ACQUIRED HYPOTHYROIDISM: Primary | ICD-10-CM

## 2020-11-16 RX ORDER — LEVOTHYROXINE SODIUM 75 UG/1
75 TABLET ORAL DAILY
Qty: 90 TABLET | Refills: 3 | Status: SHIPPED | OUTPATIENT
Start: 2020-11-16 | End: 2021-11-24 | Stop reason: SDUPTHER

## 2020-11-24 ENCOUNTER — OFFICE VISIT (OUTPATIENT)
Dept: RHEUMATOLOGY | Facility: CLINIC | Age: 70
End: 2020-11-24
Payer: MEDICARE

## 2020-11-24 ENCOUNTER — HOSPITAL ENCOUNTER (OUTPATIENT)
Dept: RADIOLOGY | Facility: HOSPITAL | Age: 70
Discharge: HOME | End: 2020-11-24
Attending: INTERNAL MEDICINE
Payer: MEDICARE

## 2020-11-24 VITALS
BODY MASS INDEX: 24.61 KG/M2 | HEIGHT: 65 IN | DIASTOLIC BLOOD PRESSURE: 78 MMHG | HEART RATE: 82 BPM | WEIGHT: 147.69 LBS | SYSTOLIC BLOOD PRESSURE: 135 MMHG

## 2020-11-24 DIAGNOSIS — Z12.31 SCREENING MAMMOGRAM, ENCOUNTER FOR: ICD-10-CM

## 2020-11-24 DIAGNOSIS — D84.9 IMMUNOCOMPROMISED: Primary | ICD-10-CM

## 2020-11-24 DIAGNOSIS — D84.9 IMMUNOCOMPROMISED: ICD-10-CM

## 2020-11-24 DIAGNOSIS — M05.79 RHEUMATOID ARTHRITIS INVOLVING MULTIPLE SITES WITH POSITIVE RHEUMATOID FACTOR: Primary | ICD-10-CM

## 2020-11-24 DIAGNOSIS — M79.7 FIBROMYALGIA: ICD-10-CM

## 2020-11-24 DIAGNOSIS — M05.771 RHEUMATOID ARTHRITIS INVOLVING RIGHT FOOT WITH POSITIVE RHEUMATOID FACTOR: ICD-10-CM

## 2020-11-24 DIAGNOSIS — Z79.899 HIGH RISK MEDICATION USE: ICD-10-CM

## 2020-11-24 DIAGNOSIS — M85.80 OSTEOPENIA WITH HIGH RISK OF FRACTURE: ICD-10-CM

## 2020-11-24 DIAGNOSIS — M35.00 SJOGREN'S SYNDROME, WITH UNSPECIFIED ORGAN INVOLVEMENT: ICD-10-CM

## 2020-11-24 DIAGNOSIS — N18.31 STAGE 3A CHRONIC KIDNEY DISEASE: ICD-10-CM

## 2020-11-24 DIAGNOSIS — M85.89 OSTEOPENIA OF MULTIPLE SITES: ICD-10-CM

## 2020-11-24 DIAGNOSIS — M79.10 MUSCLE PAIN: ICD-10-CM

## 2020-11-24 DIAGNOSIS — K21.00 GASTROESOPHAGEAL REFLUX DISEASE WITH ESOPHAGITIS WITHOUT HEMORRHAGE: ICD-10-CM

## 2020-11-24 DIAGNOSIS — Z29.9 PREVENTIVE MEASURE: ICD-10-CM

## 2020-11-24 PROCEDURE — 77067 SCR MAMMO BI INCL CAD: CPT | Mod: TC

## 2020-11-24 PROCEDURE — 99213 OFFICE O/P EST LOW 20 MIN: CPT | Mod: PBBFAC,25 | Performed by: PHYSICIAN ASSISTANT

## 2020-11-24 PROCEDURE — 77063 BREAST TOMOSYNTHESIS BI: CPT | Mod: 26,,, | Performed by: RADIOLOGY

## 2020-11-24 PROCEDURE — 20600 DRAIN/INJ JOINT/BURSA W/O US: CPT | Mod: PBBFAC | Performed by: PHYSICIAN ASSISTANT

## 2020-11-24 PROCEDURE — 99999 PR PBB SHADOW E&M-EST. PATIENT-LVL III: ICD-10-PCS | Mod: PBBFAC,,, | Performed by: PHYSICIAN ASSISTANT

## 2020-11-24 PROCEDURE — 99214 OFFICE O/P EST MOD 30 MIN: CPT | Mod: 25,S$PBB,, | Performed by: PHYSICIAN ASSISTANT

## 2020-11-24 PROCEDURE — 77067 MAMMO DIGITAL SCREENING BILAT WITH TOMO: ICD-10-PCS | Mod: 26,,, | Performed by: RADIOLOGY

## 2020-11-24 PROCEDURE — 20600 PR DRAIN/INJECT SMALL JOINT/BURSA: ICD-10-PCS | Mod: S$PBB,RT,, | Performed by: PHYSICIAN ASSISTANT

## 2020-11-24 PROCEDURE — 20600 DRAIN/INJ JOINT/BURSA W/O US: CPT | Mod: S$PBB,RT,, | Performed by: PHYSICIAN ASSISTANT

## 2020-11-24 PROCEDURE — 99999 PR PBB SHADOW E&M-EST. PATIENT-LVL III: CPT | Mod: PBBFAC,,, | Performed by: PHYSICIAN ASSISTANT

## 2020-11-24 PROCEDURE — 77067 SCR MAMMO BI INCL CAD: CPT | Mod: 26,,, | Performed by: RADIOLOGY

## 2020-11-24 PROCEDURE — 99214 PR OFFICE/OUTPT VISIT, EST, LEVL IV, 30-39 MIN: ICD-10-PCS | Mod: 25,S$PBB,, | Performed by: PHYSICIAN ASSISTANT

## 2020-11-24 PROCEDURE — 77063 MAMMO DIGITAL SCREENING BILAT WITH TOMO: ICD-10-PCS | Mod: 26,,, | Performed by: RADIOLOGY

## 2020-11-24 RX ORDER — SODIUM CHLORIDE 0.9 % (FLUSH) 0.9 %
10 SYRINGE (ML) INJECTION
Status: CANCELLED | OUTPATIENT
Start: 2020-11-24

## 2020-11-24 RX ORDER — HEPARIN 100 UNIT/ML
500 SYRINGE INTRAVENOUS
Status: CANCELLED | OUTPATIENT
Start: 2020-11-24

## 2020-11-24 RX ORDER — CYCLOBENZAPRINE HCL 10 MG
10 TABLET ORAL NIGHTLY
Qty: 90 TABLET | Refills: 3 | Status: SHIPPED | OUTPATIENT
Start: 2020-11-24 | End: 2021-02-22

## 2020-11-24 RX ORDER — ZOLEDRONIC ACID 5 MG/100ML
5 INJECTION, SOLUTION INTRAVENOUS
Status: CANCELLED | OUTPATIENT
Start: 2020-11-24

## 2020-11-24 RX ORDER — METHYLPREDNISOLONE ACETATE 80 MG/ML
16 INJECTION, SUSPENSION INTRA-ARTICULAR; INTRALESIONAL; INTRAMUSCULAR; SOFT TISSUE ONCE
Status: COMPLETED | OUTPATIENT
Start: 2020-11-24 | End: 2020-11-24

## 2020-11-24 RX ORDER — BETAMETHASONE SODIUM PHOSPHATE AND BETAMETHASONE ACETATE 3; 3 MG/ML; MG/ML
1.2 INJECTION, SUSPENSION INTRA-ARTICULAR; INTRALESIONAL; INTRAMUSCULAR; SOFT TISSUE ONCE
Status: COMPLETED | OUTPATIENT
Start: 2020-11-24 | End: 2020-11-24

## 2020-11-24 RX ORDER — ACETAMINOPHEN 325 MG/1
650 TABLET ORAL
Status: CANCELLED | OUTPATIENT
Start: 2020-11-24

## 2020-11-24 RX ADMIN — METHYLPREDNISOLONE ACETATE 16 MG: 80 INJECTION, SUSPENSION INTRALESIONAL; INTRAMUSCULAR; INTRASYNOVIAL; SOFT TISSUE at 11:11

## 2020-11-24 RX ADMIN — BETAMETHASONE ACETATE AND BETAMETHASONE SODIUM PHOSPHATE 1.2 MG: 3; 3 INJECTION, SUSPENSION INTRA-ARTICULAR; INTRALESIONAL; INTRAMUSCULAR; SOFT TISSUE at 11:11

## 2020-11-24 ASSESSMENT — ROUTINE ASSESSMENT OF PATIENT INDEX DATA (RAPID3): MDHAQ FUNCTION SCORE: 0

## 2020-11-24 NOTE — PROGRESS NOTES
Subjective:       Patient ID: Leticia Piña is a 70 y.o. female.    Chief Complaint: Rheumatoid Arthritis, Osteopenia, and Joint Pain (right foot )      Leticia is here today for rheumatology followup.      She has chronic seropositive rheumatoid arthritis, Sjogren's syndrome, osteopenia, and Vitamin D deficiency.  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 Also failed enbrel this was IE.  For RA on cimzia 400 mg every 5-6 weeks, now weaned down and off mtx over the past 2 yrs, still on Plaquenil 200 mg once daily.      Had mild liver elevation; held cimzia; she did well off; lft returned to normal  We opted to stay off  Recently w/covid concerns she upped her hcq to bid    RA- no prolonged AM stiffness, no swelling some mild aches and pain   Some right foot pain   No swelling or RA exacerbation has occured    Today pain 3/10  Right 5th mtp only other wise good - chronic damage there     RA wise BL hand and foot x-rays done in 7/2017 and repeat 6/2019 showed no new erosions but right 5th mtp joint does have erosive changes which are chronic and x-rays were overall stable.      Fibro On Cymbalta 60 mg/d, We sent her to aquatic therapy at [facility] which really helped a lot.   Added flexeril 5 mg which help; taking 10 mg at night      For Sjogren's, she has +SSB, +SSA, +GIOVANNA 1:160 speckled in the past. She takes Evoxac 3-4X daily cost $160/month.  Now on salagen using good rx coupon cost is better. Also  Using otc products nasal gel, lozenges, drinking water. Seen by ophthalmology and prescribed plasma serum eye drops, which have worked great. No parotid tenderness or swelling.     Osteopenia diagnosed by Dexa. Last scan 8/2014. TF -1.0, FN -1.8, spine -0.2.   Previously, we did trial of fosamax but had GI upset and stopped. No prednisone. Took  estradiol, progesterone- now off  calcium by diet only, and vit D was 5000 IU per day but D level high at 80, I cut her dose back to no more  "than 2000 IU daily.   last bone density 12/6/17 stable; repeat tdoay 11/24/20- falling bmd at hip and spine  Osteopenia w/high risk fx    No falls or fractures since last visit.           Low-back Pain  Associated symptoms include arthralgias. Pertinent negatives include no abdominal pain, chest pain, chills, congestion, coughing, fatigue, fever, joint swelling, myalgias, nausea, neck pain, rash, sore throat, vomiting or weakness.   Hip Pain  Associated symptoms include arthralgias. Pertinent negatives include no abdominal pain, chest pain, chills, congestion, coughing, fatigue, fever, joint swelling, myalgias, nausea, neck pain, rash, sore throat, vomiting or weakness.     Review of Systems   Constitutional: Negative.  Negative for activity change, appetite change, chills, fatigue and fever.   HENT: Negative for congestion, ear pain, mouth sores, rhinorrhea, sinus pressure, sore throat and trouble swallowing.         + dry mouth   Eyes: Negative.  Negative for photophobia, pain and redness.        No swollen or red eyes, + dry eye     Respiratory: Negative for cough, choking, chest tightness, shortness of breath, wheezing and stridor.    Cardiovascular: Negative.  Negative for chest pain.   Gastrointestinal: Negative.  Negative for abdominal pain, blood in stool, diarrhea, nausea and vomiting.   Genitourinary: Negative.  Negative for dysuria, frequency, hematuria and urgency.   Musculoskeletal: Positive for arthralgias. Negative for back pain, gait problem, joint swelling, myalgias, neck pain and neck stiffness.   Skin: Negative for color change, pallor and rash.   Neurological: Negative.  Negative for weakness.   Hematological: Negative for adenopathy.   Psychiatric/Behavioral: Negative for suicidal ideas.         Objective:     /78   Pulse 82   Ht 5' 5" (1.651 m)   Wt 67 kg (147 lb 11.3 oz)   BMI 24.58 kg/m²      Physical Exam   Constitutional: She is oriented to person, place, and time and " well-developed, well-nourished, and in no distress. No distress.   HENT:   Head: Normocephalic and atraumatic.   Right Ear: External ear normal.   Left Ear: External ear normal.   Mouth/Throat: Mucous membranes are not pale, dry and not cyanotic. She does not have dentures. No oral lesions. No oropharyngeal exudate.   Eyes: Conjunctivae and EOM are normal. Pupils are equal, round, and reactive to light. Right conjunctiva is not injected. Right conjunctiva has no hemorrhage. Left conjunctiva is not injected. Left conjunctiva has no hemorrhage. No scleral icterus.       Neck: Normal range of motion. Neck supple. No thyromegaly present.   Cardiovascular: Normal rate, regular rhythm and normal heart sounds.    No murmur heard.  Pulmonary/Chest: Effort normal and breath sounds normal. She exhibits no tenderness.   Abdominal: Soft. Bowel sounds are normal.       Right Side Rheumatological Exam     Examination finds the shoulder, elbow, wrist, knee, 1st PIP, 1st MCP, 2nd PIP, 2nd MCP, 3rd PIP, 3rd MCP, 4th PIP, 4th MCP, 5th PIP and 5th MCP normal.    The patient is tender to palpation of the 5th MTP    She has swelling of the 5th MTP    Left Side Rheumatological Exam     Examination finds the shoulder, elbow, wrist, knee, 1st PIP, 1st MCP, 2nd PIP, 2nd MCP, 3rd PIP, 3rd MCP, 4th PIP, 4th MCP, 5th PIP and 5th MCP normal.    Joint Exam Comments   Knee: Lateral side of knee at lateral collateral ligament  Some ttp noted            Lymphadenopathy:     She has no cervical adenopathy.   Neurological: She is alert and oriented to person, place, and time. She displays normal reflexes. No cranial nerve deficit. She exhibits normal muscle tone. Gait normal.   Skin: Skin is warm and dry. No rash noted. No erythema.     Musculoskeletal: Tenderness and deformity present. No edema.      Comments: DIP enlargement BL hands,   cmp and pip joints look good  no synovitis, warmth.   Right elbow ttp lateral epicondyle consistent w/tennis  elbow     metatarsal squeeze right foot, neg left foot  Right 5th mtp enlargement, djd   No swelling/synovitis  romel mtp      ttp right  hip bursa;  upper arms, neck  Thighs etc- generalized tenderness     No synovitis     No weakness on exam, muscle strenght 5/5 upper and lower ext                  Results for orders placed or performed in visit on 11/24/20   Sedimentation rate, manual   Result Value Ref Range    Sed Rate 8 0 - 36 mm/Hr   C-reactive protein   Result Value Ref Range    CRP 2.4 0.0 - 8.2 mg/L   Comprehensive metabolic panel   Result Value Ref Range    Sodium 140 136 - 145 mmol/L    Potassium 4.0 3.5 - 5.1 mmol/L    Chloride 99 95 - 110 mmol/L    CO2 32 (H) 23 - 29 mmol/L    Glucose 94 70 - 110 mg/dL    BUN 18 8 - 23 mg/dL    Creatinine 0.8 0.5 - 1.4 mg/dL    Calcium 9.2 8.7 - 10.5 mg/dL    Total Protein 7.3 6.0 - 8.4 g/dL    Albumin 3.9 3.5 - 5.2 g/dL    Total Bilirubin 0.4 0.1 - 1.0 mg/dL    Alkaline Phosphatase 78 55 - 135 U/L    AST 22 10 - 40 U/L    ALT 19 10 - 44 U/L    Anion Gap 9 8 - 16 mmol/L    eGFR if African American >60 >60 mL/min/1.73 m^2    eGFR if non African American >60 >60 mL/min/1.73 m^2   CBC auto differential   Result Value Ref Range    WBC 5.19 3.90 - 12.70 K/uL    RBC 3.94 (L) 4.00 - 5.40 M/uL    Hemoglobin 11.5 (L) 12.0 - 16.0 g/dL    Hematocrit 35.3 (L) 37.0 - 48.5 %    MCV 90 82 - 98 fL    MCH 29.2 27.0 - 31.0 pg    MCHC 32.6 32.0 - 36.0 g/dL    RDW 11.9 11.5 - 14.5 %    Platelets 317 150 - 350 K/uL    MPV 9.1 (L) 9.2 - 12.9 fL    Immature Granulocytes 0.4 0.0 - 0.5 %    Gran # (ANC) 3.3 1.8 - 7.7 K/uL    Immature Grans (Abs) 0.02 0.00 - 0.04 K/uL    Lymph # 1.1 1.0 - 4.8 K/uL    Mono # 0.4 0.3 - 1.0 K/uL    Eos # 0.3 0.0 - 0.5 K/uL    Baso # 0.08 0.00 - 0.20 K/uL    nRBC 0 0 /100 WBC    Gran % 63.6 38.0 - 73.0 %    Lymph % 21.6 18.0 - 48.0 %    Mono % 7.5 4.0 - 15.0 %    Eosinophil % 5.4 0.0 - 8.0 %    Basophil % 1.5 0.0 - 1.9 %    Differential Method Automated    Basic  metabolic panel   Result Value Ref Range    Sodium 140 136 - 145 mmol/L    Potassium 4.0 3.5 - 5.1 mmol/L    Chloride 99 95 - 110 mmol/L    CO2 32 (H) 23 - 29 mmol/L    Glucose 94 70 - 110 mg/dL    BUN 18 8 - 23 mg/dL    Creatinine 0.8 0.5 - 1.4 mg/dL    Calcium 9.2 8.7 - 10.5 mg/dL    Anion Gap 9 8 - 16 mmol/L    eGFR if African American >60 >60 mL/min/1.73 m^2    eGFR if non African American >60 >60 mL/min/1.73 m^2       6/4/19  BL Foot X-ray -FINDINGS:  Allowing for positioning and technique there has been no interval development of new focal erosion or periosteal reaction.  Stable multi articular degenerative changes present throughout the feet bilaterally.  Left and borderline right pes planus.  Stable erosions on the right involving the 1st and 5th metatarsal heads with slight increased smooth soft tissue prominence along the lateral margin of the right 5th MTP joint.  Similar erosions on the left involving the 5th MTP with stable appearance the erosions and soft tissue prominence.  No acute fracture or dislocation.  No new soft tissue calcification or radiopaque foreign body.    BL Hand -FINDINGS:  Stable exam without acute fracture or subluxation noted.  No new or developing focal erosion or periosteal reaction.  Multi articular degenerative change noted in the wrist and hand bilaterally, greater on the left.        X-ray BL hand and foot 5/2016 and 7/2017 : no new erosions - stable    12/2017 DEXA  Total femur mean 0.855 g/cm² with a T score -1.2  Left femur neck 0.738 g/cm² T score -2.2  L1-L4 spine 1.152 g/cm² with a T score -0.2  Osteopenia with falling bone density at the left femur neck moderate to high fracture risk      12/6/15 DEXA  Total femur mean 0.860 g/cm² with a T score -1.2  Left femur neck 0.738 g/cm² T score -2.2  L1-L4 spine 1.152 g/cm² with a T score -0.2  Osteopenia with falling bone density at the left femur neck moderate to high fracture risk    8/6/14 DEXA  Total femur mean 0.880  g/cm² with a T score -1.0  Left femur neck 0.789 g/cm² with a T score -1.8  Spine L1-L4 1.153 g/cm² with a T score -0.2  Osteopenia with low to moderate risk of fracture       Ref. Range 5/28/2019 08:11   Vit D, 25-Hydroxy Latest Ref Range: 30 - 96 ng/mL 80       Immunization History   Administered Date(s) Administered    Hepatitis A, Adult 06/05/2019    Influenza (FLUAD) - Quadrivalent - Adjuvanted - PF *Preferred* (65+) 10/06/2020    Influenza (FLUAD) - Trivalent - Adjuvanted - PF (65+) 10/23/2017    Influenza - High Dose - PF (65 years and older) 11/07/2007, 09/30/2008, 09/23/2009, 10/27/2010, 10/31/2011, 10/05/2015, 10/12/2018, 09/24/2019    Influenza - Quadrivalent 11/13/2014, 10/27/2016    PPD Test 03/28/2011    Pneumococcal Conjugate - 13 Valent 12/10/2013, 12/10/2013, 11/27/2019    Pneumococcal Polysaccharide - 23 Valent 11/07/2007, 03/23/2017    Tdap 09/15/2010, 04/10/2013    Zoster 04/10/2013         Assessment:       1. Rheumatoid arthritis involving multiple sites with positive rheumatoid factor    2. Osteopenia of multiple sites    3. Immunocompromised    4. Sjogren's syndrome, with unspecified organ involvement    5. Stage 3a chronic kidney disease    6. High risk medication use    7. Osteopenia with high risk of fracture    8. Gastroesophageal reflux disease with esophagitis without hemorrhage    9. Fibromyalgia    10. Muscle pain        1. RA well controlled on Plaquenil 200 mg daily  Off  Cimzia 400 mg was Q 5-6  weeks- last done 10/22/2019    ALEMAN-28 (CRP): 1.4 (Remission)  MARGARITA 0,  CDAI 0.2    hand and foot x-ray up to date 6/2019  stable no changes     In the past failed mtx monotherapy  Failed humira and enbrel    2. Sjogrens - dryness of eyes and mouth. On evoxac 3-4 X daily and plasma serum eye drops prescribed by ophthalmologist.   evoxac poorly covered by insurance cost $160/month- now on salagen doing fair, lower cost    3. Drug induced lupus from humira- resolved- no recurrence  suspected     4. Osteopenia - DEXA stable at both FN, femur mean and spine- mod-high rask- will follow  Currently taking estradiol, progesterone, calcium by diet only, and vit D 2000 international units daily. In the past failed a trial of oral bisphosphonate secondary GI intolerance  Needs repeat dexa     5. Vaccination status: up to date    6. Medication Monitoring- no current issues, no evidence of toxicity    7. Immunocompromised no issues with recurrent infections    8. Fibromyalgia with ongoing fatigue, sleep disturbances, myofascial pain ongoing depression- better on  Cymbalta and flexeril at night   Failed Wellbutrin,  some help with aquatic therapy     9.  R 5th MTP - pain/swelling- resolved post IA injection last year  Has djd present and RA damage  Chronic enlargement       Plan:         No orders of the defined types were placed in this encounter.    Regarding RA meds  Stay off  mtx   C/w Plaquenil 200 mg daily. Do not need more than once daily   This  will decrease long term risk of eye issues     For now can stay off cimizia CAM liopholised 400 mg Q 4 weeks    Repeat her bilateral hand and foot x-rays in 1 year last done 6/2019       For fibromyalgia  C/w flexeril 5-10 mg Q pm   C/w cymbalta 60 mg/d, good Rx coupon is lower than cost with insurance  consider gabapentin in future     Can add back prn mobic for a few days     C/w salagen 5 mg 3-4 X daily, good Rx coupon  Can also try OTC Xylimelts or Thera Breath lozenges for dry mouth.  Nasogel  Cool mist humidier  Nighttime eye ointment, use sleep mask  C/w eye doc, yearly eye checks for plaquenil and c/w her plasma serum eye drops.     Inject right 5th mtp joint  Procedure note: After verbal consent was obtained.  The right 5th mtp joint was prepared with sterile prep.  The skin was anesthetized with 1% ethyl chloride.  The joint was injected with  0.2 mL celestone/soluspan 30 mg/5 mL, 0.2 mL Depo-Medrol 80 mg/mL and  0.2 mL of 1% lidocaine.   Hemostasis was obtained. The patient tolerated procedure well with no complications.  Patient diischarged with icing instructions        Dexa  Scan  Falling BMD  Needs treatment   GI reflux  Better for parental therapy   Will move to yearly reclast   Labs good; can start in 1-2 wks  Discussed risk versus benefits and potential side effects of reclast . Medication Literature given to patient. Discussed with pt option to decline treatment if that is their wish, discussed potential consequences of not treating.       Keep her  vit D to no higher than 1-2000 IU daily; d level is 80 and calcium in diet,     rtc 6 mon giovanni w/reg 4 labs  Call with any questions, changes or concerns.

## 2020-11-24 NOTE — PATIENT INSTRUCTIONS
Zoledronic Acid injection (Paget's Disease, Osteoporosis)  What is this medicine?  ZOLEDRONIC ACID (JAZMÍN le dron ik AS id) lowers the amount of calcium loss from bone. It is used to treat Paget's disease and osteoporosis in women.  How should I use this medicine?  This medicine is for infusion into a vein. It is given by a health care professional in a hospital or clinic setting.  Talk to your pediatrician regarding the use of this medicine in children. This medicine is not approved for use in children.  What side effects may I notice from receiving this medicine?  Side effects that you should report to your doctor or health care professional as soon as possible:  · allergic reactions like skin rash, itching or hives, swelling of the face, lips, or tongue  · anxiety, confusion, or depression  · breathing problems  · changes in vision  · eye pain  · feeling faint or lightheaded, falls  · jaw pain, especially after dental work  · mouth sores  · muscle cramps, stiffness, or weakness  · redness, blistering, peeling or loosening of the skin, including inside the mouth  · trouble passing urine or change in the amount of urine  Side effects that usually do not require medical attention (report to your doctor or health care professional if they continue or are bothersome):  · bone, joint, or muscle pain  · constipation  · diarrhea  · fever  · hair loss  · irritation at site where injected  · loss of appetite  · nausea, vomiting  · stomach upset  · trouble sleeping  · trouble swallowing  · weak or tired  What may interact with this medicine?  · certain antibiotics given by injection  · NSAIDs, medicines for pain and inflammation, like ibuprofen or naproxen  · some diuretics like bumetanide, furosemide  · teriparatide  What if I miss a dose?  It is important not to miss your dose. Call your doctor or health care professional if you are unable to keep an appointment.  Where should I keep my medicine?  This drug is given in a  hospital or clinic and will not be stored at home.  What should I tell my health care provider before I take this medicine?  They need to know if you have any of these conditions:  · aspirin-sensitive asthma  · cancer, especially if you are receiving medicines used to treat cancer  · dental disease or wear dentures  · infection  · kidney disease  · low levels of calcium in the blood  · past surgery on the parathyroid gland or intestines  · receiving corticosteroids like dexamethasone or prednisone  · an unusual or allergic reaction to zoledronic acid, other medicines, foods, dyes, or preservatives  · pregnant or trying to get pregnant  · breast-feeding  What should I watch for while using this medicine?  Visit your doctor or health care professional for regular checkups. It may be some time before you see the benefit from this medicine. Do not stop taking your medicine unless your doctor tells you to. Your doctor may order blood tests or other tests to see how you are doing.  Women should inform their doctor if they wish to become pregnant or think they might be pregnant. There is a potential for serious side effects to an unborn child. Talk to your health care professional or pharmacist for more information.  You should make sure that you get enough calcium and vitamin D while you are taking this medicine. Discuss the foods you eat and the vitamins you take with your health care professional.  Some people who take this medicine have severe bone, joint, and/or muscle pain. This medicine may also increase your risk for jaw problems or a broken thigh bone. Tell your doctor right away if you have severe pain in your jaw, bones, joints, or muscles. Tell your doctor if you have any pain that does not go away or that gets worse.  Tell your dentist and dental surgeon that you are taking this medicine. You should not have major dental surgery while on this medicine. See your dentist to have a dental exam and fix any dental  problems before starting this medicine. Take good care of your teeth while on this medicine. Make sure you see your dentist for regular follow-up appointments.  NOTE:This sheet is a summary. It may not cover all possible information. If you have questions about this medicine, talk to your doctor, pharmacist, or health care provider. Copyright© 2017 [employer]

## 2020-12-02 ENCOUNTER — INFUSION (OUTPATIENT)
Dept: INFUSION THERAPY | Facility: HOSPITAL | Age: 70
End: 2020-12-02
Attending: PHYSICIAN ASSISTANT
Payer: MEDICARE

## 2020-12-02 VITALS
TEMPERATURE: 98 F | SYSTOLIC BLOOD PRESSURE: 147 MMHG | DIASTOLIC BLOOD PRESSURE: 80 MMHG | WEIGHT: 149.94 LBS | OXYGEN SATURATION: 96 % | BODY MASS INDEX: 24.95 KG/M2 | HEART RATE: 78 BPM | RESPIRATION RATE: 16 BRPM

## 2020-12-02 DIAGNOSIS — M85.80 OSTEOPENIA WITH HIGH RISK OF FRACTURE: Primary | ICD-10-CM

## 2020-12-02 DIAGNOSIS — K21.00 GASTROESOPHAGEAL REFLUX DISEASE WITH ESOPHAGITIS WITHOUT HEMORRHAGE: ICD-10-CM

## 2020-12-02 PROCEDURE — 25000003 PHARM REV CODE 250: Performed by: PHYSICIAN ASSISTANT

## 2020-12-02 PROCEDURE — 96365 THER/PROPH/DIAG IV INF INIT: CPT

## 2020-12-02 PROCEDURE — A4216 STERILE WATER/SALINE, 10 ML: HCPCS | Performed by: PHYSICIAN ASSISTANT

## 2020-12-02 PROCEDURE — 63600175 PHARM REV CODE 636 W HCPCS: Performed by: PHYSICIAN ASSISTANT

## 2020-12-02 RX ORDER — ACETAMINOPHEN 325 MG/1
650 TABLET ORAL
OUTPATIENT
Start: 2020-12-02

## 2020-12-02 RX ORDER — SODIUM CHLORIDE 0.9 % (FLUSH) 0.9 %
10 SYRINGE (ML) INJECTION
OUTPATIENT
Start: 2020-12-02

## 2020-12-02 RX ORDER — BUPROPION HYDROCHLORIDE 300 MG/1
300 TABLET ORAL DAILY
Qty: 30 TABLET | Refills: 0 | Status: SHIPPED | OUTPATIENT
Start: 2020-12-02 | End: 2021-02-21 | Stop reason: SDUPTHER

## 2020-12-02 RX ORDER — ACETAMINOPHEN 325 MG/1
650 TABLET ORAL
Status: COMPLETED | OUTPATIENT
Start: 2020-12-02 | End: 2020-12-02

## 2020-12-02 RX ORDER — ZOLEDRONIC ACID 5 MG/100ML
5 INJECTION, SOLUTION INTRAVENOUS
Status: COMPLETED | OUTPATIENT
Start: 2020-12-02 | End: 2020-12-02

## 2020-12-02 RX ORDER — SODIUM CHLORIDE 0.9 % (FLUSH) 0.9 %
10 SYRINGE (ML) INJECTION
Status: DISCONTINUED | OUTPATIENT
Start: 2020-12-02 | End: 2020-12-02 | Stop reason: HOSPADM

## 2020-12-02 RX ORDER — HEPARIN 100 UNIT/ML
500 SYRINGE INTRAVENOUS
OUTPATIENT
Start: 2020-12-02

## 2020-12-02 RX ORDER — ZOLEDRONIC ACID 5 MG/100ML
5 INJECTION, SOLUTION INTRAVENOUS
OUTPATIENT
Start: 2020-12-02

## 2020-12-02 RX ADMIN — Medication 10 ML: at 03:12

## 2020-12-02 RX ADMIN — ZOLEDRONIC ACID 5 MG: 5 INJECTION, SOLUTION INTRAVENOUS at 03:12

## 2020-12-02 RX ADMIN — ACETAMINOPHEN 650 MG: 325 TABLET ORAL at 03:12

## 2020-12-02 NOTE — NURSING
Infusion# 1    Recent labs? yes    S/S infection noted or voiced? None noted/ denies  Recent or planned surgeries/invasive procedures? Denies  Recent vaccines? Denies    Premeds? Tylenol 650 mg PO    Reclast 5  mg administered IV at a 15 minute rate per orders; see MAR & Flow Sheet for more  details. Tolerated well without adverse events

## 2020-12-02 NOTE — DISCHARGE INSTRUCTIONS
[facility]  [id number] [address]  [address]  [phone] phone     Hours of Operation: Monday- Friday 8:00am- 5:00pm  [phone]    [address] Dr[address] Dr., [place]      Please call with any concerns regarding your appointment today.

## 2020-12-08 ENCOUNTER — PATIENT MESSAGE (OUTPATIENT)
Dept: FAMILY MEDICINE | Facility: CLINIC | Age: 70
End: 2020-12-08

## 2020-12-08 ENCOUNTER — LAB VISIT (OUTPATIENT)
Dept: LAB | Facility: HOSPITAL | Age: 70
End: 2020-12-08
Payer: MEDICARE

## 2020-12-08 DIAGNOSIS — N30.90 CYSTITIS: ICD-10-CM

## 2020-12-08 DIAGNOSIS — N30.90 CYSTITIS: Primary | ICD-10-CM

## 2020-12-08 LAB
BACTERIA #/AREA URNS HPF: NORMAL /HPF
BILIRUB UR QL STRIP: NEGATIVE
CLARITY UR: CLEAR
COLOR UR: YELLOW
GLUCOSE UR QL STRIP: NEGATIVE
HGB UR QL STRIP: ABNORMAL
KETONES UR QL STRIP: NEGATIVE
LEUKOCYTE ESTERASE UR QL STRIP: NEGATIVE
MICROSCOPIC COMMENT: NORMAL
NITRITE UR QL STRIP: NEGATIVE
PH UR STRIP: 8 [PH] (ref 5–8)
PROT UR QL STRIP: NEGATIVE
RBC #/AREA URNS HPF: 2 /HPF (ref 0–4)
SP GR UR STRIP: 1.01 (ref 1–1.03)
SQUAMOUS #/AREA URNS HPF: 2 /HPF
URN SPEC COLLECT METH UR: ABNORMAL
WBC #/AREA URNS HPF: 5 /HPF (ref 0–5)

## 2020-12-08 PROCEDURE — 87086 URINE CULTURE/COLONY COUNT: CPT

## 2020-12-08 PROCEDURE — 81000 URINALYSIS NONAUTO W/SCOPE: CPT

## 2020-12-08 RX ORDER — CIPROFLOXACIN 250 MG/1
250 TABLET, FILM COATED ORAL 2 TIMES DAILY
Qty: 14 TABLET | Refills: 0 | Status: SHIPPED | OUTPATIENT
Start: 2020-12-08 | End: 2020-12-08

## 2020-12-09 LAB — BACTERIA UR CULT: NO GROWTH

## 2020-12-11 ENCOUNTER — PATIENT MESSAGE (OUTPATIENT)
Dept: OTHER | Facility: OTHER | Age: 70
End: 2020-12-11

## 2020-12-16 ENCOUNTER — PATIENT MESSAGE (OUTPATIENT)
Dept: FAMILY MEDICINE | Facility: CLINIC | Age: 70
End: 2020-12-16

## 2021-01-07 ENCOUNTER — PATIENT MESSAGE (OUTPATIENT)
Dept: PSYCHIATRY | Facility: CLINIC | Age: 71
End: 2021-01-07

## 2021-01-07 ENCOUNTER — TELEPHONE (OUTPATIENT)
Dept: PSYCHIATRY | Facility: CLINIC | Age: 71
End: 2021-01-07

## 2021-01-13 ENCOUNTER — OFFICE VISIT (OUTPATIENT)
Dept: PODIATRY | Facility: CLINIC | Age: 71
End: 2021-01-13
Payer: MEDICARE

## 2021-01-13 VITALS — HEIGHT: 65 IN | BODY MASS INDEX: 25.42 KG/M2 | WEIGHT: 152.56 LBS

## 2021-01-13 DIAGNOSIS — M72.2 BILATERAL PLANTAR FASCIITIS: Primary | ICD-10-CM

## 2021-01-13 PROCEDURE — 99999 PR PBB SHADOW E&M-EST. PATIENT-LVL II: ICD-10-PCS | Mod: PBBFAC,,, | Performed by: PODIATRIST

## 2021-01-13 PROCEDURE — 99213 PR OFFICE/OUTPT VISIT, EST, LEVL III, 20-29 MIN: ICD-10-PCS | Mod: S$PBB,,, | Performed by: PODIATRIST

## 2021-01-13 PROCEDURE — 99212 OFFICE O/P EST SF 10 MIN: CPT | Mod: PBBFAC | Performed by: PODIATRIST

## 2021-01-13 PROCEDURE — 99999 PR PBB SHADOW E&M-EST. PATIENT-LVL II: CPT | Mod: PBBFAC,,, | Performed by: PODIATRIST

## 2021-01-13 PROCEDURE — 99213 OFFICE O/P EST LOW 20 MIN: CPT | Mod: S$PBB,,, | Performed by: PODIATRIST

## 2021-01-21 ENCOUNTER — LAB VISIT (OUTPATIENT)
Dept: LAB | Facility: HOSPITAL | Age: 71
End: 2021-01-21
Attending: INTERNAL MEDICINE
Payer: MEDICARE

## 2021-01-21 DIAGNOSIS — D50.8 OTHER IRON DEFICIENCY ANEMIA: ICD-10-CM

## 2021-01-21 LAB
BASOPHILS # BLD AUTO: 0.11 K/UL (ref 0–0.2)
BASOPHILS NFR BLD: 1.7 % (ref 0–1.9)
DIFFERENTIAL METHOD: ABNORMAL
EOSINOPHIL # BLD AUTO: 0.5 K/UL (ref 0–0.5)
EOSINOPHIL NFR BLD: 7.5 % (ref 0–8)
ERYTHROCYTE [DISTWIDTH] IN BLOOD BY AUTOMATED COUNT: 12 % (ref 11.5–14.5)
FERRITIN SERPL-MCNC: 47 NG/ML (ref 20–300)
HCT VFR BLD AUTO: 37.2 % (ref 37–48.5)
HGB BLD-MCNC: 11.5 G/DL (ref 12–16)
IMM GRANULOCYTES # BLD AUTO: 0.02 K/UL (ref 0–0.04)
IMM GRANULOCYTES NFR BLD AUTO: 0.3 % (ref 0–0.5)
IRON SERPL-MCNC: 50 UG/DL (ref 30–160)
LYMPHOCYTES # BLD AUTO: 1.7 K/UL (ref 1–4.8)
LYMPHOCYTES NFR BLD: 26 % (ref 18–48)
MCH RBC QN AUTO: 28.5 PG (ref 27–31)
MCHC RBC AUTO-ENTMCNC: 30.9 G/DL (ref 32–36)
MCV RBC AUTO: 92 FL (ref 82–98)
MONOCYTES # BLD AUTO: 0.7 K/UL (ref 0.3–1)
MONOCYTES NFR BLD: 10 % (ref 4–15)
NEUTROPHILS # BLD AUTO: 3.6 K/UL (ref 1.8–7.7)
NEUTROPHILS NFR BLD: 54.5 % (ref 38–73)
NRBC BLD-RTO: 0 /100 WBC
PLATELET # BLD AUTO: 296 K/UL (ref 150–350)
PMV BLD AUTO: 10.1 FL (ref 9.2–12.9)
RBC # BLD AUTO: 4.03 M/UL (ref 4–5.4)
SATURATED IRON: 11 % (ref 20–50)
TOTAL IRON BINDING CAPACITY: 468 UG/DL (ref 250–450)
TRANSFERRIN SERPL-MCNC: 316 MG/DL (ref 200–375)
WBC # BLD AUTO: 6.53 K/UL (ref 3.9–12.7)

## 2021-01-21 PROCEDURE — 82728 ASSAY OF FERRITIN: CPT

## 2021-01-21 PROCEDURE — 85025 COMPLETE CBC W/AUTO DIFF WBC: CPT

## 2021-01-21 PROCEDURE — 83540 ASSAY OF IRON: CPT

## 2021-01-21 PROCEDURE — 36415 COLL VENOUS BLD VENIPUNCTURE: CPT

## 2021-01-23 ENCOUNTER — PATIENT MESSAGE (OUTPATIENT)
Dept: OTOLARYNGOLOGY | Facility: CLINIC | Age: 71
End: 2021-01-23

## 2021-01-23 ENCOUNTER — OFFICE VISIT (OUTPATIENT)
Dept: URGENT CARE | Facility: CLINIC | Age: 71
End: 2021-01-23
Payer: MEDICARE

## 2021-01-23 VITALS
BODY MASS INDEX: 24.99 KG/M2 | DIASTOLIC BLOOD PRESSURE: 85 MMHG | WEIGHT: 150 LBS | OXYGEN SATURATION: 96 % | HEIGHT: 65 IN | TEMPERATURE: 99 F | HEART RATE: 85 BPM | SYSTOLIC BLOOD PRESSURE: 176 MMHG

## 2021-01-23 DIAGNOSIS — J32.0 CHRONIC MAXILLARY SINUSITIS: Primary | ICD-10-CM

## 2021-01-23 DIAGNOSIS — Z11.9 ENCOUNTER FOR SCREENING EXAMINATION FOR INFECTIOUS DISEASE: ICD-10-CM

## 2021-01-23 DIAGNOSIS — I10 ELEVATED BLOOD PRESSURE READING IN OFFICE WITH DIAGNOSIS OF HYPERTENSION: ICD-10-CM

## 2021-01-23 LAB
CTP QC/QA: YES
SARS-COV-2 RDRP RESP QL NAA+PROBE: NEGATIVE

## 2021-01-23 PROCEDURE — U0002: ICD-10-PCS | Mod: QW,CR,S$GLB, | Performed by: PHYSICIAN ASSISTANT

## 2021-01-23 PROCEDURE — U0002 COVID-19 LAB TEST NON-CDC: HCPCS | Mod: QW,CR,S$GLB, | Performed by: PHYSICIAN ASSISTANT

## 2021-01-23 PROCEDURE — 99213 PR OFFICE/OUTPT VISIT, EST, LEVL III, 20-29 MIN: ICD-10-PCS | Mod: S$GLB,,, | Performed by: PHYSICIAN ASSISTANT

## 2021-01-23 PROCEDURE — 99213 OFFICE O/P EST LOW 20 MIN: CPT | Mod: S$GLB,,, | Performed by: PHYSICIAN ASSISTANT

## 2021-01-23 RX ORDER — PREDNISONE 20 MG/1
TABLET ORAL
Qty: 7 TABLET | Refills: 0 | Status: SHIPPED | OUTPATIENT
Start: 2021-01-23 | End: 2021-02-23

## 2021-01-23 RX ORDER — LEVOFLOXACIN 500 MG/1
500 TABLET, FILM COATED ORAL DAILY
Qty: 10 TABLET | Refills: 0 | Status: SHIPPED | OUTPATIENT
Start: 2021-01-23 | End: 2021-02-02

## 2021-01-25 RX ORDER — FLUTICASONE PROPIONATE 50 MCG
2 SPRAY, SUSPENSION (ML) NASAL 2 TIMES DAILY
Qty: 9.9 ML | Refills: 11 | Status: SHIPPED | OUTPATIENT
Start: 2021-01-25 | End: 2022-08-29

## 2021-01-26 ENCOUNTER — OFFICE VISIT (OUTPATIENT)
Dept: HEMATOLOGY/ONCOLOGY | Facility: CLINIC | Age: 71
End: 2021-01-26
Payer: MEDICARE

## 2021-01-26 VITALS
DIASTOLIC BLOOD PRESSURE: 75 MMHG | OXYGEN SATURATION: 98 % | TEMPERATURE: 97 F | SYSTOLIC BLOOD PRESSURE: 150 MMHG | BODY MASS INDEX: 25.93 KG/M2 | HEIGHT: 65 IN | WEIGHT: 155.63 LBS | HEART RATE: 82 BPM

## 2021-01-26 DIAGNOSIS — K21.00 GASTROESOPHAGEAL REFLUX DISEASE WITH ESOPHAGITIS WITHOUT HEMORRHAGE: ICD-10-CM

## 2021-01-26 DIAGNOSIS — D50.9 IRON DEFICIENCY ANEMIA, UNSPECIFIED IRON DEFICIENCY ANEMIA TYPE: Primary | ICD-10-CM

## 2021-01-26 DIAGNOSIS — M35.00 SJOGREN'S SYNDROME, WITH UNSPECIFIED ORGAN INVOLVEMENT: ICD-10-CM

## 2021-01-26 PROCEDURE — 99213 OFFICE O/P EST LOW 20 MIN: CPT | Mod: PBBFAC | Performed by: NURSE PRACTITIONER

## 2021-01-26 PROCEDURE — 99214 PR OFFICE/OUTPT VISIT, EST, LEVL IV, 30-39 MIN: ICD-10-PCS | Mod: S$PBB,,, | Performed by: NURSE PRACTITIONER

## 2021-01-26 PROCEDURE — 99999 PR PBB SHADOW E&M-EST. PATIENT-LVL III: ICD-10-PCS | Mod: PBBFAC,,, | Performed by: NURSE PRACTITIONER

## 2021-01-26 PROCEDURE — 99214 OFFICE O/P EST MOD 30 MIN: CPT | Mod: S$PBB,,, | Performed by: NURSE PRACTITIONER

## 2021-01-26 PROCEDURE — 99999 PR PBB SHADOW E&M-EST. PATIENT-LVL III: CPT | Mod: PBBFAC,,, | Performed by: NURSE PRACTITIONER

## 2021-01-26 RX ORDER — HEPARIN 100 UNIT/ML
500 SYRINGE INTRAVENOUS
Status: CANCELLED | OUTPATIENT
Start: 2021-01-26

## 2021-01-26 RX ORDER — SODIUM CHLORIDE 0.9 % (FLUSH) 0.9 %
10 SYRINGE (ML) INJECTION
Status: CANCELLED | OUTPATIENT
Start: 2021-01-26

## 2021-01-27 ENCOUNTER — TELEPHONE (OUTPATIENT)
Dept: ENDOSCOPY | Facility: HOSPITAL | Age: 71
End: 2021-01-27

## 2021-02-03 ENCOUNTER — INFUSION (OUTPATIENT)
Dept: INFUSION THERAPY | Facility: HOSPITAL | Age: 71
End: 2021-02-03
Attending: NURSE PRACTITIONER
Payer: MEDICARE

## 2021-02-03 VITALS
SYSTOLIC BLOOD PRESSURE: 153 MMHG | BODY MASS INDEX: 25.93 KG/M2 | TEMPERATURE: 98 F | DIASTOLIC BLOOD PRESSURE: 77 MMHG | HEIGHT: 65 IN | HEART RATE: 73 BPM | RESPIRATION RATE: 18 BRPM | OXYGEN SATURATION: 97 % | WEIGHT: 155.63 LBS

## 2021-02-03 DIAGNOSIS — D50.8 OTHER IRON DEFICIENCY ANEMIA: Primary | ICD-10-CM

## 2021-02-03 PROCEDURE — 63600175 PHARM REV CODE 636 W HCPCS: Mod: JG | Performed by: NURSE PRACTITIONER

## 2021-02-03 PROCEDURE — 96365 THER/PROPH/DIAG IV INF INIT: CPT

## 2021-02-03 PROCEDURE — A4216 STERILE WATER/SALINE, 10 ML: HCPCS | Performed by: NURSE PRACTITIONER

## 2021-02-03 PROCEDURE — 25000003 PHARM REV CODE 250: Performed by: NURSE PRACTITIONER

## 2021-02-03 PROCEDURE — 96375 TX/PRO/DX INJ NEW DRUG ADDON: CPT

## 2021-02-03 RX ORDER — SODIUM CHLORIDE 0.9 % (FLUSH) 0.9 %
10 SYRINGE (ML) INJECTION
Status: DISCONTINUED | OUTPATIENT
Start: 2021-02-03 | End: 2021-02-03 | Stop reason: HOSPADM

## 2021-02-03 RX ORDER — METHYLPREDNISOLONE SOD SUCC 125 MG
80 VIAL (EA) INJECTION
Status: COMPLETED | OUTPATIENT
Start: 2021-02-03 | End: 2021-02-03

## 2021-02-03 RX ADMIN — FERUMOXYTOL 510 MG: 510 INJECTION INTRAVENOUS at 03:02

## 2021-02-03 RX ADMIN — METHYLPREDNISOLONE SODIUM SUCCINATE 80 MG: 125 INJECTION, POWDER, FOR SOLUTION INTRAMUSCULAR; INTRAVENOUS at 03:02

## 2021-02-03 RX ADMIN — Medication 10 ML: at 03:02

## 2021-02-22 RX ORDER — BUPROPION HYDROCHLORIDE 300 MG/1
300 TABLET ORAL DAILY
Qty: 30 TABLET | Refills: 0 | Status: SHIPPED | OUTPATIENT
Start: 2021-02-22 | End: 2021-02-28

## 2021-02-23 ENCOUNTER — OFFICE VISIT (OUTPATIENT)
Dept: FAMILY MEDICINE | Facility: CLINIC | Age: 71
End: 2021-02-23
Payer: MEDICARE

## 2021-02-23 VITALS
OXYGEN SATURATION: 97 % | WEIGHT: 149.5 LBS | BODY MASS INDEX: 24.91 KG/M2 | HEIGHT: 65 IN | RESPIRATION RATE: 18 BRPM | DIASTOLIC BLOOD PRESSURE: 80 MMHG | TEMPERATURE: 98 F | HEART RATE: 89 BPM | SYSTOLIC BLOOD PRESSURE: 138 MMHG

## 2021-02-23 DIAGNOSIS — I10 ESSENTIAL HYPERTENSION: Primary | ICD-10-CM

## 2021-02-23 PROCEDURE — 99214 PR OFFICE/OUTPT VISIT, EST, LEVL IV, 30-39 MIN: ICD-10-PCS | Mod: S$PBB,,, | Performed by: REGISTERED NURSE

## 2021-02-23 PROCEDURE — 99215 OFFICE O/P EST HI 40 MIN: CPT | Mod: PBBFAC,PO | Performed by: REGISTERED NURSE

## 2021-02-23 PROCEDURE — 99214 OFFICE O/P EST MOD 30 MIN: CPT | Mod: S$PBB,,, | Performed by: REGISTERED NURSE

## 2021-02-23 PROCEDURE — 99999 PR PBB SHADOW E&M-EST. PATIENT-LVL V: CPT | Mod: PBBFAC,,, | Performed by: REGISTERED NURSE

## 2021-02-23 PROCEDURE — 99999 PR PBB SHADOW E&M-EST. PATIENT-LVL V: ICD-10-PCS | Mod: PBBFAC,,, | Performed by: REGISTERED NURSE

## 2021-02-23 RX ORDER — LOSARTAN POTASSIUM 100 MG/1
100 TABLET ORAL DAILY
Qty: 90 TABLET | Refills: 0 | Status: SHIPPED | OUTPATIENT
Start: 2021-02-23 | End: 2021-03-29 | Stop reason: SDUPTHER

## 2021-02-25 ENCOUNTER — PATIENT MESSAGE (OUTPATIENT)
Dept: HEMATOLOGY/ONCOLOGY | Facility: CLINIC | Age: 71
End: 2021-02-25

## 2021-03-01 ENCOUNTER — PATIENT MESSAGE (OUTPATIENT)
Dept: FAMILY MEDICINE | Facility: CLINIC | Age: 71
End: 2021-03-01

## 2021-03-05 ENCOUNTER — PES CALL (OUTPATIENT)
Dept: ADMINISTRATIVE | Facility: CLINIC | Age: 71
End: 2021-03-05

## 2021-03-11 ENCOUNTER — OFFICE VISIT (OUTPATIENT)
Dept: GASTROENTEROLOGY | Facility: CLINIC | Age: 71
End: 2021-03-11
Payer: MEDICARE

## 2021-03-11 ENCOUNTER — PATIENT MESSAGE (OUTPATIENT)
Dept: RHEUMATOLOGY | Facility: CLINIC | Age: 71
End: 2021-03-11

## 2021-03-11 ENCOUNTER — HOSPITAL ENCOUNTER (OUTPATIENT)
Dept: RADIOLOGY | Facility: HOSPITAL | Age: 71
Discharge: HOME | End: 2021-03-11
Attending: PHYSICIAN ASSISTANT
Payer: MEDICARE

## 2021-03-11 ENCOUNTER — OFFICE VISIT (OUTPATIENT)
Dept: OTOLARYNGOLOGY | Facility: CLINIC | Age: 71
End: 2021-03-11
Payer: MEDICARE

## 2021-03-11 ENCOUNTER — OFFICE VISIT (OUTPATIENT)
Dept: URGENT CARE | Facility: CLINIC | Age: 71
End: 2021-03-11
Payer: MEDICARE

## 2021-03-11 VITALS
OXYGEN SATURATION: 96 % | TEMPERATURE: 100 F | SYSTOLIC BLOOD PRESSURE: 130 MMHG | DIASTOLIC BLOOD PRESSURE: 68 MMHG | WEIGHT: 147.94 LBS | BODY MASS INDEX: 24.65 KG/M2 | HEART RATE: 81 BPM | HEIGHT: 65 IN

## 2021-03-11 VITALS
BODY MASS INDEX: 24.54 KG/M2 | TEMPERATURE: 100 F | SYSTOLIC BLOOD PRESSURE: 157 MMHG | OXYGEN SATURATION: 95 % | WEIGHT: 147.5 LBS | DIASTOLIC BLOOD PRESSURE: 73 MMHG | HEART RATE: 86 BPM

## 2021-03-11 VITALS
TEMPERATURE: 99 F | BODY MASS INDEX: 24.69 KG/M2 | DIASTOLIC BLOOD PRESSURE: 73 MMHG | SYSTOLIC BLOOD PRESSURE: 148 MMHG | WEIGHT: 148.38 LBS | HEART RATE: 86 BPM

## 2021-03-11 DIAGNOSIS — R53.83 FATIGUE, UNSPECIFIED TYPE: ICD-10-CM

## 2021-03-11 DIAGNOSIS — J32.4 CHRONIC PANSINUSITIS: ICD-10-CM

## 2021-03-11 DIAGNOSIS — K59.00 CONSTIPATION, UNSPECIFIED CONSTIPATION TYPE: ICD-10-CM

## 2021-03-11 DIAGNOSIS — R05.9 COUGH: Primary | ICD-10-CM

## 2021-03-11 DIAGNOSIS — R05.9 COUGH: ICD-10-CM

## 2021-03-11 DIAGNOSIS — I10 ESSENTIAL HYPERTENSION: ICD-10-CM

## 2021-03-11 DIAGNOSIS — K21.9 GASTROESOPHAGEAL REFLUX DISEASE, UNSPECIFIED WHETHER ESOPHAGITIS PRESENT: ICD-10-CM

## 2021-03-11 DIAGNOSIS — R05.8 COUGH PRESENT FOR GREATER THAN 3 WEEKS: ICD-10-CM

## 2021-03-11 DIAGNOSIS — R05.8 COUGH PRESENT FOR GREATER THAN 3 WEEKS: Primary | ICD-10-CM

## 2021-03-11 DIAGNOSIS — R50.9 FEVER, UNSPECIFIED FEVER CAUSE: ICD-10-CM

## 2021-03-11 DIAGNOSIS — J30.89 NON-SEASONAL ALLERGIC RHINITIS, UNSPECIFIED TRIGGER: ICD-10-CM

## 2021-03-11 DIAGNOSIS — Z20.822 SUSPECTED COVID-19 VIRUS INFECTION: ICD-10-CM

## 2021-03-11 DIAGNOSIS — R10.10 UPPER ABDOMINAL PAIN: Primary | ICD-10-CM

## 2021-03-11 DIAGNOSIS — R50.9 FEVER AND CHILLS: ICD-10-CM

## 2021-03-11 DIAGNOSIS — M06.9 RHEUMATOID ARTHRITIS FLARE: Primary | ICD-10-CM

## 2021-03-11 PROCEDURE — 99999 PR PBB SHADOW E&M-EST. PATIENT-LVL V: ICD-10-PCS | Mod: PBBFAC,,, | Performed by: NURSE PRACTITIONER

## 2021-03-11 PROCEDURE — 99214 OFFICE O/P EST MOD 30 MIN: CPT | Mod: S$PBB,,, | Performed by: NURSE PRACTITIONER

## 2021-03-11 PROCEDURE — 99215 OFFICE O/P EST HI 40 MIN: CPT | Mod: PBBFAC,25 | Performed by: PHYSICIAN ASSISTANT

## 2021-03-11 PROCEDURE — 71046 XR CHEST PA AND LATERAL: ICD-10-PCS | Mod: 26,,, | Performed by: RADIOLOGY

## 2021-03-11 PROCEDURE — 71046 X-RAY EXAM CHEST 2 VIEWS: CPT | Mod: TC

## 2021-03-11 PROCEDURE — 99214 PR OFFICE/OUTPT VISIT, EST, LEVL IV, 30-39 MIN: ICD-10-PCS | Mod: S$PBB,,, | Performed by: PHYSICIAN ASSISTANT

## 2021-03-11 PROCEDURE — 99214 PR OFFICE/OUTPT VISIT, EST, LEVL IV, 30-39 MIN: ICD-10-PCS | Mod: S$PBB,,, | Performed by: NURSE PRACTITIONER

## 2021-03-11 PROCEDURE — 99999 PR PBB SHADOW E&M-EST. PATIENT-LVL V: ICD-10-PCS | Mod: PBBFAC,,, | Performed by: PHYSICIAN ASSISTANT

## 2021-03-11 PROCEDURE — 99215 OFFICE O/P EST HI 40 MIN: CPT | Mod: PBBFAC,25,27,PO | Performed by: NURSE PRACTITIONER

## 2021-03-11 PROCEDURE — 99999 PR PBB SHADOW E&M-EST. PATIENT-LVL V: CPT | Mod: PBBFAC,,, | Performed by: PHYSICIAN ASSISTANT

## 2021-03-11 PROCEDURE — 71046 X-RAY EXAM CHEST 2 VIEWS: CPT | Mod: 26,,, | Performed by: RADIOLOGY

## 2021-03-11 PROCEDURE — 99214 OFFICE O/P EST MOD 30 MIN: CPT | Mod: S$PBB,,, | Performed by: PHYSICIAN ASSISTANT

## 2021-03-11 PROCEDURE — 99215 OFFICE O/P EST HI 40 MIN: CPT | Mod: PBBFAC,25,27 | Performed by: PHYSICIAN ASSISTANT

## 2021-03-11 PROCEDURE — 99999 PR PBB SHADOW E&M-EST. PATIENT-LVL V: CPT | Mod: PBBFAC,,, | Performed by: NURSE PRACTITIONER

## 2021-03-11 RX ORDER — OMEPRAZOLE 40 MG/1
40 CAPSULE, DELAYED RELEASE ORAL DAILY
Qty: 30 CAPSULE | Refills: 11 | Status: SHIPPED | OUTPATIENT
Start: 2021-03-11 | End: 2022-03-27 | Stop reason: SDUPTHER

## 2021-03-11 RX ORDER — BENZONATATE 100 MG/1
200 CAPSULE ORAL 3 TIMES DAILY PRN
Qty: 60 CAPSULE | Refills: 1 | Status: SHIPPED | OUTPATIENT
Start: 2021-03-11 | End: 2021-07-16

## 2021-03-12 ENCOUNTER — PATIENT MESSAGE (OUTPATIENT)
Dept: GASTROENTEROLOGY | Facility: CLINIC | Age: 71
End: 2021-03-12

## 2021-03-12 ENCOUNTER — PATIENT MESSAGE (OUTPATIENT)
Dept: OTOLARYNGOLOGY | Facility: CLINIC | Age: 71
End: 2021-03-12

## 2021-03-16 ENCOUNTER — PATIENT MESSAGE (OUTPATIENT)
Dept: OTOLARYNGOLOGY | Facility: CLINIC | Age: 71
End: 2021-03-16

## 2021-03-18 ENCOUNTER — OFFICE VISIT (OUTPATIENT)
Dept: OTOLARYNGOLOGY | Facility: CLINIC | Age: 71
End: 2021-03-18
Payer: MEDICARE

## 2021-03-18 VITALS
DIASTOLIC BLOOD PRESSURE: 87 MMHG | HEART RATE: 77 BPM | WEIGHT: 146.63 LBS | SYSTOLIC BLOOD PRESSURE: 156 MMHG | TEMPERATURE: 98 F | BODY MASS INDEX: 24.4 KG/M2

## 2021-03-18 DIAGNOSIS — R05.3 CHRONIC COUGH: Primary | ICD-10-CM

## 2021-03-18 PROCEDURE — 99213 PR OFFICE/OUTPT VISIT, EST, LEVL III, 20-29 MIN: ICD-10-PCS | Mod: 25,S$PBB,, | Performed by: OTOLARYNGOLOGY

## 2021-03-18 PROCEDURE — 99999 PR PBB SHADOW E&M-EST. PATIENT-LVL V: ICD-10-PCS | Mod: PBBFAC,,, | Performed by: OTOLARYNGOLOGY

## 2021-03-18 PROCEDURE — 99999 PR PBB SHADOW E&M-EST. PATIENT-LVL V: CPT | Mod: PBBFAC,,, | Performed by: OTOLARYNGOLOGY

## 2021-03-18 PROCEDURE — 31575 DIAGNOSTIC LARYNGOSCOPY: CPT | Mod: S$PBB,,, | Performed by: OTOLARYNGOLOGY

## 2021-03-18 PROCEDURE — 99215 OFFICE O/P EST HI 40 MIN: CPT | Mod: PBBFAC | Performed by: OTOLARYNGOLOGY

## 2021-03-18 PROCEDURE — 31575 DIAGNOSTIC LARYNGOSCOPY: CPT | Mod: PBBFAC | Performed by: OTOLARYNGOLOGY

## 2021-03-18 PROCEDURE — 99213 OFFICE O/P EST LOW 20 MIN: CPT | Mod: 25,S$PBB,, | Performed by: OTOLARYNGOLOGY

## 2021-03-18 PROCEDURE — 31575 PR LARYNGOSCOPY, FLEXIBLE; DIAGNOSTIC: ICD-10-PCS | Mod: S$PBB,,, | Performed by: OTOLARYNGOLOGY

## 2021-03-18 RX ORDER — METHYLPREDNISOLONE 4 MG/1
TABLET ORAL
Qty: 1 PACKAGE | Refills: 0 | Status: SHIPPED | OUTPATIENT
Start: 2021-03-18 | End: 2021-04-06

## 2021-03-19 ENCOUNTER — TELEPHONE (OUTPATIENT)
Dept: RADIOLOGY | Facility: HOSPITAL | Age: 71
End: 2021-03-19

## 2021-03-22 ENCOUNTER — TELEPHONE (OUTPATIENT)
Dept: RHEUMATOLOGY | Facility: CLINIC | Age: 71
End: 2021-03-22

## 2021-03-22 ENCOUNTER — HOSPITAL ENCOUNTER (OUTPATIENT)
Dept: RADIOLOGY | Facility: HOSPITAL | Age: 71
Discharge: HOME | DRG: 417 | End: 2021-03-22
Attending: PHYSICIAN ASSISTANT
Payer: MEDICARE

## 2021-03-22 DIAGNOSIS — R10.10 UPPER ABDOMINAL PAIN: ICD-10-CM

## 2021-03-22 PROCEDURE — 76700 US ABDOMEN COMPLETE: ICD-10-PCS | Mod: 26,,, | Performed by: RADIOLOGY

## 2021-03-22 PROCEDURE — 76700 US EXAM ABDOM COMPLETE: CPT | Mod: TC

## 2021-03-22 PROCEDURE — 76700 US EXAM ABDOM COMPLETE: CPT | Mod: 26,,, | Performed by: RADIOLOGY

## 2021-03-24 ENCOUNTER — TELEPHONE (OUTPATIENT)
Dept: GASTROENTEROLOGY | Facility: CLINIC | Age: 71
End: 2021-03-24

## 2021-03-24 ENCOUNTER — DOCUMENTATION ONLY (OUTPATIENT)
Dept: GASTROENTEROLOGY | Facility: HOSPITAL | Age: 71
End: 2021-03-24

## 2021-03-24 ENCOUNTER — HOSPITAL ENCOUNTER (INPATIENT)
Facility: HOSPITAL | Age: 71
LOS: 1 days | Discharge: HOME | DRG: 417 | End: 2021-03-26
Attending: EMERGENCY MEDICINE | Admitting: INTERNAL MEDICINE
Payer: MEDICARE

## 2021-03-24 ENCOUNTER — PATIENT MESSAGE (OUTPATIENT)
Dept: GASTROENTEROLOGY | Facility: CLINIC | Age: 71
End: 2021-03-24

## 2021-03-24 DIAGNOSIS — K85.10 ACUTE BILIARY PANCREATITIS WITHOUT INFECTION OR NECROSIS: ICD-10-CM

## 2021-03-24 DIAGNOSIS — K83.1 BILIARY OBSTRUCTION: Primary | ICD-10-CM

## 2021-03-24 DIAGNOSIS — K80.70 CHOLELITHIASIS WITH CHOLEDOCHOLITHIASIS: ICD-10-CM

## 2021-03-24 PROBLEM — K80.51 CHOLEDOCHOLITHIASIS WITH OBSTRUCTION: Status: ACTIVE | Noted: 2021-03-24

## 2021-03-24 LAB
ALBUMIN SERPL BCP-MCNC: 3.7 G/DL (ref 3.5–5.2)
ALP SERPL-CCNC: 258 U/L (ref 55–135)
ALT SERPL W/O P-5'-P-CCNC: 627 U/L (ref 10–44)
AMYLASE SERPL-CCNC: 127 U/L (ref 20–110)
ANION GAP SERPL CALC-SCNC: 11 MMOL/L (ref 8–16)
AST SERPL-CCNC: 827 U/L (ref 10–40)
BASOPHILS # BLD AUTO: 0.06 K/UL (ref 0–0.2)
BASOPHILS NFR BLD: 0.9 % (ref 0–1.9)
BILIRUB SERPL-MCNC: 1.1 MG/DL (ref 0.1–1)
BILIRUB UR QL STRIP: NEGATIVE
BUN SERPL-MCNC: 12 MG/DL (ref 8–23)
CALCIUM SERPL-MCNC: 9.5 MG/DL (ref 8.7–10.5)
CHLORIDE SERPL-SCNC: 96 MMOL/L (ref 95–110)
CLARITY UR: CLEAR
CO2 SERPL-SCNC: 28 MMOL/L (ref 23–29)
COLOR UR: YELLOW
CREAT SERPL-MCNC: 0.7 MG/DL (ref 0.5–1.4)
DIFFERENTIAL METHOD: ABNORMAL
EOSINOPHIL # BLD AUTO: 0.1 K/UL (ref 0–0.5)
EOSINOPHIL NFR BLD: 1.9 % (ref 0–8)
ERYTHROCYTE [DISTWIDTH] IN BLOOD BY AUTOMATED COUNT: 12 % (ref 11.5–14.5)
EST. GFR  (AFRICAN AMERICAN): >60 ML/MIN/1.73 M^2
EST. GFR  (NON AFRICAN AMERICAN): >60 ML/MIN/1.73 M^2
GLUCOSE SERPL-MCNC: 107 MG/DL (ref 70–110)
GLUCOSE UR QL STRIP: NEGATIVE
HCT VFR BLD AUTO: 37.6 % (ref 37–48.5)
HCV AB SERPL QL IA: NEGATIVE
HGB BLD-MCNC: 12.6 G/DL (ref 12–16)
HGB UR QL STRIP: ABNORMAL
IMM GRANULOCYTES # BLD AUTO: 0.04 K/UL (ref 0–0.04)
IMM GRANULOCYTES NFR BLD AUTO: 0.6 % (ref 0–0.5)
KETONES UR QL STRIP: NEGATIVE
LEUKOCYTE ESTERASE UR QL STRIP: NEGATIVE
LIPASE SERPL-CCNC: 323 U/L (ref 4–60)
LYMPHOCYTES # BLD AUTO: 1.2 K/UL (ref 1–4.8)
LYMPHOCYTES NFR BLD: 18.3 % (ref 18–48)
MCH RBC QN AUTO: 28.1 PG (ref 27–31)
MCHC RBC AUTO-ENTMCNC: 33.5 G/DL (ref 32–36)
MCV RBC AUTO: 84 FL (ref 82–98)
MONOCYTES # BLD AUTO: 0.7 K/UL (ref 0.3–1)
MONOCYTES NFR BLD: 11.5 % (ref 4–15)
NEUTROPHILS # BLD AUTO: 4.3 K/UL (ref 1.8–7.7)
NEUTROPHILS NFR BLD: 66.8 % (ref 38–73)
NITRITE UR QL STRIP: NEGATIVE
NRBC BLD-RTO: 0 /100 WBC
PH UR STRIP: 8 [PH] (ref 5–8)
PLATELET # BLD AUTO: 337 K/UL (ref 150–350)
PMV BLD AUTO: 9.5 FL (ref 9.2–12.9)
POTASSIUM SERPL-SCNC: 3.5 MMOL/L (ref 3.5–5.1)
PROT SERPL-MCNC: 7.4 G/DL (ref 6–8.4)
PROT UR QL STRIP: NEGATIVE
RBC # BLD AUTO: 4.48 M/UL (ref 4–5.4)
SARS-COV-2 RDRP RESP QL NAA+PROBE: NEGATIVE
SODIUM SERPL-SCNC: 135 MMOL/L (ref 136–145)
SP GR UR STRIP: 1.01 (ref 1–1.03)
URN SPEC COLLECT METH UR: ABNORMAL
UROBILINOGEN UR STRIP-ACNC: NEGATIVE EU/DL
WBC # BLD AUTO: 6.46 K/UL (ref 3.9–12.7)

## 2021-03-24 PROCEDURE — 99291 CRITICAL CARE FIRST HOUR: CPT | Mod: 25

## 2021-03-24 PROCEDURE — 96374 THER/PROPH/DIAG INJ IV PUSH: CPT | Performed by: EMERGENCY MEDICINE

## 2021-03-24 PROCEDURE — G0378 HOSPITAL OBSERVATION PER HR: HCPCS

## 2021-03-24 PROCEDURE — 82150 ASSAY OF AMYLASE: CPT | Performed by: NURSE PRACTITIONER

## 2021-03-24 PROCEDURE — 63600175 PHARM REV CODE 636 W HCPCS: Performed by: EMERGENCY MEDICINE

## 2021-03-24 PROCEDURE — 85025 COMPLETE CBC W/AUTO DIFF WBC: CPT | Performed by: NURSE PRACTITIONER

## 2021-03-24 PROCEDURE — 25000003 PHARM REV CODE 250: Performed by: INTERNAL MEDICINE

## 2021-03-24 PROCEDURE — 86803 HEPATITIS C AB TEST: CPT | Performed by: EMERGENCY MEDICINE

## 2021-03-24 PROCEDURE — 25000003 PHARM REV CODE 250: Performed by: EMERGENCY MEDICINE

## 2021-03-24 PROCEDURE — 81003 URINALYSIS AUTO W/O SCOPE: CPT | Performed by: NURSE PRACTITIONER

## 2021-03-24 PROCEDURE — 25000003 PHARM REV CODE 250: Performed by: NURSE PRACTITIONER

## 2021-03-24 PROCEDURE — U0002 COVID-19 LAB TEST NON-CDC: HCPCS | Performed by: EMERGENCY MEDICINE

## 2021-03-24 PROCEDURE — 80053 COMPREHEN METABOLIC PANEL: CPT | Performed by: NURSE PRACTITIONER

## 2021-03-24 PROCEDURE — 83690 ASSAY OF LIPASE: CPT | Performed by: NURSE PRACTITIONER

## 2021-03-24 PROCEDURE — 96374 THER/PROPH/DIAG INJ IV PUSH: CPT

## 2021-03-24 PROCEDURE — 96361 HYDRATE IV INFUSION ADD-ON: CPT

## 2021-03-24 RX ORDER — HYDRALAZINE HYDROCHLORIDE 20 MG/ML
10 INJECTION INTRAMUSCULAR; INTRAVENOUS EVERY 6 HOURS PRN
Status: DISCONTINUED | OUTPATIENT
Start: 2021-03-24 | End: 2021-03-26 | Stop reason: HOSPADM

## 2021-03-24 RX ORDER — MORPHINE SULFATE 2 MG/ML
2 INJECTION, SOLUTION INTRAMUSCULAR; INTRAVENOUS EVERY 4 HOURS PRN
Status: DISCONTINUED | OUTPATIENT
Start: 2021-03-24 | End: 2021-03-26

## 2021-03-24 RX ORDER — DIPHENHYDRAMINE HCL 25 MG
25 CAPSULE ORAL EVERY 6 HOURS PRN
Status: DISCONTINUED | OUTPATIENT
Start: 2021-03-24 | End: 2021-03-26 | Stop reason: HOSPADM

## 2021-03-24 RX ORDER — ONDANSETRON 2 MG/ML
4 INJECTION INTRAMUSCULAR; INTRAVENOUS EVERY 8 HOURS PRN
Status: DISCONTINUED | OUTPATIENT
Start: 2021-03-24 | End: 2021-03-26 | Stop reason: HOSPADM

## 2021-03-24 RX ORDER — SODIUM CHLORIDE 9 MG/ML
INJECTION, SOLUTION INTRAVENOUS CONTINUOUS
Status: ACTIVE | OUTPATIENT
Start: 2021-03-24 | End: 2021-03-25

## 2021-03-24 RX ORDER — MORPHINE SULFATE 4 MG/ML
4 INJECTION, SOLUTION INTRAMUSCULAR; INTRAVENOUS EVERY 4 HOURS PRN
Status: DISCONTINUED | OUTPATIENT
Start: 2021-03-24 | End: 2021-03-26

## 2021-03-24 RX ADMIN — PIPERACILLIN AND TAZOBACTAM 4.5 G: 4; .5 INJECTION, POWDER, LYOPHILIZED, FOR SOLUTION INTRAVENOUS; PARENTERAL at 10:03

## 2021-03-24 RX ADMIN — SODIUM CHLORIDE 1000 ML: 0.9 INJECTION, SOLUTION INTRAVENOUS at 07:03

## 2021-03-24 RX ADMIN — SODIUM CHLORIDE: 0.9 INJECTION, SOLUTION INTRAVENOUS at 10:03

## 2021-03-24 NOTE — Clinical Note
Is this patient a high probability for COVID-19?: No   Diagnosis: Biliary obstruction [132981]   Future Attending Provider: FRANCE EDWARDS [[id number]]   Admitting Provider:: FRANCE EDWARDS [[id number]]   Special Needs:: No Special Needs [1]

## 2021-03-24 NOTE — ED PROVIDER NOTES
"SCRIBE #1 NOTE: I, Elda Rosas, am scribing for, and in the presence of, Lazaro England MD. I have scribed the entire note.          History     Chief Complaint   Patient presents with    Abdominal Pain     pain to abdomen and back since 1230; also c/o vomiting; history of gall bladder issues     Review of patient's allergies indicates:   Allergen Reactions    Humira  [adalimumab]      Other reaction(s): drug-induced lupus  Other reaction(s): drug-induced lupus    Sulfa (sulfonamide antibiotics)      Other reaction(s): Hives  Other reaction(s): Rash  Other reaction(s): Hives         History of Present Illness     HPI    3/24/2021, 6:52 PM  History obtained from the patient      History of Present Illness: Leticia Piña is a 70 y.o. female patient with a PMHx of GERD, gall bladder issues, and hypothyroid who presents to the Emergency Department for evaluation of abdominal pain (resolved), which onset 12:30 PM today. Symptoms are constant and moderate in severity. Pain onsets after eating. Pt states it feels like her "insides are swelling." Associated sxs include back pain (resolved) and n/v (resolved). Patient denies any fever, cough, chills, CP, SOB, diarrhea, and all other sxs at this time. Prior tx includes Tylenol with some relief. No further complaints or concerns at this time.       Arrival mode: Personal vehicle      PCP: Nicole Castellon MD        Past Medical History:  Past Medical History:   Diagnosis Date    Allergic rhinitis     Cutaneous lupus erythematosus     drug induced.    Essential hypertension 2/5/2019    GERD (gastroesophageal reflux disease)     Hypothyroid     Insomnia     Mixed anxiety and depressive disorder     Normal cardiac stress test     11/07    Rheumatoid arthritis(714.0)     Sjogren's syndrome        Past Surgical History:  Past Surgical History:   Procedure Laterality Date    AUGMENTATION OF BREAST      breast implants      breast surgery for rupture   "    TONSILLECTOMY, ADENOIDECTOMY      TUBAL LIGATION           Family History:  History reviewed. No pertinent family history.    Social History:  Social History     Tobacco Use    Smoking status: Never Smoker    Smokeless tobacco: Never Used   Substance and Sexual Activity    Alcohol use: Not Currently     Comment: socially     Drug use: No    Sexual activity: Not Currently        Review of Systems     Review of Systems   Constitutional: Negative for chills and fever.   HENT: Negative for sore throat.    Respiratory: Negative for cough and shortness of breath.    Cardiovascular: Negative for chest pain.   Gastrointestinal: Positive for abdominal pain (resolved), nausea (resolved) and vomiting (resolved). Negative for diarrhea.   Genitourinary: Negative for dysuria.   Musculoskeletal: Positive for back pain (resolved).   Skin: Negative for rash.   Neurological: Negative for weakness.   Hematological: Does not bruise/bleed easily.   All other systems reviewed and are negative.       Physical Exam     Initial Vitals [03/24/21 1700]   BP Pulse Resp Temp SpO2   (!) 179/76 71 20 98.4 °F (36.9 °C) 99 %      MAP       --          Physical Exam  Nursing Notes and Vital Signs Reviewed.  Constitutional: Patient is in no acute distress. Well-developed and well-nourished.  Head: Atraumatic. Normocephalic.  Eyes: PERRL. EOM intact. Conjunctivae are not pale. No scleral icterus.  ENT: Mucous membranes are moist. Oropharynx is clear and symmetric.    Neck: Supple. Full ROM. No lymphadenopathy.  Cardiovascular: Regular rate. Regular rhythm. No murmurs, rubs, or gallops. Distal pulses are 2+ and symmetric.   Pulmonary/Chest: No respiratory distress. Clear to auscultation bilaterally. No wheezing or rales.  Abdominal: Soft and non-distended.  RUQ tenderness.  No rebound, guarding, or rigidity. Good bowel sounds.  Genitourinary: No CVA tenderness  Musculoskeletal: Moves all extremities. No obvious deformities. No edema. No calf  tenderness.  Skin: Warm and dry.  Neurological:  Alert, awake, and appropriate.  Normal speech.  No acute focal neurological deficits are appreciated.  Psychiatric: Normal affect. Good eye contact. Appropriate in content.     ED Course   Critical Care    Date/Time: 3/24/2021 4:56 PM  Performed by: Lazaro England MD  Authorized by: Lazaro England MD   Direct patient critical care time: 18 minutes  Ordering / reviewing critical care time: 9 minutes  Documentation critical care time: 6 minutes  Consulting other physicians critical care time: 6 minutes  Consult with family critical care time: 0 minutes  Other critical care time: 0 minutes  Total critical care time (exclusive of procedural time) : 39 minutes  Critical care time was exclusive of separately billable procedures and treating other patients and teaching time.  Critical care was necessary to treat or prevent imminent or life-threatening deterioration of the following conditions: acute liver injury.  Critical care was time spent personally by me on the following activities: blood draw for specimens, development of treatment plan with patient or surrogate, discussions with consultants, interpretation of cardiac output measurements, evaluation of patient's response to treatment, examination of patient, obtaining history from patient or surrogate, ordering and performing treatments and interventions, ordering and review of laboratory studies, ordering and review of radiographic studies, pulse oximetry and re-evaluation of patient's condition.        ED Vital Signs:  Vitals:    03/25/21 0031 03/25/21 0112 03/25/21 0317 03/25/21 0518   BP: (!) 159/77   139/64   Pulse:  69 70 75   Resp:    20   Temp:    98.1 °F (36.7 °C)   TempSrc:    Oral   SpO2:    (!) 94%   Weight:    65.5 kg (144 lb 6.4 oz)   Height:        03/25/21 0712 03/25/21 1232 03/25/21 1337 03/25/21 1602   BP: (!) 146/69 (!) 158/68 (!) 147/66 (!) 150/70   Pulse: 77 72 77 77   Resp: 15 18  18    Temp: 98 °F (36.7 °C) 98.3 °F (36.8 °C)  97.9 °F (36.6 °C)   TempSrc: Oral Oral  Oral   SpO2: 96% 95%  95%   Weight:       Height:        03/25/21 1621 03/25/21 1711 03/25/21 1720 03/25/21 1731   BP: (!) 145/84 (!) 149/76 (!) 155/78 (!) 169/88   Pulse: 84 78 76 73   Resp: 18 16 18 18   Temp: 97.9 °F (36.6 °C) 97.2 °F (36.2 °C)     TempSrc:       SpO2: 95% (!) 93% 96% 97%   Weight:       Height:        03/25/21 2007 03/25/21 2055 03/25/21 2316   BP: (!) 173/76 (!) 141/78 122/61   Pulse: 73  79   Resp: 14  18   Temp: 98 °F (36.7 °C)  97.9 °F (36.6 °C)   TempSrc: Oral  Oral   SpO2: 98%  (!) 93%   Weight:      Height:          Abnormal Lab Results:  Labs Reviewed   CBC W/ AUTO DIFFERENTIAL - Abnormal; Notable for the following components:       Result Value    Immature Granulocytes 0.6 (*)     All other components within normal limits   COMPREHENSIVE METABOLIC PANEL - Abnormal; Notable for the following components:    Sodium 135 (*)     Total Bilirubin 1.1 (*)     Alkaline Phosphatase 258 (*)      (*)      (*)     All other components within normal limits   LIPASE - Abnormal; Notable for the following components:    Lipase 323 (*)     All other components within normal limits   URINALYSIS, REFLEX TO URINE CULTURE - Abnormal; Notable for the following components:    Occult Blood UA Trace (*)     All other components within normal limits    Narrative:     Specimen Source->Urine   AMYLASE - Abnormal; Notable for the following components:    Amylase 127 (*)     All other components within normal limits   HEPATITIS C ANTIBODY   SARS-COV-2 RNA AMPLIFICATION, QUAL        All Lab Results:  Results for orders placed or performed during the hospital encounter of 03/24/21   Hepatitis C antibody   Result Value Ref Range    Hepatitis C Ab Negative Negative   CBC W/ AUTO DIFFERENTIAL   Result Value Ref Range    WBC 6.46 3.9 - 12.7 K/uL    RBC 4.48 4.00 - 5.40 M/uL    Hemoglobin 12.6 12.0 - 16.0 g/dL    Hematocrit 37.6  37 - 48 %    MCV 84 82.0 - 98.0 fL    MCH 28.1 27.0 - 31.0 pg    MCHC 33.5 32.0 - 36.0 g/dL    RDW 12.0 11.5 - 14.5 %    Platelets 337 150 - 350 K/uL    MPV 9.5 9.2 - 12.9 fL    Immature Granulocytes 0.6 (H) 0.0 - 0.5 %    Gran # (ANC) 4.3 1.8 - 7.7 K/uL    Immature Grans (Abs) 0.04 0.00 - 0.04 K/uL    Lymph # 1.2 1.0 - 4.8 K/uL    Mono # 0.7 0.3 - 1.0 K/uL    Eos # 0.1 0.0 - 0.5 K/uL    Baso # 0.06 0.00 - 0.20 K/uL    nRBC 0 0 /100 WBC    Gran % 66.8 38.0 - 73.0 %    Lymph % 18.3 18 - 48 %    Mono % 11.5 4 - 15 %    Eosinophil % 1.9 0.0 - 8.0 %    Basophil % 0.9 0 - 1 %    Differential Method Automated    Comp. Metabolic Panel   Result Value Ref Range    Sodium 135 (L) 136 - 145 mmol/L    Potassium 3.5 3.5 - 5.1 mmol/L    Chloride 96 95 - 110 mmol/L    CO2 28 23 - 29 mmol/L    Glucose 107 70 - 110 mg/dL    BUN 12 8 - 23 mg/dL    Creatinine 0.7 0.5 - 1.4 mg/dL    Calcium 9.5 8.7 - 10.5 mg/dL    Total Protein 7.4 6.0 - 8.4 g/dL    Albumin 3.7 3.5 - 5.2 g/dL    Total Bilirubin 1.1 (H) 0.1 - 1.0 mg/dL    Alkaline Phosphatase 258 (H) 55 - 135 U/L     (H) 10 - 40 U/L     (H) 10 - 44 U/L    Anion Gap 11 8 - 16 mmol/L    eGFR if African American >60 >60 mL/min/1.73 m^2    eGFR if non African American >60 >60 mL/min/1.73 m^2   Lipase   Result Value Ref Range    Lipase 323 (H) 4 - 60 U/L   Urinalysis, Reflex to Urine Culture Urine, Clean Catch    Specimen: Urine   Result Value Ref Range    Specimen UA Urine, Clean Catch     Color, UA Yellow Yellow, Straw, Dilma    Appearance, UA Clear Clear    pH, UA 8.0 5.0 - 8.0    Specific Gravity, UA 1.015 1.005 - 1.030    Protein, UA Negative Negative    Glucose, UA Negative Negative    Ketones, UA Negative Negative    Bilirubin (UA) Negative Negative    Occult Blood UA Trace (A) Negative    Nitrite, UA Negative Negative    Urobilinogen, UA Negative <2.0 EU/dL    Leukocytes, UA Negative Negative   Amylase   Result Value Ref Range    Amylase 127 (H) 20 - 110 U/L   COVID-19  Rapid Screening   Result Value Ref Range    SARS-CoV-2 RNA, Amplification, Qual Negative Negative   CBC Auto Differential   Result Value Ref Range    WBC 4.03 3.9 - 12.7 K/uL    RBC 4.13 4.00 - 5.40 M/uL    Hemoglobin 11.6 (L) 12.0 - 16.0 g/dL    Hematocrit 35.7 (L) 37 - 48 %    MCV 86 82.0 - 98.0 fL    MCH 28.1 27.0 - 31.0 pg    MCHC 32.5 32.0 - 36.0 g/dL    RDW 12.3 11.5 - 14.5 %    Platelets 302 150 - 350 K/uL    MPV 9.6 9.2 - 12.9 fL    Immature Granulocytes 0.5 0.0 - 0.5 %    Gran # (ANC) 2.3 1.8 - 7.7 K/uL    Immature Grans (Abs) 0.02 0.00 - 0.04 K/uL    Lymph # 1.0 1.0 - 4.8 K/uL    Mono # 0.4 0.3 - 1.0 K/uL    Eos # 0.2 0.0 - 0.5 K/uL    Baso # 0.07 0.00 - 0.20 K/uL    nRBC 0 0 /100 WBC    Gran % 57.3 38.0 - 73.0 %    Lymph % 24.6 18 - 48 %    Mono % 10.7 4 - 15 %    Eosinophil % 5.2 0.0 - 8.0 %    Basophil % 1.7 0 - 1 %    Differential Method Automated    Comprehensive Metabolic Panel   Result Value Ref Range    Sodium 140 136 - 145 mmol/L    Potassium 3.3 (L) 3.5 - 5.1 mmol/L    Chloride 105 95 - 110 mmol/L    CO2 25 23 - 29 mmol/L    Glucose 85 70 - 110 mg/dL    BUN 8 8 - 23 mg/dL    Creatinine 0.7 0.5 - 1.4 mg/dL    Calcium 8.5 (L) 8.7 - 10.5 mg/dL    Total Protein 6.3 6.0 - 8.4 g/dL    Albumin 3.2 (L) 3.5 - 5.2 g/dL    Total Bilirubin 0.6 0.1 - 1.0 mg/dL    Alkaline Phosphatase 236 (H) 55 - 135 U/L     (H) 10 - 40 U/L     (H) 10 - 44 U/L    Anion Gap 10 8 - 16 mmol/L    eGFR if African American >60 >60 mL/min/1.73 m^2    eGFR if non African American >60 >60 mL/min/1.73 m^2     *Note: Due to a large number of results and/or encounters for the requested time period, some results have not been displayed. A complete set of results can be found in Results Review.         Imaging Results:  Imaging Results    None                 The Emergency Provider reviewed the vital signs and test results, which are outlined above.     ED Discussion     7:43 PM: Reassessed pt at this time.  Pt states her  condition has improved at this time. Discussed with pt all pertinent ED information and results. Discussed pt dx and plan of tx. Gave pt all f/u and return to the ED instructions. All questions and concerns were addressed at this time. Pt expresses understanding of information and instructions, and is comfortable with plan to discharge. Pt is stable for discharge.    I discussed with patient and/or family/caretaker that evaluation in the ED does not suggest any emergent or life threatening medical conditions requiring immediate intervention beyond what was provided in the ED, and I believe patient is safe for discharge.  Regardless, an unremarkable evaluation in the ED does not preclude the development or presence of a serious of life threatening condition. As such, patient was instructed to return immediately for any worsening or change in current symptoms.         Medical Decision Making:   Clinical Tests:   Lab Tests: Ordered and Reviewed           ED Medication(s):  Medications   ondansetron injection 4 mg (has no administration in time range)   diphenhydrAMINE capsule 25 mg (has no administration in time range)   morphine injection 2 mg (has no administration in time range)   morphine injection 4 mg (has no administration in time range)   hydrALAZINE injection 10 mg (10 mg Intravenous Given 3/25/21 2017)   0.9%  NaCl infusion (0 mL/hr Intravenous Stopped 3/25/21 2218)   piperacillin-tazobactam 4.5 g in dextrose 5 % 100 mL IVPB (ready to mix system) (4.5 g Intravenous New Bag 3/25/21 2207)   traZODone tablet 100 mg (100 mg Oral Given 3/25/21 0026)   acetaminophen tablet 650 mg (650 mg Oral Given 3/25/21 1343)   indomethacin 50 mg suppository 100 mg (100 mg Rectal Given 3/25/21 1651)   sodium chloride 0.9% bolus 1,000 mL (0 mLs Intravenous Stopped 3/24/21 2108)   piperacillin-tazobactam 4.5 g in dextrose 5 % 100 mL IVPB (ready to mix system) (4.5 g Intravenous New Bag 3/24/21 2203)   potassium chloride SA CR  tablet 40 mEq (40 mEq Oral Given 3/25/21 0915)       Current Discharge Medication List                  Scribe Attestation:   Scribe #1: I performed the above scribed service and the documentation accurately describes the services I performed. I attest to the accuracy of the note.     Attending:   Physician Attestation Statement for Scribe #1: I, Lazaro England MD, personally performed the services described in this documentation, as scribed by Elda Rosas, in my presence, and it is both accurate and complete.           Clinical Impression       ICD-10-CM ICD-9-CM   1. Biliary obstruction  K83.1 576.2   2. Acute biliary pancreatitis without infection or necrosis  K85.10 577.0   3. Cholelithiasis with choledocholithiasis  K80.70 574.90       Disposition:   Disposition: Discharged  Condition: Stable         Lazaro England MD  03/25/21 2503

## 2021-03-25 ENCOUNTER — ANESTHESIA (OUTPATIENT)
Dept: ENDOSCOPY | Facility: HOSPITAL | Age: 71
DRG: 417 | End: 2021-03-25
Payer: MEDICARE

## 2021-03-25 ENCOUNTER — ANESTHESIA EVENT (OUTPATIENT)
Dept: ENDOSCOPY | Facility: HOSPITAL | Age: 71
DRG: 417 | End: 2021-03-25
Payer: MEDICARE

## 2021-03-25 PROBLEM — K83.1 BILIARY OBSTRUCTION: Status: ACTIVE | Noted: 2021-03-25

## 2021-03-25 LAB
ALBUMIN SERPL BCP-MCNC: 3.2 G/DL (ref 3.5–5.2)
ALP SERPL-CCNC: 236 U/L (ref 55–135)
ALT SERPL W/O P-5'-P-CCNC: 561 U/L (ref 10–44)
ANION GAP SERPL CALC-SCNC: 10 MMOL/L (ref 8–16)
AST SERPL-CCNC: 413 U/L (ref 10–40)
BASOPHILS # BLD AUTO: 0.07 K/UL (ref 0–0.2)
BASOPHILS NFR BLD: 1.7 % (ref 0–1.9)
BILIRUB SERPL-MCNC: 0.6 MG/DL (ref 0.1–1)
BUN SERPL-MCNC: 8 MG/DL (ref 8–23)
CALCIUM SERPL-MCNC: 8.5 MG/DL (ref 8.7–10.5)
CHLORIDE SERPL-SCNC: 105 MMOL/L (ref 95–110)
CO2 SERPL-SCNC: 25 MMOL/L (ref 23–29)
CREAT SERPL-MCNC: 0.7 MG/DL (ref 0.5–1.4)
DIFFERENTIAL METHOD: ABNORMAL
EOSINOPHIL # BLD AUTO: 0.2 K/UL (ref 0–0.5)
EOSINOPHIL NFR BLD: 5.2 % (ref 0–8)
ERYTHROCYTE [DISTWIDTH] IN BLOOD BY AUTOMATED COUNT: 12.3 % (ref 11.5–14.5)
EST. GFR  (AFRICAN AMERICAN): >60 ML/MIN/1.73 M^2
EST. GFR  (NON AFRICAN AMERICAN): >60 ML/MIN/1.73 M^2
GLUCOSE SERPL-MCNC: 85 MG/DL (ref 70–110)
HCT VFR BLD AUTO: 35.7 % (ref 37–48.5)
HGB BLD-MCNC: 11.6 G/DL (ref 12–16)
IMM GRANULOCYTES # BLD AUTO: 0.02 K/UL (ref 0–0.04)
IMM GRANULOCYTES NFR BLD AUTO: 0.5 % (ref 0–0.5)
LYMPHOCYTES # BLD AUTO: 1 K/UL (ref 1–4.8)
LYMPHOCYTES NFR BLD: 24.6 % (ref 18–48)
MCH RBC QN AUTO: 28.1 PG (ref 27–31)
MCHC RBC AUTO-ENTMCNC: 32.5 G/DL (ref 32–36)
MCV RBC AUTO: 86 FL (ref 82–98)
MONOCYTES # BLD AUTO: 0.4 K/UL (ref 0.3–1)
MONOCYTES NFR BLD: 10.7 % (ref 4–15)
NEUTROPHILS # BLD AUTO: 2.3 K/UL (ref 1.8–7.7)
NEUTROPHILS NFR BLD: 57.3 % (ref 38–73)
NRBC BLD-RTO: 0 /100 WBC
PLATELET # BLD AUTO: 302 K/UL (ref 150–350)
PMV BLD AUTO: 9.6 FL (ref 9.2–12.9)
POTASSIUM SERPL-SCNC: 3.3 MMOL/L (ref 3.5–5.1)
PROT SERPL-MCNC: 6.3 G/DL (ref 6–8.4)
RBC # BLD AUTO: 4.13 M/UL (ref 4–5.4)
SODIUM SERPL-SCNC: 140 MMOL/L (ref 136–145)
WBC # BLD AUTO: 4.03 K/UL (ref 3.9–12.7)

## 2021-03-25 PROCEDURE — 21400001 HC TELEMETRY ROOM

## 2021-03-25 PROCEDURE — C2625 STENT, NON-COR, TEM W/DEL SY: HCPCS

## 2021-03-25 PROCEDURE — 74328 X-RAY BILE DUCT ENDOSCOPY: CPT | Mod: 26,,, | Performed by: INTERNAL MEDICINE

## 2021-03-25 PROCEDURE — 63600175 PHARM REV CODE 636 W HCPCS: Performed by: INTERNAL MEDICINE

## 2021-03-25 PROCEDURE — 43274 ERCP DUCT STENT PLACEMENT: CPT | Performed by: INTERNAL MEDICINE

## 2021-03-25 PROCEDURE — 25000003 PHARM REV CODE 250: Performed by: INTERNAL MEDICINE

## 2021-03-25 PROCEDURE — 80053 COMPREHEN METABOLIC PANEL: CPT | Performed by: INTERNAL MEDICINE

## 2021-03-25 PROCEDURE — 99222 PR INITIAL HOSPITAL CARE,LEVL II: ICD-10-PCS | Mod: ,,, | Performed by: SURGERY

## 2021-03-25 PROCEDURE — 37000008 HC ANESTHESIA 1ST 15 MINUTES: Performed by: INTERNAL MEDICINE

## 2021-03-25 PROCEDURE — C1769 GUIDE WIRE: HCPCS | Performed by: INTERNAL MEDICINE

## 2021-03-25 PROCEDURE — 37000009 HC ANESTHESIA EA ADD 15 MINS: Performed by: INTERNAL MEDICINE

## 2021-03-25 PROCEDURE — 25000003 PHARM REV CODE 250: Performed by: NURSE ANESTHETIST, CERTIFIED REGISTERED

## 2021-03-25 PROCEDURE — 99222 1ST HOSP IP/OBS MODERATE 55: CPT | Mod: ,,, | Performed by: SURGERY

## 2021-03-25 PROCEDURE — 43274 PR ERCP W/STENT PLCMNT BILIARY/PANCREATIC DUCT: ICD-10-PCS | Mod: ,,, | Performed by: INTERNAL MEDICINE

## 2021-03-25 PROCEDURE — 85025 COMPLETE CBC W/AUTO DIFF WBC: CPT | Performed by: INTERNAL MEDICINE

## 2021-03-25 PROCEDURE — 74328 PR  X-RAY FOR BILE DUCT ENDOSCOPY: ICD-10-PCS | Mod: 26,,, | Performed by: INTERNAL MEDICINE

## 2021-03-25 PROCEDURE — 74328 X-RAY BILE DUCT ENDOSCOPY: CPT | Performed by: INTERNAL MEDICINE

## 2021-03-25 PROCEDURE — 99223 PR INITIAL HOSPITAL CARE,LEVL III: ICD-10-PCS | Mod: ,,, | Performed by: INTERNAL MEDICINE

## 2021-03-25 PROCEDURE — 43274 ERCP DUCT STENT PLACEMENT: CPT | Mod: ,,, | Performed by: INTERNAL MEDICINE

## 2021-03-25 PROCEDURE — 25000003 PHARM REV CODE 250: Performed by: NURSE PRACTITIONER

## 2021-03-25 PROCEDURE — 36415 COLL VENOUS BLD VENIPUNCTURE: CPT | Performed by: INTERNAL MEDICINE

## 2021-03-25 PROCEDURE — 99223 1ST HOSP IP/OBS HIGH 75: CPT | Mod: ,,, | Performed by: INTERNAL MEDICINE

## 2021-03-25 PROCEDURE — 63600175 PHARM REV CODE 636 W HCPCS: Performed by: NURSE ANESTHETIST, CERTIFIED REGISTERED

## 2021-03-25 PROCEDURE — 27201674 HC SPHINCTERTOME: Performed by: INTERNAL MEDICINE

## 2021-03-25 RX ORDER — ACETAMINOPHEN 325 MG/1
650 TABLET ORAL EVERY 6 HOURS PRN
Status: DISCONTINUED | OUTPATIENT
Start: 2021-03-25 | End: 2021-03-26 | Stop reason: HOSPADM

## 2021-03-25 RX ORDER — LIDOCAINE HYDROCHLORIDE 10 MG/ML
INJECTION, SOLUTION EPIDURAL; INFILTRATION; INTRACAUDAL; PERINEURAL
Status: DISCONTINUED | OUTPATIENT
Start: 2021-03-25 | End: 2021-03-25

## 2021-03-25 RX ORDER — SUCCINYLCHOLINE CHLORIDE 20 MG/ML
INJECTION INTRAMUSCULAR; INTRAVENOUS
Status: DISCONTINUED | OUTPATIENT
Start: 2021-03-25 | End: 2021-03-25

## 2021-03-25 RX ORDER — ONDANSETRON 2 MG/ML
INJECTION INTRAMUSCULAR; INTRAVENOUS
Status: DISCONTINUED | OUTPATIENT
Start: 2021-03-25 | End: 2021-03-25

## 2021-03-25 RX ORDER — TRAZODONE HYDROCHLORIDE 100 MG/1
100 TABLET ORAL NIGHTLY PRN
Status: DISCONTINUED | OUTPATIENT
Start: 2021-03-25 | End: 2021-03-26 | Stop reason: HOSPADM

## 2021-03-25 RX ORDER — PROPOFOL 10 MG/ML
VIAL (ML) INTRAVENOUS
Status: DISCONTINUED | OUTPATIENT
Start: 2021-03-25 | End: 2021-03-25

## 2021-03-25 RX ORDER — INDOMETHACIN 50 MG/1
100 SUPPOSITORY RECTAL EVERY 12 HOURS PRN
Status: DISCONTINUED | OUTPATIENT
Start: 2021-03-25 | End: 2021-03-26 | Stop reason: HOSPADM

## 2021-03-25 RX ORDER — INDOMETHACIN 50 MG/1
50 SUPPOSITORY RECTAL EVERY 12 HOURS PRN
Status: DISCONTINUED | OUTPATIENT
Start: 2021-03-25 | End: 2021-03-25

## 2021-03-25 RX ORDER — POTASSIUM CHLORIDE 20 MEQ/1
40 TABLET, EXTENDED RELEASE ORAL ONCE
Status: COMPLETED | OUTPATIENT
Start: 2021-03-25 | End: 2021-03-25

## 2021-03-25 RX ORDER — FENTANYL CITRATE 50 UG/ML
INJECTION, SOLUTION INTRAMUSCULAR; INTRAVENOUS
Status: DISCONTINUED | OUTPATIENT
Start: 2021-03-25 | End: 2021-03-25

## 2021-03-25 RX ORDER — SODIUM CHLORIDE, SODIUM LACTATE, POTASSIUM CHLORIDE, CALCIUM CHLORIDE 600; 310; 30; 20 MG/100ML; MG/100ML; MG/100ML; MG/100ML
INJECTION, SOLUTION INTRAVENOUS CONTINUOUS PRN
Status: DISCONTINUED | OUTPATIENT
Start: 2021-03-25 | End: 2021-03-25

## 2021-03-25 RX ORDER — DEXAMETHASONE SODIUM PHOSPHATE 4 MG/ML
INJECTION, SOLUTION INTRA-ARTICULAR; INTRALESIONAL; INTRAMUSCULAR; INTRAVENOUS; SOFT TISSUE
Status: DISCONTINUED | OUTPATIENT
Start: 2021-03-25 | End: 2021-03-25

## 2021-03-25 RX ADMIN — HYDRALAZINE HYDROCHLORIDE 10 MG: 20 INJECTION, SOLUTION INTRAMUSCULAR; INTRAVENOUS at 12:03

## 2021-03-25 RX ADMIN — LIDOCAINE HYDROCHLORIDE 100 MG: 10 INJECTION, SOLUTION EPIDURAL; INFILTRATION; INTRACAUDAL; PERINEURAL at 04:03

## 2021-03-25 RX ADMIN — FENTANYL CITRATE 50 MCG: 50 INJECTION, SOLUTION INTRAMUSCULAR; INTRAVENOUS at 04:03

## 2021-03-25 RX ADMIN — TRAZODONE HYDROCHLORIDE 100 MG: 100 TABLET ORAL at 12:03

## 2021-03-25 RX ADMIN — PROPOFOL 150 MG: 10 INJECTION, EMULSION INTRAVENOUS at 04:03

## 2021-03-25 RX ADMIN — ONDANSETRON 4 MG: 2 INJECTION, SOLUTION INTRAMUSCULAR; INTRAVENOUS at 04:03

## 2021-03-25 RX ADMIN — POTASSIUM CHLORIDE 40 MEQ: 1500 TABLET, EXTENDED RELEASE ORAL at 09:03

## 2021-03-25 RX ADMIN — PROPOFOL 50 MG: 10 INJECTION, EMULSION INTRAVENOUS at 04:03

## 2021-03-25 RX ADMIN — DEXAMETHASONE SODIUM PHOSPHATE 8 MG: 4 INJECTION, SOLUTION INTRAMUSCULAR; INTRAVENOUS at 04:03

## 2021-03-25 RX ADMIN — HYDRALAZINE HYDROCHLORIDE 10 MG: 20 INJECTION, SOLUTION INTRAMUSCULAR; INTRAVENOUS at 08:03

## 2021-03-25 RX ADMIN — PIPERACILLIN AND TAZOBACTAM 4.5 G: 4; .5 INJECTION, POWDER, LYOPHILIZED, FOR SOLUTION INTRAVENOUS; PARENTERAL at 01:03

## 2021-03-25 RX ADMIN — SUCCINYLCHOLINE CHLORIDE 140 MG: 20 INJECTION, SOLUTION INTRAMUSCULAR; INTRAVENOUS at 04:03

## 2021-03-25 RX ADMIN — SODIUM CHLORIDE, SODIUM LACTATE, POTASSIUM CHLORIDE, AND CALCIUM CHLORIDE: 600; 310; 30; 20 INJECTION, SOLUTION INTRAVENOUS at 04:03

## 2021-03-25 RX ADMIN — PIPERACILLIN AND TAZOBACTAM 4.5 G: 4; .5 INJECTION, POWDER, LYOPHILIZED, FOR SOLUTION INTRAVENOUS; PARENTERAL at 06:03

## 2021-03-25 RX ADMIN — ACETAMINOPHEN 650 MG: 325 TABLET ORAL at 01:03

## 2021-03-25 RX ADMIN — PIPERACILLIN AND TAZOBACTAM 4.5 G: 4; .5 INJECTION, POWDER, LYOPHILIZED, FOR SOLUTION INTRAVENOUS; PARENTERAL at 10:03

## 2021-03-25 RX ADMIN — INDOMETHACIN 100 MG: 50 SUPPOSITORY RECTAL at 04:03

## 2021-03-25 NOTE — SUBJECTIVE & OBJECTIVE
Past Medical History:   Diagnosis Date    Allergic rhinitis     Cutaneous lupus erythematosus     drug induced.    Essential hypertension 2/5/2019    GERD (gastroesophageal reflux disease)     Hypothyroid     Insomnia     Mixed anxiety and depressive disorder     Normal cardiac stress test     11/07    Rheumatoid arthritis(714.0)     Sjogren's syndrome        Past Surgical History:   Procedure Laterality Date    AUGMENTATION OF BREAST      breast implants      breast surgery for rupture      TONSILLECTOMY, ADENOIDECTOMY      TUBAL LIGATION         Review of patient's allergies indicates:   Allergen Reactions    Humira  [adalimumab]      Other reaction(s): drug-induced lupus  Other reaction(s): drug-induced lupus    Sulfa (sulfonamide antibiotics)      Other reaction(s): Hives  Other reaction(s): Rash  Other reaction(s): Hives       No current facility-administered medications on file prior to encounter.     Current Outpatient Medications on File Prior to Encounter   Medication Sig    ALA-HIST IR 2 mg Tab TAKE 1 TABLET BY MOUTH EVERY DAY    azelastine (ASTELIN) 137 mcg (0.1 %) nasal spray 1 spray (137 mcg total) by Nasal route 2 (two) times daily.    benzonatate (TESSALON PERLES) 100 MG capsule Take 2 capsules (200 mg total) by mouth 3 (three) times daily as needed for Cough.    buPROPion (WELLBUTRIN XL) 300 MG 24 hr tablet TAKE ONE TABLET BY MOUTH ONE TIME DAILY     cholecalciferol, vitamin D3, 5,000 unit/mL Drop Take 1 drop by mouth Daily.    cyanocobalamin, vitamin B-12, (VITAMIN B-12) 5,000 mcg Subl Place under the tongue once daily.    diclofenac sodium (VOLTAREN) 1 % Gel Apply 2 g topically 4 (four) times daily.    DULoxetine (CYMBALTA) 60 MG capsule TAKE ONE CAPSULE BY MOUTH ONE TIME DAILY     ferrous gluconate (FERGON) 324 MG tablet Take 324 mg by mouth daily with breakfast.    fluticasone propionate (FLONASE) 50 mcg/actuation nasal spray 2 sprays (100 mcg total) by Each Nostril  route 2 (two) times a day.    folic acid (FOLVITE) 1 MG tablet TAKE ONE TABLET BY MOUTH ONE TIME DAILY    hydroCHLOROthiazide (HYDRODIURIL) 25 MG tablet Take 1 tablet (25 mg total) by mouth once daily.    hydrOXYchloroQUINE (PLAQUENIL) 200 mg tablet TAKE ONE TABLET BY MOUTH TWICE DAILY     levothyroxine (EUTHYROX) 75 MCG tablet Take 1 tablet (75 mcg total) by mouth once daily.    linaCLOtide (LINZESS) 72 mcg Cap capsule Take 1 capsule (72 mcg total) by mouth before breakfast.    LORazepam (ATIVAN) 1 MG tablet TAKE 1 TABLET (1 MG TOTAL) BY MOUTH 2 (TWO) TIMES DAILY AS NEEDED.    losartan (COZAAR) 100 MG tablet Take 1 tablet (100 mg total) by mouth once daily.    meloxicam (MOBIC) 15 MG tablet Take 1 tablet (15 mg total) by mouth once daily.    methylPREDNISolone (MEDROL DOSEPACK) 4 mg tablet use as directed    omeprazole (PRILOSEC) 40 MG capsule Take 1 capsule (40 mg total) by mouth once daily.    potassium chloride SA (K-DUR,KLOR-CON) 20 MEQ tablet TAKE ONE TABLET BY MOUTH ONE TIME DAILY     traZODone (DESYREL) 50 MG tablet TAKE 2-3 TABLETS (100-150 MG TOTAL) BY MOUTH NIGHTLY AS NEEDED FOR INSOMNIA.     triamcinolone (NASACORT) 55 mcg nasal inhaler 2 sprays by Nasal route once daily.    [DISCONTINUED] DULoxetine (CYMBALTA) 60 MG capsule TAKE ONE CAPSULE BY MOUTH ONE TIME DAILY    [DISCONTINUED] DULoxetine (CYMBALTA) 60 MG capsule TAKE ONE CAPSULE BY MOUTH ONE TIME DAILY     [DISCONTINUED] hydroCHLOROthiazide (HYDRODIURIL) 25 MG tablet Take 1 tablet (25 mg total) by mouth once daily.    [DISCONTINUED] levothyroxine (SYNTHROID) 75 MCG tablet Take 1 tablet (75 mcg total) by mouth once daily.    [DISCONTINUED] potassium chloride SA (K-DUR,KLOR-CON) 20 MEQ tablet Take 1 tablet (20 mEq total) by mouth once daily.     Family History     Reviewed and Not Pertinent        Tobacco Use    Smoking status: Never Smoker    Smokeless tobacco: Never Used   Substance and Sexual Activity    Alcohol use: Not  Currently     Comment: socially     Drug use: No    Sexual activity: Not Currently     Review of Systems   Constitutional: Negative.  Negative for chills, diaphoresis, fatigue and fever.   HENT: Negative.  Negative for congestion, nosebleeds and sinus pressure.    Eyes: Negative.    Respiratory: Negative.  Negative for cough, chest tightness and shortness of breath.    Cardiovascular: Negative.  Negative for chest pain and palpitations.   Gastrointestinal: Positive for abdominal pain (upper) and nausea. Negative for diarrhea and vomiting.   Endocrine: Negative.    Genitourinary: Negative.  Negative for dysuria and flank pain.   Musculoskeletal: Negative.  Negative for back pain and joint swelling.   Skin: Negative.  Negative for rash.   Allergic/Immunologic: Negative.    Neurological: Negative.  Negative for dizziness, speech difficulty, weakness, numbness and headaches.   Hematological: Negative.    Psychiatric/Behavioral: Negative.  Negative for decreased concentration and hallucinations. The patient is not nervous/anxious.    All other systems reviewed and are negative.    Objective:     Vital Signs (Most Recent):  Temp: 98.4 °F (36.9 °C) (03/24/21 1700)  Pulse: 71 (03/24/21 1700)  Resp: 20 (03/24/21 1700)  BP: (!) 179/76 (03/24/21 1700)  SpO2: 99 % (03/24/21 1700) Vital Signs (24h Range):  Temp:  [98.4 °F (36.9 °C)] 98.4 °F (36.9 °C)  Pulse:  [71] 71  Resp:  [20] 20  SpO2:  [99 %] 99 %  BP: (179)/(76) 179/76     Weight: 66 kg (145 lb 9.8 oz)  Body mass index is 24.23 kg/m².    Physical Exam  Vitals and nursing note reviewed.   Constitutional:       General: She is not in acute distress.     Appearance: Normal appearance. She is not ill-appearing.   HENT:      Head: Normocephalic and atraumatic.      Mouth/Throat:      Mouth: Mucous membranes are moist.   Eyes:      General: No scleral icterus.  Cardiovascular:      Rate and Rhythm: Normal rate and regular rhythm.      Heart sounds: Normal heart sounds. No  murmur heard.     Pulmonary:      Effort: Pulmonary effort is normal. No respiratory distress.      Breath sounds: Normal breath sounds.   Abdominal:      General: There is no distension.      Palpations: Abdomen is soft.      Tenderness: There is no abdominal tenderness.   Musculoskeletal:         General: No swelling. Normal range of motion.      Cervical back: Normal range of motion.   Skin:     General: Skin is warm and dry.      Coloration: Skin is not jaundiced.      Findings: No erythema.   Neurological:      General: No focal deficit present.      Mental Status: She is alert and oriented to person, place, and time. Mental status is at baseline.      Cranial Nerves: No cranial nerve deficit.      Motor: No abnormal muscle tone.   Psychiatric:         Mood and Affect: Mood normal.         Behavior: Behavior normal.             Significant Labs:   Results for orders placed or performed during the hospital encounter of 03/24/21   Hepatitis C antibody   Result Value Ref Range    Hepatitis C Ab Negative Negative   CBC W/ AUTO DIFFERENTIAL   Result Value Ref Range    WBC 6.46 3.9 - 12.7 K/uL    RBC 4.48 4.00 - 5.40 M/uL    Hemoglobin 12.6 12.0 - 16.0 g/dL    Hematocrit 37.6 37 - 48 %    MCV 84 82.0 - 98.0 fL    MCH 28.1 27.0 - 31.0 pg    MCHC 33.5 32.0 - 36.0 g/dL    RDW 12.0 11.5 - 14.5 %    Platelets 337 150 - 350 K/uL    MPV 9.5 9.2 - 12.9 fL    Immature Granulocytes 0.6 (H) 0.0 - 0.5 %    Gran # (ANC) 4.3 1.8 - 7.7 K/uL    Immature Grans (Abs) 0.04 0.00 - 0.04 K/uL    Lymph # 1.2 1.0 - 4.8 K/uL    Mono # 0.7 0.3 - 1.0 K/uL    Eos # 0.1 0.0 - 0.5 K/uL    Baso # 0.06 0.00 - 0.20 K/uL    nRBC 0 0 /100 WBC    Gran % 66.8 38.0 - 73.0 %    Lymph % 18.3 18 - 48 %    Mono % 11.5 4 - 15 %    Eosinophil % 1.9 0.0 - 8.0 %    Basophil % 0.9 0 - 1 %    Differential Method Automated    Comp. Metabolic Panel   Result Value Ref Range    Sodium 135 (L) 136 - 145 mmol/L    Potassium 3.5 3.5 - 5.1 mmol/L    Chloride 96 95 - 110  mmol/L    CO2 28 23 - 29 mmol/L    Glucose 107 70 - 110 mg/dL    BUN 12 8 - 23 mg/dL    Creatinine 0.7 0.5 - 1.4 mg/dL    Calcium 9.5 8.7 - 10.5 mg/dL    Total Protein 7.4 6.0 - 8.4 g/dL    Albumin 3.7 3.5 - 5.2 g/dL    Total Bilirubin 1.1 (H) 0.1 - 1.0 mg/dL    Alkaline Phosphatase 258 (H) 55 - 135 U/L     (H) 10 - 40 U/L     (H) 10 - 44 U/L    Anion Gap 11 8 - 16 mmol/L    eGFR if African American >60 >60 mL/min/1.73 m^2    eGFR if non African American >60 >60 mL/min/1.73 m^2   Lipase   Result Value Ref Range    Lipase 323 (H) 4 - 60 U/L   Urinalysis, Reflex to Urine Culture Urine, Clean Catch    Specimen: Urine   Result Value Ref Range    Specimen UA Urine, Clean Catch     Color, UA Yellow Yellow, Straw, Dilma    Appearance, UA Clear Clear    pH, UA 8.0 5.0 - 8.0    Specific Gravity, UA 1.015 1.005 - 1.030    Protein, UA Negative Negative    Glucose, UA Negative Negative    Ketones, UA Negative Negative    Bilirubin (UA) Negative Negative    Occult Blood UA Trace (A) Negative    Nitrite, UA Negative Negative    Urobilinogen, UA Negative <2.0 EU/dL    Leukocytes, UA Negative Negative   Amylase   Result Value Ref Range    Amylase 127 (H) 20 - 110 U/L   COVID-19 Rapid Screening   Result Value Ref Range    SARS-CoV-2 RNA, Amplification, Qual Negative Negative     *Note: Due to a large number of results and/or encounters for the requested time period, some results have not been displayed. A complete set of results can be found in Results Review.         Significant Imaging: I have reviewed and interpreted all pertinent imaging results/findings within the past 24 hours.     Imaging Results    US ABDOMEN COMPLETE 3/22/21  Stone and sludge filled gallbladder without convincing sonographic evidence of cholecystitis.         I have independently reviewed all pertinent labs within the past 24 hours.    I have independently reviewed, visualized and interpreted all pertinent imaging results within the past 24  hours and discussed the findings with the ED physician, Dr. England

## 2021-03-25 NOTE — PLAN OF CARE
[facility] -   Initial Discharge Assessment       Primary Care Provider: Nicole Castellon MD    Expected Discharge Date:     Discharge Barriers Identified: None    Discharge Plan A: Home with family  Discharge Plan B: Home with family      [id number] [place] - [id number]  [address] [place] [address]  Phone: [phone] Fax: [phone]         Initial Assessment (most recent)      Adult Discharge Assessment - 03/25/21 1505        Discharge Assessment    Assessment Type  Discharge Planning Assessment     Confirmed/corrected address, phone number and insurance  Yes     Confirmed Demographics  Correct on Facesheet     Source of Information  patient     Communicated WISAM with patient/caregiver  Date not available/Unable to determine     Reason For Admission  abd discomfort, s/p ERCP. Patient states she will probably have lap liv tomorrow.     Lives With  spouse     Do you expect to return to your current living situation?  Yes     Do you have help at home or someone to help you manage your care at home?  Yes     Who are your caregiver(s) and their phone number(s)?  spouse     Prior to hospitilization cognitive status:  Alert/Oriented     Current cognitive status:  Alert/Oriented     Walking or Climbing Stairs Difficulty  none     Dressing/Bathing Difficulty  none     Equipment Currently Used at Home  none     Readmission within 30 days?  No     Patient currently being followed by outpatient case management?  No     Do you currently have service(s) that help you manage your care at home?  No     Do you take prescription medications?  Yes     Do you have prescription coverage?  Yes     Do you have any problems affording any of your prescribed medications?  No     Is the patient taking medications as prescribed?  yes     How do you get to doctors appointments?  family or friend will provide     Are you on dialysis?  No     Do you take coumadin?  No     Discharge Plan A   Home with family     Discharge Plan B  Home with family     DME Needed Upon Discharge   none     Discharge Plan discussed with:  Patient;Spouse/sig other     Discharge Barriers Identified  None        Relationship/Environment    Name(s) of Who Lives With Patient  spouse, Alpesh, [phone]           CM met with patient at bedside to introduce role and discuss d/c planning. Patient independent in ADLS, denies needs at this time. CM following.    CM provided a transitional care folder, information on advanced directives, information on pharmacy bedside delivery, and discharge planning begins on admission with contact information for any needs/questions.

## 2021-03-25 NOTE — [employer] PATIENT INSTRUCTIONS
Discharge Summary/Instructions after an Endoscopic Procedure  Patient Name: Leticia Piña  Patient MRN: [id number]  Patient YOB: 1950 Thursday, March 25, 2021 Preston Madrigal MD  Dear patient,  As a result of recent federal legislation (The Federal Cures Act), you may   receive lab or pathology results from your procedure in your [facility]   account before your physician is able to contact you. Your physician or   their representative will relay the results to you with their   recommendations at their soonest availability.  Thank you,  RESTRICTIONS:  During your procedure today, you received medications for sedation.  These   medications may affect your judgment, balance and coordination.  Therefore,   for 24 hours, you have the following restrictions:   - DO NOT drive a car, operate machinery, make legal/financial decisions,   sign important papers or drink alcohol.    ACTIVITY:  Today: no heavy lifting, straining or running due to procedural   sedation/anesthesia.  The following day: return to full activity including work.  DIET:  Eat and drink normally unless instructed otherwise.     TREATMENT FOR COMMON SIDE EFFECTS:  - Mild abdominal pain, nausea, belching, bloating or excessive gas:  rest,   eat lightly and use a heating pad.  - Sore Throat: treat with throat lozenges and/or gargle with warm salt   water.  - Because air was used during the procedure, expelling large amounts of air   from your rectum or belching is normal.  - If a bowel prep was taken, you may not have a bowel movement for 1-3 days.    This is normal.  SYMPTOMS TO WATCH FOR AND REPORT TO YOUR PHYSICIAN:  1. Abdominal pain or bloating, other than gas cramps.  2. Chest pain.  3. Back pain.  4. Signs of infection such as: chills or fever occurring within 24 hours   after the procedure.  5. Rectal bleeding, which would show as bright red, maroon, or black stools.   (A tablespoon of blood from the rectum is not serious,  especially if   hemorrhoids are present.)  6. Vomiting.  7. Weakness or dizziness.  GO DIRECTLY TO THE NEAREST EMERGENCY ROOM IF YOU HAVE ANY OF THE FOLLOWING:      Difficulty breathing              Chills and/or fever over 101 F   Persistent vomiting and/or vomiting blood   Severe abdominal pain   Severe chest pain   Black, tarry stools   Bleeding- more than one tablespoon   Any other symptom or condition that you feel may need urgent attention  Your doctor recommends these additional instructions:  If any biopsies were taken, your doctors clinic will contact you in 1 to 2   weeks with any results.  - Return patient to hospital [facility] for ongoing care.   - Resume previous diet.   - Repeat ERCP in 2 months to remove stent.   - Follow up with surgery for cholecystectomy  For questions, problems or results please call your physician Preston Madrigal MD at Work:  [phone]  If you have any questions about the above instructions, call the GI   department at [phone] or call the endoscopy unit at [phone]   from 7am until 3 pm.  [facility] [place], EMERGENCY ROOM PHONE NUMBER:   [phone]  IF A COMPLICATION OR EMERGENCY SITUATION ARISES AND YOU ARE UNABLE TO REACH   YOUR PHYSICIAN - GO DIRECTLY TO THE EMERGENCY ROOM.  I have read or have had read to me these discharge instructions for my   procedure and have received a written copy.  I understand these   instructions and will follow-up with my physician if I have any questions.     __________________________________       _____________________________________  Nurse Signature                                          Patient/Designated   Responsible Party Signature  MD Preston Soriaon MD  3/25/2021 5:32:31 PM  This report has been verified and signed electronically.  Dear patient,  As a result of recent federal legislation (The Federal Cures Act), you may   receive lab or pathology results  from your procedure in your [facility]   account before your physician is able to contact you. Your physician or   their representative will relay the results to you with their   recommendations at their soonest availability.  Thank you,  [employer]ATION

## 2021-03-25 NOTE — HOSPITAL COURSE
Ms Piña is a 70 year old female admitted with abdominal pain and ultrasound revealed gallstones with elevation in LFTs.  GI consulted for further evaluation. Patient underwent MRCP on the morning of 3/25/2021 that showed choledocholithiasis. She later on 3/25/2021 underwent ERCP by Dr Madrigal. General Surgery was also consulted for further evaluation and treatment. This AM, patient underwent Laparoscopic cholecystectomy by Dr Blue, tolerated procedure well. Case reviewed with Dr Blue, ok to discharge today from General Surgery Standpoint if tolerating clear, then advance to full liquid diet. She was follow up with GI and General Surgery in clinic. Patient tolerated full liquid well, pain well control. She was seen, examined and deemed suitable for discharge.

## 2021-03-25 NOTE — PLAN OF CARE
Discussed plan of care with patient, verbalizes understanding. O2 mid 90s on RA. Patient is AAO x4. No falls at this time. PIV intact, NS infusing at 100ml/hr. Cardiac monitor in place, NSR. BP monitored, hydralazine administered per order. NPO after midnight. Oriented to room, and surroundings. No other complaints at this time. Instructed to call for assistance prior to getting up. Bed is in lowest position, SR up x2, Call light within reach. Will continue to monitor.

## 2021-03-25 NOTE — ASSESSMENT & PLAN NOTE
Lipase 323, with gallbladder sludge.  IV pain medications as needed.  Continue normal saline at 125 cc/hour

## 2021-03-25 NOTE — NURSING
Pt arrived on unit by wheelchair, ambulated to bed w/ assistance. Personal belongings and Zosyn sent with pt. PIV intact. Placed on tele monitor, vital signs taken. VSS. Call light w/i reach, bed alarm set, pt instructed to call for assistance when needed.

## 2021-03-25 NOTE — SUBJECTIVE & OBJECTIVE
No current facility-administered medications on file prior to encounter.     Current Outpatient Medications on File Prior to Encounter   Medication Sig    ALA-HIST IR 2 mg Tab TAKE 1 TABLET BY MOUTH EVERY DAY    azelastine (ASTELIN) 137 mcg (0.1 %) nasal spray 1 spray (137 mcg total) by Nasal route 2 (two) times daily.    benzonatate (TESSALON PERLES) 100 MG capsule Take 2 capsules (200 mg total) by mouth 3 (three) times daily as needed for Cough.    buPROPion (WELLBUTRIN XL) 300 MG 24 hr tablet TAKE ONE TABLET BY MOUTH ONE TIME DAILY     cholecalciferol, vitamin D3, 5,000 unit/mL Drop Take 1 drop by mouth Daily.    cyanocobalamin, vitamin B-12, (VITAMIN B-12) 5,000 mcg Subl Place under the tongue once daily.    DULoxetine (CYMBALTA) 60 MG capsule TAKE ONE CAPSULE BY MOUTH ONE TIME DAILY     fluticasone propionate (FLONASE) 50 mcg/actuation nasal spray 2 sprays (100 mcg total) by Each Nostril route 2 (two) times a day.    folic acid (FOLVITE) 1 MG tablet TAKE ONE TABLET BY MOUTH ONE TIME DAILY    hydroCHLOROthiazide (HYDRODIURIL) 25 MG tablet Take 1 tablet (25 mg total) by mouth once daily.    hydrOXYchloroQUINE (PLAQUENIL) 200 mg tablet TAKE ONE TABLET BY MOUTH TWICE DAILY     levothyroxine (EUTHYROX) 75 MCG tablet Take 1 tablet (75 mcg total) by mouth once daily.    losartan (COZAAR) 100 MG tablet Take 1 tablet (100 mg total) by mouth once daily.    omeprazole (PRILOSEC) 40 MG capsule Take 1 capsule (40 mg total) by mouth once daily.    potassium chloride SA (K-DUR,KLOR-CON) 20 MEQ tablet TAKE ONE TABLET BY MOUTH ONE TIME DAILY     traZODone (DESYREL) 50 MG tablet TAKE 2-3 TABLETS (100-150 MG TOTAL) BY MOUTH NIGHTLY AS NEEDED FOR INSOMNIA.     triamcinolone (NASACORT) 55 mcg nasal inhaler 2 sprays by Nasal route once daily.    diclofenac sodium (VOLTAREN) 1 % Gel Apply 2 g topically 4 (four) times daily.    ferrous gluconate (FERGON) 324 MG tablet Take 324 mg by mouth daily with breakfast.     linaCLOtide (LINZESS) 72 mcg Cap capsule Take 1 capsule (72 mcg total) by mouth before breakfast.    LORazepam (ATIVAN) 1 MG tablet TAKE 1 TABLET (1 MG TOTAL) BY MOUTH 2 (TWO) TIMES DAILY AS NEEDED.    meloxicam (MOBIC) 15 MG tablet Take 1 tablet (15 mg total) by mouth once daily.    methylPREDNISolone (MEDROL DOSEPACK) 4 mg tablet use as directed    [DISCONTINUED] DULoxetine (CYMBALTA) 60 MG capsule TAKE ONE CAPSULE BY MOUTH ONE TIME DAILY    [DISCONTINUED] DULoxetine (CYMBALTA) 60 MG capsule TAKE ONE CAPSULE BY MOUTH ONE TIME DAILY     [DISCONTINUED] hydroCHLOROthiazide (HYDRODIURIL) 25 MG tablet Take 1 tablet (25 mg total) by mouth once daily.    [DISCONTINUED] levothyroxine (SYNTHROID) 75 MCG tablet Take 1 tablet (75 mcg total) by mouth once daily.    [DISCONTINUED] potassium chloride SA (K-DUR,KLOR-CON) 20 MEQ tablet Take 1 tablet (20 mEq total) by mouth once daily.       Review of patient's allergies indicates:   Allergen Reactions    Humira  [adalimumab]      Other reaction(s): drug-induced lupus  Other reaction(s): drug-induced lupus    Sulfa (sulfonamide antibiotics)      Other reaction(s): Hives  Other reaction(s): Rash  Other reaction(s): Hives       Past Medical History:   Diagnosis Date    Allergic rhinitis     Cutaneous lupus erythematosus     drug induced.    Essential hypertension 2/5/2019    GERD (gastroesophageal reflux disease)     Hypothyroid     Insomnia     Mixed anxiety and depressive disorder     Normal cardiac stress test     11/07    Rheumatoid arthritis(714.0)     Sjogren's syndrome      Past Surgical History:   Procedure Laterality Date    AUGMENTATION OF BREAST      breast implants      breast surgery for rupture      TONSILLECTOMY, ADENOIDECTOMY      TUBAL LIGATION       Family History     None        Tobacco Use    Smoking status: Never Smoker    Smokeless tobacco: Never Used   Substance and Sexual Activity    Alcohol use: Not Currently     Comment: socially      Drug use: No    Sexual activity: Not Currently     Review of Systems   Constitutional: Negative for chills, fatigue, fever and unexpected weight change.   Respiratory: Negative for cough, shortness of breath, wheezing and stridor.    Cardiovascular: Negative for chest pain, palpitations and leg swelling.   Gastrointestinal: Positive for abdominal pain and nausea. Negative for abdominal distention, constipation, diarrhea and vomiting.   Genitourinary: Negative for difficulty urinating, dysuria, frequency, hematuria and urgency.   Skin: Negative for color change, pallor, rash and wound.   Hematological: Does not bruise/bleed easily.     Objective:     Vital Signs (Most Recent):  Temp: 98.3 °F (36.8 °C) (03/25/21 1232)  Pulse: 72 (03/25/21 1232)  Resp: 18 (03/25/21 1232)  BP: (!) 158/68 (03/25/21 1232)  SpO2: 95 % (03/25/21 1232) Vital Signs (24h Range):  Temp:  [97.8 °F (36.6 °C)-98.4 °F (36.9 °C)] 98.3 °F (36.8 °C)  Pulse:  [67-77] 72  Resp:  [14-20] 18  SpO2:  [94 %-100 %] 95 %  BP: (139-179)/(64-79) 158/68     Weight: 65.5 kg (144 lb 6.4 oz)  Body mass index is 24.03 kg/m².    Physical Exam  Vitals reviewed.   Constitutional:       Appearance: She is well-developed.   HENT:      Head: Normocephalic and atraumatic.   Cardiovascular:      Rate and Rhythm: Normal rate and regular rhythm.   Pulmonary:      Effort: Pulmonary effort is normal.      Breath sounds: Normal breath sounds.   Abdominal:      General: Bowel sounds are normal. There is no distension.      Palpations: Abdomen is soft.      Tenderness: There is no abdominal tenderness.   Musculoskeletal:      Cervical back: Neck supple.   Skin:     General: Skin is warm and dry.   Neurological:      Mental Status: She is alert and oriented to person, place, and time.         Significant Labs:  CBC:   Recent Labs   Lab 03/25/21  0703   WBC 4.03   RBC 4.13   HGB 11.6*   HCT 35.7*      MCV 86   MCH 28.1   MCHC 32.5     CMP:   Recent Labs   Lab  03/25/21  0703   GLU 85   CALCIUM 8.5*   ALBUMIN 3.2*   PROT 6.3      K 3.3*   CO2 25      BUN 8   CREATININE 0.7   ALKPHOS 236*   *   *   BILITOT 0.6       Significant Diagnostics:  U/S:  Impression:     Stone and sludge filled gallbladder without convincing sonographic evidence of cholecystitis.    MRCP:    Impression:     Distal stones suspected within the common bile duct just above the ampulla noted causing mild intra and extrahepatic biliary ductal dilatation.     Multiple gallstones are seen measuring up to 1.9 cm with mild wall thickening and small amount of pericholecystic fluid.

## 2021-03-25 NOTE — SUBJECTIVE & OBJECTIVE
Past Medical History:   Diagnosis Date    Allergic rhinitis     Cutaneous lupus erythematosus     drug induced.    Essential hypertension 2/5/2019    GERD (gastroesophageal reflux disease)     Hypothyroid     Insomnia     Mixed anxiety and depressive disorder     Normal cardiac stress test     11/07    Rheumatoid arthritis(714.0)     Sjogren's syndrome        Past Surgical History:   Procedure Laterality Date    AUGMENTATION OF BREAST      breast implants      breast surgery for rupture      TONSILLECTOMY, ADENOIDECTOMY      TUBAL LIGATION         Review of patient's allergies indicates:   Allergen Reactions    Humira  [adalimumab]      Other reaction(s): drug-induced lupus  Other reaction(s): drug-induced lupus    Sulfa (sulfonamide antibiotics)      Other reaction(s): Hives  Other reaction(s): Rash  Other reaction(s): Hives     Family History     None        Tobacco Use    Smoking status: Never Smoker    Smokeless tobacco: Never Used   Substance and Sexual Activity    Alcohol use: Not Currently     Comment: socially     Drug use: No    Sexual activity: Not Currently     Review of Systems   Constitutional: Negative for fever.   HENT: Negative for hearing loss.    Eyes: Negative for visual disturbance.   Respiratory: Negative for cough and shortness of breath.    Cardiovascular: Negative for chest pain.   Gastrointestinal:        As per HPI.   Genitourinary: Negative for dysuria, frequency and hematuria.   Musculoskeletal: Positive for back pain. Negative for arthralgias.   Skin: Negative for rash.   Neurological: Negative for seizures, syncope, numbness and headaches.   Hematological: Does not bruise/bleed easily.   Psychiatric/Behavioral: The patient is not nervous/anxious.      Objective:     Vital Signs (Most Recent):  Temp: 98 °F (36.7 °C) (03/25/21 0712)  Pulse: 77 (03/25/21 0712)  Resp: 15 (03/25/21 0712)  BP: (!) 146/69 (03/25/21 0712)  SpO2: 96 % (03/25/21 0712) Vital Signs (24h  Range):  Temp:  [97.8 °F (36.6 °C)-98.4 °F (36.9 °C)] 98 °F (36.7 °C)  Pulse:  [67-77] 77  Resp:  [14-20] 15  SpO2:  [94 %-100 %] 96 %  BP: (139-179)/(64-79) 146/69     Weight: 65.5 kg (144 lb 6.4 oz) (03/25/21 0518)  Body mass index is 24.03 kg/m².      Intake/Output Summary (Last 24 hours) at 3/25/2021 0805  Last data filed at 3/25/2021 0700  Gross per 24 hour   Intake 910.83 ml   Output --   Net 910.83 ml       Lines/Drains/Airways     Peripheral Intravenous Line                 Peripheral IV - Single Lumen 03/24/21 1910 18 G Left Forearm <1 day                Physical Exam  Constitutional:       Appearance: Normal appearance. She is well-developed.   HENT:      Head: Normocephalic and atraumatic.   Eyes:      Extraocular Movements: Extraocular movements intact.   Neck:      Vascular: No carotid bruit.   Cardiovascular:      Rate and Rhythm: Normal rate and regular rhythm.      Heart sounds: No murmur heard.     Pulmonary:      Effort: Pulmonary effort is normal. No respiratory distress.      Breath sounds: Normal breath sounds. No wheezing.   Abdominal:      General: Bowel sounds are normal. There is no distension.      Palpations: Abdomen is soft. There is no mass.      Tenderness: There is no abdominal tenderness.   Musculoskeletal:      Cervical back: Normal range of motion and neck supple.      Right lower leg: No edema.      Left lower leg: No edema.   Skin:     General: Skin is warm and dry.      Findings: No rash.   Neurological:      Mental Status: She is alert and oriented to person, place, and time.      Cranial Nerves: No cranial nerve deficit.   Psychiatric:         Behavior: Behavior normal.         Significant Labs:  CBC:   Recent Labs   Lab 03/24/21  1829 03/25/21  0703   WBC 6.46 4.03   HGB 12.6 11.6*   HCT 37.6 35.7*    302     CMP:   Recent Labs   Lab 03/24/21  1829      CALCIUM 9.5   ALBUMIN 3.7   PROT 7.4   *   K 3.5   CO2 28   CL 96   BUN 12   CREATININE 0.7   ALKPHOS  258*   *   *   BILITOT 1.1*     Coagulation: No results for input(s): PT, INR, APTT in the last 48 hours.  Lipase:   Recent Labs   Lab 03/24/21  1829   LIPASE 323*       Significant Imaging:  Imaging results within the past 24 hours have been reviewed.

## 2021-03-25 NOTE — CONSULTS
[facility] -   General Surgery  Consult Note    Patient Name: Leticia Piña  MRN: [id number]  Code Status: No Order  Admission Date: 3/24/2021  Hospital Length of Stay: 0 days  Attending Physician: Jose Miguel Melgar MD  Primary Care Provider: Nicole Castellon MD    Patient information was obtained from patient.     Inpatient consult to General Surgery  Consult performed by: Tucker Mancuso MD  Consult ordered by: Ed Castro MD        Subjective:     Principal Problem: Cholelithiasis with choledocholithiasis    History of Present Illness: 70-year-old female referred for choledocholithiasis.  Patient was admitted for ongoing upper abdominal pain, nausea.  Ultrasound revealed gallstones with slight elevation in LFTs.  Patient underwent MRCP today showing choledocholithiasis with plans for ERCP today.      No current facility-administered medications on file prior to encounter.     Current Outpatient Medications on File Prior to Encounter   Medication Sig    ALA-HIST IR 2 mg Tab TAKE 1 TABLET BY MOUTH EVERY DAY    azelastine (ASTELIN) 137 mcg (0.1 %) nasal spray 1 spray (137 mcg total) by Nasal route 2 (two) times daily.    benzonatate (TESSALON PERLES) 100 MG capsule Take 2 capsules (200 mg total) by mouth 3 (three) times daily as needed for Cough.    buPROPion (WELLBUTRIN XL) 300 MG 24 hr tablet TAKE ONE TABLET BY MOUTH ONE TIME DAILY     cholecalciferol, vitamin D3, 5,000 unit/mL Drop Take 1 drop by mouth Daily.    cyanocobalamin, vitamin B-12, (VITAMIN B-12) 5,000 mcg Subl Place under the tongue once daily.    DULoxetine (CYMBALTA) 60 MG capsule TAKE ONE CAPSULE BY MOUTH ONE TIME DAILY     fluticasone propionate (FLONASE) 50 mcg/actuation nasal spray 2 sprays (100 mcg total) by Each Nostril route 2 (two) times a day.    folic acid (FOLVITE) 1 MG tablet TAKE ONE TABLET BY MOUTH ONE TIME DAILY    hydroCHLOROthiazide (HYDRODIURIL) 25 MG tablet Take 1 tablet (25 mg total) by mouth once  daily.    hydrOXYchloroQUINE (PLAQUENIL) 200 mg tablet TAKE ONE TABLET BY MOUTH TWICE DAILY     levothyroxine (EUTHYROX) 75 MCG tablet Take 1 tablet (75 mcg total) by mouth once daily.    losartan (COZAAR) 100 MG tablet Take 1 tablet (100 mg total) by mouth once daily.    omeprazole (PRILOSEC) 40 MG capsule Take 1 capsule (40 mg total) by mouth once daily.    potassium chloride SA (K-DUR,KLOR-CON) 20 MEQ tablet TAKE ONE TABLET BY MOUTH ONE TIME DAILY     traZODone (DESYREL) 50 MG tablet TAKE 2-3 TABLETS (100-150 MG TOTAL) BY MOUTH NIGHTLY AS NEEDED FOR INSOMNIA.     triamcinolone (NASACORT) 55 mcg nasal inhaler 2 sprays by Nasal route once daily.    diclofenac sodium (VOLTAREN) 1 % Gel Apply 2 g topically 4 (four) times daily.    ferrous gluconate (FERGON) 324 MG tablet Take 324 mg by mouth daily with breakfast.    linaCLOtide (LINZESS) 72 mcg Cap capsule Take 1 capsule (72 mcg total) by mouth before breakfast.    LORazepam (ATIVAN) 1 MG tablet TAKE 1 TABLET (1 MG TOTAL) BY MOUTH 2 (TWO) TIMES DAILY AS NEEDED.    meloxicam (MOBIC) 15 MG tablet Take 1 tablet (15 mg total) by mouth once daily.    methylPREDNISolone (MEDROL DOSEPACK) 4 mg tablet use as directed    [DISCONTINUED] DULoxetine (CYMBALTA) 60 MG capsule TAKE ONE CAPSULE BY MOUTH ONE TIME DAILY    [DISCONTINUED] DULoxetine (CYMBALTA) 60 MG capsule TAKE ONE CAPSULE BY MOUTH ONE TIME DAILY     [DISCONTINUED] hydroCHLOROthiazide (HYDRODIURIL) 25 MG tablet Take 1 tablet (25 mg total) by mouth once daily.    [DISCONTINUED] levothyroxine (SYNTHROID) 75 MCG tablet Take 1 tablet (75 mcg total) by mouth once daily.    [DISCONTINUED] potassium chloride SA (K-DUR,KLOR-CON) 20 MEQ tablet Take 1 tablet (20 mEq total) by mouth once daily.       Review of patient's allergies indicates:   Allergen Reactions    Humira  [adalimumab]      Other reaction(s): drug-induced lupus  Other reaction(s): drug-induced lupus    Sulfa (sulfonamide antibiotics)       Other reaction(s): Hives  Other reaction(s): Rash  Other reaction(s): Hives       Past Medical History:   Diagnosis Date    Allergic rhinitis     Cutaneous lupus erythematosus     drug induced.    Essential hypertension 2/5/2019    GERD (gastroesophageal reflux disease)     Hypothyroid     Insomnia     Mixed anxiety and depressive disorder     Normal cardiac stress test     11/07    Rheumatoid arthritis(714.0)     Sjogren's syndrome      Past Surgical History:   Procedure Laterality Date    AUGMENTATION OF BREAST      breast implants      breast surgery for rupture      TONSILLECTOMY, ADENOIDECTOMY      TUBAL LIGATION       Family History     None        Tobacco Use    Smoking status: Never Smoker    Smokeless tobacco: Never Used   Substance and Sexual Activity    Alcohol use: Not Currently     Comment: socially     Drug use: No    Sexual activity: Not Currently     Review of Systems   Constitutional: Negative for chills, fatigue, fever and unexpected weight change.   Respiratory: Negative for cough, shortness of breath, wheezing and stridor.    Cardiovascular: Negative for chest pain, palpitations and leg swelling.   Gastrointestinal: Positive for abdominal pain and nausea. Negative for abdominal distention, constipation, diarrhea and vomiting.   Genitourinary: Negative for difficulty urinating, dysuria, frequency, hematuria and urgency.   Skin: Negative for color change, pallor, rash and wound.   Hematological: Does not bruise/bleed easily.     Objective:     Vital Signs (Most Recent):  Temp: 98.3 °F (36.8 °C) (03/25/21 1232)  Pulse: 72 (03/25/21 1232)  Resp: 18 (03/25/21 1232)  BP: (!) 158/68 (03/25/21 1232)  SpO2: 95 % (03/25/21 1232) Vital Signs (24h Range):  Temp:  [97.8 °F (36.6 °C)-98.4 °F (36.9 °C)] 98.3 °F (36.8 °C)  Pulse:  [67-77] 72  Resp:  [14-20] 18  SpO2:  [94 %-100 %] 95 %  BP: (139-179)/(64-79) 158/68     Weight: 65.5 kg (144 lb 6.4 oz)  Body mass index is 24.03  kg/m².    Physical Exam  Vitals reviewed.   Constitutional:       Appearance: She is well-developed.   HENT:      Head: Normocephalic and atraumatic.   Cardiovascular:      Rate and Rhythm: Normal rate and regular rhythm.   Pulmonary:      Effort: Pulmonary effort is normal.      Breath sounds: Normal breath sounds.   Abdominal:      General: Bowel sounds are normal. There is no distension.      Palpations: Abdomen is soft.      Tenderness: There is no abdominal tenderness.   Musculoskeletal:      Cervical back: Neck supple.   Skin:     General: Skin is warm and dry.   Neurological:      Mental Status: She is alert and oriented to person, place, and time.         Significant Labs:  CBC:   Recent Labs   Lab 03/25/21  0703   WBC 4.03   RBC 4.13   HGB 11.6*   HCT 35.7*      MCV 86   MCH 28.1   MCHC 32.5     CMP:   Recent Labs   Lab 03/25/21  0703   GLU 85   CALCIUM 8.5*   ALBUMIN 3.2*   PROT 6.3      K 3.3*   CO2 25      BUN 8   CREATININE 0.7   ALKPHOS 236*   *   *   BILITOT 0.6       Significant Diagnostics:  U/S:  Impression:     Stone and sludge filled gallbladder without convincing sonographic evidence of cholecystitis.    MRCP:    Impression:     Distal stones suspected within the common bile duct just above the ampulla noted causing mild intra and extrahepatic biliary ductal dilatation.     Multiple gallstones are seen measuring up to 1.9 cm with mild wall thickening and small amount of pericholecystic fluid.          Assessment/Plan:     * Cholelithiasis with choledocholithiasis  ERCP today per GI  NPO after midnight, IV fluids, iv abx  Plan for cholecystectomy tomorrow  The risks of robotic/laparoscopic cholecystectomy including bleeding, infection, common bile duct injury, bile leak, injury to abdominal organs, failure to alleviate symptoms, pulmonary embolus, deep vein thrombosis, cardiac event, and possibility of conversion to an open operation were explained to the patient.    The nature of the patient's condition, probability of success, risks of refusing treatment, and alternatives and risks of the alternatives were also explained.  The patient verbalized understanding.          VTE Risk Mitigation (From admission, onward)         Ordered     Place sequential compression device  Until discontinued      03/24/21 2055                Thank you for your consult. I will follow-up with patient. Please contact us if you have any additional questions.    Tucker Mancuso MD  General Surgery  [facility]

## 2021-03-25 NOTE — HPI
The patient presented to the ER for abdominal pain, nausea and vomiting. The pain started in the epigastrium and radiated across the upper abdomen and into the right side of her back. She has been having similar episodes off and no for over a year, but this episode lasted longer than usual. She says it feels like her insides are swelling. The episodes are usually associated with meals. She took Tylenol and symptoms resolved by the time she got to the ER. Labs showed elevated LFTs: , , T bili 1.1a and . Ultrasound showed stone and sludge filled gallbladder without convincing sonographic evidence of cholecystitis; CBD was 6.2 mm without intrahepatic ductal dilatation. Lipase was 323. WBC count normal. Afebrile. Vitals stable.

## 2021-03-25 NOTE — HPI
70-year-old female referred for choledocholithiasis.  Patient was admitted for ongoing upper abdominal pain, nausea.  Ultrasound revealed gallstones with slight elevation in LFTs.  Patient underwent MRCP today showing choledocholithiasis with plans for ERCP today.

## 2021-03-25 NOTE — CONSULTS
[facility] -   Gastroenterology  Consult Note    Patient Name: Leticia Piña  MRN: [id number]  Admission Date: 3/24/2021  Hospital Length of Stay: 0 days  Code Status: No Order   Attending Provider: Jose Miguel Melgar MD   Consulting Provider: Raman Cummings PA-C  Primary Care Physician: Nicole Castellon MD  Principal Problem:Cholelithiasis with choledocholithiasis    Inpatient consult to Gastroenterology  Consult performed by: Raman Cummings PA-C  Consult ordered by: Ed Castro MD  Reason for consult: Choledocholithiasis         Subjective:     HPI:  The patient presented to the ER for abdominal pain, nausea and vomiting. The pain started in the epigastrium and radiated across the upper abdomen and into the right side of her back. She has been having similar episodes off and no for over a year, but this episode lasted longer than usual. She says it feels like her insides are swelling. The episodes are usually associated with meals. She took Tylenol and symptoms resolved by the time she got to the ER. Labs showed elevated LFTs: , , T bili 1.1a and . Ultrasound showed stone and sludge filled gallbladder without convincing sonographic evidence of cholecystitis; CBD was 6.2 mm without intrahepatic ductal dilatation. Lipase was 323. WBC count normal. Afebrile. Vitals stable.       Past Medical History:   Diagnosis Date    Allergic rhinitis     Cutaneous lupus erythematosus     drug induced.    Essential hypertension 2/5/2019    GERD (gastroesophageal reflux disease)     Hypothyroid     Insomnia     Mixed anxiety and depressive disorder     Normal cardiac stress test     11/07    Rheumatoid arthritis(714.0)     Sjogren's syndrome        Past Surgical History:   Procedure Laterality Date    AUGMENTATION OF BREAST      breast implants      breast surgery for rupture      TONSILLECTOMY, ADENOIDECTOMY      TUBAL LIGATION         Review of patient's allergies  indicates:   Allergen Reactions    Humira  [adalimumab]      Other reaction(s): drug-induced lupus  Other reaction(s): drug-induced lupus    Sulfa (sulfonamide antibiotics)      Other reaction(s): Hives  Other reaction(s): Rash  Other reaction(s): Hives     Family History     None        Tobacco Use    Smoking status: Never Smoker    Smokeless tobacco: Never Used   Substance and Sexual Activity    Alcohol use: Not Currently     Comment: socially     Drug use: No    Sexual activity: Not Currently     Review of Systems   Constitutional: Negative for fever.   HENT: Negative for hearing loss.    Eyes: Negative for visual disturbance.   Respiratory: Negative for cough and shortness of breath.    Cardiovascular: Negative for chest pain.   Gastrointestinal:        As per HPI.   Genitourinary: Negative for dysuria, frequency and hematuria.   Musculoskeletal: Positive for back pain. Negative for arthralgias.   Skin: Negative for rash.   Neurological: Negative for seizures, syncope, numbness and headaches.   Hematological: Does not bruise/bleed easily.   Psychiatric/Behavioral: The patient is not nervous/anxious.      Objective:     Vital Signs (Most Recent):  Temp: 98 °F (36.7 °C) (03/25/21 0712)  Pulse: 77 (03/25/21 0712)  Resp: 15 (03/25/21 0712)  BP: (!) 146/69 (03/25/21 0712)  SpO2: 96 % (03/25/21 0712) Vital Signs (24h Range):  Temp:  [97.8 °F (36.6 °C)-98.4 °F (36.9 °C)] 98 °F (36.7 °C)  Pulse:  [67-77] 77  Resp:  [14-20] 15  SpO2:  [94 %-100 %] 96 %  BP: (139-179)/(64-79) 146/69     Weight: 65.5 kg (144 lb 6.4 oz) (03/25/21 0518)  Body mass index is 24.03 kg/m².      Intake/Output Summary (Last 24 hours) at 3/25/2021 0805  Last data filed at 3/25/2021 0700  Gross per 24 hour   Intake 910.83 ml   Output --   Net 910.83 ml       Lines/Drains/Airways     Peripheral Intravenous Line                 Peripheral IV - Single Lumen 03/24/21 1910 18 G Left Forearm <1 day                Physical Exam  Constitutional:        Appearance: Normal appearance. She is well-developed.   HENT:      Head: Normocephalic and atraumatic.   Eyes:      Extraocular Movements: Extraocular movements intact.   Neck:      Vascular: No carotid bruit.   Cardiovascular:      Rate and Rhythm: Normal rate and regular rhythm.      Heart sounds: No murmur heard.     Pulmonary:      Effort: Pulmonary effort is normal. No respiratory distress.      Breath sounds: Normal breath sounds. No wheezing.   Abdominal:      General: Bowel sounds are normal. There is no distension.      Palpations: Abdomen is soft. There is no mass.      Tenderness: There is no abdominal tenderness.   Musculoskeletal:      Cervical back: Normal range of motion and neck supple.      Right lower leg: No edema.      Left lower leg: No edema.   Skin:     General: Skin is warm and dry.      Findings: No rash.   Neurological:      Mental Status: She is alert and oriented to person, place, and time.      Cranial Nerves: No cranial nerve deficit.   Psychiatric:         Behavior: Behavior normal.         Significant Labs:  CBC:   Recent Labs   Lab 03/24/21  1829 03/25/21  0703   WBC 6.46 4.03   HGB 12.6 11.6*   HCT 37.6 35.7*    302     CMP:   Recent Labs   Lab 03/24/21  1829      CALCIUM 9.5   ALBUMIN 3.7   PROT 7.4   *   K 3.5   CO2 28   CL 96   BUN 12   CREATININE 0.7   ALKPHOS 258*   *   *   BILITOT 1.1*     Coagulation: No results for input(s): PT, INR, APTT in the last 48 hours.  Lipase:   Recent Labs   Lab 03/24/21  1829   LIPASE 323*       Significant Imaging:  Imaging results within the past 24 hours have been reviewed.    Assessment/Plan:     * Cholelithiasis with choledocholithiasis  T. Bili is only 1.1, but US showed CBD of 6.8. She is now asymptomatic, so not sure if stone passed. Await morning labs. Once available, will discuss with Dr. Madrigal about ERCP vs MRCP.     Acute biliary pancreatitis  No symptoms of pancreatitis but lipase is a little  elevated. I will increase her IVF to 175 cc/hr.   We will continue to monitor her clinical course.         Thank you for your consult. I will follow-up with patient. Please contact us if you have any additional questions.    Raman Cummings PA-C  Gastroenterology  [facility]

## 2021-03-25 NOTE — PROGRESS NOTES
[facility] Hospital Medicine  Progress Note    Patient Name: Leticia Piña  MRN: [id number]  Patient Class: IP- Inpatient   Admission Date: 3/24/2021  Length of Stay: 0 days  Attending Physician: Jose Miguel Melgar MD  Primary Care Provider: Nicole Castellon MD        Subjective:     Principal Problem:Cholelithiasis with choledocholithiasis        HPI:  Ms. Piña is a pleasant 70-year-old  female with PMH significant for rheumatoid arthritis, Sjogren syndrome, HTN, depression/anxiety, has been complaining of upper abdominal discomfort for the past many months, intermittently worsen with eating, but sometimes with spontaneously improve on its own.  She was seen in the GI clinic on 3/11/21, and an outpatient abdominal ultrasound was ordered, which was done on 3/22/21, that revealed stone and sludge filled gallbladder without convincing sonographic evidence of cholecystitis.  CBD was dilated at 6.2 mm with no intrahepatic ductal dilatation.  Since around lunchtime earlier today, reports increasing upper abdominal discomfort, associated with nausea without vomiting.  Hence she presented to the ED, by which time the abdominal pain and nausea has resolved.  Laboratory workup done in the ED revealed total bilirubin 1.1, , , alkaline phosphatase 258, amylase 127, lipase 323.  Sodium 135.  Rest of the laboratory workup is unremarkable.  COVID-19 is negative.  Hepatitis C antibody negative.  ED physician discussed case with Dr. Escobedo with GI, recommended hospitalization for possible ERCP in a.m. and followed by possible laparoscopic cholecystectomy eventually.    Admitting diagnosis:  Choledocholithiasis, cholelithiasis with obstruction.  Acute biliary pancreatitis.      Overview/Hospital Course:  69 y/o admitted cholelithiasis with choledocholithiasis. Ultrasound revealed gallstones with slight elevation in LFTs.  Patient underwent MRCP today showing choledocholithiasis  with plans for ERCP today.        Review of Systems   Constitutional: Negative.  Negative for chills, diaphoresis, fatigue and fever.   HENT: Negative.  Negative for congestion, nosebleeds and sinus pressure.    Eyes: Negative.    Respiratory: Negative.  Negative for cough, chest tightness and shortness of breath.    Cardiovascular: Negative.  Negative for chest pain and palpitations.   Gastrointestinal: Positive for abdominal pain (upper) and nausea. Negative for diarrhea and vomiting.   Endocrine: Negative.    Genitourinary: Negative.  Negative for dysuria and flank pain.   Musculoskeletal: Negative.  Negative for back pain and joint swelling.   Skin: Negative.  Negative for rash.   Allergic/Immunologic: Negative.    Neurological: Negative.  Negative for dizziness, speech difficulty, weakness, numbness and headaches.   Hematological: Negative.    Psychiatric/Behavioral: Negative.  Negative for decreased concentration and hallucinations. The patient is not nervous/anxious.    All other systems reviewed and are negative.    Objective:     Vital Signs (Most Recent):  Temp: 98.3 °F (36.8 °C) (03/25/21 1232)  Pulse: 77 (03/25/21 1337)  Resp: 18 (03/25/21 1232)  BP: (!) 147/66 (03/25/21 1337)  SpO2: 95 % (03/25/21 1232) Vital Signs (24h Range):  Temp:  [97.8 °F (36.6 °C)-98.4 °F (36.9 °C)] 98.3 °F (36.8 °C)  Pulse:  [67-77] 77  Resp:  [14-20] 18  SpO2:  [94 %-100 %] 95 %  BP: (139-179)/(64-79) 147/66     Weight: 65.5 kg (144 lb 6.4 oz)  Body mass index is 24.03 kg/m².    Intake/Output Summary (Last 24 hours) at 3/25/2021 1601  Last data filed at 3/25/2021 0700  Gross per 24 hour   Intake 910.83 ml   Output --   Net 910.83 ml      Physical Exam  Vitals and nursing note reviewed.   Constitutional:       General: She is not in acute distress.     Appearance: Normal appearance. She is not ill-appearing.   HENT:      Head: Normocephalic and atraumatic.      Mouth/Throat:      Mouth: Mucous membranes are moist.   Eyes:       General: No scleral icterus.  Cardiovascular:      Rate and Rhythm: Normal rate and regular rhythm.      Heart sounds: Normal heart sounds. No murmur heard.     Pulmonary:      Effort: Pulmonary effort is normal. No respiratory distress.      Breath sounds: Normal breath sounds.   Abdominal:      General: There is no distension.      Palpations: Abdomen is soft.      Tenderness: There is no abdominal tenderness.   Musculoskeletal:         General: No swelling. Normal range of motion.      Cervical back: Normal range of motion.   Skin:     General: Skin is warm and dry.      Coloration: Skin is not jaundiced.      Findings: No erythema.   Neurological:      General: No focal deficit present.      Mental Status: She is alert and oriented to person, place, and time. Mental status is at baseline.      Cranial Nerves: No cranial nerve deficit.      Motor: No abnormal muscle tone.   Psychiatric:         Mood and Affect: Mood normal.         Behavior: Behavior normal.         Significant Labs:   BMP:   Recent Labs   Lab 03/25/21  0703   GLU 85      K 3.3*      CO2 25   BUN 8   CREATININE 0.7   CALCIUM 8.5*     CBC:   Recent Labs   Lab 03/24/21 1829 03/25/21  0703   WBC 6.46 4.03   HGB 12.6 11.6*   HCT 37.6 35.7*    302     CMP:   Recent Labs   Lab 03/24/21 1829 03/25/21  0703   * 140   K 3.5 3.3*   CL 96 105   CO2 28 25    85   BUN 12 8   CREATININE 0.7 0.7   CALCIUM 9.5 8.5*   PROT 7.4 6.3   ALBUMIN 3.7 3.2*   BILITOT 1.1* 0.6   ALKPHOS 258* 236*   * 413*   * 561*   ANIONGAP 11 10   EGFRNONAA >60 >60     Lipase:   Recent Labs   Lab 03/24/21 1829   LIPASE 323*     Lipid Panel: No results for input(s): CHOL, HDL, LDLCALC, TRIG, CHOLHDL in the last 48 hours.  Urine Studies:   Recent Labs   Lab 03/24/21 1829   COLORU Yellow   APPEARANCEUA Clear   PHUR 8.0   SPECGRAV 1.015   PROTEINUA Negative   GLUCUA Negative   KETONESU Negative   BILIRUBINUA Negative   OCCULTUA Trace*    NITRITE Negative   UROBILINOGEN Negative   LEUKOCYTESUR Negative     All pertinent labs within the past 24 hours have been reviewed.    Significant Imaging:   Imaging Results    None         Assessment/Plan:      * Cholelithiasis with choledocholithiasis  General surgery consult.  Likely will need laparoscopic cholecystectomy after ERCP.  NPO past midnight.    3/25/21  -Ultrasound revealed gallstones with slight elevation in LFTs.    -MRCP today showing choledocholithiasis with plans for ERCP today.    Acute biliary pancreatitis  Lipase 323, with gallbladder sludge.  IV pain medications as needed.  Continue normal saline at 125 cc/hour    3/25/21  -Aggressive IVFs     Essential hypertension  Hydralazine IV as needed      Acquired hypothyroidism  Resume Synthroid when not NPO.      Rheumatoid arthritis involving multiple sites with positive rheumatoid factor  Follow-up as outpatient.        VTE Risk Mitigation (From admission, onward)         Ordered     Place sequential compression device  Until discontinued      03/24/21 2055                Discharge Planning   WISAM:      Code Status: Not on file   Is the patient medically ready for discharge?:     Reason for patient still in hospital (select all that apply): Laboratory test, Treatment and Imaging  Discharge Plan A: Home with family                  Jannet Richardson NP  Department of Hospital Medicine   [facility]

## 2021-03-25 NOTE — ASSESSMENT & PLAN NOTE
T. Bili is only 1.1, but US showed CBD of 6.8. She is now asymptomatic, so not sure if stone passed. Await morning labs. Once available, will discuss with Dr. Madrigal about ERCP vs MRCP.

## 2021-03-25 NOTE — ASSESSMENT & PLAN NOTE
ERCP today per GI  NPO after midnight, IV fluids, iv abx  Plan for cholecystectomy tomorrow  The risks of robotic/laparoscopic cholecystectomy including bleeding, infection, common bile duct injury, bile leak, injury to abdominal organs, failure to alleviate symptoms, pulmonary embolus, deep vein thrombosis, cardiac event, and possibility of conversion to an open operation were explained to the patient.   The nature of the patient's condition, probability of success, risks of refusing treatment, and alternatives and risks of the alternatives were also explained.  The patient verbalized understanding.

## 2021-03-25 NOTE — H&P
[facility] Hospital Medicine  History & Physical    Patient Name: Leticia Piña  MRN: [id number]  Patient Class: OP- Observation  Admission Date: 3/24/2021  Attending Physician: No att. providers found   Primary Care Provider: Nicole Castellon MD         Patient information was obtained from patient, spouse/SO, past medical records and ER records.     Subjective:     Principal Problem:Choledocholithiasis with obstruction    Chief Complaint:   Chief Complaint   Patient presents with    Abdominal Pain     pain to abdomen and back since 1230; also c/o vomiting; history of gall bladder issues        HPI: Ms. Piña is a pleasant 70-year-old  female with PMH significant for rheumatoid arthritis, Sjogren syndrome, HTN, depression/anxiety, has been complaining of upper abdominal discomfort for the past many months, intermittently worsen with eating, but sometimes with spontaneously improve on its own.  She was seen in the GI clinic on 3/11/21, and an outpatient abdominal ultrasound was ordered, which was done on 3/22/21, that revealed stone and sludge filled gallbladder without convincing sonographic evidence of cholecystitis.  CBD was dilated at 6.2 mm with no intrahepatic ductal dilatation.  Since around lunchtime earlier today, reports increasing upper abdominal discomfort, associated with nausea without vomiting.  Hence she presented to the ED, by which time the abdominal pain and nausea has resolved.  Laboratory workup done in the ED revealed total bilirubin 1.1, , , alkaline phosphatase 258, amylase 127, lipase 323.  Sodium 135.  Rest of the laboratory workup is unremarkable.  COVID-19 is negative.  Hepatitis C antibody negative.  ED physician discussed case with Dr. Escobedo with GI, recommended hospitalization for possible ERCP in a.m. and followed by possible laparoscopic cholecystectomy eventually.    Admitting diagnosis:  Choledocholithiasis, cholelithiasis with  obstruction.  Acute biliary pancreatitis.      Past Medical History:   Diagnosis Date    Allergic rhinitis     Cutaneous lupus erythematosus     drug induced.    Essential hypertension 2/5/2019    GERD (gastroesophageal reflux disease)     Hypothyroid     Insomnia     Mixed anxiety and depressive disorder     Normal cardiac stress test     11/07    Rheumatoid arthritis(714.0)     Sjogren's syndrome        Past Surgical History:   Procedure Laterality Date    AUGMENTATION OF BREAST      breast implants      breast surgery for rupture      TONSILLECTOMY, ADENOIDECTOMY      TUBAL LIGATION         Review of patient's allergies indicates:   Allergen Reactions    Humira  [adalimumab]      Other reaction(s): drug-induced lupus  Other reaction(s): drug-induced lupus    Sulfa (sulfonamide antibiotics)      Other reaction(s): Hives  Other reaction(s): Rash  Other reaction(s): Hives       No current facility-administered medications on file prior to encounter.     Current Outpatient Medications on File Prior to Encounter   Medication Sig    ALA-HIST IR 2 mg Tab TAKE 1 TABLET BY MOUTH EVERY DAY    azelastine (ASTELIN) 137 mcg (0.1 %) nasal spray 1 spray (137 mcg total) by Nasal route 2 (two) times daily.    benzonatate (TESSALON PERLES) 100 MG capsule Take 2 capsules (200 mg total) by mouth 3 (three) times daily as needed for Cough.    buPROPion (WELLBUTRIN XL) 300 MG 24 hr tablet TAKE ONE TABLET BY MOUTH ONE TIME DAILY     cholecalciferol, vitamin D3, 5,000 unit/mL Drop Take 1 drop by mouth Daily.    cyanocobalamin, vitamin B-12, (VITAMIN B-12) 5,000 mcg Subl Place under the tongue once daily.    diclofenac sodium (VOLTAREN) 1 % Gel Apply 2 g topically 4 (four) times daily.    DULoxetine (CYMBALTA) 60 MG capsule TAKE ONE CAPSULE BY MOUTH ONE TIME DAILY     ferrous gluconate (FERGON) 324 MG tablet Take 324 mg by mouth daily with breakfast.    fluticasone propionate (FLONASE) 50 mcg/actuation nasal  spray 2 sprays (100 mcg total) by Each Nostril route 2 (two) times a day.    folic acid (FOLVITE) 1 MG tablet TAKE ONE TABLET BY MOUTH ONE TIME DAILY    hydroCHLOROthiazide (HYDRODIURIL) 25 MG tablet Take 1 tablet (25 mg total) by mouth once daily.    hydrOXYchloroQUINE (PLAQUENIL) 200 mg tablet TAKE ONE TABLET BY MOUTH TWICE DAILY     levothyroxine (EUTHYROX) 75 MCG tablet Take 1 tablet (75 mcg total) by mouth once daily.    linaCLOtide (LINZESS) 72 mcg Cap capsule Take 1 capsule (72 mcg total) by mouth before breakfast.    LORazepam (ATIVAN) 1 MG tablet TAKE 1 TABLET (1 MG TOTAL) BY MOUTH 2 (TWO) TIMES DAILY AS NEEDED.    losartan (COZAAR) 100 MG tablet Take 1 tablet (100 mg total) by mouth once daily.    meloxicam (MOBIC) 15 MG tablet Take 1 tablet (15 mg total) by mouth once daily.    methylPREDNISolone (MEDROL DOSEPACK) 4 mg tablet use as directed    omeprazole (PRILOSEC) 40 MG capsule Take 1 capsule (40 mg total) by mouth once daily.    potassium chloride SA (K-DUR,KLOR-CON) 20 MEQ tablet TAKE ONE TABLET BY MOUTH ONE TIME DAILY     traZODone (DESYREL) 50 MG tablet TAKE 2-3 TABLETS (100-150 MG TOTAL) BY MOUTH NIGHTLY AS NEEDED FOR INSOMNIA.     triamcinolone (NASACORT) 55 mcg nasal inhaler 2 sprays by Nasal route once daily.    [DISCONTINUED] DULoxetine (CYMBALTA) 60 MG capsule TAKE ONE CAPSULE BY MOUTH ONE TIME DAILY    [DISCONTINUED] DULoxetine (CYMBALTA) 60 MG capsule TAKE ONE CAPSULE BY MOUTH ONE TIME DAILY     [DISCONTINUED] hydroCHLOROthiazide (HYDRODIURIL) 25 MG tablet Take 1 tablet (25 mg total) by mouth once daily.    [DISCONTINUED] levothyroxine (SYNTHROID) 75 MCG tablet Take 1 tablet (75 mcg total) by mouth once daily.    [DISCONTINUED] potassium chloride SA (K-DUR,KLOR-CON) 20 MEQ tablet Take 1 tablet (20 mEq total) by mouth once daily.     Family History     Reviewed and Not Pertinent        Tobacco Use    Smoking status: Never Smoker    Smokeless tobacco: Never Used   Substance  and Sexual Activity    Alcohol use: Not Currently     Comment: socially     Drug use: No    Sexual activity: Not Currently     Review of Systems   Constitutional: Negative.  Negative for chills, diaphoresis, fatigue and fever.   HENT: Negative.  Negative for congestion, nosebleeds and sinus pressure.    Eyes: Negative.    Respiratory: Negative.  Negative for cough, chest tightness and shortness of breath.    Cardiovascular: Negative.  Negative for chest pain and palpitations.   Gastrointestinal: Positive for abdominal pain (upper) and nausea. Negative for diarrhea and vomiting.   Endocrine: Negative.    Genitourinary: Negative.  Negative for dysuria and flank pain.   Musculoskeletal: Negative.  Negative for back pain and joint swelling.   Skin: Negative.  Negative for rash.   Allergic/Immunologic: Negative.    Neurological: Negative.  Negative for dizziness, speech difficulty, weakness, numbness and headaches.   Hematological: Negative.    Psychiatric/Behavioral: Negative.  Negative for decreased concentration and hallucinations. The patient is not nervous/anxious.    All other systems reviewed and are negative.    Objective:     Vital Signs (Most Recent):  Temp: 98.4 °F (36.9 °C) (03/24/21 1700)  Pulse: 71 (03/24/21 1700)  Resp: 20 (03/24/21 1700)  BP: (!) 179/76 (03/24/21 1700)  SpO2: 99 % (03/24/21 1700) Vital Signs (24h Range):  Temp:  [98.4 °F (36.9 °C)] 98.4 °F (36.9 °C)  Pulse:  [71] 71  Resp:  [20] 20  SpO2:  [99 %] 99 %  BP: (179)/(76) 179/76     Weight: 66 kg (145 lb 9.8 oz)  Body mass index is 24.23 kg/m².    Physical Exam  Vitals and nursing note reviewed.   Constitutional:       General: She is not in acute distress.     Appearance: Normal appearance. She is not ill-appearing.   HENT:      Head: Normocephalic and atraumatic.      Mouth/Throat:      Mouth: Mucous membranes are moist.   Eyes:      General: No scleral icterus.  Cardiovascular:      Rate and Rhythm: Normal rate and regular rhythm.       Heart sounds: Normal heart sounds. No murmur heard.     Pulmonary:      Effort: Pulmonary effort is normal. No respiratory distress.      Breath sounds: Normal breath sounds.   Abdominal:      General: There is no distension.      Palpations: Abdomen is soft.      Tenderness: There is no abdominal tenderness.   Musculoskeletal:         General: No swelling. Normal range of motion.      Cervical back: Normal range of motion.   Skin:     General: Skin is warm and dry.      Coloration: Skin is not jaundiced.      Findings: No erythema.   Neurological:      General: No focal deficit present.      Mental Status: She is alert and oriented to person, place, and time. Mental status is at baseline.      Cranial Nerves: No cranial nerve deficit.      Motor: No abnormal muscle tone.   Psychiatric:         Mood and Affect: Mood normal.         Behavior: Behavior normal.             Significant Labs:   Results for orders placed or performed during the hospital encounter of 03/24/21   Hepatitis C antibody   Result Value Ref Range    Hepatitis C Ab Negative Negative   CBC W/ AUTO DIFFERENTIAL   Result Value Ref Range    WBC 6.46 3.9 - 12.7 K/uL    RBC 4.48 4.00 - 5.40 M/uL    Hemoglobin 12.6 12.0 - 16.0 g/dL    Hematocrit 37.6 37 - 48 %    MCV 84 82.0 - 98.0 fL    MCH 28.1 27.0 - 31.0 pg    MCHC 33.5 32.0 - 36.0 g/dL    RDW 12.0 11.5 - 14.5 %    Platelets 337 150 - 350 K/uL    MPV 9.5 9.2 - 12.9 fL    Immature Granulocytes 0.6 (H) 0.0 - 0.5 %    Gran # (ANC) 4.3 1.8 - 7.7 K/uL    Immature Grans (Abs) 0.04 0.00 - 0.04 K/uL    Lymph # 1.2 1.0 - 4.8 K/uL    Mono # 0.7 0.3 - 1.0 K/uL    Eos # 0.1 0.0 - 0.5 K/uL    Baso # 0.06 0.00 - 0.20 K/uL    nRBC 0 0 /100 WBC    Gran % 66.8 38.0 - 73.0 %    Lymph % 18.3 18 - 48 %    Mono % 11.5 4 - 15 %    Eosinophil % 1.9 0.0 - 8.0 %    Basophil % 0.9 0 - 1 %    Differential Method Automated    Comp. Metabolic Panel   Result Value Ref Range    Sodium 135 (L) 136 - 145 mmol/L    Potassium 3.5 3.5  - 5.1 mmol/L    Chloride 96 95 - 110 mmol/L    CO2 28 23 - 29 mmol/L    Glucose 107 70 - 110 mg/dL    BUN 12 8 - 23 mg/dL    Creatinine 0.7 0.5 - 1.4 mg/dL    Calcium 9.5 8.7 - 10.5 mg/dL    Total Protein 7.4 6.0 - 8.4 g/dL    Albumin 3.7 3.5 - 5.2 g/dL    Total Bilirubin 1.1 (H) 0.1 - 1.0 mg/dL    Alkaline Phosphatase 258 (H) 55 - 135 U/L     (H) 10 - 40 U/L     (H) 10 - 44 U/L    Anion Gap 11 8 - 16 mmol/L    eGFR if African American >60 >60 mL/min/1.73 m^2    eGFR if non African American >60 >60 mL/min/1.73 m^2   Lipase   Result Value Ref Range    Lipase 323 (H) 4 - 60 U/L   Urinalysis, Reflex to Urine Culture Urine, Clean Catch    Specimen: Urine   Result Value Ref Range    Specimen UA Urine, Clean Catch     Color, UA Yellow Yellow, Straw, Dilma    Appearance, UA Clear Clear    pH, UA 8.0 5.0 - 8.0    Specific Gravity, UA 1.015 1.005 - 1.030    Protein, UA Negative Negative    Glucose, UA Negative Negative    Ketones, UA Negative Negative    Bilirubin (UA) Negative Negative    Occult Blood UA Trace (A) Negative    Nitrite, UA Negative Negative    Urobilinogen, UA Negative <2.0 EU/dL    Leukocytes, UA Negative Negative   Amylase   Result Value Ref Range    Amylase 127 (H) 20 - 110 U/L   COVID-19 Rapid Screening   Result Value Ref Range    SARS-CoV-2 RNA, Amplification, Qual Negative Negative     *Note: Due to a large number of results and/or encounters for the requested time period, some results have not been displayed. A complete set of results can be found in Results Review.         Significant Imaging: I have reviewed and interpreted all pertinent imaging results/findings within the past 24 hours.     Imaging Results    US ABDOMEN COMPLETE 3/22/21  Stone and sludge filled gallbladder without convincing sonographic evidence of cholecystitis.         I have independently reviewed all pertinent labs within the past 24 hours.    I have independently reviewed, visualized and interpreted all pertinent  imaging results within the past 24 hours and discussed the findings with the ED physician, Dr. England            Assessment/Plan:     * Choledocholithiasis with obstruction  Gallbladder ultrasound done 03/22/2021 reveals gallstones and sludge in the gallbladder with no convincing evidence of cholecystitis.  GI consulted for ERCP in a.m..  Keep NPO past midnight.  IV Zosyn empirically per GI recommendations.      Cholelithiasis with choledocholithiasis  General surgery consult.  Likely will need laparoscopic cholecystectomy after ERCP.  NPO past midnight.      Acute biliary pancreatitis  Lipase 323, with gallbladder sludge.  IV pain medications as needed.  Continue normal saline at 125 cc/hour      Rheumatoid arthritis involving multiple sites with positive rheumatoid factor  Follow-up as outpatient.      Acquired hypothyroidism  Resume Synthroid when not NPO.      Essential hypertension  Hydralazine IV as needed      VTE Risk Mitigation (From admission, onward)         Ordered     Place sequential compression device  Until discontinued      03/24/21 2055                 The patient is placed in OBSERVATION status.      Ed Castro MD  Department of Hospital Medicine   [facility]

## 2021-03-25 NOTE — ASSESSMENT & PLAN NOTE
No symptoms of pancreatitis but lipase is a little elevated. I will increase her IVF to 175 cc/hr.   We will continue to monitor her clinical course.

## 2021-03-25 NOTE — SUBJECTIVE & OBJECTIVE
Review of Systems   Constitutional: Negative.  Negative for chills, diaphoresis, fatigue and fever.   HENT: Negative.  Negative for congestion, nosebleeds and sinus pressure.    Eyes: Negative.    Respiratory: Negative.  Negative for cough, chest tightness and shortness of breath.    Cardiovascular: Negative.  Negative for chest pain and palpitations.   Gastrointestinal: Positive for abdominal pain (upper) and nausea. Negative for diarrhea and vomiting.   Endocrine: Negative.    Genitourinary: Negative.  Negative for dysuria and flank pain.   Musculoskeletal: Negative.  Negative for back pain and joint swelling.   Skin: Negative.  Negative for rash.   Allergic/Immunologic: Negative.    Neurological: Negative.  Negative for dizziness, speech difficulty, weakness, numbness and headaches.   Hematological: Negative.    Psychiatric/Behavioral: Negative.  Negative for decreased concentration and hallucinations. The patient is not nervous/anxious.    All other systems reviewed and are negative.    Objective:     Vital Signs (Most Recent):  Temp: 98.3 °F (36.8 °C) (03/25/21 1232)  Pulse: 77 (03/25/21 1337)  Resp: 18 (03/25/21 1232)  BP: (!) 147/66 (03/25/21 1337)  SpO2: 95 % (03/25/21 1232) Vital Signs (24h Range):  Temp:  [97.8 °F (36.6 °C)-98.4 °F (36.9 °C)] 98.3 °F (36.8 °C)  Pulse:  [67-77] 77  Resp:  [14-20] 18  SpO2:  [94 %-100 %] 95 %  BP: (139-179)/(64-79) 147/66     Weight: 65.5 kg (144 lb 6.4 oz)  Body mass index is 24.03 kg/m².    Intake/Output Summary (Last 24 hours) at 3/25/2021 1601  Last data filed at 3/25/2021 0700  Gross per 24 hour   Intake 910.83 ml   Output --   Net 910.83 ml      Physical Exam  Vitals and nursing note reviewed.   Constitutional:       General: She is not in acute distress.     Appearance: Normal appearance. She is not ill-appearing.   HENT:      Head: Normocephalic and atraumatic.      Mouth/Throat:      Mouth: Mucous membranes are moist.   Eyes:      General: No scleral  icterus.  Cardiovascular:      Rate and Rhythm: Normal rate and regular rhythm.      Heart sounds: Normal heart sounds. No murmur heard.     Pulmonary:      Effort: Pulmonary effort is normal. No respiratory distress.      Breath sounds: Normal breath sounds.   Abdominal:      General: There is no distension.      Palpations: Abdomen is soft.      Tenderness: There is no abdominal tenderness.   Musculoskeletal:         General: No swelling. Normal range of motion.      Cervical back: Normal range of motion.   Skin:     General: Skin is warm and dry.      Coloration: Skin is not jaundiced.      Findings: No erythema.   Neurological:      General: No focal deficit present.      Mental Status: She is alert and oriented to person, place, and time. Mental status is at baseline.      Cranial Nerves: No cranial nerve deficit.      Motor: No abnormal muscle tone.   Psychiatric:         Mood and Affect: Mood normal.         Behavior: Behavior normal.         Significant Labs:   BMP:   Recent Labs   Lab 03/25/21  0703   GLU 85      K 3.3*      CO2 25   BUN 8   CREATININE 0.7   CALCIUM 8.5*     CBC:   Recent Labs   Lab 03/24/21 1829 03/25/21  0703   WBC 6.46 4.03   HGB 12.6 11.6*   HCT 37.6 35.7*    302     CMP:   Recent Labs   Lab 03/24/21 1829 03/25/21  0703   * 140   K 3.5 3.3*   CL 96 105   CO2 28 25    85   BUN 12 8   CREATININE 0.7 0.7   CALCIUM 9.5 8.5*   PROT 7.4 6.3   ALBUMIN 3.7 3.2*   BILITOT 1.1* 0.6   ALKPHOS 258* 236*   * 413*   * 561*   ANIONGAP 11 10   EGFRNONAA >60 >60     Lipase:   Recent Labs   Lab 03/24/21 1829   LIPASE 323*     Lipid Panel: No results for input(s): CHOL, HDL, LDLCALC, TRIG, CHOLHDL in the last 48 hours.  Urine Studies:   Recent Labs   Lab 03/24/21 1829   COLORU Yellow   APPEARANCEUA Clear   PHUR 8.0   SPECGRAV 1.015   PROTEINUA Negative   GLUCUA Negative   KETONESU Negative   BILIRUBINUA Negative   OCCULTUA Trace*   NITRITE Negative    UROBILINOGEN Negative   LEUKOCYTESUR Negative     All pertinent labs within the past 24 hours have been reviewed.    Significant Imaging:   Imaging Results    None

## 2021-03-25 NOTE — ASSESSMENT & PLAN NOTE
General surgery consult.  Likely will need laparoscopic cholecystectomy after ERCP.  NPO past midnight.

## 2021-03-25 NOTE — HPI
Ms. Piña is a pleasant 70-year-old  female with PMH significant for rheumatoid arthritis, Sjogren syndrome, HTN, depression/anxiety, has been complaining of upper abdominal discomfort for the past many months, intermittently worsen with eating, but sometimes with spontaneously improve on its own.  She was seen in the GI clinic on 3/11/21, and an outpatient abdominal ultrasound was ordered, which was done on 3/22/21, that revealed stone and sludge filled gallbladder without convincing sonographic evidence of cholecystitis.  CBD was dilated at 6.2 mm with no intrahepatic ductal dilatation.  Since around lunchtime earlier today, reports increasing upper abdominal discomfort, associated with nausea without vomiting.  Hence she presented to the ED, by which time the abdominal pain and nausea has resolved.  Laboratory workup done in the ED revealed total bilirubin 1.1, , , alkaline phosphatase 258, amylase 127, lipase 323.  Sodium 135.  Rest of the laboratory workup is unremarkable.  COVID-19 is negative.  Hepatitis C antibody negative.  ED physician discussed case with Dr. Escobedo with GI, recommended hospitalization for possible ERCP in a.m. and followed by possible laparoscopic cholecystectomy eventually.    Admitting diagnosis:  Choledocholithiasis, cholelithiasis with obstruction.  Acute biliary pancreatitis.

## 2021-03-25 NOTE — ASSESSMENT & PLAN NOTE
General surgery consult.  Likely will need laparoscopic cholecystectomy after ERCP.  NPO past midnight.    3/25/21  -Ultrasound revealed gallstones with slight elevation in LFTs.    -MRCP today showing choledocholithiasis with plans for ERCP today.

## 2021-03-25 NOTE — ASSESSMENT & PLAN NOTE
Gallbladder ultrasound done 03/22/2021 reveals gallstones and sludge in the gallbladder with no convincing evidence of cholecystitis.  GI consulted for ERCP in a.m..  Keep NPO past midnight.  IV Zosyn empirically per GI recommendations.

## 2021-03-26 ENCOUNTER — ANESTHESIA (OUTPATIENT)
Dept: SURGERY | Facility: HOSPITAL | Age: 71
DRG: 417 | End: 2021-03-26
Payer: MEDICARE

## 2021-03-26 ENCOUNTER — ANESTHESIA EVENT (OUTPATIENT)
Dept: SURGERY | Facility: HOSPITAL | Age: 71
DRG: 417 | End: 2021-03-26
Payer: MEDICARE

## 2021-03-26 VITALS
DIASTOLIC BLOOD PRESSURE: 68 MMHG | WEIGHT: 145.31 LBS | SYSTOLIC BLOOD PRESSURE: 144 MMHG | RESPIRATION RATE: 20 BRPM | HEIGHT: 65 IN | BODY MASS INDEX: 24.21 KG/M2 | TEMPERATURE: 97 F | OXYGEN SATURATION: 94 % | HEART RATE: 88 BPM

## 2021-03-26 PROBLEM — K85.10 ACUTE BILIARY PANCREATITIS: Status: RESOLVED | Noted: 2021-03-24 | Resolved: 2021-03-26

## 2021-03-26 PROBLEM — K83.1 BILIARY OBSTRUCTION: Status: RESOLVED | Noted: 2021-03-25 | Resolved: 2021-03-26

## 2021-03-26 PROBLEM — K80.70 CHOLELITHIASIS WITH CHOLEDOCHOLITHIASIS: Status: RESOLVED | Noted: 2021-03-24 | Resolved: 2021-03-26

## 2021-03-26 LAB
ALBUMIN SERPL BCP-MCNC: 3.4 G/DL (ref 3.5–5.2)
ALP SERPL-CCNC: 214 U/L (ref 55–135)
ALT SERPL W/O P-5'-P-CCNC: 380 U/L (ref 10–44)
ANION GAP SERPL CALC-SCNC: 10 MMOL/L (ref 8–16)
AST SERPL-CCNC: 166 U/L (ref 10–40)
BASOPHILS # BLD AUTO: 0.01 K/UL (ref 0–0.2)
BASOPHILS NFR BLD: 0.2 % (ref 0–1.9)
BILIRUB SERPL-MCNC: 0.4 MG/DL (ref 0.1–1)
BUN SERPL-MCNC: 11 MG/DL (ref 8–23)
CALCIUM SERPL-MCNC: 8.8 MG/DL (ref 8.7–10.5)
CHLORIDE SERPL-SCNC: 103 MMOL/L (ref 95–110)
CO2 SERPL-SCNC: 24 MMOL/L (ref 23–29)
CREAT SERPL-MCNC: 0.7 MG/DL (ref 0.5–1.4)
DIFFERENTIAL METHOD: ABNORMAL
EOSINOPHIL # BLD AUTO: 0 K/UL (ref 0–0.5)
EOSINOPHIL NFR BLD: 0 % (ref 0–8)
ERYTHROCYTE [DISTWIDTH] IN BLOOD BY AUTOMATED COUNT: 12.2 % (ref 11.5–14.5)
EST. GFR  (AFRICAN AMERICAN): >60 ML/MIN/1.73 M^2
EST. GFR  (NON AFRICAN AMERICAN): >60 ML/MIN/1.73 M^2
GLUCOSE SERPL-MCNC: 119 MG/DL (ref 70–110)
HCT VFR BLD AUTO: 36.5 % (ref 37–48.5)
HGB BLD-MCNC: 11.8 G/DL (ref 12–16)
IMM GRANULOCYTES # BLD AUTO: 0.01 K/UL (ref 0–0.04)
IMM GRANULOCYTES NFR BLD AUTO: 0.2 % (ref 0–0.5)
LIPASE SERPL-CCNC: 36 U/L (ref 4–60)
LYMPHOCYTES # BLD AUTO: 0.6 K/UL (ref 1–4.8)
LYMPHOCYTES NFR BLD: 13.3 % (ref 18–48)
MCH RBC QN AUTO: 27.9 PG (ref 27–31)
MCHC RBC AUTO-ENTMCNC: 32.3 G/DL (ref 32–36)
MCV RBC AUTO: 86 FL (ref 82–98)
MONOCYTES # BLD AUTO: 0.1 K/UL (ref 0.3–1)
MONOCYTES NFR BLD: 1.9 % (ref 4–15)
NEUTROPHILS # BLD AUTO: 3.6 K/UL (ref 1.8–7.7)
NEUTROPHILS NFR BLD: 84.4 % (ref 38–73)
NRBC BLD-RTO: 0 /100 WBC
PLATELET # BLD AUTO: 319 K/UL (ref 150–350)
PMV BLD AUTO: 9.6 FL (ref 9.2–12.9)
POTASSIUM SERPL-SCNC: 3.9 MMOL/L (ref 3.5–5.1)
PROT SERPL-MCNC: 6.5 G/DL (ref 6–8.4)
RBC # BLD AUTO: 4.23 M/UL (ref 4–5.4)
SODIUM SERPL-SCNC: 137 MMOL/L (ref 136–145)
WBC # BLD AUTO: 4.3 K/UL (ref 3.9–12.7)

## 2021-03-26 PROCEDURE — 25000003 PHARM REV CODE 250: Performed by: SURGERY

## 2021-03-26 PROCEDURE — 25000003 PHARM REV CODE 250: Performed by: NURSE PRACTITIONER

## 2021-03-26 PROCEDURE — 36000708 HC OR TIME LEV III 1ST 15 MIN: Performed by: SURGERY

## 2021-03-26 PROCEDURE — 63600175 PHARM REV CODE 636 W HCPCS: Performed by: ANESTHESIOLOGY

## 2021-03-26 PROCEDURE — 99233 PR SUBSEQUENT HOSPITAL CARE,LEVL III: ICD-10-PCS | Mod: 57,,, | Performed by: SURGERY

## 2021-03-26 PROCEDURE — 25000003 PHARM REV CODE 250: Performed by: INTERNAL MEDICINE

## 2021-03-26 PROCEDURE — 37000008 HC ANESTHESIA 1ST 15 MINUTES: Performed by: SURGERY

## 2021-03-26 PROCEDURE — 63600175 PHARM REV CODE 636 W HCPCS: Performed by: INTERNAL MEDICINE

## 2021-03-26 PROCEDURE — 27201423 OPTIME MED/SURG SUP & DEVICES STERILE SUPPLY: Performed by: SURGERY

## 2021-03-26 PROCEDURE — 99233 SBSQ HOSP IP/OBS HIGH 50: CPT | Mod: 57,,, | Performed by: SURGERY

## 2021-03-26 PROCEDURE — 83690 ASSAY OF LIPASE: CPT | Performed by: SURGERY

## 2021-03-26 PROCEDURE — 25000003 PHARM REV CODE 250: Performed by: ANESTHESIOLOGY

## 2021-03-26 PROCEDURE — 94799 UNLISTED PULMONARY SVC/PX: CPT

## 2021-03-26 PROCEDURE — 25000003 PHARM REV CODE 250: Performed by: NURSE ANESTHETIST, CERTIFIED REGISTERED

## 2021-03-26 PROCEDURE — 71000033 HC RECOVERY, INTIAL HOUR: Performed by: SURGERY

## 2021-03-26 PROCEDURE — 85025 COMPLETE CBC W/AUTO DIFF WBC: CPT | Performed by: SURGERY

## 2021-03-26 PROCEDURE — 47562 PR LAP,CHOLECYSTECTOMY: ICD-10-PCS | Mod: ,,, | Performed by: SURGERY

## 2021-03-26 PROCEDURE — 88304 TISSUE EXAM BY PATHOLOGIST: CPT | Performed by: STUDENT IN AN ORGANIZED HEALTH CARE EDUCATION/TRAINING PROGRAM

## 2021-03-26 PROCEDURE — 37000009 HC ANESTHESIA EA ADD 15 MINS: Performed by: SURGERY

## 2021-03-26 PROCEDURE — 63600175 PHARM REV CODE 636 W HCPCS: Performed by: NURSE ANESTHETIST, CERTIFIED REGISTERED

## 2021-03-26 PROCEDURE — 71000039 HC RECOVERY, EACH ADD'L HOUR: Performed by: SURGERY

## 2021-03-26 PROCEDURE — 63600175 PHARM REV CODE 636 W HCPCS: Performed by: SURGERY

## 2021-03-26 PROCEDURE — 36415 COLL VENOUS BLD VENIPUNCTURE: CPT | Performed by: SURGERY

## 2021-03-26 PROCEDURE — 47562 LAPAROSCOPIC CHOLECYSTECTOMY: CPT | Mod: ,,, | Performed by: SURGERY

## 2021-03-26 PROCEDURE — 88304 TISSUE EXAM BY PATHOLOGIST: CPT | Mod: 26,,, | Performed by: STUDENT IN AN ORGANIZED HEALTH CARE EDUCATION/TRAINING PROGRAM

## 2021-03-26 PROCEDURE — 80053 COMPREHEN METABOLIC PANEL: CPT | Performed by: SURGERY

## 2021-03-26 PROCEDURE — 36000709 HC OR TIME LEV III EA ADD 15 MIN: Performed by: SURGERY

## 2021-03-26 PROCEDURE — 88304 PR  SURG PATH,LEVEL III: ICD-10-PCS | Mod: 26,,, | Performed by: STUDENT IN AN ORGANIZED HEALTH CARE EDUCATION/TRAINING PROGRAM

## 2021-03-26 RX ORDER — MIDAZOLAM HYDROCHLORIDE 1 MG/ML
INJECTION INTRAMUSCULAR; INTRAVENOUS
Status: DISCONTINUED | OUTPATIENT
Start: 2021-03-26 | End: 2021-03-26

## 2021-03-26 RX ORDER — DULOXETIN HYDROCHLORIDE 30 MG/1
60 CAPSULE, DELAYED RELEASE ORAL DAILY
Status: DISCONTINUED | OUTPATIENT
Start: 2021-03-26 | End: 2021-03-26 | Stop reason: HOSPADM

## 2021-03-26 RX ORDER — HYDROMORPHONE HYDROCHLORIDE 2 MG/ML
0.2 INJECTION, SOLUTION INTRAMUSCULAR; INTRAVENOUS; SUBCUTANEOUS EVERY 5 MIN PRN
Status: DISCONTINUED | OUTPATIENT
Start: 2021-03-26 | End: 2021-03-26 | Stop reason: HOSPADM

## 2021-03-26 RX ORDER — BUPIVACAINE HYDROCHLORIDE 2.5 MG/ML
INJECTION, SOLUTION EPIDURAL; INFILTRATION; INTRACAUDAL
Status: DISCONTINUED | OUTPATIENT
Start: 2021-03-26 | End: 2021-03-26 | Stop reason: HOSPADM

## 2021-03-26 RX ORDER — DEXAMETHASONE SODIUM PHOSPHATE 4 MG/ML
INJECTION, SOLUTION INTRA-ARTICULAR; INTRALESIONAL; INTRAMUSCULAR; INTRAVENOUS; SOFT TISSUE
Status: DISCONTINUED | OUTPATIENT
Start: 2021-03-26 | End: 2021-03-26

## 2021-03-26 RX ORDER — NEOSTIGMINE METHYLSULFATE 1 MG/ML
INJECTION, SOLUTION INTRAVENOUS
Status: DISCONTINUED | OUTPATIENT
Start: 2021-03-26 | End: 2021-03-26

## 2021-03-26 RX ORDER — ONDANSETRON 8 MG/1
8 TABLET, ORALLY DISINTEGRATING ORAL EVERY 8 HOURS PRN
Status: DISCONTINUED | OUTPATIENT
Start: 2021-03-26 | End: 2021-03-26 | Stop reason: HOSPADM

## 2021-03-26 RX ORDER — LIDOCAINE HYDROCHLORIDE 10 MG/ML
INJECTION, SOLUTION EPIDURAL; INFILTRATION; INTRACAUDAL; PERINEURAL
Status: DISCONTINUED | OUTPATIENT
Start: 2021-03-26 | End: 2021-03-26

## 2021-03-26 RX ORDER — CHLORHEXIDINE GLUCONATE ORAL RINSE 1.2 MG/ML
10 SOLUTION DENTAL 2 TIMES DAILY
Status: DISCONTINUED | OUTPATIENT
Start: 2021-03-26 | End: 2021-03-26 | Stop reason: HOSPADM

## 2021-03-26 RX ORDER — HYDROCODONE BITARTRATE AND ACETAMINOPHEN 7.5; 325 MG/1; MG/1
1 TABLET ORAL EVERY 6 HOURS PRN
Status: DISCONTINUED | OUTPATIENT
Start: 2021-03-26 | End: 2021-03-26 | Stop reason: HOSPADM

## 2021-03-26 RX ORDER — HYDROMORPHONE HYDROCHLORIDE 1 MG/ML
1 INJECTION, SOLUTION INTRAMUSCULAR; INTRAVENOUS; SUBCUTANEOUS EVERY 4 HOURS PRN
Status: DISCONTINUED | OUTPATIENT
Start: 2021-03-26 | End: 2021-03-26 | Stop reason: HOSPADM

## 2021-03-26 RX ORDER — OXYCODONE AND ACETAMINOPHEN 5; 325 MG/1; MG/1
1 TABLET ORAL
Status: DISCONTINUED | OUTPATIENT
Start: 2021-03-26 | End: 2021-03-26 | Stop reason: HOSPADM

## 2021-03-26 RX ORDER — HYDROCODONE BITARTRATE AND ACETAMINOPHEN 5; 325 MG/1; MG/1
1 TABLET ORAL EVERY 4 HOURS PRN
Status: DISCONTINUED | OUTPATIENT
Start: 2021-03-26 | End: 2021-03-26 | Stop reason: HOSPADM

## 2021-03-26 RX ORDER — ROCURONIUM BROMIDE 10 MG/ML
INJECTION, SOLUTION INTRAVENOUS
Status: DISCONTINUED | OUTPATIENT
Start: 2021-03-26 | End: 2021-03-26

## 2021-03-26 RX ORDER — KETOROLAC TROMETHAMINE 30 MG/ML
15 INJECTION, SOLUTION INTRAMUSCULAR; INTRAVENOUS EVERY 8 HOURS PRN
Status: DISCONTINUED | OUTPATIENT
Start: 2021-03-26 | End: 2021-03-26 | Stop reason: HOSPADM

## 2021-03-26 RX ORDER — PROPOFOL 10 MG/ML
VIAL (ML) INTRAVENOUS
Status: DISCONTINUED | OUTPATIENT
Start: 2021-03-26 | End: 2021-03-26

## 2021-03-26 RX ORDER — SUCCINYLCHOLINE CHLORIDE 20 MG/ML
INJECTION INTRAMUSCULAR; INTRAVENOUS
Status: DISCONTINUED | OUTPATIENT
Start: 2021-03-26 | End: 2021-03-26

## 2021-03-26 RX ORDER — HYDROCODONE BITARTRATE AND ACETAMINOPHEN 5; 325 MG/1; MG/1
1 TABLET ORAL EVERY 4 HOURS PRN
Qty: 15 TABLET | Refills: 0 | Status: SHIPPED | OUTPATIENT
Start: 2021-03-26 | End: 2021-04-09

## 2021-03-26 RX ORDER — ONDANSETRON 2 MG/ML
4 INJECTION INTRAMUSCULAR; INTRAVENOUS DAILY PRN
Status: DISCONTINUED | OUTPATIENT
Start: 2021-03-26 | End: 2021-03-26 | Stop reason: HOSPADM

## 2021-03-26 RX ORDER — SODIUM CHLORIDE, SODIUM LACTATE, POTASSIUM CHLORIDE, CALCIUM CHLORIDE 600; 310; 30; 20 MG/100ML; MG/100ML; MG/100ML; MG/100ML
INJECTION, SOLUTION INTRAVENOUS CONTINUOUS PRN
Status: DISCONTINUED | OUTPATIENT
Start: 2021-03-26 | End: 2021-03-26

## 2021-03-26 RX ORDER — FENTANYL CITRATE 50 UG/ML
INJECTION, SOLUTION INTRAMUSCULAR; INTRAVENOUS
Status: DISCONTINUED | OUTPATIENT
Start: 2021-03-26 | End: 2021-03-26

## 2021-03-26 RX ORDER — LOSARTAN POTASSIUM 50 MG/1
100 TABLET ORAL DAILY
Status: DISCONTINUED | OUTPATIENT
Start: 2021-03-26 | End: 2021-03-26 | Stop reason: HOSPADM

## 2021-03-26 RX ORDER — BUPROPION HYDROCHLORIDE 150 MG/1
300 TABLET ORAL DAILY
Status: DISCONTINUED | OUTPATIENT
Start: 2021-03-26 | End: 2021-03-26 | Stop reason: HOSPADM

## 2021-03-26 RX ORDER — SODIUM CHLORIDE, SODIUM LACTATE, POTASSIUM CHLORIDE, CALCIUM CHLORIDE 600; 310; 30; 20 MG/100ML; MG/100ML; MG/100ML; MG/100ML
INJECTION, SOLUTION INTRAVENOUS CONTINUOUS
Status: DISCONTINUED | OUTPATIENT
Start: 2021-03-26 | End: 2021-03-26 | Stop reason: HOSPADM

## 2021-03-26 RX ADMIN — BUPROPION HYDROCHLORIDE 300 MG: 150 TABLET, FILM COATED, EXTENDED RELEASE ORAL at 12:03

## 2021-03-26 RX ADMIN — HYDROMORPHONE HYDROCHLORIDE 0.2 MG: 2 INJECTION INTRAMUSCULAR; INTRAVENOUS; SUBCUTANEOUS at 09:03

## 2021-03-26 RX ADMIN — ROCURONIUM BROMIDE 5 MG: 10 INJECTION, SOLUTION INTRAVENOUS at 08:03

## 2021-03-26 RX ADMIN — ONDANSETRON 4 MG: 2 INJECTION, SOLUTION INTRAMUSCULAR; INTRAVENOUS at 08:03

## 2021-03-26 RX ADMIN — FENTANYL CITRATE 50 MCG: 50 INJECTION, SOLUTION INTRAMUSCULAR; INTRAVENOUS at 09:03

## 2021-03-26 RX ADMIN — OXYCODONE HYDROCHLORIDE AND ACETAMINOPHEN 1 TABLET: 5; 325 TABLET ORAL at 09:03

## 2021-03-26 RX ADMIN — SODIUM CHLORIDE, SODIUM LACTATE, POTASSIUM CHLORIDE, AND CALCIUM CHLORIDE: .6; .31; .03; .02 INJECTION, SOLUTION INTRAVENOUS at 12:03

## 2021-03-26 RX ADMIN — LIDOCAINE HYDROCHLORIDE 100 MG: 10 INJECTION, SOLUTION EPIDURAL; INFILTRATION; INTRACAUDAL; PERINEURAL at 08:03

## 2021-03-26 RX ADMIN — DULOXETINE HYDROCHLORIDE 60 MG: 30 CAPSULE, DELAYED RELEASE ORAL at 12:03

## 2021-03-26 RX ADMIN — KETOROLAC TROMETHAMINE 15 MG: 30 INJECTION, SOLUTION INTRAMUSCULAR; INTRAVENOUS at 09:03

## 2021-03-26 RX ADMIN — FENTANYL CITRATE 50 MCG: 50 INJECTION, SOLUTION INTRAMUSCULAR; INTRAVENOUS at 08:03

## 2021-03-26 RX ADMIN — FENTANYL CITRATE 100 MCG: 50 INJECTION, SOLUTION INTRAMUSCULAR; INTRAVENOUS at 08:03

## 2021-03-26 RX ADMIN — LIDOCAINE HYDROCHLORIDE 100 MG: 10 INJECTION, SOLUTION EPIDURAL; INFILTRATION; INTRACAUDAL; PERINEURAL at 09:03

## 2021-03-26 RX ADMIN — LOSARTAN POTASSIUM 100 MG: 50 TABLET, FILM COATED ORAL at 12:03

## 2021-03-26 RX ADMIN — PROPOFOL 150 MG: 10 INJECTION, EMULSION INTRAVENOUS at 08:03

## 2021-03-26 RX ADMIN — HYDRALAZINE HYDROCHLORIDE 10 MG: 20 INJECTION, SOLUTION INTRAMUSCULAR; INTRAVENOUS at 08:03

## 2021-03-26 RX ADMIN — ROCURONIUM BROMIDE 30 MG: 10 INJECTION, SOLUTION INTRAVENOUS at 08:03

## 2021-03-26 RX ADMIN — PIPERACILLIN AND TAZOBACTAM 4.5 G: 4; .5 INJECTION, POWDER, LYOPHILIZED, FOR SOLUTION INTRAVENOUS; PARENTERAL at 06:03

## 2021-03-26 RX ADMIN — SODIUM CHLORIDE, SODIUM LACTATE, POTASSIUM CHLORIDE, AND CALCIUM CHLORIDE: .6; .31; .03; .02 INJECTION, SOLUTION INTRAVENOUS at 08:03

## 2021-03-26 RX ADMIN — DIPHENHYDRAMINE HYDROCHLORIDE 25 MG: 25 CAPSULE ORAL at 12:03

## 2021-03-26 RX ADMIN — NEOSTIGMINE METHYLSULFATE 4 MG: 1 INJECTION INTRAVENOUS at 08:03

## 2021-03-26 RX ADMIN — PROPOFOL 50 MG: 10 INJECTION, EMULSION INTRAVENOUS at 08:03

## 2021-03-26 RX ADMIN — SUCCINYLCHOLINE CHLORIDE 140 MG: 20 INJECTION, SOLUTION INTRAMUSCULAR; INTRAVENOUS at 08:03

## 2021-03-26 RX ADMIN — MIDAZOLAM HYDROCHLORIDE 2 MG: 1 INJECTION, SOLUTION INTRAMUSCULAR; INTRAVENOUS at 08:03

## 2021-03-26 RX ADMIN — GLYCOPYRROLATE 0.6 MG: 0.2 INJECTION, SOLUTION INTRAMUSCULAR; INTRAVENOUS at 08:03

## 2021-03-26 RX ADMIN — DEXAMETHASONE SODIUM PHOSPHATE 8 MG: 4 INJECTION, SOLUTION INTRAMUSCULAR; INTRAVENOUS at 08:03

## 2021-03-26 RX ADMIN — PROPOFOL 30 MG: 10 INJECTION, EMULSION INTRAVENOUS at 08:03

## 2021-03-26 NOTE — OP NOTE
[facility]  Surgery Department  Operative Note    SUMMARY     Date of Procedure: 3/26/2021     Procedure: Procedure(s) (LRB):  CHOLECYSTECTOMY, LAPAROSCOPIC (N/A)     Surgeon(s) and Role:     * Jose Blue MD - Primary    Assisting Surgeon: None    Pre-Operative Diagnosis: Cholelithiasis with choledocholithiasis [K80.70]    Post-Operative Diagnosis: Post-Op Diagnosis Codes:     * Cholelithiasis with choledocholithiasis [K80.70]    Anesthesia: General    Technical Procedures Used:     Laparoscopic cholecystectomy    Description of the Findings of the Procedure:     Findings:  A mild to moderately inflamed gallbladder      The patient was placed on the operating table in the supine position. SCDs were placed for DVT prophylaxis and antibiotics were administered within an hour prior to the incision. General anesthesia was induced.  The abdomen was prepped and draped in a sterile fashion.    Time out completed    An incision was made at the umbilicus. The fascia was elevated and the Veress needle was inserted. Proper position was confirmed by aspiration and saline drop test. The abdomen was insufflated with carbon dioxide to a pressure of 15 mmHg. The patient tolerated insufflation well. A 5 mm trocar was then inserted.     The laparoscope was inserted and the abdomen inspected. No injuries from the initial trocar placement were noted. A 11 mmm trocars were then inserted in the epigastrium and two 5 mmtrocars  in the right mid clavicular line and along the right lateral costal margin. The abdomen was inspected and no gross abnormalities were found.  The table was placed in the reverse Trendelenburg position with the right side up.      The dome of the gallbladder was grasped with an atraumatic grasper passed through the lateral port and retracted over the dome of the liver.  Filmy adhesions to the gallbladder were lysed bluntly.  The infundibulum was also grasped with an atraumatic grasper through  the midclavicular port and retracted toward the right lower quadrant. This maneuver exposed Calot's triangle. The peritoneum overlying the gallbladder infundibulum was then incised and the cystic duct and cystic artery were identified and circumferentially dissected.   The neck of the gallbladder was the dissected off the liver bed.  The critical view was obtainedThe cystic duct and cystic artery were then doubly clipped proximally and singly clipped distally.  The cystic duct and cystic artery were then divided.     The gallbladder was then dissected from its peritoneal attachments by electrocautery.  A posterior artery was controlled with a liga clip. Hemostasis was checked and the gallbladder was removed through the epigastic port.  This was done by opening the gallbladder and removing the bile. The gallbladder was passed off the table as a specimen. The gallbladder fossa was copiously irrigated with saline, and hemostasis was obtained. There was no evidence of bleeding of the gallbladder fossa or cystic artery or leakage of bile from the cystic duct stump.     The epigastric trocar site was closed with 0 Vicryl using a suture 0 Vicryl in a figure-of-eight fashion.    Marcaine was infiltrated       Secondary trocars were removed under direct vision. No bleeding was noted. The skin for all ports was closed with 4-0 vricryl subcuticular sutures after the infiltration of marcaine.  Dermabond was placed     The patient tolerated the procedure well and was taken to the postanesthesia care unit in stable condition.      Counts were corrected       Significant Surgical Tasks Conducted by the Assistant(s), if Applicable:   none    Complications: No    Estimated Blood Loss (EBL): 15 mL  IV fluids 1200 mL  Marcaine 0.25% 30 mL           Implants: * No implants in log *    Specimens:   Specimen (12h ago, onward) Comment     Start     Ordered    03/26/21 8351  Specimen to Pathology, Surgery General Surgery  Once      Comments: Pre-op Diagnosis: Cholelithiasis with choledocholithiasis [K80.70]    Procedure(s):  CHOLECYSTECTOMY, LAPAROSCOPIC     Number of specimens: 1    Name of specimens: Gallbladder with stones (perm)   Question Answer Comment   Procedure Type: General Surgery    Specimen Class: Routine/Screening    Release to patient Immediate        03/26/21 0856                        Condition: Stable    Disposition: PACU - guarded condition.    Attestation: I performed the procedure.

## 2021-03-26 NOTE — NURSING
.Went over discharge instructions with patient.   Stressed importance of making and keeping all follow ups.   Patient verbalized understanding and has no questions in regards to discharge.  IV removed, catheter intact.  Patient dressed and transported off unit . Left with .

## 2021-03-26 NOTE — HOSPITAL COURSE
03/26/2021.  No abdominal pain slight headache.  Liver function tests show improvement lipase has normalized.  ERCP was reviewed, no stones stent in the bile duct.    For laparoscopic cholecystectomy this morning

## 2021-03-26 NOTE — SUBJECTIVE & OBJECTIVE
Interval History:  No abdominal pain.  Lipase is normal.  Liver function tests improvement.  For laparoscopic cholecystectomy this morning    Medications:  Continuous Infusions:  Scheduled Meds:   nozaseptin   Each Nostril BID    piperacillin-tazobactam (ZOSYN) IVPB  4.5 g Intravenous Q8H     PRN Meds:acetaminophen, diphenhydrAMINE, hydrALAZINE, indomethacin, morphine, morphine, ondansetron, traZODone     Review of patient's allergies indicates:   Allergen Reactions    Humira  [adalimumab]      Other reaction(s): drug-induced lupus  Other reaction(s): drug-induced lupus    Sulfa (sulfonamide antibiotics)      Other reaction(s): Hives  Other reaction(s): Rash  Other reaction(s): Hives     Objective:     Vital Signs (Most Recent):  Temp: 97.1 °F (36.2 °C) (03/26/21 0715)  Pulse: 94 (03/26/21 0715)  Resp: 18 (03/26/21 0715)  BP: (!) 162/78 (03/26/21 0715)  SpO2: 97 % (03/26/21 0715) Vital Signs (24h Range):  Temp:  [97.1 °F (36.2 °C)-98.3 °F (36.8 °C)] 97.1 °F (36.2 °C)  Pulse:  [72-94] 94  Resp:  [14-19] 18  SpO2:  [93 %-98 %] 97 %  BP: (122-173)/(61-88) 162/78     Weight: 65.9 kg (145 lb 4.5 oz)  Body mass index is 24.18 kg/m².    Intake/Output - Last 3 Shifts       03/24 0700 - 03/25 0659 03/25 0700 - 03/26 0659 03/26 0700 - 03/27 0659    P.O. 0      I.V. (mL/kg)  1795 (27.2)     IV Piggyback  65.8     Total Intake(mL/kg) 0 (0) 1860.8 (28.2)     Net 0 +1860.8            Urine Occurrence 2 x 2 x           Physical Exam  Vitals and nursing note reviewed.   Cardiovascular:      Rate and Rhythm: Normal rate and regular rhythm.   Pulmonary:      Effort: Pulmonary effort is normal.      Breath sounds: Normal breath sounds.   Abdominal:      General: Abdomen is flat. Bowel sounds are normal. There is no distension.      Palpations: Abdomen is soft.      Tenderness: There is no abdominal tenderness.   Musculoskeletal:      Right lower leg: No edema.      Left lower leg: No edema.   Skin:     General: Skin is warm and  dry.      Capillary Refill: Capillary refill takes less than 2 seconds.   Neurological:      General: No focal deficit present.      Mental Status: She is alert.   Psychiatric:         Mood and Affect: Mood normal.         Behavior: Behavior normal.         Thought Content: Thought content normal.         Judgment: Judgment normal.         Significant Labs:  CBC:   Recent Labs   Lab 03/26/21  0512   WBC 4.30   RBC 4.23   HGB 11.8*   HCT 36.5*      MCV 86   MCH 27.9   MCHC 32.3     CMP:   Recent Labs   Lab 03/26/21  0512   *   CALCIUM 8.8   ALBUMIN 3.4*   PROT 6.5      K 3.9   CO2 24      BUN 11   CREATININE 0.7   ALKPHOS 214*   *   *   BILITOT 0.4     Lipase was 36    Significant Diagnostics:  Imaging studies and ERCP reviewed

## 2021-03-26 NOTE — DISCHARGE SUMMARY
[facility] Hospital Medicine  Discharge Summary      Patient Name: Leticia Piña  MRN: [id number]  Patient Class: IP- Inpatient  Admission Date: 3/24/2021  Hospital Length of Stay: 1 days  Discharge Date and Time:  03/26/2021 4:38 PM  Attending Physician: Malachi Garcia MD   Discharging Provider: Jomar Hutson NP  Primary Care Provider: Nicole Castellon MD      HPI:   Ms. Piña is a pleasant 70-year-old  female with PMH significant for rheumatoid arthritis, Sjogren syndrome, HTN, depression/anxiety, has been complaining of upper abdominal discomfort for the past many months, intermittently worsen with eating, but sometimes with spontaneously improve on its own.  She was seen in the GI clinic on 3/11/21, and an outpatient abdominal ultrasound was ordered, which was done on 3/22/21, that revealed stone and sludge filled gallbladder without convincing sonographic evidence of cholecystitis.  CBD was dilated at 6.2 mm with no intrahepatic ductal dilatation.  Since around lunchtime earlier today, reports increasing upper abdominal discomfort, associated with nausea without vomiting.  Hence she presented to the ED, by which time the abdominal pain and nausea has resolved.  Laboratory workup done in the ED revealed total bilirubin 1.1, , , alkaline phosphatase 258, amylase 127, lipase 323.  Sodium 135.  Rest of the laboratory workup is unremarkable.  COVID-19 is negative.  Hepatitis C antibody negative.  ED physician discussed case with Dr. Escobedo with GI, recommended hospitalization for possible ERCP in a.m. and followed by possible laparoscopic cholecystectomy eventually.    Admitting diagnosis:  Choledocholithiasis, cholelithiasis with obstruction.  Acute biliary pancreatitis.      Procedure(s) (LRB):  CHOLECYSTECTOMY, LAPAROSCOPIC (N/A)      Hospital Course:   Ms Piña is a 70 year old female admitted with abdominal pain and ultrasound revealed gallstones with  elevation in LFTs.  GI consulted for further evaluation. Patient underwent MRCP on the morning of 3/25/2021 that showed choledocholithiasis. She later on 3/25/2021 underwent ERCP by Dr Madrigal. General Surgery was also consulted for further evaluation and treatment. This AM, patient underwent Laparoscopic cholecystectomy by Dr Blue, tolerated procedure well. Case reviewed with Dr Blue, ok to discharge today from General Surgery Standpoint if tolerating clear, then advance to full liquid diet. She was follow up with GI and General Surgery in clinic. Patient tolerated full liquid well, pain well control. She was seen, examined and deemed suitable for discharge.        Consults:   Consults (From admission, onward)        Status Ordering Provider     Inpatient consult to Gastroenterology  Once     Provider:  Susan Escobedo MD    Completed [facility]     Inpatient consult to General Surgery  Once     Provider:  Tucker Mancuso MD    Completed [facility]          No new Assessment & Plan notes have been filed under this hospital service since the last note was generated.  Service: Hospital Medicine    Final Active Diagnoses:    Diagnosis Date Noted POA    Essential hypertension [I10] 02/05/2019 Yes    Rheumatoid arthritis involving multiple sites with positive rheumatoid factor [M05.79]  Yes    Acquired hypothyroidism [E03.9]  Yes      Problems Resolved During this Admission:    Diagnosis Date Noted Date Resolved POA    PRINCIPAL PROBLEM:  Cholelithiasis with choledocholithiasis [K80.70] 03/24/2021 03/26/2021 Yes    Biliary obstruction [K83.1] 03/25/2021 03/26/2021 Yes    Acute biliary pancreatitis [K85.10] 03/24/2021 03/26/2021 Yes       Discharged Condition: stable    Disposition: Home or Self Care    Follow Up:  Follow-up Information     Jose Blue MD.    Specialty: General Surgery  Why: post op appt  Contact information:  [address]  [place] [address] [phone]              Preston Madrigal MD. Schedule an appointment as soon as possible for a visit in 2 weeks.    Specialty: Gastroenterology  Why: hospital follow up post ERCP procedure   Contact information:  [address] [place] [address] [address] [place] [address] [phone]                 Patient Instructions:   No discharge procedures on file.    Significant Diagnostic Studies: Labs:   CMP   Recent Labs   Lab 03/24/21 1829 03/25/21  0703 03/26/21  0512   * 140 137   K 3.5 3.3* 3.9   CL 96 105 103   CO2 28 25 24    85 119*   BUN 12 8 11   CREATININE 0.7 0.7 0.7   CALCIUM 9.5 8.5* 8.8   PROT 7.4 6.3 6.5   ALBUMIN 3.7 3.2* 3.4*   BILITOT 1.1* 0.6 0.4   ALKPHOS 258* 236* 214*   * 413* 166*   * 561* 380*   ANIONGAP 11 10 10   ESTGFRAFRICA >60 >60 >60   EGFRNONAA >60 >60 >60    and CBC   Recent Labs   Lab 03/24/21 1829 03/25/21  0703 03/26/21  0512   WBC 6.46 4.03 4.30   HGB 12.6 11.6* 11.8*   HCT 37.6 35.7* 36.5*    302 319       Pending Diagnostic Studies:     Procedure Component Value Units Date/Time    Specimen to Pathology, Surgery General Surgery [[id number]] Collected: 03/26/21 0856    Order Status: Sent Lab Status: In process Updated: 03/26/21 1156         Medications:  Reconciled Home Medications:      Medication List      START taking these medications    HYDROcodone-acetaminophen 5-325 mg per tablet  Commonly known as: NORCO  Take 1 tablet by mouth every 4 (four) hours as needed.        CONTINUE taking these medications    ALA-HIST IR 2 mg Tab  Generic drug: dexbrompheniramine maleate  TAKE 1 TABLET BY MOUTH EVERY DAY     azelastine 137 mcg (0.1 %) nasal spray  Commonly known as: ASTELIN  1 spray (137 mcg total) by Nasal route 2 (two) times daily.     benzonatate 100 MG capsule  Commonly known as: TESSALON PERLES  Take 2 capsules (200 mg total) by mouth 3 (three) times daily as needed for Cough.     buPROPion 300 MG 24 hr tablet  Commonly known as: WELLBUTRIN XL  TAKE ONE TABLET BY  MOUTH ONE TIME DAILY     cholecalciferol (vitamin D3) 125 mcg/mL (5,000 unit/mL) Drop  Take 1 drop by mouth Daily.     diclofenac sodium 1 % Gel  Commonly known as: VOLTAREN  Apply 2 g topically 4 (four) times daily.     DULoxetine 60 MG capsule  Commonly known as: CYMBALTA  TAKE ONE CAPSULE BY MOUTH ONE TIME DAILY     ferrous gluconate 324 MG tablet  Commonly known as: FERGON  Take 324 mg by mouth daily with breakfast.     fluticasone propionate 50 mcg/actuation nasal spray  Commonly known as: FLONASE  2 sprays (100 mcg total) by Each Nostril route 2 (two) times a day.     folic acid 1 MG tablet  Commonly known as: FOLVITE  TAKE ONE TABLET BY MOUTH ONE TIME DAILY     hydroCHLOROthiazide 25 MG tablet  Commonly known as: HYDRODIURIL  Take 1 tablet (25 mg total) by mouth once daily.     hydrOXYchloroQUINE 200 mg tablet  Commonly known as: PLAQUENIL  TAKE ONE TABLET BY MOUTH TWICE DAILY     levothyroxine 75 MCG tablet  Commonly known as: EUTHYROX  Take 1 tablet (75 mcg total) by mouth once daily.     linaCLOtide 72 mcg Cap capsule  Commonly known as: LINZESS  Take 1 capsule (72 mcg total) by mouth before breakfast.     LORazepam 1 MG tablet  Commonly known as: ATIVAN  TAKE 1 TABLET (1 MG TOTAL) BY MOUTH 2 (TWO) TIMES DAILY AS NEEDED.     losartan 100 MG tablet  Commonly known as: COZAAR  Take 1 tablet (100 mg total) by mouth once daily.     meloxicam 15 MG tablet  Commonly known as: MOBIC  Take 1 tablet (15 mg total) by mouth once daily.     methylPREDNISolone 4 mg tablet  Commonly known as: MEDROL DOSEPACK  use as directed     omeprazole 40 MG capsule  Commonly known as: PRILOSEC  Take 1 capsule (40 mg total) by mouth once daily.     potassium chloride SA 20 MEQ tablet  Commonly known as: K-DUR,KLOR-CON  TAKE ONE TABLET BY MOUTH ONE TIME DAILY     traZODone 50 MG tablet  Commonly known as: DESYREL  TAKE 2-3 TABLETS (100-150 MG TOTAL) BY MOUTH NIGHTLY AS NEEDED FOR INSOMNIA.     triamcinolone 55 mcg nasal  inhaler  Commonly known as: NASACORT  2 sprays by Nasal route once daily.     VITAMIN B-12 5,000 mcg Subl  Generic drug: cyanocobalamin (vitamin B-12)  Place under the tongue once daily.            Indwelling Lines/Drains at time of discharge:   Lines/Drains/Airways     None                 Time spent on the discharge of patient: 45 minutes  Patient was seen and examined on the date of discharge and determined to be suitable for discharge.         Jomar Hutson NP  Department of Hospital Medicine  [facility]

## 2021-03-26 NOTE — DISCHARGE INSTRUCTIONS
Please call for any fever, increase in pain, nausea or vomiting or redness or drainage from incision(s).    No lifting more than 30 pounds for 2 weeks    May shower     Removed the glue when it begins to come loose      If you become constipated from the pain medication you can use over the counter laxatives,  Miralax or Glycolax, or Magnesium Citrate for severe constipation.

## 2021-03-26 NOTE — PLAN OF CARE
Patient transferred to [facility].    at bedside, updated.  Floor nurse to assume care.  Stable at handoff.

## 2021-03-26 NOTE — PLAN OF CARE
POC reviewed, verbalized understanding. AAOx4. Room air. Pt remained free from falls, fall precautions in place. Pt is not on monitor. Given hydralazine per order, BP rechecked. VSS. No other c/o at this time. PIV intact, IV abx. Call bell and personal belongings within reach. Hourly rounding complete. Reminded to call for assistance. Will continue to monitor. Pantera peck in AM.

## 2021-03-26 NOTE — PLAN OF CARE
.Pt AAO x4.  VSS.  Pt remained afebrile throughout this shift.   IV NS and zosyn administered per order.   Pt remained free of falls this shift.   Pt complaint of headache this shift. Tylenol given.  Plan of care reviewed. Patient verbalizes understanding.   Pt moving/turing self. Frequent weight shifting encouraged.  Patient not on cardiac monitor  Bed low, side rails up x 2, wheels locked, call light in reach.   Bed alarm maintained for safety.   Patient instructed to call for assistance.   Hourly rounding completed.   24 hour chart check completed.  Will continue to monitor.         Problem: Adult Inpatient Plan of Care  Goal: Plan of Care Review  Outcome: Ongoing, Progressing  Goal: Patient-Specific Goal (Individualization)  Outcome: Ongoing, Progressing  Goal: Absence of Hospital-Acquired Illness or Injury  Outcome: Ongoing, Progressing  Goal: Optimal Comfort and Wellbeing  Outcome: Ongoing, Progressing  Goal: Readiness for Transition of Care  Outcome: Ongoing, Progressing  Goal: Rounds/Family Conference  Outcome: Ongoing, Progressing     Problem: Fall Injury Risk  Goal: Absence of Fall and Fall-Related Injury  Outcome: Ongoing, Progressing

## 2021-03-26 NOTE — PROGRESS NOTES
[facility] -   General Surgery  Progress Note    Subjective:     History of Present Illness:  70-year-old female referred for choledocholithiasis.  Patient was admitted for ongoing upper abdominal pain, nausea.  Ultrasound revealed gallstones with slight elevation in LFTs.  Patient underwent MRCP today showing choledocholithiasis with plans for ERCP today.      Post-Op Info:  Procedure(s) (LRB):  CHOLECYSTECTOMY, LAPAROSCOPIC (N/A)   Day of Surgery     Interval History:  No abdominal pain.  Lipase is normal.  Liver function tests improvement.  For laparoscopic cholecystectomy this morning    Medications:  Continuous Infusions:  Scheduled Meds:   nozaseptin   Each Nostril BID    piperacillin-tazobactam (ZOSYN) IVPB  4.5 g Intravenous Q8H     PRN Meds:acetaminophen, diphenhydrAMINE, hydrALAZINE, indomethacin, morphine, morphine, ondansetron, traZODone     Review of patient's allergies indicates:   Allergen Reactions    Humira  [adalimumab]      Other reaction(s): drug-induced lupus  Other reaction(s): drug-induced lupus    Sulfa (sulfonamide antibiotics)      Other reaction(s): Hives  Other reaction(s): Rash  Other reaction(s): Hives     Objective:     Vital Signs (Most Recent):  Temp: 97.1 °F (36.2 °C) (03/26/21 0715)  Pulse: 94 (03/26/21 0715)  Resp: 18 (03/26/21 0715)  BP: (!) 162/78 (03/26/21 0715)  SpO2: 97 % (03/26/21 0715) Vital Signs (24h Range):  Temp:  [97.1 °F (36.2 °C)-98.3 °F (36.8 °C)] 97.1 °F (36.2 °C)  Pulse:  [72-94] 94  Resp:  [14-19] 18  SpO2:  [93 %-98 %] 97 %  BP: (122-173)/(61-88) 162/78     Weight: 65.9 kg (145 lb 4.5 oz)  Body mass index is 24.18 kg/m².    Intake/Output - Last 3 Shifts       03/24 0700 - 03/25 0659 03/25 0700 - 03/26 0659 03/26 0700 - 03/27 0659    P.O. 0      I.V. (mL/kg)  1795 (27.2)     IV Piggyback  65.8     Total Intake(mL/kg) 0 (0) 1860.8 (28.2)     Net 0 +1860.8            Urine Occurrence 2 x 2 x           Physical Exam  Vitals and nursing note  reviewed.   Cardiovascular:      Rate and Rhythm: Normal rate and regular rhythm.   Pulmonary:      Effort: Pulmonary effort is normal.      Breath sounds: Normal breath sounds.   Abdominal:      General: Abdomen is flat. Bowel sounds are normal. There is no distension.      Palpations: Abdomen is soft.      Tenderness: There is no abdominal tenderness.   Musculoskeletal:      Right lower leg: No edema.      Left lower leg: No edema.   Skin:     General: Skin is warm and dry.      Capillary Refill: Capillary refill takes less than 2 seconds.   Neurological:      General: No focal deficit present.      Mental Status: She is alert.   Psychiatric:         Mood and Affect: Mood normal.         Behavior: Behavior normal.         Thought Content: Thought content normal.         Judgment: Judgment normal.         Significant Labs:  CBC:   Recent Labs   Lab 03/26/21  0512   WBC 4.30   RBC 4.23   HGB 11.8*   HCT 36.5*      MCV 86   MCH 27.9   MCHC 32.3     CMP:   Recent Labs   Lab 03/26/21 0512   *   CALCIUM 8.8   ALBUMIN 3.4*   PROT 6.5      K 3.9   CO2 24      BUN 11   CREATININE 0.7   ALKPHOS 214*   *   *   BILITOT 0.4     Lipase was 36    Significant Diagnostics:  Imaging studies and ERCP reviewed    Assessment/Plan:     * Cholelithiasis with choledocholithiasis  Laparoscopic cholecystectomy, see above      Acute biliary pancreatitis  Patient presented with gallstones and elevated liver function tests as well as increased lipase.  She underwent and ERCP as well as MRCP.    Today she is pain free and liver function tests and lipase of improved.    Laparoscopic cholecystectomy    The risks, benefits and complications of laparoscopic cholecystectomy were discussed.  These included infection, bleeding, abscess and bile leak.  The need for open conversion was dicussed.  The rare possibility of a common bile duct injury and the need for additional procedures and/or surgery was reviewed.   All question were answered.  The patient has agreed to proceed.  Consent was obtained.        Essential hypertension  Management per Medicine    Acquired hypothyroidism  Management per Medicine    Rheumatoid arthritis involving multiple sites with positive rheumatoid factor  Management per Medicine        Jose Blue MD  General Surgery  [facility]

## 2021-03-26 NOTE — ASSESSMENT & PLAN NOTE
Patient presented with gallstones and elevated liver function tests as well as increased lipase.  She underwent and ERCP as well as MRCP.    Today she is pain free and liver function tests and lipase of improved.    Laparoscopic cholecystectomy    The risks, benefits and complications of laparoscopic cholecystectomy were discussed.  These included infection, bleeding, abscess and bile leak.  The need for open conversion was dicussed.  The rare possibility of a common bile duct injury and the need for additional procedures and/or surgery was reviewed.  All question were answered.  The patient has agreed to proceed.  Consent was obtained.

## 2021-03-29 ENCOUNTER — PATIENT MESSAGE (OUTPATIENT)
Dept: HEMATOLOGY/ONCOLOGY | Facility: CLINIC | Age: 71
End: 2021-03-29

## 2021-03-29 ENCOUNTER — PATIENT OUTREACH (OUTPATIENT)
Dept: ADMINISTRATIVE | Facility: CLINIC | Age: 71
End: 2021-03-29

## 2021-03-30 ENCOUNTER — PATIENT MESSAGE (OUTPATIENT)
Dept: FAMILY MEDICINE | Facility: CLINIC | Age: 71
End: 2021-03-30

## 2021-03-30 DIAGNOSIS — K11.7 XEROSTOMIA DUE TO AUTOIMMUNE DISEASE: ICD-10-CM

## 2021-03-30 DIAGNOSIS — M35.01 SJOGREN'S SYNDROME WITH KERATOCONJUNCTIVITIS SICCA: ICD-10-CM

## 2021-03-30 DIAGNOSIS — M35.9 XEROSTOMIA DUE TO AUTOIMMUNE DISEASE: ICD-10-CM

## 2021-03-30 DIAGNOSIS — M05.79 RHEUMATOID ARTHRITIS INVOLVING MULTIPLE SITES WITH POSITIVE RHEUMATOID FACTOR: ICD-10-CM

## 2021-03-30 LAB
FINAL PATHOLOGIC DIAGNOSIS: NORMAL
GROSS: NORMAL
Lab: NORMAL

## 2021-03-30 RX ORDER — HYDROXYCHLOROQUINE SULFATE 200 MG/1
TABLET, FILM COATED ORAL
Qty: 180 TABLET | Refills: 1 | Status: SHIPPED | OUTPATIENT
Start: 2021-03-30 | End: 2021-09-27

## 2021-03-31 ENCOUNTER — LAB VISIT (OUTPATIENT)
Dept: LAB | Facility: HOSPITAL | Age: 71
End: 2021-03-31
Attending: NURSE PRACTITIONER
Payer: MEDICARE

## 2021-03-31 DIAGNOSIS — D50.9 IRON DEFICIENCY ANEMIA, UNSPECIFIED IRON DEFICIENCY ANEMIA TYPE: ICD-10-CM

## 2021-03-31 LAB
ANION GAP SERPL CALC-SCNC: 9 MMOL/L (ref 8–16)
BASOPHILS # BLD AUTO: 0.06 K/UL (ref 0–0.2)
BASOPHILS NFR BLD: 0.8 % (ref 0–1.9)
BUN SERPL-MCNC: 20 MG/DL (ref 8–23)
CALCIUM SERPL-MCNC: 9 MG/DL (ref 8.7–10.5)
CHLORIDE SERPL-SCNC: 96 MMOL/L (ref 95–110)
CO2 SERPL-SCNC: 31 MMOL/L (ref 23–29)
CREAT SERPL-MCNC: 0.8 MG/DL (ref 0.5–1.4)
DIFFERENTIAL METHOD: NORMAL
EOSINOPHIL # BLD AUTO: 0.2 K/UL (ref 0–0.5)
EOSINOPHIL NFR BLD: 3.1 % (ref 0–8)
ERYTHROCYTE [DISTWIDTH] IN BLOOD BY AUTOMATED COUNT: 12.8 % (ref 11.5–14.5)
EST. GFR  (AFRICAN AMERICAN): >60 ML/MIN/1.73 M^2
EST. GFR  (NON AFRICAN AMERICAN): >60 ML/MIN/1.73 M^2
GLUCOSE SERPL-MCNC: 101 MG/DL (ref 70–110)
HCT VFR BLD AUTO: 37.7 % (ref 37–48.5)
HGB BLD-MCNC: 12.3 G/DL (ref 12–16)
IMM GRANULOCYTES # BLD AUTO: 0.02 K/UL (ref 0–0.04)
IMM GRANULOCYTES NFR BLD AUTO: 0.3 % (ref 0–0.5)
LYMPHOCYTES # BLD AUTO: 1.4 K/UL (ref 1–4.8)
LYMPHOCYTES NFR BLD: 18.6 % (ref 18–48)
MCH RBC QN AUTO: 28.5 PG (ref 27–31)
MCHC RBC AUTO-ENTMCNC: 32.6 G/DL (ref 32–36)
MCV RBC AUTO: 88 FL (ref 82–98)
MONOCYTES # BLD AUTO: 0.6 K/UL (ref 0.3–1)
MONOCYTES NFR BLD: 8.2 % (ref 4–15)
NEUTROPHILS # BLD AUTO: 5 K/UL (ref 1.8–7.7)
NEUTROPHILS NFR BLD: 69 % (ref 38–73)
NRBC BLD-RTO: 0 /100 WBC
PLATELET # BLD AUTO: 356 K/UL (ref 150–450)
PMV BLD AUTO: 9.7 FL (ref 9.2–12.9)
POTASSIUM SERPL-SCNC: 3.6 MMOL/L (ref 3.5–5.1)
RBC # BLD AUTO: 4.31 M/UL (ref 4–5.4)
SODIUM SERPL-SCNC: 136 MMOL/L (ref 136–145)
WBC # BLD AUTO: 7.31 K/UL (ref 3.9–12.7)

## 2021-03-31 PROCEDURE — 80048 BASIC METABOLIC PNL TOTAL CA: CPT | Performed by: NURSE PRACTITIONER

## 2021-03-31 PROCEDURE — 82728 ASSAY OF FERRITIN: CPT | Performed by: NURSE PRACTITIONER

## 2021-03-31 PROCEDURE — 36415 COLL VENOUS BLD VENIPUNCTURE: CPT | Performed by: NURSE PRACTITIONER

## 2021-03-31 PROCEDURE — 83540 ASSAY OF IRON: CPT | Performed by: NURSE PRACTITIONER

## 2021-03-31 PROCEDURE — 85025 COMPLETE CBC W/AUTO DIFF WBC: CPT | Performed by: NURSE PRACTITIONER

## 2021-03-31 NOTE — PHYSICIAN QUERY
PT Name: Leticia Piña  MR #: [id number]    Pathology Findings Clarification     CDS/: Mahsa Hassan RN, CDS               Contact information: [email or web address]    This form is a permanent document in the medical record.     Query Date: March 31, 2021    By submitting this query, we are merely seeking further clarification of documentation.  Please utilize your independent clinical judgment when addressing the question(s) below.    The medical record contains the following:  Pathology Findings Location in Medical Record   complaining of upper abdominal discomfort for the past many months, intermittently worsen with eating, but sometimes with spontaneously improve on its own.  She was seen in the GI clinic on 3/11/21, and an outpatient abdominal ultrasound was ordered, which was done on 3/22/21, that revealed stone and sludge filled gallbladder without convincing sonographic evidence of cholecystitis.    Description of the Findings of the Procedure:   Findings:  A mild to moderately inflamed gallbladder     Final Pathological Diagnosis:  Gallbladder, cholecystectomy:  Chronic cholecystitis H&P 3/24                 Op Note 3/26       Path Report      Collected: 3/26      Resulted: 3/30        Please clarify the pathology findings of chronic cholecystitis:    [  x] Pathology findings noted above are ruled in/confirmed as diagnoses   [  ] Pathology findings noted above are not confirmed as diagnoses   [  ] Other diagnosis (please specify): ___________   [  ] Clinically Undetermined       Please document in your progress notes daily for the duration of treatment until resolved and include in your discharge summary.

## 2021-04-01 LAB
FERRITIN SERPL-MCNC: 312 NG/ML (ref 20–300)
IRON SERPL-MCNC: 82 UG/DL (ref 30–160)
SATURATED IRON: 21 % (ref 20–50)
TOTAL IRON BINDING CAPACITY: 383 UG/DL (ref 250–450)
TRANSFERRIN SERPL-MCNC: 259 MG/DL (ref 200–375)

## 2021-04-06 ENCOUNTER — PES CALL (OUTPATIENT)
Dept: ADMINISTRATIVE | Facility: CLINIC | Age: 71
End: 2021-04-06

## 2021-04-06 ENCOUNTER — OFFICE VISIT (OUTPATIENT)
Dept: HEMATOLOGY/ONCOLOGY | Facility: CLINIC | Age: 71
End: 2021-04-06
Payer: MEDICARE

## 2021-04-06 DIAGNOSIS — M35.00 SJOGREN'S SYNDROME, WITH UNSPECIFIED ORGAN INVOLVEMENT: ICD-10-CM

## 2021-04-06 DIAGNOSIS — K81.2 ACUTE ON CHRONIC CHOLECYSTITIS: ICD-10-CM

## 2021-04-06 DIAGNOSIS — Z86.2 HISTORY OF IRON DEFICIENCY ANEMIA: ICD-10-CM

## 2021-04-06 DIAGNOSIS — R74.8 ELEVATED LIVER ENZYMES: Primary | ICD-10-CM

## 2021-04-06 PROCEDURE — 99214 PR OFFICE/OUTPT VISIT, EST, LEVL IV, 30-39 MIN: ICD-10-PCS | Mod: 95,,, | Performed by: NURSE PRACTITIONER

## 2021-04-06 PROCEDURE — 99214 OFFICE O/P EST MOD 30 MIN: CPT | Mod: 95,,, | Performed by: NURSE PRACTITIONER

## 2021-04-07 ENCOUNTER — OFFICE VISIT (OUTPATIENT)
Dept: RHEUMATOLOGY | Facility: CLINIC | Age: 71
End: 2021-04-07
Payer: MEDICARE

## 2021-04-07 VITALS
WEIGHT: 143.31 LBS | HEIGHT: 65 IN | SYSTOLIC BLOOD PRESSURE: 123 MMHG | DIASTOLIC BLOOD PRESSURE: 72 MMHG | HEART RATE: 86 BPM | BODY MASS INDEX: 23.88 KG/M2

## 2021-04-07 DIAGNOSIS — Z79.899 HIGH RISK MEDICATION USE: ICD-10-CM

## 2021-04-07 DIAGNOSIS — M05.79 RHEUMATOID ARTHRITIS INVOLVING MULTIPLE SITES WITH POSITIVE RHEUMATOID FACTOR: Primary | ICD-10-CM

## 2021-04-07 PROCEDURE — 99999 PR PBB SHADOW E&M-EST. PATIENT-LVL V: ICD-10-PCS | Mod: PBBFAC,,, | Performed by: PHYSICIAN ASSISTANT

## 2021-04-07 PROCEDURE — 99214 PR OFFICE/OUTPT VISIT, EST, LEVL IV, 30-39 MIN: ICD-10-PCS | Mod: S$PBB,,, | Performed by: PHYSICIAN ASSISTANT

## 2021-04-07 PROCEDURE — 99214 OFFICE O/P EST MOD 30 MIN: CPT | Mod: S$PBB,,, | Performed by: PHYSICIAN ASSISTANT

## 2021-04-07 PROCEDURE — 99999 PR PBB SHADOW E&M-EST. PATIENT-LVL V: CPT | Mod: PBBFAC,,, | Performed by: PHYSICIAN ASSISTANT

## 2021-04-07 PROCEDURE — 99215 OFFICE O/P EST HI 40 MIN: CPT | Mod: PBBFAC | Performed by: PHYSICIAN ASSISTANT

## 2021-04-08 ENCOUNTER — LAB VISIT (OUTPATIENT)
Dept: LAB | Facility: HOSPITAL | Age: 71
End: 2021-04-08
Attending: PHYSICIAN ASSISTANT
Payer: MEDICARE

## 2021-04-08 DIAGNOSIS — R74.8 ELEVATED LIVER ENZYMES: ICD-10-CM

## 2021-04-08 DIAGNOSIS — D84.9 IMMUNOCOMPROMISED: ICD-10-CM

## 2021-04-08 LAB
ALBUMIN SERPL BCP-MCNC: 3.7 G/DL (ref 3.5–5.2)
ALP SERPL-CCNC: 103 U/L (ref 55–135)
ALT SERPL W/O P-5'-P-CCNC: 32 U/L (ref 10–44)
ANION GAP SERPL CALC-SCNC: 9 MMOL/L (ref 8–16)
AST SERPL-CCNC: 22 U/L (ref 10–40)
BASOPHILS # BLD AUTO: 0.08 K/UL (ref 0–0.2)
BASOPHILS NFR BLD: 1.1 % (ref 0–1.9)
BILIRUB DIRECT SERPL-MCNC: 0.1 MG/DL (ref 0.1–0.3)
BILIRUB SERPL-MCNC: 0.2 MG/DL (ref 0.1–1)
BUN SERPL-MCNC: 19 MG/DL (ref 8–23)
CALCIUM SERPL-MCNC: 9.2 MG/DL (ref 8.7–10.5)
CHLORIDE SERPL-SCNC: 97 MMOL/L (ref 95–110)
CO2 SERPL-SCNC: 31 MMOL/L (ref 23–29)
CREAT SERPL-MCNC: 0.8 MG/DL (ref 0.5–1.4)
CRP SERPL-MCNC: 7 MG/L (ref 0–8.2)
DIFFERENTIAL METHOD: ABNORMAL
EOSINOPHIL # BLD AUTO: 0.3 K/UL (ref 0–0.5)
EOSINOPHIL NFR BLD: 3.8 % (ref 0–8)
ERYTHROCYTE [DISTWIDTH] IN BLOOD BY AUTOMATED COUNT: 12.9 % (ref 11.5–14.5)
ERYTHROCYTE [SEDIMENTATION RATE] IN BLOOD BY WESTERGREN METHOD: 20 MM/HR (ref 0–20)
EST. GFR  (AFRICAN AMERICAN): >60 ML/MIN/1.73 M^2
EST. GFR  (NON AFRICAN AMERICAN): >60 ML/MIN/1.73 M^2
GLUCOSE SERPL-MCNC: 107 MG/DL (ref 70–110)
HCT VFR BLD AUTO: 34.7 % (ref 37–48.5)
HGB BLD-MCNC: 11.4 G/DL (ref 12–16)
IMM GRANULOCYTES # BLD AUTO: 0.02 K/UL (ref 0–0.04)
IMM GRANULOCYTES NFR BLD AUTO: 0.3 % (ref 0–0.5)
LYMPHOCYTES # BLD AUTO: 1.5 K/UL (ref 1–4.8)
LYMPHOCYTES NFR BLD: 19.6 % (ref 18–48)
MCH RBC QN AUTO: 28.5 PG (ref 27–31)
MCHC RBC AUTO-ENTMCNC: 32.9 G/DL (ref 32–36)
MCV RBC AUTO: 87 FL (ref 82–98)
MONOCYTES # BLD AUTO: 0.6 K/UL (ref 0.3–1)
MONOCYTES NFR BLD: 8.4 % (ref 4–15)
NEUTROPHILS # BLD AUTO: 5.1 K/UL (ref 1.8–7.7)
NEUTROPHILS NFR BLD: 66.8 % (ref 38–73)
NRBC BLD-RTO: 0 /100 WBC
PLATELET # BLD AUTO: 303 K/UL (ref 150–450)
PMV BLD AUTO: 9.6 FL (ref 9.2–12.9)
POTASSIUM SERPL-SCNC: 3.4 MMOL/L (ref 3.5–5.1)
PROT SERPL-MCNC: 6.9 G/DL (ref 6–8.4)
RBC # BLD AUTO: 4 M/UL (ref 4–5.4)
SODIUM SERPL-SCNC: 137 MMOL/L (ref 136–145)
WBC # BLD AUTO: 7.6 K/UL (ref 3.9–12.7)

## 2021-04-08 PROCEDURE — 85651 RBC SED RATE NONAUTOMATED: CPT | Performed by: PHYSICIAN ASSISTANT

## 2021-04-08 PROCEDURE — 86140 C-REACTIVE PROTEIN: CPT | Performed by: PHYSICIAN ASSISTANT

## 2021-04-08 PROCEDURE — 82248 BILIRUBIN DIRECT: CPT | Performed by: PHYSICIAN ASSISTANT

## 2021-04-08 PROCEDURE — 80053 COMPREHEN METABOLIC PANEL: CPT | Performed by: PHYSICIAN ASSISTANT

## 2021-04-08 PROCEDURE — 85025 COMPLETE CBC W/AUTO DIFF WBC: CPT | Performed by: PHYSICIAN ASSISTANT

## 2021-04-09 ENCOUNTER — OFFICE VISIT (OUTPATIENT)
Dept: FAMILY MEDICINE | Facility: CLINIC | Age: 71
End: 2021-04-09
Payer: MEDICARE

## 2021-04-09 VITALS
SYSTOLIC BLOOD PRESSURE: 122 MMHG | TEMPERATURE: 98 F | WEIGHT: 145.31 LBS | DIASTOLIC BLOOD PRESSURE: 60 MMHG | BODY MASS INDEX: 23.35 KG/M2 | RESPIRATION RATE: 20 BRPM | HEIGHT: 66 IN | HEART RATE: 92 BPM

## 2021-04-09 DIAGNOSIS — M35.00 SJOGREN'S SYNDROME, WITH UNSPECIFIED ORGAN INVOLVEMENT: ICD-10-CM

## 2021-04-09 DIAGNOSIS — E55.9 VITAMIN D DEFICIENCY DISEASE: ICD-10-CM

## 2021-04-09 DIAGNOSIS — M05.79 RHEUMATOID ARTHRITIS INVOLVING MULTIPLE SITES WITH POSITIVE RHEUMATOID FACTOR: ICD-10-CM

## 2021-04-09 DIAGNOSIS — K21.00 GASTROESOPHAGEAL REFLUX DISEASE WITH ESOPHAGITIS WITHOUT HEMORRHAGE: ICD-10-CM

## 2021-04-09 DIAGNOSIS — E72.12 METHYLENETETRAHYDROFOLATE REDUCTASE DEFICIENCY: ICD-10-CM

## 2021-04-09 DIAGNOSIS — N18.31 STAGE 3A CHRONIC KIDNEY DISEASE: ICD-10-CM

## 2021-04-09 DIAGNOSIS — R30.0 BURNING WITH URINATION: ICD-10-CM

## 2021-04-09 DIAGNOSIS — Z00.00 ENCOUNTER FOR PREVENTIVE HEALTH EXAMINATION: Primary | ICD-10-CM

## 2021-04-09 DIAGNOSIS — F33.1 MODERATE EPISODE OF RECURRENT MAJOR DEPRESSIVE DISORDER: ICD-10-CM

## 2021-04-09 DIAGNOSIS — E03.9 ACQUIRED HYPOTHYROIDISM: ICD-10-CM

## 2021-04-09 DIAGNOSIS — I10 ESSENTIAL HYPERTENSION: ICD-10-CM

## 2021-04-09 DIAGNOSIS — D84.9 IMMUNOCOMPROMISED: ICD-10-CM

## 2021-04-09 DIAGNOSIS — T50.905A DRUG-INDUCED LUPUS ERYTHEMATOSUS: ICD-10-CM

## 2021-04-09 DIAGNOSIS — L93.2 DRUG-INDUCED LUPUS ERYTHEMATOSUS: ICD-10-CM

## 2021-04-09 PROCEDURE — 99215 OFFICE O/P EST HI 40 MIN: CPT | Mod: PBBFAC,PO | Performed by: NURSE PRACTITIONER

## 2021-04-09 PROCEDURE — 99999 PR PBB SHADOW E&M-EST. PATIENT-LVL V: CPT | Mod: PBBFAC,,, | Performed by: NURSE PRACTITIONER

## 2021-04-09 PROCEDURE — 81001 URINALYSIS AUTO W/SCOPE: CPT | Performed by: NURSE PRACTITIONER

## 2021-04-09 PROCEDURE — G0439 PR MEDICARE ANNUAL WELLNESS SUBSEQUENT VISIT: ICD-10-PCS | Mod: ,,, | Performed by: NURSE PRACTITIONER

## 2021-04-09 PROCEDURE — G0439 PPPS, SUBSEQ VISIT: HCPCS | Mod: ,,, | Performed by: NURSE PRACTITIONER

## 2021-04-09 PROCEDURE — 99999 PR PBB SHADOW E&M-EST. PATIENT-LVL V: ICD-10-PCS | Mod: PBBFAC,,, | Performed by: NURSE PRACTITIONER

## 2021-04-10 ENCOUNTER — TELEPHONE (OUTPATIENT)
Dept: INTERNAL MEDICINE | Facility: CLINIC | Age: 71
End: 2021-04-10

## 2021-04-10 ENCOUNTER — NURSE TRIAGE (OUTPATIENT)
Dept: ADMINISTRATIVE | Facility: CLINIC | Age: 71
End: 2021-04-10

## 2021-04-10 DIAGNOSIS — N30.00 ACUTE CYSTITIS WITHOUT HEMATURIA: Primary | ICD-10-CM

## 2021-04-10 LAB
BACTERIA #/AREA URNS AUTO: ABNORMAL /HPF
BILIRUB UR QL STRIP: NEGATIVE
CLARITY UR REFRACT.AUTO: CLEAR
COLOR UR AUTO: ABNORMAL
GLUCOSE UR QL STRIP: NEGATIVE
HGB UR QL STRIP: ABNORMAL
KETONES UR QL STRIP: NEGATIVE
LEUKOCYTE ESTERASE UR QL STRIP: ABNORMAL
MICROSCOPIC COMMENT: ABNORMAL
NITRITE UR QL STRIP: POSITIVE
PH UR STRIP: 7 [PH] (ref 5–8)
PROT UR QL STRIP: NEGATIVE
RBC #/AREA URNS AUTO: 1 /HPF (ref 0–4)
SP GR UR STRIP: 1.01 (ref 1–1.03)
URN SPEC COLLECT METH UR: ABNORMAL
WBC #/AREA URNS AUTO: 10 /HPF (ref 0–5)

## 2021-04-10 RX ORDER — NITROFURANTOIN 25; 75 MG/1; MG/1
100 CAPSULE ORAL 2 TIMES DAILY
Qty: 10 CAPSULE | Refills: 0 | Status: SHIPPED | OUTPATIENT
Start: 2021-04-10 | End: 2021-04-15

## 2021-04-15 ENCOUNTER — PATIENT MESSAGE (OUTPATIENT)
Dept: RHEUMATOLOGY | Facility: CLINIC | Age: 71
End: 2021-04-15

## 2021-04-28 ENCOUNTER — PATIENT MESSAGE (OUTPATIENT)
Dept: RHEUMATOLOGY | Facility: CLINIC | Age: 71
End: 2021-04-28

## 2021-04-28 DIAGNOSIS — M35.9 XEROSTOMIA DUE TO AUTOIMMUNE DISEASE: ICD-10-CM

## 2021-04-28 DIAGNOSIS — M05.79 RHEUMATOID ARTHRITIS INVOLVING MULTIPLE SITES WITH POSITIVE RHEUMATOID FACTOR: ICD-10-CM

## 2021-04-28 DIAGNOSIS — M35.01 SJOGREN'S SYNDROME WITH KERATOCONJUNCTIVITIS SICCA: ICD-10-CM

## 2021-04-28 DIAGNOSIS — K11.7 XEROSTOMIA DUE TO AUTOIMMUNE DISEASE: ICD-10-CM

## 2021-04-28 RX ORDER — HYDROXYCHLOROQUINE SULFATE 200 MG/1
200 TABLET, FILM COATED ORAL 2 TIMES DAILY
Qty: 180 TABLET | Refills: 1 | Status: CANCELLED | OUTPATIENT
Start: 2021-04-28

## 2021-05-03 ENCOUNTER — OFFICE VISIT (OUTPATIENT)
Dept: RHEUMATOLOGY | Facility: CLINIC | Age: 71
End: 2021-05-03
Payer: MEDICARE

## 2021-05-03 ENCOUNTER — LAB VISIT (OUTPATIENT)
Dept: LAB | Facility: HOSPITAL | Age: 71
End: 2021-05-03
Attending: PHYSICIAN ASSISTANT
Payer: MEDICARE

## 2021-05-03 VITALS
HEIGHT: 66 IN | WEIGHT: 146.63 LBS | HEART RATE: 81 BPM | DIASTOLIC BLOOD PRESSURE: 75 MMHG | SYSTOLIC BLOOD PRESSURE: 149 MMHG | BODY MASS INDEX: 23.57 KG/M2

## 2021-05-03 DIAGNOSIS — Z79.899 HIGH RISK MEDICATION USE: ICD-10-CM

## 2021-05-03 DIAGNOSIS — D84.9 IMMUNOCOMPROMISED: ICD-10-CM

## 2021-05-03 DIAGNOSIS — Z11.59 NEED FOR HEPATITIS B SCREENING TEST: ICD-10-CM

## 2021-05-03 DIAGNOSIS — Z11.1 SCREENING-PULMONARY TB: ICD-10-CM

## 2021-05-03 DIAGNOSIS — M06.9 RHEUMATOID ARTHRITIS FLARE: ICD-10-CM

## 2021-05-03 DIAGNOSIS — M05.79 RHEUMATOID ARTHRITIS INVOLVING MULTIPLE SITES WITH POSITIVE RHEUMATOID FACTOR: Primary | ICD-10-CM

## 2021-05-03 DIAGNOSIS — M35.00 SJOGREN'S SYNDROME, WITH UNSPECIFIED ORGAN INVOLVEMENT: ICD-10-CM

## 2021-05-03 DIAGNOSIS — M18.12 PRIMARY OSTEOARTHRITIS OF FIRST CARPOMETACARPAL JOINT OF LEFT HAND: ICD-10-CM

## 2021-05-03 DIAGNOSIS — M85.80 OSTEOPENIA WITH HIGH RISK OF FRACTURE: ICD-10-CM

## 2021-05-03 DIAGNOSIS — M25.532 WRIST PAIN, ACUTE, LEFT: ICD-10-CM

## 2021-05-03 PROCEDURE — 99999 PR PBB SHADOW E&M-EST. PATIENT-LVL IV: CPT | Mod: PBBFAC,,, | Performed by: PHYSICIAN ASSISTANT

## 2021-05-03 PROCEDURE — 20605 PR DRAIN/INJECT INTERMEDIATE JOINT/BURSA: ICD-10-PCS | Mod: S$PBB,LT,, | Performed by: PHYSICIAN ASSISTANT

## 2021-05-03 PROCEDURE — 86706 HEP B SURFACE ANTIBODY: CPT | Performed by: PHYSICIAN ASSISTANT

## 2021-05-03 PROCEDURE — 99214 OFFICE O/P EST MOD 30 MIN: CPT | Mod: PBBFAC | Performed by: PHYSICIAN ASSISTANT

## 2021-05-03 PROCEDURE — 96372 THER/PROPH/DIAG INJ SC/IM: CPT | Mod: PBBFAC,59

## 2021-05-03 PROCEDURE — 87340 HEPATITIS B SURFACE AG IA: CPT | Performed by: PHYSICIAN ASSISTANT

## 2021-05-03 PROCEDURE — 20605 DRAIN/INJ JOINT/BURSA W/O US: CPT | Mod: S$PBB,LT,, | Performed by: PHYSICIAN ASSISTANT

## 2021-05-03 PROCEDURE — 99214 PR OFFICE/OUTPT VISIT, EST, LEVL IV, 30-39 MIN: ICD-10-PCS | Mod: S$PBB,25,, | Performed by: PHYSICIAN ASSISTANT

## 2021-05-03 PROCEDURE — 86704 HEP B CORE ANTIBODY TOTAL: CPT | Performed by: PHYSICIAN ASSISTANT

## 2021-05-03 PROCEDURE — 99214 OFFICE O/P EST MOD 30 MIN: CPT | Mod: S$PBB,25,, | Performed by: PHYSICIAN ASSISTANT

## 2021-05-03 PROCEDURE — 86480 TB TEST CELL IMMUN MEASURE: CPT | Performed by: PHYSICIAN ASSISTANT

## 2021-05-03 PROCEDURE — 99999 PR PBB SHADOW E&M-EST. PATIENT-LVL IV: ICD-10-PCS | Mod: PBBFAC,,, | Performed by: PHYSICIAN ASSISTANT

## 2021-05-03 PROCEDURE — 36415 COLL VENOUS BLD VENIPUNCTURE: CPT | Performed by: PHYSICIAN ASSISTANT

## 2021-05-03 RX ORDER — TRIAMCINOLONE ACETONIDE 40 MG/ML
8 INJECTION, SUSPENSION INTRA-ARTICULAR; INTRAMUSCULAR
Status: COMPLETED | OUTPATIENT
Start: 2021-05-03 | End: 2021-05-03

## 2021-05-03 RX ORDER — DIPHENHYDRAMINE HYDROCHLORIDE 50 MG/ML
12.5 INJECTION INTRAMUSCULAR; INTRAVENOUS ONCE AS NEEDED
Status: CANCELLED | OUTPATIENT
Start: 2021-05-03

## 2021-05-03 RX ORDER — TRIAMCINOLONE ACETONIDE 40 MG/ML
30 INJECTION, SUSPENSION INTRA-ARTICULAR; INTRAMUSCULAR
Status: DISCONTINUED | OUTPATIENT
Start: 2021-05-03 | End: 2021-05-03

## 2021-05-03 RX ORDER — ACETAMINOPHEN 325 MG/1
650 TABLET ORAL ONCE AS NEEDED
Status: CANCELLED | OUTPATIENT
Start: 2021-05-03

## 2021-05-03 RX ORDER — TRIAMCINOLONE ACETONIDE 40 MG/ML
32 INJECTION, SUSPENSION INTRA-ARTICULAR; INTRAMUSCULAR
Status: COMPLETED | OUTPATIENT
Start: 2021-05-03 | End: 2021-05-03

## 2021-05-03 RX ADMIN — TRIAMCINOLONE ACETONIDE 32 MG: 40 INJECTION, SUSPENSION INTRA-ARTICULAR; INTRAMUSCULAR at 04:05

## 2021-05-03 RX ADMIN — TRIAMCINOLONE ACETONIDE 8 MG: 40 INJECTION, SUSPENSION INTRA-ARTICULAR; INTRAMUSCULAR at 03:05

## 2021-05-03 ASSESSMENT — ROUTINE ASSESSMENT OF PATIENT INDEX DATA (RAPID3): MDHAQ FUNCTION SCORE: 0.3

## 2021-05-04 ENCOUNTER — TELEPHONE (OUTPATIENT)
Dept: RHEUMATOLOGY | Facility: CLINIC | Age: 71
End: 2021-05-04

## 2021-05-05 ENCOUNTER — TELEPHONE (OUTPATIENT)
Dept: RHEUMATOLOGY | Facility: CLINIC | Age: 71
End: 2021-05-05

## 2021-05-05 LAB
GAMMA INTERFERON BACKGROUND BLD IA-ACNC: 0.14 IU/ML
M TB IFN-G CD4+ BCKGRND COR BLD-ACNC: -0.04 IU/ML
MITOGEN IGNF BCKGRD COR BLD-ACNC: 7.2 IU/ML
TB GOLD PLUS: NEGATIVE
TB2 - NIL: -0.08 IU/ML

## 2021-05-12 ENCOUNTER — PATIENT MESSAGE (OUTPATIENT)
Dept: FAMILY MEDICINE | Facility: CLINIC | Age: 71
End: 2021-05-12

## 2021-05-13 ENCOUNTER — OFFICE VISIT (OUTPATIENT)
Dept: PSYCHIATRY | Facility: CLINIC | Age: 71
End: 2021-05-13
Payer: MEDICARE

## 2021-05-13 DIAGNOSIS — G47.00 INSOMNIA, UNSPECIFIED TYPE: Primary | ICD-10-CM

## 2021-05-13 DIAGNOSIS — F41.8 MIXED ANXIETY AND DEPRESSIVE DISORDER: ICD-10-CM

## 2021-05-13 PROCEDURE — 99499 UNLISTED E&M SERVICE: CPT | Mod: 95,,, | Performed by: PSYCHIATRY & NEUROLOGY

## 2021-05-13 PROCEDURE — 99499 NO LOS: ICD-10-PCS | Mod: 95,,, | Performed by: PSYCHIATRY & NEUROLOGY

## 2021-05-15 RX ORDER — TRAZODONE HYDROCHLORIDE 50 MG/1
TABLET ORAL
Qty: 135 TABLET | Refills: 1 | Status: SHIPPED | OUTPATIENT
Start: 2021-05-15 | End: 2021-11-17

## 2021-05-15 RX ORDER — DULOXETIN HYDROCHLORIDE 60 MG/1
60 CAPSULE, DELAYED RELEASE ORAL DAILY
Qty: 90 CAPSULE | Refills: 1 | Status: SHIPPED | OUTPATIENT
Start: 2021-05-15 | End: 2021-12-26

## 2021-05-15 RX ORDER — LORAZEPAM 1 MG/1
1 TABLET ORAL 2 TIMES DAILY PRN
Qty: 60 TABLET | Refills: 1 | Status: SHIPPED | OUTPATIENT
Start: 2021-05-15 | End: 2021-10-18

## 2021-05-15 RX ORDER — BUPROPION HYDROCHLORIDE 300 MG/1
300 TABLET ORAL DAILY
Qty: 90 TABLET | Refills: 1 | Status: SHIPPED | OUTPATIENT
Start: 2021-05-15 | End: 2022-01-21

## 2021-05-19 ENCOUNTER — TELEPHONE (OUTPATIENT)
Dept: INFUSION THERAPY | Facility: HOSPITAL | Age: 71
End: 2021-05-19

## 2021-05-26 ENCOUNTER — TELEPHONE (OUTPATIENT)
Dept: INTERNAL MEDICINE | Facility: CLINIC | Age: 71
End: 2021-05-26

## 2021-05-26 ENCOUNTER — PATIENT MESSAGE (OUTPATIENT)
Dept: RHEUMATOLOGY | Facility: CLINIC | Age: 71
End: 2021-05-26

## 2021-05-26 ENCOUNTER — TELEPHONE (OUTPATIENT)
Dept: GASTROENTEROLOGY | Facility: CLINIC | Age: 71
End: 2021-05-26

## 2021-05-27 ENCOUNTER — OFFICE VISIT (OUTPATIENT)
Dept: RHEUMATOLOGY | Facility: CLINIC | Age: 71
End: 2021-05-27
Payer: MEDICARE

## 2021-05-27 ENCOUNTER — INFUSION (OUTPATIENT)
Dept: INFUSION THERAPY | Facility: HOSPITAL | Age: 71
End: 2021-05-27
Attending: PHYSICIAN ASSISTANT
Payer: MEDICARE

## 2021-05-27 VITALS
BODY MASS INDEX: 23.53 KG/M2 | HEIGHT: 66 IN | RESPIRATION RATE: 18 BRPM | DIASTOLIC BLOOD PRESSURE: 68 MMHG | OXYGEN SATURATION: 98 % | SYSTOLIC BLOOD PRESSURE: 120 MMHG | WEIGHT: 146.38 LBS | HEART RATE: 79 BPM | TEMPERATURE: 98 F

## 2021-05-27 VITALS
HEIGHT: 66 IN | BODY MASS INDEX: 23.53 KG/M2 | HEART RATE: 86 BPM | SYSTOLIC BLOOD PRESSURE: 150 MMHG | WEIGHT: 146.38 LBS | DIASTOLIC BLOOD PRESSURE: 81 MMHG

## 2021-05-27 DIAGNOSIS — M05.79 RHEUMATOID ARTHRITIS INVOLVING MULTIPLE SITES WITH POSITIVE RHEUMATOID FACTOR: Primary | ICD-10-CM

## 2021-05-27 DIAGNOSIS — Z46.89 ENCOUNTER FOR REMOVAL OF BILIARY STENT: Primary | ICD-10-CM

## 2021-05-27 DIAGNOSIS — M06.9 FLARE OF RHEUMATOID ARTHRITIS: ICD-10-CM

## 2021-05-27 PROCEDURE — 99215 OFFICE O/P EST HI 40 MIN: CPT | Mod: S$PBB,,, | Performed by: INTERNAL MEDICINE

## 2021-05-27 PROCEDURE — 63600175 PHARM REV CODE 636 W HCPCS: Mod: JG | Performed by: PHYSICIAN ASSISTANT

## 2021-05-27 PROCEDURE — 99999 PR PBB SHADOW E&M-EST. PATIENT-LVL V: ICD-10-PCS | Mod: PBBFAC,,, | Performed by: INTERNAL MEDICINE

## 2021-05-27 PROCEDURE — 99215 OFFICE O/P EST HI 40 MIN: CPT | Mod: PBBFAC,25 | Performed by: INTERNAL MEDICINE

## 2021-05-27 PROCEDURE — 99215 PR OFFICE/OUTPT VISIT, EST, LEVL V, 40-54 MIN: ICD-10-PCS | Mod: S$PBB,,, | Performed by: INTERNAL MEDICINE

## 2021-05-27 PROCEDURE — 96372 THER/PROPH/DIAG INJ SC/IM: CPT | Mod: PBBFAC

## 2021-05-27 PROCEDURE — 96372 THER/PROPH/DIAG INJ SC/IM: CPT

## 2021-05-27 PROCEDURE — 99999 PR PBB SHADOW E&M-EST. PATIENT-LVL V: CPT | Mod: PBBFAC,,, | Performed by: INTERNAL MEDICINE

## 2021-05-27 RX ORDER — ACETAMINOPHEN 325 MG/1
650 TABLET ORAL
Status: CANCELLED | OUTPATIENT
Start: 2021-05-27

## 2021-05-27 RX ORDER — SODIUM CHLORIDE 0.9 % (FLUSH) 0.9 %
10 SYRINGE (ML) INJECTION
Status: CANCELLED | OUTPATIENT
Start: 2021-05-27

## 2021-05-27 RX ORDER — DIPHENHYDRAMINE HYDROCHLORIDE 50 MG/ML
12.5 INJECTION INTRAMUSCULAR; INTRAVENOUS ONCE AS NEEDED
Status: CANCELLED | OUTPATIENT
Start: 2021-06-24

## 2021-05-27 RX ORDER — ZOLEDRONIC ACID 5 MG/100ML
5 INJECTION, SOLUTION INTRAVENOUS
Status: CANCELLED | OUTPATIENT
Start: 2021-05-27

## 2021-05-27 RX ORDER — PREDNISONE 10 MG/1
TABLET ORAL
Qty: 20 TABLET | Refills: 0 | Status: SHIPPED | OUTPATIENT
Start: 2021-05-27 | End: 2021-06-16

## 2021-05-27 RX ORDER — ACETAMINOPHEN 325 MG/1
650 TABLET ORAL ONCE AS NEEDED
Status: CANCELLED | OUTPATIENT
Start: 2021-06-24

## 2021-05-27 RX ORDER — HEPARIN 100 UNIT/ML
500 SYRINGE INTRAVENOUS
Status: CANCELLED | OUTPATIENT
Start: 2021-05-27

## 2021-05-27 RX ORDER — CERTOLIZUMAB PEGOL 200 MG/ML
400 INJECTION, SOLUTION SUBCUTANEOUS
Status: COMPLETED | OUTPATIENT
Start: 2021-05-27 | End: 2021-05-27

## 2021-05-27 RX ORDER — BETAMETHASONE SODIUM PHOSPHATE AND BETAMETHASONE ACETATE 3; 3 MG/ML; MG/ML
6 INJECTION, SUSPENSION INTRA-ARTICULAR; INTRALESIONAL; INTRAMUSCULAR; SOFT TISSUE
Status: COMPLETED | OUTPATIENT
Start: 2021-05-27 | End: 2021-05-27

## 2021-05-27 RX ADMIN — CERTOLIZUMAB PEGOL 400 MG: 200 INJECTION, SOLUTION SUBCUTANEOUS at 11:05

## 2021-05-27 RX ADMIN — BETAMETHASONE ACETATE AND BETAMETHASONE SODIUM PHOSPHATE 6 MG: 3; 3 INJECTION, SUSPENSION INTRA-ARTICULAR; INTRALESIONAL; INTRAMUSCULAR; SOFT TISSUE at 03:05

## 2021-05-28 ENCOUNTER — PATIENT MESSAGE (OUTPATIENT)
Dept: GASTROENTEROLOGY | Facility: CLINIC | Age: 71
End: 2021-05-28

## 2021-05-31 ENCOUNTER — PATIENT MESSAGE (OUTPATIENT)
Dept: ENDOSCOPY | Facility: HOSPITAL | Age: 71
End: 2021-05-31

## 2021-05-31 ENCOUNTER — TELEPHONE (OUTPATIENT)
Dept: GASTROENTEROLOGY | Facility: CLINIC | Age: 71
End: 2021-05-31

## 2021-05-31 ENCOUNTER — TELEPHONE (OUTPATIENT)
Dept: ENDOSCOPY | Facility: HOSPITAL | Age: 71
End: 2021-05-31

## 2021-06-01 ENCOUNTER — TELEPHONE (OUTPATIENT)
Dept: GASTROENTEROLOGY | Facility: CLINIC | Age: 71
End: 2021-06-01

## 2021-06-03 ENCOUNTER — PATIENT MESSAGE (OUTPATIENT)
Dept: RHEUMATOLOGY | Facility: CLINIC | Age: 71
End: 2021-06-03

## 2021-06-03 ENCOUNTER — TELEPHONE (OUTPATIENT)
Dept: RHEUMATOLOGY | Facility: CLINIC | Age: 71
End: 2021-06-03

## 2021-06-08 ENCOUNTER — OFFICE VISIT (OUTPATIENT)
Dept: FAMILY MEDICINE | Facility: CLINIC | Age: 71
End: 2021-06-08
Payer: MEDICARE

## 2021-06-08 ENCOUNTER — LAB VISIT (OUTPATIENT)
Dept: LAB | Facility: HOSPITAL | Age: 71
End: 2021-06-08
Payer: MEDICARE

## 2021-06-08 VITALS
OXYGEN SATURATION: 96 % | WEIGHT: 140.31 LBS | HEART RATE: 96 BPM | SYSTOLIC BLOOD PRESSURE: 126 MMHG | HEIGHT: 66 IN | DIASTOLIC BLOOD PRESSURE: 70 MMHG | BODY MASS INDEX: 22.55 KG/M2 | TEMPERATURE: 98 F

## 2021-06-08 DIAGNOSIS — M85.80 OSTEOPENIA WITH HIGH RISK OF FRACTURE: ICD-10-CM

## 2021-06-08 DIAGNOSIS — Z13.6 SCREENING FOR CARDIOVASCULAR CONDITION: ICD-10-CM

## 2021-06-08 DIAGNOSIS — E55.9 VITAMIN D DEFICIENCY DISEASE: ICD-10-CM

## 2021-06-08 DIAGNOSIS — Z00.00 ENCOUNTER FOR PREVENTIVE HEALTH EXAMINATION: ICD-10-CM

## 2021-06-08 DIAGNOSIS — Z12.31 ENCOUNTER FOR SCREENING MAMMOGRAM FOR MALIGNANT NEOPLASM OF BREAST: ICD-10-CM

## 2021-06-08 DIAGNOSIS — E03.9 ACQUIRED HYPOTHYROIDISM: ICD-10-CM

## 2021-06-08 DIAGNOSIS — F41.8 ANXIETY ASSOCIATED WITH DEPRESSION: ICD-10-CM

## 2021-06-08 DIAGNOSIS — D84.9 IMMUNOCOMPROMISED: ICD-10-CM

## 2021-06-08 DIAGNOSIS — Z12.11 SCREEN FOR COLON CANCER: ICD-10-CM

## 2021-06-08 DIAGNOSIS — N18.31 STAGE 3A CHRONIC KIDNEY DISEASE: ICD-10-CM

## 2021-06-08 DIAGNOSIS — K21.00 GASTROESOPHAGEAL REFLUX DISEASE WITH ESOPHAGITIS WITHOUT HEMORRHAGE: ICD-10-CM

## 2021-06-08 DIAGNOSIS — M05.79 RHEUMATOID ARTHRITIS INVOLVING MULTIPLE SITES WITH POSITIVE RHEUMATOID FACTOR: ICD-10-CM

## 2021-06-08 DIAGNOSIS — I10 ESSENTIAL HYPERTENSION: Primary | ICD-10-CM

## 2021-06-08 PROCEDURE — 80061 LIPID PANEL: CPT | Performed by: INTERNAL MEDICINE

## 2021-06-08 PROCEDURE — 99214 PR OFFICE/OUTPT VISIT, EST, LEVL IV, 30-39 MIN: ICD-10-PCS | Mod: 25,S$PBB,, | Performed by: INTERNAL MEDICINE

## 2021-06-08 PROCEDURE — 90471 IMMUNIZATION ADMIN: CPT | Mod: PBBFAC,PO

## 2021-06-08 PROCEDURE — 84443 ASSAY THYROID STIM HORMONE: CPT | Performed by: INTERNAL MEDICINE

## 2021-06-08 PROCEDURE — 82306 VITAMIN D 25 HYDROXY: CPT | Performed by: INTERNAL MEDICINE

## 2021-06-08 PROCEDURE — 99999 PR PBB SHADOW E&M-EST. PATIENT-LVL V: ICD-10-PCS | Mod: PBBFAC,,, | Performed by: INTERNAL MEDICINE

## 2021-06-08 PROCEDURE — 36415 COLL VENOUS BLD VENIPUNCTURE: CPT | Mod: PO | Performed by: INTERNAL MEDICINE

## 2021-06-08 PROCEDURE — 99999 PR PBB SHADOW E&M-EST. PATIENT-LVL V: CPT | Mod: PBBFAC,,, | Performed by: INTERNAL MEDICINE

## 2021-06-08 PROCEDURE — 99214 OFFICE O/P EST MOD 30 MIN: CPT | Mod: 25,S$PBB,, | Performed by: INTERNAL MEDICINE

## 2021-06-08 PROCEDURE — 99215 OFFICE O/P EST HI 40 MIN: CPT | Mod: PBBFAC,PO | Performed by: INTERNAL MEDICINE

## 2021-06-08 PROCEDURE — 90750 HZV VACC RECOMBINANT IM: CPT | Mod: PBBFAC,PO

## 2021-06-09 ENCOUNTER — PATIENT MESSAGE (OUTPATIENT)
Dept: ENDOSCOPY | Facility: HOSPITAL | Age: 71
End: 2021-06-09

## 2021-06-09 ENCOUNTER — PATIENT MESSAGE (OUTPATIENT)
Dept: FAMILY MEDICINE | Facility: CLINIC | Age: 71
End: 2021-06-09

## 2021-06-09 LAB
25(OH)D3+25(OH)D2 SERPL-MCNC: 62 NG/ML (ref 30–96)
CHOLEST SERPL-MCNC: 192 MG/DL (ref 120–199)
CHOLEST/HDLC SERPL: 2.2 {RATIO} (ref 2–5)
HDLC SERPL-MCNC: 86 MG/DL (ref 40–75)
HDLC SERPL: 44.8 % (ref 20–50)
LDLC SERPL CALC-MCNC: 61.6 MG/DL (ref 63–159)
NONHDLC SERPL-MCNC: 106 MG/DL
TRIGL SERPL-MCNC: 222 MG/DL (ref 30–150)
TSH SERPL DL<=0.005 MIU/L-ACNC: 0.78 UIU/ML (ref 0.4–4)

## 2021-06-21 ENCOUNTER — HOSPITAL ENCOUNTER (OUTPATIENT)
Facility: HOSPITAL | Age: 71
Discharge: STILL ADMITTED OR ADMINISTRATIVE | End: 2021-06-21
Attending: INTERNAL MEDICINE | Admitting: INTERNAL MEDICINE
Payer: MEDICARE

## 2021-06-21 ENCOUNTER — ANESTHESIA (OUTPATIENT)
Dept: ENDOSCOPY | Facility: HOSPITAL | Age: 71
End: 2021-06-21
Payer: MEDICARE

## 2021-06-21 ENCOUNTER — ANESTHESIA EVENT (OUTPATIENT)
Dept: ENDOSCOPY | Facility: HOSPITAL | Age: 71
End: 2021-06-21
Payer: MEDICARE

## 2021-06-21 DIAGNOSIS — Z46.89 ENCOUNTER FOR REMOVAL OF BILIARY STENT: Primary | ICD-10-CM

## 2021-06-21 PROCEDURE — 43275 ERCP REMOVE FORGN BODY DUCT: CPT | Mod: ,,, | Performed by: INTERNAL MEDICINE

## 2021-06-21 PROCEDURE — 25000003 PHARM REV CODE 250: Performed by: STUDENT IN AN ORGANIZED HEALTH CARE EDUCATION/TRAINING PROGRAM

## 2021-06-21 PROCEDURE — 43264 PR ERCP,W/REMOVAL STONE,BIL/PANCR DUCTS: ICD-10-PCS | Mod: 51,,, | Performed by: INTERNAL MEDICINE

## 2021-06-21 PROCEDURE — 37000008 HC ANESTHESIA 1ST 15 MINUTES: Performed by: INTERNAL MEDICINE

## 2021-06-21 PROCEDURE — 74328 PR  X-RAY FOR BILE DUCT ENDOSCOPY: ICD-10-PCS | Mod: 26,,, | Performed by: INTERNAL MEDICINE

## 2021-06-21 PROCEDURE — 25500020 PHARM REV CODE 255: Performed by: INTERNAL MEDICINE

## 2021-06-21 PROCEDURE — 74328 X-RAY BILE DUCT ENDOSCOPY: CPT | Performed by: INTERNAL MEDICINE

## 2021-06-21 PROCEDURE — 74328 X-RAY BILE DUCT ENDOSCOPY: CPT | Mod: 26,,, | Performed by: INTERNAL MEDICINE

## 2021-06-21 PROCEDURE — 27201014 HC GRASPER DEVICE: Performed by: INTERNAL MEDICINE

## 2021-06-21 PROCEDURE — 43264 ERCP REMOVE DUCT CALCULI: CPT | Performed by: INTERNAL MEDICINE

## 2021-06-21 PROCEDURE — 43275 PR ERCP W/REMOVAL FOREIGN BODY/STENT FROM BILIARY/PANCREATIC DUCT: ICD-10-PCS | Mod: ,,, | Performed by: INTERNAL MEDICINE

## 2021-06-21 PROCEDURE — 27202125 HC BALLOON, EXTRACTION (ANY): Performed by: INTERNAL MEDICINE

## 2021-06-21 PROCEDURE — 43264 ERCP REMOVE DUCT CALCULI: CPT | Mod: 51,,, | Performed by: INTERNAL MEDICINE

## 2021-06-21 PROCEDURE — C1769 GUIDE WIRE: HCPCS | Performed by: INTERNAL MEDICINE

## 2021-06-21 PROCEDURE — 43275 ERCP REMOVE FORGN BODY DUCT: CPT | Performed by: INTERNAL MEDICINE

## 2021-06-21 PROCEDURE — 63600175 PHARM REV CODE 636 W HCPCS: Performed by: STUDENT IN AN ORGANIZED HEALTH CARE EDUCATION/TRAINING PROGRAM

## 2021-06-21 RX ORDER — PROPOFOL 10 MG/ML
VIAL (ML) INTRAVENOUS
Status: DISCONTINUED | OUTPATIENT
Start: 2021-06-21 | End: 2021-06-21

## 2021-06-21 RX ORDER — SODIUM CHLORIDE, SODIUM LACTATE, POTASSIUM CHLORIDE, CALCIUM CHLORIDE 600; 310; 30; 20 MG/100ML; MG/100ML; MG/100ML; MG/100ML
INJECTION, SOLUTION INTRAVENOUS CONTINUOUS
Status: DISCONTINUED | OUTPATIENT
Start: 2021-06-21 | End: 2021-06-21 | Stop reason: HOSPADM

## 2021-06-21 RX ORDER — LIDOCAINE HYDROCHLORIDE 10 MG/ML
INJECTION, SOLUTION EPIDURAL; INFILTRATION; INTRACAUDAL; PERINEURAL
Status: DISCONTINUED | OUTPATIENT
Start: 2021-06-21 | End: 2021-06-21

## 2021-06-21 RX ORDER — SODIUM CHLORIDE, SODIUM LACTATE, POTASSIUM CHLORIDE, CALCIUM CHLORIDE 600; 310; 30; 20 MG/100ML; MG/100ML; MG/100ML; MG/100ML
INJECTION, SOLUTION INTRAVENOUS CONTINUOUS PRN
Status: DISCONTINUED | OUTPATIENT
Start: 2021-06-21 | End: 2021-06-21

## 2021-06-21 RX ADMIN — PROPOFOL 50 MG: 10 INJECTION, EMULSION INTRAVENOUS at 07:06

## 2021-06-21 RX ADMIN — LIDOCAINE HYDROCHLORIDE 50 MG: 10 INJECTION, SOLUTION EPIDURAL; INFILTRATION; INTRACAUDAL; PERINEURAL at 07:06

## 2021-06-21 RX ADMIN — PROPOFOL 100 MG: 10 INJECTION, EMULSION INTRAVENOUS at 07:06

## 2021-06-21 RX ADMIN — SODIUM CHLORIDE, SODIUM LACTATE, POTASSIUM CHLORIDE, AND CALCIUM CHLORIDE: 600; 310; 30; 20 INJECTION, SOLUTION INTRAVENOUS at 06:06

## 2021-06-21 RX ADMIN — PROPOFOL 30 MG: 10 INJECTION, EMULSION INTRAVENOUS at 07:06

## 2021-06-21 RX ADMIN — PROPOFOL 20 MG: 10 INJECTION, EMULSION INTRAVENOUS at 07:06

## 2021-06-21 RX ADMIN — IOHEXOL 10 ML: 300 INJECTION, SOLUTION INTRAVENOUS at 07:06

## 2021-06-22 ENCOUNTER — PATIENT MESSAGE (OUTPATIENT)
Dept: FAMILY MEDICINE | Facility: CLINIC | Age: 71
End: 2021-06-22

## 2021-06-23 ENCOUNTER — TELEPHONE (OUTPATIENT)
Dept: GASTROENTEROLOGY | Facility: CLINIC | Age: 71
End: 2021-06-23

## 2021-06-23 ENCOUNTER — PATIENT MESSAGE (OUTPATIENT)
Dept: GASTROENTEROLOGY | Facility: CLINIC | Age: 71
End: 2021-06-23

## 2021-06-23 ENCOUNTER — INFUSION (OUTPATIENT)
Dept: INFUSION THERAPY | Facility: HOSPITAL | Age: 71
End: 2021-06-23
Attending: NURSE PRACTITIONER
Payer: MEDICARE

## 2021-06-23 VITALS
RESPIRATION RATE: 16 BRPM | SYSTOLIC BLOOD PRESSURE: 141 MMHG | HEIGHT: 65 IN | WEIGHT: 144.19 LBS | DIASTOLIC BLOOD PRESSURE: 79 MMHG | OXYGEN SATURATION: 98 % | TEMPERATURE: 97 F | HEART RATE: 76 BPM | BODY MASS INDEX: 24.02 KG/M2

## 2021-06-23 VITALS
HEART RATE: 97 BPM | SYSTOLIC BLOOD PRESSURE: 145 MMHG | OXYGEN SATURATION: 98 % | RESPIRATION RATE: 18 BRPM | DIASTOLIC BLOOD PRESSURE: 75 MMHG | TEMPERATURE: 99 F

## 2021-06-23 DIAGNOSIS — M05.79 RHEUMATOID ARTHRITIS INVOLVING MULTIPLE SITES WITH POSITIVE RHEUMATOID FACTOR: Primary | ICD-10-CM

## 2021-06-23 PROCEDURE — 96372 THER/PROPH/DIAG INJ SC/IM: CPT | Mod: 59

## 2021-06-23 PROCEDURE — 63600175 PHARM REV CODE 636 W HCPCS: Mod: JG | Performed by: NURSE PRACTITIONER

## 2021-06-23 RX ORDER — DIPHENHYDRAMINE HYDROCHLORIDE 50 MG/ML
12.5 INJECTION INTRAMUSCULAR; INTRAVENOUS ONCE AS NEEDED
Status: CANCELLED | OUTPATIENT
Start: 2021-07-21

## 2021-06-23 RX ORDER — ACETAMINOPHEN 325 MG/1
650 TABLET ORAL ONCE AS NEEDED
Status: CANCELLED | OUTPATIENT
Start: 2021-07-21

## 2021-06-23 RX ORDER — CERTOLIZUMAB PEGOL 200 MG/ML
400 INJECTION, SOLUTION SUBCUTANEOUS
Status: COMPLETED | OUTPATIENT
Start: 2021-06-23 | End: 2021-06-23

## 2021-06-23 RX ADMIN — CERTOLIZUMAB PEGOL 400 MG: 200 INJECTION, SOLUTION SUBCUTANEOUS at 02:06

## 2021-06-24 ENCOUNTER — TELEPHONE (OUTPATIENT)
Dept: GASTROENTEROLOGY | Facility: CLINIC | Age: 71
End: 2021-06-24

## 2021-07-13 ENCOUNTER — OFFICE VISIT (OUTPATIENT)
Dept: URGENT CARE | Facility: CLINIC | Age: 71
End: 2021-07-13
Payer: MEDICARE

## 2021-07-13 VITALS
DIASTOLIC BLOOD PRESSURE: 75 MMHG | OXYGEN SATURATION: 98 % | TEMPERATURE: 98 F | RESPIRATION RATE: 16 BRPM | SYSTOLIC BLOOD PRESSURE: 158 MMHG | HEART RATE: 84 BPM

## 2021-07-13 DIAGNOSIS — L01.00 IMPETIGO: Primary | ICD-10-CM

## 2021-07-13 PROCEDURE — 99214 OFFICE O/P EST MOD 30 MIN: CPT | Mod: S$GLB,,, | Performed by: PHYSICIAN ASSISTANT

## 2021-07-13 PROCEDURE — 99214 PR OFFICE/OUTPT VISIT, EST, LEVL IV, 30-39 MIN: ICD-10-PCS | Mod: S$GLB,,, | Performed by: PHYSICIAN ASSISTANT

## 2021-07-13 RX ORDER — DOXYCYCLINE 100 MG/1
100 CAPSULE ORAL EVERY 12 HOURS
Qty: 20 CAPSULE | Refills: 0 | Status: SHIPPED | OUTPATIENT
Start: 2021-07-13 | End: 2021-07-23

## 2021-07-13 RX ORDER — MUPIROCIN 20 MG/G
OINTMENT TOPICAL 3 TIMES DAILY
Qty: 30 G | Refills: 0 | Status: SHIPPED | OUTPATIENT
Start: 2021-07-13 | End: 2022-08-29

## 2021-07-13 RX ORDER — CEPHALEXIN 500 MG/1
500 CAPSULE ORAL 4 TIMES DAILY
Qty: 28 CAPSULE | Refills: 0 | Status: SHIPPED | OUTPATIENT
Start: 2021-07-13 | End: 2021-07-19

## 2021-07-18 ENCOUNTER — OFFICE VISIT (OUTPATIENT)
Dept: URGENT CARE | Facility: CLINIC | Age: 71
End: 2021-07-18
Payer: MEDICARE

## 2021-07-18 VITALS
RESPIRATION RATE: 20 BRPM | BODY MASS INDEX: 24.16 KG/M2 | HEIGHT: 65 IN | OXYGEN SATURATION: 98 % | SYSTOLIC BLOOD PRESSURE: 132 MMHG | WEIGHT: 145 LBS | DIASTOLIC BLOOD PRESSURE: 81 MMHG | TEMPERATURE: 98 F | HEART RATE: 67 BPM

## 2021-07-18 DIAGNOSIS — R21 RASH: Primary | ICD-10-CM

## 2021-07-18 PROCEDURE — 99212 OFFICE O/P EST SF 10 MIN: CPT | Mod: S$GLB,,, | Performed by: EMERGENCY MEDICINE

## 2021-07-18 PROCEDURE — 99212 PR OFFICE/OUTPT VISIT, EST, LEVL II, 10-19 MIN: ICD-10-PCS | Mod: S$GLB,,, | Performed by: EMERGENCY MEDICINE

## 2021-07-18 RX ORDER — TRIAMCINOLONE ACETONIDE 0.25 MG/G
CREAM TOPICAL 2 TIMES DAILY
Qty: 15 G | Refills: 0 | Status: SHIPPED | OUTPATIENT
Start: 2021-07-18 | End: 2021-07-19

## 2021-07-19 ENCOUNTER — OFFICE VISIT (OUTPATIENT)
Dept: DERMATOLOGY | Facility: CLINIC | Age: 71
End: 2021-07-19
Payer: MEDICARE

## 2021-07-19 DIAGNOSIS — B02.9 HERPES ZOSTER WITHOUT COMPLICATION: Primary | ICD-10-CM

## 2021-07-19 PROCEDURE — 99213 OFFICE O/P EST LOW 20 MIN: CPT | Mod: PBBFAC,PO | Performed by: DERMATOLOGY

## 2021-07-19 PROCEDURE — 87070 CULTURE OTHR SPECIMN AEROBIC: CPT | Performed by: DERMATOLOGY

## 2021-07-19 PROCEDURE — 99203 PR OFFICE/OUTPT VISIT, NEW, LEVL III, 30-44 MIN: ICD-10-PCS | Mod: S$PBB,,, | Performed by: DERMATOLOGY

## 2021-07-19 PROCEDURE — 99203 OFFICE O/P NEW LOW 30 MIN: CPT | Mod: S$PBB,,, | Performed by: DERMATOLOGY

## 2021-07-19 PROCEDURE — 99999 PR PBB SHADOW E&M-EST. PATIENT-LVL III: ICD-10-PCS | Mod: PBBFAC,,, | Performed by: DERMATOLOGY

## 2021-07-19 PROCEDURE — 99999 PR PBB SHADOW E&M-EST. PATIENT-LVL III: CPT | Mod: PBBFAC,,, | Performed by: DERMATOLOGY

## 2021-07-19 RX ORDER — VALACYCLOVIR HYDROCHLORIDE 1 G/1
1000 TABLET, FILM COATED ORAL 3 TIMES DAILY
Qty: 21 TABLET | Refills: 0 | Status: SHIPPED | OUTPATIENT
Start: 2021-07-19 | End: 2021-07-22

## 2021-07-21 ENCOUNTER — TELEPHONE (OUTPATIENT)
Dept: URGENT CARE | Facility: CLINIC | Age: 71
End: 2021-07-21

## 2021-07-21 LAB
HSV1 DNA SPEC QL NAA+PROBE: NEGATIVE
HSV2 DNA SPEC QL NAA+PROBE: NEGATIVE
SPECIMEN SOURCE: NORMAL

## 2021-07-22 ENCOUNTER — INFUSION (OUTPATIENT)
Dept: INFUSION THERAPY | Facility: HOSPITAL | Age: 71
End: 2021-07-22
Attending: PHYSICIAN ASSISTANT
Payer: MEDICARE

## 2021-07-22 ENCOUNTER — OFFICE VISIT (OUTPATIENT)
Dept: RHEUMATOLOGY | Facility: CLINIC | Age: 71
End: 2021-07-22
Payer: MEDICARE

## 2021-07-22 VITALS
HEART RATE: 75 BPM | WEIGHT: 149.06 LBS | OXYGEN SATURATION: 97 % | HEIGHT: 65 IN | DIASTOLIC BLOOD PRESSURE: 76 MMHG | BODY MASS INDEX: 24.83 KG/M2 | SYSTOLIC BLOOD PRESSURE: 136 MMHG | RESPIRATION RATE: 18 BRPM | TEMPERATURE: 98 F

## 2021-07-22 VITALS
WEIGHT: 149.06 LBS | DIASTOLIC BLOOD PRESSURE: 70 MMHG | HEART RATE: 83 BPM | BODY MASS INDEX: 24.83 KG/M2 | SYSTOLIC BLOOD PRESSURE: 129 MMHG | HEIGHT: 65 IN

## 2021-07-22 DIAGNOSIS — M05.79 RHEUMATOID ARTHRITIS INVOLVING MULTIPLE SITES WITH POSITIVE RHEUMATOID FACTOR: Primary | ICD-10-CM

## 2021-07-22 DIAGNOSIS — M35.00 SJOGREN'S SYNDROME, WITH UNSPECIFIED ORGAN INVOLVEMENT: ICD-10-CM

## 2021-07-22 DIAGNOSIS — M18.12 PRIMARY OSTEOARTHRITIS OF FIRST CARPOMETACARPAL JOINT OF LEFT HAND: ICD-10-CM

## 2021-07-22 DIAGNOSIS — M85.80 OSTEOPENIA WITH HIGH RISK OF FRACTURE: ICD-10-CM

## 2021-07-22 PROCEDURE — 99214 OFFICE O/P EST MOD 30 MIN: CPT | Mod: S$PBB,,, | Performed by: PHYSICIAN ASSISTANT

## 2021-07-22 PROCEDURE — 99214 PR OFFICE/OUTPT VISIT, EST, LEVL IV, 30-39 MIN: ICD-10-PCS | Mod: S$PBB,,, | Performed by: PHYSICIAN ASSISTANT

## 2021-07-22 PROCEDURE — 63600175 PHARM REV CODE 636 W HCPCS: Mod: JG | Performed by: PHYSICIAN ASSISTANT

## 2021-07-22 PROCEDURE — 96372 THER/PROPH/DIAG INJ SC/IM: CPT

## 2021-07-22 PROCEDURE — 99999 PR PBB SHADOW E&M-EST. PATIENT-LVL III: ICD-10-PCS | Mod: PBBFAC,,, | Performed by: PHYSICIAN ASSISTANT

## 2021-07-22 PROCEDURE — 99999 PR PBB SHADOW E&M-EST. PATIENT-LVL III: CPT | Mod: PBBFAC,,, | Performed by: PHYSICIAN ASSISTANT

## 2021-07-22 PROCEDURE — 99213 OFFICE O/P EST LOW 20 MIN: CPT | Mod: PBBFAC | Performed by: PHYSICIAN ASSISTANT

## 2021-07-22 RX ORDER — PREDNISONE 20 MG/1
20 TABLET ORAL DAILY
COMMUNITY
End: 2021-10-04

## 2021-07-22 RX ORDER — DIPHENHYDRAMINE HYDROCHLORIDE 50 MG/ML
12.5 INJECTION INTRAMUSCULAR; INTRAVENOUS ONCE AS NEEDED
Status: CANCELLED | OUTPATIENT
Start: 2021-08-19

## 2021-07-22 RX ORDER — ACETAMINOPHEN 325 MG/1
650 TABLET ORAL ONCE AS NEEDED
Status: CANCELLED | OUTPATIENT
Start: 2021-08-19

## 2021-07-22 RX ADMIN — CERTOLIZUMAB PEGOL 400 MG: 200 INJECTION, SOLUTION SUBCUTANEOUS at 12:07

## 2021-07-23 LAB — BACTERIA SPEC AEROBE CULT: NO GROWTH

## 2021-07-25 ENCOUNTER — PATIENT MESSAGE (OUTPATIENT)
Dept: FAMILY MEDICINE | Facility: CLINIC | Age: 71
End: 2021-07-25

## 2021-07-28 ENCOUNTER — PATIENT MESSAGE (OUTPATIENT)
Dept: RHEUMATOLOGY | Facility: CLINIC | Age: 71
End: 2021-07-28

## 2021-08-12 ENCOUNTER — PATIENT MESSAGE (OUTPATIENT)
Dept: RHEUMATOLOGY | Facility: CLINIC | Age: 71
End: 2021-08-12

## 2021-08-17 ENCOUNTER — TELEPHONE (OUTPATIENT)
Dept: RHEUMATOLOGY | Facility: CLINIC | Age: 71
End: 2021-08-17

## 2021-08-17 ENCOUNTER — PATIENT MESSAGE (OUTPATIENT)
Dept: RHEUMATOLOGY | Facility: CLINIC | Age: 71
End: 2021-08-17

## 2021-08-19 ENCOUNTER — TELEPHONE (OUTPATIENT)
Dept: RHEUMATOLOGY | Facility: CLINIC | Age: 71
End: 2021-08-19

## 2021-08-19 ENCOUNTER — INFUSION (OUTPATIENT)
Dept: INFUSION THERAPY | Facility: HOSPITAL | Age: 71
End: 2021-08-19
Attending: PHYSICIAN ASSISTANT
Payer: MEDICARE

## 2021-08-19 ENCOUNTER — PATIENT MESSAGE (OUTPATIENT)
Dept: RHEUMATOLOGY | Facility: CLINIC | Age: 71
End: 2021-08-19

## 2021-08-19 ENCOUNTER — TELEPHONE (OUTPATIENT)
Dept: FAMILY MEDICINE | Facility: CLINIC | Age: 71
End: 2021-08-19

## 2021-08-19 ENCOUNTER — HOSPITAL ENCOUNTER (OUTPATIENT)
Dept: RADIOLOGY | Facility: HOSPITAL | Age: 71
Discharge: HOME | End: 2021-08-19
Attending: PHYSICIAN ASSISTANT
Payer: MEDICARE

## 2021-08-19 VITALS
TEMPERATURE: 98 F | HEART RATE: 106 BPM | OXYGEN SATURATION: 97 % | RESPIRATION RATE: 18 BRPM | DIASTOLIC BLOOD PRESSURE: 77 MMHG | SYSTOLIC BLOOD PRESSURE: 162 MMHG

## 2021-08-19 DIAGNOSIS — M05.79 RHEUMATOID ARTHRITIS INVOLVING MULTIPLE SITES WITH POSITIVE RHEUMATOID FACTOR: ICD-10-CM

## 2021-08-19 DIAGNOSIS — M05.79 RHEUMATOID ARTHRITIS INVOLVING MULTIPLE SITES WITH POSITIVE RHEUMATOID FACTOR: Primary | ICD-10-CM

## 2021-08-19 DIAGNOSIS — S99.922A INJURY OF LEFT FOOT, INITIAL ENCOUNTER: ICD-10-CM

## 2021-08-19 DIAGNOSIS — M25.475 SWELLING OF FOOT JOINT, LEFT: ICD-10-CM

## 2021-08-19 DIAGNOSIS — M25.475 SWELLING OF FOOT JOINT, LEFT: Primary | ICD-10-CM

## 2021-08-19 PROCEDURE — 96372 THER/PROPH/DIAG INJ SC/IM: CPT

## 2021-08-19 PROCEDURE — 73630 X-RAY EXAM OF FOOT: CPT | Mod: TC,LT

## 2021-08-19 PROCEDURE — 73630 X-RAY EXAM OF FOOT: CPT | Mod: 26,LT,, | Performed by: RADIOLOGY

## 2021-08-19 PROCEDURE — 63600175 PHARM REV CODE 636 W HCPCS: Mod: JG | Performed by: PHYSICIAN ASSISTANT

## 2021-08-19 PROCEDURE — 73630 XR FOOT COMPLETE 3 VIEW LEFT: ICD-10-PCS | Mod: 26,LT,, | Performed by: RADIOLOGY

## 2021-08-19 RX ORDER — ACETAMINOPHEN 325 MG/1
650 TABLET ORAL ONCE AS NEEDED
Status: CANCELLED | OUTPATIENT
Start: 2021-09-16

## 2021-08-19 RX ORDER — CERTOLIZUMAB PEGOL 200 MG/ML
400 INJECTION, SOLUTION SUBCUTANEOUS
Status: COMPLETED | OUTPATIENT
Start: 2021-08-19 | End: 2021-08-19

## 2021-08-19 RX ORDER — METHYLPREDNISOLONE 4 MG/1
TABLET ORAL
Qty: 1 PACKAGE | Refills: 0 | Status: SHIPPED | OUTPATIENT
Start: 2021-08-19 | End: 2021-10-01 | Stop reason: SDUPTHER

## 2021-08-19 RX ORDER — DIPHENHYDRAMINE HYDROCHLORIDE 50 MG/ML
12.5 INJECTION INTRAMUSCULAR; INTRAVENOUS ONCE AS NEEDED
Status: CANCELLED | OUTPATIENT
Start: 2021-09-16

## 2021-08-19 RX ADMIN — CERTOLIZUMAB PEGOL 400 MG: 200 INJECTION, SOLUTION SUBCUTANEOUS at 03:08

## 2021-08-20 ENCOUNTER — TELEPHONE (OUTPATIENT)
Dept: DERMATOLOGY | Facility: CLINIC | Age: 71
End: 2021-08-20

## 2021-08-21 ENCOUNTER — PATIENT MESSAGE (OUTPATIENT)
Dept: FAMILY MEDICINE | Facility: CLINIC | Age: 71
End: 2021-08-21

## 2021-09-15 ENCOUNTER — PATIENT OUTREACH (OUTPATIENT)
Dept: ADMINISTRATIVE | Facility: OTHER | Age: 71
End: 2021-09-15

## 2021-09-16 ENCOUNTER — PATIENT MESSAGE (OUTPATIENT)
Dept: DERMATOLOGY | Facility: CLINIC | Age: 71
End: 2021-09-16

## 2021-09-16 ENCOUNTER — INFUSION (OUTPATIENT)
Dept: INFUSION THERAPY | Facility: HOSPITAL | Age: 71
End: 2021-09-16
Attending: PHYSICIAN ASSISTANT
Payer: MEDICARE

## 2021-09-16 ENCOUNTER — OFFICE VISIT (OUTPATIENT)
Dept: DERMATOLOGY | Facility: CLINIC | Age: 71
End: 2021-09-16
Payer: MEDICARE

## 2021-09-16 VITALS
RESPIRATION RATE: 16 BRPM | OXYGEN SATURATION: 98 % | SYSTOLIC BLOOD PRESSURE: 138 MMHG | DIASTOLIC BLOOD PRESSURE: 67 MMHG | HEART RATE: 83 BPM | TEMPERATURE: 98 F

## 2021-09-16 DIAGNOSIS — L30.9 DERMATITIS: Primary | ICD-10-CM

## 2021-09-16 DIAGNOSIS — M05.79 RHEUMATOID ARTHRITIS INVOLVING MULTIPLE SITES WITH POSITIVE RHEUMATOID FACTOR: Primary | ICD-10-CM

## 2021-09-16 DIAGNOSIS — L82.1 SEBORRHEIC KERATOSIS: ICD-10-CM

## 2021-09-16 DIAGNOSIS — L81.4 LENTIGINES: ICD-10-CM

## 2021-09-16 PROCEDURE — 11104 PUNCH BX SKIN SINGLE LESION: CPT | Mod: PBBFAC | Performed by: PHYSICIAN ASSISTANT

## 2021-09-16 PROCEDURE — 88312 PR  SPECIAL STAINS,GROUP I: ICD-10-PCS | Mod: 26,,, | Performed by: PATHOLOGY

## 2021-09-16 PROCEDURE — 11105 PR PUNCH BIOPSY, SKIN, EA ADDTL LESION: ICD-10-PCS | Mod: S$PBB,,, | Performed by: PHYSICIAN ASSISTANT

## 2021-09-16 PROCEDURE — 63600175 PHARM REV CODE 636 W HCPCS: Mod: JG | Performed by: PHYSICIAN ASSISTANT

## 2021-09-16 PROCEDURE — 88313 SPECIAL STAINS GROUP 2: CPT | Mod: 26,,, | Performed by: PATHOLOGY

## 2021-09-16 PROCEDURE — 11105 PUNCH BX SKIN EA SEP/ADDL: CPT | Mod: S$PBB,,, | Performed by: PHYSICIAN ASSISTANT

## 2021-09-16 PROCEDURE — 88305 TISSUE EXAM BY PATHOLOGIST: CPT | Performed by: PATHOLOGY

## 2021-09-16 PROCEDURE — 88313 SPECIAL STAINS GROUP 2: CPT | Mod: 59 | Performed by: PATHOLOGY

## 2021-09-16 PROCEDURE — 99214 PR OFFICE/OUTPT VISIT, EST, LEVL IV, 30-39 MIN: ICD-10-PCS | Mod: 25,S$PBB,, | Performed by: PHYSICIAN ASSISTANT

## 2021-09-16 PROCEDURE — 99999 PR PBB SHADOW E&M-EST. PATIENT-LVL III: CPT | Mod: PBBFAC,,, | Performed by: PHYSICIAN ASSISTANT

## 2021-09-16 PROCEDURE — 99213 OFFICE O/P EST LOW 20 MIN: CPT | Mod: PBBFAC,25 | Performed by: PHYSICIAN ASSISTANT

## 2021-09-16 PROCEDURE — 88312 SPECIAL STAINS GROUP 1: CPT | Performed by: PATHOLOGY

## 2021-09-16 PROCEDURE — 11104 PUNCH BX SKIN SINGLE LESION: CPT | Mod: S$PBB,,, | Performed by: PHYSICIAN ASSISTANT

## 2021-09-16 PROCEDURE — 99214 OFFICE O/P EST MOD 30 MIN: CPT | Mod: 25,S$PBB,, | Performed by: PHYSICIAN ASSISTANT

## 2021-09-16 PROCEDURE — 96372 THER/PROPH/DIAG INJ SC/IM: CPT | Mod: 59

## 2021-09-16 PROCEDURE — 99999 PR PBB SHADOW E&M-EST. PATIENT-LVL III: ICD-10-PCS | Mod: PBBFAC,,, | Performed by: PHYSICIAN ASSISTANT

## 2021-09-16 PROCEDURE — 88305 TISSUE EXAM BY PATHOLOGIST: CPT | Mod: 26,,, | Performed by: PATHOLOGY

## 2021-09-16 PROCEDURE — 88313 PR  SPECIAL STAINS,GROUP II: ICD-10-PCS | Mod: 26,,, | Performed by: PATHOLOGY

## 2021-09-16 PROCEDURE — 88305 TISSUE EXAM BY PATHOLOGIST: ICD-10-PCS | Mod: 26,,, | Performed by: PATHOLOGY

## 2021-09-16 PROCEDURE — 11105 PUNCH BX SKIN EA SEP/ADDL: CPT | Mod: PBBFAC | Performed by: PHYSICIAN ASSISTANT

## 2021-09-16 PROCEDURE — 88312 SPECIAL STAINS GROUP 1: CPT | Mod: 26,,, | Performed by: PATHOLOGY

## 2021-09-16 PROCEDURE — 11104 PR PUNCH BIOPSY, SKIN, SINGLE LESION: ICD-10-PCS | Mod: S$PBB,,, | Performed by: PHYSICIAN ASSISTANT

## 2021-09-16 RX ORDER — DIPHENHYDRAMINE HYDROCHLORIDE 50 MG/ML
12.5 INJECTION INTRAMUSCULAR; INTRAVENOUS ONCE AS NEEDED
Status: CANCELLED | OUTPATIENT
Start: 2021-10-14

## 2021-09-16 RX ORDER — CERTOLIZUMAB PEGOL 200 MG/ML
400 INJECTION, SOLUTION SUBCUTANEOUS
Status: COMPLETED | OUTPATIENT
Start: 2021-09-16 | End: 2021-09-16

## 2021-09-16 RX ORDER — TRIAMCINOLONE ACETONIDE 1 MG/G
CREAM TOPICAL 2 TIMES DAILY
Qty: 45 G | Refills: 0 | Status: SHIPPED | OUTPATIENT
Start: 2021-09-16 | End: 2022-04-27

## 2021-09-16 RX ORDER — ACETAMINOPHEN 325 MG/1
650 TABLET ORAL ONCE AS NEEDED
Status: CANCELLED | OUTPATIENT
Start: 2021-10-14

## 2021-09-16 RX ADMIN — CERTOLIZUMAB PEGOL 400 MG: 200 INJECTION, SOLUTION SUBCUTANEOUS at 04:09

## 2021-09-18 ENCOUNTER — OFFICE VISIT (OUTPATIENT)
Dept: URGENT CARE | Facility: CLINIC | Age: 71
End: 2021-09-18
Payer: MEDICARE

## 2021-09-18 VITALS
DIASTOLIC BLOOD PRESSURE: 79 MMHG | HEART RATE: 89 BPM | WEIGHT: 150 LBS | HEIGHT: 65 IN | SYSTOLIC BLOOD PRESSURE: 152 MMHG | BODY MASS INDEX: 24.99 KG/M2 | TEMPERATURE: 98 F | OXYGEN SATURATION: 98 % | RESPIRATION RATE: 18 BRPM

## 2021-09-18 DIAGNOSIS — Z87.39 HISTORY OF RHEUMATOID ARTHRITIS: ICD-10-CM

## 2021-09-18 DIAGNOSIS — M05.79 RHEUMATOID ARTHRITIS INVOLVING MULTIPLE SITES WITH POSITIVE RHEUMATOID FACTOR: ICD-10-CM

## 2021-09-18 DIAGNOSIS — M35.00 HISTORY OF SJOGREN'S DISEASE: ICD-10-CM

## 2021-09-18 DIAGNOSIS — H02.889 MEIBOMIAN GLAND DYSFUNCTION: ICD-10-CM

## 2021-09-18 DIAGNOSIS — M25.572 ACUTE LEFT ANKLE PAIN: ICD-10-CM

## 2021-09-18 DIAGNOSIS — R03.0 ELEVATED BLOOD PRESSURE READING: ICD-10-CM

## 2021-09-18 DIAGNOSIS — M06.9: ICD-10-CM

## 2021-09-18 DIAGNOSIS — M25.472 LEFT ANKLE SWELLING: Primary | ICD-10-CM

## 2021-09-18 PROCEDURE — 99214 OFFICE O/P EST MOD 30 MIN: CPT | Mod: 25,S$GLB,, | Performed by: PHYSICIAN ASSISTANT

## 2021-09-18 PROCEDURE — 99214 PR OFFICE/OUTPT VISIT, EST, LEVL IV, 30-39 MIN: ICD-10-PCS | Mod: 25,S$GLB,, | Performed by: PHYSICIAN ASSISTANT

## 2021-09-18 PROCEDURE — 96372 THER/PROPH/DIAG INJ SC/IM: CPT | Mod: S$GLB,,, | Performed by: FAMILY MEDICINE

## 2021-09-18 PROCEDURE — 96372 PR INJECTION,THERAP/PROPH/DIAG2ST, IM OR SUBCUT: ICD-10-PCS | Mod: S$GLB,,, | Performed by: FAMILY MEDICINE

## 2021-09-18 RX ORDER — DEXAMETHASONE SODIUM PHOSPHATE 100 MG/10ML
10 INJECTION INTRAMUSCULAR; INTRAVENOUS ONCE
Status: COMPLETED | OUTPATIENT
Start: 2021-09-18 | End: 2021-09-18

## 2021-09-18 RX ORDER — MELOXICAM 15 MG/1
15 TABLET ORAL DAILY
Qty: 10 TABLET | Refills: 0 | Status: SHIPPED | OUTPATIENT
Start: 2021-09-18 | End: 2021-09-28

## 2021-09-18 RX ORDER — KETOROLAC TROMETHAMINE 30 MG/ML
30 INJECTION, SOLUTION INTRAMUSCULAR; INTRAVENOUS
Status: COMPLETED | OUTPATIENT
Start: 2021-09-18 | End: 2021-09-18

## 2021-09-18 RX ADMIN — DEXAMETHASONE SODIUM PHOSPHATE 10 MG: 100 INJECTION INTRAMUSCULAR; INTRAVENOUS at 06:09

## 2021-09-18 RX ADMIN — KETOROLAC TROMETHAMINE 30 MG: 30 INJECTION, SOLUTION INTRAMUSCULAR; INTRAVENOUS at 06:09

## 2021-09-21 ENCOUNTER — TELEPHONE (OUTPATIENT)
Dept: URGENT CARE | Facility: CLINIC | Age: 71
End: 2021-09-21

## 2021-09-24 ENCOUNTER — PATIENT MESSAGE (OUTPATIENT)
Dept: RHEUMATOLOGY | Facility: CLINIC | Age: 71
End: 2021-09-24

## 2021-09-24 ENCOUNTER — OFFICE VISIT (OUTPATIENT)
Dept: DERMATOLOGY | Facility: CLINIC | Age: 71
End: 2021-09-24
Payer: MEDICARE

## 2021-09-24 DIAGNOSIS — Z48.89 ENCOUNTER FOR POST SURGICAL WOUND CHECK: Primary | ICD-10-CM

## 2021-09-24 PROCEDURE — 87186 SC STD MICRODIL/AGAR DIL: CPT | Performed by: STUDENT IN AN ORGANIZED HEALTH CARE EDUCATION/TRAINING PROGRAM

## 2021-09-24 PROCEDURE — 99999 PR PBB SHADOW E&M-EST. PATIENT-LVL I: ICD-10-PCS | Mod: PBBFAC,,, | Performed by: STUDENT IN AN ORGANIZED HEALTH CARE EDUCATION/TRAINING PROGRAM

## 2021-09-24 PROCEDURE — 87070 CULTURE OTHR SPECIMN AEROBIC: CPT | Performed by: STUDENT IN AN ORGANIZED HEALTH CARE EDUCATION/TRAINING PROGRAM

## 2021-09-24 PROCEDURE — 99211 OFF/OP EST MAY X REQ PHY/QHP: CPT | Mod: PBBFAC | Performed by: STUDENT IN AN ORGANIZED HEALTH CARE EDUCATION/TRAINING PROGRAM

## 2021-09-24 PROCEDURE — 87077 CULTURE AEROBIC IDENTIFY: CPT | Performed by: STUDENT IN AN ORGANIZED HEALTH CARE EDUCATION/TRAINING PROGRAM

## 2021-09-24 PROCEDURE — 99024 POSTOP FOLLOW-UP VISIT: CPT | Mod: POP,,, | Performed by: STUDENT IN AN ORGANIZED HEALTH CARE EDUCATION/TRAINING PROGRAM

## 2021-09-24 PROCEDURE — 99999 PR PBB SHADOW E&M-EST. PATIENT-LVL I: CPT | Mod: PBBFAC,,, | Performed by: STUDENT IN AN ORGANIZED HEALTH CARE EDUCATION/TRAINING PROGRAM

## 2021-09-24 PROCEDURE — 99024 PR POST-OP FOLLOW-UP VISIT: ICD-10-PCS | Mod: POP,,, | Performed by: STUDENT IN AN ORGANIZED HEALTH CARE EDUCATION/TRAINING PROGRAM

## 2021-09-27 RX ORDER — DEXBROMPHENIRAMINE MALEATE 2 MG/1
1 TABLET ORAL DAILY
Qty: 30 TABLET | Refills: 2 | Status: SHIPPED | OUTPATIENT
Start: 2021-09-27 | End: 2022-09-29 | Stop reason: SDUPTHER

## 2021-09-28 ENCOUNTER — PATIENT MESSAGE (OUTPATIENT)
Dept: FAMILY MEDICINE | Facility: CLINIC | Age: 71
End: 2021-09-28

## 2021-09-28 DIAGNOSIS — L08.9 WOUND INFECTION: Primary | ICD-10-CM

## 2021-09-28 DIAGNOSIS — T14.8XXA WOUND INFECTION: Primary | ICD-10-CM

## 2021-09-28 LAB
BACTERIA SPEC AEROBE CULT: ABNORMAL
COMMENT: NORMAL
FINAL PATHOLOGIC DIAGNOSIS: NORMAL
GROSS: NORMAL
Lab: NORMAL
MICROSCOPIC EXAM: NORMAL

## 2021-09-28 RX ORDER — DOXYCYCLINE HYCLATE 100 MG
100 TABLET ORAL EVERY 12 HOURS
Qty: 14 TABLET | Refills: 0 | Status: SHIPPED | OUTPATIENT
Start: 2021-09-28 | End: 2021-10-05

## 2021-09-30 ENCOUNTER — TELEPHONE (OUTPATIENT)
Dept: FAMILY MEDICINE | Facility: CLINIC | Age: 71
End: 2021-09-30

## 2021-09-30 RX ORDER — CODEINE PHOSPHATE AND GUAIFENESIN 10; 100 MG/5ML; MG/5ML
5-10 SOLUTION ORAL EVERY 6 HOURS PRN
Qty: 150 ML | Refills: 0 | Status: CANCELLED | OUTPATIENT
Start: 2021-09-30 | End: 2021-10-10

## 2021-09-30 RX ORDER — CODEINE PHOSPHATE AND GUAIFENESIN 10; 100 MG/5ML; MG/5ML
5-10 SOLUTION ORAL EVERY 6 HOURS PRN
Qty: 150 ML | Refills: 0 | Status: SHIPPED | OUTPATIENT
Start: 2021-09-30 | End: 2021-10-10

## 2021-10-01 ENCOUNTER — PATIENT MESSAGE (OUTPATIENT)
Dept: URGENT CARE | Facility: CLINIC | Age: 71
End: 2021-10-01

## 2021-10-01 ENCOUNTER — OFFICE VISIT (OUTPATIENT)
Dept: URGENT CARE | Facility: CLINIC | Age: 71
End: 2021-10-01
Payer: MEDICARE

## 2021-10-01 ENCOUNTER — HOSPITAL ENCOUNTER (OUTPATIENT)
Dept: RADIOLOGY | Facility: CLINIC | Age: 71
Discharge: HOME | End: 2021-10-01
Attending: PHYSICIAN ASSISTANT
Payer: MEDICARE

## 2021-10-01 VITALS
DIASTOLIC BLOOD PRESSURE: 71 MMHG | BODY MASS INDEX: 24.99 KG/M2 | HEIGHT: 65 IN | RESPIRATION RATE: 18 BRPM | OXYGEN SATURATION: 100 % | WEIGHT: 150 LBS | SYSTOLIC BLOOD PRESSURE: 143 MMHG | TEMPERATURE: 100 F | HEART RATE: 90 BPM

## 2021-10-01 DIAGNOSIS — R05.9 COUGH: ICD-10-CM

## 2021-10-01 DIAGNOSIS — D84.9 IMMUNOSUPPRESSION: ICD-10-CM

## 2021-10-01 DIAGNOSIS — J20.9 ACUTE BRONCHITIS, UNSPECIFIED ORGANISM: Primary | ICD-10-CM

## 2021-10-01 LAB
CTP QC/QA: YES
CTP QC/QA: YES
POC MOLECULAR INFLUENZA A AGN: NEGATIVE
POC MOLECULAR INFLUENZA B AGN: NEGATIVE
SARS-COV-2 RDRP RESP QL NAA+PROBE: NEGATIVE

## 2021-10-01 PROCEDURE — U0002: ICD-10-PCS | Mod: QW,CR,S$GLB, | Performed by: PHYSICIAN ASSISTANT

## 2021-10-01 PROCEDURE — 71046 X-RAY EXAM CHEST 2 VIEWS: CPT | Mod: S$GLB,,, | Performed by: RADIOLOGY

## 2021-10-01 PROCEDURE — 99214 PR OFFICE/OUTPT VISIT, EST, LEVL IV, 30-39 MIN: ICD-10-PCS | Mod: S$GLB,CS,, | Performed by: PHYSICIAN ASSISTANT

## 2021-10-01 PROCEDURE — 87502 POCT INFLUENZA A/B MOLECULAR: ICD-10-PCS | Mod: QW,S$GLB,, | Performed by: PHYSICIAN ASSISTANT

## 2021-10-01 PROCEDURE — 87502 INFLUENZA DNA AMP PROBE: CPT | Mod: QW,S$GLB,, | Performed by: PHYSICIAN ASSISTANT

## 2021-10-01 PROCEDURE — 99214 OFFICE O/P EST MOD 30 MIN: CPT | Mod: S$GLB,CS,, | Performed by: PHYSICIAN ASSISTANT

## 2021-10-01 PROCEDURE — U0002 COVID-19 LAB TEST NON-CDC: HCPCS | Mod: QW,CR,S$GLB, | Performed by: PHYSICIAN ASSISTANT

## 2021-10-01 PROCEDURE — 71046 XR CHEST PA AND LATERAL: ICD-10-PCS | Mod: S$GLB,,, | Performed by: RADIOLOGY

## 2021-10-01 RX ORDER — ALBUTEROL SULFATE 90 UG/1
2 AEROSOL, METERED RESPIRATORY (INHALATION) EVERY 4 HOURS PRN
Qty: 8 G | Refills: 0 | Status: SHIPPED | OUTPATIENT
Start: 2021-10-01 | End: 2021-11-10 | Stop reason: SDUPTHER

## 2021-10-01 RX ORDER — AMOXICILLIN AND CLAVULANATE POTASSIUM 875; 125 MG/1; MG/1
1 TABLET, FILM COATED ORAL 2 TIMES DAILY
Qty: 14 TABLET | Refills: 0 | Status: SHIPPED | OUTPATIENT
Start: 2021-10-01 | End: 2021-10-08

## 2021-10-01 RX ORDER — METHYLPREDNISOLONE 4 MG/1
TABLET ORAL
Qty: 1 PACKAGE | Refills: 0 | Status: SHIPPED | OUTPATIENT
Start: 2021-10-01 | End: 2021-11-15

## 2021-10-04 ENCOUNTER — OFFICE VISIT (OUTPATIENT)
Dept: RHEUMATOLOGY | Facility: CLINIC | Age: 71
End: 2021-10-04
Payer: MEDICARE

## 2021-10-04 VITALS
WEIGHT: 147.25 LBS | DIASTOLIC BLOOD PRESSURE: 85 MMHG | SYSTOLIC BLOOD PRESSURE: 182 MMHG | HEART RATE: 86 BPM | BODY MASS INDEX: 24.53 KG/M2 | HEIGHT: 65 IN

## 2021-10-04 DIAGNOSIS — M35.9 XEROSTOMIA DUE TO AUTOIMMUNE DISEASE: ICD-10-CM

## 2021-10-04 DIAGNOSIS — D84.9 IMMUNOCOMPROMISED: ICD-10-CM

## 2021-10-04 DIAGNOSIS — K11.7 XEROSTOMIA DUE TO AUTOIMMUNE DISEASE: ICD-10-CM

## 2021-10-04 DIAGNOSIS — Z79.899 HIGH RISK MEDICATION USE: ICD-10-CM

## 2021-10-04 DIAGNOSIS — M35.01 SJOGREN'S SYNDROME WITH KERATOCONJUNCTIVITIS SICCA: ICD-10-CM

## 2021-10-04 DIAGNOSIS — M05.79 RHEUMATOID ARTHRITIS INVOLVING MULTIPLE SITES WITH POSITIVE RHEUMATOID FACTOR: Primary | ICD-10-CM

## 2021-10-04 DIAGNOSIS — M35.00 SJOGREN'S SYNDROME, WITH UNSPECIFIED ORGAN INVOLVEMENT: ICD-10-CM

## 2021-10-04 PROCEDURE — 99215 PR OFFICE/OUTPT VISIT, EST, LEVL V, 40-54 MIN: ICD-10-PCS | Mod: S$PBB,,, | Performed by: INTERNAL MEDICINE

## 2021-10-04 PROCEDURE — 99999 PR PBB SHADOW E&M-EST. PATIENT-LVL III: CPT | Mod: PBBFAC,,, | Performed by: INTERNAL MEDICINE

## 2021-10-04 PROCEDURE — 99213 OFFICE O/P EST LOW 20 MIN: CPT | Mod: PBBFAC,PO | Performed by: INTERNAL MEDICINE

## 2021-10-04 PROCEDURE — 99999 PR PBB SHADOW E&M-EST. PATIENT-LVL III: ICD-10-PCS | Mod: PBBFAC,,, | Performed by: INTERNAL MEDICINE

## 2021-10-04 PROCEDURE — 99215 OFFICE O/P EST HI 40 MIN: CPT | Mod: S$PBB,,, | Performed by: INTERNAL MEDICINE

## 2021-10-04 RX ORDER — HYDROXYCHLOROQUINE SULFATE 200 MG/1
200 TABLET, FILM COATED ORAL 2 TIMES DAILY
Qty: 180 TABLET | Refills: 0 | Status: SHIPPED | OUTPATIENT
Start: 2021-10-04 | End: 2021-12-27

## 2021-10-14 ENCOUNTER — INFUSION (OUTPATIENT)
Dept: INFUSION THERAPY | Facility: HOSPITAL | Age: 71
End: 2021-10-14
Attending: INTERNAL MEDICINE
Payer: MEDICARE

## 2021-10-14 ENCOUNTER — CLINICAL SUPPORT (OUTPATIENT)
Dept: RHEUMATOLOGY | Facility: CLINIC | Age: 71
End: 2021-10-14
Payer: MEDICARE

## 2021-10-14 VITALS
TEMPERATURE: 97 F | RESPIRATION RATE: 16 BRPM | BODY MASS INDEX: 24.43 KG/M2 | HEART RATE: 89 BPM | DIASTOLIC BLOOD PRESSURE: 84 MMHG | SYSTOLIC BLOOD PRESSURE: 147 MMHG | WEIGHT: 146.81 LBS

## 2021-10-14 DIAGNOSIS — M05.79 RHEUMATOID ARTHRITIS INVOLVING MULTIPLE SITES WITH POSITIVE RHEUMATOID FACTOR: Primary | ICD-10-CM

## 2021-10-14 PROCEDURE — 90694 VACC AIIV4 NO PRSRV 0.5ML IM: CPT | Mod: PBBFAC

## 2021-10-14 PROCEDURE — G0008 ADMIN INFLUENZA VIRUS VAC: HCPCS | Mod: PBBFAC

## 2021-10-14 PROCEDURE — 96372 THER/PROPH/DIAG INJ SC/IM: CPT

## 2021-10-14 PROCEDURE — 63600175 PHARM REV CODE 636 W HCPCS: Mod: JG | Performed by: PHYSICIAN ASSISTANT

## 2021-10-14 RX ORDER — ACETAMINOPHEN 325 MG/1
650 TABLET ORAL ONCE AS NEEDED
Status: CANCELLED | OUTPATIENT
Start: 2021-11-11

## 2021-10-14 RX ORDER — DIPHENHYDRAMINE HYDROCHLORIDE 50 MG/ML
12.5 INJECTION INTRAMUSCULAR; INTRAVENOUS ONCE AS NEEDED
Status: CANCELLED | OUTPATIENT
Start: 2021-11-11

## 2021-10-14 RX ORDER — CERTOLIZUMAB PEGOL 200 MG/ML
400 INJECTION, SOLUTION SUBCUTANEOUS
Status: COMPLETED | OUTPATIENT
Start: 2021-10-14 | End: 2021-10-14

## 2021-10-14 RX ADMIN — CERTOLIZUMAB PEGOL 400 MG: 200 INJECTION, SOLUTION SUBCUTANEOUS at 03:10

## 2021-11-05 ENCOUNTER — PATIENT MESSAGE (OUTPATIENT)
Dept: FAMILY MEDICINE | Facility: CLINIC | Age: 71
End: 2021-11-05
Payer: MEDICARE

## 2021-11-05 RX ORDER — FLUTICASONE PROPIONATE 44 UG/1
1 AEROSOL, METERED RESPIRATORY (INHALATION) 2 TIMES DAILY
Qty: 10.6 G | Refills: 0 | Status: SHIPPED | OUTPATIENT
Start: 2021-11-05 | End: 2021-11-10

## 2021-11-05 RX ORDER — AZITHROMYCIN 250 MG/1
TABLET, FILM COATED ORAL
Qty: 6 TABLET | Refills: 0 | Status: SHIPPED | OUTPATIENT
Start: 2021-11-05 | End: 2021-11-15

## 2021-11-08 DIAGNOSIS — R05.3 PERSISTENT COUGH FOR 3 WEEKS OR LONGER: Primary | ICD-10-CM

## 2021-11-09 ENCOUNTER — PATIENT MESSAGE (OUTPATIENT)
Dept: FAMILY MEDICINE | Facility: CLINIC | Age: 71
End: 2021-11-09
Payer: MEDICARE

## 2021-11-10 ENCOUNTER — TELEPHONE (OUTPATIENT)
Dept: DERMATOLOGY | Facility: CLINIC | Age: 71
End: 2021-11-10
Payer: MEDICARE

## 2021-11-10 RX ORDER — ALBUTEROL SULFATE 90 UG/1
2 AEROSOL, METERED RESPIRATORY (INHALATION)
Qty: 18 G | Refills: 2 | Status: SHIPPED | OUTPATIENT
Start: 2021-11-10 | End: 2024-04-02 | Stop reason: SDUPTHER

## 2021-11-11 ENCOUNTER — INFUSION (OUTPATIENT)
Dept: INFUSION THERAPY | Facility: HOSPITAL | Age: 71
End: 2021-11-11
Attending: PHYSICIAN ASSISTANT
Payer: MEDICARE

## 2021-11-11 VITALS
TEMPERATURE: 98 F | DIASTOLIC BLOOD PRESSURE: 77 MMHG | HEART RATE: 76 BPM | RESPIRATION RATE: 16 BRPM | OXYGEN SATURATION: 99 % | SYSTOLIC BLOOD PRESSURE: 143 MMHG

## 2021-11-11 DIAGNOSIS — M05.79 RHEUMATOID ARTHRITIS INVOLVING MULTIPLE SITES WITH POSITIVE RHEUMATOID FACTOR: Primary | ICD-10-CM

## 2021-11-11 PROCEDURE — 96372 THER/PROPH/DIAG INJ SC/IM: CPT

## 2021-11-11 PROCEDURE — 63600175 PHARM REV CODE 636 W HCPCS: Mod: JG | Performed by: INTERNAL MEDICINE

## 2021-11-11 RX ORDER — DIPHENHYDRAMINE HYDROCHLORIDE 50 MG/ML
12.5 INJECTION INTRAMUSCULAR; INTRAVENOUS ONCE AS NEEDED
Status: CANCELLED | OUTPATIENT
Start: 2021-12-09

## 2021-11-11 RX ORDER — CERTOLIZUMAB PEGOL 200 MG/ML
400 INJECTION, SOLUTION SUBCUTANEOUS
Status: COMPLETED | OUTPATIENT
Start: 2021-11-11 | End: 2021-11-11

## 2021-11-11 RX ORDER — ACETAMINOPHEN 325 MG/1
650 TABLET ORAL ONCE AS NEEDED
Status: CANCELLED | OUTPATIENT
Start: 2021-12-09

## 2021-11-11 RX ADMIN — CERTOLIZUMAB PEGOL 400 MG: 200 INJECTION, SOLUTION SUBCUTANEOUS at 04:11

## 2021-11-15 ENCOUNTER — OFFICE VISIT (OUTPATIENT)
Dept: RHEUMATOLOGY | Facility: CLINIC | Age: 71
End: 2021-11-15
Payer: MEDICARE

## 2021-11-15 VITALS
DIASTOLIC BLOOD PRESSURE: 77 MMHG | HEIGHT: 65 IN | BODY MASS INDEX: 24.21 KG/M2 | HEART RATE: 93 BPM | SYSTOLIC BLOOD PRESSURE: 130 MMHG | WEIGHT: 145.31 LBS

## 2021-11-15 DIAGNOSIS — M05.79 RHEUMATOID ARTHRITIS INVOLVING MULTIPLE SITES WITH POSITIVE RHEUMATOID FACTOR: Primary | ICD-10-CM

## 2021-11-15 DIAGNOSIS — M35.00 SJOGREN'S SYNDROME, WITH UNSPECIFIED ORGAN INVOLVEMENT: ICD-10-CM

## 2021-11-15 DIAGNOSIS — D84.9 IMMUNOCOMPROMISED: ICD-10-CM

## 2021-11-15 DIAGNOSIS — Z79.899 HIGH RISK MEDICATION USE: ICD-10-CM

## 2021-11-15 PROCEDURE — 99999 PR PBB SHADOW E&M-EST. PATIENT-LVL V: ICD-10-PCS | Mod: PBBFAC,,, | Performed by: INTERNAL MEDICINE

## 2021-11-15 PROCEDURE — 99999 PR PBB SHADOW E&M-EST. PATIENT-LVL V: CPT | Mod: PBBFAC,,, | Performed by: INTERNAL MEDICINE

## 2021-11-15 PROCEDURE — 99214 OFFICE O/P EST MOD 30 MIN: CPT | Mod: S$PBB,,, | Performed by: INTERNAL MEDICINE

## 2021-11-15 PROCEDURE — 99215 OFFICE O/P EST HI 40 MIN: CPT | Mod: PBBFAC,PO | Performed by: INTERNAL MEDICINE

## 2021-11-15 PROCEDURE — 99214 PR OFFICE/OUTPT VISIT, EST, LEVL IV, 30-39 MIN: ICD-10-PCS | Mod: S$PBB,,, | Performed by: INTERNAL MEDICINE

## 2021-11-15 RX ORDER — METHYLPREDNISOLONE 4 MG/1
TABLET ORAL
Qty: 21 TABLET | Refills: 0 | Status: SHIPPED | OUTPATIENT
Start: 2021-11-15 | End: 2022-08-29

## 2021-11-15 RX ORDER — UPADACITINIB 15 MG/1
15 TABLET, EXTENDED RELEASE ORAL DAILY
Qty: 30 TABLET | Refills: 11 | Status: SHIPPED | OUTPATIENT
Start: 2021-11-15 | End: 2022-04-27

## 2021-11-24 DIAGNOSIS — E03.9 ACQUIRED HYPOTHYROIDISM: ICD-10-CM

## 2021-11-24 RX ORDER — LEVOTHYROXINE SODIUM 75 UG/1
75 TABLET ORAL DAILY
Qty: 90 TABLET | Refills: 3 | Status: SHIPPED | OUTPATIENT
Start: 2021-11-24 | End: 2022-11-28 | Stop reason: SDUPTHER

## 2021-12-14 ENCOUNTER — PATIENT OUTREACH (OUTPATIENT)
Dept: ADMINISTRATIVE | Facility: HOSPITAL | Age: 71
End: 2021-12-14
Payer: MEDICARE

## 2021-12-20 RX ORDER — CYCLOBENZAPRINE HCL 10 MG
10 TABLET ORAL 3 TIMES DAILY PRN
Qty: 90 TABLET | Refills: 3 | Status: SHIPPED | OUTPATIENT
Start: 2021-12-20 | End: 2022-03-20

## 2021-12-22 ENCOUNTER — HOSPITAL ENCOUNTER (OUTPATIENT)
Dept: RADIOLOGY | Facility: HOSPITAL | Age: 71
Discharge: HOME | End: 2021-12-22
Attending: INTERNAL MEDICINE
Payer: MEDICARE

## 2021-12-22 VITALS — HEIGHT: 65 IN | WEIGHT: 145.31 LBS | BODY MASS INDEX: 24.21 KG/M2

## 2021-12-22 DIAGNOSIS — Z12.31 ENCOUNTER FOR SCREENING MAMMOGRAM FOR MALIGNANT NEOPLASM OF BREAST: ICD-10-CM

## 2021-12-22 PROCEDURE — 77063 MAMMO DIGITAL SCREENING BILAT WITH TOMO: ICD-10-PCS | Mod: 26,,, | Performed by: RADIOLOGY

## 2021-12-22 PROCEDURE — 77067 SCR MAMMO BI INCL CAD: CPT | Mod: TC

## 2021-12-22 PROCEDURE — 77067 MAMMO DIGITAL SCREENING BILAT WITH TOMO: ICD-10-PCS | Mod: 26,,, | Performed by: RADIOLOGY

## 2021-12-22 PROCEDURE — 77067 SCR MAMMO BI INCL CAD: CPT | Mod: 26,,, | Performed by: RADIOLOGY

## 2021-12-22 PROCEDURE — 77063 BREAST TOMOSYNTHESIS BI: CPT | Mod: 26,,, | Performed by: RADIOLOGY

## 2022-01-04 ENCOUNTER — PATIENT MESSAGE (OUTPATIENT)
Dept: RHEUMATOLOGY | Facility: CLINIC | Age: 72
End: 2022-01-04
Payer: MEDICARE

## 2022-01-09 ENCOUNTER — PATIENT MESSAGE (OUTPATIENT)
Dept: OTOLARYNGOLOGY | Facility: CLINIC | Age: 72
End: 2022-01-09
Payer: MEDICARE

## 2022-01-10 ENCOUNTER — DOCUMENTATION ONLY (OUTPATIENT)
Dept: RHEUMATOLOGY | Facility: CLINIC | Age: 72
End: 2022-01-10
Payer: MEDICARE

## 2022-01-11 DIAGNOSIS — R05.9 COUGH: ICD-10-CM

## 2022-01-11 DIAGNOSIS — I10 ESSENTIAL HYPERTENSION: ICD-10-CM

## 2022-01-11 DIAGNOSIS — R50.9 FEVER AND CHILLS: ICD-10-CM

## 2022-01-11 RX ORDER — BENZONATATE 100 MG/1
200 CAPSULE ORAL 3 TIMES DAILY PRN
Qty: 60 CAPSULE | Refills: 3 | Status: SHIPPED | OUTPATIENT
Start: 2022-01-11 | End: 2022-08-02 | Stop reason: SDUPTHER

## 2022-02-03 ENCOUNTER — NURSE TRIAGE (OUTPATIENT)
Dept: ADMINISTRATIVE | Facility: CLINIC | Age: 72
End: 2022-02-03
Payer: MEDICARE

## 2022-02-03 ENCOUNTER — PATIENT MESSAGE (OUTPATIENT)
Dept: OTOLARYNGOLOGY | Facility: CLINIC | Age: 72
End: 2022-02-03
Payer: MEDICARE

## 2022-02-03 ENCOUNTER — PATIENT MESSAGE (OUTPATIENT)
Dept: RHEUMATOLOGY | Facility: CLINIC | Age: 72
End: 2022-02-03
Payer: MEDICARE

## 2022-02-03 ENCOUNTER — PATIENT MESSAGE (OUTPATIENT)
Dept: FAMILY MEDICINE | Facility: CLINIC | Age: 72
End: 2022-02-03
Payer: MEDICARE

## 2022-02-03 ENCOUNTER — TELEPHONE (OUTPATIENT)
Dept: OTOLARYNGOLOGY | Facility: CLINIC | Age: 72
End: 2022-02-03
Payer: MEDICARE

## 2022-02-03 NOTE — TELEPHONE ENCOUNTER
C/o ha/, body aches, fever since Monday. Started isolation as precaution. Began having bilat ear pain since yesterday. Highest temp 100.1. message sent to clinic this morning; has not heard back yet.    Dispo-callback by nurse within 1 hr from clinic. Advised on home care & to callback if symptoms worsen/change. Pt agrees.     Reason for Disposition   HIGH RISK for severe COVID complications (e.g., age > 64 years, obesity with BMI > 25, pregnant, chronic lung disease or other chronic medical condition) (Exception: Already seen by PCP and no new or worsening symptoms.)    Additional Information   Negative: SEVERE difficulty breathing (e.g., struggling for each breath, speaks in single words)   Negative: Difficult to awaken or acting confused (e.g., disoriented, slurred speech)   Negative: Bluish (or gray) lips or face now   Negative: Shock suspected (e.g., cold/pale/clammy skin, too weak to stand, low BP, rapid pulse)   Negative: Sounds like a life-threatening emergency to the triager   Negative: SEVERE or constant chest pain or pressure (Exception: mild central chest pain, present only when coughing)   Negative: MODERATE difficulty breathing (e.g., speaks in phrases, SOB even at rest, pulse 100-120)   Negative: Headache and stiff neck (can't touch chin to chest)   Negative: Chest pain or pressure   Negative: Patient sounds very sick or weak to the triager   Negative: MILD difficulty breathing (e.g., minimal/no SOB at rest, SOB with walking, pulse <100)   Negative: Fever > 103 F (39.4 C)   Negative: [1] Fever > 101 F (38.3 C) AND [2] over 60 years of age   Negative: [1] Fever > 100.0 F (37.8 C) AND [2] bedridden (e.g., nursing home patient, CVA, chronic illness, recovering from surgery)    Protocols used: CORONAVIRUS (COVID-19) DIAGNOSED OR [id number]

## 2022-02-05 ENCOUNTER — PATIENT OUTREACH (OUTPATIENT)
Dept: ADMINISTRATIVE | Facility: OTHER | Age: 72
End: 2022-02-05
Payer: MEDICARE

## 2022-02-06 DIAGNOSIS — F41.8 MIXED ANXIETY AND DEPRESSIVE DISORDER: ICD-10-CM

## 2022-02-06 NOTE — PROGRESS NOTES
Health Maintenance Due   Topic Date Due    Shingles Vaccine (3 of 3) 08/03/2021    COVID-19 Vaccine (3 - Booster for Pfizer series) 09/15/2021    Colorectal Cancer Screening  12/14/2021     Updates were requested from care everywhere.  Chart was reviewed for overdue Proactive [facility] ([facility]) topics (CRS, Breast Cancer Screening, Eye exam)  Health Maintenance has been updated.  LINKS immunization registry triggered.  Immunizations were reconciled.

## 2022-02-07 ENCOUNTER — OFFICE VISIT (OUTPATIENT)
Dept: OTOLARYNGOLOGY | Facility: CLINIC | Age: 72
End: 2022-02-07
Payer: MEDICARE

## 2022-02-07 ENCOUNTER — CLINICAL SUPPORT (OUTPATIENT)
Dept: AUDIOLOGY | Facility: CLINIC | Age: 72
End: 2022-02-07
Payer: MEDICARE

## 2022-02-07 VITALS — TEMPERATURE: 98 F | BODY MASS INDEX: 24.25 KG/M2 | WEIGHT: 145.75 LBS

## 2022-02-07 DIAGNOSIS — H90.6 MIXED HEARING LOSS, BILATERAL: Primary | ICD-10-CM

## 2022-02-07 DIAGNOSIS — H65.93 FLUID LEVEL BEHIND TYMPANIC MEMBRANE OF BOTH EARS: ICD-10-CM

## 2022-02-07 DIAGNOSIS — H91.90 PERCEIVED HEARING LOSS: ICD-10-CM

## 2022-02-07 DIAGNOSIS — H91.90 PERCEIVED HEARING LOSS: Primary | ICD-10-CM

## 2022-02-07 PROCEDURE — 99213 OFFICE O/P EST LOW 20 MIN: CPT | Mod: S$PBB,,, | Performed by: PHYSICIAN ASSISTANT

## 2022-02-07 PROCEDURE — 99213 OFFICE O/P EST LOW 20 MIN: CPT | Mod: PBBFAC,27 | Performed by: PHYSICIAN ASSISTANT

## 2022-02-07 PROCEDURE — 99213 PR OFFICE/OUTPT VISIT, EST, LEVL III, 20-29 MIN: ICD-10-PCS | Mod: S$PBB,,, | Performed by: PHYSICIAN ASSISTANT

## 2022-02-07 PROCEDURE — 92567 TYMPANOMETRY: CPT | Mod: PBBFAC,PO | Performed by: AUDIOLOGIST-HEARING AID FITTER

## 2022-02-07 PROCEDURE — 92557 COMPREHENSIVE HEARING TEST: CPT | Mod: PBBFAC,PO | Performed by: AUDIOLOGIST-HEARING AID FITTER

## 2022-02-07 PROCEDURE — 99211 OFF/OP EST MAY X REQ PHY/QHP: CPT | Mod: PBBFAC,PO | Performed by: AUDIOLOGIST-HEARING AID FITTER

## 2022-02-07 PROCEDURE — 99999 PR PBB SHADOW E&M-EST. PATIENT-LVL I: ICD-10-PCS | Mod: PBBFAC,,, | Performed by: AUDIOLOGIST-HEARING AID FITTER

## 2022-02-07 PROCEDURE — 99999 PR PBB SHADOW E&M-EST. PATIENT-LVL III: CPT | Mod: PBBFAC,,, | Performed by: PHYSICIAN ASSISTANT

## 2022-02-07 PROCEDURE — 99999 PR PBB SHADOW E&M-EST. PATIENT-LVL III: ICD-10-PCS | Mod: PBBFAC,,, | Performed by: PHYSICIAN ASSISTANT

## 2022-02-07 PROCEDURE — 99999 PR PBB SHADOW E&M-EST. PATIENT-LVL I: CPT | Mod: PBBFAC,,, | Performed by: AUDIOLOGIST-HEARING AID FITTER

## 2022-02-07 RX ORDER — PREDNISONE 20 MG/1
TABLET ORAL
Qty: 20 TABLET | Refills: 0 | Status: SHIPPED | OUTPATIENT
Start: 2022-02-07 | End: 2022-04-27

## 2022-02-07 RX ORDER — LORAZEPAM 1 MG/1
1 TABLET ORAL 2 TIMES DAILY PRN
Qty: 60 TABLET | Refills: 0 | Status: SHIPPED | OUTPATIENT
Start: 2022-02-07 | End: 2022-05-26

## 2022-02-07 RX ORDER — FLUTICASONE PROPIONATE 50 MCG
2 SPRAY, SUSPENSION (ML) NASAL 2 TIMES DAILY
Qty: 9.9 ML | Refills: 11 | Status: SHIPPED | OUTPATIENT
Start: 2022-02-07 | End: 2022-08-29

## 2022-02-07 RX ORDER — TRAZODONE HYDROCHLORIDE 50 MG/1
TABLET ORAL
Qty: 135 TABLET | Refills: 0 | Status: SHIPPED | OUTPATIENT
Start: 2022-02-07 | End: 2022-05-11

## 2022-02-07 NOTE — PROGRESS NOTES
Referring Provider:    Nicole Castellon Md  [address] [address]  Subjective:   Patient: Leticia Piña [id number], :1950   Visit date:2022 2:02 PM    Chief Complaint:  Otalgia and Ear Fullness    HPI:    Prior notes reviewed by myself.  Clinical documentation obtained by nursing staff reviewed.     Called c/o acute HL and otalgia last week, symptoms started   Tested + on Thursday, 22 - was started on Augmentin  All other symptoms - facial pressure, fatigue, body aches are much better but pt is with persistent bilateral otalgia and significant HL (from her baseline)  No otorrhea  No relieving factors       Objective:     Physical Exam:  Vitals:  Temp 98.1 °F (36.7 °C) (Temporal)   Wt 66.1 kg (145 lb 11.6 oz)   BMI 24.25 kg/m²   General appearance:  Well developed, well nourished    Ears:  L TM with moderate bulge, clear, intact, central erythema; R TM with moderate bulge also clear with slight erythema, intact.     Nose:  No masses/lesions of external nose, nasal mucosa, septum, and turbinates were within normal limits.    Mouth:  No mass/lesion of lips, teeth, gums, hard/soft palate, tongue, tonsils, or oropharynx.    Neck & Lymphatics:  No cervical lymphadenopathy, no neck mass/crepitus/ asymmetry, trachea is midline, no thyroid enlargement/tenderness/mass.        Results reveal a mild-to-severe mixed hearing loss 250-8000 Hz for the right ear, and  moderate-to-severe mixed hearing loss 250-8000 Hz for the left ear.   Speech Reception Thresholds were  55 dBHL for the right ear and 55 dBHL for the left ear.   Word recognition scores were excellent for the right ear and excellent for the left ear.   Tympanograms were Type B for the right ear and Type B for the left ear.        Assessment & Plan:   Perceived hearing loss  -     Ambulatory referral/consult to Audiology; Future; Expected date: 2022    Fluid level behind tympanic membrane of both  ears    Other orders  -     predniSONE (DELTASONE) 20 MG tablet; Take 3 tab in am x 3d, then 2 tab in am x 3d, then 1 tab in am x 3d, then 1/2 tab in am x 3d  Dispense: 20 tablet; Refill: 0  -     fluticasone propionate (FLONASE) 50 mcg/actuation nasal spray; 2 sprays (100 mcg total) by Each Nostril route 2 (two) times daily.  Dispense: 9.9 mL; Refill: 11    Advised course with prednisone taper, resume Flonase, and continue current abx of Augmentin  Plan to recheck in 3 weeks f/u with MD with audiogram prior      Thank you for allowing me to participate in the care of Leticia.        Agatha Guerrero PA-C  [facility]   [facility]  [address]  [place] [address]  P: [phone]  F: [phone]

## 2022-02-07 NOTE — Clinical Note
Type B tymps, with symmetrical MHL, AU.  Please call patient's , Alpesh, with your recommendations. [phone]

## 2022-02-07 NOTE — PROGRESS NOTES
Leticia Piña was seen 02/07/2022 for an audiological evaluation.  Called c/o acute HL and otalgia last week, symptoms started Monday, 01/31. Tested + on Thursday, 02/03/22 - was started on Augmentin. All other symptoms - facial pressure, fatigue, body aches are much better but pt is with persistent bilateral otalgia and significant HL (from her baseline). No otorrhea. No relieving factors.     Results reveal a mild-to-severe mixed hearing loss 250-8000 Hz for the right ear, and  moderate-to-severe mixed hearing loss 250-8000 Hz for the left ear.   Speech Reception Thresholds were  55 dBHL for the right ear and 55 dBHL for the left ear.   Word recognition scores were excellent for the right ear and excellent for the left ear.   Tympanograms were Type B for the right ear and Type B for the left ear.    Patient was counseled on the above findings.    Recommendations:  1. ENT review   2. Audiogram post treatment

## 2022-02-08 NOTE — PLAN OF CARE
Problem: Adult Inpatient Plan of Care  Goal: Absence of Hospital-Acquired Illness or Injury    Intervention: Prevent Infection  Instructed pt on infection prevention measures. Encouraged frequent hand washing and use of antibacteria hand sanitizers as needed. Instructed to notify Rheumatology Provider/ Infusion Dept for any antibiotic use, illness, vaccinations or surgeries/invasive procedures before, on, or after infusion date. Verbalizes understanding.           
Statement Selected

## 2022-02-09 ENCOUNTER — PATIENT MESSAGE (OUTPATIENT)
Dept: OTOLARYNGOLOGY | Facility: CLINIC | Age: 72
End: 2022-02-09
Payer: MEDICARE

## 2022-02-14 ENCOUNTER — CLINICAL SUPPORT (OUTPATIENT)
Dept: AUDIOLOGY | Facility: CLINIC | Age: 72
End: 2022-02-14
Payer: MEDICARE

## 2022-02-14 DIAGNOSIS — H74.93 DISORDER OF BOTH MIDDLE EARS: Primary | ICD-10-CM

## 2022-02-14 PROCEDURE — 92567 TYMPANOMETRY: CPT | Mod: PBBFAC | Performed by: AUDIOLOGIST-HEARING AID FITTER

## 2022-02-14 NOTE — PROGRESS NOTES
Patient was seen for tympanometry on 2/14/2022 at the request of ENT. Tympanometry revealed Type B in the right ear, and Type B in the left ear.     Right Ear: No measurable peak or pressure, with ear canal volume of: 1.2ml    Left Ear: No measurable peak or pressure, with ear canal volume of: 0.9ml    Patient was counseled with results.    Recommendations:   1. Follow-up with ENT, as scheduled.   2. Repeat audiological evaluation per ENT, or sooner if needed.

## 2022-02-16 ENCOUNTER — CLINICAL SUPPORT (OUTPATIENT)
Dept: AUDIOLOGY | Facility: CLINIC | Age: 72
End: 2022-02-16
Payer: MEDICARE

## 2022-02-16 ENCOUNTER — OFFICE VISIT (OUTPATIENT)
Dept: OTOLARYNGOLOGY | Facility: CLINIC | Age: 72
End: 2022-02-16
Payer: MEDICARE

## 2022-02-16 ENCOUNTER — PATIENT MESSAGE (OUTPATIENT)
Dept: OTOLARYNGOLOGY | Facility: CLINIC | Age: 72
End: 2022-02-16

## 2022-02-16 VITALS — BODY MASS INDEX: 24.43 KG/M2 | WEIGHT: 146.63 LBS | HEIGHT: 65 IN

## 2022-02-16 DIAGNOSIS — H65.33 CHRONIC MUCOID OTITIS MEDIA OF BOTH EARS: ICD-10-CM

## 2022-02-16 DIAGNOSIS — H90.5 HEARING LOSS, SENSORINEURAL, COMBINED TYPES: Primary | ICD-10-CM

## 2022-02-16 DIAGNOSIS — H69.93 DYSFUNCTION OF BOTH EUSTACHIAN TUBES: ICD-10-CM

## 2022-02-16 DIAGNOSIS — H90.6 MIXED CONDUCTIVE AND SENSORINEURAL HEARING LOSS OF BOTH EARS: Primary | ICD-10-CM

## 2022-02-16 PROCEDURE — 99214 OFFICE O/P EST MOD 30 MIN: CPT | Mod: PBBFAC | Performed by: OTOLARYNGOLOGY

## 2022-02-16 PROCEDURE — 99999 PR PBB SHADOW E&M-EST. PATIENT-LVL IV: ICD-10-PCS | Mod: PBBFAC,,, | Performed by: OTOLARYNGOLOGY

## 2022-02-16 PROCEDURE — 99214 PR OFFICE/OUTPT VISIT, EST, LEVL IV, 30-39 MIN: ICD-10-PCS | Mod: S$PBB,,, | Performed by: OTOLARYNGOLOGY

## 2022-02-16 PROCEDURE — 99214 OFFICE O/P EST MOD 30 MIN: CPT | Mod: S$PBB,,, | Performed by: OTOLARYNGOLOGY

## 2022-02-16 PROCEDURE — 99999 PR PBB SHADOW E&M-EST. PATIENT-LVL IV: CPT | Mod: PBBFAC,,, | Performed by: OTOLARYNGOLOGY

## 2022-02-16 PROCEDURE — 92557 COMPREHENSIVE HEARING TEST: CPT | Mod: PBBFAC | Performed by: AUDIOLOGIST

## 2022-02-16 PROCEDURE — 92567 TYMPANOMETRY: CPT | Mod: PBBFAC | Performed by: AUDIOLOGIST

## 2022-02-16 NOTE — PROGRESS NOTES
Leticia Piña was seen 02/16/2022 for an audiological evaluation.  She contracted COVID-19 at the end of January and subsequently noticed that she had significantly decreased hearing. She was treated with antibiotics but her muffled hearing has continued.  She had audiogram on 2/7/22 which demonstrated a significant mixed hearing loss.  Since that time she was treated with high-dose steroids, fluticasone nasal spray, autoinsufflation.  She does feel that her hearing has improved but not completely.  No vertigo or tinnitus.    Results reveal a mild-to-moderately severe sensorineural hearing loss 500-8000 Hz for the right ear and conductive hearing loss at 1000 Hz, Ad., and  mild-to-moderate sensorineural hearing loss 1494-2694 Hz for the left ear.   Speech Reception Thresholds were  30 dBHL for the right ear and 25 dBHL for the left ear.   Word recognition scores were excellent for the right ear and excellent for the left ear.   Tympanograms were Type B, abnormal for the right ear and Type B, abnormal for the left ear.    Patient was counseled on the above findings.    Recommendations include:    1.  ENT followup  2.  Recheck per ENT  3.  Hearing aid consult when interested  4.  Wear hearing protective devices around loud noise  5.  Annual audiograms

## 2022-02-16 NOTE — PROGRESS NOTES
"Referring Provider:    No referring provider defined for this encounter.  Subjective:   Patient: Leticia Piña [id number], :1950   Visit date:2022 9:53 AM    Chief Complaint:  Follow-up    HPI:    Prior notes reviewed by myself.  Clinical documentation obtained by nursing staff reviewed.     61-year-old female presents in follow-up for hearing loss.  She contracted COVID-19 at the end of January and subsequently noticed that she had significantly decreased hearing.  She was treated with antibiotics but her muffled hearing has continued.  She was seen in our office and an audiogram was performed which demonstrated a mixed hearing loss.  Since that time she was treated with high-dose steroids, fluticasone nasal spray, autoinsufflation.  She does feel that her hearing has improved but not completely.  No vertigo or tinnitus.      Objective:     Physical Exam:  Vitals:  Ht 5' 5" (1.651 m)   Wt 66.5 kg (146 lb 9.7 oz)   BMI 24.40 kg/m²   General appearance:  Well developed, well nourished    Ears:  Otoscopy of external auditory canals and tympanic membranes was significant for dull TM's bilaterally, MATTHIEU, clinical speech reception thresholds grossly intact, no mass/lesion of auricle.    Nose:  No masses/lesions of external nose, nasal mucosa, septum, and turbinates were within normal limits.    Mouth:  No mass/lesion of lips, teeth, gums, hard/soft palate, tongue, tonsils, or oropharynx.    Neck & Lymphatics:  No cervical lymphadenopathy, no neck mass/crepitus/ asymmetry, trachea is midline, no thyroid enlargement/tenderness/mass.        [x]  Data Reviewed:    Lab Results   Component Value Date    WBC 8.14 10/14/2021    HGB 12.1 10/14/2021    HCT 36.5 (L) 10/14/2021    MCV 88 10/14/2021    EOSINOPHIL 7.4 10/14/2021         [x]  Independent interpretation of test: CHL nearly completely resolved since last audio  Media Information                File Link    Annotation on 2022  3:29 PM by Edna MELGOZA" LUIS ENRIQUE Feliz: AUDIOGRAM        Key Information    Document ID File Type Document Type Description   [id number] Annotation Annotation AUDIOGRAM       Import Information    Attached At Date Time User Dept   Encounter Level 2/7/2022  3:29 PM LUIS ENRIQUE Mendez [facility] Audiology       Encounter    Clinical Support on 2/7/22 with LUIS ENRIQUE Mendez       Media Audit Information    Patient Entered Attachment was moved from this patient by [facility], System Message on 7/28/2021 at 1:34 PM (DCS ID: [id number], File: [id number])   Patient Entered Attachment was moved from this patient by [facility], System Message on 7/28/2021 at 1:37 PM (DCS ID: [id number], File: [id number])   Patient Entered Attachment was moved from this patient by [facility], System Message on 7/28/2021 at 2:28 PM (DCS ID: [id number], File: [id number])   Patient Entered Attachment was moved from this patient by [facility], System Message on 8/12/2021 at 9:09 PM (DCS ID: [id number], File: [id number])   Patient Entered Attachment was deleted from this patient by Epic User on 8/12/2021 at 9:07 PM (DCS ID: [id number], File: [id number])   Patient Entered Attachment was moved from this patient by [facility], System Message on 8/12/2021 at 9:09 PM (DCS ID: [id number], File: [id number])   Patient Entered Attachment was moved from this patient by [facility], System Message on 8/12/2021 at 9:09 PM (DCS ID: [id number], File: [id number])   Patient Entered Attachment was moved from this patient by [facility], System Message on 9/24/2021 at 7:23 AM (DCS ID: [id number], File: [id number])   Patient Entered Attachment was moved from this patient by [facility], System Message on 9/24/2021 at 7:23 AM (DCS ID: [id number], File: [id number])   Patient Entered Attachment was moved from this patient by [facility], System Message on 1/9/2022 at 9:20 AM (DCS ID: [id number],  File: [id number])   Patient Entered Attachment was moved from this patient by [facility], System Message on 1/9/2022 at 9:20 AM (DCS ID: [id number], File: [id number])     Media Information                File Link    Annotation on 2/16/2022  9:44 AM by LUIS ENRIQUE Rodriguez: AUDIOGRAM        Key Information    Document ID File Type Document Type Description   [id number] Annotation Annotation AUDIOGRAM       Import Information    Attached At Date Time User Dept   Encounter Level 2/16/2022  9:44 AM LUIS ENRIQUE Rodriguez [facility] Audiology       Encounter    Appointment on 2/16/22 with LUIS ENRIQUE Rodriguez       Media Audit Information    Patient Entered Attachment was moved from this patient by [facility], System Message on 7/28/2021 at 1:34 PM (DCS ID: [id number], File: [id number])   Patient Entered Attachment was moved from this patient by [facility], System Message on 7/28/2021 at 1:37 PM (DCS ID: [id number], File: [id number])   Patient Entered Attachment was moved from this patient by [facility], System Message on 7/28/2021 at 2:28 PM (DCS ID: [id number], File: [id number])   Patient Entered Attachment was moved from this patient by [facility], System Message on 8/12/2021 at 9:09 PM (DCS ID: [id number], File: [id number])   Patient Entered Attachment was deleted from this patient by Epic, User on 8/12/2021 at 9:07 PM (DCS ID: [id number], File: [id number])   Patient Entered Attachment was moved from this patient by [facility], System Message on 8/12/2021 at 9:09 PM (DCS ID: [id number], File: [id number])   Patient Entered Attachment was moved from this patient by [facility], System Message on 8/12/2021 at 9:09 PM (DCS ID: [id number], File: [id number])   Patient Entered Attachment was moved from this patient by [facility], System Message on 9/24/2021 at 7:23 AM (DCS ID: [id number], File: [id number])   Patient  Entered Attachment was moved from this patient by [facility], System Message on 9/24/2021 at 7:23 AM (DCS ID: [id number], File: [id number])   Patient Entered Attachment was moved from this patient by [facility], System Message on 1/9/2022 at 9:20 AM (DCS ID: [id number], File: [id number])   Patient Entered Attachment was moved from this patient by [facility], System Message on 1/9/2022 at 9:20 AM (DCS ID: [id number], File: [id number])               Assessment & Plan:   Mixed conductive and sensorineural hearing loss of both ears    Dysfunction of both eustachian tubes    Chronic mucoid otitis media of both ears        We had a long discussion about her history and her 2 audiograms.  She had a very significant conductive hearing loss on her 1st audiogram which has since nearly resolved.  She also has a sensorineural hearing loss which is worse in the high frequencies.  We do not have an older audiogram to compare to, but I suspect that her sensorineural component of her hearing loss is not new. She continues to have Type B tympanograms.     We discussed treatment options including bilateral myringotomy and tube placement versus continued medical therapy and we both agreed to continue with medical therapy with a follow-up in 2 weeks.    Dr. Shaw's Eustachian Tube Dysfunction Protocol          Day 1-3 (Perform 2x per day)    1.  Afrin (pump spray mist OTC) 2 sprays in each nostril   2. Fluticasone nasal spray 2 sprays in each nostril      Days 4 - follow up visit (perform 1x per day)    1. Fluticasone nasal spray 2 sprays in each nostril      *Autoinsufflate (POP your ears gently!) at least 4 times per day until your follow up appointment    *TAKE ALL OTHER MEDICATIONS AS DIRECTED BY DR SHAW    *MAKE SURE YOU STOP USING AFRIN AFTER THE 3RD DAY AS THERE IS A RISK OF BECOMING DEPENDENT ON THAT MEDICATION

## 2022-02-18 ENCOUNTER — PATIENT MESSAGE (OUTPATIENT)
Dept: OTOLARYNGOLOGY | Facility: CLINIC | Age: 72
End: 2022-02-18
Payer: MEDICARE

## 2022-02-21 ENCOUNTER — PATIENT MESSAGE (OUTPATIENT)
Dept: OTOLARYNGOLOGY | Facility: CLINIC | Age: 72
End: 2022-02-21
Payer: MEDICARE

## 2022-02-21 ENCOUNTER — TELEPHONE (OUTPATIENT)
Dept: FAMILY MEDICINE | Facility: CLINIC | Age: 72
End: 2022-02-21
Payer: MEDICARE

## 2022-02-21 NOTE — TELEPHONE ENCOUNTER
Fadi I sent this message to dr to see if she wanted to over book for pt and show her bp that pt sent..    She told me to schedule appt for her annual but for the bp she wanted to see if you or someone else could see..    MD Diana Guidry MA  Pt needs to be seen- please schedule for annual in the next few weeks, but see if Fadi or someone can see for BPs sooner.   SM         Please advise.

## 2022-02-23 ENCOUNTER — CLINICAL SUPPORT (OUTPATIENT)
Dept: AUDIOLOGY | Facility: CLINIC | Age: 72
End: 2022-02-23
Payer: MEDICARE

## 2022-02-23 ENCOUNTER — OFFICE VISIT (OUTPATIENT)
Dept: OTOLARYNGOLOGY | Facility: CLINIC | Age: 72
End: 2022-02-23
Payer: MEDICARE

## 2022-02-23 VITALS — TEMPERATURE: 98 F

## 2022-02-23 DIAGNOSIS — H90.A31 MIXED CONDUCTIVE AND SENSORINEURAL HEARING LOSS OF RIGHT EAR WITH RESTRICTED HEARING OF LEFT EAR: Primary | ICD-10-CM

## 2022-02-23 DIAGNOSIS — H69.91 ETD (EUSTACHIAN TUBE DYSFUNCTION), RIGHT: ICD-10-CM

## 2022-02-23 DIAGNOSIS — D84.9 IMMUNOSUPPRESSED STATUS: ICD-10-CM

## 2022-02-23 DIAGNOSIS — H69.91 DYSFUNCTION OF RIGHT EUSTACHIAN TUBE: Primary | ICD-10-CM

## 2022-02-23 PROCEDURE — 92567 TYMPANOMETRY: CPT | Mod: PBBFAC | Performed by: AUDIOLOGIST-HEARING AID FITTER

## 2022-02-23 PROCEDURE — 99213 PR OFFICE/OUTPT VISIT, EST, LEVL III, 20-29 MIN: ICD-10-PCS | Mod: S$PBB,,, | Performed by: OTOLARYNGOLOGY

## 2022-02-23 PROCEDURE — 99213 OFFICE O/P EST LOW 20 MIN: CPT | Mod: S$PBB,,, | Performed by: OTOLARYNGOLOGY

## 2022-02-23 PROCEDURE — 99999 PR PBB SHADOW E&M-EST. PATIENT-LVL III: CPT | Mod: PBBFAC,,, | Performed by: OTOLARYNGOLOGY

## 2022-02-23 PROCEDURE — 92553 AUDIOMETRY AIR & BONE: CPT | Mod: PBBFAC | Performed by: AUDIOLOGIST-HEARING AID FITTER

## 2022-02-23 PROCEDURE — 99213 OFFICE O/P EST LOW 20 MIN: CPT | Mod: PBBFAC | Performed by: OTOLARYNGOLOGY

## 2022-02-23 PROCEDURE — 99999 PR PBB SHADOW E&M-EST. PATIENT-LVL III: ICD-10-PCS | Mod: PBBFAC,,, | Performed by: OTOLARYNGOLOGY

## 2022-02-23 NOTE — PROGRESS NOTES
Referring provider: Dr. Pete Larryreuben was seen 02/23/2022 for an audiological evaluation.  Patient complains of decrease in hearing and increase in ear pressure since her last visit. She contracted COVID-19 at the end of January and subsequently noticed that she had significantly decreased hearing.  She was treated with antibiotics but her muffled hearing has continued.  She was seen in our office and an audiogram was performed which demonstrated a mixed hearing loss.  Since that time she was treated with high-dose steroids, fluticasone nasal spray, autoinsufflation. No vertigo or tinnitus.    Results reveal a mild-to-moderate mixed hearing loss 250-8000 Hz for the right ear, and a borderline normal-to-moderate sensorineural hearing loss 250-8000 Hz for the left ear.  Tympanograms were Type C for the right ear and Type As for the left ear.    Since her previous exam 02/16/22, today's results indicate an improvement in hearing and TM mobility for both ears, with a persistent with mild ABG for the right ear.     Patient was counseled on the above findings.    Recommendations:  1. ENT

## 2022-02-23 NOTE — PROGRESS NOTES
Referring Provider:    No referring provider defined for this encounter.  Subjective:   Patient: Leticia Piña [id number], :1950   Visit date:2022 2:54 PM    Chief Complaint:  Hearing Loss and Dizziness    HPI:    Prior notes reviewed by myself.  Clinical documentation obtained by nursing staff reviewed.       61-year-old female presents in follow-up for hearing loss.  She contracted COVID-19 at the end of January and subsequently noticed that she had significantly decreased hearing.  She was treated with antibiotics but her muffled hearing has continued.  She was seen in our office and an audiogram was performed which demonstrated a mixed hearing loss.  Since that time she was treated with high-dose steroids, fluticasone nasal spray, autoinsufflation.  She does feel that her hearing has improved but not completely.  No vertigo or tinnitus.    Objective:     Physical Exam:  Vitals:  Temp 98.2 °F (36.8 °C) (Temporal)   General appearance:  Well developed, well nourished    Ears:  Otoscopy of external auditory canals and tympanic membranes was normal, clinical speech reception thresholds grossly intact, no mass/lesion of auricle.    Nose:  No masses/lesions of external nose, nasal mucosa, septum, and turbinates were within normal limits.    Mouth:  No mass/lesion of lips, teeth, gums, hard/soft palate, tongue, tonsils, or oropharynx.    Neck & Lymphatics:  No cervical lymphadenopathy, no neck mass/crepitus/ asymmetry, trachea is midline, no thyroid enlargement/tenderness/mass.                       Assessment & Plan:   Dysfunction of right eustachian tube      Left ETD resolved.  Right still with very small air bone gap but TM mobility demonstrates progressive improvement  Recheck in 2 months

## 2022-03-07 ENCOUNTER — PATIENT MESSAGE (OUTPATIENT)
Dept: RHEUMATOLOGY | Facility: CLINIC | Age: 72
End: 2022-03-07
Payer: MEDICARE

## 2022-03-07 RX ORDER — MELOXICAM 15 MG/1
15 TABLET ORAL DAILY
Qty: 30 TABLET | Refills: 1 | Status: SHIPPED | OUTPATIENT
Start: 2022-03-07 | End: 2022-05-10

## 2022-03-27 DIAGNOSIS — R10.10 UPPER ABDOMINAL PAIN: ICD-10-CM

## 2022-03-28 RX ORDER — OMEPRAZOLE 40 MG/1
40 CAPSULE, DELAYED RELEASE ORAL DAILY
Qty: 90 CAPSULE | Refills: 0 | Status: SHIPPED | OUTPATIENT
Start: 2022-03-28 | End: 2022-03-29

## 2022-03-30 ENCOUNTER — TELEPHONE (OUTPATIENT)
Dept: ADMINISTRATIVE | Facility: HOSPITAL | Age: 72
End: 2022-03-30
Payer: MEDICARE

## 2022-04-12 DIAGNOSIS — K21.00 GASTROESOPHAGEAL REFLUX DISEASE WITH ESOPHAGITIS WITHOUT HEMORRHAGE: ICD-10-CM

## 2022-04-12 DIAGNOSIS — R05.9 COUGH: Primary | ICD-10-CM

## 2022-04-18 ENCOUNTER — PATIENT MESSAGE (OUTPATIENT)
Dept: PSYCHIATRY | Facility: CLINIC | Age: 72
End: 2022-04-18
Payer: MEDICARE

## 2022-04-18 ENCOUNTER — TELEPHONE (OUTPATIENT)
Dept: FAMILY MEDICINE | Facility: CLINIC | Age: 72
End: 2022-04-18
Payer: MEDICARE

## 2022-04-19 ENCOUNTER — PATIENT MESSAGE (OUTPATIENT)
Dept: PSYCHIATRY | Facility: CLINIC | Age: 72
End: 2022-04-19
Payer: MEDICARE

## 2022-04-20 ENCOUNTER — OFFICE VISIT (OUTPATIENT)
Dept: FAMILY MEDICINE | Facility: CLINIC | Age: 72
End: 2022-04-20
Payer: MEDICARE

## 2022-04-20 VITALS
BODY MASS INDEX: 23.76 KG/M2 | TEMPERATURE: 97 F | HEIGHT: 65 IN | DIASTOLIC BLOOD PRESSURE: 72 MMHG | RESPIRATION RATE: 18 BRPM | OXYGEN SATURATION: 98 % | HEART RATE: 75 BPM | WEIGHT: 142.63 LBS | SYSTOLIC BLOOD PRESSURE: 140 MMHG

## 2022-04-20 DIAGNOSIS — M35.00 SJOGREN'S SYNDROME, WITH UNSPECIFIED ORGAN INVOLVEMENT: ICD-10-CM

## 2022-04-20 DIAGNOSIS — R05.9 COUGH: ICD-10-CM

## 2022-04-20 DIAGNOSIS — M85.80 OSTEOPENIA WITH HIGH RISK OF FRACTURE: ICD-10-CM

## 2022-04-20 DIAGNOSIS — L93.2 DRUG-INDUCED LUPUS ERYTHEMATOSUS: ICD-10-CM

## 2022-04-20 DIAGNOSIS — K21.00 GASTROESOPHAGEAL REFLUX DISEASE WITH ESOPHAGITIS WITHOUT HEMORRHAGE: ICD-10-CM

## 2022-04-20 DIAGNOSIS — Z23 NEED FOR SHINGLES VACCINE: ICD-10-CM

## 2022-04-20 DIAGNOSIS — F51.01 PRIMARY INSOMNIA: ICD-10-CM

## 2022-04-20 DIAGNOSIS — Z00.00 ENCOUNTER FOR PREVENTIVE HEALTH EXAMINATION: Primary | ICD-10-CM

## 2022-04-20 DIAGNOSIS — I10 ESSENTIAL HYPERTENSION: ICD-10-CM

## 2022-04-20 DIAGNOSIS — F33.1 MODERATE EPISODE OF RECURRENT MAJOR DEPRESSIVE DISORDER: ICD-10-CM

## 2022-04-20 DIAGNOSIS — N18.31 STAGE 3A CHRONIC KIDNEY DISEASE: ICD-10-CM

## 2022-04-20 DIAGNOSIS — F41.8 ANXIETY ASSOCIATED WITH DEPRESSION: ICD-10-CM

## 2022-04-20 DIAGNOSIS — T50.905A DRUG-INDUCED LUPUS ERYTHEMATOSUS: ICD-10-CM

## 2022-04-20 DIAGNOSIS — M05.79 RHEUMATOID ARTHRITIS INVOLVING MULTIPLE SITES WITH POSITIVE RHEUMATOID FACTOR: ICD-10-CM

## 2022-04-20 DIAGNOSIS — D84.9 IMMUNOCOMPROMISED: ICD-10-CM

## 2022-04-20 DIAGNOSIS — E72.12 METHYLENETETRAHYDROFOLATE REDUCTASE DEFICIENCY: ICD-10-CM

## 2022-04-20 DIAGNOSIS — E55.9 VITAMIN D DEFICIENCY DISEASE: ICD-10-CM

## 2022-04-20 DIAGNOSIS — E03.9 ACQUIRED HYPOTHYROIDISM: ICD-10-CM

## 2022-04-20 DIAGNOSIS — H91.93 DECREASED HEARING, BILATERAL: ICD-10-CM

## 2022-04-20 PROCEDURE — 99999 PR PBB SHADOW E&M-EST. PATIENT-LVL V: ICD-10-PCS | Mod: PBBFAC,,, | Performed by: NURSE PRACTITIONER

## 2022-04-20 PROCEDURE — 99215 OFFICE O/P EST HI 40 MIN: CPT | Mod: PBBFAC,PO | Performed by: NURSE PRACTITIONER

## 2022-04-20 PROCEDURE — 90750 HZV VACC RECOMBINANT IM: CPT | Mod: PBBFAC,PO

## 2022-04-20 PROCEDURE — 99999 PR PBB SHADOW E&M-EST. PATIENT-LVL V: CPT | Mod: PBBFAC,,, | Performed by: NURSE PRACTITIONER

## 2022-04-20 PROCEDURE — G0439 PR MEDICARE ANNUAL WELLNESS SUBSEQUENT VISIT: ICD-10-PCS | Mod: ,,, | Performed by: NURSE PRACTITIONER

## 2022-04-20 PROCEDURE — G0439 PPPS, SUBSEQ VISIT: HCPCS | Mod: ,,, | Performed by: NURSE PRACTITIONER

## 2022-04-20 NOTE — Clinical Note
Your patient was seen today for a HRA visit.  I have included a copy of my visit note, please review the note and feel free to contact me with any questions.  Thank you for allowing me to participate in the care of your patients.  Juli Melgoza NP

## 2022-04-20 NOTE — PROGRESS NOTES
"  Leticia Piña presented for a  Medicare AWV and comprehensive Health Risk Assessment today. The following components were reviewed and updated:    · Medical history  · Family History  · Social history  · Allergies and Current Medications  · Health Risk Assessment  · Health Maintenance  · Care Team         ** See Completed Assessments for Annual Wellness Visit within the encounter summary.**         The following assessments were completed:  · Living Situation  · CAGE  · Depression Screening  · Timed Get Up and Go  · Whisper Test  · Cognitive Function Screening  · Nutrition Screening  · ADL Screening  · PAQ Screening        Vitals:    04/20/22 0906   Resp: 18   Temp: 97 °F (36.1 °C)   SpO2: 98%   Weight: 64.7 kg (142 lb 10.2 oz)   Height: 5' 5" (1.651 m)     Body mass index is 23.74 kg/m².  Physical Exam  Vitals and nursing note reviewed.   Constitutional:       Appearance: Normal appearance. She is well-developed.   HENT:      Head: Normocephalic and atraumatic.   Eyes:      Pupils: Pupils are equal, round, and reactive to light.   Neck:      Vascular: No carotid bruit.   Cardiovascular:      Rate and Rhythm: Normal rate and regular rhythm.      Pulses: Normal pulses.      Heart sounds: Normal heart sounds. No murmur heard.    No gallop.   Pulmonary:      Effort: Pulmonary effort is normal.      Breath sounds: Examination of the right-upper field reveals decreased breath sounds and rales. Examination of the left-upper field reveals decreased breath sounds and rales. Decreased breath sounds and rales present.   Abdominal:      General: Bowel sounds are normal. There is no distension.      Palpations: Abdomen is soft.      Tenderness: There is no abdominal tenderness.   Musculoskeletal:         General: No tenderness. Normal range of motion.   Skin:     General: Skin is warm and dry.   Neurological:      Mental Status: She is alert.      Motor: No abnormal muscle tone.   Psychiatric:         Mood and Affect: " Affect is flat.         Speech: Speech normal.         Behavior: Behavior normal.         Thought Content: Thought content normal.         Judgment: Judgment normal.       Current Outpatient Medications   Medication Instructions    albuterol (VENTOLIN HFA) 90 mcg/actuation inhaler 2 puffs, Inhalation, Every 4-6 hours PRN, Rescue    azelastine (ASTELIN) 137 mcg, Nasal, 2 times daily    benzonatate (TESSALON) 200 mg, Oral, 3 times daily PRN    bupropion (WELLBUTRIN XL) 300 mg, Oral, Daily    cholecalciferol, vitamin D3, 5,000 unit/mL Drop 1 drop, Daily    cyanocobalamin, vitamin B-12, 5,000 mcg Subl Sublingual, Daily    dexbrompheniramine maleate (ALA-HIST IR) 2 mg Tab 1 tablet, Oral, Daily    diclofenac sodium (VOLTAREN) 2 g, Topical (Top), 4 times daily    DULoxetine (CYMBALTA) 60 mg, Oral, Daily    fluticasone propionate (FLONASE) 100 mcg, Each Nostril, 2 times daily    fluticasone propionate (FLONASE) 100 mcg, Each Nostril, 2 times daily    hydrochlorothiazide (HYDRODIURIL) 25 mg, Oral, Daily    hydroxychloroquine (PLAQUENIL) 200 mg tablet TAKE ONE TABLET BY MOUTH TWICE DAILY    levothyroxine (EUTHYROID) 75 mcg, Oral, Daily    Lorazepam (ATIVAN) 1 mg, Oral, 2 times daily PRN    losartan (COZAAR) 100 mg, Oral, Daily    meloxicam (MOBIC) 15 mg, Oral, Daily    methylPREDNISolone (MEDROL DOSEPACK) 4 mg tablet use as directed    mupirocin (BACTROBAN) 2 % ointment Topical (Top), 3 times daily    omeprazole (PRILOSEC) 40 MG capsule TAKE ONE CAPSULE BY MOUTH ONE TIME DAILY    potassium chloride SA (K-DUR,KLOR-CON) 20 MEQ tablet TAKE ONE TABLET BY MOUTH ONE TIME DAILY    predniSONE (DELTASONE) 20 MG tablet Take 3 tab in am x 3d, then 2 tab in am x 3d, then 1 tab in am x 3d, then 1/2 tab in am x 3d    RINVOQ 15 mg, Oral, Daily    trazodone (DESYREL) 50 MG tablet TAKE 1 TO 1.5 TABLETS BY MOUTH AT BEDTIME AS NEEDED FOR SLEEP.    triamcinolone acetonide 0.1% (KENALOG) 0.1 % cream Topical (Top), 2 times  daily, PRN rash of legs. Moderate steroid- may use for up to 4 weeks.    upadacitinib (RINVOQ) 15 mg 24 hr tablet Oral, Test claim only             Diagnoses and health risks identified today and associated recommendations/orders:    1. Encounter for preventive health examination    2. Methylenetetrahydrofolate reductase deficiency  Chronic and Stable on vitamin . Continue current treatment plan as previously prescribed with your rheumatologist    3. Rheumatoid arthritis involving multiple sites with positive rheumatoid factor  Chronic and Stable- states no recent flare up - not taking no med's  hydroxychloroquine (PLAQUENIL) 200 mg tablet     Continue current treatment plan as previously prescribed with your rheumatologist    4. Sjogren's syndrome, with unspecified organ involvement  Chronic and Stable Plaquenil Continue current treatment plan as previously prescribed with your rheumatologist    5. Drug-induced lupus erythematosus  Chronic and Stable. Continue current treatment plan as previously prescribed with your rheumatologist    6. Moderate episode of recurrent major depressive disorder  Chronic and Stable on Wellbutrin and Cymbalta. Continue current treatment plan as previously prescribed with your Psych  Md     7. Anxiety associated with depression  Chronic and Stable on Wellbutrin and Cymbalta. Continue current treatment plan as previously prescribed with your Psych  Md     8. Essential hypertension  Chronic and Stable on Losartan. Continue current treatment plan as previously prescribed with your PCP    9. Stage 3 a chronic kidney disease  Chronic and Stable on Losartan. Continue current treatment plan as previously prescribed with your PCP    10. Acquired hypothyroidism  Chronic and Stable on Synthroid. Continue current treatment plan as previously prescribed with your PCP    10. Vitamin D deficiency disease  Chronic and Stable on vitamin D . Continue current treatment plan as previously prescribed with  your PCP    12. Gastroesophageal reflux disease with esophagitis without hemorrhage  Chronic and Stable  On Prilosec. Continue current treatment plan as previously prescribed with your PCP    13. Osteopenia with high risk of fracture  Chronic and Stable on vitamin d. Continue current treatment plan as previously prescribed with your PCP    14. Primary insomnia  Chronic and Ongoing on Trazodone. Continue current treatment plan as previously prescribed with your psych Md    15. Decreased hearing, bilateral    Dx SNL hearing loss - followed by ent/audiologist    16. Need for shingles vaccine   2nd dose Shingrex given    17 Immunocompromised  Stable and controlled- mointer for infection . Continue current treatment plan as previously prescribed with your rheumatologist.     18. Cough  Chronic and Ongoing. Pt has chronic rales - currently seeing pulmo Md     I offered to discuss end of life issues, including information on how to make advance directives that the patient could use to name someone who would make medical decisions on their behalf if they became too ill to make themselves.  _X_Patient is interested, I provided paper work and offered to discuss    Provided Leticia with a 5-10 year written screening schedule and personal prevention plan. Recommendations were developed using the USPHS age appropriate recommendations. Education, counseling, and referrals were provided as needed. After Visit Summary printed and given to patient which includes a list of additional screenings\tests needed.    Follow up in about 1 year (around 4/20/2023) for Follow up with PCP.

## 2022-04-20 NOTE — PATIENT INSTRUCTIONS
Counseling and Referral of Other Preventative  (Italic type indicates deductible and co-insurance are waived)    Patient Name: Leticia Piña  Today's Date: 4/20/2022    Health Maintenance       Date Due Completion Date    Shingles Vaccine (2 of 2) 08/03/2021 6/8/2021    COVID-19 Vaccine (3 - Booster for Pfizer series) 08/15/2021 3/15/2021    Colorectal Cancer Screening 12/14/2021 12/14/2011    Pneumococcal Vaccines (Age 65+) (4 - PPSV23 or PCV20) 03/23/2022 11/27/2019    Mammogram 12/22/2022 12/22/2021    Override on 9/13/2012: Done    DEXA Scan 11/24/2023 11/24/2020    Override on 12/13/2011: Done (Recheck in 3 years)    Lipid Panel 06/08/2026 6/8/2021    TETANUS VACCINE 06/21/2031 6/21/2021        No orders of the defined types were placed in this encounter.    The following information is provided to all patients.  This information is to help you find resources for any of the problems found today that may be affecting your health:                Living healthy guide: www.[facility].[place].gov      Understanding Diabetes: www.diabetes.org      Eating healthy: www.cdc.gov/healthyweight      CDC home safety checklist: www.cdc.gov/steadi/patient.html      Agency on Aging: www.goea.[place].[facility]      Alcoholics anonymous (AA): www.aa.org      Physical Activity: www.mallory.nih.gov/rx8yfjj      Tobacco use: www.quitwithusla.org

## 2022-04-22 PROBLEM — H90.3 SENSORINEURAL HEARING LOSS (SNHL) OF BOTH EARS: Status: ACTIVE | Noted: 2022-04-22

## 2022-05-05 RX ORDER — BUPROPION HYDROCHLORIDE 300 MG/1
300 TABLET ORAL DAILY
Qty: 90 TABLET | Refills: 0 | Status: SHIPPED | OUTPATIENT
Start: 2022-05-05 | End: 2022-07-07 | Stop reason: SDUPTHER

## 2022-05-06 DIAGNOSIS — I10 ESSENTIAL HYPERTENSION: ICD-10-CM

## 2022-05-06 DIAGNOSIS — F41.8 MIXED ANXIETY AND DEPRESSIVE DISORDER: ICD-10-CM

## 2022-05-06 NOTE — TELEPHONE ENCOUNTER
Care Due:                  Date            Visit Type   Department     Provider  --------------------------------------------------------------------------------                                MYCHART                              ANNUAL                              CHECKUP/PHY  [facility]    Nicole Lisa  Last Visit: 06-      [facility]       [place]                              [facility]    Nicole Lisa  Next Visit: 05-      [facility]   [facility]                                                            Last  Test          Frequency    Reason                     Performed    Due Date  --------------------------------------------------------------------------------    TSH.........  12 months..  levothyroxine............  06- 06-    [facility] Embedded Care Gaps. Reference number: [id number]. 5/06/2022   1:22:22 PM CDT

## 2022-05-06 NOTE — TELEPHONE ENCOUNTER
Refill Routing Note   Medication(s) are not appropriate for processing by [facility] for the following reason(s):      - Patient has been seen in the ED/Hospital since the last PCP visit    [facility] action(s):  Defer          Medication reconciliation completed: No     Appointments  past 12m or future 3m with PCP    Date Provider   Last Visit   6/8/2021 Nicole Jasmine MD   Next Visit   5/31/2022 Nicole Jasmine MD   ED visits in past 90 days: 0        Note composed:6:21 PM 05/06/2022

## 2022-05-07 RX ORDER — HYDROCHLOROTHIAZIDE 25 MG/1
TABLET ORAL
Qty: 90 TABLET | Refills: 3 | Status: SHIPPED | OUTPATIENT
Start: 2022-05-07 | End: 2023-07-10 | Stop reason: SDUPTHER

## 2022-05-07 RX ORDER — DULOXETIN HYDROCHLORIDE 60 MG/1
CAPSULE, DELAYED RELEASE ORAL
Qty: 90 CAPSULE | Refills: 0 | Status: SHIPPED | OUTPATIENT
Start: 2022-05-07 | End: 2022-06-23 | Stop reason: SDUPTHER

## 2022-06-16 ENCOUNTER — PATIENT MESSAGE (OUTPATIENT)
Dept: GASTROENTEROLOGY | Facility: CLINIC | Age: 72
End: 2022-06-16
Payer: MEDICARE

## 2022-06-21 RX ORDER — TRAZODONE HYDROCHLORIDE 50 MG/1
TABLET ORAL
Qty: 180 TABLET | Refills: 0 | Status: SHIPPED | OUTPATIENT
Start: 2022-06-21 | End: 2022-07-07 | Stop reason: SDUPTHER

## 2022-07-07 ENCOUNTER — OFFICE VISIT (OUTPATIENT)
Dept: PSYCHIATRY | Facility: CLINIC | Age: 72
End: 2022-07-07
Payer: MEDICARE

## 2022-07-07 DIAGNOSIS — F41.8 MIXED ANXIETY AND DEPRESSIVE DISORDER: ICD-10-CM

## 2022-07-07 DIAGNOSIS — G47.00 INSOMNIA, UNSPECIFIED TYPE: Primary | ICD-10-CM

## 2022-07-07 PROCEDURE — 99214 PR OFFICE/OUTPT VISIT, EST, LEVL IV, 30-39 MIN: ICD-10-PCS | Mod: 95,,, | Performed by: PSYCHIATRY & NEUROLOGY

## 2022-07-07 PROCEDURE — 99214 OFFICE O/P EST MOD 30 MIN: CPT | Mod: 95,,, | Performed by: PSYCHIATRY & NEUROLOGY

## 2022-07-07 RX ORDER — BUPROPION HYDROCHLORIDE 300 MG/1
300 TABLET ORAL DAILY
Qty: 90 TABLET | Refills: 1 | Status: SHIPPED | OUTPATIENT
Start: 2022-07-07 | End: 2023-01-09 | Stop reason: SDUPTHER

## 2022-07-07 RX ORDER — LORAZEPAM 1 MG/1
1 TABLET ORAL 2 TIMES DAILY
Qty: 60 TABLET | Refills: 1 | Status: SHIPPED | OUTPATIENT
Start: 2022-07-07 | End: 2023-01-09 | Stop reason: SDUPTHER

## 2022-07-07 RX ORDER — TRAZODONE HYDROCHLORIDE 50 MG/1
TABLET ORAL
Qty: 180 TABLET | Refills: 1 | Status: SHIPPED | OUTPATIENT
Start: 2022-07-07 | End: 2022-12-20

## 2022-07-07 RX ORDER — DULOXETIN HYDROCHLORIDE 60 MG/1
60 CAPSULE, DELAYED RELEASE ORAL DAILY
Qty: 90 CAPSULE | Refills: 1 | Status: SHIPPED | OUTPATIENT
Start: 2022-07-07 | End: 2022-09-21 | Stop reason: SDUPTHER

## 2022-07-07 NOTE — PROGRESS NOTES
"Outpatient Psychiatry Follow-up Visit (MD/NP)    7/7/2022    Leticia Piña, a 72 y.o. female, presenting for follow-up visit. Met with patient.    Reason for Encounter: Patient complains of anxiety, depression    Interval Hx: Patient seen and interviewed for follow-up, last seen about 14 months ago. This was a VIDEO VISIT. Reports having retired in May, moved in December. Future plans undetermined. Working on household projects. No new health problems since last visit. New medication - ringvoq for RA. Has been helpful. No other new medications. Mental health mostly ok. "nervous stomach" from multiple ongoing/unresolved projects. Taking a few ativan in previous weeks. Tends to help ok. Sleeping ok with trazodone - agrees with her. Mom getting 24/7 care.     Background: 68 year old F with hx of anxiety and depression x ~20 years, with onset roughly corresponding to menopause in 1997. Describes following illness and treatment course:     Perimenopause - 1997 - insomnia roblems ; start ambien.   August 2002 - emotional distress  Can't accomplish anything (decreased functioning), feeling overwhelmed, going around in circles.   September '02 - first treatment - citalopram 20 mg. Thought it it was causing dry mouth (later dx'ed sjogrens). Feels helped symptoms present all her life that she later recognized as depression. Reduced citalopram.   November '03- more perimenopausal symptoms. Started muliti-hormonal treatment for menopausal symptoms. Had temporary benefit, stopped in '04 due to negligible benefit by that time. Remained on estradiol  Eventually went back to 20 mg citalopram.   Stress and fatigue in May '06. More dryness. Early sjogren's. Stopped citalopram, tried duloxetine.   2.07-stopped menses, 3.07 - back to progest/test, off est.   Ativan helped anxiety, caused lethargy.   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 Insomnia ongoing despite ambien. " "  Increased ambien to 15.   In '09 added trazodone 75 mg - helped in combo with ambien  2012 - cymbalta to 90 mg.   '13 - added wellbutrin 100 mg' diagnosed with sjogren's.   '15 - wellbutrin 150, trazodone 100 mg. Stopped ambien  Had a dip in moods in early summer.   Taking 225 mg bupropion - starting about 1 month ago. 300 mg was too intense. Tolerating this lower dose.     Worrying too much about different things   Trouble relaxing   SUSAN-7 = 2    Sleep Problems   Sad Mood  Appetite and weight changes  Concentration problems  Guilt  Thoughts of Emptiness/Death/Suicide  Anhedonia  Anergia  Slowing/PMR    QIDS = 7    Rested on rising. Wakes only briefly. Sometimes has trazodone hangover. Doesn't interfere with functioning. Interest is good.   Anxiety fluctuates. Recently taking ativan about 1-2x/week.     Psych Hx: as above. No avh, no hospitalization, no serious SI, elaine. One previous psych assessment in [facility] - "horrible experience". She changed meds (to escitalopram), felt terrible.    No other interactions with psychiatrist.     Social history: When younger - low self-esteem, dad was pessimistic and fault-finding. Dad critical. No nancy abuse. No serious maltreatment or trauma. Fewer than most friends (mostly because they were outgoing; thought poorly of one's self, inhibited. Denies teasing and bullying. Above average in school. Graduated HS, went to work x 1 year then went to [employer]. Started dating . Didn't graduate. 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 Gets good feedback on performance. Good relationship with .  x 43 years. 2 grown kids, 4 grandkids. Kids live locally. Doesn't see her son much. Dtr-in-law leads them to not visit with them.  lost business as late consequence of bad economy & a "misappropriation of funds" issue &  health issues. Was primary caregiver to " "mother-in-law (now in [place]). Was primary caregiver to granddaughter who had medical disabilities. "my lowest point".  now working, "see good future ahead".       From PCP note: 3.2.18 - Here for follow up of medical problems. Works long hours. Works & then goes to sleep right after she gets home. RA pain is doing well. [place] weird so started checking BP. Has been monitoring BPs, range 130-174/ . Has no past history of HTN. No med change. Only major change is started working 3 months ago, , & babysits. No exercise. Sleeping ok with trazodone 100mg. Mixed anxiety & depressive disorder- incr wellbutrin to 300, cont  cymbalta 60mg, trazodone 100 hs, try wean ativan to half hs.  Refer for counseling, refer to Psychiatry if not better in 6 weeks.  RTC 6 weeks.    From psychotherapy eval    Chief complaint/reason for encounter: depression, anxiety, sleep and interpersonal     History of present illness:  67 year old  female presented for initial evaluation, with chief complaint of "I've always been kind of anxious & depressed for as long as I can remember, but it just really started getting worse since around 2009..." Pt described a few challenging stressful events starting then, that she feels have accumulate in her life. These include financial strain,her own worsening Rhumatoid Arthritis--diagnosed in 1994, loss of home & the family business in 2013, 's life threatening health scare starting in 2009, legal troubles related to his business, & a feeling of social abandonment from mother, sister, some friends, & daughter-in-law (which means her son & his 2 kids don't see her much anymore.) Pt described in recent months worsening symptoms of sudden uncontrolled tearfulness, increased muscle tension, being distracted, pervasive ruminating worries, loss of senthil, trouble sleeping unless she takes a sleeping aid--currently Trazodone, & tendency to socially isolate. She " stated she has never been very sociable. She now feels more rejection & scrutiny from others & finds herself withdrawing. She said much of the negativity from some family & friends relates to 's legal trouble, having been arrested for business related fraud from a period when he was juggling inadequate customer funds & misappropriated a customer's money to cover expenses of someone else's job; all that while he was battling cancer. Pt said she marvels that her  seems able to stay generally optimistic & pleasant & that he has a lot of friends. Meanwhile, she lacks much social support & stated she isn;t comfortable reaching out to connect with people, so she has a small support network to begin with. She feels devastated by judgment against her  by her own sister, mother, & daughter in law, with the result that she is often unable to spend time with 2 of her grandchildren; she noted those children visit with the daughter-in-law's parents virtually every weekend. Pt described a certain pressure level of social expectation- -material & financial standards, & she & her  have lose their home, as well as the business, & they are living with her parents. 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 She said they have a lot of financial backlog of bills to catch up on. Pt denied any suicidal or homicidal ideation, denied any cognitive deficit other than poor focus at times, denied psychosis, mood swings, rages, or substance abuse. Identified therapeutic goals include reducing anxiety & depression & improving coping skills; possibly finding a way to somewhat reduce social isolation, perhaps by reconnecting with old friends.       Pain: not quantified but described as moderate RA pain     Symptoms:   ? Mood: depressed mood,  "insomnia, worthlessness/guilt, poor concentration, tearfulness and social isolation  ? Anxiety: excessive anxiety/worry, restlessness/keyed up, muscle tension and social phobia  ? Substance abuse: denied  ? Cognitive functioning: denied  ? Health behaviors: noncontributory     Psychiatric history: none     Medical history: RA--diagnosed in 1994; recent hypertension; Hypothyroidism     Family history of psychiatric illness: mother chronically anxious (starting when older); father apparently depressed--very pessimistic & irritable, doesn't acknowledge problem. Suspects paternal grandfather     Social history: Grew up locally, the oldest of 3 sisters, the other two 7 & 8 & 1/2 years younger than she. Raised by both parents; recalled being markedly shy her entire life; not particularly close to her sisters, who were much younger & had each other. Lifelong active [employer]; has a home [employer]. Not currently involved in any smaller groups within the [employer]. Graduated high school & attended 2 years of college;  at age 24. Still with , Alpesh, today.  Mother of 2, a son, age 42, & daughter, age 39. Both each have 2 children of their own. Described daughter as a close source of support; also talks with  a lot, processing the struggles they have come through together. 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 Pt described recent loss of a few friend connections she had; her middle sister verbally declared she wouldn't associate with the pt & her , due to his legal "disgrace."  Denied any  experience.  No tobacco, light to moderate wine intake. Minimal caffeine, no recreational drugs.       Substance use:   Alcohol: occasional   Drugs: none   Tobacco: none   Caffeine: a cup of green tea most mornings.     Review Of Systems:     GENERAL:  No weight gain or loss  SKIN:  No rashes or lacerations  HEAD:  No headaches  EYES:  No exophthalmos, jaundice or " blindness  EARS:  No dizziness, tinnitus or hearing loss  NOSE:  No changes in smell  MOUTH & THROAT:  No dyskinetic movements or obvious goiter  CHEST:  No shortness of breath, hyperventilation or cough  CARDIOVASCULAR:  No tachycardia or chest pain  ABDOMEN:  No nausea, vomiting, pain, constipation or diarrhea  URINARY:  No frequency, dysuria or sexual dysfunction  ENDOCRINE:  No polydipsia, polyuria  MUSCULOSKELETAL:  No pain or stiffness of the joints  NEUROLOGIC:  No weakness, sensory changes, seizures, confusion, memory loss, tremor or other abnormal movements    Current Evaluation:     Nutritional Screening: Considering the patient's height and weight, medications, medical history and preferences, should a referral be made to the dietitian? no    Constitutional  Vitals:  Most recent vital signs, dated less than 90 days prior to this appointment, were not reviewed.    There were no vitals filed for this visit.     General:  unremarkable, age appropriate     Musculoskeletal  Muscle Strength/Tone:  no tremor, no tic   Gait & Station:  non-ataxic     Psychiatric  Appearance: casually dressed & groomed;   Behavior: calm,   Cooperation: cooperative with assessment  Speech: normal rate, volume, tone  Thought Process: linear, goal-directed  Thought Content: No suicidal or homicidal ideation; no delusions  Affect: reactive  Mood: euthymic  Perceptions: No auditory or visual hallucinations  Level of Consciousness: alert throughout interview  Insight: fair  Cognition: Oriented to person, place, time, & situation  Memory: no apparent deficits to general clinical interview; not formally assessed  Attention/Concentration: no apparent deficits to general clinical interview; not formally assessed  Fund of Knowledge: average by vocabulary/education    Laboratory Data  No visits with results within 1 Month(s) from this visit.   Latest known visit with results is:   Lab Visit on 10/14/2021   Component Date Value Ref Range  Status    WBC 10/14/2021 8.14  3.90 - 12.70 K/uL Final    RBC 10/14/2021 4.14  4.00 - 5.40 M/uL Final    Hemoglobin 10/14/2021 12.1  12.0 - 16.0 g/dL Final    Hematocrit 10/14/2021 36.5 (A) 37.0 - 48.5 % Final    MCV 10/14/2021 88  82 - 98 fL Final    MCH 10/14/2021 29.2  27.0 - 31.0 pg Final    MCHC 10/14/2021 33.2  32.0 - 36.0 g/dL Final    RDW 10/14/2021 11.7  11.5 - 14.5 % Final    Platelets 10/14/2021 267  150 - 450 K/uL Final    MPV 10/14/2021 9.0 (A) 9.2 - 12.9 fL Final    Immature Granulocytes 10/14/2021 0.2  0.0 - 0.5 % Final    Gran # (ANC) 10/14/2021 4.3  1.8 - 7.7 K/uL Final    Immature Grans (Abs) 10/14/2021 0.02  0.00 - 0.04 K/uL Final    Lymph # 10/14/2021 2.3  1.0 - 4.8 K/uL Final    Mono # 10/14/2021 0.8  0.3 - 1.0 K/uL Final    Eos # 10/14/2021 0.6 (A) 0.0 - 0.5 K/uL Final    Baso # 10/14/2021 0.09  0.00 - 0.20 K/uL Final    nRBC 10/14/2021 0  0 /100 WBC Final    Gran % 10/14/2021 53.1  38.0 - 73.0 % Final    Lymph % 10/14/2021 28.6  18.0 - 48.0 % Final    Mono % 10/14/2021 9.6  4.0 - 15.0 % Final    Eosinophil % 10/14/2021 7.4  0.0 - 8.0 % Final    Basophil % 10/14/2021 1.1  0.0 - 1.9 % Final    Differential Method 10/14/2021 Automated   Final    Sodium 10/14/2021 138  136 - 145 mmol/L Final    Potassium 10/14/2021 3.5  3.5 - 5.1 mmol/L Final    Chloride 10/14/2021 97  95 - 110 mmol/L Final    CO2 10/14/2021 31 (A) 23 - 29 mmol/L Final    Glucose 10/14/2021 97  70 - 110 mg/dL Final    BUN 10/14/2021 17  8 - 23 mg/dL Final    Creatinine 10/14/2021 0.8  0.5 - 1.4 mg/dL Final    Calcium 10/14/2021 9.3  8.7 - 10.5 mg/dL Final    Total Protein 10/14/2021 7.3  6.0 - 8.4 g/dL Final    Albumin 10/14/2021 3.9  3.5 - 5.2 g/dL Final    Total Bilirubin 10/14/2021 0.4  0.1 - 1.0 mg/dL Final    Alkaline Phosphatase 10/14/2021 60  55 - 135 U/L Final    AST 10/14/2021 20  10 - 40 U/L Final    ALT 10/14/2021 18  10 - 44 U/L Final    Anion Gap 10/14/2021 10  8 - 16 mmol/L  Final    eGFR if African American 10/14/2021 >60  >60 mL/min/1.73 m^2 Final    eGFR if non African American 10/14/2021 >60  >60 mL/min/1.73 m^2 Final    Ferritin 10/14/2021 100  20.0 - 300.0 ng/mL Final    Iron 10/14/2021 86  30 - 160 ug/dL Final    Transferrin 10/14/2021 307  200 - 375 mg/dL Final    TIBC 10/14/2021 454 (A) 250 - 450 ug/dL Final    Saturated Iron 10/14/2021 19 (A) 20 - 50 % Final     Medications  Outpatient Encounter Medications as of 7/7/2022   Medication Sig Dispense Refill    albuterol (VENTOLIN HFA) 90 mcg/actuation inhaler Inhale 2 puffs into the lungs every 4 to 6 hours as needed for Wheezing or Shortness of Breath. Rescue 18 g 2    azelastine (ASTELIN) 137 mcg (0.1 %) nasal spray 1 spray (137 mcg total) by Nasal route 2 (two) times daily. 30 mL 11    benzonatate (TESSALON) 100 MG capsule Take 2 capsules (200 mg total) by mouth 3 (three) times daily as needed. 60 capsule 3    buPROPion (WELLBUTRIN XL) 300 MG 24 hr tablet Take 1 tablet (300 mg total) by mouth once daily. 90 tablet 0    cholecalciferol, vitamin D3, 5,000 unit/mL Drop Take 1 drop by mouth Daily.      cyanocobalamin, vitamin B-12, 5,000 mcg Subl Place under the tongue once daily.      dexbrompheniramine maleate (ALA-HIST IR) 2 mg Tab Take 1 tablet by mouth once daily. 30 tablet 2    diclofenac sodium (VOLTAREN) 1 % Gel Apply 2 g topically 4 (four) times daily. 300 g 3    DULoxetine (CYMBALTA) 60 MG capsule TAKE ONE CAPSULE BY MOUTH ONE TIME DAILY 90 capsule 0    fluticasone propionate (FLONASE) 50 mcg/actuation nasal spray 2 sprays (100 mcg total) by Each Nostril route 2 (two) times a day. 9.9 mL 11    fluticasone propionate (FLONASE) 50 mcg/actuation nasal spray 2 sprays (100 mcg total) by Each Nostril route 2 (two) times daily. 9.9 mL 11    fluticasone propionate (FLONASE) 50 mcg/actuation nasal spray INSTILL TWO SPRAYS IN EACH NOSTRIL TWICE DAILY 16 g 3    hydroCHLOROthiazide (HYDRODIURIL) 25 MG tablet TAKE  ONE TABLET BY MOUTH ONE TIME DAILY 90 tablet 3    hydrOXYchloroQUINE (PLAQUENIL) 200 mg tablet TAKE ONE TABLET BY MOUTH TWICE DAILY 180 tablet 0    levothyroxine (EUTHYROX) 75 MCG tablet Take 1 tablet (75 mcg total) by mouth once daily. 90 tablet 3    LORazepam (ATIVAN) 1 MG tablet TAKE ONE TABLET BY MOUTH TWICE DAILY 60 tablet 1    losartan (COZAAR) 100 MG tablet Take 1 tablet (100 mg total) by mouth once daily. 90 tablet 3    losartan (COZAAR) 50 MG tablet TAKE TWO TABLETS BY MOUTH ONCE DAILY 120 tablet 3    meloxicam (MOBIC) 15 MG tablet TAKE ONE TABLET BY MOUTH ONE TIME DAILY 30 tablet 1    methylPREDNISolone (MEDROL DOSEPACK) 4 mg tablet use as directed 21 tablet 0    mupirocin (BACTROBAN) 2 % ointment Apply topically 3 (three) times daily. 30 g 0    omeprazole (PRILOSEC) 40 MG capsule TAKE ONE CAPSULE BY MOUTH ONE TIME DAILY 30 capsule 0    potassium chloride SA (K-DUR,KLOR-CON) 20 MEQ tablet TAKE ONE TABLET BY MOUTH ONE TIME DAILY 90 tablet 3    traZODone (DESYREL) 50 MG tablet TAKE 1 TO 2 TABLETS BY MOUTH AT BEDTIME AS NEEDED FOR SLEEP. 180 tablet 0    upadacitinib (RINVOQ) 15 mg 24 hr tablet Take by mouth. Test claim only 30 tablet 0     No facility-administered encounter medications on file as of 7/7/2022.     Assessment - Diagnosis - Goals:     Impression: 72 year old F with chronic anxiety and depression, RA and sjogren's. Had exacerbation of illness in spring '18, currently doing somewhat better following medication changes, psychotherapy. Generally stable, tolerating ongoing, beneficial treatment.     Treatment Goals:  Specify outcomes written in observable, behavioral terms:   Minimize recurrences    Treatment Plan/Recommendations:   · Duloxetine, bupropion, lorazepam prn. Takes trazodone prn sleep.  *consider antidepressant adjustment - duloxetine up   · psychoeducation including behavioral activation, psychotherapy, meds.   · Discussed services for father including hospice that may help.     · Discussed risks, benefits, and alternatives to treatment plan documented above with patient. I answered all patient questions related to this plan and patient expressed understanding and agreement.     Return to Clinic: 2 months    HEIDY Long MD  Psychiatry  [facility]  [address] , [place] [address]  [phone]

## 2022-08-03 ENCOUNTER — OFFICE VISIT (OUTPATIENT)
Dept: INTERNAL MEDICINE | Facility: CLINIC | Age: 72
End: 2022-08-03
Payer: MEDICARE

## 2022-08-03 VITALS
HEART RATE: 84 BPM | DIASTOLIC BLOOD PRESSURE: 84 MMHG | BODY MASS INDEX: 25.2 KG/M2 | SYSTOLIC BLOOD PRESSURE: 138 MMHG | WEIGHT: 151.25 LBS | HEIGHT: 65 IN | TEMPERATURE: 97 F

## 2022-08-03 DIAGNOSIS — B02.9 HERPES ZOSTER WITHOUT COMPLICATION: Primary | ICD-10-CM

## 2022-08-03 PROCEDURE — 99215 OFFICE O/P EST HI 40 MIN: CPT | Mod: PBBFAC,PO | Performed by: NURSE PRACTITIONER

## 2022-08-03 PROCEDURE — 99213 OFFICE O/P EST LOW 20 MIN: CPT | Mod: S$PBB,,, | Performed by: NURSE PRACTITIONER

## 2022-08-03 PROCEDURE — 99999 PR PBB SHADOW E&M-EST. PATIENT-LVL V: ICD-10-PCS | Mod: PBBFAC,,, | Performed by: NURSE PRACTITIONER

## 2022-08-03 PROCEDURE — 99999 PR PBB SHADOW E&M-EST. PATIENT-LVL V: CPT | Mod: PBBFAC,,, | Performed by: NURSE PRACTITIONER

## 2022-08-03 PROCEDURE — 99213 PR OFFICE/OUTPT VISIT, EST, LEVL III, 20-29 MIN: ICD-10-PCS | Mod: S$PBB,,, | Performed by: NURSE PRACTITIONER

## 2022-08-03 RX ORDER — VALACYCLOVIR HYDROCHLORIDE 1 G/1
1000 TABLET, FILM COATED ORAL 3 TIMES DAILY
Qty: 21 TABLET | Refills: 0 | Status: SHIPPED | OUTPATIENT
Start: 2022-08-03 | End: 2022-12-12

## 2022-08-03 NOTE — PROGRESS NOTES
"Subjective:       Patient ID: Leticia Piña is a 72 y.o. female.    Chief Complaint: Rash    Rash  This is a new problem. Episode onset: 4 days. The problem has been gradually worsening since onset. The affected locations include the face. The rash is characterized by blistering, redness and pain. She was exposed to nothing. Pertinent negatives include no cough, fatigue, fever or shortness of breath. Past treatments include nothing. The treatment provided no relief. Her past medical history is significant for varicella.       /84   Pulse 84   Temp 97.2 °F (36.2 °C)   Ht 5' 5" (1.651 m)   Wt 68.6 kg (151 lb 3.8 oz)   BMI 25.17 kg/m²     Review of Systems   Constitutional: Negative for activity change, appetite change, chills, diaphoresis, fatigue, fever and unexpected weight change.   HENT: Negative.    Respiratory: Negative for cough and shortness of breath.    Cardiovascular: Negative for chest pain, palpitations and leg swelling.   Gastrointestinal: Negative.    Genitourinary: Negative.    Musculoskeletal: Negative.    Skin: Positive for color change and rash. Negative for pallor and wound.   Allergic/Immunologic: Negative for immunocompromised state.   Neurological: Negative.  Negative for dizziness and facial asymmetry.   Hematological: Negative for adenopathy. Does not bruise/bleed easily.   Psychiatric/Behavioral: Negative for agitation, behavioral problems and confusion.       Objective:      Physical Exam  Constitutional:       General: She is not in acute distress.     Appearance: She is well-developed. She is not diaphoretic.   HENT:      Head: Normocephalic and atraumatic.        Comments: There is a painful, unilateral eruption on erythematous base in a dermatomal distribution noted per drawing.    The area is swollen as well     Right Ear: External ear normal.      Left Ear: External ear normal.   Eyes:      General: No scleral icterus.        Right eye: No discharge.         Left eye: " No discharge.      Conjunctiva/sclera: Conjunctivae normal.   Pulmonary:      Effort: Pulmonary effort is normal. No tachypnea, accessory muscle usage or respiratory distress.      Breath sounds: No stridor.   Musculoskeletal:      Cervical back: Normal range of motion and neck supple.   Skin:     Findings: No rash.   Neurological:      Mental Status: She is alert. She is not disoriented.   Psychiatric:         Attention and Perception: She is attentive.         Mood and Affect: Mood is not anxious or depressed. Affect is not labile, blunt, angry or inappropriate.         Speech: Speech normal.         Behavior: Behavior normal.         Thought Content: Thought content normal.         Judgment: Judgment normal.             Assessment:       1. Herpes zoster without complication        Plan:       Leticia was seen today for rash.    Diagnoses and all orders for this visit:    Herpes zoster without complication  -     valACYclovir (VALTREX) 1000 MG tablet; Take 1 tablet (1,000 mg total) by mouth 3 (three) times daily. for 7 days  -     Ambulatory referral/consult to Optometry; Future    suspect shingles  Treat with valtrex  Consult eye doctor tomorrow due to erythema to lower lid  Derm if not improving/worse

## 2022-08-29 ENCOUNTER — PATIENT MESSAGE (OUTPATIENT)
Dept: FAMILY MEDICINE | Facility: CLINIC | Age: 72
End: 2022-08-29
Payer: MEDICARE

## 2022-08-29 DIAGNOSIS — R10.10 UPPER ABDOMINAL PAIN: ICD-10-CM

## 2022-08-29 RX ORDER — OMEPRAZOLE 40 MG/1
40 CAPSULE, DELAYED RELEASE ORAL DAILY
Qty: 30 CAPSULE | Refills: 5 | Status: ON HOLD | OUTPATIENT
Start: 2022-08-29 | End: 2023-01-24 | Stop reason: SDUPTHER

## 2022-09-01 ENCOUNTER — OFFICE VISIT (OUTPATIENT)
Dept: HEMATOLOGY/ONCOLOGY | Facility: CLINIC | Age: 72
End: 2022-09-01
Payer: MEDICARE

## 2022-09-01 ENCOUNTER — LAB VISIT (OUTPATIENT)
Dept: LAB | Facility: HOSPITAL | Age: 72
End: 2022-09-01
Attending: NURSE PRACTITIONER
Payer: MEDICARE

## 2022-09-01 VITALS
SYSTOLIC BLOOD PRESSURE: 152 MMHG | WEIGHT: 152.31 LBS | OXYGEN SATURATION: 100 % | HEART RATE: 81 BPM | DIASTOLIC BLOOD PRESSURE: 75 MMHG | BODY MASS INDEX: 25.38 KG/M2 | HEIGHT: 65 IN

## 2022-09-01 DIAGNOSIS — Z86.2 HISTORY OF IRON DEFICIENCY ANEMIA: Primary | ICD-10-CM

## 2022-09-01 DIAGNOSIS — Z13.220 SCREENING CHOLESTEROL LEVEL: ICD-10-CM

## 2022-09-01 DIAGNOSIS — Z86.2 HISTORY OF IRON DEFICIENCY ANEMIA: ICD-10-CM

## 2022-09-01 DIAGNOSIS — M05.79 RHEUMATOID ARTHRITIS INVOLVING MULTIPLE SITES WITH POSITIVE RHEUMATOID FACTOR: ICD-10-CM

## 2022-09-01 DIAGNOSIS — K87 DISORDERS OF GALLBLADDER, BILIARY TRACT AND PANCREAS IN DISEASES CLASSIFIED ELSEWHERE: ICD-10-CM

## 2022-09-01 DIAGNOSIS — M35.00 SJOGREN'S SYNDROME, WITH UNSPECIFIED ORGAN INVOLVEMENT: ICD-10-CM

## 2022-09-01 DIAGNOSIS — Z12.11 COLON CANCER SCREENING: ICD-10-CM

## 2022-09-01 LAB
ALBUMIN SERPL BCP-MCNC: 4 G/DL (ref 3.5–5.2)
ALP SERPL-CCNC: 56 U/L (ref 55–135)
ALT SERPL W/O P-5'-P-CCNC: 19 U/L (ref 10–44)
ANION GAP SERPL CALC-SCNC: 10 MMOL/L (ref 8–16)
AST SERPL-CCNC: 23 U/L (ref 10–40)
BASOPHILS # BLD AUTO: 0.05 K/UL (ref 0–0.2)
BASOPHILS NFR BLD: 1.1 % (ref 0–1.9)
BILIRUB SERPL-MCNC: 0.2 MG/DL (ref 0.1–1)
BUN SERPL-MCNC: 16 MG/DL (ref 8–23)
CALCIUM SERPL-MCNC: 9.3 MG/DL (ref 8.7–10.5)
CHLORIDE SERPL-SCNC: 97 MMOL/L (ref 95–110)
CO2 SERPL-SCNC: 31 MMOL/L (ref 23–29)
CREAT SERPL-MCNC: 0.8 MG/DL (ref 0.5–1.4)
DIFFERENTIAL METHOD: ABNORMAL
EOSINOPHIL # BLD AUTO: 0.1 K/UL (ref 0–0.5)
EOSINOPHIL NFR BLD: 2.9 % (ref 0–8)
ERYTHROCYTE [DISTWIDTH] IN BLOOD BY AUTOMATED COUNT: 12.7 % (ref 11.5–14.5)
EST. GFR  (NO RACE VARIABLE): >60 ML/MIN/1.73 M^2
FERRITIN SERPL-MCNC: 17 NG/ML (ref 20–300)
GLUCOSE SERPL-MCNC: 79 MG/DL (ref 70–110)
HCT VFR BLD AUTO: 32.9 % (ref 37–48.5)
HGB BLD-MCNC: 10.3 G/DL (ref 12–16)
IMM GRANULOCYTES # BLD AUTO: 0.02 K/UL (ref 0–0.04)
IMM GRANULOCYTES NFR BLD AUTO: 0.4 % (ref 0–0.5)
IRON SERPL-MCNC: 78 UG/DL (ref 30–160)
LYMPHOCYTES # BLD AUTO: 1.1 K/UL (ref 1–4.8)
LYMPHOCYTES NFR BLD: 25.3 % (ref 18–48)
MCH RBC QN AUTO: 28.3 PG (ref 27–31)
MCHC RBC AUTO-ENTMCNC: 31.3 G/DL (ref 32–36)
MCV RBC AUTO: 90 FL (ref 82–98)
MONOCYTES # BLD AUTO: 0.6 K/UL (ref 0.3–1)
MONOCYTES NFR BLD: 13.2 % (ref 4–15)
NEUTROPHILS # BLD AUTO: 2.6 K/UL (ref 1.8–7.7)
NEUTROPHILS NFR BLD: 57.1 % (ref 38–73)
NRBC BLD-RTO: 0 /100 WBC
PLATELET # BLD AUTO: 315 K/UL (ref 150–450)
PMV BLD AUTO: 10.2 FL (ref 9.2–12.9)
POTASSIUM SERPL-SCNC: 3.6 MMOL/L (ref 3.5–5.1)
PROT SERPL-MCNC: 7.4 G/DL (ref 6–8.4)
RBC # BLD AUTO: 3.64 M/UL (ref 4–5.4)
SATURATED IRON: 13 % (ref 20–50)
SODIUM SERPL-SCNC: 138 MMOL/L (ref 136–145)
TOTAL IRON BINDING CAPACITY: 616 UG/DL (ref 250–450)
TRANSFERRIN SERPL-MCNC: 416 MG/DL (ref 200–375)
WBC # BLD AUTO: 4.47 K/UL (ref 3.9–12.7)

## 2022-09-01 PROCEDURE — 84466 ASSAY OF TRANSFERRIN: CPT | Performed by: NURSE PRACTITIONER

## 2022-09-01 PROCEDURE — 99999 PR PBB SHADOW E&M-EST. PATIENT-LVL IV: CPT | Mod: PBBFAC,,, | Performed by: NURSE PRACTITIONER

## 2022-09-01 PROCEDURE — 36415 COLL VENOUS BLD VENIPUNCTURE: CPT | Mod: PO | Performed by: NURSE PRACTITIONER

## 2022-09-01 PROCEDURE — 99999 PR PBB SHADOW E&M-EST. PATIENT-LVL IV: ICD-10-PCS | Mod: PBBFAC,,, | Performed by: NURSE PRACTITIONER

## 2022-09-01 PROCEDURE — 99214 PR OFFICE/OUTPT VISIT, EST, LEVL IV, 30-39 MIN: ICD-10-PCS | Mod: S$PBB,,, | Performed by: NURSE PRACTITIONER

## 2022-09-01 PROCEDURE — 82728 ASSAY OF FERRITIN: CPT | Performed by: NURSE PRACTITIONER

## 2022-09-01 PROCEDURE — 85025 COMPLETE CBC W/AUTO DIFF WBC: CPT | Performed by: NURSE PRACTITIONER

## 2022-09-01 PROCEDURE — 99214 OFFICE O/P EST MOD 30 MIN: CPT | Mod: PBBFAC,PO | Performed by: NURSE PRACTITIONER

## 2022-09-01 PROCEDURE — 99214 OFFICE O/P EST MOD 30 MIN: CPT | Mod: S$PBB,,, | Performed by: NURSE PRACTITIONER

## 2022-09-01 PROCEDURE — 80053 COMPREHEN METABOLIC PANEL: CPT | Performed by: NURSE PRACTITIONER

## 2022-09-01 RX ORDER — SODIUM CHLORIDE 0.9 % (FLUSH) 0.9 %
10 SYRINGE (ML) INJECTION
Status: CANCELLED | OUTPATIENT
Start: 2022-09-01

## 2022-09-01 RX ORDER — HEPARIN 100 UNIT/ML
500 SYRINGE INTRAVENOUS
Status: CANCELLED | OUTPATIENT
Start: 2022-09-01

## 2022-09-01 RX ORDER — METHYLPREDNISOLONE SOD SUCC 125 MG
80 VIAL (EA) INJECTION
Status: CANCELLED | OUTPATIENT
Start: 2022-09-01

## 2022-09-01 NOTE — PROGRESS NOTES
Subjective:      Patient ID: Leticia Piña is a 72 y.o. female.    Chief Complaint: Fatigue    HPI:  Patient is a 72 year old female who presents today for follow up of her longstanding chronic on/off anemia for several years.  She states that she takes folic acid and B12 daily.  Denies any previous diagnosis of B12 of folic acid deficiency.  She has had on/off iron deficiency noted with history of IV Injectafer infusion 3/2020 and then again on 2/2021 she received 1 dose Feraheme due to recurrent ID.      She had colonoscopy in 2011 - no polyps found.  Up to date on mammogram and pap.     She complains of acid reflux symptoms for years.  She also has Sjogren's disease and cutaneous lupus erythematous, RA followed by rheumatology.       Most recent hgb 12.3 g/dL and iron is repleated.  Had ERCP done with Dr. Madrigal with stent placement and pathology showing Chronic cholecystitis with cholelithiasis and Rokitansky-Aschoff sinuses   Negative for atypia or malignancy     Had emergency gallbladder surgery removal on 3/26/2020 with noted elevated LFTs with improvement in symptoms post surgery.     Interval History:  Will need to repeat labs today to assess for recurrent Iron deficiency anemia.     6/21/2021 ERCP Biliary stent removed with biliary sludge and a                          stone removed.     Is currently being treated with Rinvoq once daily for treatment of RA and is tolerating well.  Denies any s/s of infection.   C/o fatigue.  Denies any abnormal bleeding.  Is not currently taking oral iron daily. Denies f/c/ns or unintentional weight loss.  Denies abnormal lymphadenopathy.         Social History     Socioeconomic History    Marital status:    Tobacco Use    Smoking status: Never    Smokeless tobacco: Never   Substance and Sexual Activity    Alcohol use: Not Currently     Comment: socially     Drug use: No    Sexual activity: Not Currently     Social Determinants of Health     Financial  Resource Strain: Low Risk     Difficulty of Paying Living Expenses: Not very hard   Food Insecurity: No Food Insecurity    Worried About Running Out of Food in the Last Year: Never true    Ran Out of Food in the Last Year: Never true   Transportation Needs: No Transportation Needs    Lack of Transportation (Medical): No    Lack of Transportation (Non-Medical): No   Physical Activity: Unknown    Days of Exercise per Week: 0 days    Minutes of Exercise per Session: Patient refused   Stress: No Stress Concern Present    Feeling of Stress : Not at all   Social Connections: Unknown    Frequency of Communication with Friends and Family: More than three times a week    Frequency of Social Gatherings with Friends and Family: Twice a week    Active Member of Clubs or Organizations: No    Attends Club or Organization Meetings: Patient refused    Marital Status:    Housing Stability: Low Risk     Unable to Pay for Housing in the Last Year: No    Number of Places Lived in the Last Year: 2    Unstable Housing in the Last Year: No       Family History   Problem Relation Age of Onset    Heart disease Mother     Heart disease Father        Past Surgical History:   Procedure Laterality Date    AUGMENTATION OF BREAST      breast implants      breast surgery for rupture      ERCP N/A 3/25/2021    Procedure: ERCP (ENDOSCOPIC RETROGRADE CHOLANGIOPANCREATOGRAPHY);  Surgeon: Preston Madrigal MD;  Location: [facility];  Service: Endoscopy;  Laterality: N/A;    ERCP N/A 6/21/2021    Procedure: ERCP (ENDOSCOPIC RETROGRADE CHOLANGIOPANCREATOGRAPHY);  Surgeon: Preston Madrigal MD;  Location: [facility];  Service: Endoscopy;  Laterality: N/A;    LAPAROSCOPIC CHOLECYSTECTOMY N/A 3/26/2021    Procedure: CHOLECYSTECTOMY, LAPAROSCOPIC;  Surgeon: Jose Blue MD;  Location: [facility];  Service: General;  Laterality: N/A;    TONSILLECTOMY, ADENOIDECTOMY      TUBAL LIGATION         Past Medical History:   Diagnosis Date     Allergic rhinitis     Cutaneous lupus erythematosus     drug induced.    Essential hypertension 2/5/2019    GERD (gastroesophageal reflux disease)     Hypothyroid     Insomnia     Mixed anxiety and depressive disorder     Normal cardiac stress test     11/07    Rheumatoid arthritis(714.0)     Sjogren's syndrome        Review of Systems   Constitutional:  Positive for fatigue. Negative for appetite change and unexpected weight change.   HENT:  Negative for mouth sores and nosebleeds.    Eyes:  Negative for visual disturbance.   Respiratory:  Negative for cough and shortness of breath.    Cardiovascular:  Negative for chest pain.   Gastrointestinal:  Negative for abdominal pain, anal bleeding, blood in stool and diarrhea.   Endocrine: Negative.    Genitourinary:  Negative for frequency and hematuria.   Musculoskeletal:  Negative for back pain.   Skin:  Negative for rash.   Allergic/Immunologic: Positive for immunocompromised state.   Neurological:  Negative for headaches.   Hematological:  Negative for adenopathy. Does not bruise/bleed easily.   Psychiatric/Behavioral:  The patient is nervous/anxious.         Medication List with Changes/Refills   Current Medications    ALBUTEROL (VENTOLIN HFA) 90 MCG/ACTUATION INHALER    Inhale 2 puffs into the lungs every 4 to 6 hours as needed for Wheezing or Shortness of Breath. Rescue    AZELASTINE (ASTELIN) 137 MCG (0.1 %) NASAL SPRAY    1 spray (137 mcg total) by Nasal route 2 (two) times daily.    BENZONATATE (TESSALON) 100 MG CAPSULE    Take 2 capsules (200 mg total) by mouth 3 (three) times daily as needed.    BUPROPION (WELLBUTRIN XL) 300 MG 24 HR TABLET    Take 1 tablet (300 mg total) by mouth once daily.    CHOLECALCIFEROL, VITAMIN D3, 5,000 UNIT/ML DROP    Take 1 drop by mouth Daily.    CYANOCOBALAMIN, VITAMIN B-12, 5,000 MCG SUBL    Place under the tongue once daily.    DEXBROMPHENIRAMINE MALEATE (ALA-HIST IR) 2 MG TAB    Take 1 tablet by mouth once daily.    DICLOFENAC  SODIUM (VOLTAREN) 1 % GEL    Apply 2 g topically 4 (four) times daily.    DULOXETINE (CYMBALTA) 60 MG CAPSULE    Take 1 capsule (60 mg total) by mouth once daily.    HYDROCHLOROTHIAZIDE (HYDRODIURIL) 25 MG TABLET    TAKE ONE TABLET BY MOUTH ONE TIME DAILY    HYDROXYCHLOROQUINE (PLAQUENIL) 200 MG TABLET    TAKE ONE TABLET BY MOUTH TWICE DAILY    LEVOTHYROXINE (EUTHYROX) 75 MCG TABLET    Take 1 tablet (75 mcg total) by mouth once daily.    LORAZEPAM (ATIVAN) 1 MG TABLET    Take 1 tablet (1 mg total) by mouth 2 (two) times daily.    LOSARTAN (COZAAR) 50 MG TABLET    Take 2 tablets (100 mg total) by mouth once daily.    MELOXICAM (MOBIC) 15 MG TABLET    TAKE ONE TABLET BY MOUTH ONE TIME DAILY    OMEPRAZOLE (PRILOSEC) 40 MG CAPSULE    Take 1 capsule (40 mg total) by mouth once daily.    POTASSIUM CHLORIDE SA (K-DUR,KLOR-CON) 20 MEQ TABLET    TAKE ONE TABLET BY MOUTH ONE TIME DAILY    TRAZODONE (DESYREL) 50 MG TABLET    TAKE 1 TO 2 TABLETS BY MOUTH AT BEDTIME AS NEEDED FOR SLEEP.    UPADACITINIB (RINVOQ) 15 MG 24 HR TABLET    Take by mouth. Test claim only    VALACYCLOVIR (VALTREX) 1000 MG TABLET    Take 1 tablet (1,000 mg total) by mouth 3 (three) times daily. for 7 days        Objective:     Vitals:    09/01/22 0913   BP: (!) 152/75   Pulse: 81       Physical Exam  Vitals and nursing note reviewed.   Constitutional:       Appearance: Normal appearance.   HENT:      Head: Normocephalic and atraumatic.      Right Ear: External ear normal.      Left Ear: External ear normal.   Cardiovascular:      Rate and Rhythm: Normal rate and regular rhythm.      Heart sounds: Normal heart sounds, S1 normal and S2 normal.   Pulmonary:      Effort: Pulmonary effort is normal.      Breath sounds: Normal breath sounds.   Abdominal:      General: There is no distension.   Musculoskeletal:         General: Normal range of motion.      Cervical back: Normal range of motion.   Lymphadenopathy:      Head:      Right side of head: No submental,  submandibular, tonsillar, preauricular, posterior auricular or occipital adenopathy.      Left side of head: No submental, submandibular, tonsillar, preauricular, posterior auricular or occipital adenopathy.      Cervical: No cervical adenopathy.      Upper Body:      Right upper body: No supraclavicular, axillary or pectoral adenopathy.      Left upper body: No supraclavicular, axillary or pectoral adenopathy.   Skin:     General: Skin is warm and dry.   Neurological:      General: No focal deficit present.      Mental Status: She is alert and oriented to person, place, and time.   Psychiatric:         Attention and Perception: Attention and perception normal.         Mood and Affect: Mood and affect normal.         Speech: Speech normal.         Behavior: Behavior normal. Behavior is cooperative.         Thought Content: Thought content normal.         Cognition and Memory: Cognition and memory normal.         Judgment: Judgment normal.       Assessment:     Problem List Items Addressed This Visit          Immunology/Multi System    Rheumatoid arthritis involving multiple sites with positive rheumatoid factor    Relevant Orders    CBC Auto Differential    Comprehensive Metabolic Panel    Ferritin    Iron and TIBC    Sjogren's syndrome    Relevant Orders    CBC Auto Differential    Comprehensive Metabolic Panel    Ferritin    Iron and TIBC     Other Visit Diagnoses       History of iron deficiency anemia    -  Primary    Relevant Orders    CBC Auto Differential (Completed)    Comprehensive Metabolic Panel (Completed)    Ferritin (Completed)    Iron and TIBC (Completed)    CBC Auto Differential    Comprehensive Metabolic Panel    Ferritin    Iron and TIBC    Colon cancer screening        Relevant Orders    Fecal Immunochemical Test (iFOBT)    Screening cholesterol level        Relevant Orders    Lipid panel    Disorders of gallbladder, biliary tract and pancreas in diseases classified elsewhere        Relevant  Orders    Lipid panel          Lab Results   Component Value Date    WBC 4.47 09/01/2022    RBC 3.64 (L) 09/01/2022    HGB 10.3 (L) 09/01/2022    HCT 32.9 (L) 09/01/2022    MCV 90 09/01/2022    MCH 28.3 09/01/2022    MCHC 31.3 (L) 09/01/2022    RDW 12.7 09/01/2022     09/01/2022    MPV 10.2 09/01/2022    GRAN 2.6 09/01/2022    GRAN 57.1 09/01/2022    LYMPH 1.1 09/01/2022    LYMPH 25.3 09/01/2022    MONO 0.6 09/01/2022    MONO 13.2 09/01/2022    EOS 0.1 09/01/2022    BASO 0.05 09/01/2022    EOSINOPHIL 2.9 09/01/2022    BASOPHIL 1.1 09/01/2022      BMP  Lab Results   Component Value Date     09/01/2022    K 3.6 09/01/2022    CL 97 09/01/2022    CO2 31 (H) 09/01/2022    BUN 16 09/01/2022    CREATININE 0.8 09/01/2022    CALCIUM 9.3 09/01/2022    ANIONGAP 10 09/01/2022    ESTGFRAFRICA >60 10/14/2021    EGFRNONAA >60 10/14/2021     Lab Results   Component Value Date    ALT 19 09/01/2022    AST 23 09/01/2022    ALKPHOS 56 09/01/2022    BILITOT 0.2 09/01/2022     Lab Results   Component Value Date    IRON 78 09/01/2022    TIBC 616 (H) 09/01/2022    FERRITIN 17 (L) 09/01/2022       Plan:   History of iron deficiency anemia  -     CBC Auto Differential; Future; Expected date: 09/01/2022  -     Comprehensive Metabolic Panel; Future; Expected date: 09/01/2022  -     Ferritin; Future; Expected date: 09/01/2022  -     Iron and TIBC; Future; Expected date: 09/01/2022  -     CBC Auto Differential; Future; Expected date: 09/01/2022  -     Comprehensive Metabolic Panel; Future; Expected date: 09/01/2022  -     Ferritin; Future; Expected date: 09/01/2022  -     Iron and TIBC; Future; Expected date: 09/01/2022    Sjogren's syndrome, with unspecified organ involvement  -     CBC Auto Differential; Future; Expected date: 09/01/2022  -     Comprehensive Metabolic Panel; Future; Expected date: 09/01/2022  -     Ferritin; Future; Expected date: 09/01/2022  -     Iron and TIBC; Future; Expected date: 09/01/2022    Rheumatoid  arthritis involving multiple sites with positive rheumatoid factor  -     CBC Auto Differential; Future; Expected date: 09/01/2022  -     Comprehensive Metabolic Panel; Future; Expected date: 09/01/2022  -     Ferritin; Future; Expected date: 09/01/2022  -     Iron and TIBC; Future; Expected date: 09/01/2022    Colon cancer screening  -     Fecal Immunochemical Test (iFOBT); Future; Expected date: 09/01/2022    Screening cholesterol level  -     Lipid panel; Future; Expected date: 09/01/2022    Disorders of gallbladder, biliary tract and pancreas in diseases classified elsewhere  -     Lipid panel; Future; Expected date: 09/01/2022    Other orders  -     methylPREDNISolone sodium succinate injection 80 mg  -     ferumoxytoL (FERAHEME) 510 mg in dextrose 5 % 100 mL IVPB  -     sodium chloride 0.9% flush 10 mL  -     heparin, porcine (PF) 100 unit/mL injection flush 500 Units  -     alteplase injection 2 mg    If iron deficient will give another dose of IV iron (Feraheme).      Update:  Currently with iron deficiency anemia - Will schedule for 2 doses of IV Feraheme with steroids due to having 2 allergies.  F/U 6 weeks after 2nd dose with labs prior to assess response.      Collaborating Provider:  Dr. Rodger Zamudio    Thank You,  MARY PatrickP-C  Hematology Oncology

## 2022-09-02 ENCOUNTER — PATIENT MESSAGE (OUTPATIENT)
Dept: HEMATOLOGY/ONCOLOGY | Facility: CLINIC | Age: 72
End: 2022-09-02
Payer: MEDICARE

## 2022-09-14 ENCOUNTER — INFUSION (OUTPATIENT)
Dept: INFUSION THERAPY | Facility: HOSPITAL | Age: 72
End: 2022-09-14
Attending: NURSE PRACTITIONER
Payer: MEDICARE

## 2022-09-14 VITALS
RESPIRATION RATE: 16 BRPM | HEIGHT: 65 IN | HEART RATE: 71 BPM | WEIGHT: 152.31 LBS | OXYGEN SATURATION: 97 % | SYSTOLIC BLOOD PRESSURE: 111 MMHG | TEMPERATURE: 98 F | DIASTOLIC BLOOD PRESSURE: 64 MMHG | BODY MASS INDEX: 25.38 KG/M2

## 2022-09-14 DIAGNOSIS — D50.8 OTHER IRON DEFICIENCY ANEMIA: Primary | ICD-10-CM

## 2022-09-14 PROCEDURE — 25000003 PHARM REV CODE 250: Performed by: NURSE PRACTITIONER

## 2022-09-14 PROCEDURE — 96375 TX/PRO/DX INJ NEW DRUG ADDON: CPT

## 2022-09-14 PROCEDURE — 96374 THER/PROPH/DIAG INJ IV PUSH: CPT

## 2022-09-14 PROCEDURE — 63600175 PHARM REV CODE 636 W HCPCS: Performed by: NURSE PRACTITIONER

## 2022-09-14 RX ORDER — METHYLPREDNISOLONE SOD SUCC 125 MG
80 VIAL (EA) INJECTION
Status: CANCELLED | OUTPATIENT
Start: 2022-09-15 | End: 2022-09-14

## 2022-09-14 RX ORDER — HEPARIN 100 UNIT/ML
500 SYRINGE INTRAVENOUS
Status: CANCELLED | OUTPATIENT
Start: 2022-09-15

## 2022-09-14 RX ORDER — METHYLPREDNISOLONE SOD SUCC 125 MG
80 VIAL (EA) INJECTION
Status: COMPLETED | OUTPATIENT
Start: 2022-09-14 | End: 2022-09-14

## 2022-09-14 RX ORDER — SODIUM CHLORIDE 0.9 % (FLUSH) 0.9 %
10 SYRINGE (ML) INJECTION
Status: CANCELLED | OUTPATIENT
Start: 2022-09-15

## 2022-09-14 RX ADMIN — FERUMOXYTOL 510 MG: 510 INJECTION INTRAVENOUS at 11:09

## 2022-09-14 RX ADMIN — METHYLPREDNISOLONE SODIUM SUCCINATE 80 MG: 125 INJECTION, POWDER, FOR SOLUTION INTRAMUSCULAR; INTRAVENOUS at 11:09

## 2022-09-14 NOTE — DISCHARGE INSTRUCTIONS
[facility]  [address] [facility]  [address]  [phone] phone     [phone] fax  Hours of Operation: Monday- Friday 8:00am- 5:00pm  After hours phone  [phone]  Hematology / Oncology Physicians on call      PRATIK Alex Dr., Dr., RITIKA Mccoy, RITIKA Swanson, JAN Shaw    Please call with any concerns regarding your appointment today. FALL PREVENTION   Falls often occur due to slipping, tripping or losing your balance. Here are ways to reduce your risk of falling again.   Was there anything that caused your fall that can be fixed, removed or replaced?   Make your home safe by keeping walkways clear of objects you may trip over.   Use non-slip pads under rugs.   Do not walk in poorly lit areas.   Do not stand on chairs or wobbly ladders.   Use caution when reaching overhead or looking upward. This position can cause a loss of balance.   Be sure your shoes fit properly, have non-slip bottoms and are in good condition.   Be cautious when going up and down stairs, curbs, and when walking on uneven sidewalks.   If your balance is poor, consider using a cane or walker.   If your fall was related to alcohol use, stop or limit alcohol intake.   If your fall was related to use of sleeping medicines, talk to your doctor about this. You may need to reduce your dosage at bedtime if you awaken during the night to go to the bathroom.   To reduce the need for nighttime bathroom trips:   Avoid drinking fluids for several hours before going to bed   Empty your bladder before going to bed   Men can keep a urinal at the bedside   © 8331-9356 [employer] [facility], [address] [place] [address], [place] [address]. All rights reserved. This information is not intended as a substitute for professional medical care. Always follow your healthcare professional's instructions.

## 2022-09-14 NOTE — PLAN OF CARE
Pt is stable. Pt administered Feraheme today. Pt will follow up in one week.   Problem: Fall Injury Risk  Goal: Absence of Fall and Fall-Related Injury  Outcome: Ongoing, Progressing     Problem: Anemia  Goal: Anemia Symptom Improvement  Outcome: Ongoing, Progressing     Problem: Adult Inpatient Plan of Care  Goal: Plan of Care Review  Outcome: Ongoing, Progressing  Goal: Patient-Specific Goal (Individualized)  Outcome: Ongoing, Progressing  Flowsheets (Taken 9/14/2022 1131)  Anxieties, Fears or Concerns: Pt denies any  Individualized Care Needs: Pt provided pillow, warm blanket and legs elevated in reclined position. Snack and drink offered.,  Patient-Specific Goals (Include Timeframe): Tolerate treatment as scheduled.

## 2022-09-21 ENCOUNTER — INFUSION (OUTPATIENT)
Dept: INFUSION THERAPY | Facility: HOSPITAL | Age: 72
End: 2022-09-21
Payer: MEDICARE

## 2022-09-21 VITALS
WEIGHT: 150.81 LBS | RESPIRATION RATE: 18 BRPM | HEIGHT: 65 IN | BODY MASS INDEX: 25.13 KG/M2 | OXYGEN SATURATION: 99 % | SYSTOLIC BLOOD PRESSURE: 117 MMHG | DIASTOLIC BLOOD PRESSURE: 78 MMHG | TEMPERATURE: 97 F | HEART RATE: 87 BPM

## 2022-09-21 DIAGNOSIS — D50.8 OTHER IRON DEFICIENCY ANEMIA: Primary | ICD-10-CM

## 2022-09-21 PROCEDURE — 63600175 PHARM REV CODE 636 W HCPCS: Mod: JG | Performed by: NURSE PRACTITIONER

## 2022-09-21 PROCEDURE — 96374 THER/PROPH/DIAG INJ IV PUSH: CPT

## 2022-09-21 PROCEDURE — 25000003 PHARM REV CODE 250: Performed by: NURSE PRACTITIONER

## 2022-09-21 PROCEDURE — 96375 TX/PRO/DX INJ NEW DRUG ADDON: CPT

## 2022-09-21 PROCEDURE — 96376 TX/PRO/DX INJ SAME DRUG ADON: CPT

## 2022-09-21 RX ORDER — METHYLPREDNISOLONE SOD SUCC 125 MG
80 VIAL (EA) INJECTION
Status: COMPLETED | OUTPATIENT
Start: 2022-09-21 | End: 2022-09-21

## 2022-09-21 RX ORDER — SODIUM CHLORIDE 0.9 % (FLUSH) 0.9 %
10 SYRINGE (ML) INJECTION
Status: DISCONTINUED | OUTPATIENT
Start: 2022-09-21 | End: 2022-09-21 | Stop reason: HOSPADM

## 2022-09-21 RX ADMIN — FERUMOXYTOL 510 MG: 510 INJECTION INTRAVENOUS at 11:09

## 2022-09-21 RX ADMIN — METHYLPREDNISOLONE SODIUM SUCCINATE 80 MG: 125 INJECTION, POWDER, FOR SOLUTION INTRAMUSCULAR; INTRAVENOUS at 11:09

## 2022-09-21 NOTE — NURSING
Infusion# 2  of 2    Recent labs? Reviewed    Premeds? Solumedrol 80 mg IVP    S/S of current or recent infections in the past 14 days? None/Denies    Recent Surgery/invasive procedures? None/Denies     Any recent vaccines ? None/Denies    Feraheme 510 mg administered IV at a 16 minute rate per orders; see MAR and vitals for more  Details.

## 2022-09-22 DIAGNOSIS — M35.00 SJOGREN'S SYNDROME: ICD-10-CM

## 2022-09-22 DIAGNOSIS — M05.79 RHEUMATOID ARTHRITIS INVOLVING MULTIPLE SITES WITH POSITIVE RHEUMATOID FACTOR: ICD-10-CM

## 2022-09-22 DIAGNOSIS — M35.01 SJOGREN'S SYNDROME WITH KERATOCONJUNCTIVITIS SICCA: ICD-10-CM

## 2022-09-22 DIAGNOSIS — T50.905A DRUG-INDUCED LUPUS ERYTHEMATOSUS: ICD-10-CM

## 2022-09-22 DIAGNOSIS — K11.7 XEROSTOMIA DUE TO AUTOIMMUNE DISEASE: ICD-10-CM

## 2022-09-22 DIAGNOSIS — M35.9 XEROSTOMIA DUE TO AUTOIMMUNE DISEASE: ICD-10-CM

## 2022-09-22 DIAGNOSIS — L93.2 DRUG-INDUCED LUPUS ERYTHEMATOSUS: ICD-10-CM

## 2022-09-22 RX ORDER — FOLIC ACID 1 MG/1
1 TABLET ORAL DAILY
Qty: 90 TABLET | Refills: 3 | Status: SHIPPED | OUTPATIENT
Start: 2022-09-22 | End: 2023-12-02 | Stop reason: SDUPTHER

## 2022-09-22 RX ORDER — HYDROXYCHLOROQUINE SULFATE 200 MG/1
200 TABLET, FILM COATED ORAL 2 TIMES DAILY
Qty: 180 TABLET | Refills: 0 | Status: SHIPPED | OUTPATIENT
Start: 2022-09-22 | End: 2022-12-20

## 2022-10-05 NOTE — PROGRESS NOTES
"Subjective:      Patient ID: Leticia Piña is a 72 y.o. female.    Chief Complaint: Follow-up (Pt doesn't have any concerns today. Says she feels great.)      HPI  Here for f/u medical problems and preventive exam.  BP at home 140s/60s.  No pain now.  Just started walking for exercise.  Anxiety doing well on current meds.  No f/c/sw/cough.  Takes tessalon prn cough due to postnasal drainage, but doesn't take flonase regularly.  No cp/sob/palp.  BMs and urine normal.  Doesn't want Cscope due to several friends bleeding out.      HM: 10/22 fluvax, 3/21 covid vaccines, 6/19 HAV, 11/19 [id number], 3/17 booster [id number], 4/13 TDaP, 4/13 zoster, 4/22 Shingrix #2, 11/20 BMD/ Reclast rep 2y, 12/11 Cscope rep 10y, 12/21 MMG/me, 11/17 Gyn Dr. Jimenez, 3/17 HCV neg.   10/22 Cologuard negative.      Review of Systems   Constitutional:  Negative for appetite change, chills, diaphoresis and fever.   HENT:  Negative for congestion, ear pain, rhinorrhea, sinus pressure and sore throat.    Respiratory:  Negative for cough, chest tightness and shortness of breath.    Cardiovascular:  Negative for chest pain and palpitations.   Gastrointestinal:  Negative for blood in stool, constipation, diarrhea, nausea and vomiting.   Genitourinary:  Negative for dysuria, frequency, hematuria, menstrual problem, urgency and vaginal discharge.   Musculoskeletal:  Negative for arthralgias.   Skin:  Negative for rash.   Neurological:  Negative for dizziness and headaches.   Psychiatric/Behavioral:  Negative for sleep disturbance. The patient is not nervous/anxious.        Objective:   BP (!) 148/65 (BP Location: Left arm)   Temp 98.5 °F (36.9 °C) (Oral)   Ht 5' 4" (1.626 m)   Wt 69.1 kg (152 lb 4.8 oz)   SpO2 99%   BMI 26.14 kg/m²     Physical Exam  Constitutional:       Appearance: She is well-developed.   HENT:      Right Ear: External ear normal. Tympanic membrane is not injected.      Left Ear: External ear normal. Tympanic membrane is " not injected.   Eyes:      Conjunctiva/sclera: Conjunctivae normal.   Neck:      Thyroid: No thyromegaly.   Cardiovascular:      Rate and Rhythm: Normal rate and regular rhythm.      Heart sounds: No murmur heard.    No friction rub. No gallop.   Pulmonary:      Effort: Pulmonary effort is normal.      Breath sounds: Normal breath sounds. No wheezing or rales.   Chest:   Breasts:     Right: No mass, nipple discharge, skin change or tenderness.      Left: No mass, nipple discharge, skin change or tenderness.   Abdominal:      General: Bowel sounds are normal.      Palpations: Abdomen is soft. There is no mass.      Tenderness: There is no abdominal tenderness.   Musculoskeletal:      Cervical back: Normal range of motion and neck supple.   Lymphadenopathy:      Cervical: No cervical adenopathy.   Skin:     General: Skin is warm.      Findings: No rash.   Neurological:      Mental Status: She is alert and oriented to person, place, and time.          Latest Reference Range & Units 09/01/22 09:48   WBC 3.90 - 12.70 K/uL 4.47   RBC 4.00 - 5.40 M/uL 3.64 (L)   Hemoglobin 12.0 - 16.0 g/dL 10.3 (L)   Hematocrit 37.0 - 48.5 % 32.9 (L)   MCV 82 - 98 fL 90   MCH 27.0 - 31.0 pg 28.3   MCHC 32.0 - 36.0 g/dL 31.3 (L)   RDW 11.5 - 14.5 % 12.7   Platelets 150 - 450 K/uL 315   MPV 9.2 - 12.9 fL 10.2   Gran % 38.0 - 73.0 % 57.1   Lymph % 18.0 - 48.0 % 25.3   Mono % 4.0 - 15.0 % 13.2   Eosinophil % 0.0 - 8.0 % 2.9   Basophil % 0.0 - 1.9 % 1.1   Immature Granulocytes 0.0 - 0.5 % 0.4   Gran # (ANC) 1.8 - 7.7 K/uL 2.6   Lymph # 1.0 - 4.8 K/uL 1.1   Mono # 0.3 - 1.0 K/uL 0.6   Eos # 0.0 - 0.5 K/uL 0.1   Baso # 0.00 - 0.20 K/uL 0.05   Immature Grans (Abs) 0.00 - 0.04 K/uL 0.02   nRBC 0 /100 WBC 0   Differential Method  Automated   Iron 30 - 160 ug/dL 78   TIBC 250 - 450 ug/dL 616 (H)   Saturated Iron 20 - 50 % 13 (L)   Transferrin 200 - 375 mg/dL 416 (H)   Ferritin 20.0 - 300.0 ng/mL 17 (L)   Sodium 136 - 145 mmol/L 138   Potassium 3.5 -  5.1 mmol/L 3.6   Chloride 95 - 110 mmol/L 97   CO2 23 - 29 mmol/L 31 (H)   Anion Gap 8 - 16 mmol/L 10   BUN 8 - 23 mg/dL 16   Creatinine 0.5 - 1.4 mg/dL 0.8   eGFR >60 mL/min/1.73 m^2 >60   Glucose 70 - 110 mg/dL 79   Calcium 8.7 - 10.5 mg/dL 9.3   Alkaline Phosphatase 55 - 135 U/L 56   PROTEIN TOTAL 6.0 - 8.4 g/dL 7.4   Albumin 3.5 - 5.2 g/dL 4.0   BILIRUBIN TOTAL 0.1 - 1.0 mg/dL 0.2   AST 10 - 40 U/L 23   ALT 10 - 44 U/L 19     Assessment:       1. Essential hypertension    2. Recurrent major depressive disorder, in partial remission    3. Rheumatoid arthritis involving multiple sites with positive rheumatoid factor    4. Sjogren's syndrome, with unspecified organ involvement    5. Iron deficiency anemia, unspecified iron deficiency anemia type    6. Immunocompromised    7. Gastroesophageal reflux disease with esophagitis without hemorrhage    8. Stage 3a chronic kidney disease    9. Acquired hypothyroidism    10. Anxiety associated with depression    11. Wellness examination    12. Screen for colon cancer    13. Seasonal allergic rhinitis due to pollen          Plan:     Essential hypertension- cont hctz, change losartan to valsartan, monitor BPs.  RTC 2mo.  -     valsartan (DIOVAN) 320 MG tablet; Take 1 tablet (320 mg total) by mouth once daily.  Dispense: 90 tablet; Refill: 3    Recurrent major depressive disorder, in partial remission, Anxiety associated with depression- doing well, cont meds.    Rheumatoid arthritis involving multiple sites with positive rheumatoid factor, Sjogren's syndrome, Immunocompromised, doing well on meds per Rheum.    Iron deficiency anemia, unspecified iron deficiency anemia type- s/p 2 fereheme infusions, to f/u with HemeOnc.    Gastroesophageal reflux disease with esophagitis without hemorrhage- cont PPI.    Stage 3a chronic kidney disease- cont to follow.    Acquired hypothyroidism- Clinically stable, continue present treatment. Add lab to 11/1 appt.  -     TSH; Future; Expected  date: 10/06/2022  -     Lipid Panel; Future; Expected date: 10/06/2022    Wellness examination- fluvax now.  Will avoid covid booster due to severe reaction last booster.    Screen for colon cancer  -     Cologuard Screening (Multitarget Stool DNA); Future; Expected date: 10/06/2022    Other orders  -     Influenza - Quadrivalent (Adjuvanted)    RTC 2 mo.  Flonase daily- let me know how doing in 2 weeks.  Poss add atrovent.

## 2022-10-06 ENCOUNTER — OFFICE VISIT (OUTPATIENT)
Dept: PRIMARY CARE CLINIC | Facility: CLINIC | Age: 72
End: 2022-10-06
Payer: MEDICARE

## 2022-10-06 VITALS
SYSTOLIC BLOOD PRESSURE: 148 MMHG | HEIGHT: 64 IN | DIASTOLIC BLOOD PRESSURE: 65 MMHG | BODY MASS INDEX: 26 KG/M2 | WEIGHT: 152.31 LBS | TEMPERATURE: 99 F | OXYGEN SATURATION: 99 %

## 2022-10-06 DIAGNOSIS — N18.31 STAGE 3A CHRONIC KIDNEY DISEASE: ICD-10-CM

## 2022-10-06 DIAGNOSIS — I10 ESSENTIAL HYPERTENSION: Primary | ICD-10-CM

## 2022-10-06 DIAGNOSIS — E03.9 ACQUIRED HYPOTHYROIDISM: ICD-10-CM

## 2022-10-06 DIAGNOSIS — D50.9 IRON DEFICIENCY ANEMIA, UNSPECIFIED IRON DEFICIENCY ANEMIA TYPE: ICD-10-CM

## 2022-10-06 DIAGNOSIS — M05.79 RHEUMATOID ARTHRITIS INVOLVING MULTIPLE SITES WITH POSITIVE RHEUMATOID FACTOR: ICD-10-CM

## 2022-10-06 DIAGNOSIS — D84.9 IMMUNOCOMPROMISED: ICD-10-CM

## 2022-10-06 DIAGNOSIS — Z12.11 SCREEN FOR COLON CANCER: ICD-10-CM

## 2022-10-06 DIAGNOSIS — K21.00 GASTROESOPHAGEAL REFLUX DISEASE WITH ESOPHAGITIS WITHOUT HEMORRHAGE: ICD-10-CM

## 2022-10-06 DIAGNOSIS — F33.41 RECURRENT MAJOR DEPRESSIVE DISORDER, IN PARTIAL REMISSION: ICD-10-CM

## 2022-10-06 DIAGNOSIS — M35.00 SJOGREN'S SYNDROME, WITH UNSPECIFIED ORGAN INVOLVEMENT: ICD-10-CM

## 2022-10-06 DIAGNOSIS — Z00.00 WELLNESS EXAMINATION: ICD-10-CM

## 2022-10-06 DIAGNOSIS — F41.8 ANXIETY ASSOCIATED WITH DEPRESSION: ICD-10-CM

## 2022-10-06 DIAGNOSIS — J30.1 SEASONAL ALLERGIC RHINITIS DUE TO POLLEN: ICD-10-CM

## 2022-10-06 PROCEDURE — 99999 PR PBB SHADOW E&M-EST. PATIENT-LVL IV: CPT | Mod: PBBFAC,,, | Performed by: INTERNAL MEDICINE

## 2022-10-06 PROCEDURE — G0008 ADMIN INFLUENZA VIRUS VAC: HCPCS | Mod: PBBFAC,PN

## 2022-10-06 PROCEDURE — 99215 OFFICE O/P EST HI 40 MIN: CPT | Mod: S$PBB,,, | Performed by: INTERNAL MEDICINE

## 2022-10-06 PROCEDURE — 99999 PR PBB SHADOW E&M-EST. PATIENT-LVL IV: ICD-10-PCS | Mod: PBBFAC,,, | Performed by: INTERNAL MEDICINE

## 2022-10-06 PROCEDURE — 99215 PR OFFICE/OUTPT VISIT, EST, LEVL V, 40-54 MIN: ICD-10-PCS | Mod: S$PBB,,, | Performed by: INTERNAL MEDICINE

## 2022-10-06 PROCEDURE — 99214 OFFICE O/P EST MOD 30 MIN: CPT | Mod: PBBFAC,PN | Performed by: INTERNAL MEDICINE

## 2022-10-06 RX ORDER — VALSARTAN 320 MG/1
320 TABLET ORAL DAILY
Qty: 90 TABLET | Refills: 3 | Status: SHIPPED | OUTPATIENT
Start: 2022-10-06 | End: 2023-07-17 | Stop reason: SDUPTHER

## 2022-10-17 NOTE — TELEPHONE ENCOUNTER
Patient Active Problem List   Diagnosis    Other constipation    S/P tube myringotomy    BMI (body mass index), pediatric, > 99% for age        Review of patient's allergies indicates:   Allergen Reactions    Bromfed [brompheniramine-pseudoephedrin] Rash          Current Outpatient Medications:     cetirizine (ZYRTEC) 1 mg/mL syrup, GIVE 2.5 ML BY MOUTH ONCE DAILY, Disp: , Rfl:     albuterol (ACCUNEB) 1.25 mg/3 mL Nebu, Take 3 mLs (1.25 mg total) by nebulization every 6 (six) hours as needed (cough). Rescue (Patient not taking: No sig reported), Disp: 60 each, Rfl: 0    budesonide (PULMICORT) 0.25 mg/2 mL nebulizer solution, Take 2 mLs (0.25 mg total) by nebulization 2 (two) times daily. Controller (Patient not taking: No sig reported), Disp: 30 each, Rfl: 1    cefdinir (OMNICEF) 125 mg/5 mL suspension, Take 4 mLs by mouth 2 (two) times daily., Disp: , Rfl:     nebulizer and compressor Chiquita, 1 Units by Misc.(Non-Drug; Combo Route) route 2 (two) times daily. (Patient not taking: No sig reported), Disp: 1 each, Rfl: 0    nystatin (MYCOSTATIN) cream, , Disp: , Rfl:     nystatin (MYCOSTATIN) ointment, Apply topically 4 (four) times daily. (Patient not taking: No sig reported), Disp: 30 g, Rfl: 0    sodium chloride for inhalation (SODIUM CHLORIDE 0.9%) 0.9 % nebulizer solution, USE UTD FOR NEBULIZATION, Disp: , Rfl:      César ARZATE Mirza is here today for a 5 year well check.  he is accompanied by his mother.  There are no concerns.needs note saying off of milk    Imm. Status: up to date   Growth Chart:  normal      Diet/Nutrition:  Milk/Calcium:  No    Advised that juice is not necessary     Fruits/vegetables:  Yes     Feeding problems:  No    Vitamin D:  Yes     Other vitamins Yes    Bowel/bladder habits:  normal   Potty-trained:  Yes  Sleep:  no sleep issues  Development: Subjective:  appropriate for age    Objective/PDQ:  appropriate for age  School:   attends day care      5 year development:    Gross motor  Rx for Golytely sent to [facility] for approval.     "skills:  Jumps in place, kicks ball, pedals tricycle, walks up stairs with alternating gait    fine motor skills: scribbles, copies a [employer], uses utensils, puts on some clothing, can stack at least eight blocks    Cognitive skills:  Participates in pretend play, knows name, age, sex    Language skills:  Speech is at least 75% intelligible, talks in short sentences, Asks questions such as why?, and what's that?    Social skills:  Enjoys interactive play, can be oppositional or destructive, listens to short stories    Adaptive skills: undresses, toilet training or trained, feeds self    Review of Systems   Constitutional:  Negative for activity change, appetite change and fever.   HENT:  Negative for congestion, mouth sores and sore throat.    Eyes:  Negative for discharge and redness.   Respiratory:  Negative for cough and wheezing.    Cardiovascular:  Negative for chest pain and cyanosis.   Gastrointestinal:  Negative for constipation, diarrhea and vomiting.   Genitourinary:  Negative for difficulty urinating and hematuria.   Skin:  Negative for rash and wound.   Neurological:  Negative for syncope and headaches.   Psychiatric/Behavioral:  Negative for behavioral problems and sleep disturbance.       Vitals:    10/17/22 0838   Pulse: 105   Resp: 24   Temp: 97.9 °F (36.6 °C)   TempSrc: Axillary   SpO2: 98%   Weight: 17.2 kg (38 lb)   Height: 3' 1.68" (0.957 m)       Physical Exam  Vitals reviewed.   Constitutional:       General: He is active.      Appearance: He is well-developed.   HENT:      Head: Atraumatic.      Right Ear: Tympanic membrane normal.      Left Ear: Tympanic membrane normal.      Nose: Nose normal. No congestion.      Mouth/Throat:      Mouth: Mucous membranes are moist.      Pharynx: Oropharynx is clear.      Tonsils: No tonsillar exudate.   Eyes:      General:         Right eye: No discharge.         Left eye: No discharge.      Extraocular Movements: Extraocular movements intact.      " Conjunctiva/sclera: Conjunctivae normal.      Pupils: Pupils are equal, round, and reactive to light.   Cardiovascular:      Rate and Rhythm: Normal rate and regular rhythm.      Pulses: Pulses are strong.      Heart sounds: S1 normal and S2 normal. No murmur heard.  Pulmonary:      Effort: Pulmonary effort is normal. No respiratory distress or retractions.      Breath sounds: Normal breath sounds. No wheezing.   Abdominal:      General: Bowel sounds are normal. There is no distension.      Palpations: Abdomen is soft. There is no hepatomegaly or splenomegaly.      Tenderness: There is no abdominal tenderness. There is no guarding or rebound.   Genitourinary:     Penis: Normal and circumcised.    Musculoskeletal:         General: No deformity. Normal range of motion.      Cervical back: Normal range of motion and neck supple. No rigidity.   Lymphadenopathy:      Cervical: No cervical adenopathy.   Skin:     General: Skin is warm.      Capillary Refill: Capillary refill takes 2 to 3 seconds.      Coloration: Skin is not jaundiced.      Findings: No petechiae or rash. Rash is not purpuric.   Neurological:      Mental Status: He is alert.      Coordination: Coordination normal.        César was seen today for well child.    Diagnoses and all orders for this visit:    Encounter for well child visit at 5 years of age  -     POCT Urinalysis, Dipstick, Automated, W/O Scope    Constipation, unspecified constipation type    BMI (body mass index), pediatric, > 99% for age       No problem-specific Assessment & Plan notes found for this encounter.       Follow up in about 1 year (around 10/17/2023).

## 2022-10-20 ENCOUNTER — PATIENT MESSAGE (OUTPATIENT)
Dept: PRIMARY CARE CLINIC | Facility: CLINIC | Age: 72
End: 2022-10-20
Payer: MEDICARE

## 2022-10-20 ENCOUNTER — TELEPHONE (OUTPATIENT)
Dept: PRIMARY CARE CLINIC | Facility: CLINIC | Age: 72
End: 2022-10-20
Payer: MEDICARE

## 2022-10-20 DIAGNOSIS — R51.9 SEVERE FRONTAL HEADACHES: Primary | ICD-10-CM

## 2022-11-01 ENCOUNTER — PATIENT MESSAGE (OUTPATIENT)
Dept: PRIMARY CARE CLINIC | Facility: CLINIC | Age: 72
End: 2022-11-01
Payer: MEDICARE

## 2022-11-01 ENCOUNTER — LAB VISIT (OUTPATIENT)
Dept: LAB | Facility: HOSPITAL | Age: 72
End: 2022-11-01
Attending: INTERNAL MEDICINE
Payer: MEDICARE

## 2022-11-01 DIAGNOSIS — E03.9 ACQUIRED HYPOTHYROIDISM: ICD-10-CM

## 2022-11-01 LAB
CHOLEST SERPL-MCNC: 208 MG/DL (ref 120–199)
CHOLEST/HDLC SERPL: 3 {RATIO} (ref 2–5)
HDLC SERPL-MCNC: 69 MG/DL (ref 40–75)
HDLC SERPL: 33.2 % (ref 20–50)
LDLC SERPL CALC-MCNC: 114 MG/DL (ref 63–159)
NONHDLC SERPL-MCNC: 139 MG/DL
NONINV COLON CA DNA+OCC BLD SCRN STL QL: NEGATIVE
TRIGL SERPL-MCNC: 125 MG/DL (ref 30–150)
TSH SERPL DL<=0.005 MIU/L-ACNC: 0.73 UIU/ML (ref 0.4–4)

## 2022-11-01 PROCEDURE — 80061 LIPID PANEL: CPT | Mod: 91 | Performed by: INTERNAL MEDICINE

## 2022-11-01 PROCEDURE — 84443 ASSAY THYROID STIM HORMONE: CPT | Performed by: INTERNAL MEDICINE

## 2022-11-01 PROCEDURE — 36415 COLL VENOUS BLD VENIPUNCTURE: CPT | Mod: PO | Performed by: INTERNAL MEDICINE

## 2022-11-02 ENCOUNTER — PATIENT MESSAGE (OUTPATIENT)
Dept: PRIMARY CARE CLINIC | Facility: CLINIC | Age: 72
End: 2022-11-02
Payer: MEDICARE

## 2022-11-03 ENCOUNTER — OFFICE VISIT (OUTPATIENT)
Dept: HEMATOLOGY/ONCOLOGY | Facility: CLINIC | Age: 72
End: 2022-11-03
Payer: MEDICARE

## 2022-11-03 ENCOUNTER — LAB VISIT (OUTPATIENT)
Dept: LAB | Facility: HOSPITAL | Age: 72
End: 2022-11-03
Attending: NURSE PRACTITIONER
Payer: MEDICARE

## 2022-11-03 ENCOUNTER — PATIENT MESSAGE (OUTPATIENT)
Dept: HEMATOLOGY/ONCOLOGY | Facility: CLINIC | Age: 72
End: 2022-11-03
Payer: MEDICARE

## 2022-11-03 VITALS
HEART RATE: 73 BPM | DIASTOLIC BLOOD PRESSURE: 76 MMHG | SYSTOLIC BLOOD PRESSURE: 136 MMHG | HEIGHT: 65 IN | BODY MASS INDEX: 25.38 KG/M2 | WEIGHT: 152.31 LBS | OXYGEN SATURATION: 99 %

## 2022-11-03 DIAGNOSIS — M35.00 SJOGREN'S SYNDROME, WITH UNSPECIFIED ORGAN INVOLVEMENT: ICD-10-CM

## 2022-11-03 DIAGNOSIS — M05.79 RHEUMATOID ARTHRITIS INVOLVING MULTIPLE SITES WITH POSITIVE RHEUMATOID FACTOR: ICD-10-CM

## 2022-11-03 DIAGNOSIS — M05.79 RHEUMATOID ARTHRITIS INVOLVING MULTIPLE SITES WITH POSITIVE RHEUMATOID FACTOR: Primary | ICD-10-CM

## 2022-11-03 DIAGNOSIS — D50.9 IRON DEFICIENCY ANEMIA, UNSPECIFIED IRON DEFICIENCY ANEMIA TYPE: ICD-10-CM

## 2022-11-03 LAB
ERYTHROCYTE [SEDIMENTATION RATE] IN BLOOD BY WESTERGREN METHOD: 9 MM/HR (ref 0–20)
IRON SERPL-MCNC: 181 UG/DL (ref 30–160)
SATURATED IRON: 38 % (ref 20–50)
TOTAL IRON BINDING CAPACITY: 480 UG/DL (ref 250–450)
TRANSFERRIN SERPL-MCNC: 324 MG/DL (ref 200–375)

## 2022-11-03 PROCEDURE — 84466 ASSAY OF TRANSFERRIN: CPT | Performed by: NURSE PRACTITIONER

## 2022-11-03 PROCEDURE — 99999 PR PBB SHADOW E&M-EST. PATIENT-LVL IV: CPT | Mod: PBBFAC,,, | Performed by: NURSE PRACTITIONER

## 2022-11-03 PROCEDURE — 36415 COLL VENOUS BLD VENIPUNCTURE: CPT | Mod: PO | Performed by: NURSE PRACTITIONER

## 2022-11-03 PROCEDURE — 85651 RBC SED RATE NONAUTOMATED: CPT | Performed by: NURSE PRACTITIONER

## 2022-11-03 PROCEDURE — 99214 OFFICE O/P EST MOD 30 MIN: CPT | Mod: PBBFAC,PO | Performed by: NURSE PRACTITIONER

## 2022-11-03 PROCEDURE — 99214 OFFICE O/P EST MOD 30 MIN: CPT | Mod: S$PBB,,, | Performed by: NURSE PRACTITIONER

## 2022-11-03 NOTE — PROGRESS NOTES
Subjective:      Patient ID: Leticia Piña is a 72 y.o. female.    Chief Complaint: fatigue    HPI:  is a 72 year old female who presents today for follow up of her longstanding chronic on/off anemia for several years.  She states that she takes folic acid and B12 daily.  Denies any previous diagnosis of B12 of folic acid deficiency.  She has had on/off iron deficiency noted with history of IV Injectafer infusion 3/2020 and then again on 2/2021 she received 1 dose Feraheme due to recurrent ID.      She had colonoscopy in 2011 - no polyps found.  Up to date on mammogram and pap.     She complains of acid reflux symptoms for years.  She also has Sjogren's disease and cutaneous lupus erythematous, RA followed by rheumatology.       Most recent hgb 12.3 g/dL and iron is repleated.  Had ERCP done with Dr. Madrigal with stent placement and pathology showing Chronic cholecystitis with cholelithiasis and Rokitansky-Aschoff sinuses   Negative for atypia or malignancy      Had emergency gallbladder surgery removal on 3/26/2020 with noted elevated LFTs with improvement in symptoms post surgery.     Interval History:    9/1/2022 Will need to repeat labs today to assess for recurrent Iron deficiency anemia.   6/21/2021 ERCP Biliary stent removed with biliary sludge and a stone removed.   Is currently being treated with Rinvoq once daily for treatment of RA and is tolerating well.  Denies any s/s of infection.   C/o fatigue.  Denies any abnormal bleeding.  Is not currently taking oral iron daily. Denies f/c/ns or unintentional weight loss.  Denies abnormal lymphadenopathy.     11/3/2022 Presents today to evaluate response of IV Feraheme treatments x 2.  Last received on 9/21/2022.  Awaiting iron and tibc lab results.   Continues with chronic fatigue. Denies any abnormal bleeding.  Continues to take B12 and folate daily.         Social History     Socioeconomic History    Marital status:    Tobacco Use    Smoking  status: Never    Smokeless tobacco: Never   Substance and Sexual Activity    Alcohol use: Not Currently     Comment: socially     Drug use: No    Sexual activity: Not Currently     Social Determinants of Health     Financial Resource Strain: Low Risk     Difficulty of Paying Living Expenses: Not very hard   Food Insecurity: No Food Insecurity    Worried About Running Out of Food in the Last Year: Never true    Ran Out of Food in the Last Year: Never true   Transportation Needs: No Transportation Needs    Lack of Transportation (Medical): No    Lack of Transportation (Non-Medical): No   Physical Activity: Unknown    Days of Exercise per Week: 0 days    Minutes of Exercise per Session: Patient refused   Stress: No Stress Concern Present    Feeling of Stress : Not at all   Social Connections: Unknown    Frequency of Communication with Friends and Family: More than three times a week    Frequency of Social Gatherings with Friends and Family: Twice a week    Active Member of Clubs or Organizations: No    Attends Club or Organization Meetings: Patient refused    Marital Status:    Housing Stability: Low Risk     Unable to Pay for Housing in the Last Year: No    Number of Places Lived in the Last Year: 2    Unstable Housing in the Last Year: No       Family History   Problem Relation Age of Onset    Heart disease Mother     Heart disease Father        Past Surgical History:   Procedure Laterality Date    AUGMENTATION OF BREAST      breast implants      breast surgery for rupture      ERCP N/A 3/25/2021    Procedure: ERCP (ENDOSCOPIC RETROGRADE CHOLANGIOPANCREATOGRAPHY);  Surgeon: Preston Madrigal MD;  Location: [facility];  Service: Endoscopy;  Laterality: N/A;    ERCP N/A 6/21/2021    Procedure: ERCP (ENDOSCOPIC RETROGRADE CHOLANGIOPANCREATOGRAPHY);  Surgeon: Preston Madrigal MD;  Location: [facility];  Service: Endoscopy;  Laterality: N/A;    LAPAROSCOPIC CHOLECYSTECTOMY N/A 3/26/2021    Procedure:  CHOLECYSTECTOMY, LAPAROSCOPIC;  Surgeon: Jose Blue MD;  Location: [facility];  Service: General;  Laterality: N/A;    TONSILLECTOMY, ADENOIDECTOMY      TUBAL LIGATION         Past Medical History:   Diagnosis Date    Allergic rhinitis     Cutaneous lupus erythematosus     drug induced.    Essential hypertension 2/5/2019    GERD (gastroesophageal reflux disease)     Hypothyroid     Insomnia     Mixed anxiety and depressive disorder     Normal cardiac stress test     11/07    Rheumatoid arthritis(714.0)     Sjogren's syndrome        Review of Systems   Constitutional:  Positive for fatigue. Negative for appetite change and unexpected weight change.   HENT:  Negative for mouth sores and nosebleeds.    Eyes:  Negative for visual disturbance.   Respiratory:  Negative for cough and shortness of breath.    Cardiovascular:  Negative for chest pain.   Gastrointestinal:  Negative for abdominal pain, anal bleeding, blood in stool and diarrhea.   Endocrine: Negative.    Genitourinary:  Negative for frequency, hematuria and vaginal bleeding.   Musculoskeletal:  Negative for back pain.   Skin:  Negative for rash.   Allergic/Immunologic: Positive for immunocompromised state.   Neurological:  Positive for headaches.   Hematological:  Negative for adenopathy. Bruises/bleeds easily.   Psychiatric/Behavioral:  The patient is not nervous/anxious.         Medication List with Changes/Refills   Current Medications    ALBUTEROL (VENTOLIN HFA) 90 MCG/ACTUATION INHALER    Inhale 2 puffs into the lungs every 4 to 6 hours as needed for Wheezing or Shortness of Breath. Rescue    AZELASTINE (ASTELIN) 137 MCG (0.1 %) NASAL SPRAY    1 spray (137 mcg total) by Nasal route 2 (two) times daily.    BENZONATATE (TESSALON) 100 MG CAPSULE    Take 2 capsules (200 mg total) by mouth 3 (three) times daily as needed.    BUPROPION (WELLBUTRIN XL) 300 MG 24 HR TABLET    Take 1 tablet (300 mg total) by mouth once daily.    CHOLECALCIFEROL, VITAMIN D3,  5,000 UNIT/ML DROP    Take 1 drop by mouth Daily.    CYANOCOBALAMIN, VITAMIN B-12, 5,000 MCG SUBL    Place under the tongue once daily.    DEXBROMPHENIRAMINE MALEATE (ALA-HIST IR) 2 MG TAB    Take 1 tablet by mouth once daily.    DICLOFENAC SODIUM (VOLTAREN) 1 % GEL    Apply 2 g topically 4 (four) times daily.    DULOXETINE (CYMBALTA) 60 MG CAPSULE    TAKE ONE CAPSULE BY MOUTH ONE TIME DAILY    FOLIC ACID (FOLVITE) 1 MG TABLET    Take 1 tablet (1 mg total) by mouth once daily.    HYDROCHLOROTHIAZIDE (HYDRODIURIL) 25 MG TABLET    TAKE ONE TABLET BY MOUTH ONE TIME DAILY    HYDROXYCHLOROQUINE (PLAQUENIL) 200 MG TABLET    Take 1 tablet (200 mg total) by mouth 2 (two) times daily.    LEVOTHYROXINE (EUTHYROX) 75 MCG TABLET    Take 1 tablet (75 mcg total) by mouth once daily.    LORAZEPAM (ATIVAN) 1 MG TABLET    Take 1 tablet (1 mg total) by mouth 2 (two) times daily.    MELOXICAM (MOBIC) 15 MG TABLET    TAKE ONE TABLET BY MOUTH ONE TIME DAILY    OMEPRAZOLE (PRILOSEC) 40 MG CAPSULE    Take 1 capsule (40 mg total) by mouth once daily.    POTASSIUM CHLORIDE SA (K-DUR,KLOR-CON) 20 MEQ TABLET    TAKE ONE TABLET BY MOUTH ONE TIME DAILY    TRAZODONE (DESYREL) 50 MG TABLET    TAKE 1 TO 2 TABLETS BY MOUTH AT BEDTIME AS NEEDED FOR SLEEP.    UPADACITINIB (RINVOQ) 15 MG 24 HR TABLET    Take by mouth. Test claim only    VALACYCLOVIR (VALTREX) 1000 MG TABLET    Take 1 tablet (1,000 mg total) by mouth 3 (three) times daily. for 7 days    VALSARTAN (DIOVAN) 320 MG TABLET    Take 1 tablet (320 mg total) by mouth once daily.        Objective:     Vitals:    11/03/22 0903   BP: 136/76   Pulse:        Physical Exam  Vitals and nursing note reviewed.   Constitutional:       Appearance: Normal appearance.   HENT:      Head: Normocephalic and atraumatic.      Right Ear: External ear normal.      Left Ear: External ear normal.   Cardiovascular:      Rate and Rhythm: Normal rate and regular rhythm.      Heart sounds: Normal heart sounds, S1  normal and S2 normal. No murmur heard.  Pulmonary:      Effort: Pulmonary effort is normal.      Breath sounds: Normal breath sounds.   Abdominal:      General: There is no distension.   Musculoskeletal:         General: Normal range of motion.      Cervical back: Normal range of motion.   Lymphadenopathy:      Head:      Right side of head: No submental, submandibular, tonsillar, preauricular, posterior auricular or occipital adenopathy.      Left side of head: No submental, submandibular, tonsillar, preauricular, posterior auricular or occipital adenopathy.      Cervical: No cervical adenopathy.      Upper Body:      Right upper body: No supraclavicular, axillary or pectoral adenopathy.      Left upper body: No supraclavicular, axillary or pectoral adenopathy.   Skin:     General: Skin is warm and dry.   Neurological:      General: No focal deficit present.      Mental Status: She is alert and oriented to person, place, and time.   Psychiatric:         Attention and Perception: Attention and perception normal.         Mood and Affect: Mood and affect normal.         Speech: Speech normal.         Behavior: Behavior normal. Behavior is cooperative.         Thought Content: Thought content normal.         Cognition and Memory: Cognition and memory normal.         Judgment: Judgment normal.       Assessment:     Problem List Items Addressed This Visit          Immunology/Multi System    Rheumatoid arthritis involving multiple sites with positive rheumatoid factor - Primary    Relevant Orders    Iron and TIBC    Sedimentation rate    Sjogren's syndrome    Relevant Orders    Iron and TIBC    Sedimentation rate       Oncology    Iron deficiency anemia    Relevant Orders    Iron and TIBC       Lab Results   Component Value Date    WBC 3.99 11/01/2022    RBC 3.88 (L) 11/01/2022    HGB 11.7 (L) 11/01/2022    HCT 34.7 (L) 11/01/2022    MCV 89 11/01/2022    MCH 30.2 11/01/2022    MCHC 33.7 11/01/2022    RDW 12.0 11/01/2022      11/01/2022    MPV 9.9 11/01/2022    GRAN 2.4 11/01/2022    GRAN 60.6 11/01/2022    LYMPH 1.0 11/01/2022    LYMPH 25.3 11/01/2022    MONO 0.4 11/01/2022    MONO 9.8 11/01/2022    EOS 0.1 11/01/2022    BASO 0.06 11/01/2022    EOSINOPHIL 2.5 11/01/2022    BASOPHIL 1.5 11/01/2022      Lab Results   Component Value Date     11/01/2022    K 3.7 11/01/2022    CL 99 11/01/2022    CO2 33 (H) 11/01/2022    BUN 18 11/01/2022    CREATININE 0.8 11/01/2022    CALCIUM 9.3 11/01/2022    ANIONGAP 9 11/01/2022    ESTGFRAFRICA >60 10/14/2021    EGFRNONAA >60 10/14/2021     Lab Results   Component Value Date    ALT 21 11/01/2022    AST 24 11/01/2022    ALKPHOS 50 (L) 11/01/2022    BILITOT 0.5 11/01/2022     Lab Results   Component Value Date    FERRITIN 415 (H) 11/01/2022     Lab Results   Component Value Date    TSH 0.734 11/01/2022     Cologuard testing 10/25/2022 negative      Plan:   Rheumatoid arthritis involving multiple sites with positive rheumatoid factor  -     Iron and TIBC; Future; Expected date: 11/03/2022  -     Sedimentation rate; Future; Expected date: 11/03/2022    Sjogren's syndrome, with unspecified organ involvement  -     Iron and TIBC; Future; Expected date: 11/03/2022  -     Sedimentation rate; Future; Expected date: 11/03/2022    Iron deficiency anemia, unspecified iron deficiency anemia type  -     Case Request Endoscopy: ESOPHAGOGASTRODUODENOSCOPY (EGD)  -     Iron and TIBC; Future; Expected date: 11/03/2022    Total time spent on encounter: 40 minutes    Collaborating Provider:  Dr. Rodger Zamudio    Thank You,  MARY PatrickP-C  Hematology Oncology

## 2022-11-10 ENCOUNTER — PATIENT MESSAGE (OUTPATIENT)
Dept: HEMATOLOGY/ONCOLOGY | Facility: CLINIC | Age: 72
End: 2022-11-10
Payer: MEDICARE

## 2022-11-10 DIAGNOSIS — D50.9 IRON DEFICIENCY ANEMIA, UNSPECIFIED IRON DEFICIENCY ANEMIA TYPE: Primary | ICD-10-CM

## 2022-11-11 ENCOUNTER — TELEPHONE (OUTPATIENT)
Dept: HEMATOLOGY/ONCOLOGY | Facility: CLINIC | Age: 72
End: 2022-11-11
Payer: MEDICARE

## 2022-11-11 NOTE — TELEPHONE ENCOUNTER
----- Message from Angy Dale sent at 11/11/2022  3:33 PM CST -----  Pt stated she is returning a call regarding test results. Call back number is [phone]. Thx JM

## 2022-12-01 NOTE — PROGRESS NOTES
"Subjective:      Patient ID: Leticia Piña is a 72 y.o. female.    Chief Complaint: Follow-up (Two month follow up. No issues or concerns.)      HPI  Here for follow up of medical problems.  Not with recent BPs.  No pain now, on Rinvoq.  No regular exercise.  Didn't get repeat labs or EGD done.    Updated/ annual due 10/23:  HM: 10/22 fluvax, 3/21 covid vaccines, 6/19 HAV, 11/19 [id number], 3/17 booster [id number], 12/22 today [id number], 4/13 TDaP, 4/13 zoster, 4/22 Shingrix #2, 11/20 BMD/ Reclast rep 2y, 12/11 Cscope rep 10y, 12/21 MMG/me, 11/17 Gyn Dr. Jimenez, 3/17 HCV neg.   10/22 Cologuard negative.     Review of Systems   Constitutional:  Negative for chills, diaphoresis and fever.   Respiratory:  Negative for cough and shortness of breath.    Cardiovascular:  Negative for chest pain, palpitations and leg swelling.   Gastrointestinal:  Negative for blood in stool, constipation, diarrhea, nausea and vomiting.   Genitourinary:  Negative for dysuria, frequency and hematuria.   Psychiatric/Behavioral:  The patient is not nervous/anxious.        Objective:   /64 (BP Location: Right arm)   Pulse 79   Temp 98.1 °F (36.7 °C) (Oral)   Ht 5' 5" (1.651 m)   Wt 68.4 kg (150 lb 11.2 oz)   SpO2 97%   BMI 25.08 kg/m²     Physical Exam  Constitutional:       Appearance: She is well-developed.   Neck:      Thyroid: No thyroid mass.      Vascular: No carotid bruit.   Cardiovascular:      Rate and Rhythm: Normal rate and regular rhythm.      Heart sounds: No murmur heard.    No friction rub. No gallop.   Pulmonary:      Effort: Pulmonary effort is normal.      Breath sounds: Normal breath sounds. No wheezing or rales.   Abdominal:      General: Bowel sounds are normal.      Palpations: Abdomen is soft. There is no mass.      Tenderness: There is no abdominal tenderness.   Musculoskeletal:      Cervical back: Neck supple.   Lymphadenopathy:      Cervical: No cervical adenopathy.   Neurological:      Mental Status: " She is alert and oriented to person, place, and time.         Assessment:       1. Essential hypertension    2. Rheumatoid arthritis involving multiple sites with positive rheumatoid factor    3. Sjogren's syndrome, with unspecified organ involvement    4. Immunocompromised    5. Recurrent major depressive disorder, in partial remission    6. Stage 3a chronic kidney disease    7. Other iron deficiency anemia    8. Gastroesophageal reflux disease with esophagitis without hemorrhage          Plan:     Essential hypertension- monitor BPs more often.  -     Hypertension Digital Medicine ([facility]) Enrollment Order  -     Hypertension Digital Medicine ([facility]): Assign Onboarding Questionnaires    Rheumatoid arthritis involving multiple sites with positive rheumatoid factor, Sjogren's syndrome, with unspecified organ involvement, Immunocompromised  -     Pneumococcal Conjugate Vaccine (20 Valent) (IM)    Recurrent major depressive disorder, in partial remission- cont meds, doing well.    Stage 3a chronic kidney disease, resolved now.    Other iron deficiency anemia, Gastroesophageal reflux disease with esophagitis without hemorrhage- cont PPI, EGD now.  -     Ambulatory referral/consult to Endo Procedure ; Future; Expected date: 12/09/2022    RTC  3mo.  Start regular exercise, discussed.

## 2022-12-08 ENCOUNTER — OFFICE VISIT (OUTPATIENT)
Dept: PRIMARY CARE CLINIC | Facility: CLINIC | Age: 72
End: 2022-12-08
Payer: MEDICARE

## 2022-12-08 VITALS
TEMPERATURE: 98 F | SYSTOLIC BLOOD PRESSURE: 138 MMHG | HEART RATE: 79 BPM | HEIGHT: 65 IN | WEIGHT: 150.69 LBS | OXYGEN SATURATION: 97 % | BODY MASS INDEX: 25.11 KG/M2 | DIASTOLIC BLOOD PRESSURE: 64 MMHG

## 2022-12-08 DIAGNOSIS — F33.41 RECURRENT MAJOR DEPRESSIVE DISORDER, IN PARTIAL REMISSION: ICD-10-CM

## 2022-12-08 DIAGNOSIS — I10 ESSENTIAL HYPERTENSION: Primary | ICD-10-CM

## 2022-12-08 DIAGNOSIS — N18.31 STAGE 3A CHRONIC KIDNEY DISEASE: ICD-10-CM

## 2022-12-08 DIAGNOSIS — M05.79 RHEUMATOID ARTHRITIS INVOLVING MULTIPLE SITES WITH POSITIVE RHEUMATOID FACTOR: ICD-10-CM

## 2022-12-08 DIAGNOSIS — M35.00 SJOGREN'S SYNDROME, WITH UNSPECIFIED ORGAN INVOLVEMENT: ICD-10-CM

## 2022-12-08 DIAGNOSIS — Z12.31 ENCOUNTER FOR SCREENING MAMMOGRAM FOR MALIGNANT NEOPLASM OF BREAST: ICD-10-CM

## 2022-12-08 DIAGNOSIS — D84.9 IMMUNOCOMPROMISED: ICD-10-CM

## 2022-12-08 DIAGNOSIS — K21.00 GASTROESOPHAGEAL REFLUX DISEASE WITH ESOPHAGITIS WITHOUT HEMORRHAGE: ICD-10-CM

## 2022-12-08 DIAGNOSIS — D50.8 OTHER IRON DEFICIENCY ANEMIA: ICD-10-CM

## 2022-12-08 PROBLEM — N18.30 CKD (CHRONIC KIDNEY DISEASE) STAGE 3, GFR 30-59 ML/MIN: Status: RESOLVED | Noted: 2019-02-05 | Resolved: 2022-12-08

## 2022-12-08 PROCEDURE — 99999 PR PBB SHADOW E&M-EST. PATIENT-LVL V: CPT | Mod: PBBFAC,,, | Performed by: INTERNAL MEDICINE

## 2022-12-08 PROCEDURE — 99213 PR OFFICE/OUTPT VISIT, EST, LEVL III, 20-29 MIN: ICD-10-PCS | Mod: 25,S$PBB,, | Performed by: INTERNAL MEDICINE

## 2022-12-08 PROCEDURE — 85025 COMPLETE CBC W/AUTO DIFF WBC: CPT | Performed by: INTERNAL MEDICINE

## 2022-12-08 PROCEDURE — 84466 ASSAY OF TRANSFERRIN: CPT | Performed by: INTERNAL MEDICINE

## 2022-12-08 PROCEDURE — 99213 OFFICE O/P EST LOW 20 MIN: CPT | Mod: 25,S$PBB,, | Performed by: INTERNAL MEDICINE

## 2022-12-08 PROCEDURE — 90677 PCV20 VACCINE IM: CPT | Mod: PBBFAC,PN

## 2022-12-08 PROCEDURE — 82728 ASSAY OF FERRITIN: CPT | Performed by: INTERNAL MEDICINE

## 2022-12-08 PROCEDURE — 99999 PR PBB SHADOW E&M-EST. PATIENT-LVL V: ICD-10-PCS | Mod: PBBFAC,,, | Performed by: INTERNAL MEDICINE

## 2022-12-08 PROCEDURE — 99215 OFFICE O/P EST HI 40 MIN: CPT | Mod: PBBFAC,PN | Performed by: INTERNAL MEDICINE

## 2022-12-09 ENCOUNTER — PATIENT MESSAGE (OUTPATIENT)
Dept: PRIMARY CARE CLINIC | Facility: CLINIC | Age: 72
End: 2022-12-09
Payer: MEDICARE

## 2022-12-09 LAB
BASOPHILS # BLD AUTO: 0.05 K/UL (ref 0–0.2)
BASOPHILS NFR BLD: 1 % (ref 0–1.9)
DIFFERENTIAL METHOD: ABNORMAL
EOSINOPHIL # BLD AUTO: 0.1 K/UL (ref 0–0.5)
EOSINOPHIL NFR BLD: 2.2 % (ref 0–8)
ERYTHROCYTE [DISTWIDTH] IN BLOOD BY AUTOMATED COUNT: 11.8 % (ref 11.5–14.5)
FERRITIN SERPL-MCNC: 314 NG/ML (ref 20–300)
HCT VFR BLD AUTO: 37.4 % (ref 37–48.5)
HGB BLD-MCNC: 12.3 G/DL (ref 12–16)
IMM GRANULOCYTES # BLD AUTO: 0.02 K/UL (ref 0–0.04)
IMM GRANULOCYTES NFR BLD AUTO: 0.4 % (ref 0–0.5)
IRON SERPL-MCNC: 149 UG/DL (ref 30–160)
LYMPHOCYTES # BLD AUTO: 1.3 K/UL (ref 1–4.8)
LYMPHOCYTES NFR BLD: 25.1 % (ref 18–48)
MCH RBC QN AUTO: 31.3 PG (ref 27–31)
MCHC RBC AUTO-ENTMCNC: 32.9 G/DL (ref 32–36)
MCV RBC AUTO: 95 FL (ref 82–98)
MONOCYTES # BLD AUTO: 0.5 K/UL (ref 0.3–1)
MONOCYTES NFR BLD: 9.4 % (ref 4–15)
NEUTROPHILS # BLD AUTO: 3.2 K/UL (ref 1.8–7.7)
NEUTROPHILS NFR BLD: 61.9 % (ref 38–73)
NRBC BLD-RTO: 0 /100 WBC
PLATELET # BLD AUTO: 299 K/UL (ref 150–450)
PMV BLD AUTO: 10.5 FL (ref 9.2–12.9)
RBC # BLD AUTO: 3.93 M/UL (ref 4–5.4)
SATURATED IRON: 32 % (ref 20–50)
TOTAL IRON BINDING CAPACITY: 472 UG/DL (ref 250–450)
TRANSFERRIN SERPL-MCNC: 319 MG/DL (ref 200–375)
WBC # BLD AUTO: 5.1 K/UL (ref 3.9–12.7)

## 2022-12-12 ENCOUNTER — OFFICE VISIT (OUTPATIENT)
Dept: HEMATOLOGY/ONCOLOGY | Facility: CLINIC | Age: 72
End: 2022-12-12
Payer: MEDICARE

## 2022-12-12 ENCOUNTER — HOSPITAL ENCOUNTER (OUTPATIENT)
Dept: PREADMISSION TESTING | Facility: HOSPITAL | Age: 72
Discharge: HOME | End: 2022-12-12
Payer: MEDICARE

## 2022-12-12 DIAGNOSIS — E61.1 IRON DEFICIENCY: ICD-10-CM

## 2022-12-12 DIAGNOSIS — M05.79 RHEUMATOID ARTHRITIS INVOLVING MULTIPLE SITES WITH POSITIVE RHEUMATOID FACTOR: Primary | ICD-10-CM

## 2022-12-12 DIAGNOSIS — M35.00 SJOGREN'S SYNDROME, WITH UNSPECIFIED ORGAN INVOLVEMENT: ICD-10-CM

## 2022-12-12 DIAGNOSIS — K21.00 GASTROESOPHAGEAL REFLUX DISEASE WITH ESOPHAGITIS WITHOUT HEMORRHAGE: ICD-10-CM

## 2022-12-12 DIAGNOSIS — D50.8 OTHER IRON DEFICIENCY ANEMIA: ICD-10-CM

## 2022-12-12 PROCEDURE — 99214 PR OFFICE/OUTPT VISIT, EST, LEVL IV, 30-39 MIN: ICD-10-PCS | Mod: 95,,, | Performed by: NURSE PRACTITIONER

## 2022-12-12 PROCEDURE — 99214 OFFICE O/P EST MOD 30 MIN: CPT | Mod: 95,,, | Performed by: NURSE PRACTITIONER

## 2022-12-12 RX ORDER — HEPARIN 100 UNIT/ML
500 SYRINGE INTRAVENOUS
Status: CANCELLED | OUTPATIENT
Start: 2022-12-12

## 2022-12-12 RX ORDER — SODIUM CHLORIDE 0.9 % (FLUSH) 0.9 %
10 SYRINGE (ML) INJECTION
Status: CANCELLED | OUTPATIENT
Start: 2022-12-12

## 2022-12-12 RX ORDER — METHYLPREDNISOLONE SOD SUCC 125 MG
80 VIAL (EA) INJECTION
Status: CANCELLED | OUTPATIENT
Start: 2022-12-12 | End: 2022-12-12

## 2022-12-12 NOTE — PROGRESS NOTES
The patient location is: home  The chief complaint leading to consultation is: fatigue    Visit type: audiovisual    Face to Face time with patient: 20  45 minutes of total time spent on the encounter, which includes face to face time and non-face to face time preparing to see the patient (eg, review of tests), Obtaining and/or reviewing separately obtained history, Documenting clinical information in the electronic or other health record, Independently interpreting results (not separately reported) and communicating results to the patient/family/caregiver, or Care coordination (not separately reported).     Each patient to whom he or she provides medical services by telemedicine is:  (1) informed of the relationship between the physician and patient and the respective role of any other health care provider with respect to management of the patient; and (2) notified that he or she may decline to receive medical services by telemedicine and may withdraw from such care at any time.    Patient ID: Leticia Piña is a 72 y.o. female.    Chief Complaint: fatigue    HPI:  is a 72 year old female who presents today for follow up of her longstanding chronic on/off anemia for several years.  She states that she takes folic acid and B12 daily.  Denies any previous diagnosis of B12 of folic acid deficiency.  She has had on/off iron deficiency noted with history of IV Injectafer infusion 3/2020 and then again on 2/2021 she received 1 dose Feraheme due to recurrent ID.      She had colonoscopy in 2011 - no polyps found.  Up to date on mammogram and pap.     She complains of acid reflux symptoms for years.  She also has Sjogren's disease and cutaneous lupus erythematous, RA followed by rheumatology.       Most recent hgb 12.3 g/dL and iron is repleated.  Had ERCP done with Dr. Madrigal with stent placement and pathology showing Chronic cholecystitis with cholelithiasis and Rokitansky-Aschoff sinuses   Negative for atypia  or malignancy      Had emergency gallbladder surgery removal on 3/26/2020 with noted elevated LFTs with improvement in symptoms post surgery.     Interval History:    9/1/2022 Will need to repeat labs today to assess for recurrent Iron deficiency anemia.   6/21/2021 ERCP Biliary stent removed with biliary sludge and a stone removed.   Is currently being treated with Rinvoq once daily for treatment of RA and is tolerating well.  Denies any s/s of infection.   C/o fatigue.  Denies any abnormal bleeding.  Is not currently taking oral iron daily. Denies f/c/ns or unintentional weight loss.  Denies abnormal lymphadenopathy.      11/3/2022 Presents today to evaluate response of IV Feraheme treatments x 2.  Last received on 9/21/2022.  Awaiting iron and tibc lab results.   Continues with chronic fatigue. Denies any abnormal bleeding.  Continues to take B12 and folate daily. is a 72 year old female who presents today for follow up of her longstanding chronic on/off anemia for several years.  She states that she takes folic acid and B12 daily.  Denies any previous diagnosis of B12 of folic acid deficiency.  She has had on/off iron deficiency noted with history of IV Injectafer infusion 3/2020 and then again on 2/2021 she received 1 dose Feraheme due to recurrent ID.      She had colonoscopy in 2011 - no polyps found.  Up to date on mammogram and pap.     She complains of acid reflux symptoms for years.  She also has Sjogren's disease and cutaneous lupus erythematous, RA followed by rheumatology.       Most recent hgb 12.3 g/dL and iron is repleated.  Had ERCP done with Dr. Madrigal with stent placement and pathology showing Chronic cholecystitis with cholelithiasis and Rokitansky-Aschoff sinuses   Negative for atypia or malignancy      Had emergency gallbladder surgery removal on 3/26/2020 with noted elevated LFTs with improvement in symptoms post surgery.     Interval History:    9/1/2022 Will need to repeat labs today to  assess for recurrent Iron deficiency anemia.   6/21/2021 ERCP Biliary stent removed with biliary sludge and a stone removed.   Is currently being treated with Rinvoq once daily for treatment of RA and is tolerating well.  Denies any s/s of infection.   C/o fatigue.  Denies any abnormal bleeding.  Is not currently taking oral iron daily. Denies f/c/ns or unintentional weight loss.  Denies abnormal lymphadenopathy.      11/3/2022 Presents today to evaluate response of IV Feraheme treatments x 2.  Last received on 9/21/2022.  Awaiting iron and tibc lab results.   Continues with chronic fatigue. Denies any abnormal bleeding.  Continues to take B12 and folate daily.     12/12/2022  EGD scheduled 1/24/2022.  Cotinines to take B12 and folate daily. Denies any GI symptoms or abnormal blood loss.  Continues treatment for RA - Rinvoqu and plaqunenil.  Currently not taking oral iron.  Has taking oral iron in the past and is unable to tolerate due to constipation.       Social History     Socioeconomic History    Marital status:    Tobacco Use    Smoking status: Never    Smokeless tobacco: Never   Substance and Sexual Activity    Alcohol use: Not Currently     Comment: socially     Drug use: No    Sexual activity: Not Currently     Social Determinants of Health     Financial Resource Strain: Low Risk     Difficulty of Paying Living Expenses: Not very hard   Food Insecurity: No Food Insecurity    Worried About Running Out of Food in the Last Year: Never true    Ran Out of Food in the Last Year: Never true   Transportation Needs: No Transportation Needs    Lack of Transportation (Medical): No    Lack of Transportation (Non-Medical): No   Physical Activity: Unknown    Days of Exercise per Week: 0 days    Minutes of Exercise per Session: Patient refused   Stress: No Stress Concern Present    Feeling of Stress : Not at all   Social Connections: Unknown    Frequency of Communication with Friends and Family: More than three  times a week    Frequency of Social Gatherings with Friends and Family: Twice a week    Active Member of Clubs or Organizations: No    Attends Club or Organization Meetings: Patient refused    Marital Status:    Housing Stability: Low Risk     Unable to Pay for Housing in the Last Year: No    Number of Places Lived in the Last Year: 2    Unstable Housing in the Last Year: No       Family History   Problem Relation Age of Onset    Heart disease Mother     Heart disease Father        Past Surgical History:   Procedure Laterality Date    AUGMENTATION OF BREAST      breast implants      breast surgery for rupture      ERCP N/A 3/25/2021    Procedure: ERCP (ENDOSCOPIC RETROGRADE CHOLANGIOPANCREATOGRAPHY);  Surgeon: Preston Madrigal MD;  Location: [facility] ENDO;  Service: Endoscopy;  Laterality: N/A;    ERCP N/A 6/21/2021    Procedure: ERCP (ENDOSCOPIC RETROGRADE CHOLANGIOPANCREATOGRAPHY);  Surgeon: Prseton Madrigal MD;  Location: [facility] ENDO;  Service: Endoscopy;  Laterality: N/A;    LAPAROSCOPIC CHOLECYSTECTOMY N/A 3/26/2021    Procedure: CHOLECYSTECTOMY, LAPAROSCOPIC;  Surgeon: Jose Blue MD;  Location: [facility] OR;  Service: General;  Laterality: N/A;    TONSILLECTOMY, ADENOIDECTOMY      TUBAL LIGATION         Past Medical History:   Diagnosis Date    Allergic rhinitis     Cutaneous lupus erythematosus     drug induced.    Essential hypertension 2/5/2019    GERD (gastroesophageal reflux disease)     Hypothyroid     Insomnia     Mixed anxiety and depressive disorder     Normal cardiac stress test     11/07    Rheumatoid arthritis(714.0)     Sjogren's syndrome        Review of Systems   Constitutional:  Positive for fatigue. Negative for appetite change and unexpected weight change.   HENT:  Negative for mouth sores and nosebleeds.    Eyes:  Negative for visual disturbance.   Respiratory:  Negative for cough and shortness of breath.    Cardiovascular:  Negative for chest pain.   Gastrointestinal:   Negative for abdominal pain, anal bleeding, blood in stool and diarrhea.   Endocrine: Negative.    Genitourinary:  Negative for frequency, hematuria and vaginal bleeding.   Musculoskeletal:  Negative for back pain.   Skin:  Negative for rash.   Allergic/Immunologic: Positive for immunocompromised state.   Neurological:  Negative for headaches.   Hematological:  Negative for adenopathy. Bruises/bleeds easily.   Psychiatric/Behavioral:  The patient is not nervous/anxious.         Medication List with Changes/Refills   Current Medications    ALBUTEROL (VENTOLIN HFA) 90 MCG/ACTUATION INHALER    Inhale 2 puffs into the lungs every 4 to 6 hours as needed for Wheezing or Shortness of Breath. Rescue    AZELASTINE (ASTELIN) 137 MCG (0.1 %) NASAL SPRAY    1 spray (137 mcg total) by Nasal route 2 (two) times daily.    BENZONATATE (TESSALON) 100 MG CAPSULE    Take 2 capsules (200 mg total) by mouth 3 (three) times daily as needed.    BUPROPION (WELLBUTRIN XL) 300 MG 24 HR TABLET    Take 1 tablet (300 mg total) by mouth once daily.    CHOLECALCIFEROL, VITAMIN D3, 5,000 UNIT/ML DROP    Take 1 drop by mouth Daily.    CYANOCOBALAMIN, VITAMIN B-12, 5,000 MCG SUBL    Place under the tongue once daily.    DEXBROMPHENIRAMINE MALEATE (ALA-HIST IR) 2 MG TAB    Take 1 tablet by mouth once daily.    DICLOFENAC SODIUM (VOLTAREN) 1 % GEL    Apply 2 g topically 4 (four) times daily.    DULOXETINE (CYMBALTA) 60 MG CAPSULE    TAKE ONE CAPSULE BY MOUTH ONE TIME DAILY    FOLIC ACID (FOLVITE) 1 MG TABLET    Take 1 tablet (1 mg total) by mouth once daily.    HYDROCHLOROTHIAZIDE (HYDRODIURIL) 25 MG TABLET    TAKE ONE TABLET BY MOUTH ONE TIME DAILY    HYDROXYCHLOROQUINE (PLAQUENIL) 200 MG TABLET    Take 1 tablet (200 mg total) by mouth 2 (two) times daily.    LEVOTHYROXINE (EUTHYROX) 75 MCG TABLET    Take 1 tablet (75 mcg total) by mouth once daily.    LORAZEPAM (ATIVAN) 1 MG TABLET    Take 1 tablet (1 mg total) by mouth 2 (two) times daily.     MELOXICAM (MOBIC) 15 MG TABLET    TAKE ONE TABLET BY MOUTH ONE TIME DAILY    OMEPRAZOLE (PRILOSEC) 40 MG CAPSULE    Take 1 capsule (40 mg total) by mouth once daily.    POTASSIUM CHLORIDE SA (K-DUR,KLOR-CON) 20 MEQ TABLET    TAKE ONE TABLET BY MOUTH ONE TIME DAILY    TRAZODONE (DESYREL) 50 MG TABLET    TAKE 1 TO 2 TABLETS BY MOUTH AT BEDTIME AS NEEDED FOR SLEEP.    UPADACITINIB (RINVOQ) 15 MG 24 HR TABLET    Take by mouth. Test claim only    VALACYCLOVIR (VALTREX) 1000 MG TABLET    Take 1 tablet (1,000 mg total) by mouth 3 (three) times daily. for 7 days    VALSARTAN (DIOVAN) 320 MG TABLET    Take 1 tablet (320 mg total) by mouth once daily.        Objective:   There were no vitals filed for this visit.    Physical Exam  Constitutional:       Appearance: Normal appearance.   Pulmonary:      Effort: Pulmonary effort is normal.   Neurological:      Mental Status: She is alert and oriented to person, place, and time.   Psychiatric:         Mood and Affect: Mood normal.         Behavior: Behavior normal.         Thought Content: Thought content normal.         Judgment: Judgment normal.       Assessment:     Problem List Items Addressed This Visit          Immunology/Multi System    Rheumatoid arthritis involving multiple sites with positive rheumatoid factor - Primary    Sjogren's syndrome     Other Visit Diagnoses       Iron deficiency                Lab Results   Component Value Date    WBC 5.10 12/08/2022    RBC 3.93 (L) 12/08/2022    HGB 12.3 12/08/2022    HCT 37.4 12/08/2022    MCV 95 12/08/2022    MCH 31.3 (H) 12/08/2022    MCHC 32.9 12/08/2022    RDW 11.8 12/08/2022     12/08/2022    MPV 10.5 12/08/2022    GRAN 3.2 12/08/2022    GRAN 61.9 12/08/2022    LYMPH 1.3 12/08/2022    LYMPH 25.1 12/08/2022    MONO 0.5 12/08/2022    MONO 9.4 12/08/2022    EOS 0.1 12/08/2022    BASO 0.05 12/08/2022    EOSINOPHIL 2.2 12/08/2022    BASOPHIL 1.0 12/08/2022      Lab Results   Component Value Date     11/01/2022     K 3.7 11/01/2022    CL 99 11/01/2022    CO2 33 (H) 11/01/2022    BUN 18 11/01/2022    CREATININE 0.8 11/01/2022    CALCIUM 9.3 11/01/2022    ANIONGAP 9 11/01/2022    ESTGFRAFRICA >60 10/14/2021    EGFRNONAA >60 10/14/2021     Lab Results   Component Value Date    ALT 21 11/01/2022    AST 24 11/01/2022    ALKPHOS 50 (L) 11/01/2022    BILITOT 0.5 11/01/2022     Lab Results   Component Value Date    UIBC 280 01/11/2013    IRON 149 12/08/2022    TRANSFERRIN 319 12/08/2022    TIBC 472 (H) 12/08/2022    FESATURATED 32 12/08/2022      Lab Results   Component Value Date    FERRITIN 314 (H) 12/08/2022       Med Onc Chart Routing      Follow up with physician    Follow up with RONNY . Schedule for 1 dose of IV Feraheme at .  She will f/u on 2/6/2022 with a VV and labs prior in [place] - cbc, bmp,iron studies.   Infusion scheduling note see above   Injection scheduling note see above   Labs   Lab interval:  N/a   Imaging   N/a   Pharmacy appointment No pharmacy appointment needed      Other referrals No additional referrals needed          Plan:   Rheumatoid arthritis involving multiple sites with positive rheumatoid factor    Sjogren's syndrome, with unspecified organ involvement    Iron deficiency    Other orders  -     methylPREDNISolone sodium succinate injection 80 mg  -     ferumoxytoL (FERAHEME) 510 mg in dextrose 5 % 100 mL IVPB  -     sodium chloride 0.9% flush 10 mL  -     heparin, porcine (PF) 100 unit/mL injection flush 500 Units  -     alteplase injection 2 mg      Collaborating Provider:  Dr. Rodger Zamudio    Thank You,  Oj Kim, FNP-C  Hematology Oncology

## 2022-12-15 ENCOUNTER — PATIENT MESSAGE (OUTPATIENT)
Dept: ADMINISTRATIVE | Facility: OTHER | Age: 72
End: 2022-12-15
Payer: MEDICARE

## 2022-12-20 DIAGNOSIS — I10 ESSENTIAL HYPERTENSION: ICD-10-CM

## 2022-12-20 RX ORDER — POTASSIUM CHLORIDE 20 MEQ/1
TABLET, EXTENDED RELEASE ORAL
Qty: 90 TABLET | Refills: 3 | Status: SHIPPED | OUTPATIENT
Start: 2022-12-20 | End: 2024-04-02

## 2022-12-27 ENCOUNTER — INFUSION (OUTPATIENT)
Dept: INFUSION THERAPY | Facility: HOSPITAL | Age: 72
End: 2022-12-27
Attending: NURSE PRACTITIONER
Payer: MEDICARE

## 2022-12-27 VITALS
HEART RATE: 76 BPM | DIASTOLIC BLOOD PRESSURE: 80 MMHG | RESPIRATION RATE: 18 BRPM | OXYGEN SATURATION: 98 % | SYSTOLIC BLOOD PRESSURE: 143 MMHG | TEMPERATURE: 97 F

## 2022-12-27 DIAGNOSIS — D50.8 OTHER IRON DEFICIENCY ANEMIA: Primary | ICD-10-CM

## 2022-12-27 PROCEDURE — 63600175 PHARM REV CODE 636 W HCPCS: Mod: JG | Performed by: NURSE PRACTITIONER

## 2022-12-27 PROCEDURE — 96365 THER/PROPH/DIAG IV INF INIT: CPT

## 2022-12-27 PROCEDURE — 25000003 PHARM REV CODE 250: Performed by: NURSE PRACTITIONER

## 2022-12-27 RX ADMIN — FERUMOXYTOL 510 MG: 510 INJECTION INTRAVENOUS at 07:12

## 2022-12-27 NOTE — PLAN OF CARE
Discussed plan of care with pt. Addressed any and ongoing concerns. Pt denies    Problem: Adult Inpatient Plan of Care  Goal: Plan of Care Review  Outcome: Ongoing, Progressing  Flowsheets (Taken 12/27/2022 0812)  Plan of Care Reviewed With: patient  Goal: Patient-Specific Goal (Individualized)  Outcome: Ongoing, Progressing  Goal: Absence of Hospital-Acquired Illness or Injury  Outcome: Ongoing, Progressing  Intervention: Identify and Manage Fall Risk  Flowsheets (Taken 12/27/2022 0812)  Safety Promotion/Fall Prevention:   in recliner, wheels locked   room near unit station  Intervention: Prevent Infection  Flowsheets (Taken 12/27/2022 0812)  Infection Prevention:   hand hygiene promoted   equipment surfaces disinfected  Goal: Optimal Comfort and Wellbeing  Outcome: Ongoing, Progressing  Intervention: Monitor Pain and Promote Comfort  Flowsheets (Taken 12/27/2022 0812)  Pain Management Interventions: quiet environment facilitated  Intervention: Provide Person-Centered Care  Flowsheets (Taken 12/27/2022 0812)  Trust Relationship/Rapport:   reassurance provided   care explained   choices provided   thoughts/feelings acknowledged   emotional support provided   empathic listening provided   questions answered   questions encouraged

## 2023-01-04 ENCOUNTER — PATIENT MESSAGE (OUTPATIENT)
Dept: PRIMARY CARE CLINIC | Facility: CLINIC | Age: 73
End: 2023-01-04
Payer: MEDICARE

## 2023-01-04 DIAGNOSIS — R50.9 FEVER AND CHILLS: ICD-10-CM

## 2023-01-04 DIAGNOSIS — I10 ESSENTIAL HYPERTENSION: ICD-10-CM

## 2023-01-04 DIAGNOSIS — R05.9 COUGH: ICD-10-CM

## 2023-01-04 RX ORDER — BENZONATATE 100 MG/1
200 CAPSULE ORAL 3 TIMES DAILY PRN
Qty: 60 CAPSULE | Refills: 3 | Status: SHIPPED | OUTPATIENT
Start: 2023-01-04 | End: 2023-12-02 | Stop reason: SDUPTHER

## 2023-01-09 ENCOUNTER — OFFICE VISIT (OUTPATIENT)
Dept: PSYCHIATRY | Facility: CLINIC | Age: 73
End: 2023-01-09
Payer: MEDICARE

## 2023-01-09 DIAGNOSIS — F41.8 MIXED ANXIETY AND DEPRESSIVE DISORDER: ICD-10-CM

## 2023-01-09 DIAGNOSIS — G47.00 INSOMNIA, UNSPECIFIED TYPE: Primary | ICD-10-CM

## 2023-01-09 PROBLEM — Z00.00 WELLNESS EXAMINATION: Status: RESOLVED | Noted: 2022-10-06 | Resolved: 2023-01-09

## 2023-01-09 PROCEDURE — 99214 PR OFFICE/OUTPT VISIT, EST, LEVL IV, 30-39 MIN: ICD-10-PCS | Mod: 95,,, | Performed by: PSYCHIATRY & NEUROLOGY

## 2023-01-09 PROCEDURE — 99214 OFFICE O/P EST MOD 30 MIN: CPT | Mod: 95,,, | Performed by: PSYCHIATRY & NEUROLOGY

## 2023-01-09 RX ORDER — TRAZODONE HYDROCHLORIDE 50 MG/1
TABLET ORAL
Qty: 180 TABLET | Refills: 0 | Status: SHIPPED | OUTPATIENT
Start: 2023-01-09 | End: 2023-03-20

## 2023-01-09 RX ORDER — LORAZEPAM 1 MG/1
1 TABLET ORAL 2 TIMES DAILY
Qty: 60 TABLET | Refills: 1 | Status: SHIPPED | OUTPATIENT
Start: 2023-01-09 | End: 2023-04-26 | Stop reason: SDUPTHER

## 2023-01-09 RX ORDER — DULOXETIN HYDROCHLORIDE 60 MG/1
60 CAPSULE, DELAYED RELEASE ORAL DAILY
Qty: 90 CAPSULE | Refills: 0 | Status: SHIPPED | OUTPATIENT
Start: 2023-01-09 | End: 2023-04-26 | Stop reason: SDUPTHER

## 2023-01-09 RX ORDER — BUPROPION HYDROCHLORIDE 300 MG/1
300 TABLET ORAL DAILY
Qty: 90 TABLET | Refills: 1 | Status: SHIPPED | OUTPATIENT
Start: 2023-01-09 | End: 2023-04-26 | Stop reason: SDUPTHER

## 2023-01-09 NOTE — PROGRESS NOTES
Outpatient Psychiatry Follow-up Visit (MD/NP)    1/9/2023    Leticia Piña, a 72 y.o. female, presenting for follow-up visit. Met with patient.    Reason for Encounter: Patient complains of anxiety, depression    Interval Hx: Patient seen and interviewed for follow-up, last seen about six months ago. This was a VIDEO VISIT. She was at home. New problems with anemia. Will have an EGD. Bp has been ok following medication switch. No new symptoms. Mental health symptoms ongoing.   Stress level is ok. Med adherent - ongoing benefit. Denies medication side effects.     Background: 68 year old F with hx of anxiety and depression x ~20 years, with onset roughly corresponding to menopause in 1997. Describes following illness and treatment course:     Perimenopause - 1997 - insomnia roblems ; start ambien.   August 2002 - emotional distress  Can't accomplish anything (decreased functioning), feeling overwhelmed, going around in circles.   September '02 - first treatment - citalopram 20 mg. Thought it it was causing dry mouth (later dx'ed sjogrens). Feels helped symptoms present all her life that she later recognized as depression. Reduced citalopram.   November '03- more perimenopausal symptoms. Started muliti-hormonal treatment for menopausal symptoms. Had temporary benefit, stopped in '04 due to negligible benefit by that time. Remained on estradiol  Eventually went back to 20 mg citalopram.   Stress and fatigue in May '06. More dryness. Early sjogren's. Stopped citalopram, tried duloxetine.   2.07-stopped menses, 3.07 - back to progest/test, off est.   Ativan helped anxiety, caused lethargy.   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 Insomnia ongoing despite ambien.   Increased ambien to 15.   In '09 added trazodone 75 mg - helped in combo with ambien  2012 - cymbalta to 90 mg.   '13 - added wellbutrin 100 mg' diagnosed with sjogren's.   '15 - wellbutrin 150,  "trazodone 100 mg. Stopped ambien  Had a dip in moods in early summer.   Taking 225 mg bupropion - starting about 1 month ago. 300 mg was too intense. Tolerating this lower dose.     Worrying too much about different things   Trouble relaxing   SUSAN-7 = 2    Sleep Problems   Sad Mood  Appetite and weight changes  Concentration problems  Guilt  Thoughts of Emptiness/Death/Suicide  Anhedonia  Anergia  Slowing/PMR    QIDS = 7    Rested on rising. Wakes only briefly. Sometimes has trazodone hangover. Doesn't interfere with functioning. Interest is good.   Anxiety fluctuates. Recently taking ativan about 1-2x/week.     Psych Hx: as above. No avh, no hospitalization, no serious SI, elaine. One previous psych assessment in [facility] - "horrible experience". She changed meds (to escitalopram), felt terrible.    No other interactions with psychiatrist.     Social history: When younger - low self-esteem, dad was pessimistic and fault-finding. Dad critical. No nancy abuse. No serious maltreatment or trauma. Fewer than most friends (mostly because they were outgoing; thought poorly of one's self, inhibited. Denies teasing and bullying. Above average in school. Graduated HS, went to work x 1 year then went to [employer]. Started dating . Didn't graduate. 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 Gets good feedback on performance. Good relationship with .  x 43 years. 2 grown kids, 4 grandkids. Kids live locally. Doesn't see her son much. Dtr-in-law leads them to not visit with them.  lost business as late consequence of bad economy & a "misappropriation of funds" issue &  health issues. Was primary caregiver to mother-in-law (now in [place]). Was primary caregiver to granddaughter who had medical disabilities. "my lowest point".  now working, "see good future ahead".       From PCP note: 3.2.18 - Here for follow " "up of medical problems. Works long hours. Works & then goes to sleep right after she gets home. RA pain is doing well. Nesquehoning weird so started checking BP. Has been monitoring BPs, range 130-174/ . Has no past history of HTN. No med change. Only major change is started working 3 months ago, , & babysits. No exercise. Sleeping ok with trazodone 100mg. Mixed anxiety & depressive disorder- incr wellbutrin to 300, cont  cymbalta 60mg, trazodone 100 hs, try wean ativan to half hs.  Refer for counseling, refer to Psychiatry if not better in 6 weeks.  RTC 6 weeks.    From psychotherapy eval    Chief complaint/reason for encounter: depression, anxiety, sleep and interpersonal     History of present illness:  67 year old  female presented for initial evaluation, with chief complaint of "I've always been kind of anxious & depressed for as long as I can remember, but it just really started getting worse since around 2009..." Pt described a few challenging stressful events starting then, that she feels have accumulate in her life. These include financial strain,her own worsening Rhumatoid Arthritis--diagnosed in 1994, loss of home & the family business in 2013, 's life threatening health scare starting in 2009, legal troubles related to his business, & a feeling of social abandonment from mother, sister, some friends, & daughter-in-law (which means her son & his 2 kids don't see her much anymore.) Pt described in recent months worsening symptoms of sudden uncontrolled tearfulness, increased muscle tension, being distracted, pervasive ruminating worries, loss of senthil, trouble sleeping unless she takes a sleeping aid--currently Trazodone, & tendency to socially isolate. She stated she has never been very sociable. She now feels more rejection & scrutiny from others & finds herself withdrawing. She said much of the negativity from some family & friends relates to 's legal " trouble, having been arrested for business related fraud from a period when he was juggling inadequate customer funds & misappropriated a customer's money to cover expenses of someone else's job; all that while he was battling cancer. Pt said she marvels that her  seems able to stay generally optimistic & pleasant & that he has a lot of friends. Meanwhile, she lacks much social support & stated she isn;t comfortable reaching out to connect with people, so she has a small support network to begin with. She feels devastated by judgment against her  by her own sister, mother, & daughter in law, with the result that she is often unable to spend time with 2 of her grandchildren; she noted those children visit with the daughter-in-law's parents virtually every weekend. Pt described a certain pressure level of social expectation- -material & financial standards, & she & her  have lose their home, as well as the business, & they are living with her parents. 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 She said they have a lot of financial backlog of bills to catch up on. Pt denied any suicidal or homicidal ideation, denied any cognitive deficit other than poor focus at times, denied psychosis, mood swings, rages, or substance abuse. Identified therapeutic goals include reducing anxiety & depression & improving coping skills; possibly finding a way to somewhat reduce social isolation, perhaps by reconnecting with old friends.       Pain: not quantified but described as moderate RA pain     Symptoms:   Mood: depressed mood, insomnia, worthlessness/guilt, poor concentration, tearfulness and social isolation  Anxiety: excessive anxiety/worry, restlessness/keyed up, muscle tension and social phobia  Substance abuse: denied  Cognitive  "functioning: denied  Health behaviors: noncontributory     Psychiatric history: none     Medical history: RA--diagnosed in 1994; recent hypertension; Hypothyroidism     Family history of psychiatric illness: mother chronically anxious (starting when older); father apparently depressed--very pessimistic & irritable, doesn't acknowledge problem. Suspects paternal grandfather     Social history: Grew up locally, the oldest of 3 sisters, the other two 7 & 8 & 1/2 years younger than she. Raised by both parents; recalled being markedly shy her entire life; not particularly close to her sisters, who were much younger & had each other. Lifelong active Druze; has a home Jain. Not currently involved in any smaller groups within the Jain. Graduated high school & attended 2 years of college;  at age 24. Still with , Alpesh, today.  Mother of 2, a son, age 42, & daughter, age 39. Both each have 2 children of their own. Described daughter as a close source of support; also talks with  a lot, processing the struggles they have come through together. 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 Pt described recent loss of a few friend connections she had; her middle sister verbally declared she wouldn't associate with the pt & her , due to his legal "disgrace."  Denied any  experience.  No tobacco, light to moderate wine intake. Minimal caffeine, no recreational drugs.       Substance use:   Alcohol: occasional   Drugs: none   Tobacco: none   Caffeine: a cup of green tea most mornings.     Review Of Systems:     GENERAL:  No weight gain or loss  SKIN:  No rashes or lacerations  HEAD:  No headaches  EYES:  No exophthalmos, jaundice or blindness  EARS:  No dizziness, tinnitus or hearing loss  NOSE:  No changes in smell  MOUTH & THROAT:  No dyskinetic movements or obvious goiter  CHEST:  No shortness of breath, hyperventilation or cough  CARDIOVASCULAR:  " No tachycardia or chest pain  ABDOMEN:  No nausea, vomiting, pain, constipation or diarrhea  URINARY:  No frequency, dysuria or sexual dysfunction  ENDOCRINE:  No polydipsia, polyuria  MUSCULOSKELETAL:  No pain or stiffness of the joints  NEUROLOGIC:  No weakness, sensory changes, seizures, confusion, memory loss, tremor or other abnormal movements    Current Evaluation:     Nutritional Screening: Considering the patient's height and weight, medications, medical history and preferences, should a referral be made to the dietitian? no    Constitutional  Vitals:  Most recent vital signs, dated less than 90 days prior to this appointment, were not reviewed.    There were no vitals filed for this visit.     General:  unremarkable, age appropriate     Musculoskeletal  Muscle Strength/Tone:  no tremor, no tic   Gait & Station:  non-ataxic     Psychiatric  Appearance: casually dressed & groomed;   Behavior: calm,   Cooperation: cooperative with assessment  Speech: normal rate, volume, tone  Thought Process: linear, goal-directed  Thought Content: No suicidal or homicidal ideation; no delusions  Affect: reactive  Mood: euthymic  Perceptions: No auditory or visual hallucinations  Level of Consciousness: alert throughout interview  Insight: fair  Cognition: Oriented to person, place, time, & situation  Memory: no apparent deficits to general clinical interview; not formally assessed  Attention/Concentration: no apparent deficits to general clinical interview; not formally assessed  Fund of Knowledge: average by vocabulary/education    Laboratory Data  No visits with results within 1 Month(s) from this visit.   Latest known visit with results is:   Office Visit on 12/08/2022   Component Date Value Ref Range Status    Ferritin 12/08/2022 314 (H)  20.0 - 300.0 ng/mL Final    Iron 12/08/2022 149  30 - 160 ug/dL Final    Transferrin 12/08/2022 319  200 - 375 mg/dL Final    TIBC 12/08/2022 472 (H)  250 - 450 ug/dL Final    Saturated  Iron 12/08/2022 32  20 - 50 % Final    WBC 12/08/2022 5.10  3.90 - 12.70 K/uL Final    RBC 12/08/2022 3.93 (L)  4.00 - 5.40 M/uL Final    Hemoglobin 12/08/2022 12.3  12.0 - 16.0 g/dL Final    Hematocrit 12/08/2022 37.4  37.0 - 48.5 % Final    MCV 12/08/2022 95  82 - 98 fL Final    MCH 12/08/2022 31.3 (H)  27.0 - 31.0 pg Final    MCHC 12/08/2022 32.9  32.0 - 36.0 g/dL Final    RDW 12/08/2022 11.8  11.5 - 14.5 % Final    Platelets 12/08/2022 299  150 - 450 K/uL Final    MPV 12/08/2022 10.5  9.2 - 12.9 fL Final    Immature Granulocytes 12/08/2022 0.4  0.0 - 0.5 % Final    Gran # (ANC) 12/08/2022 3.2  1.8 - 7.7 K/uL Final    Immature Grans (Abs) 12/08/2022 0.02  0.00 - 0.04 K/uL Final    Lymph # 12/08/2022 1.3  1.0 - 4.8 K/uL Final    Mono # 12/08/2022 0.5  0.3 - 1.0 K/uL Final    Eos # 12/08/2022 0.1  0.0 - 0.5 K/uL Final    Baso # 12/08/2022 0.05  0.00 - 0.20 K/uL Final    nRBC 12/08/2022 0  0 /100 WBC Final    Gran % 12/08/2022 61.9  38.0 - 73.0 % Final    Lymph % 12/08/2022 25.1  18.0 - 48.0 % Final    Mono % 12/08/2022 9.4  4.0 - 15.0 % Final    Eosinophil % 12/08/2022 2.2  0.0 - 8.0 % Final    Basophil % 12/08/2022 1.0  0.0 - 1.9 % Final    Differential Method 12/08/2022 Automated   Final     Medications  Outpatient Encounter Medications as of 1/9/2023   Medication Sig Dispense Refill    albuterol (VENTOLIN HFA) 90 mcg/actuation inhaler Inhale 2 puffs into the lungs every 4 to 6 hours as needed for Wheezing or Shortness of Breath. Rescue 18 g 2    azelastine (ASTELIN) 137 mcg (0.1 %) nasal spray 1 spray (137 mcg total) by Nasal route 2 (two) times daily. 30 mL 11    benzonatate (TESSALON) 100 MG capsule Take 2 capsules (200 mg total) by mouth 3 (three) times daily as needed. 60 capsule 3    buPROPion (WELLBUTRIN XL) 300 MG 24 hr tablet Take 1 tablet (300 mg total) by mouth once daily. 90 tablet 1    cholecalciferol, vitamin D3, 5,000 unit/mL Drop Take 1 drop by mouth Daily.      cyanocobalamin, vitamin B-12, 5,000  mcg Subl Place under the tongue once daily.      dexbrompheniramine maleate (ALA-HIST IR) 2 mg Tab Take 1 tablet by mouth once daily. 30 tablet 2    diclofenac sodium (VOLTAREN) 1 % Gel Apply 2 g topically 4 (four) times daily. 300 g 3    DULoxetine (CYMBALTA) 60 MG capsule TAKE ONE CAPSULE BY MOUTH ONE TIME DAILY 90 capsule 1    folic acid (FOLVITE) 1 MG tablet Take 1 tablet (1 mg total) by mouth once daily. 90 tablet 3    hydroCHLOROthiazide (HYDRODIURIL) 25 MG tablet TAKE ONE TABLET BY MOUTH ONE TIME DAILY 90 tablet 3    hydrOXYchloroQUINE (PLAQUENIL) 200 mg tablet TAKE ONE TABLET BY MOUTH TWICE DAILY 180 tablet 0    levothyroxine (EUTHYROX) 75 MCG tablet Take 1 tablet (75 mcg total) by mouth once daily. 90 tablet 3    LORazepam (ATIVAN) 1 MG tablet Take 1 tablet (1 mg total) by mouth 2 (two) times daily. 60 tablet 1    meloxicam (MOBIC) 15 MG tablet TAKE ONE TABLET BY MOUTH ONE TIME DAILY 30 tablet 1    omeprazole (PRILOSEC) 40 MG capsule Take 1 capsule (40 mg total) by mouth once daily. 30 capsule 5    potassium chloride SA (K-DUR,KLOR-CON) 20 MEQ tablet TAKE ONE TABLET BY MOUTH ONE TIME DAILY 90 tablet 3    traZODone (DESYREL) 50 MG tablet TAKE ONE OR TWO TABLETS BY MOUTH AT BEDTIME AS NEEDED FOR SLEEP 180 tablet 0    upadacitinib (RINVOQ) 15 mg 24 hr tablet Take by mouth. Test claim only 30 tablet 0    valsartan (DIOVAN) 320 MG tablet Take 1 tablet (320 mg total) by mouth once daily. 90 tablet 3    [DISCONTINUED] benzonatate (TESSALON) 100 MG capsule Take 2 capsules (200 mg total) by mouth 3 (three) times daily as needed. 60 capsule 3     No facility-administered encounter medications on file as of 1/9/2023.     Assessment - Diagnosis - Goals:     Impression: 72 year old F with chronic anxiety and depression, RA and sjogren's. Had exacerbation of illness in spring '18, currently doing somewhat better following medication changes, psychotherapy. Generally stable, tolerating ongoing, beneficial treatment.      Treatment Goals:  Specify outcomes written in observable, behavioral terms:   Minimize recurrences    Treatment Plan/Recommendations:   Duloxetine, bupropion, lorazepam prn. Takes trazodone prn sleep.  *consider antidepressant adjustment - duloxetine up   psychoeducation including behavioral activation, psychotherapy, meds.   Discussed services for father including hospice that may help.    Discussed risks, benefits, and alternatives to treatment plan documented above with patient. I answered all patient questions related to this plan and patient expressed understanding and agreement.     Return to Clinic: 2 months    C. Jimbo Long MD  Psychiatry  [facility]  [address] , [place] [address] [phone]

## 2023-01-11 ENCOUNTER — PATIENT MESSAGE (OUTPATIENT)
Dept: HEMATOLOGY/ONCOLOGY | Facility: CLINIC | Age: 73
End: 2023-01-11
Payer: MEDICARE

## 2023-01-11 DIAGNOSIS — R04.0 EPISTAXIS: Primary | ICD-10-CM

## 2023-01-11 DIAGNOSIS — R71.8 OTHER ABNORMALITY OF RED BLOOD CELLS: ICD-10-CM

## 2023-01-12 ENCOUNTER — LAB VISIT (OUTPATIENT)
Dept: LAB | Facility: HOSPITAL | Age: 73
End: 2023-01-12
Attending: NURSE PRACTITIONER
Payer: MEDICARE

## 2023-01-12 ENCOUNTER — PATIENT MESSAGE (OUTPATIENT)
Dept: HEMATOLOGY/ONCOLOGY | Facility: CLINIC | Age: 73
End: 2023-01-12
Payer: MEDICARE

## 2023-01-12 DIAGNOSIS — R04.0 EPISTAXIS: ICD-10-CM

## 2023-01-12 DIAGNOSIS — R71.8 OTHER ABNORMALITY OF RED BLOOD CELLS: ICD-10-CM

## 2023-01-12 LAB
ANION GAP SERPL CALC-SCNC: 11 MMOL/L (ref 8–16)
BASOPHILS # BLD AUTO: 0.03 K/UL (ref 0–0.2)
BASOPHILS NFR BLD: 0.8 % (ref 0–1.9)
BUN SERPL-MCNC: 13 MG/DL (ref 8–23)
CALCIUM SERPL-MCNC: 9.7 MG/DL (ref 8.7–10.5)
CHLORIDE SERPL-SCNC: 98 MMOL/L (ref 95–110)
CO2 SERPL-SCNC: 29 MMOL/L (ref 23–29)
CREAT SERPL-MCNC: 0.8 MG/DL (ref 0.5–1.4)
DIFFERENTIAL METHOD: ABNORMAL
EOSINOPHIL # BLD AUTO: 0.2 K/UL (ref 0–0.5)
EOSINOPHIL NFR BLD: 4.3 % (ref 0–8)
ERYTHROCYTE [DISTWIDTH] IN BLOOD BY AUTOMATED COUNT: 11.9 % (ref 11.5–14.5)
EST. GFR  (NO RACE VARIABLE): >60 ML/MIN/1.73 M^2
FERRITIN SERPL-MCNC: 758 NG/ML (ref 20–300)
GLUCOSE SERPL-MCNC: 87 MG/DL (ref 70–110)
HCT VFR BLD AUTO: 33.8 % (ref 37–48.5)
HGB BLD-MCNC: 11.4 G/DL (ref 12–16)
IMM GRANULOCYTES # BLD AUTO: 0.01 K/UL (ref 0–0.04)
IMM GRANULOCYTES NFR BLD AUTO: 0.3 % (ref 0–0.5)
IRON SERPL-MCNC: 167 UG/DL (ref 30–160)
LYMPHOCYTES # BLD AUTO: 1 K/UL (ref 1–4.8)
LYMPHOCYTES NFR BLD: 25.6 % (ref 18–48)
MCH RBC QN AUTO: 30.6 PG (ref 27–31)
MCHC RBC AUTO-ENTMCNC: 33.7 G/DL (ref 32–36)
MCV RBC AUTO: 91 FL (ref 82–98)
MONOCYTES # BLD AUTO: 0.5 K/UL (ref 0.3–1)
MONOCYTES NFR BLD: 13.3 % (ref 4–15)
NEUTROPHILS # BLD AUTO: 2.2 K/UL (ref 1.8–7.7)
NEUTROPHILS NFR BLD: 55.7 % (ref 38–73)
NRBC BLD-RTO: 0 /100 WBC
PLATELET # BLD AUTO: 278 K/UL (ref 150–450)
PMV BLD AUTO: 10 FL (ref 9.2–12.9)
POTASSIUM SERPL-SCNC: 3.6 MMOL/L (ref 3.5–5.1)
RBC # BLD AUTO: 3.72 M/UL (ref 4–5.4)
SATURATED IRON: 41 % (ref 20–50)
SODIUM SERPL-SCNC: 138 MMOL/L (ref 136–145)
TOTAL IRON BINDING CAPACITY: 406 UG/DL (ref 250–450)
TRANSFERRIN SERPL-MCNC: 274 MG/DL (ref 200–375)
WBC # BLD AUTO: 3.91 K/UL (ref 3.9–12.7)

## 2023-01-12 PROCEDURE — 80048 BASIC METABOLIC PNL TOTAL CA: CPT | Performed by: NURSE PRACTITIONER

## 2023-01-12 PROCEDURE — 82728 ASSAY OF FERRITIN: CPT | Performed by: NURSE PRACTITIONER

## 2023-01-12 PROCEDURE — 36415 COLL VENOUS BLD VENIPUNCTURE: CPT | Mod: PO | Performed by: NURSE PRACTITIONER

## 2023-01-12 PROCEDURE — 84466 ASSAY OF TRANSFERRIN: CPT | Performed by: NURSE PRACTITIONER

## 2023-01-12 PROCEDURE — 85025 COMPLETE CBC W/AUTO DIFF WBC: CPT | Performed by: NURSE PRACTITIONER

## 2023-01-13 ENCOUNTER — PATIENT MESSAGE (OUTPATIENT)
Dept: HEMATOLOGY/ONCOLOGY | Facility: CLINIC | Age: 73
End: 2023-01-13
Payer: MEDICARE

## 2023-01-24 ENCOUNTER — ANESTHESIA EVENT (OUTPATIENT)
Dept: ENDOSCOPY | Facility: HOSPITAL | Age: 73
End: 2023-01-24
Payer: MEDICARE

## 2023-01-24 ENCOUNTER — HOSPITAL ENCOUNTER (OUTPATIENT)
Facility: HOSPITAL | Age: 73
Discharge: HOME | End: 2023-01-24
Attending: INTERNAL MEDICINE | Admitting: INTERNAL MEDICINE
Payer: MEDICARE

## 2023-01-24 ENCOUNTER — ANESTHESIA (OUTPATIENT)
Dept: ENDOSCOPY | Facility: HOSPITAL | Age: 73
End: 2023-01-24
Payer: MEDICARE

## 2023-01-24 VITALS
RESPIRATION RATE: 15 BRPM | TEMPERATURE: 98 F | BODY MASS INDEX: 24.99 KG/M2 | DIASTOLIC BLOOD PRESSURE: 72 MMHG | HEART RATE: 65 BPM | OXYGEN SATURATION: 97 % | SYSTOLIC BLOOD PRESSURE: 134 MMHG | WEIGHT: 150 LBS | HEIGHT: 65 IN

## 2023-01-24 DIAGNOSIS — D50.8 OTHER IRON DEFICIENCY ANEMIA: Primary | ICD-10-CM

## 2023-01-24 DIAGNOSIS — R10.10 UPPER ABDOMINAL PAIN: ICD-10-CM

## 2023-01-24 PROCEDURE — 43239 EGD BIOPSY SINGLE/MULTIPLE: CPT | Mod: ,,, | Performed by: INTERNAL MEDICINE

## 2023-01-24 PROCEDURE — 63600175 PHARM REV CODE 636 W HCPCS: Performed by: NURSE ANESTHETIST, CERTIFIED REGISTERED

## 2023-01-24 PROCEDURE — 88305 TISSUE EXAM BY PATHOLOGIST: ICD-10-PCS | Mod: 26,,, | Performed by: PATHOLOGY

## 2023-01-24 PROCEDURE — 37000008 HC ANESTHESIA 1ST 15 MINUTES: Performed by: INTERNAL MEDICINE

## 2023-01-24 PROCEDURE — 88305 TISSUE EXAM BY PATHOLOGIST: CPT | Performed by: PATHOLOGY

## 2023-01-24 PROCEDURE — 25000003 PHARM REV CODE 250: Performed by: NURSE ANESTHETIST, CERTIFIED REGISTERED

## 2023-01-24 PROCEDURE — 37000009 HC ANESTHESIA EA ADD 15 MINS: Performed by: INTERNAL MEDICINE

## 2023-01-24 PROCEDURE — 88305 TISSUE EXAM BY PATHOLOGIST: CPT | Mod: 26,,, | Performed by: PATHOLOGY

## 2023-01-24 PROCEDURE — 27201012 HC FORCEPS, HOT/COLD, DISP: Performed by: INTERNAL MEDICINE

## 2023-01-24 PROCEDURE — 43239 PR EGD, FLEX, W/BIOPSY, SGL/MULTI: ICD-10-PCS | Mod: ,,, | Performed by: INTERNAL MEDICINE

## 2023-01-24 PROCEDURE — 43239 EGD BIOPSY SINGLE/MULTIPLE: CPT | Performed by: INTERNAL MEDICINE

## 2023-01-24 RX ORDER — LIDOCAINE HYDROCHLORIDE 20 MG/ML
INJECTION INTRAVENOUS
Status: DISCONTINUED | OUTPATIENT
Start: 2023-01-24 | End: 2023-01-24

## 2023-01-24 RX ORDER — PROPOFOL 10 MG/ML
VIAL (ML) INTRAVENOUS
Status: DISCONTINUED | OUTPATIENT
Start: 2023-01-24 | End: 2023-01-24

## 2023-01-24 RX ORDER — OMEPRAZOLE 40 MG/1
40 CAPSULE, DELAYED RELEASE ORAL
Qty: 180 CAPSULE | Refills: 0 | Status: SHIPPED | OUTPATIENT
Start: 2023-01-24 | End: 2023-06-19 | Stop reason: SDUPTHER

## 2023-01-24 RX ADMIN — PROPOFOL 30 MG: 10 INJECTION, EMULSION INTRAVENOUS at 08:01

## 2023-01-24 RX ADMIN — Medication 50 MG: at 08:01

## 2023-01-24 RX ADMIN — SODIUM CHLORIDE, SODIUM LACTATE, POTASSIUM CHLORIDE, AND CALCIUM CHLORIDE: .6; .31; .03; .02 INJECTION, SOLUTION INTRAVENOUS at 08:01

## 2023-01-24 RX ADMIN — PROPOFOL 80 MG: 10 INJECTION, EMULSION INTRAVENOUS at 08:01

## 2023-01-24 NOTE — TRANSFER OF CARE
"Anesthesia Transfer of Care Note    Patient: Leticia Piña    Procedure(s) Performed: Procedure(s) (LRB):  ESOPHAGOGASTRODUODENOSCOPY (EGD) (N/A)    Patient location: PACU    Anesthesia Type: MAC    Transport from OR: Transported from OR on room air with adequate spontaneous ventilation    Post pain: adequate analgesia    Post assessment: no apparent anesthetic complications    Post vital signs: stable    Level of consciousness: responds to stimulation    Nausea/Vomiting: no nausea/vomiting    Complications: none    Transfer of care protocol was followed      Last vitals:   Visit Vitals  BP (!) 146/76 (BP Location: Left arm, Patient Position: Lying)   Pulse 67   Temp 36.5 °C (97.7 °F) (Temporal)   Resp 20   Ht 5' 5" (1.651 m)   Wt 68 kg (150 lb)   SpO2 98%   Breastfeeding No   BMI 24.96 kg/m²     "

## 2023-01-24 NOTE — PROVATION PATIENT INSTRUCTIONS
Discharge Summary/Instructions after an Endoscopic Procedure  Patient Name: Leticia Piña  Patient MRN: [id number]  Patient YOB: 1950 Tuesday, January 24, 2023 Susan Escobedo MD  Dear patient,  As a result of recent federal legislation (The Federal Cures Act), you may   receive lab or pathology results from your procedure in your [facility]   account before your physician is able to contact you. Your physician or   their representative will relay the results to you with their   recommendations at their soonest availability.  Thank you,  RESTRICTIONS:  During your procedure today, you received medications for sedation.  These   medications may affect your judgment, balance and coordination.  Therefore,   for 24 hours, you have the following restrictions:   - DO NOT drive a car, operate machinery, make legal/financial decisions,   sign important papers or drink alcohol.    ACTIVITY:  Today: no heavy lifting, straining or running due to procedural   sedation/anesthesia.  The following day: return to full activity including work.  DIET:  Eat and drink normally unless instructed otherwise.     TREATMENT FOR COMMON SIDE EFFECTS:  - Mild abdominal pain, nausea, belching, bloating or excessive gas:  rest,   eat lightly and use a heating pad.  - Sore Throat: treat with throat lozenges and/or gargle with warm salt   water.  - Because air was used during the procedure, expelling large amounts of air   from your rectum or belching is normal.  - If a bowel prep was taken, you may not have a bowel movement for 1-3 days.    This is normal.  SYMPTOMS TO WATCH FOR AND REPORT TO YOUR PHYSICIAN:  1. Abdominal pain or bloating, other than gas cramps.  2. Chest pain.  3. Back pain.  4. Signs of infection such as: chills or fever occurring within 24 hours   after the procedure.  5. Rectal bleeding, which would show as bright red, maroon, or black stools.   (A tablespoon of blood from the rectum is not serious, especially  if   hemorrhoids are present.)  6. Vomiting.  7. Weakness or dizziness.  GO DIRECTLY TO THE NEAREST EMERGENCY ROOM IF YOU HAVE ANY OF THE FOLLOWING:      Difficulty breathing              Chills and/or fever over 101 F   Persistent vomiting and/or vomiting blood   Severe abdominal pain   Severe chest pain   Black, tarry stools   Bleeding- more than one tablespoon   Any other symptom or condition that you feel may need urgent attention  Your doctor recommends these additional instructions:  If any biopsies were taken, your doctors clinic will contact you in 1 to 2   weeks with any results.  - Discharge patient to home (with escort).   - Resume previous diet.   - Continue present medications.   - Increase Omeprazole from once a day to twice a day.   - Await pathology results.   - Perform a colonoscopy to further evaluate the anemia.   - Return to nurse practitioner with Vivian Kim NP of   Hematology-Oncology.   - The findings and recommendations were discussed with the patient.  For questions, problems or results please call your physician Susan Escobedo MD at Work:  [phone]  If you have any questions about the above instructions, call the GI   department at [phone] or call the endoscopy unit at [phone]   from 7am until 3 pm.  [facility], EMERGENCY ROOM PHONE NUMBER:   [phone]  IF A COMPLICATION OR EMERGENCY SITUATION ARISES AND YOU ARE UNABLE TO REACH   YOUR PHYSICIAN - GO DIRECTLY TO THE EMERGENCY ROOM.  I have read or have had read to me these discharge instructions for my   procedure and have received a written copy.  I understand these   instructions and will follow-up with my physician if I have any questions.     __________________________________       _____________________________________  Nurse Signature                                          Patient/Designated   Responsible Party Signature  MD Susan Soto MD  1/24/2023 8:40:59  AM  This report has been verified and signed electronically.  Dear patient,  As a result of recent federal legislation (The Federal Cures Act), you may   receive lab or pathology results from your procedure in your [facility]   account before your physician is able to contact you. Your physician or   their representative will relay the results to you with their   recommendations at their soonest availability.  Thank you,  PROVATION

## 2023-01-24 NOTE — ANESTHESIA PREPROCEDURE EVALUATION
01/24/2023  Leticia Piña is a 72 y.o., female.      Pre-op Assessment    I have reviewed the Patient Summary Reports.     I have reviewed the Nursing Notes. I have reviewed the NPO Status.   I have reviewed the Medications.     Review of Systems  Anesthesia Hx:  No problems with previous Anesthesia  Denies Family Hx of Anesthesia complications.   Denies Personal Hx of Anesthesia complications.   Social:  Non-Smoker    Hematology/Oncology:     Oncology Normal    -- Anemia:   EENT/Dental:EENT/Dental Normal   Cardiovascular:   Hypertension ECG has been reviewed.    Pulmonary:  Pulmonary Normal    Renal/:  Renal/ Normal     Hepatic/GI:   GERD    Musculoskeletal:   Arthritis  Rheumatoid arthritis   Neurological:   Neuromuscular Disease,    Endocrine:   Hypothyroidism    Dermatological:  Skin Normal    Psych:   Psychiatric History          Physical Exam  General: Alert, Cooperative, Well nourished and Oriented    Airway:  Mallampati: II   Mouth Opening: Normal  TM Distance: Normal  Tongue: Normal  Neck ROM: Normal ROM    Dental:  Intact    Chest/Lungs:  Clear to auscultation, Normal Respiratory Rate    Heart:  Rate: Normal  Rhythm: Regular Rhythm        Anesthesia Plan  Type of Anesthesia, risks & benefits discussed:    Anesthesia Type: MAC  Intra-op Monitoring Plan: Standard ASA Monitors  Induction:  IV  Informed Consent: Informed consent signed with the Patient and all parties understand the risks and agree with anesthesia plan.  All questions answered.   ASA Score: 2  Day of Surgery Review of History & Physical: H&P Update referred to the surgeon/provider.I have interviewed and examined the patient. I have reviewed the patient's H&P dated: There are no significant changes.     Ready For Surgery From Anesthesia Perspective.     .

## 2023-01-24 NOTE — H&P
PRE PROCEDURE H&P    Patient Name: Leticia Piña  MRN: [id number]  : 1950  Date of Procedure:  2023  Referring Physician: Vivian Kim NP  Primary Physician: Nicole Castellon MD  Procedure Physician: Susan Escobedo MD       Planned Procedure: EGD  Diagnosis:  ALISSA    Chief Complaint: Same as above    HPI: Patient is an 72 y.o. female is here for the above. Hx of Sjogren's and Rheumatoid arthritis. Referred by Vivian Kim NP of heme-onc for ALISSA. No previous EGD. Last colonoscopy in  and was normal. Patient declines repeat colonoscopy due to knowing 3 people (including mother) who had perforation from their colonoscopies. She has completed a Cologuard and is negative.     Last colonoscopy:   Family history: none  Anticoagulation: none    Past Medical History:   Past Medical History:   Diagnosis Date    Allergic rhinitis     Cutaneous lupus erythematosus     drug induced.    Essential hypertension 2019    GERD (gastroesophageal reflux disease)     Hypothyroid     Insomnia     Mixed anxiety and depressive disorder     Normal cardiac stress test         Rheumatoid arthritis(714.0)     Sjogren's syndrome         Past Surgical History:  Past Surgical History:   Procedure Laterality Date    AUGMENTATION OF BREAST      breast implants      breast surgery for rupture      ERCP N/A 3/25/2021    Procedure: ERCP (ENDOSCOPIC RETROGRADE CHOLANGIOPANCREATOGRAPHY);  Surgeon: Preston Madrigal MD;  Location: [facility] ENDO;  Service: Endoscopy;  Laterality: N/A;    ERCP N/A 2021    Procedure: ERCP (ENDOSCOPIC RETROGRADE CHOLANGIOPANCREATOGRAPHY);  Surgeon: Prseton Madrigal MD;  Location: [facility] ENDO;  Service: Endoscopy;  Laterality: N/A;    LAPAROSCOPIC CHOLECYSTECTOMY N/A 3/26/2021    Procedure: CHOLECYSTECTOMY, LAPAROSCOPIC;  Surgeon: Jose Blue MD;  Location: [facility] OR;  Service: General;  Laterality: N/A;    TONSILLECTOMY, ADENOIDECTOMY      TUBAL LIGATION           Home Medications:  Prior to Admission medications    Medication Sig Start Date End Date Taking? Authorizing Provider   benzonatate (TESSALON) 100 MG capsule Take 2 capsules (200 mg total) by mouth 3 (three) times daily as needed. 1/4/23  Yes Nicole Jasmine MD   buPROPion (WELLBUTRIN XL) 300 MG 24 hr tablet Take 1 tablet (300 mg total) by mouth once daily. 1/9/23  Yes Zeus Johnson MD   cholecalciferol, vitamin D3, 5,000 unit/mL Drop Take 1 drop by mouth Daily. 5/14/12  Yes Historical Provider   cyanocobalamin, vitamin B-12, 5,000 mcg Subl Place under the tongue once daily.   Yes Historical Provider   diclofenac sodium (VOLTAREN) 1 % Gel Apply 2 g topically 4 (four) times daily. 1/7/20  Yes Leta Yuen PA-C   DULoxetine (CYMBALTA) 60 MG capsule Take 1 capsule (60 mg total) by mouth once daily. 1/9/23  Yes Zeus Johnson MD   folic acid (FOLVITE) 1 MG tablet Take 1 tablet (1 mg total) by mouth once daily. 9/22/22 9/22/23 Yes Rolando Silva MD   hydroCHLOROthiazide (HYDRODIURIL) 25 MG tablet TAKE ONE TABLET BY MOUTH ONE TIME DAILY 5/7/22  Yes Nicole Jasmine MD   hydrOXYchloroQUINE (PLAQUENIL) 200 mg tablet TAKE ONE TABLET BY MOUTH TWICE DAILY 12/20/22  Yes Rolando Silva MD   levothyroxine (EUTHYROX) 75 MCG tablet Take 1 tablet (75 mcg total) by mouth once daily. 11/28/22  Yes Ronel Pal MD   potassium chloride SA (K-DUR,KLOR-CON) 20 MEQ tablet TAKE ONE TABLET BY MOUTH ONE TIME DAILY 12/20/22  Yes Nicole Jasmine MD   traZODone (DESYREL) 50 MG tablet TAKE ONE OR TWO TABLETS BY MOUTH AT BEDTIME AS NEEDED FOR SLEEP 1/9/23  Yes Zeus Johnson MD   upadacitinib (RINVOQ) 15 mg 24 hr tablet Take by mouth. Test claim only 11/18/21  Yes Rolando Silva MD   valsartan (DIOVAN) 320 MG tablet Take 1 tablet (320 mg total) by mouth once daily. 10/6/22 10/6/23 Yes Nicole Jasmine MD   albuterol (VENTOLIN HFA) 90 mcg/actuation inhaler Inhale 2 puffs  into the lungs every 4 to 6 hours as needed for Wheezing or Shortness of Breath. Rescue 11/10/21   Fadi Garcia NP   azelastine (ASTELIN) 137 mcg (0.1 %) nasal spray 1 spray (137 mcg total) by Nasal route 2 (two) times daily. 6/13/19 7/22/21  Agatha Guerrero PA-C   dexbrompheniramine maleate (ALA-HIST IR) 2 mg Tab Take 1 tablet by mouth once daily. 9/30/22   Nicole Jasmine MD   LORazepam (ATIVAN) 1 MG tablet Take 1 tablet (1 mg total) by mouth 2 (two) times daily. 1/9/23   Zeus Johnson MD   meloxicam (MOBIC) 15 MG tablet TAKE ONE TABLET BY MOUTH ONE TIME DAILY 5/10/22   Rolando Silva MD   omeprazole (PRILOSEC) 40 MG capsule Take 1 capsule (40 mg total) by mouth once daily. 8/29/22   Nicole Jasmine MD        Allergies:  Review of patient's allergies indicates:   Allergen Reactions    Humira  [adalimumab]      Other reaction(s): drug-induced lupus  Other reaction(s): drug-induced lupus    Sulfa (sulfonamide antibiotics)      Other reaction(s): Hives  Other reaction(s): Rash  Other reaction(s): Hives        Social History:   Social History     Socioeconomic History    Marital status:    Tobacco Use    Smoking status: Never    Smokeless tobacco: Never   Substance and Sexual Activity    Alcohol use: Not Currently     Comment: socially     Drug use: No    Sexual activity: Not Currently     Social Determinants of Health     Financial Resource Strain: Low Risk     Difficulty of Paying Living Expenses: Not very hard   Food Insecurity: No Food Insecurity    Worried About Running Out of Food in the Last Year: Never true    Ran Out of Food in the Last Year: Never true   Transportation Needs: No Transportation Needs    Lack of Transportation (Medical): No    Lack of Transportation (Non-Medical): No   Physical Activity: Unknown    Days of Exercise per Week: 0 days    Minutes of Exercise per Session: Patient refused   Stress: No Stress Concern Present    Feeling of Stress : Not at all    Social Connections: Unknown    Frequency of Communication with Friends and Family: More than three times a week    Frequency of Social Gatherings with Friends and Family: Twice a week    Active Member of Clubs or Organizations: No    Attends Club or Organization Meetings: Patient refused    Marital Status:    Housing Stability: Low Risk     Unable to Pay for Housing in the Last Year: No    Number of Places Lived in the Last Year: 2    Unstable Housing in the Last Year: No       Family History:  Family History   Problem Relation Age of Onset    Heart disease Mother     Heart disease Father        ROS: No acute cardiac events, no acute respiratory complaints.     Physical Exam (all patients):    Breastfeeding No   Lungs: Clear to auscultation bilaterally, respirations unlabored  Heart: Regular rate and rhythm, S1 and S2 normal, no obvious murmurs  Abdomen:         Soft, non-tender, bowel sounds normal, no masses, no organomegaly    Lab Results   Component Value Date    WBC 3.91 01/12/2023    MCV 91 01/12/2023    RDW 11.9 01/12/2023     01/12/2023    INR 0.9 01/15/2020    GLU 87 01/12/2023    BUN 13 01/12/2023     01/12/2023    K 3.6 01/12/2023    CL 98 01/12/2023        SEDATION PLAN: per anesthesia      History reviewed, vital signs satisfactory, cardiopulmonary status satisfactory, sedation options, risks and plans have been discussed with the patient  All their questions were answered and the patient agrees to the sedation procedures as planned and the patient is deemed an appropriate candidate for the sedation as planned.    The risks, benefits and alternatives of the procedure were discussed with the patient in detail. This discussion was had in the presence of endoscopy staff. The risks include, risks of adverse reaction to sedation requiring the use of reversal agents, bleeding requiring blood transfusion, perforation requiring surgical intervention and technical failure. Other risks  include aspiration leading to respiratory distress and respiratory failure resulting in endotracheal intubation and mechanical ventilation including death. If anesthesia is being utilized for this procedure, it is up to the anesthesiologist to determine airway safety including elective endotracheal intubation. Questions were answered, they agree to proceed. There was no language barriers.      Procedure explained to patient, informed consent obtained and placed in chart.    Susan Escobedo  1/24/2023  8:02 AM

## 2023-01-24 NOTE — ANESTHESIA POSTPROCEDURE EVALUATION
Anesthesia Post Evaluation    Patient: Leticia Piña    Procedure(s) Performed: Procedure(s) (LRB):  ESOPHAGOGASTRODUODENOSCOPY (EGD) (N/A)    Final Anesthesia Type: MAC      Patient location during evaluation: PACU  Patient participation: Yes- Able to Participate  Level of consciousness: awake and alert  Post-procedure vital signs: reviewed and stable  Pain management: adequate  Airway patency: patent    PONV status at discharge: No PONV  Anesthetic complications: no      Cardiovascular status: blood pressure returned to baseline  Respiratory status: unassisted and room air  Hydration status: euvolemic  Follow-up not needed.          Vitals Value Taken Time   /76 01/24/23 0805   Temp 36.5 °C (97.7 °F) 01/24/23 0805   Pulse 67 01/24/23 0805   Resp 20 01/24/23 0805   SpO2 98 % 01/24/23 0805         No case tracking events are documented in the log.      Pain/Zulema Score: No data recorded

## 2023-01-24 NOTE — PLAN OF CARE
Dr. Escobedo at  to discuss findings. VSS. No  Pain, no GI bleeding. Pt to be discharged from unit.

## 2023-01-25 ENCOUNTER — PATIENT MESSAGE (OUTPATIENT)
Dept: PRIMARY CARE CLINIC | Facility: CLINIC | Age: 73
End: 2023-01-25
Payer: MEDICARE

## 2023-01-25 DIAGNOSIS — Z12.11 COLON CANCER SCREENING: ICD-10-CM

## 2023-01-25 DIAGNOSIS — Z12.31 ENCOUNTER FOR SCREENING MAMMOGRAM FOR MALIGNANT NEOPLASM OF BREAST: Primary | ICD-10-CM

## 2023-01-30 ENCOUNTER — PATIENT MESSAGE (OUTPATIENT)
Dept: PRIMARY CARE CLINIC | Facility: CLINIC | Age: 73
End: 2023-01-30
Payer: MEDICARE

## 2023-01-30 LAB
FINAL PATHOLOGIC DIAGNOSIS: NORMAL
Lab: NORMAL

## 2023-02-01 NOTE — PROGRESS NOTES
The biopsies of your small bowel and stomach show no reasons why you are anemic. The end of your swallowing pipe shows chronic active inflammation. Please take the Omeprazole prescribed. We look forward to completing your colonoscopy soon.

## 2023-02-03 ENCOUNTER — LAB VISIT (OUTPATIENT)
Dept: LAB | Facility: HOSPITAL | Age: 73
End: 2023-02-03
Attending: NURSE PRACTITIONER
Payer: MEDICARE

## 2023-02-03 DIAGNOSIS — D50.9 IRON DEFICIENCY ANEMIA, UNSPECIFIED IRON DEFICIENCY ANEMIA TYPE: ICD-10-CM

## 2023-02-03 LAB
BASOPHILS # BLD AUTO: 0.05 K/UL (ref 0–0.2)
BASOPHILS NFR BLD: 1 % (ref 0–1.9)
DIFFERENTIAL METHOD: ABNORMAL
EOSINOPHIL # BLD AUTO: 0.2 K/UL (ref 0–0.5)
EOSINOPHIL NFR BLD: 2.9 % (ref 0–8)
ERYTHROCYTE [DISTWIDTH] IN BLOOD BY AUTOMATED COUNT: 11.3 % (ref 11.5–14.5)
HCT VFR BLD AUTO: 32.8 % (ref 37–48.5)
HGB BLD-MCNC: 11.3 G/DL (ref 12–16)
IMM GRANULOCYTES # BLD AUTO: 0.01 K/UL (ref 0–0.04)
IMM GRANULOCYTES NFR BLD AUTO: 0.2 % (ref 0–0.5)
LYMPHOCYTES # BLD AUTO: 1.9 K/UL (ref 1–4.8)
LYMPHOCYTES NFR BLD: 37.2 % (ref 18–48)
MCH RBC QN AUTO: 31.4 PG (ref 27–31)
MCHC RBC AUTO-ENTMCNC: 34.5 G/DL (ref 32–36)
MCV RBC AUTO: 91 FL (ref 82–98)
MONOCYTES # BLD AUTO: 0.6 K/UL (ref 0.3–1)
MONOCYTES NFR BLD: 11.3 % (ref 4–15)
NEUTROPHILS # BLD AUTO: 2.5 K/UL (ref 1.8–7.7)
NEUTROPHILS NFR BLD: 47.4 % (ref 38–73)
NRBC BLD-RTO: 0 /100 WBC
PLATELET # BLD AUTO: 287 K/UL (ref 150–450)
PMV BLD AUTO: 10 FL (ref 9.2–12.9)
RBC # BLD AUTO: 3.6 M/UL (ref 4–5.4)
WBC # BLD AUTO: 5.21 K/UL (ref 3.9–12.7)

## 2023-02-03 PROCEDURE — 82728 ASSAY OF FERRITIN: CPT | Performed by: NURSE PRACTITIONER

## 2023-02-03 PROCEDURE — 84466 ASSAY OF TRANSFERRIN: CPT | Performed by: NURSE PRACTITIONER

## 2023-02-03 PROCEDURE — 85025 COMPLETE CBC W/AUTO DIFF WBC: CPT | Performed by: NURSE PRACTITIONER

## 2023-02-03 PROCEDURE — 36415 COLL VENOUS BLD VENIPUNCTURE: CPT | Mod: PO | Performed by: NURSE PRACTITIONER

## 2023-02-04 LAB
FERRITIN SERPL-MCNC: 597 NG/ML (ref 20–300)
IRON SERPL-MCNC: 119 UG/DL (ref 30–160)
SATURATED IRON: 27 % (ref 20–50)
TOTAL IRON BINDING CAPACITY: 434 UG/DL (ref 250–450)
TRANSFERRIN SERPL-MCNC: 293 MG/DL (ref 200–375)

## 2023-02-06 ENCOUNTER — PATIENT MESSAGE (OUTPATIENT)
Dept: ENDOSCOPY | Facility: HOSPITAL | Age: 73
End: 2023-02-06
Payer: MEDICARE

## 2023-02-06 ENCOUNTER — OFFICE VISIT (OUTPATIENT)
Dept: HEMATOLOGY/ONCOLOGY | Facility: CLINIC | Age: 73
End: 2023-02-06
Payer: MEDICARE

## 2023-02-06 DIAGNOSIS — Z86.2 HISTORY OF IRON DEFICIENCY ANEMIA: ICD-10-CM

## 2023-02-06 DIAGNOSIS — M05.79 RHEUMATOID ARTHRITIS INVOLVING MULTIPLE SITES WITH POSITIVE RHEUMATOID FACTOR: Primary | ICD-10-CM

## 2023-02-06 PROCEDURE — 99214 OFFICE O/P EST MOD 30 MIN: CPT | Mod: 95,,, | Performed by: NURSE PRACTITIONER

## 2023-02-06 PROCEDURE — 99214 PR OFFICE/OUTPT VISIT, EST, LEVL IV, 30-39 MIN: ICD-10-PCS | Mod: 95,,, | Performed by: NURSE PRACTITIONER

## 2023-02-06 NOTE — PROGRESS NOTES
The patient location is: home  The chief complaint leading to consultation is: fatigue    Visit type: audiovisual    Face to Face time with patient: 30  45 minutes of total time spent on the encounter, which includes face to face time and non-face to face time preparing to see the patient (eg, review of tests), Obtaining and/or reviewing separately obtained history, Documenting clinical information in the electronic or other health record, Independently interpreting results (not separately reported) and communicating results to the patient/family/caregiver, or Care coordination (not separately reported).     Each patient to whom he or she provides medical services by telemedicine is:  (1) informed of the relationship between the physician and patient and the respective role of any other health care provider with respect to management of the patient; and (2) notified that he or she may decline to receive medical services by telemedicine and may withdraw from such care at any time.    Patient ID: Leticia Piña is a 72 y.o. female.    Chief Complaint: fatigue    HPI:  72 year old female who presents today for follow up of her longstanding chronic on/off anemia for several years.  She states that she takes folic acid and B12 daily.  Denies any previous diagnosis of B12 of folic acid deficiency.  She has had on/off iron deficiency noted with history of IV Injectafer infusion 3/2020 and then again on 2/2021 she received 1 dose Feraheme due to recurrent ID.      She had colonoscopy in 2011 - no polyps found.  Up to date on mammogram and pap.     She complains of acid reflux symptoms for years.  She also has Sjogren's disease and cutaneous lupus erythematous, RA followed by rheumatology.       Most recent hgb 12.3 g/dL and iron is repleated.  Had ERCP done with Dr. Madrigal with stent placement and pathology showing Chronic cholecystitis with cholelithiasis and Rokitansky-Aschoff sinuses   Negative for atypia or  malignancy      Had emergency gallbladder surgery removal on 3/26/2020 with noted elevated LFTs with improvement in symptoms post surgery.     Interval History:    9/1/2022 Will need to repeat labs today to assess for recurrent Iron deficiency anemia.   6/21/2021 ERCP Biliary stent removed with biliary sludge and a stone removed.   Is currently being treated with Rinvoq once daily for treatment of RA and is tolerating well.  Denies any s/s of infection.   C/o fatigue.  Denies any abnormal bleeding.  Is not currently taking oral iron daily. Denies f/c/ns or unintentional weight loss.  Denies abnormal lymphadenopathy.      11/3/2022 Presents today to evaluate response of IV Feraheme treatments x 2.  Last received on 9/21/2022.  Awaiting iron and tibc lab results.   Continues with chronic fatigue. Denies any abnormal bleeding.  Continues to take B12 and folate daily. is a 72 year old female who presents today for follow up of her longstanding chronic on/off anemia for several years.  She states that she takes folic acid and B12 daily.  Denies any previous diagnosis of B12 of folic acid deficiency.  She has had on/off iron deficiency noted with history of IV Injectafer infusion 3/2020 and then again on 2/2021 she received 1 dose Feraheme due to recurrent ID.      She had colonoscopy in 2011 - no polyps found.  Up to date on mammogram and pap.     She complains of acid reflux symptoms for years.  She also has Sjogren's disease and cutaneous lupus erythematous, RA followed by rheumatology.       Most recent hgb 12.3 g/dL and iron is repleated.  Had ERCP done with Dr. Madrigal with stent placement and pathology showing Chronic cholecystitis with cholelithiasis and Rokitansky-Aschoff sinuses   Negative for atypia or malignancy      Had emergency gallbladder surgery removal on 3/26/2020 with noted elevated LFTs with improvement in symptoms post surgery.     Interval History:    9/1/2022 Will need to repeat labs today to  assess for recurrent Iron deficiency anemia.   6/21/2021 ERCP Biliary stent removed with biliary sludge and a stone removed.   Is currently being treated with Rinvoq once daily for treatment of RA and is tolerating well.  Denies any s/s of infection.   C/o fatigue.  Denies any abnormal bleeding.  Is not currently taking oral iron daily. Denies f/c/ns or unintentional weight loss.  Denies abnormal lymphadenopathy.      11/3/2022 Presents today to evaluate response of IV Feraheme treatments x 2.  Last received on 9/21/2022.  Awaiting iron and tibc lab results.   Continues with chronic fatigue. Denies any abnormal bleeding.  Continues to take B12 and folate daily.      12/12/2022  EGD scheduled 1/24/2022.  Continues to take B12 and folate daily. Denies any GI symptoms or abnormal blood loss.  Continues treatment for RA - Rinvoqu and plaqunenil.  Currently not taking oral iron.  Has taking oral iron in the past and is unable to tolerate due to constipation    2/6/2023 Last dose of Feraheme on 12/27/2022 and has been evaluated with EGD.  Presents today to review response. Currently with normocytic anemia- hgb 11.3, mcv 91 iron repleated.  EGDNormal duodenal bulb and second portion of the duodenum.   - Gastritis.  3 cm hiatal hernia. Z-line irregular, 35 cm from the incisors. Normal upper third of esophagus and middle third of esophagus.   Pathology: A.   SMALL BOWEL, BIOPSY:   - Fragments of benign small bowel mucosa, without diagnostic abnormality.   B.   STOMACH, BIOPSY:   - Mild inactive chronic gastritis with reactive mucosal changes and focal intestinal metaplasia.   - No Helicobacter pylori organisms identified by *[facility].   - Negative for malignancy.   C.   GASTROESOPHAGEAL JUNCTION BIOPSY:   - Fragments of benign reactive squamous and gastric-type mucosa with rare   intraepithelial eosinophils and neutrophils and mild chronic active   inflammation, consistent with reflux.   - No intestinal metaplasia identified.   -  Negative for dysplasia or malignancy.   COMMENT:   A). Sections show fragments of benign small bowel mucosa with normal villous   architecture. No villous atrophy, crypt hyperplasia, increased   intraepithelial lymphocytes or evidence of celiac sprue identified.   PPI increased from once per day to twice per day.        Social History     Socioeconomic History    Marital status:    Tobacco Use    Smoking status: Never    Smokeless tobacco: Never   Substance and Sexual Activity    Alcohol use: Not Currently     Comment: socially     Drug use: No    Sexual activity: Not Currently     Social Determinants of Health     Financial Resource Strain: Low Risk     Difficulty of Paying Living Expenses: Not very hard   Food Insecurity: No Food Insecurity    Worried About Running Out of Food in the Last Year: Never true    Ran Out of Food in the Last Year: Never true   Transportation Needs: No Transportation Needs    Lack of Transportation (Medical): No    Lack of Transportation (Non-Medical): No   Physical Activity: Unknown    Days of Exercise per Week: 0 days    Minutes of Exercise per Session: Patient refused   Stress: No Stress Concern Present    Feeling of Stress : Not at all   Social Connections: Unknown    Frequency of Communication with Friends and Family: More than three times a week    Frequency of Social Gatherings with Friends and Family: Twice a week    Active Member of Clubs or Organizations: No    Attends Club or Organization Meetings: Patient refused    Marital Status:    Housing Stability: Low Risk     Unable to Pay for Housing in the Last Year: No    Number of Places Lived in the Last Year: 2    Unstable Housing in the Last Year: No       Family History   Problem Relation Age of Onset    Heart disease Mother     Heart disease Father        Past Surgical History:   Procedure Laterality Date    AUGMENTATION OF BREAST      breast implants      breast surgery for rupture      ERCP N/A 3/25/2021     Procedure: ERCP (ENDOSCOPIC RETROGRADE CHOLANGIOPANCREATOGRAPHY);  Surgeon: Preston Madrigal MD;  Location: [facility] ENDO;  Service: Endoscopy;  Laterality: N/A;    ERCP N/A 6/21/2021    Procedure: ERCP (ENDOSCOPIC RETROGRADE CHOLANGIOPANCREATOGRAPHY);  Surgeon: Preston Madrigal MD;  Location: [facility] ENDO;  Service: Endoscopy;  Laterality: N/A;    ESOPHAGOGASTRODUODENOSCOPY N/A 1/24/2023    Procedure: ESOPHAGOGASTRODUODENOSCOPY (EGD);  Surgeon: Susan Escobedo MD;  Location: [facility] ENDO;  Service: Endoscopy;  Laterality: N/A;    LAPAROSCOPIC CHOLECYSTECTOMY N/A 3/26/2021    Procedure: CHOLECYSTECTOMY, LAPAROSCOPIC;  Surgeon: Jose Blue MD;  Location: [facility] OR;  Service: General;  Laterality: N/A;    TONSILLECTOMY, ADENOIDECTOMY      TUBAL LIGATION         Past Medical History:   Diagnosis Date    Allergic rhinitis     Cutaneous lupus erythematosus     drug induced.    Essential hypertension 2/5/2019    GERD (gastroesophageal reflux disease)     Hypothyroid     Insomnia     Mixed anxiety and depressive disorder     Normal cardiac stress test     11/07    Rheumatoid arthritis(714.0)     Sjogren's syndrome        Review of Systems   Constitutional:  Positive for fatigue. Negative for appetite change and unexpected weight change.   HENT:  Negative for mouth sores and nosebleeds.    Eyes:  Negative for visual disturbance.   Respiratory:  Negative for cough and shortness of breath.    Cardiovascular:  Negative for chest pain.   Gastrointestinal:  Negative for abdominal pain, anal bleeding, blood in stool and diarrhea.   Endocrine: Negative.    Genitourinary:  Negative for frequency, hematuria and vaginal bleeding.   Musculoskeletal:  Negative for back pain.   Skin:  Negative for rash.   Allergic/Immunologic: Positive for immunocompromised state.   Neurological:  Negative for headaches.   Hematological:  Negative for adenopathy. Bruises/bleeds easily.   Psychiatric/Behavioral:  The patient is not  nervous/anxious.         Medication List with Changes/Refills   Current Medications    ALBUTEROL (VENTOLIN HFA) 90 MCG/ACTUATION INHALER    Inhale 2 puffs into the lungs every 4 to 6 hours as needed for Wheezing or Shortness of Breath. Rescue    AZELASTINE (ASTELIN) 137 MCG (0.1 %) NASAL SPRAY    1 spray (137 mcg total) by Nasal route 2 (two) times daily.    BENZONATATE (TESSALON) 100 MG CAPSULE    Take 2 capsules (200 mg total) by mouth 3 (three) times daily as needed.    BUPROPION (WELLBUTRIN XL) 300 MG 24 HR TABLET    Take 1 tablet (300 mg total) by mouth once daily.    CHOLECALCIFEROL, VITAMIN D3, 5,000 UNIT/ML DROP    Take 1 drop by mouth Daily.    CYANOCOBALAMIN, VITAMIN B-12, 5,000 MCG SUBL    Place under the tongue once daily.    DEXBROMPHENIRAMINE MALEATE (ALA-HIST IR) 2 MG TAB    Take 1 tablet by mouth once daily.    DICLOFENAC SODIUM (VOLTAREN) 1 % GEL    Apply 2 g topically 4 (four) times daily.    DULOXETINE (CYMBALTA) 60 MG CAPSULE    Take 1 capsule (60 mg total) by mouth once daily.    FOLIC ACID (FOLVITE) 1 MG TABLET    Take 1 tablet (1 mg total) by mouth once daily.    HYDROCHLOROTHIAZIDE (HYDRODIURIL) 25 MG TABLET    TAKE ONE TABLET BY MOUTH ONE TIME DAILY    HYDROXYCHLOROQUINE (PLAQUENIL) 200 MG TABLET    TAKE ONE TABLET BY MOUTH TWICE DAILY    LEVOTHYROXINE (EUTHYROX) 75 MCG TABLET    Take 1 tablet (75 mcg total) by mouth once daily.    LORAZEPAM (ATIVAN) 1 MG TABLET    Take 1 tablet (1 mg total) by mouth 2 (two) times daily.    MELOXICAM (MOBIC) 15 MG TABLET    TAKE ONE TABLET BY MOUTH ONE TIME DAILY    OMEPRAZOLE (PRILOSEC) 40 MG CAPSULE    Take 1 capsule (40 mg total) by mouth 2 (two) times daily before meals.    POTASSIUM CHLORIDE SA (K-DUR,KLOR-CON) 20 MEQ TABLET    TAKE ONE TABLET BY MOUTH ONE TIME DAILY    TRAZODONE (DESYREL) 50 MG TABLET    TAKE ONE OR TWO TABLETS BY MOUTH AT BEDTIME AS NEEDED FOR SLEEP    UPADACITINIB (RINVOQ) 15 MG 24 HR TABLET    Take by mouth. Test claim only     VALSARTAN (DIOVAN) 320 MG TABLET    Take 1 tablet (320 mg total) by mouth once daily.        Objective:   There were no vitals filed for this visit.    Physical Exam  Constitutional:       Appearance: Normal appearance.   Pulmonary:      Effort: Pulmonary effort is normal.   Neurological:      Mental Status: She is alert and oriented to person, place, and time.   Psychiatric:         Mood and Affect: Mood normal.         Behavior: Behavior normal.         Thought Content: Thought content normal.         Judgment: Judgment normal.       Assessment:     Problem List Items Addressed This Visit          Immunology/Multi System    Rheumatoid arthritis involving multiple sites with positive rheumatoid factor - Primary    Relevant Orders    CBC Auto Differential    Basic Metabolic Panel    Ferritin    Iron and TIBC     Other Visit Diagnoses       History of iron deficiency anemia        Relevant Orders    CBC Auto Differential    Basic Metabolic Panel    Ferritin    Iron and TIBC            Lab Results   Component Value Date    WBC 5.21 02/03/2023    RBC 3.60 (L) 02/03/2023    HGB 11.3 (L) 02/03/2023    HCT 32.8 (L) 02/03/2023    MCV 91 02/03/2023    MCH 31.4 (H) 02/03/2023    MCHC 34.5 02/03/2023    RDW 11.3 (L) 02/03/2023     02/03/2023    MPV 10.0 02/03/2023    GRAN 2.5 02/03/2023    GRAN 47.4 02/03/2023    LYMPH 1.9 02/03/2023    LYMPH 37.2 02/03/2023    MONO 0.6 02/03/2023    MONO 11.3 02/03/2023    EOS 0.2 02/03/2023    BASO 0.05 02/03/2023    EOSINOPHIL 2.9 02/03/2023    BASOPHIL 1.0 02/03/2023      Lab Results   Component Value Date     01/12/2023    K 3.6 01/12/2023    CL 98 01/12/2023    CO2 29 01/12/2023    BUN 13 01/12/2023    CREATININE 0.8 01/12/2023    CALCIUM 9.7 01/12/2023    ANIONGAP 11 01/12/2023    ESTGFRAFRICA >60 10/14/2021    EGFRNONAA >60 10/14/2021     Lab Results   Component Value Date    ALT 21 11/01/2022    AST 24 11/01/2022    ALKPHOS 50 (L) 11/01/2022    BILITOT 0.5 11/01/2022      Lab Results   Component Value Date    UIBC 280 01/11/2013    IRON 119 02/03/2023    TRANSFERRIN 293 02/03/2023    TIBC 434 02/03/2023    FESATURATED 27 02/03/2023      Lab Results   Component Value Date    FERRITIN 597 (H) 02/03/2023     Lab Results   Component Value Date    FKRVOOAD49 1348 (H) 08/15/2014     Lab Results   Component Value Date    FOLATE >24 11/07/2007         Plan:   Rheumatoid arthritis involving multiple sites with positive rheumatoid factor  -     CBC Auto Differential; Future; Expected date: 02/06/2023  -     Basic Metabolic Panel; Future; Expected date: 02/06/2023  -     Ferritin; Future; Expected date: 02/06/2023  -     Iron and TIBC; Future; Expected date: 02/06/2023    History of iron deficiency anemia  -     CBC Auto Differential; Future; Expected date: 02/06/2023  -     Basic Metabolic Panel; Future; Expected date: 02/06/2023  -     Ferritin; Future; Expected date: 02/06/2023  -     Iron and TIBC; Future; Expected date: 02/06/2023      Med Onc Chart Routing      Follow up with physician    Follow up with RONNY 3 months. fu in 3 months with labs prior in [place] - cbc, cmp, iron studies and VV after to discuss results   Infusion scheduling note n/a   Injection scheduling note n/a   Labs   Lab interval:  See above   Imaging   N/a   Pharmacy appointment No pharmacy appointment needed      Other referrals No additional referrals needed          Collaborating Provider:  Dr. Rodger Zamudio    Thank You,  Oj Kim, MARYP-C  Hematology Oncology

## 2023-02-07 ENCOUNTER — OFFICE VISIT (OUTPATIENT)
Dept: DERMATOLOGY | Facility: CLINIC | Age: 73
End: 2023-02-07
Payer: MEDICARE

## 2023-02-07 DIAGNOSIS — L82.1 SEBORRHEIC KERATOSES: ICD-10-CM

## 2023-02-07 DIAGNOSIS — L81.4 SOLAR LENTIGO: ICD-10-CM

## 2023-02-07 DIAGNOSIS — Z12.83 SKIN CANCER SCREENING: Primary | ICD-10-CM

## 2023-02-07 DIAGNOSIS — D22.9 MULTIPLE BENIGN NEVI: ICD-10-CM

## 2023-02-07 DIAGNOSIS — D18.00 HEMANGIOMA, UNSPECIFIED SITE: ICD-10-CM

## 2023-02-07 PROCEDURE — 99999 PR PBB SHADOW E&M-EST. PATIENT-LVL III: ICD-10-PCS | Mod: PBBFAC,,, | Performed by: STUDENT IN AN ORGANIZED HEALTH CARE EDUCATION/TRAINING PROGRAM

## 2023-02-07 PROCEDURE — 99213 OFFICE O/P EST LOW 20 MIN: CPT | Mod: S$PBB,,, | Performed by: STUDENT IN AN ORGANIZED HEALTH CARE EDUCATION/TRAINING PROGRAM

## 2023-02-07 PROCEDURE — 99213 PR OFFICE/OUTPT VISIT, EST, LEVL III, 20-29 MIN: ICD-10-PCS | Mod: S$PBB,,, | Performed by: STUDENT IN AN ORGANIZED HEALTH CARE EDUCATION/TRAINING PROGRAM

## 2023-02-07 PROCEDURE — 99213 OFFICE O/P EST LOW 20 MIN: CPT | Mod: PBBFAC | Performed by: STUDENT IN AN ORGANIZED HEALTH CARE EDUCATION/TRAINING PROGRAM

## 2023-02-07 PROCEDURE — 99999 PR PBB SHADOW E&M-EST. PATIENT-LVL III: CPT | Mod: PBBFAC,,, | Performed by: STUDENT IN AN ORGANIZED HEALTH CARE EDUCATION/TRAINING PROGRAM

## 2023-02-07 NOTE — PROGRESS NOTES
Patient Information  Name: Leticia Piña  : 1950  MRN: [id number]     Referring Physician:  Dr. Roman ref. provider found   Primary Care Physician:  Dr. Nicole Castellon MD   Date of Visit: 2023      Subjective:       Leticia Piña is a 72 y.o. female who presents for   Chief Complaint   Patient presents with    Skin Check     FBSE     HPI  Patient here for skin check.     Does patient have a personal hx of skin cancers? no  Does patient have family hx of melanoma?  no  Does patient have hx of strong sun exposure or tanning bed use in the past? yes    Patient was last seen:Visit date not found     Prior notes by myself reviewed.   Clinical documentation obtained by nursing staff reviewed.    Review of Systems   Skin:  Negative for itching and rash.      Objective:    Physical Exam   Constitutional: She appears well-developed and well-nourished. No distress.   Neurological: She is alert and oriented to person, place, and time. She is not disoriented.   Psychiatric: She has a normal mood and affect.   Skin:   Areas Examined (abnormalities noted in diagram):   Scalp / Hair Palpated and Inspected  Head / Face Inspection Performed  Neck Inspection Performed  Chest / Axilla Inspection Performed  Abdomen Inspection Performed  Genitals / Buttocks / Groin Inspection Performed  Back Inspection Performed  RUE Inspected  LUE Inspection Performed  RLE Inspected  LLE Inspection Performed  Nails and Digits Inspection Performed                 Diagram Legend     Erythematous scaling macule/papule c/w actinic keratosis       Vascular papule c/w angioma      Pigmented verrucoid papule/plaque c/w seborrheic keratosis      Yellow umbilicated papule c/w sebaceous hyperplasia      Irregularly shaped tan macule c/w lentigo     1-2 mm smooth white papules consistent with Milia      Movable subcutaneous cyst with punctum c/w epidermal inclusion cyst      Subcutaneous movable cyst c/w pilar cyst      Firm pink to  brown papule c/w dermatofibroma      Pedunculated fleshy papule(s) c/w skin tag(s)      Evenly pigmented macule c/w junctional nevus     Mildly variegated pigmented, slightly irregular-bordered macule c/w mildly atypical nevus      Flesh colored to evenly pigmented papule c/w intradermal nevus       Pink pearly papule/plaque c/w basal cell carcinoma      Erythematous hyperkeratotic cursted plaque c/w SCC      Surgical scar with no sign of skin cancer recurrence      Open and closed comedones      Inflammatory papules and pustules      Verrucoid papule consistent consistent with wart     Erythematous eczematous patches and plaques     Dystrophic onycholytic nail with subungual debris c/w onychomycosis     Umbilicated papule    Erythematous-base heme-crusted tan verrucoid plaque consistent with inflamed seborrheic keratosis     Erythematous Silvery Scaling Plaque c/w Psoriasis     See annotation      No images are attached to the encounter or orders placed in the encounter.    [] Data reviewed  [] Independent review of test  [] Management discussed with another provider    Assessment / Plan:        Skin cancer screening  Total body skin examination performed today including at least 12 points as noted in physical examination. No lesions suspicious for malignancy noted.    Recommend daily sun protection/avoidance, use of at least SPF 30, broad spectrum sunscreen (OTC drug), skin self examinations, and routine physician surveillance to optimize early detection    Seborrheic keratoses  These are benign inherited growths without a malignant potential. Reassurance given to patient. No treatment is necessary.     Solar lentigo  This is a benign hyperpigmented sun induced lesion. Recommend daily sun protection/avoidance and use of at least SPF 30, broad spectrum sunscreen (OTC drug) will reduce the number of new lesions. Treatment of these benign lesions are considered cosmetic.    Hemangioma, unspecified site  This is a  benign vascular lesion. Reassurance given. No treatment required.     Multiple benign nevi  Discussed ABCDE's of nevi.  Monitor for new mole or moles that are becoming bigger, darker, irritated, or developing irregular borders. Brochure provided. Instructed patient to observe lesion(s) for changes and follow up in clinic if changes are noted. Patient to monitor skin at home for new or changing lesions.              LOS NUMBER AND COMPLEXITY OF PROBLEMS    COMPLEXITY OF DATA RISK TOTAL TIME (m)   38816  32022 [] 1 self-limited or minor problem [x] Minimal to none [] No treatment recommended or patient to monitor 15-29  10-19   72286  85088 Low  [] 2 or > self limited or minor problems  [] 1 stable chronic illness  [] 1 acute, uncomplicated illness or injury Limited (2)  [] Prior external notes from each unique source  [] Review result of each unique test  [] Order each unique test [x]  Low  OTC medications, minor skin biopsy 30-44  20-29   39228  05765 Moderate  []  1 or > chronic illness with progression, exacerbation or SE of treatment  [x]  2 or more stable chronic illnesses  []  1 acute illness with systemic symptoms  []  1 acute complicated injury  []  1 undiagnosed new problem with uncertain prognosis Moderate (1/3 below)  []  3 or more data items        *Now includes assessment requiring independent historian  []  Independent interpretation of a test  []  Discuss management/test with another provider Moderate  []  Prescription drug mgmt  []  Minor surgery with risk discussed  []  Mgmt limited by social determinates 45-59  30-39   30061  76326 High  []  1 or more chronic illness with severe exacerbation, progression or SE of treatment  []  1 acute or chronic illness/injury that poses a threat to life or bodily function Extensive (2/3 below)  []  3 or more data items        *Now includes assessment requiring independent historian.  []  Independent interpretation of a test  []  Discuss management/test with  another provider High  []  Major surgery with risk discussed  []  Drug therapy requiring intensive monitoring for toxicity  []  Hospitalization  []  Decision for DNR 60-74  40-54      No follow-ups on file.    Edna Hendricks MD, FAAD  [facility]

## 2023-02-15 ENCOUNTER — HOSPITAL ENCOUNTER (OUTPATIENT)
Dept: RADIOLOGY | Facility: HOSPITAL | Age: 73
Discharge: HOME | End: 2023-02-15
Attending: FAMILY MEDICINE
Payer: MEDICARE

## 2023-02-15 DIAGNOSIS — Z12.31 ENCOUNTER FOR SCREENING MAMMOGRAM FOR MALIGNANT NEOPLASM OF BREAST: ICD-10-CM

## 2023-02-15 PROCEDURE — 77067 MAMMO DIGITAL SCREENING BILAT WITH TOMO: ICD-10-PCS | Mod: 26,,, | Performed by: RADIOLOGY

## 2023-02-15 PROCEDURE — 77067 SCR MAMMO BI INCL CAD: CPT | Mod: TC,PO

## 2023-02-15 PROCEDURE — 77063 BREAST TOMOSYNTHESIS BI: CPT | Mod: 26,,, | Performed by: RADIOLOGY

## 2023-02-15 PROCEDURE — 77067 SCR MAMMO BI INCL CAD: CPT | Mod: 26,,, | Performed by: RADIOLOGY

## 2023-02-15 PROCEDURE — 77063 MAMMO DIGITAL SCREENING BILAT WITH TOMO: ICD-10-PCS | Mod: 26,,, | Performed by: RADIOLOGY

## 2023-02-28 RX ORDER — SODIUM, POTASSIUM,MAG SULFATES 17.5-3.13G
1 SOLUTION, RECONSTITUTED, ORAL ORAL DAILY
Qty: 1 KIT | Refills: 0 | Status: SHIPPED | OUTPATIENT
Start: 2023-02-28 | End: 2023-03-02

## 2023-03-03 ENCOUNTER — PATIENT MESSAGE (OUTPATIENT)
Dept: PRIMARY CARE CLINIC | Facility: CLINIC | Age: 73
End: 2023-03-03
Payer: MEDICARE

## 2023-03-03 DIAGNOSIS — N30.00 ACUTE CYSTITIS WITHOUT HEMATURIA: Primary | ICD-10-CM

## 2023-03-03 RX ORDER — NITROFURANTOIN 25; 75 MG/1; MG/1
100 CAPSULE ORAL 2 TIMES DAILY
Qty: 14 CAPSULE | Refills: 0 | Status: SHIPPED | OUTPATIENT
Start: 2023-03-03 | End: 2023-03-10

## 2023-03-07 ENCOUNTER — ANESTHESIA EVENT (OUTPATIENT)
Dept: ENDOSCOPY | Facility: HOSPITAL | Age: 73
End: 2023-03-07
Payer: MEDICARE

## 2023-03-07 ENCOUNTER — ANESTHESIA (OUTPATIENT)
Dept: ENDOSCOPY | Facility: HOSPITAL | Age: 73
End: 2023-03-07
Payer: MEDICARE

## 2023-03-07 ENCOUNTER — HOSPITAL ENCOUNTER (OUTPATIENT)
Facility: HOSPITAL | Age: 73
Discharge: HOME | End: 2023-03-07
Attending: INTERNAL MEDICINE | Admitting: INTERNAL MEDICINE
Payer: MEDICARE

## 2023-03-07 DIAGNOSIS — Z12.11 ENCOUNTER FOR SCREENING COLONOSCOPY: Primary | ICD-10-CM

## 2023-03-07 PROCEDURE — 37000009 HC ANESTHESIA EA ADD 15 MINS: Performed by: INTERNAL MEDICINE

## 2023-03-07 PROCEDURE — 45380 COLONOSCOPY AND BIOPSY: CPT | Mod: PT,59,, | Performed by: INTERNAL MEDICINE

## 2023-03-07 PROCEDURE — 45385 COLONOSCOPY W/LESION REMOVAL: CPT | Mod: PT,,, | Performed by: INTERNAL MEDICINE

## 2023-03-07 PROCEDURE — 45385 PR COLONOSCOPY,REMV LESN,SNARE: ICD-10-PCS | Mod: PT,,, | Performed by: INTERNAL MEDICINE

## 2023-03-07 PROCEDURE — 45380 PR COLONOSCOPY,BIOPSY: ICD-10-PCS | Mod: PT,59,, | Performed by: INTERNAL MEDICINE

## 2023-03-07 PROCEDURE — 25000003 PHARM REV CODE 250: Performed by: NURSE ANESTHETIST, CERTIFIED REGISTERED

## 2023-03-07 PROCEDURE — 63600175 PHARM REV CODE 636 W HCPCS: Performed by: NURSE ANESTHETIST, CERTIFIED REGISTERED

## 2023-03-07 PROCEDURE — 27201089 HC SNARE, DISP (ANY): Performed by: INTERNAL MEDICINE

## 2023-03-07 PROCEDURE — 37000008 HC ANESTHESIA 1ST 15 MINUTES: Performed by: INTERNAL MEDICINE

## 2023-03-07 PROCEDURE — 27201012 HC FORCEPS, HOT/COLD, DISP: Performed by: INTERNAL MEDICINE

## 2023-03-07 PROCEDURE — 88305 TISSUE EXAM BY PATHOLOGIST: CPT | Mod: 26,,, | Performed by: PATHOLOGY

## 2023-03-07 PROCEDURE — 45380 COLONOSCOPY AND BIOPSY: CPT | Mod: PT,59 | Performed by: INTERNAL MEDICINE

## 2023-03-07 PROCEDURE — 88305 TISSUE EXAM BY PATHOLOGIST: ICD-10-PCS | Mod: 26,,, | Performed by: PATHOLOGY

## 2023-03-07 PROCEDURE — 45385 COLONOSCOPY W/LESION REMOVAL: CPT | Mod: PT | Performed by: INTERNAL MEDICINE

## 2023-03-07 PROCEDURE — 88305 TISSUE EXAM BY PATHOLOGIST: CPT | Mod: 59 | Performed by: PATHOLOGY

## 2023-03-07 RX ORDER — LIDOCAINE HYDROCHLORIDE 10 MG/ML
INJECTION, SOLUTION EPIDURAL; INFILTRATION; INTRACAUDAL; PERINEURAL
Status: DISCONTINUED | OUTPATIENT
Start: 2023-03-07 | End: 2023-03-07

## 2023-03-07 RX ORDER — PROPOFOL 10 MG/ML
VIAL (ML) INTRAVENOUS
Status: DISCONTINUED | OUTPATIENT
Start: 2023-03-07 | End: 2023-03-07

## 2023-03-07 RX ADMIN — LIDOCAINE HYDROCHLORIDE 50 MG: 10 INJECTION, SOLUTION EPIDURAL; INFILTRATION; INTRACAUDAL; PERINEURAL at 08:03

## 2023-03-07 RX ADMIN — PROPOFOL 20 MG: 10 INJECTION, EMULSION INTRAVENOUS at 08:03

## 2023-03-07 RX ADMIN — PROPOFOL 40 MG: 10 INJECTION, EMULSION INTRAVENOUS at 08:03

## 2023-03-07 RX ADMIN — PROPOFOL 80 MG: 10 INJECTION, EMULSION INTRAVENOUS at 08:03

## 2023-03-07 RX ADMIN — SODIUM CHLORIDE, POTASSIUM CHLORIDE, SODIUM LACTATE AND CALCIUM CHLORIDE: 600; 310; 30; 20 INJECTION, SOLUTION INTRAVENOUS at 08:03

## 2023-03-07 NOTE — ANESTHESIA POSTPROCEDURE EVALUATION
Anesthesia Post Evaluation    Patient: Leticia Piña    Procedure(s) Performed: Procedure(s) (LRB):  COLONOSCOPY (N/A)    Final Anesthesia Type: MAC      Patient location during evaluation: GI PACU  Patient participation: No - Unable to Participate, Sedation  Level of consciousness: sedated  Post-procedure vital signs: reviewed and not stable  Pain management: adequate  Airway patency: patent    PONV status at discharge: No PONV  Anesthetic complications: no      Cardiovascular status: blood pressure returned to baseline  Respiratory status: unassisted and spontaneous ventilation  Hydration status: euvolemic  Follow-up not needed.              No case tracking events are documented in the log.      Pain/Zulema Score: No data recorded

## 2023-03-07 NOTE — ANESTHESIA PREPROCEDURE EVALUATION
03/07/2023  Leticia Piña is a 72 y.o., female.      Pre-op Assessment    I have reviewed the Patient Summary Reports.     I have reviewed the Nursing Notes. I have reviewed the NPO Status.   I have reviewed the Medications.     Review of Systems  Anesthesia Hx:  No problems with previous Anesthesia  Denies Family Hx of Anesthesia complications.   Denies Personal Hx of Anesthesia complications.   Social:  Non-Smoker    Hematology/Oncology:  Hematology Normal   Oncology Normal     EENT/Dental:EENT/Dental Normal   Cardiovascular:   Hypertension ECG has been reviewed.    Pulmonary:  Pulmonary Normal    Renal/:  Renal/ Normal     Hepatic/GI:   GERD    Musculoskeletal:  Musculoskeletal Normal    Neurological:   Neuromuscular Disease,    Endocrine:   Hypothyroidism    Dermatological:  Skin Normal    Psych:   Psychiatric History          Physical Exam  General: Well nourished, Cooperative, Alert and Oriented    Airway:  Mallampati: II   Mouth Opening: Normal  TM Distance: Normal  Tongue: Normal  Neck ROM: Normal ROM    Dental:  Intact    Chest/Lungs:  Clear to auscultation, Normal Respiratory Rate    Heart:  Rate: Normal  Rhythm: Regular Rhythm        Anesthesia Plan  Type of Anesthesia, risks & benefits discussed:    Anesthesia Type: MAC  Intra-op Monitoring Plan: Standard ASA Monitors  Induction:  IV  Informed Consent: Informed consent signed with the Patient and all parties understand the risks and agree with anesthesia plan.  All questions answered.   ASA Score: 3  Day of Surgery Review of History & Physical: H&P Update referred to the surgeon/provider.I have interviewed and examined the patient. I have reviewed the patient's H&P dated: There are no significant changes.     Ready For Surgery From Anesthesia Perspective.     .

## 2023-03-07 NOTE — H&P
PRE PROCEDURE H&P    Patient Name: Leticia Piña  MRN: [id number]  : 1950  Date of Procedure:  3/7/2023  Referring Physician: Susan Escobedo MD  Primary Physician: Nicole Castlelon MD  Procedure Physician: Susan Escobedo MD       Planned Procedure: Colonoscopy  Diagnosis: screening for colon cancer     Chief Complaint: Same as above    HPI: Patient is an 72 y.o. female is here for the above. Last colonoscopy . No Fhx of CRC, no blood thinners. Reports hemorrhoids. Inquiring about possible treatment and/or removal. Currently being treated for UTI with Macrobid.     Last colonoscopy:   Family history: none  Anticoagulation: none    Past Medical History:   Past Medical History:   Diagnosis Date    Allergic rhinitis     Cutaneous lupus erythematosus     drug induced.    Essential hypertension 2019    GERD (gastroesophageal reflux disease)     Hypothyroid     Insomnia     Mixed anxiety and depressive disorder     Normal cardiac stress test         Rheumatoid arthritis(714.0)     Sjogren's syndrome         Past Surgical History:  Past Surgical History:   Procedure Laterality Date    AUGMENTATION OF BREAST      breast implants      breast surgery for rupture      ERCP N/A 2021    Procedure: ERCP (ENDOSCOPIC RETROGRADE CHOLANGIOPANCREATOGRAPHY);  Surgeon: Preston Madrigal MD;  Location: [facility];  Service: Endoscopy;  Laterality: N/A;    ERCP N/A 2021    Procedure: ERCP (ENDOSCOPIC RETROGRADE CHOLANGIOPANCREATOGRAPHY);  Surgeon: Preston Madrigal MD;  Location: [facility] ENDO;  Service: Endoscopy;  Laterality: N/A;    ESOPHAGOGASTRODUODENOSCOPY N/A 2023    Procedure: ESOPHAGOGASTRODUODENOSCOPY (EGD);  Surgeon: Susan Escobedo MD;  Location: [facility];  Service: Endoscopy;  Laterality: N/A;    LAPAROSCOPIC CHOLECYSTECTOMY N/A 2021    Procedure: CHOLECYSTECTOMY, LAPAROSCOPIC;  Surgeon: Jose Blue MD;  Location: [facility];  Service: General;   Laterality: N/A;    TONSILLECTOMY, ADENOIDECTOMY      TUBAL LIGATION          Home Medications:  Prior to Admission medications    Medication Sig Start Date End Date Taking? Authorizing Provider   benzonatate (TESSALON) 100 MG capsule Take 2 capsules (200 mg total) by mouth 3 (three) times daily as needed. 1/4/23  Yes Nicole Jasmine MD   buPROPion (WELLBUTRIN XL) 300 MG 24 hr tablet Take 1 tablet (300 mg total) by mouth once daily. 1/9/23  Yes Zeus Johnson MD   cholecalciferol, vitamin D3, 5,000 unit/mL Drop Take 1 drop by mouth Daily. 5/14/12  Yes Historical Provider   cyanocobalamin, vitamin B-12, 5,000 mcg Subl Place under the tongue once daily.   Yes Historical Provider   dexbrompheniramine maleate (ALA-HIST IR) 2 mg Tab Take 1 tablet by mouth once daily. 9/30/22  Yes Nicole Jasmine MD   diclofenac sodium (VOLTAREN) 1 % Gel Apply 2 g topically 4 (four) times daily. 1/7/20  Yes Leta Yuen PA-C   DULoxetine (CYMBALTA) 60 MG capsule Take 1 capsule (60 mg total) by mouth once daily. 1/9/23  Yes Zeus Johnson MD   folic acid (FOLVITE) 1 MG tablet Take 1 tablet (1 mg total) by mouth once daily. 9/22/22 9/22/23 Yes Rolando Silva MD   hydroCHLOROthiazide (HYDRODIURIL) 25 MG tablet TAKE ONE TABLET BY MOUTH ONE TIME DAILY 5/7/22  Yes Nicole Jasmine MD   hydrOXYchloroQUINE (PLAQUENIL) 200 mg tablet TAKE ONE TABLET BY MOUTH TWICE DAILY 12/20/22  Yes Rolando Silva MD   levothyroxine (EUTHYROX) 75 MCG tablet Take 1 tablet (75 mcg total) by mouth once daily. 11/28/22  Yes Ronel Pal MD   nitrofurantoin, macrocrystal-monohydrate, (MACROBID) 100 MG capsule Take 1 capsule (100 mg total) by mouth 2 (two) times daily. For bladder infection for 7 days 3/3/23 3/10/23 Yes Ronel Pal MD   omeprazole (PRILOSEC) 40 MG capsule Take 1 capsule (40 mg total) by mouth 2 (two) times daily before meals. 1/24/23 4/24/23 Yes Susan Escobedo MD   potassium chloride SA  (K-DUR,KLOR-CON) 20 MEQ tablet TAKE ONE TABLET BY MOUTH ONE TIME DAILY 12/20/22  Yes Nicole Jasmine MD   traZODone (DESYREL) 50 MG tablet TAKE ONE OR TWO TABLETS BY MOUTH AT BEDTIME AS NEEDED FOR SLEEP 1/9/23  Yes Zeus Johnson MD   upadacitinib (RINVOQ) 15 mg 24 hr tablet Take by mouth. Test claim only 11/18/21  Yes Rolando Silva MD   valsartan (DIOVAN) 320 MG tablet Take 1 tablet (320 mg total) by mouth once daily. 10/6/22 10/6/23 Yes Nicole Jasmine MD   albuterol (VENTOLIN HFA) 90 mcg/actuation inhaler Inhale 2 puffs into the lungs every 4 to 6 hours as needed for Wheezing or Shortness of Breath. Rescue 11/10/21   Fadi Garcia NP   azelastine (ASTELIN) 137 mcg (0.1 %) nasal spray 1 spray (137 mcg total) by Nasal route 2 (two) times daily. 6/13/19 7/22/21  Agatha Guerrero PA-C   LORazepam (ATIVAN) 1 MG tablet Take 1 tablet (1 mg total) by mouth 2 (two) times daily. 1/9/23   Zeus Johnson MD   meloxicam (MOBIC) 15 MG tablet TAKE ONE TABLET BY MOUTH ONE TIME DAILY 5/10/22   Rolando Silva MD        Allergies:  Review of patient's allergies indicates:   Allergen Reactions    Humira  [adalimumab]      Other reaction(s): drug-induced lupus  Other reaction(s): drug-induced lupus    Sulfa (sulfonamide antibiotics)      Other reaction(s): Hives  Other reaction(s): Rash  Other reaction(s): Hives        Social History:   Social History     Socioeconomic History    Marital status:    Tobacco Use    Smoking status: Never    Smokeless tobacco: Never   Substance and Sexual Activity    Alcohol use: Not Currently     Comment: socially     Drug use: No    Sexual activity: Not Currently     Social Determinants of Health     Financial Resource Strain: Low Risk     Difficulty of Paying Living Expenses: Not very hard   Food Insecurity: No Food Insecurity    Worried About Running Out of Food in the Last Year: Never true    Ran Out of Food in the Last Year: Never true  "  Transportation Needs: No Transportation Needs    Lack of Transportation (Medical): No    Lack of Transportation (Non-Medical): No   Physical Activity: Unknown    Days of Exercise per Week: 0 days    Minutes of Exercise per Session: Patient refused   Stress: No Stress Concern Present    Feeling of Stress : Not at all   Social Connections: Unknown    Frequency of Communication with Friends and Family: More than three times a week    Frequency of Social Gatherings with Friends and Family: Twice a week    Active Member of Clubs or Organizations: No    Attends Club or Organization Meetings: Patient refused    Marital Status:    Housing Stability: Low Risk     Unable to Pay for Housing in the Last Year: No    Number of Places Lived in the Last Year: 2    Unstable Housing in the Last Year: No       Family History:  Family History   Problem Relation Age of Onset    Heart disease Mother     Heart disease Father        ROS: No acute cardiac events, no acute respiratory complaints.     Physical Exam (all patients):    BP (!) 155/67 (BP Location: Left arm, Patient Position: Lying)   Pulse 66   Temp 97.9 °F (36.6 °C)   Resp 20   Ht 5' 5" (1.651 m)   Wt 65.8 kg (145 lb)   SpO2 (!) 94%   Breastfeeding No   BMI 24.13 kg/m²   Lungs: Clear to auscultation bilaterally, respirations unlabored  Heart: Regular rate and rhythm, S1 and S2 normal, no obvious murmurs  Abdomen:         Soft, non-tender, bowel sounds normal, no masses, no organomegaly    Lab Results   Component Value Date    WBC 5.21 02/03/2023    MCV 91 02/03/2023    RDW 11.3 (L) 02/03/2023     02/03/2023    INR 0.9 01/15/2020    GLU 87 01/12/2023    BUN 13 01/12/2023     01/12/2023    K 3.6 01/12/2023    CL 98 01/12/2023        SEDATION PLAN: per anesthesia      History reviewed, vital signs satisfactory, cardiopulmonary status satisfactory, sedation options, risks and plans have been discussed with the patient  All their questions were answered " and the patient agrees to the sedation procedures as planned and the patient is deemed an appropriate candidate for the sedation as planned.    The risks, benefits and alternatives of the procedure were discussed with the patient in detail. This discussion was had in the presence of her  and endoscopy staff. The risks include, risks of adverse reaction to sedation requiring the use of reversal agents, bleeding requiring blood transfusion, perforation requiring surgical intervention and technical failure. Other risks include aspiration leading to respiratory distress and respiratory failure resulting in endotracheal intubation and mechanical ventilation including death. If anesthesia is being utilized for this procedure, it is up to the anesthesiologist to determine airway safety including elective endotracheal intubation. Questions were answered, they agree to proceed. There was no language barriers.      Procedure explained to patient, informed consent obtained and placed in chart.    Susan Escobedo  3/7/2023  8:01 AM

## 2023-03-07 NOTE — TRANSFER OF CARE
"Anesthesia Transfer of Care Note    Patient: Letciia Piña    Procedure(s) Performed: Procedure(s) (LRB):  COLONOSCOPY (N/A)    Patient location: GI    Anesthesia Type: MAC    Transport from OR: Transported from OR on room air with adequate spontaneous ventilation    Post pain: adequate analgesia    Post assessment: no apparent anesthetic complications    Post vital signs: stable    Level of consciousness: sedated    Nausea/Vomiting: no nausea/vomiting    Complications: none    Transfer of care protocol was followed      Last vitals:   Visit Vitals  BP (!) 155/67 (BP Location: Left arm, Patient Position: Lying)   Pulse 66   Temp 36.6 °C (97.9 °F)   Resp 20   Ht 5' 5" (1.651 m)   Wt 65.8 kg (145 lb)   SpO2 (!) 94%   Breastfeeding No   BMI 24.13 kg/m²     "

## 2023-03-08 VITALS
SYSTOLIC BLOOD PRESSURE: 123 MMHG | WEIGHT: 145 LBS | TEMPERATURE: 98 F | RESPIRATION RATE: 18 BRPM | BODY MASS INDEX: 24.16 KG/M2 | HEIGHT: 65 IN | DIASTOLIC BLOOD PRESSURE: 71 MMHG | HEART RATE: 62 BPM | OXYGEN SATURATION: 95 %

## 2023-03-10 ENCOUNTER — TELEPHONE (OUTPATIENT)
Dept: PRIMARY CARE CLINIC | Facility: CLINIC | Age: 73
End: 2023-03-10
Payer: MEDICARE

## 2023-03-10 NOTE — TELEPHONE ENCOUNTER
That's really a change from my last visit.  Please tell her to continue to monitor over the weekend and see Dr. Pal on Monday.  SM

## 2023-03-10 NOTE — PROGRESS NOTES
"Subjective:      Patient ID: Leticia Piña is a 72 y.o. female.    Chief Complaint: 3 month f/u visit (Needs refill on fluticasone nasal spray)      HPI  Here for follow up of medical problems.  1/23 EGD, 3/23 Cscope- 4 adenomas, to repeat 3y.  No active bleeding.  On PPI BID x 3mo, then will go back to daily.    Energy good.  Less Na/sugar, down 6# on purpose.  Energy good.  No pain on current rx.  Depression doing well lately.  BP on HTN program cuff 150s-170.  Home cuff and here 120s.      Updated/ annual due 10/23:  HM: 10/22 fluvax, 3/21 covid vaccines, 6/19 HAV, 11/19 [id number], 3/17 booster [id number], 12/22 [id number], 4/13 TDaP, 4/13 zoster, 4/22 Shingrix #2, 11/20 BMD/ Reclast rep 2y, 1/23 EGD, 3/23 Cscope rep 3y, 2/23 MMG/me, 11/17 Gyn Dr. Jimenez, 3/17 HCV neg.   10/22 Cologuard negative.     Review of Systems   Constitutional:  Negative for chills, diaphoresis and fever.   Respiratory:  Negative for cough and shortness of breath.    Cardiovascular:  Negative for chest pain, palpitations and leg swelling.   Gastrointestinal:  Negative for blood in stool, constipation, diarrhea, nausea and vomiting.   Genitourinary:  Negative for dysuria, frequency and hematuria.   Psychiatric/Behavioral:  The patient is not nervous/anxious.        Objective:   /62 (BP Location: Right arm)   Pulse 84   Temp 98 °F (36.7 °C) (Oral)   Ht 5' 5" (1.651 m)   Wt 65.4 kg (144 lb 1.6 oz)   SpO2 97%   BMI 23.98 kg/m²     Physical Exam  Constitutional:       Appearance: She is well-developed.   Neck:      Thyroid: No thyroid mass.      Vascular: No carotid bruit.   Cardiovascular:      Rate and Rhythm: Normal rate and regular rhythm.      Heart sounds: No murmur heard.    No friction rub. No gallop.   Pulmonary:      Effort: Pulmonary effort is normal.      Breath sounds: Normal breath sounds. No wheezing or rales.   Abdominal:      General: Bowel sounds are normal.      Palpations: Abdomen is soft. There is no mass. "      Tenderness: There is no abdominal tenderness.   Musculoskeletal:      Cervical back: Neck supple.   Lymphadenopathy:      Cervical: No cervical adenopathy.   Neurological:      Mental Status: She is alert and oriented to person, place, and time.           Assessment:       1. Essential hypertension    2. Iron deficiency anemia, unspecified iron deficiency anemia type    3. Gastroesophageal reflux disease with esophagitis without hemorrhage    4. Rheumatoid arthritis involving multiple sites with positive rheumatoid factor    5. Immunocompromised    6. Osteopenia with high risk of fracture    7. Other specified disorders of bone density and structure, other site          Plan:     Essential hypertension- HTIN digital cuff running TOO HIGH.  Go and exchange cuff.    Iron deficiency anemia, unspecified iron deficiency anemia type, blood in May with HemeOnc.    Gastroesophageal reflux disease with esophagitis without hemorrhage    Rheumatoid arthritis involving multiple sites with positive rheumatoid factor, Immunocompromised    Osteopenia with high risk of fracture  -     DXA Bone Density Axial Skeleton 1 or more sites; Future; Expected date: 03/15/2023  -     Cancel: Ambulatory referral/consult to Physical/Occupational Therapy; Future; Expected date: 03/22/2023  -     Ambulatory referral/consult to Physical/Occupational Therapy; Future; Expected date: 03/22/2023    RTC 3mo.

## 2023-03-10 NOTE — TELEPHONE ENCOUNTER
Called pt. Gave instruction to monitor b/p over weekend and give Dr. Pal an update on Monday. Pt. verbalized understanding.

## 2023-03-10 NOTE — TELEPHONE ENCOUNTER
----- Message from Karlos Laura MA sent at 3/10/2023  1:27 PM CST -----  Contact: Pt [phone]  Spoke with pt and she says she's not feeling bad, but felt like she had a quick memory lapse. Pt is currently on Macrobid for a UTI, pt has two more days of antibiotics left. Pt also states that she and her  started a low salt, low sugar diet about three weeks ago. She wants to know if she's needing a medication change. Doesn't feel like she needs to be seen.    SJ  ----- Message -----  From: Joyce Mcnair  Sent: 3/10/2023  11:51 AM CST  To: Kenroy MERLOS Staff    Pt called and stated that her Bp has jacques unusually elevated.     Today 165/81 @11:20am                  178/81 @ 11:10am     Yesterday 158/79 @11:54am                      146/71 @ 3:15pm     3/2/23 147/76 9:30am               153/785 8:53am     Please call and advise

## 2023-03-11 NOTE — PROVATION PATIENT INSTRUCTIONS
Discharge Summary/Instructions after an Endoscopic Procedure  Patient Name: Leticia Piña  Patient MRN: [id number]  Patient YOB: 1950 Tuesday, March 7, 2023 Susan Escobedo MD  Dear patient,  As a result of recent federal legislation (The Federal Cures Act), you may   receive lab or pathology results from your procedure in your [facility]   account before your physician is able to contact you. Your physician or   their representative will relay the results to you with their   recommendations at their soonest availability.  Thank you,  RESTRICTIONS:  During your procedure today, you received medications for sedation.  These   medications may affect your judgment, balance and coordination.  Therefore,   for 24 hours, you have the following restrictions:   - DO NOT drive a car, operate machinery, make legal/financial decisions,   sign important papers or drink alcohol.    ACTIVITY:  Today: no heavy lifting, straining or running due to procedural   sedation/anesthesia.  The following day: return to full activity including work.  DIET:  Eat and drink normally unless instructed otherwise.     TREATMENT FOR COMMON SIDE EFFECTS:  - Mild abdominal pain, nausea, belching, bloating or excessive gas:  rest,   eat lightly and use a heating pad.  - Sore Throat: treat with throat lozenges and/or gargle with warm salt   water.  - Because air was used during the procedure, expelling large amounts of air   from your rectum or belching is normal.  - If a bowel prep was taken, you may not have a bowel movement for 1-3 days.    This is normal.  SYMPTOMS TO WATCH FOR AND REPORT TO YOUR PHYSICIAN:  1. Abdominal pain or bloating, other than gas cramps.  2. Chest pain.  3. Back pain.  4. Signs of infection such as: chills or fever occurring within 24 hours   after the procedure.  5. Rectal bleeding, which would show as bright red, maroon, or black stools.   (A tablespoon of blood from the rectum is not serious, especially  if   hemorrhoids are present.)  6. Vomiting.  7. Weakness or dizziness.  GO DIRECTLY TO THE NEAREST EMERGENCY ROOM IF YOU HAVE ANY OF THE FOLLOWING:      Difficulty breathing              Chills and/or fever over 101 F   Persistent vomiting and/or vomiting blood   Severe abdominal pain   Severe chest pain   Black, tarry stools   Bleeding- more than one tablespoon   Any other symptom or condition that you feel may need urgent attention  Your doctor recommends these additional instructions:  If any biopsies were taken, your doctors clinic will contact you in 1 to 2   weeks with any results.  - Discharge patient to home (with escort).   - Resume previous diet.   - Continue present medications.   - Await pathology results.   - Repeat colonoscopy in 3 years for surveillance based on pathology results.     - Return to primary care physician.  For questions, problems or results please call your physician Susan Escobedo MD at Work:  [phone]  If you have any questions about the above instructions, call the GI   department at [phone] or call the endoscopy unit at [phone]   from 7am until 3 pm.  [facility] [place], EMERGENCY ROOM PHONE NUMBER:   [phone]  IF A COMPLICATION OR EMERGENCY SITUATION ARISES AND YOU ARE UNABLE TO REACH   YOUR PHYSICIAN - GO DIRECTLY TO THE EMERGENCY ROOM.  I have read or have had read to me these discharge instructions for my   procedure and have received a written copy.  I understand these   instructions and will follow-up with my physician if I have any questions.     __________________________________       _____________________________________  Nurse Signature                                          Patient/Designated   Responsible Party Signature  MD Susan Soto MD  3/7/2023 8:48:55 AM  This report has been verified and signed electronically.  Dear patient,  As a result of recent federal legislation (The Federal Cures Act), you  may   receive lab or pathology results from your procedure in your [facility]   account before your physician is able to contact you. Your physician or   their representative will relay the results to you with their   recommendations at their soonest availability.  Thank you,  PROVATION   Pt states she has partial relief from pain. pt requesting last dose of medication. MD made aware. medication given as per order. No complaints of chest pain, headache, nausea, dizziness, vomiting  SOB, fever, chills verbalized.. Pt is a/o x 3. no complaints of chest pain, headache, nausea, dizzniness, vomiting, SOB, fever, chills   verbalized. pt resting comfortably in bed NAD. VSS pt on cardiac monitor in NSR,

## 2023-03-13 LAB
FINAL PATHOLOGIC DIAGNOSIS: NORMAL
Lab: NORMAL

## 2023-03-14 NOTE — PROGRESS NOTES
The polyps removed were precancerous also known as adenomas. They were completely removed. Guidelines recommend a repeat colonoscopy in 3 years. We will send you a reminder as the time nears.      Thanks for trusting us with your healthcare needs and using [facility].     Sincerely,    Susan Escobedo M.D.

## 2023-03-15 ENCOUNTER — OFFICE VISIT (OUTPATIENT)
Dept: PRIMARY CARE CLINIC | Facility: CLINIC | Age: 73
End: 2023-03-15
Payer: MEDICARE

## 2023-03-15 VITALS
HEIGHT: 65 IN | BODY MASS INDEX: 24.01 KG/M2 | WEIGHT: 144.13 LBS | SYSTOLIC BLOOD PRESSURE: 128 MMHG | TEMPERATURE: 98 F | OXYGEN SATURATION: 97 % | HEART RATE: 84 BPM | DIASTOLIC BLOOD PRESSURE: 62 MMHG

## 2023-03-15 DIAGNOSIS — M05.79 RHEUMATOID ARTHRITIS INVOLVING MULTIPLE SITES WITH POSITIVE RHEUMATOID FACTOR: ICD-10-CM

## 2023-03-15 DIAGNOSIS — I10 ESSENTIAL HYPERTENSION: Primary | ICD-10-CM

## 2023-03-15 DIAGNOSIS — K21.00 GASTROESOPHAGEAL REFLUX DISEASE WITH ESOPHAGITIS WITHOUT HEMORRHAGE: ICD-10-CM

## 2023-03-15 DIAGNOSIS — D84.9 IMMUNOCOMPROMISED: ICD-10-CM

## 2023-03-15 DIAGNOSIS — D50.9 IRON DEFICIENCY ANEMIA, UNSPECIFIED IRON DEFICIENCY ANEMIA TYPE: ICD-10-CM

## 2023-03-15 DIAGNOSIS — M85.80 OSTEOPENIA WITH HIGH RISK OF FRACTURE: ICD-10-CM

## 2023-03-15 DIAGNOSIS — M85.88 OTHER SPECIFIED DISORDERS OF BONE DENSITY AND STRUCTURE, OTHER SITE: ICD-10-CM

## 2023-03-15 PROBLEM — Z12.11 ENCOUNTER FOR SCREENING COLONOSCOPY: Status: RESOLVED | Noted: 2023-03-07 | Resolved: 2023-03-15

## 2023-03-15 PROBLEM — F33.41 RECURRENT MAJOR DEPRESSIVE DISORDER, IN PARTIAL REMISSION: Status: ACTIVE | Noted: 2018-07-15

## 2023-03-15 PROCEDURE — 99999 PR PBB SHADOW E&M-EST. PATIENT-LVL V: CPT | Mod: PBBFAC,,, | Performed by: INTERNAL MEDICINE

## 2023-03-15 PROCEDURE — 99999 PR PBB SHADOW E&M-EST. PATIENT-LVL V: ICD-10-PCS | Mod: PBBFAC,,, | Performed by: INTERNAL MEDICINE

## 2023-03-15 PROCEDURE — 99215 OFFICE O/P EST HI 40 MIN: CPT | Mod: PBBFAC,PN | Performed by: INTERNAL MEDICINE

## 2023-03-15 PROCEDURE — 99213 OFFICE O/P EST LOW 20 MIN: CPT | Mod: S$PBB,,, | Performed by: INTERNAL MEDICINE

## 2023-03-15 PROCEDURE — 99213 PR OFFICE/OUTPT VISIT, EST, LEVL III, 20-29 MIN: ICD-10-PCS | Mod: S$PBB,,, | Performed by: INTERNAL MEDICINE

## 2023-03-22 ENCOUNTER — PATIENT MESSAGE (OUTPATIENT)
Dept: PRIMARY CARE CLINIC | Facility: CLINIC | Age: 73
End: 2023-03-22
Payer: MEDICARE

## 2023-03-23 ENCOUNTER — APPOINTMENT (OUTPATIENT)
Dept: RADIOLOGY | Facility: HOSPITAL | Age: 73
End: 2023-03-23
Attending: INTERNAL MEDICINE
Payer: MEDICARE

## 2023-03-23 ENCOUNTER — PATIENT MESSAGE (OUTPATIENT)
Dept: PRIMARY CARE CLINIC | Facility: CLINIC | Age: 73
End: 2023-03-23
Payer: MEDICARE

## 2023-03-23 DIAGNOSIS — M85.88 OTHER SPECIFIED DISORDERS OF BONE DENSITY AND STRUCTURE, OTHER SITE: ICD-10-CM

## 2023-03-23 DIAGNOSIS — M85.80 OSTEOPENIA WITH HIGH RISK OF FRACTURE: ICD-10-CM

## 2023-03-23 PROCEDURE — 77080 DXA BONE DENSITY AXIAL: CPT | Mod: TC

## 2023-03-23 PROCEDURE — 77080 DXA BONE DENSITY AXIAL: CPT | Mod: 26,,, | Performed by: RADIOLOGY

## 2023-03-23 PROCEDURE — 77080 DXA BONE DENSITY AXIAL SKELETON 1 OR MORE SITES: ICD-10-PCS | Mod: 26,,, | Performed by: RADIOLOGY

## 2023-03-27 ENCOUNTER — CLINICAL SUPPORT (OUTPATIENT)
Dept: REHABILITATION | Facility: HOSPITAL | Age: 73
End: 2023-03-27
Payer: MEDICARE

## 2023-03-27 DIAGNOSIS — M85.80 OSTEOPENIA WITH HIGH RISK OF FRACTURE: ICD-10-CM

## 2023-03-27 DIAGNOSIS — R26.89 BALANCE PROBLEM: ICD-10-CM

## 2023-03-27 PROCEDURE — 97161 PT EVAL LOW COMPLEX 20 MIN: CPT | Mod: PN

## 2023-03-27 NOTE — PLAN OF CARE
[facility]   Physical Therapy Initial Evaluation     Date: 3/27/2023   [facility]  [facility] Number: [id number]    Therapy Diagnosis:   Encounter Diagnosis   Name Primary?    Osteopenia with high risk of fracture      Physician: Nicole Jasmine MD    Physician Orders: PT Eval and Treat   Medical Diagnosis from Referral: M85.80 (ICD-10-CM) - Osteopenia with high risk of fracture  Evaluation Date: 3/27/2023  Authorization Period Expiration: 4/24/23  Plan of Care Expiration: not applicable as patient is not continuing with therapy at this time.   Progress Note Due: not applicable as patient is not continuing with therapy at this time.  Visit # / Visits authorized: 1/ 1   FOTO: 1/1    Precautions:    Standard and Fall  Time In: 1333  Time Out: 1413  Total Appointment Time (timed & untimed codes): 40 minutes      SUBJECTIVE     Date of onset: 6 months to 1 year ago started seeing a change in balance.     History of current condition - Leticia reports: She notices it most when going down stairs. She feels that it has been harder to do over the past 6 months, climbing up step ladder is more difficult that before.     Current Activity Level: not standing to dress due to balance issues, independent with all other mobility but just needs more time to complete tasks due to not feeling as confident on her feet.     Falls: no near falls and no falls    Imaging, bone scan films: FINDINGS:  The L1 to L4 vertebral bone mineral density is equal to 1.152 g/cm squared with a T score of -0.3.  There has been a +4.3% statistically significant change relative to the prior study.  The left femoral neck bone mineral density is equal to 0.761 g/cm squared with a T score of -2.0.  There has been  a +8.7% statistically significant change relative to the prior study.  There is a 15.5% risk of a major osteoporotic fracture and a 3.5% risk of hip fracture in the next 10 years (FRAX).    Prior Therapy:  yes, 6 years ago for fibromyalgia due to pain.   Social History:  lives with their spouse  Occupation: retired  Prior Level of Function: independent   Current Level of Function: independent but more cautious    Pain:  Current 0/10, worst 0/10, best 0/10   Location:   Patients goals: to address balance     Medical History:   Past Medical History:   Diagnosis Date    Allergic rhinitis     Cutaneous lupus erythematosus     drug induced.    Essential hypertension 02/05/2019    GERD (gastroesophageal reflux disease)     Hypothyroid     Insomnia     Mixed anxiety and depressive disorder     Normal cardiac stress test     11/07    Rheumatoid arthritis(714.0)     Sjogren's syndrome        Surgical History:   Leticia Piña  has a past surgical history that includes breast implants; breast surgery for rupture; TONSILLECTOMY, ADENOIDECTOMY; Tubal ligation; Augmentation of breast; ERCP (N/A, 03/25/2021); Laparoscopic cholecystectomy (N/A, 03/26/2021); ERCP (N/A, 06/21/2021); Esophagogastroduodenoscopy (N/A, 01/24/2023); and Colonoscopy (N/A, 3/7/2023).    Medications:   Leticia has a current medication list which includes the following prescription(s): albuterol, azelastine, benzonatate, bupropion, cholecalciferol (vitamin d3), cyanocobalamin (vitamin b-12), ala-hist ir, diclofenac sodium, duloxetine, folic acid, hydrochlorothiazide, hydroxychloroquine, levothyroxine, lorazepam, meloxicam, omeprazole, potassium chloride sa, trazodone, rinvoq, and valsartan.    Allergies:   Review of patient's allergies indicates:   Allergen Reactions    Humira  [adalimumab]      Other reaction(s): drug-induced lupus  Other reaction(s): drug-induced lupus    Sulfa (sulfonamide antibiotics)      Other reaction(s): Hives  Other reaction(s): Rash  Other reaction(s): Hives          OBJECTIVE       Posture:  Pt presents with postural abnormalities which include:    [] Forward Head   [] Increased Lumbar Lordosis   [] Rounded Shoulder   [] Genu  Recurvatum   [] Increased Thoracic Kyphosis [] Genu Valgus   [] Trunk Deviated    [] Pes Planus   [] Scapular Winging    [x] Other: Within Normal Limits       Strength:    L/E MMT Right  (spine) Left Pain/Dysfunction with Movement   Hip Flexion  4+/5 4+/5    Hip Extension  4/5 4/5    Hip Abduction  4+/5 4+/5    Knee Extension 5/5 5/5    Knee Flexion 5/5 5/5    Ankle DF 5/5 5/5    Ankle PF 5/5 5/5         Sensation:  [x] Intact to Light Touch   [] Impaired:    Gait Analysis: The patient ambulated with the following assistive device: none; the pt presents with the following gait abnormalities: no specific gait deficits, only cautious and slow at times.     Fall risk assessment:    Performance Measurements Pt score Norms   Cobos Balance Scale Cobos Balance Scale    1. SITTING TO STANDIN - able to stand without using hands and stabilize independently    2. STANDING UNSUPPORTED:4 - able to stand safely 2 minutes without hold    3. SITTING WITH BACK UNSUPPORTED BUT FEET SUPPORTED ON FLOOR OR ON A STOOL: 4 - able to sit safely and securely 2 minutes    4. STANDING TO SITTIN - sits safely with minimal use of hands    5. TRANSFERS: 4 - able to trnasfer safely with minor use of hands    6. STANDING UNSUPPORTED WITH EYES CLOSED: 4 - albe to stand 10 seconds safely    7. STANDING UNSUPPORTED WITH FEET TOGETHER: 4 - able to place feet together independently and stand 1 minute safely    8. REACHING FORWARD WITH OUTSTRETCHED ARM WHILE STANDIN - can reach forward confidently 25 cm/10 inches    9.  OBJECT FROM THE FLOOR FROM A STANDING POSITION:4 - able to  slipper safely and easily    10. TURNING TO LOOK BEHIND OVER LEFT AND RIGHT SHOULDERS WHILE STANDIN - looks behind from both sides and weights shifts well    11. TURN 360 DEGREES: 4 - able to turn 360 in seconds or less    12. PLACE ALTERNATE FOOT ON STEP OR STOOL WHILE STANDING UNSUPPORTED: 4 - able to stand independently safely and complete 8 steps  in 20 seconds     13. STANDING UNSUPPORTED ONE FOOT IN FRONT: 4 - able to tandem stand independently and hold 30 seconds    14. STANDING ON ONE LE - able to lift leg indepentdently and hold = or < 3 seconds      TOTAL SCORE: 54/56       51-56 = low fall risk  41-50 = increased fall risk  0 -40 = high fall risk   Single leg stance Right: 6 seconds ; Left 3 seconds- dominant leg R Less than 4.9 sec high risk  5-6.4 sec increased risk  6.5 or greater low risk   5x sit to stand 13.35 seconds Greater than 14 sec high risk  12.1-14 sec increased risk  12 sec or less low risk   Timed up and go 6.09 seconds Greater than 14 sec high risk  11.1-14 sec increased risk  11 sec or less low risk         Self reported measurements  Outcome Norms   FES-I 25/64 41-64 high risk  25-40 increased risk  16-24 low risk       Limitation/Restriction for FOTO Neuro Balance Survey    Therapist reviewed FOTO scores for Leticia DEVINE Wang on 3/27/2023.   FOTO documents entered into [employer] - see Media section.    Limitation Score: 52/100         TREATMENT     Total Treatment time (time-based codes) separate from Evaluation: 10 minutes      Leticia received the treatments listed below:      neuromuscular re-education activities to improve: Balance, Coordination, Kinesthetic, Sense, Proprioception, and Posture for 10 minutes. The following activities were included:    Intervention 3/27/2023  Parameters   Patient education on balance program for Home Exercise Program.   Each exercise was demonstrated and progressions were discussed. x - she verbalized good understanding.                                 PATIENT EDUCATION AND HOME EXERCISES     Education provided:   - Patient  was instructed in home exercise program  to address the deficits listed above and to address overall condition and quality of life. Patient  was encouraged to participate in cardiovascular exercise and wellness exercise in fitness center with assistance of health  at  [facility] location.   - Patient was educated on all the above exercise prior/during/after for proper posture, positioning, and execution for safe performance with home exercise program.    Written Home Exercises Provided: yes. Exercises were reviewed and Leticia was able to demonstrate them prior to the end of the session.  Leticia demonstrated good  understanding of the education provided. See EMR under Patient Instructions for exercises provided during therapy sessions.    ASSESSMENT     Leticia is a 72 y.o. female referred to outpatient Physical Therapy with a medical diagnosis of M85.80 (ICD-10-CM) - Osteopenia with high risk of fracture. The patient presents with signs and symptoms consistent with diagnosis along with impairments which include impaired balance.  Her balance deficits are mostly seen with higher level, aggressive tasks such as tandem stance and single leg standing activities.      Patient  will benefit from instruction in home exercise program with follow up in wellness    Patient prognosis is not applicable as patient is not continuing with therapy at this time.   Patient will benefit from skilled outpatient Physical Therapy to address the deficits stated above and in the chart below, provide patient /family education, and to maximize patientt's level of independence.     Plan of care discussed with patient: Yes  Patient's spiritual, cultural and educational needs considered and patient is agreeable to the plan of care and goals as stated below:     Anticipated Barriers for therapy: not applicable as patient is not continuing with therapy at this time.       Medical Necessity is demonstrated by the following:    History  Co-morbidities and personal factors that may impact the plan of care Co-morbidities:   Allergic rhinitis    Cutaneous lupus erythematosus    drug induced.   Essential hypertension    GERD (gastroesophageal reflux disease)    Hypothyroid    Insomnia    Mixed anxiety and  depressive disorder    Normal cardiac stress test    11/07   Rheumatoid arthritis(714.0)    Sjogren's syndrome        Personal Factors:   no deficits     high   Examination  Body Structures and Functions, activity limitations and participation restrictions that may impact the plan of care Body Regions:   lower extremities  trunk    Body Systems:    balance  joint mobility, muscle tone, muscle length    Participation Restrictions:   See current level of function listed above     Activity limitations:   Learning and applying knowledge  no deficits    General Tasks and Commands  no deficits    Communication  no deficits    Mobility  no deficits    Self care  no deficits    Domestic Life  no deficits    Interactions/Relationships  no deficits    Life Areas  no deficits    Community and Social Life  community life  recreation and leisure  Uatsdin and spirituality         Moderate   Clinical Presentation stable and uncomplicated low   Decision Making/ Complexity Score: low         Goals:    Long Term Goals: 1 week  Pt will be independent with self management of his/her condition with home exercise program, posture, positioning and improved body mechanics.  2.   Pt will safely transition to and participate in wellness/fitness program.     PLAN   Plan of care Certification: not applicable as patient is not continuing with therapy at this time.      Patient is being seen for 1 visit to establish safe and effective home exercise program that can be performed independently. Health  will contact patient either by phone or in person weekly to monitor exercise program, answer questions regarding exercises and encourage follow up in fitness center.     Saba Lott, PT      I CERTIFY THE NEED FOR THESE SERVICES FURNISHED UNDER THIS PLAN OF TREATMENT AND WHILE UNDER MY CARE   Physician's comments:     Physician's Signature: ___________________________________________________

## 2023-03-27 NOTE — PATIENT INSTRUCTIONS
HOME EXERCISE PROGRAM  Created by Saba Lott PT, DPT  Mar 9th, 2023 View videos at [email or web address]     Total 5      BALANCE - SINGLE LEG    Start by standing with your back to a corner and a chair in front of you for a handhold. Lift up one foot and balance on the other foot. Stand upright, keep your hips level to the ground, and keep your gaze ahead. Hold this position for 10-20 seconds then relax. Switch feet and repeat. Also remember to not let your hip drop during this balancing exercise. Repeat 2 Times   Hold 10 Seconds   Complete 1 Set   Perform 1 Times a Day            BALANCE - TANDEM    Start by standing with your back to a corner and a chair in front of you for a handhold. Line up your feet in a heel-toe position. Stand upright, keeping your gaze ahead. Hold this position for 30 seconds then relax. Switch feet and repeat. Repeat 1 Time   Hold 30 Seconds   Complete 1 Set   Perform 1 Times a Day            BALANCE - ROMBERG    Start by standing with your back to a corner and a chair in front of you for a handhold. Bring your feet together and stand upright, keeping your gaze ahead. Hold this position for 1 minute then relax.    Progress with:  - Head turns/nods 5x's each  - Arm lifts/reaches 5x's each  - Stand on foam cushion or folded towel Repeat 1 Time   Hold 1 Minute   Complete 1 Set   Perform 1 Times a Day            LATERAL STEPS WITH SUPPORT - TABLE - COUNTERTOP     front of a sturdy table or countertop. Take side steps and use the table or countertop for support. Walk about 8 - 10 steps in each direction.  - Repeat 3x's in each direction  - Can progress with head turns and arm reach     Complete 3 Sets   Perform 1 Times a Day            Standing Backward Pivot Step  - Stand facing countertop with both hands on counter for support.  -Then slowly take a step backward with Right foot while rotating your body to the right  - Then step back facing the counter.    - After 8 repetitions,  repeat with L side. Repeat 8 Times   Complete 1 Set   Perform 1 Times a Day

## 2023-04-10 ENCOUNTER — PATIENT MESSAGE (OUTPATIENT)
Dept: PRIMARY CARE CLINIC | Facility: CLINIC | Age: 73
End: 2023-04-10
Payer: MEDICARE

## 2023-04-10 RX ORDER — MELOXICAM 15 MG/1
15 TABLET ORAL DAILY
Qty: 30 TABLET | Refills: 1 | OUTPATIENT
Start: 2023-04-10

## 2023-04-10 RX ORDER — FLUTICASONE PROPIONATE 50 MCG
2 SPRAY, SUSPENSION (ML) NASAL DAILY
Qty: 16 G | Refills: 2 | Status: SHIPPED | OUTPATIENT
Start: 2023-04-10 | End: 2024-03-14 | Stop reason: SDUPTHER

## 2023-04-26 ENCOUNTER — OFFICE VISIT (OUTPATIENT)
Dept: PSYCHIATRY | Facility: CLINIC | Age: 73
End: 2023-04-26
Payer: MEDICARE

## 2023-04-26 DIAGNOSIS — G47.00 INSOMNIA, UNSPECIFIED TYPE: Primary | ICD-10-CM

## 2023-04-26 DIAGNOSIS — F41.8 MIXED ANXIETY AND DEPRESSIVE DISORDER: ICD-10-CM

## 2023-04-26 PROCEDURE — 99214 OFFICE O/P EST MOD 30 MIN: CPT | Mod: 95,,, | Performed by: PSYCHIATRY & NEUROLOGY

## 2023-04-26 PROCEDURE — 99214 PR OFFICE/OUTPT VISIT, EST, LEVL IV, 30-39 MIN: ICD-10-PCS | Mod: 95,,, | Performed by: PSYCHIATRY & NEUROLOGY

## 2023-04-26 RX ORDER — BUPROPION HYDROCHLORIDE 300 MG/1
300 TABLET ORAL DAILY
Qty: 90 TABLET | Refills: 0 | Status: SHIPPED | OUTPATIENT
Start: 2023-04-26 | End: 2023-06-19 | Stop reason: SDUPTHER

## 2023-04-26 RX ORDER — LORAZEPAM 1 MG/1
1 TABLET ORAL 2 TIMES DAILY
Qty: 60 TABLET | Refills: 1 | Status: SHIPPED | OUTPATIENT
Start: 2023-04-26 | End: 2023-06-19 | Stop reason: SDUPTHER

## 2023-04-26 RX ORDER — DULOXETIN HYDROCHLORIDE 60 MG/1
60 CAPSULE, DELAYED RELEASE ORAL DAILY
Qty: 90 CAPSULE | Refills: 1 | Status: SHIPPED | OUTPATIENT
Start: 2023-04-26 | End: 2023-06-19 | Stop reason: SDUPTHER

## 2023-04-26 RX ORDER — TRAZODONE HYDROCHLORIDE 50 MG/1
TABLET ORAL
Qty: 180 TABLET | Refills: 1 | Status: SHIPPED | OUTPATIENT
Start: 2023-04-26 | End: 2023-06-28

## 2023-04-26 NOTE — PROGRESS NOTES
Outpatient Psychiatry Follow-up Visit (MD/NP)    4/26/2023    Leticia Piña, a 72 y.o. female, presenting for follow-up visit. Met with patient.    Reason for Encounter: Patient complains of anxiety, depression    Interval Hx: Patient seen and interviewed for follow-up, last seen about four months ago. This was a VIDEO VISIT. She was at home. Stressors with Mom in/out of hospital for CHF, though recently stable. Underwent a scope with unremarkable findings. Repeat bloodwork & pending eval by hematology, Blood pressure status unknown. Less anxious with mom doing better.   No new medication. No new mental health problems.   Med adherent - ongoing benefit. Denies medication side effects.     Background: 68 year old F with hx of anxiety and depression x ~20 years, with onset roughly corresponding to menopause in 1997. Describes following illness and treatment course:     Perimenopause - 1997 - insomnia roblems ; start ambien.   August 2002 - emotional distress  Can't accomplish anything (decreased functioning), feeling overwhelmed, going around in circles.   September '02 - first treatment - citalopram 20 mg. Thought it it was causing dry mouth (later dx'ed sjogrens). Feels helped symptoms present all her life that she later recognized as depression. Reduced citalopram.   November '03- more perimenopausal symptoms. Started muliti-hormonal treatment for menopausal symptoms. Had temporary benefit, stopped in '04 due to negligible benefit by that time. Remained on estradiol  Eventually went back to 20 mg citalopram.   Stress and fatigue in May '06. More dryness. Early sjogren's. Stopped citalopram, tried duloxetine.   2.07-stopped menses, 3.07 - back to progest/test, off est.   Ativan helped anxiety, caused lethargy.   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 Insomnia ongoing despite ambien.   Increased ambien to 15.   In '09 added trazodone 75 mg - helped in  "combo with ambien  2012 - cymbalta to 90 mg.   '13 - added wellbutrin 100 mg' diagnosed with sjogren's.   '15 - wellbutrin 150, trazodone 100 mg. Stopped ambien  Had a dip in moods in early summer.   Taking 225 mg bupropion - starting about 1 month ago. 300 mg was too intense. Tolerating this lower dose.     Worrying too much about different things   Trouble relaxing   SUSAN-7 = 2    Sleep Problems   Sad Mood  Appetite and weight changes  Concentration problems  Guilt  Thoughts of Emptiness/Death/Suicide  Anhedonia  Anergia  Slowing/PMR    QIDS = 7    Rested on rising. Wakes only briefly. Sometimes has trazodone hangover. Doesn't interfere with functioning. Interest is good.   Anxiety fluctuates. Recently taking ativan about 1-2x/week.     Psych Hx: as above. No avh, no hospitalization, no serious SI, elaine. One previous psych assessment in [facility] - "horrible experience". She changed meds (to escitalopram), felt terrible.    No other interactions with psychiatrist.     Social history: When younger - low self-esteem, dad was pessimistic and fault-finding. Dad critical. No nancy abuse. No serious maltreatment or trauma. Fewer than most friends (mostly because they were outgoing; thought poorly of one's self, inhibited. Denies teasing and bullying. Above average in school. Graduated HS, went to work x 1 year then went to [employer]. Started dating . Didn't graduate. 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 Gets good feedback on performance. Good relationship with .  x 43 years. 2 grown kids, 4 grandkids. Kids live locally. Doesn't see her son much. Dtr-in-law leads them to not visit with them.  lost business as late consequence of bad economy & a "misappropriation of funds" issue &  health issues. Was primary caregiver to mother-in-law (now in [place]). Was primary caregiver to granddaughter who had " "medical disabilities. "my lowest point".  now working, "see good future ahead".       From PCP note: 3.2.18 - Here for follow up of medical problems. Works long hours. Works & then goes to sleep right after she gets home. RA pain is doing well. [place] weird so started checking BP. Has been monitoring BPs, range 130-174/ . Has no past history of HTN. No med change. Only major change is started working 3 months ago, , & babysits. No exercise. Sleeping ok with trazodone 100mg. Mixed anxiety & depressive disorder- incr wellbutrin to 300, cont  cymbalta 60mg, trazodone 100 hs, try wean ativan to half hs.  Refer for counseling, refer to Psychiatry if not better in 6 weeks.  RTC 6 weeks.    From psychotherapy eval    Chief complaint/reason for encounter: depression, anxiety, sleep and interpersonal     History of present illness:  67 year old  female presented for initial evaluation, with chief complaint of "I've always been kind of anxious & depressed for as long as I can remember, but it just really started getting worse since around 2009..." Pt described a few challenging stressful events starting then, that she feels have accumulate in her life. These include financial strain,her own worsening Rhumatoid Arthritis--diagnosed in 1994, loss of home & the family business in 2013, 's life threatening health scare starting in 2009, legal troubles related to his business, & a feeling of social abandonment from mother, sister, some friends, & daughter-in-law (which means her son & his 2 kids don't see her much anymore.) Pt described in recent months worsening symptoms of sudden uncontrolled tearfulness, increased muscle tension, being distracted, pervasive ruminating worries, loss of senthil, trouble sleeping unless she takes a sleeping aid--currently Trazodone, & tendency to socially isolate. She stated she has never been very sociable. She now feels more rejection & " scrutiny from others & finds herself withdrawing. She said much of the negativity from some family & friends relates to 's legal trouble, having been arrested for business related fraud from a period when he was juggling inadequate customer funds & misappropriated a customer's money to cover expenses of someone else's job; all that while he was battling cancer. Pt said she marvels that her  seems able to stay generally optimistic & pleasant & that he has a lot of friends. Meanwhile, she lacks much social support & stated she isn;t comfortable reaching out to connect with people, so she has a small support network to begin with. She feels devastated by judgment against her  by her own sister, mother, & daughter in law, with the result that she is often unable to spend time with 2 of her grandchildren; she noted those children visit with the daughter-in-law's parents virtually every weekend. Pt described a certain pressure level of social expectation- -material & financial standards, & she & her  have lose their home, as well as the business, & they are living with her parents. 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 She said they have a lot of financial backlog of bills to catch up on. Pt denied any suicidal or homicidal ideation, denied any cognitive deficit other than poor focus at times, denied psychosis, mood swings, rages, or substance abuse. Identified therapeutic goals include reducing anxiety & depression & improving coping skills; possibly finding a way to somewhat reduce social isolation, perhaps by reconnecting with old friends.       Pain: not quantified but described as moderate RA pain     Symptoms:   Mood: depressed mood, insomnia, worthlessness/guilt, poor concentration, tearfulness and social  "isolation  Anxiety: excessive anxiety/worry, restlessness/keyed up, muscle tension and social phobia  Substance abuse: denied  Cognitive functioning: denied  Health behaviors: noncontributory     Psychiatric history: none     Medical history: RA--diagnosed in 1994; recent hypertension; Hypothyroidism     Family history of psychiatric illness: mother chronically anxious (starting when older); father apparently depressed--very pessimistic & irritable, doesn't acknowledge problem. Suspects paternal grandfather     Social history: Grew up locally, the oldest of 3 sisters, the other two 7 & 8 & 1/2 years younger than she. Raised by both parents; recalled being markedly shy her entire life; not particularly close to her sisters, who were much younger & had each other. Lifelong active [employer]; has a home [employer]. Not currently involved in any smaller groups within the [employer]. Graduated high school & attended 2 years of college;  at age 24. Still with , Alpesh, today.  Mother of 2, a son, age 42, & daughter, age 39. Both each have 2 children of their own. Described daughter as a close source of support; also talks with  a lot, processing the struggles they have come through together. 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 Pt described recent loss of a few friend connections she had; her middle sister verbally declared she wouldn't associate with the pt & her , due to his legal "disgrace."  Denied any  experience.  No tobacco, light to moderate wine intake. Minimal caffeine, no recreational drugs.       Substance use:   Alcohol: occasional   Drugs: none   Tobacco: none   Caffeine: a cup of green tea most mornings.     Review Of Systems:     GENERAL:  No weight gain or loss  SKIN:  No rashes or lacerations  HEAD:  No headaches  EYES:  No exophthalmos, jaundice or blindness  EARS:  No dizziness, tinnitus or hearing loss  NOSE:  No changes in " smell  MOUTH & THROAT:  No dyskinetic movements or obvious goiter  CHEST:  No shortness of breath, hyperventilation or cough  CARDIOVASCULAR:  No tachycardia or chest pain  ABDOMEN:  No nausea, vomiting, pain, constipation or diarrhea  URINARY:  No frequency, dysuria or sexual dysfunction  ENDOCRINE:  No polydipsia, polyuria  MUSCULOSKELETAL:  No pain or stiffness of the joints  NEUROLOGIC:  No weakness, sensory changes, seizures, confusion, memory loss, tremor or other abnormal movements    Current Evaluation:     Nutritional Screening: Considering the patient's height and weight, medications, medical history and preferences, should a referral be made to the dietitian? no    Constitutional  Vitals:  Most recent vital signs, dated less than 90 days prior to this appointment, were not reviewed.    There were no vitals filed for this visit.     General:  unremarkable, age appropriate     Musculoskeletal  Muscle Strength/Tone:  no tremor, no tic   Gait & Station:  non-ataxic     Psychiatric  Appearance: casually dressed & groomed;   Behavior: calm,   Cooperation: cooperative with assessment  Speech: normal rate, volume, tone  Thought Process: linear, goal-directed  Thought Content: No suicidal or homicidal ideation; no delusions  Affect: reactive  Mood: euthymic  Perceptions: No auditory or visual hallucinations  Level of Consciousness: alert throughout interview  Insight: fair  Cognition: Oriented to person, place, time, & situation  Memory: no apparent deficits to general clinical interview; not formally assessed  Attention/Concentration: no apparent deficits to general clinical interview; not formally assessed  Fund of Knowledge: average by vocabulary/education    Laboratory Data  No visits with results within 1 Month(s) from this visit.   Latest known visit with results is:   Admission on 03/07/2023, Discharged on 03/07/2023   Component Date Value Ref Range Status    Final Pathologic Diagnosis 03/07/2023    Final  "                   Value:[facility] DIAGNOSIS:  A. COLON, CECAL POLYP X 2, POLYPECTOMY:  - One(1) of two(2) fragments of benign colonic mucosa showing tubular adenoma  with focal superficial high grade epithelial dysplasia, see comment.  - Remaining fragment showing an intramucosal lymphoid aggregate.  - Melanosis coli.  - Multiple levels examined.  B. COLON, PROXIMAL ASCENDING POLYP X 3, POLYPECTOMY:  - Tubular adenoma x 2, negative for high grade dysplasia.  - Hyperplastic polyp x 1.  COMMENT:  A). The polyp margin is negative for high grade dysplasia.  Beth Asencio M.D.  Report attached.  Performing site:  [address] [address]  "Disclaimer:  This case diagnosis was rendered completely by the outside  consultation pathologist and the case is electronically signed by an [facility]  pathologist listed below solely to release the report into the medical  record."      Disclaimer 03/07/2023    Final                    Value:Unless the case is a 'gross only' or additional testing only, the final  diagnosis for each specimen is based on a microscopic examination of  appropriate tissue sections.       Medications  Outpatient Encounter Medications as of 4/26/2023   Medication Sig Dispense Refill    albuterol (VENTOLIN HFA) 90 mcg/actuation inhaler Inhale 2 puffs into the lungs every 4 to 6 hours as needed for Wheezing or Shortness of Breath. Rescue 18 g 2    azelastine (ASTELIN) 137 mcg (0.1 %) nasal spray 1 spray (137 mcg total) by Nasal route 2 (two) times daily. 30 mL 11    benzonatate (TESSALON) 100 MG capsule Take 2 capsules (200 mg total) by mouth 3 (three) times daily as needed. 60 capsule 3    buPROPion (WELLBUTRIN XL) 300 MG 24 hr tablet Take 1 tablet (300 mg total) by mouth once daily. 90 tablet 1    cholecalciferol, vitamin D3, 5,000 unit/mL Drop Take 1 drop by mouth Daily.      cyanocobalamin, vitamin B-12, 5,000 mcg Subl Place under the tongue once daily.      " dexbrompheniramine maleate (ALA-HIST IR) 2 mg Tab Take 1 tablet by mouth once daily. 30 tablet 2    diclofenac sodium (VOLTAREN) 1 % Gel Apply 2 g topically 4 (four) times daily. 300 g 3    DULoxetine (CYMBALTA) 60 MG capsule Take 1 capsule (60 mg total) by mouth once daily. 90 capsule 0    fluticasone propionate (FLONASE) 50 mcg/actuation nasal spray 2 sprays (100 mcg total) by Each Nostril route once daily. 16 g 2    folic acid (FOLVITE) 1 MG tablet Take 1 tablet (1 mg total) by mouth once daily. 90 tablet 3    hydroCHLOROthiazide (HYDRODIURIL) 25 MG tablet TAKE ONE TABLET BY MOUTH ONE TIME DAILY 90 tablet 3    hydrOXYchloroQUINE (PLAQUENIL) 200 mg tablet TAKE ONE TABLET BY MOUTH TWICE DAILY 180 tablet 0    levothyroxine (EUTHYROX) 75 MCG tablet Take 1 tablet (75 mcg total) by mouth once daily. 90 tablet 3    LORazepam (ATIVAN) 1 MG tablet Take 1 tablet (1 mg total) by mouth 2 (two) times daily. 60 tablet 1    meloxicam (MOBIC) 15 MG tablet TAKE ONE TABLET BY MOUTH ONE TIME DAILY 30 tablet 1    omeprazole (PRILOSEC) 40 MG capsule Take 1 capsule (40 mg total) by mouth 2 (two) times daily before meals. 180 capsule 0    potassium chloride SA (K-DUR,KLOR-CON) 20 MEQ tablet TAKE ONE TABLET BY MOUTH ONE TIME DAILY 90 tablet 3    traZODone (DESYREL) 50 MG tablet TAKE ONE OR TWO TABLETS BY MOUTH AT BEDTIME AS NEEDED FOR SLEEP 180 tablet 0    upadacitinib (RINVOQ) 15 mg 24 hr tablet Take by mouth. Test claim only 30 tablet 0    valsartan (DIOVAN) 320 MG tablet Take 1 tablet (320 mg total) by mouth once daily. 90 tablet 3     No facility-administered encounter medications on file as of 4/26/2023.     Assessment - Diagnosis - Goals:     Impression: 72 year old F with chronic anxiety and depression, RA and sjogren's. Had exacerbation of illness in spring '18, currently doing somewhat better following medication changes, psychotherapy. Generally stable, tolerating ongoing, beneficial treatment.     Treatment Goals:  Specify  outcomes written in observable, behavioral terms:   Minimize recurrences    Treatment Plan/Recommendations:   Duloxetine, bupropion, lorazepam prn. Takes trazodone prn sleep.  *consider antidepressant adjustment - duloxetine up   psychoeducation including behavioral activation, psychotherapy, meds.   Discussed services for father including hospice that may help.    Discussed risks, benefits, and alternatives to treatment plan documented above with patient. I answered all patient questions related to this plan and patient expressed understanding and agreement.     Return to Clinic: 2 months    C. Jimbo Long MD  Psychiatry  [facility]  [address] , [place], [place] [id number] [phone]

## 2023-05-15 ENCOUNTER — LAB VISIT (OUTPATIENT)
Dept: LAB | Facility: HOSPITAL | Age: 73
End: 2023-05-15
Attending: NURSE PRACTITIONER
Payer: MEDICARE

## 2023-05-15 DIAGNOSIS — Z86.2 HISTORY OF IRON DEFICIENCY ANEMIA: ICD-10-CM

## 2023-05-15 DIAGNOSIS — M05.79 RHEUMATOID ARTHRITIS INVOLVING MULTIPLE SITES WITH POSITIVE RHEUMATOID FACTOR: ICD-10-CM

## 2023-05-15 LAB
ANION GAP SERPL CALC-SCNC: 11 MMOL/L (ref 8–16)
BASOPHILS # BLD AUTO: 0.07 K/UL (ref 0–0.2)
BASOPHILS NFR BLD: 1.4 % (ref 0–1.9)
BUN SERPL-MCNC: 20 MG/DL (ref 8–23)
CALCIUM SERPL-MCNC: 9 MG/DL (ref 8.7–10.5)
CHLORIDE SERPL-SCNC: 96 MMOL/L (ref 95–110)
CO2 SERPL-SCNC: 32 MMOL/L (ref 23–29)
CREAT SERPL-MCNC: 0.8 MG/DL (ref 0.5–1.4)
DIFFERENTIAL METHOD: ABNORMAL
EOSINOPHIL # BLD AUTO: 0.3 K/UL (ref 0–0.5)
EOSINOPHIL NFR BLD: 5.1 % (ref 0–8)
ERYTHROCYTE [DISTWIDTH] IN BLOOD BY AUTOMATED COUNT: 11.3 % (ref 11.5–14.5)
EST. GFR  (NO RACE VARIABLE): >60 ML/MIN/1.73 M^2
GLUCOSE SERPL-MCNC: 101 MG/DL (ref 70–110)
HCT VFR BLD AUTO: 32.2 % (ref 37–48.5)
HGB BLD-MCNC: 10.8 G/DL (ref 12–16)
IMM GRANULOCYTES # BLD AUTO: 0.02 K/UL (ref 0–0.04)
IMM GRANULOCYTES NFR BLD AUTO: 0.4 % (ref 0–0.5)
LYMPHOCYTES # BLD AUTO: 1.4 K/UL (ref 1–4.8)
LYMPHOCYTES NFR BLD: 29.6 % (ref 18–48)
MCH RBC QN AUTO: 30.6 PG (ref 27–31)
MCHC RBC AUTO-ENTMCNC: 33.5 G/DL (ref 32–36)
MCV RBC AUTO: 91 FL (ref 82–98)
MONOCYTES # BLD AUTO: 0.5 K/UL (ref 0.3–1)
MONOCYTES NFR BLD: 9.9 % (ref 4–15)
NEUTROPHILS # BLD AUTO: 2.6 K/UL (ref 1.8–7.7)
NEUTROPHILS NFR BLD: 53.6 % (ref 38–73)
NRBC BLD-RTO: 0 /100 WBC
PLATELET # BLD AUTO: 304 K/UL (ref 150–450)
PMV BLD AUTO: 9.7 FL (ref 9.2–12.9)
POTASSIUM SERPL-SCNC: 4.3 MMOL/L (ref 3.5–5.1)
RBC # BLD AUTO: 3.53 M/UL (ref 4–5.4)
SODIUM SERPL-SCNC: 139 MMOL/L (ref 136–145)
WBC # BLD AUTO: 4.86 K/UL (ref 3.9–12.7)

## 2023-05-15 PROCEDURE — 82728 ASSAY OF FERRITIN: CPT | Performed by: NURSE PRACTITIONER

## 2023-05-15 PROCEDURE — 80048 BASIC METABOLIC PNL TOTAL CA: CPT | Performed by: NURSE PRACTITIONER

## 2023-05-15 PROCEDURE — 84466 ASSAY OF TRANSFERRIN: CPT | Performed by: NURSE PRACTITIONER

## 2023-05-15 PROCEDURE — 36415 COLL VENOUS BLD VENIPUNCTURE: CPT | Mod: PO | Performed by: NURSE PRACTITIONER

## 2023-05-15 PROCEDURE — 85025 COMPLETE CBC W/AUTO DIFF WBC: CPT | Performed by: NURSE PRACTITIONER

## 2023-05-16 ENCOUNTER — PATIENT MESSAGE (OUTPATIENT)
Dept: PSYCHIATRY | Facility: CLINIC | Age: 73
End: 2023-05-16
Payer: MEDICARE

## 2023-05-16 LAB
FERRITIN SERPL-MCNC: 303 NG/ML (ref 20–300)
IRON SERPL-MCNC: 133 UG/DL (ref 30–160)
SATURATED IRON: 29 % (ref 20–50)
TOTAL IRON BINDING CAPACITY: 457 UG/DL (ref 250–450)
TRANSFERRIN SERPL-MCNC: 309 MG/DL (ref 200–375)

## 2023-05-26 NOTE — PROGRESS NOTES
The patient location is: home  The chief complaint leading to consultation is: lab discussion    Visit type: audiovisual    Face to Face time with patient: 15   30 minutes of total time spent on the encounter, which includes face to face time and non-face to face time preparing to see the patient (eg, review of tests), Obtaining and/or reviewing separately obtained history, Documenting clinical information in the electronic or other health record, Independently interpreting results (not separately reported) and communicating results to the patient/family/caregiver, or Care coordination (not separately reported).     Each patient to whom he or she provides medical services by telemedicine is:  (1) informed of the relationship between the physician and patient and the respective role of any other health care provider with respect to management of the patient; and (2) notified that he or she may decline to receive medical services by telemedicine and may withdraw from such care at any time.    Patient ID: Leticia Piña is a 73 y.o. female.    Chief Complaint: lab discussion    HPI:  72 y/o female who presents today for f/u of her longstanding chronic on/off anemia for several years.  She states that she takes folic acid and B12 daily.  Denies any previous diagnosis of B12 of folic acid deficiency.  She has had on/off iron deficiency noted with history of IV Injectafer infusion 3/2020 and then again on 2/2021 she received 1 dose Feraheme due to recurrent ID.      She had colonoscopy in 2011 - no polyps found.  Up to date on mammogram and pap.     She complains of acid reflux symptoms for years.  She also has Sjogren's disease and cutaneous lupus erythematous, RA followed by rheumatology.       Most recent hgb 12.3 g/dL and iron is repleated.  Had ERCP done with Dr. Madrigal with stent placement and pathology showing Chronic cholecystitis with cholelithiasis and Rokitansky-Aschoff sinuses   Negative for atypia or  malignancy      Had emergency gallbladder surgery removal on 3/26/2020 with noted elevated LFTs with improvement in symptoms post surgery.     Interval History:    9/1/2022 Will need to repeat labs today to assess for recurrent Iron deficiency anemia.   6/21/2021 ERCP Biliary stent removed with biliary sludge and a stone removed.   Is currently being treated with Rinvoq once daily for treatment of RA and is tolerating well.  Denies any s/s of infection.   C/o fatigue.  Denies any abnormal bleeding.  Is not currently taking oral iron daily. Denies f/c/ns or unintentional weight loss.  Denies abnormal lymphadenopathy.      11/3/2022 Presents today to evaluate response of IV Feraheme treatments x 2.  Last received on 9/21/2022.  Awaiting iron and tibc lab results.   Continues with chronic fatigue. Denies any abnormal bleeding.  Continues to take B12 and folate daily. is a 72 year old female who presents today for follow up of her longstanding chronic on/off anemia for several years.  She states that she takes folic acid and B12 daily.  Denies any previous diagnosis of B12 of folic acid deficiency.  She has had on/off iron deficiency noted with history of IV Injectafer infusion 3/2020 and then again on 2/2021 she received 1 dose Feraheme due to recurrent ID.      She had colonoscopy in 2011 - no polyps found.  Up to date on mammogram and pap.     She complains of acid reflux symptoms for years.  She also has Sjogren's disease and cutaneous lupus erythematous, RA followed by rheumatology.       Most recent hgb 12.3 g/dL and iron is repleated.  Had ERCP done with Dr. Madrigal with stent placement and pathology showing Chronic cholecystitis with cholelithiasis and Rokitansky-Aschoff sinuses   Negative for atypia or malignancy      Had emergency gallbladder surgery removal on 3/26/2020 with noted elevated LFTs with improvement in symptoms post surgery.     Interval History:    9/1/2022 Will need to repeat labs today to  assess for recurrent Iron deficiency anemia.   6/21/2021 ERCP Biliary stent removed with biliary sludge and a stone removed.   Is currently being treated with Rinvoq once daily for treatment of RA and is tolerating well.  Denies any s/s of infection.   C/o fatigue.  Denies any abnormal bleeding.  Is not currently taking oral iron daily. Denies f/c/ns or unintentional weight loss.  Denies abnormal lymphadenopathy.      11/3/2022 Presents today to evaluate response of IV Feraheme treatments x 2.  Last received on 9/21/2022.  Awaiting iron and tibc lab results.   Continues with chronic fatigue. Denies any abnormal bleeding.  Continues to take B12 and folate daily.      12/12/2022  EGD scheduled 1/24/2022.  Continues to take B12 and folate daily. Denies any GI symptoms or abnormal blood loss.  Continues treatment for RA - Rinvoqu and plaqunenil.  Currently not taking oral iron.  Has taking oral iron in the past and is unable to tolerate due to constipation     2/6/2023 Last dose of Feraheme on 12/27/2022 and has been evaluated with EGD.  Presents today to review response. Currently with normocytic anemia- hgb 11.3, mcv 91 iron repleated.  EGDNormal duodenal bulb and second portion of the duodenum.   - Gastritis.  3 cm hiatal hernia. Z-line irregular, 35 cm from the incisors. Normal upper third of esophagus and middle third of esophagus.   Pathology: A.   SMALL BOWEL, BIOPSY:   - Fragments of benign small bowel mucosa, without diagnostic abnormality.   B.   STOMACH, BIOPSY:   - Mild inactive chronic gastritis with reactive mucosal changes and focal intestinal metaplasia.   - No Helicobacter pylori organisms identified by *[facility].   - Negative for malignancy.   C.   GASTROESOPHAGEAL JUNCTION BIOPSY:   - Fragments of benign reactive squamous and gastric-type mucosa with rare   intraepithelial eosinophils and neutrophils and mild chronic active   inflammation, consistent with reflux.   - No intestinal metaplasia identified.    - Negative for dysplasia or malignancy.   COMMENT:   A). Sections show fragments of benign small bowel mucosa with normal villous   architecture. No villous atrophy, crypt hyperplasia, increased   intraepithelial lymphocytes or evidence of celiac sprue identified.   PPI increased from once per day to twice per day.     Interval History:  5/26/2023 Not currently taking oral iron tablets.  Currently taking B12 and folic acid daily.  States that she was diagnosed at the beginning of the year with sleep apnea.  Uses dental device that was made by her dentist that helps her breath right.  RA is currently control and is being followed by rheumatology.  She has no complaints today.  Denies any abnormal bleeding.      Social History     Socioeconomic History    Marital status:    Tobacco Use    Smoking status: Never    Smokeless tobacco: Never   Substance and Sexual Activity    Alcohol use: Not Currently     Comment: socially     Drug use: No    Sexual activity: Not Currently     Social Determinants of Health     Financial Resource Strain: Low Risk     Difficulty of Paying Living Expenses: Not very hard   Food Insecurity: No Food Insecurity    Worried About Running Out of Food in the Last Year: Never true    Ran Out of Food in the Last Year: Never true   Transportation Needs: No Transportation Needs    Lack of Transportation (Medical): No    Lack of Transportation (Non-Medical): No   Physical Activity: Unknown    Days of Exercise per Week: 0 days    Minutes of Exercise per Session: Patient refused   Stress: No Stress Concern Present    Feeling of Stress : Not at all   Social Connections: Unknown    Frequency of Communication with Friends and Family: More than three times a week    Frequency of Social Gatherings with Friends and Family: Twice a week    Active Member of Clubs or Organizations: No    Attends Club or Organization Meetings: Patient refused    Marital Status:    Housing Stability: Low Risk      Unable to Pay for Housing in the Last Year: No    Number of Places Lived in the Last Year: 2    Unstable Housing in the Last Year: No       Family History   Problem Relation Age of Onset    Heart disease Mother     Heart disease Father        Past Surgical History:   Procedure Laterality Date    AUGMENTATION OF BREAST      breast implants      breast surgery for rupture      COLONOSCOPY N/A 3/7/2023    Procedure: COLONOSCOPY;  Surgeon: Susan Escobedo MD;  Location: [facility];  Service: Endoscopy;  Laterality: N/A;    ERCP N/A 03/25/2021    Procedure: ERCP (ENDOSCOPIC RETROGRADE CHOLANGIOPANCREATOGRAPHY);  Surgeon: Preston Madrigal MD;  Location: [facility];  Service: Endoscopy;  Laterality: N/A;    ERCP N/A 06/21/2021    Procedure: ERCP (ENDOSCOPIC RETROGRADE CHOLANGIOPANCREATOGRAPHY);  Surgeon: Preston Madrigal MD;  Location: [facility];  Service: Endoscopy;  Laterality: N/A;    ESOPHAGOGASTRODUODENOSCOPY N/A 01/24/2023    Procedure: ESOPHAGOGASTRODUODENOSCOPY (EGD);  Surgeon: Susan Escobedo MD;  Location: [facility];  Service: Endoscopy;  Laterality: N/A;    LAPAROSCOPIC CHOLECYSTECTOMY N/A 03/26/2021    Procedure: CHOLECYSTECTOMY, LAPAROSCOPIC;  Surgeon: Jose Blue MD;  Location: [facility];  Service: General;  Laterality: N/A;    TONSILLECTOMY, ADENOIDECTOMY      TUBAL LIGATION         Past Medical History:   Diagnosis Date    Allergic rhinitis     Cutaneous lupus erythematosus     drug induced.    Essential hypertension 02/05/2019    GERD (gastroesophageal reflux disease)     Hypothyroid     Insomnia     Mixed anxiety and depressive disorder     Normal cardiac stress test     11/07    Rheumatoid arthritis(714.0)     Sjogren's syndrome        Review of Systems   Constitutional: Negative.    HENT: Negative.     Eyes: Negative.    Respiratory: Negative.     Cardiovascular: Negative.    Gastrointestinal: Negative.    Endocrine: Negative.    Genitourinary: Negative.    Musculoskeletal:  Negative.    Skin: Negative.    Allergic/Immunologic: Negative.    Neurological: Negative.    Hematological: Negative.    Psychiatric/Behavioral: Negative.          Medication List with Changes/Refills   Current Medications    ALBUTEROL (VENTOLIN HFA) 90 MCG/ACTUATION INHALER    Inhale 2 puffs into the lungs every 4 to 6 hours as needed for Wheezing or Shortness of Breath. Rescue    AZELASTINE (ASTELIN) 137 MCG (0.1 %) NASAL SPRAY    1 spray (137 mcg total) by Nasal route 2 (two) times daily.    BENZONATATE (TESSALON) 100 MG CAPSULE    Take 2 capsules (200 mg total) by mouth 3 (three) times daily as needed.    BUPROPION (WELLBUTRIN XL) 300 MG 24 HR TABLET    Take 1 tablet (300 mg total) by mouth once daily.    CHOLECALCIFEROL, VITAMIN D3, 5,000 UNIT/ML DROP    Take 1 drop by mouth Daily.    CYANOCOBALAMIN, VITAMIN B-12, 5,000 MCG SUBL    Place under the tongue once daily.    DEXBROMPHENIRAMINE MALEATE (ALA-HIST IR) 2 MG TAB    Take 1 tablet by mouth once daily.    DICLOFENAC SODIUM (VOLTAREN) 1 % GEL    Apply 2 g topically 4 (four) times daily.    DULOXETINE (CYMBALTA) 60 MG CAPSULE    Take 1 capsule (60 mg total) by mouth once daily.    FLUTICASONE PROPIONATE (FLONASE) 50 MCG/ACTUATION NASAL SPRAY    2 sprays (100 mcg total) by Each Nostril route once daily.    FOLIC ACID (FOLVITE) 1 MG TABLET    Take 1 tablet (1 mg total) by mouth once daily.    HYDROCHLOROTHIAZIDE (HYDRODIURIL) 25 MG TABLET    TAKE ONE TABLET BY MOUTH ONE TIME DAILY    HYDROXYCHLOROQUINE (PLAQUENIL) 200 MG TABLET    TAKE ONE TABLET BY MOUTH TWICE DAILY    LEVOTHYROXINE (EUTHYROX) 75 MCG TABLET    Take 1 tablet (75 mcg total) by mouth once daily.    LORAZEPAM (ATIVAN) 1 MG TABLET    Take 1 tablet (1 mg total) by mouth 2 (two) times daily.    MELOXICAM (MOBIC) 15 MG TABLET    TAKE ONE TABLET BY MOUTH ONE TIME DAILY    OMEPRAZOLE (PRILOSEC) 40 MG CAPSULE    Take 1 capsule (40 mg total) by mouth 2 (two) times daily before meals.    POTASSIUM CHLORIDE  SA (K-DUR,KLOR-CON) 20 MEQ TABLET    TAKE ONE TABLET BY MOUTH ONE TIME DAILY    TRAZODONE (DESYREL) 50 MG TABLET    TAKE ONE OR TWO TABLETS BY MOUTH AT BEDTIME AS NEEDED FOR SLEEP    UPADACITINIB (RINVOQ) 15 MG 24 HR TABLET    Take by mouth. Test claim only    VALSARTAN (DIOVAN) 320 MG TABLET    Take 1 tablet (320 mg total) by mouth once daily.        Objective:   There were no vitals filed for this visit.    Physical Exam  Constitutional:       Appearance: Normal appearance.   Pulmonary:      Effort: Pulmonary effort is normal.   Neurological:      Mental Status: She is alert and oriented to person, place, and time.   Psychiatric:         Mood and Affect: Mood normal.         Behavior: Behavior normal.         Thought Content: Thought content normal.         Judgment: Judgment normal.       Assessment:     Problem List Items Addressed This Visit          Immunology/Multi System    Rheumatoid arthritis involving multiple sites with positive rheumatoid factor - Primary     Other Visit Diagnoses       History of iron deficiency anemia        Relevant Orders    CBC Auto Differential    Comprehensive Metabolic Panel    Ferritin    Iron and TIBC    Normocytic anemia        Relevant Orders    CBC Auto Differential    Comprehensive Metabolic Panel    Anemia of chronic disease        Relevant Orders    CBC Auto Differential    Comprehensive Metabolic Panel    Ferritin    Iron and TIBC    Sleep apnea, unspecified type                Lab Results   Component Value Date    WBC 4.86 05/15/2023    RBC 3.53 (L) 05/15/2023    HGB 10.8 (L) 05/15/2023    HCT 32.2 (L) 05/15/2023    MCV 91 05/15/2023    MCH 30.6 05/15/2023    MCHC 33.5 05/15/2023    RDW 11.3 (L) 05/15/2023     05/15/2023    MPV 9.7 05/15/2023    GRAN 2.6 05/15/2023    GRAN 53.6 05/15/2023    LYMPH 1.4 05/15/2023    LYMPH 29.6 05/15/2023    MONO 0.5 05/15/2023    MONO 9.9 05/15/2023    EOS 0.3 05/15/2023    BASO 0.07 05/15/2023    EOSINOPHIL 5.1 05/15/2023     BASOPHIL 1.4 05/15/2023      Lab Results   Component Value Date     05/15/2023    K 4.3 05/15/2023    CL 96 05/15/2023    CO2 32 (H) 05/15/2023    BUN 20 05/15/2023    CREATININE 0.8 05/15/2023    CALCIUM 9.0 05/15/2023    ANIONGAP 11 05/15/2023    ESTGFRAFRICA >60 10/14/2021    EGFRNONAA >60 10/14/2021     Lab Results   Component Value Date    ALT 21 11/01/2022    AST 24 11/01/2022    ALKPHOS 50 (L) 11/01/2022    BILITOT 0.5 11/01/2022     Lab Results   Component Value Date    UIBC 280 01/11/2013    IRON 133 05/15/2023    TRANSFERRIN 309 05/15/2023    TIBC 457 (H) 05/15/2023    FESATURATED 29 05/15/2023      Lab Results   Component Value Date    LJBVXNQC70 1348 (H) 08/15/2014     Lab Results   Component Value Date    FOLATE >24 11/07/2007     Lab Results   Component Value Date    TSH 0.734 11/01/2022       Plan:   Rheumatoid arthritis involving multiple sites with positive rheumatoid factor    History of iron deficiency anemia  -     CBC Auto Differential; Future; Expected date: 05/29/2023  -     Comprehensive Metabolic Panel; Future; Expected date: 05/29/2023  -     Ferritin; Future; Expected date: 05/29/2023  -     Iron and TIBC; Future; Expected date: 05/29/2023    Normocytic anemia  -     CBC Auto Differential; Future; Expected date: 05/29/2023  -     Comprehensive Metabolic Panel; Future; Expected date: 05/29/2023    Anemia of chronic disease  -     CBC Auto Differential; Future; Expected date: 05/29/2023  -     Comprehensive Metabolic Panel; Future; Expected date: 05/29/2023  -     Ferritin; Future; Expected date: 05/29/2023  -     Iron and TIBC; Future; Expected date: 05/29/2023    Sleep apnea, unspecified type      Med Onc Chart Routing      Follow up with physician    Follow up with RONNY 6 months. f/u in 6 months wtih labs prior in [place] and a VV after to discuss results   Infusion scheduling note n/a   Injection scheduling note n/a   Labs   Scheduling:  Preferred lab:  Lab interval:  Cbc, cmp,  iron studies   Imaging   N/a   Pharmacy appointment No pharmacy appointment needed      Other referrals  No additional referrals needed       Continue folate and B12 supplementation daily.  No need for additional IV iron at the time.  Discussed possible BMBx if hgb <10 or development of additional cytopenias.  F/u in 6 months with labs prior and VV to discuss results.      Collaborating Provider:  Dr. Rodger Zamudio    Thank You,  Oj Kim, FNP-C  Hematology Oncology

## 2023-05-29 ENCOUNTER — OFFICE VISIT (OUTPATIENT)
Dept: HEMATOLOGY/ONCOLOGY | Facility: CLINIC | Age: 73
End: 2023-05-29
Payer: MEDICARE

## 2023-05-29 DIAGNOSIS — Z86.2 HISTORY OF IRON DEFICIENCY ANEMIA: ICD-10-CM

## 2023-05-29 DIAGNOSIS — G47.30 SLEEP APNEA, UNSPECIFIED TYPE: ICD-10-CM

## 2023-05-29 DIAGNOSIS — M05.79 RHEUMATOID ARTHRITIS INVOLVING MULTIPLE SITES WITH POSITIVE RHEUMATOID FACTOR: Primary | ICD-10-CM

## 2023-05-29 DIAGNOSIS — D63.8 ANEMIA OF CHRONIC DISEASE: ICD-10-CM

## 2023-05-29 DIAGNOSIS — D64.9 NORMOCYTIC ANEMIA: ICD-10-CM

## 2023-05-29 PROCEDURE — 99214 OFFICE O/P EST MOD 30 MIN: CPT | Mod: 95,,, | Performed by: NURSE PRACTITIONER

## 2023-05-29 PROCEDURE — 99214 PR OFFICE/OUTPT VISIT, EST, LEVL IV, 30-39 MIN: ICD-10-PCS | Mod: 95,,, | Performed by: NURSE PRACTITIONER

## 2023-06-14 ENCOUNTER — TELEPHONE (OUTPATIENT)
Dept: PRIMARY CARE CLINIC | Facility: CLINIC | Age: 73
End: 2023-06-14
Payer: MEDICARE

## 2023-06-14 NOTE — TELEPHONE ENCOUNTER
Called pt to reschedule her appointment that was canceled on today. I got no answer, but left a voice message.     SJ

## 2023-06-18 ENCOUNTER — PATIENT MESSAGE (OUTPATIENT)
Dept: PRIMARY CARE CLINIC | Facility: CLINIC | Age: 73
End: 2023-06-18
Payer: MEDICARE

## 2023-06-19 ENCOUNTER — OFFICE VISIT (OUTPATIENT)
Dept: PSYCHIATRY | Facility: CLINIC | Age: 73
End: 2023-06-19
Payer: MEDICARE

## 2023-06-19 DIAGNOSIS — F41.8 MIXED ANXIETY AND DEPRESSIVE DISORDER: ICD-10-CM

## 2023-06-19 DIAGNOSIS — G47.00 INSOMNIA, UNSPECIFIED TYPE: Primary | ICD-10-CM

## 2023-06-19 DIAGNOSIS — R10.10 UPPER ABDOMINAL PAIN: ICD-10-CM

## 2023-06-19 PROCEDURE — 99214 OFFICE O/P EST MOD 30 MIN: CPT | Mod: 95,,, | Performed by: PSYCHIATRY & NEUROLOGY

## 2023-06-19 PROCEDURE — 99214 PR OFFICE/OUTPT VISIT, EST, LEVL IV, 30-39 MIN: ICD-10-PCS | Mod: 95,,, | Performed by: PSYCHIATRY & NEUROLOGY

## 2023-06-19 RX ORDER — DULOXETIN HYDROCHLORIDE 60 MG/1
60 CAPSULE, DELAYED RELEASE ORAL DAILY
Qty: 90 CAPSULE | Refills: 0 | Status: SHIPPED | OUTPATIENT
Start: 2023-06-19 | End: 2023-08-26 | Stop reason: SDUPTHER

## 2023-06-19 RX ORDER — DOXEPIN HYDROCHLORIDE 10 MG/1
10 CAPSULE ORAL NIGHTLY
Qty: 60 CAPSULE | Refills: 2 | Status: SHIPPED | OUTPATIENT
Start: 2023-06-19 | End: 2023-06-23

## 2023-06-19 RX ORDER — LORAZEPAM 1 MG/1
1 TABLET ORAL 2 TIMES DAILY
Qty: 60 TABLET | Refills: 2 | Status: SHIPPED | OUTPATIENT
Start: 2023-06-19 | End: 2023-06-28

## 2023-06-19 RX ORDER — OMEPRAZOLE 40 MG/1
40 CAPSULE, DELAYED RELEASE ORAL
Qty: 180 CAPSULE | Refills: 3 | Status: SHIPPED | OUTPATIENT
Start: 2023-06-19 | End: 2023-06-28

## 2023-06-19 RX ORDER — BUPROPION HYDROCHLORIDE 300 MG/1
300 TABLET ORAL DAILY
Qty: 90 TABLET | Refills: 0 | Status: SHIPPED | OUTPATIENT
Start: 2023-06-19 | End: 2023-07-21 | Stop reason: SDUPTHER

## 2023-06-19 NOTE — PROGRESS NOTES
Outpatient Psychiatry Follow-up Visit (MD/NP)    2023    Leticia Piña, a 73 y.o. female, presenting for follow-up visit. Met with patient.    Reason for Encounter: Patient complains of anxiety, depression    Interval Hx: Patient seen and interviewed for follow-up, last seen about four months ago. This was a VIDEO VISIT. Moods improved, desiring to socialize.   Diagnsed with sleep apnea. No new illnesses. No new medication. Mother  in May, have a mass/service. Buried locally nearby. Feels ok in her grief. Misses her, but bounced back ok. Succession already dealt with. To sell mom's house. >10 years of acting out in sleep. Thrashes, acts out during sleep. Vocalizes during sleep. Remembers having been dreaming during these episodes. Has sleep medicine follow-up, plans to address. Med adherent - ongoing benefit. Denies medication side effects.     Background: 68 year old F with hx of anxiety and depression x ~20 years, with onset roughly corresponding to menopause in . Describes following illness and treatment course:     Perimenopause -  - insomnia roblems ; start ambien.   2002 - emotional distress  Can't accomplish anything (decreased functioning), feeling overwhelmed, going around in circles.    - first treatment - citalopram 20 mg. Thought it it was causing dry mouth (later dx'ed sjogrens). Feels helped symptoms present all her life that she later recognized as depression. Reduced citalopram.   - more perimenopausal symptoms. Started muliti-hormonal treatment for menopausal symptoms. Had temporary benefit, stopped in  due to negligible benefit by that time. Remained on estradiol  Eventually went back to 20 mg citalopram.   Stress and fatigue in May '06. More dryness. Early sjogren's. Stopped citalopram, tried duloxetine.   -stopped menses, 3 - back to progest/test, off est.   Ativan helped anxiety, caused lethargy.    drug induced lupus  Aug '08  "- increased anxiety - increased cymbalta to 60, ativan helping. Insomnia ongoing despite ambien.   Increased ambien to 15.   In '09 added trazodone 75 mg - helped in combo with ambien  2012 - cymbalta to 90 mg.   '13 - added wellbutrin 100 mg' diagnosed with sjogren's.   '15 - wellbutrin 150, trazodone 100 mg. Stopped ambien  Had a dip in moods in early summer.   Taking 225 mg bupropion - starting about 1 month ago. 300 mg was too intense. Tolerating this lower dose.     Worrying too much about different things   Trouble relaxing   SUSAN-7 = 2    Sleep Problems   Sad Mood  Appetite and weight changes  Concentration problems  Guilt  Thoughts of Emptiness/Death/Suicide  Anhedonia  Anergia  Slowing/PMR    QIDS = 7    Rested on rising. Wakes only briefly. Sometimes has trazodone hangover. Doesn't interfere with functioning. Interest is good.   Anxiety fluctuates. Recently taking ativan about 1-2x/week.     Psych Hx: as above. No avh, no hospitalization, no serious SI, elaine. One previous psych assessment in [facility] - "horrible experience". She changed meds (to escitalopram), felt terrible.    No other interactions with psychiatrist.     Social history: When younger - low self-esteem, dad was pessimistic and fault-finding. Dad critical. No nancy abuse. No serious maltreatment or trauma. Fewer than most friends (mostly because they were outgoing; thought poorly of one's self, inhibited. Denies teasing and bullying. Above average in school. Graduated HS, went to work x 1 year then went to hospitals. Started dating . Didn't graduate. 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 Gets good feedback on performance. Good relationship with .  x 43 years. 2 grown kids, 4 grandkids. Kids live locally. Doesn't see her son much. Dtr-in-law leads them to not visit with them.  lost business as late consequence of bad economy & " "a "misappropriation of funds" issue &  health issues. Was primary caregiver to mother-in-law (now in [place]). Was primary caregiver to granddaughter who had medical disabilities. "my lowest point".  now working, "see good future ahead".       From PCP note: 3.2.18 - Here for follow up of medical problems. Works long hours. Works & then goes to sleep right after she gets home. RA pain is doing well. [place] weird so started checking BP. Has been monitoring BPs, range 130-174/ . Has no past history of HTN. No med change. Only major change is started working 3 months ago, , & babysits. No exercise. Sleeping ok with trazodone 100mg. Mixed anxiety & depressive disorder- incr wellbutrin to 300, cont  cymbalta 60mg, trazodone 100 hs, try wean ativan to half hs.  Refer for counseling, refer to Psychiatry if not better in 6 weeks.  RTC 6 weeks.    From psychotherapy eval    Chief complaint/reason for encounter: depression, anxiety, sleep and interpersonal     History of present illness:  67 year old  female presented for initial evaluation, with chief complaint of "I've always been kind of anxious & depressed for as long as I can remember, but it just really started getting worse since around 2009..." Pt described a few challenging stressful events starting then, that she feels have accumulate in her life. These include financial strain,her own worsening Rhumatoid Arthritis--diagnosed in 1994, loss of home & the family business in 2013, 's life threatening health scare starting in 2009, legal troubles related to his business, & a feeling of social abandonment from mother, sister, some friends, & daughter-in-law (which means her son & his 2 kids don't see her much anymore.) Pt described in recent months worsening symptoms of sudden uncontrolled tearfulness, increased muscle tension, being distracted, pervasive ruminating worries, loss of senthil, trouble sleeping unless " she takes a sleeping aid--currently Trazodone, & tendency to socially isolate. She stated she has never been very sociable. She now feels more rejection & scrutiny from others & finds herself withdrawing. She said much of the negativity from some family & friends relates to 's legal trouble, having been arrested for business related fraud from a period when he was juggling inadequate customer funds & misappropriated a customer's money to cover expenses of someone else's job; all that while he was battling cancer. Pt said she marvels that her  seems able to stay generally optimistic & pleasant & that he has a lot of friends. Meanwhile, she lacks much social support & stated she isn;t comfortable reaching out to connect with people, so she has a small support network to begin with. She feels devastated by judgment against her  by her own sister, mother, & daughter in law, with the result that she is often unable to spend time with 2 of her grandchildren; she noted those children visit with the daughter-in-law's parents virtually every weekend. Pt described a certain pressure level of social expectation- -material & financial standards, & she & her  have lose their home, as well as the business, & they are living with her parents. 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 She said they have a lot of financial backlog of bills to catch up on. Pt denied any suicidal or homicidal ideation, denied any cognitive deficit other than poor focus at times, denied psychosis, mood swings, rages, or substance abuse. Identified therapeutic goals include reducing anxiety & depression & improving coping skills; possibly finding a way to somewhat reduce social isolation, perhaps by reconnecting with old friends.       Pain: not  "quantified but described as moderate RA pain     Symptoms:   Mood: depressed mood, insomnia, worthlessness/guilt, poor concentration, tearfulness and social isolation  Anxiety: excessive anxiety/worry, restlessness/keyed up, muscle tension and social phobia  Substance abuse: denied  Cognitive functioning: denied  Health behaviors: noncontributory     Psychiatric history: none     Medical history: RA--diagnosed in 1994; recent hypertension; Hypothyroidism     Family history of psychiatric illness: mother chronically anxious (starting when older); father apparently depressed--very pessimistic & irritable, doesn't acknowledge problem. Suspects paternal grandfather     Social history: Grew up locally, the oldest of 3 sisters, the other two 7 & 8 & 1/2 years younger than she. Raised by both parents; recalled being markedly shy her entire life; not particularly close to her sisters, who were much younger & had each other. Lifelong active Mu-ism; has a home Quaker. Not currently involved in any smaller groups within the Quaker. Graduated high school & attended 2 years of college;  at age 24. Still with , Alpesh, today.  Mother of 2, a son, age 42, & daughter, age 39. Both each have 2 children of their own. Described daughter as a close source of support; also talks with  a lot, processing the struggles they have come through together. 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 Pt described recent loss of a few friend connections she had; her middle sister verbally declared she wouldn't associate with the pt & her , due to his legal "disgrace."  Denied any  experience.  No tobacco, light to moderate wine intake. Minimal caffeine, no recreational drugs.       Substance use:   Alcohol: occasional   Drugs: none   Tobacco: none   Caffeine: a cup of green tea most mornings.     Review Of Systems:     GENERAL:  No weight gain or loss  SKIN:  No rashes " or lacerations  HEAD:  No headaches  EYES:  No exophthalmos, jaundice or blindness  EARS:  No dizziness, tinnitus or hearing loss  NOSE:  No changes in smell  MOUTH & THROAT:  No dyskinetic movements or obvious goiter  CHEST:  No shortness of breath, hyperventilation or cough  CARDIOVASCULAR:  No tachycardia or chest pain  ABDOMEN:  No nausea, vomiting, pain, constipation or diarrhea  URINARY:  No frequency, dysuria or sexual dysfunction  ENDOCRINE:  No polydipsia, polyuria  MUSCULOSKELETAL:  No pain or stiffness of the joints  NEUROLOGIC:  No weakness, sensory changes, seizures, confusion, memory loss, tremor or other abnormal movements    Current Evaluation:     Nutritional Screening: Considering the patient's height and weight, medications, medical history and preferences, should a referral be made to the dietitian? no    Constitutional  Vitals:  Most recent vital signs, dated less than 90 days prior to this appointment, were not reviewed.    There were no vitals filed for this visit.     General:  unremarkable, age appropriate     Musculoskeletal  Muscle Strength/Tone:  no tremor, no tic   Gait & Station:  non-ataxic     Psychiatric  Appearance: casually dressed & groomed;   Behavior: calm,   Cooperation: cooperative with assessment  Speech: normal rate, volume, tone  Thought Process: linear, goal-directed  Thought Content: No suicidal or homicidal ideation; no delusions  Affect: reactive  Mood: euthymic  Perceptions: No auditory or visual hallucinations  Level of Consciousness: alert throughout interview  Insight: fair  Cognition: Oriented to person, place, time, & situation  Memory: no apparent deficits to general clinical interview; not formally assessed  Attention/Concentration: no apparent deficits to general clinical interview; not formally assessed  Fund of Knowledge: average by vocabulary/education    Laboratory Data  No visits with results within 1 Month(s) from this visit.   Latest known visit with  results is:   Lab Visit on 05/15/2023   Component Date Value Ref Range Status    WBC 05/15/2023 4.86  3.90 - 12.70 K/uL Final    RBC 05/15/2023 3.53 (L)  4.00 - 5.40 M/uL Final    Hemoglobin 05/15/2023 10.8 (L)  12.0 - 16.0 g/dL Final    Hematocrit 05/15/2023 32.2 (L)  37.0 - 48.5 % Final    MCV 05/15/2023 91  82 - 98 fL Final    MCH 05/15/2023 30.6  27.0 - 31.0 pg Final    MCHC 05/15/2023 33.5  32.0 - 36.0 g/dL Final    RDW 05/15/2023 11.3 (L)  11.5 - 14.5 % Final    Platelets 05/15/2023 304  150 - 450 K/uL Final    MPV 05/15/2023 9.7  9.2 - 12.9 fL Final    Immature Granulocytes 05/15/2023 0.4  0.0 - 0.5 % Final    Gran # (ANC) 05/15/2023 2.6  1.8 - 7.7 K/uL Final    Immature Grans (Abs) 05/15/2023 0.02  0.00 - 0.04 K/uL Final    Lymph # 05/15/2023 1.4  1.0 - 4.8 K/uL Final    Mono # 05/15/2023 0.5  0.3 - 1.0 K/uL Final    Eos # 05/15/2023 0.3  0.0 - 0.5 K/uL Final    Baso # 05/15/2023 0.07  0.00 - 0.20 K/uL Final    nRBC 05/15/2023 0  0 /100 WBC Final    Gran % 05/15/2023 53.6  38.0 - 73.0 % Final    Lymph % 05/15/2023 29.6  18.0 - 48.0 % Final    Mono % 05/15/2023 9.9  4.0 - 15.0 % Final    Eosinophil % 05/15/2023 5.1  0.0 - 8.0 % Final    Basophil % 05/15/2023 1.4  0.0 - 1.9 % Final    Differential Method 05/15/2023 Automated   Final    Sodium 05/15/2023 139  136 - 145 mmol/L Final    Potassium 05/15/2023 4.3  3.5 - 5.1 mmol/L Final    Chloride 05/15/2023 96  95 - 110 mmol/L Final    CO2 05/15/2023 32 (H)  23 - 29 mmol/L Final    Glucose 05/15/2023 101  70 - 110 mg/dL Final    BUN 05/15/2023 20  8 - 23 mg/dL Final    Creatinine 05/15/2023 0.8  0.5 - 1.4 mg/dL Final    Calcium 05/15/2023 9.0  8.7 - 10.5 mg/dL Final    Anion Gap 05/15/2023 11  8 - 16 mmol/L Final    eGFR 05/15/2023 >60  >60 mL/min/1.73 m^2 Final    Ferritin 05/15/2023 303 (H)  20.0 - 300.0 ng/mL Final    Iron 05/15/2023 133  30 - 160 ug/dL Final    Transferrin 05/15/2023 309  200 - 375 mg/dL Final    TIBC 05/15/2023 457 (H)  250 - 450 ug/dL Final     Saturated Iron 05/15/2023 29  20 - 50 % Final     Medications  Outpatient Encounter Medications as of 6/19/2023   Medication Sig Dispense Refill    albuterol (VENTOLIN HFA) 90 mcg/actuation inhaler Inhale 2 puffs into the lungs every 4 to 6 hours as needed for Wheezing or Shortness of Breath. Rescue 18 g 2    azelastine (ASTELIN) 137 mcg (0.1 %) nasal spray 1 spray (137 mcg total) by Nasal route 2 (two) times daily. 30 mL 11    benzonatate (TESSALON) 100 MG capsule Take 2 capsules (200 mg total) by mouth 3 (three) times daily as needed. 60 capsule 3    buPROPion (WELLBUTRIN XL) 300 MG 24 hr tablet Take 1 tablet (300 mg total) by mouth once daily. 90 tablet 0    cholecalciferol, vitamin D3, 5,000 unit/mL Drop Take 1 drop by mouth Daily.      cyanocobalamin, vitamin B-12, 5,000 mcg Subl Place under the tongue once daily.      dexbrompheniramine maleate (ALA-HIST IR) 2 mg Tab Take 1 tablet by mouth once daily. 30 tablet 2    diclofenac sodium (VOLTAREN) 1 % Gel Apply 2 g topically 4 (four) times daily. 300 g 3    DULoxetine (CYMBALTA) 60 MG capsule Take 1 capsule (60 mg total) by mouth once daily. 90 capsule 1    fluticasone propionate (FLONASE) 50 mcg/actuation nasal spray 2 sprays (100 mcg total) by Each Nostril route once daily. 16 g 2    folic acid (FOLVITE) 1 MG tablet Take 1 tablet (1 mg total) by mouth once daily. 90 tablet 3    hydroCHLOROthiazide (HYDRODIURIL) 25 MG tablet TAKE ONE TABLET BY MOUTH ONE TIME DAILY 90 tablet 3    hydrOXYchloroQUINE (PLAQUENIL) 200 mg tablet TAKE ONE TABLET BY MOUTH TWICE DAILY 180 tablet 0    levothyroxine (EUTHYROX) 75 MCG tablet Take 1 tablet (75 mcg total) by mouth once daily. 90 tablet 3    LORazepam (ATIVAN) 1 MG tablet Take 1 tablet (1 mg total) by mouth 2 (two) times daily. 60 tablet 1    meloxicam (MOBIC) 15 MG tablet TAKE ONE TABLET BY MOUTH ONE TIME DAILY 30 tablet 1    omeprazole (PRILOSEC) 40 MG capsule Take 1 capsule (40 mg total) by mouth 2 (two) times daily  before meals. 180 capsule 3    potassium chloride SA (K-DUR,KLOR-CON) 20 MEQ tablet TAKE ONE TABLET BY MOUTH ONE TIME DAILY 90 tablet 3    traZODone (DESYREL) 50 MG tablet TAKE ONE OR TWO TABLETS BY MOUTH AT BEDTIME AS NEEDED FOR SLEEP 180 tablet 1    upadacitinib (RINVOQ) 15 mg 24 hr tablet Take by mouth. Test claim only 30 tablet 0    valsartan (DIOVAN) 320 MG tablet Take 1 tablet (320 mg total) by mouth once daily. 90 tablet 3    [DISCONTINUED] omeprazole (PRILOSEC) 40 MG capsule Take 1 capsule (40 mg total) by mouth 2 (two) times daily before meals. 180 capsule 0     No facility-administered encounter medications on file as of 6/19/2023.     Assessment - Diagnosis - Goals:     Impression: 73 year old F with chronic anxiety and depression, RA and sjogren's. Had exacerbation of illness in spring '18, currently doing somewhat better following medication changes, psychotherapy. Generally stable, tolerating ongoing, beneficial treatment. Ongoing sleep problems, getting follow-up with sleep medicine.    Treatment Goals:  Specify outcomes written in observable, behavioral terms:   Minimize recurrences    Treatment Plan/Recommendations:   Duloxetine, bupropion, lorazepam prn. Takes trazodone prn sleep.  *consider antidepressant adjustment - duloxetine up   psychoeducation including behavioral activation, psychotherapy, meds.   Discussed services for father including hospice that may help.    Discussed risks, benefits, and alternatives to treatment plan documented above with patient. I answered all patient questions related to this plan and patient expressed understanding and agreement.     Return to Clinic: 3-4 months    HEIDY Long MD  Psychiatry  [facility]  [address] , [place], [place] [id number] [phone]

## 2023-06-23 ENCOUNTER — PATIENT MESSAGE (OUTPATIENT)
Dept: PSYCHIATRY | Facility: CLINIC | Age: 73
End: 2023-06-23
Payer: MEDICARE

## 2023-06-23 RX ORDER — MIRTAZAPINE 7.5 MG/1
TABLET, FILM COATED ORAL
Qty: 60 TABLET | Refills: 2 | Status: SHIPPED | OUTPATIENT
Start: 2023-06-23 | End: 2023-07-26

## 2023-06-27 NOTE — PROGRESS NOTES
"Subjective:      Patient ID: Leticia Piña is a 73 y.o. female.    Chief Complaint: Follow-up (Pt is here for a follow up on blood pressure. )      HPI  Here for follow up of medical problems.  Dx with HENRI by dentist, dental appliance helps.  Started with night terrors, Psych thought was trazodone.  Change to doxepin, thinks helping sleep.  Gained back a few pounds.  Depression doing well on rx, but mother passed last month and feeling overwhelmed.  No cp/sob/palp.  BMs normal.  BP running high here and with digital cuff.    Updated/ annual due 10/23:  HM: 10/22 fluvax, 3/21 covid vaccines, 6/19 HAV, 11/19 [id number], 3/17 booster [id number], 12/22 [id number], 6/21 TDaP, 4/13 zoster, 4/22 Shingrix #2, 3/23 now on drug holiday rep 2y, 1/23 EGD, 3/23 Cscope rep 3y, 2/23 MMG/me, 11/17 Gyn Dr. Jimenez, 3/17 HCV neg.   10/22 Cologuard negative.     Review of Systems   Constitutional:  Negative for chills, diaphoresis and fever.   Respiratory:  Negative for cough and shortness of breath.    Cardiovascular:  Negative for chest pain, palpitations and leg swelling.   Gastrointestinal:  Negative for blood in stool, constipation, diarrhea, nausea and vomiting.   Genitourinary:  Negative for dysuria, frequency and hematuria.   Psychiatric/Behavioral:  The patient is not nervous/anxious.        Objective:   BP (!) 154/80 (BP Location: Left arm)   Pulse 91   Temp 98.5 °F (36.9 °C) (Oral)   Ht 5' 5" (1.651 m)   Wt 67.4 kg (148 lb 11.2 oz)   SpO2 95%   BMI 24.74 kg/m²     Physical Exam  Constitutional:       Appearance: She is well-developed.   Neck:      Thyroid: No thyroid mass.      Vascular: No carotid bruit.   Cardiovascular:      Rate and Rhythm: Normal rate and regular rhythm.      Heart sounds: No murmur heard.    No friction rub. No gallop.   Pulmonary:      Effort: Pulmonary effort is normal.      Breath sounds: Normal breath sounds. No wheezing or rales.   Abdominal:      General: Bowel sounds are normal.      " Palpations: Abdomen is soft. There is no mass.      Tenderness: There is no abdominal tenderness.   Musculoskeletal:      Cervical back: Neck supple.   Lymphadenopathy:      Cervical: No cervical adenopathy.   Neurological:      Mental Status: She is alert and oriented to person, place, and time.         Assessment:       1. Essential hypertension    2. Anxiety associated with depression    3. Recurrent major depressive disorder, in partial remission    4. Rheumatoid arthritis involving multiple sites with positive rheumatoid factor    5. Immunocompromised    6. Other sleep apnea    7. HENRI (obstructive sleep apnea)          Plan:     Essential hypertension- will recheck 2wk.    Anxiety associated with depression, Recurrent major depressive disorder, in partial remission- cont meds per Psych.  Start counseling with Ronel.    Rheumatoid arthritis involving multiple sites with positive rheumatoid factor, Immunocompromised- cont meds.    HENRI (obstructive sleep apnea)  -     Echo; Future    Monitor BPs, try propranolol 20mg prn sys BP over 150.  RTC 2wk.

## 2023-06-28 ENCOUNTER — OFFICE VISIT (OUTPATIENT)
Dept: PRIMARY CARE CLINIC | Facility: CLINIC | Age: 73
End: 2023-06-28
Payer: MEDICARE

## 2023-06-28 VITALS
BODY MASS INDEX: 24.77 KG/M2 | WEIGHT: 148.69 LBS | DIASTOLIC BLOOD PRESSURE: 80 MMHG | SYSTOLIC BLOOD PRESSURE: 154 MMHG | OXYGEN SATURATION: 95 % | HEART RATE: 91 BPM | TEMPERATURE: 99 F | HEIGHT: 65 IN

## 2023-06-28 DIAGNOSIS — G47.39 OTHER SLEEP APNEA: ICD-10-CM

## 2023-06-28 DIAGNOSIS — G47.33 OSA (OBSTRUCTIVE SLEEP APNEA): ICD-10-CM

## 2023-06-28 DIAGNOSIS — M05.79 RHEUMATOID ARTHRITIS INVOLVING MULTIPLE SITES WITH POSITIVE RHEUMATOID FACTOR: ICD-10-CM

## 2023-06-28 DIAGNOSIS — D84.9 IMMUNOCOMPROMISED: ICD-10-CM

## 2023-06-28 DIAGNOSIS — F33.41 RECURRENT MAJOR DEPRESSIVE DISORDER, IN PARTIAL REMISSION: ICD-10-CM

## 2023-06-28 DIAGNOSIS — I10 ESSENTIAL HYPERTENSION: Primary | ICD-10-CM

## 2023-06-28 DIAGNOSIS — F41.8 ANXIETY ASSOCIATED WITH DEPRESSION: ICD-10-CM

## 2023-06-28 PROCEDURE — 99214 PR OFFICE/OUTPT VISIT, EST, LEVL IV, 30-39 MIN: ICD-10-PCS | Mod: S$PBB,,, | Performed by: INTERNAL MEDICINE

## 2023-06-28 PROCEDURE — 99214 OFFICE O/P EST MOD 30 MIN: CPT | Mod: S$PBB,,, | Performed by: INTERNAL MEDICINE

## 2023-06-28 PROCEDURE — 99999 PR PBB SHADOW E&M-EST. PATIENT-LVL V: CPT | Mod: PBBFAC,,, | Performed by: INTERNAL MEDICINE

## 2023-06-28 PROCEDURE — 99999 PR PBB SHADOW E&M-EST. PATIENT-LVL V: ICD-10-PCS | Mod: PBBFAC,,, | Performed by: INTERNAL MEDICINE

## 2023-06-28 PROCEDURE — 99215 OFFICE O/P EST HI 40 MIN: CPT | Mod: PBBFAC,PN | Performed by: INTERNAL MEDICINE

## 2023-06-28 RX ORDER — PROPRANOLOL HYDROCHLORIDE 20 MG/1
20 TABLET ORAL 2 TIMES DAILY PRN
Qty: 60 TABLET | Refills: 11 | Status: SHIPPED | OUTPATIENT
Start: 2023-06-28 | End: 2023-09-05

## 2023-06-30 ENCOUNTER — PATIENT MESSAGE (OUTPATIENT)
Dept: PRIMARY CARE CLINIC | Facility: CLINIC | Age: 73
End: 2023-06-30
Payer: MEDICARE

## 2023-07-08 DIAGNOSIS — I10 ESSENTIAL HYPERTENSION: ICD-10-CM

## 2023-07-08 NOTE — TELEPHONE ENCOUNTER
Refill Routing Note   Medication(s) are not appropriate for processing by [facility] for the following reason(s):      Required vitals abnormal    [facility] action(s):  Defer None identified          Appointments  past 12m or future 3m with PCP    Date Provider   Last Visit   6/28/2023 Nicole Jasmine MD   Next Visit   7/13/2023 Nicole Jasmine MD   ED visits in past 90 days: 0        Note composed:5:26 PM 07/08/2023

## 2023-07-08 NOTE — TELEPHONE ENCOUNTER
No care due was identified.  [facility] Embedded Care Due Messages. Reference number: [id number].   7/08/2023 5:25:08 PM CDT

## 2023-07-10 RX ORDER — HYDROCHLOROTHIAZIDE 25 MG/1
TABLET ORAL
Qty: 90 TABLET | Refills: 3 | Status: SHIPPED | OUTPATIENT
Start: 2023-07-10 | End: 2023-12-02 | Stop reason: SDUPTHER

## 2023-07-13 NOTE — PROGRESS NOTES
"Subjective:      Patient ID: Leticia Piña is a 73 y.o. female.    Chief Complaint: Follow-up (Two week follow up. Pt is complaining of insomnia and cold feet.)      HPI  Here for follow up of medical problems.  Tolerates propranolol.  Not sleeping well on mirtaz 7.5mg, tried 15mg last night and better.  2 months foot coldness, all the time.  Not hands.  No color change.  Taking B12 5000mcg sublingual daily.    Updated/ annual due 10/23:  HM: 10/22 fluvax, 3/21 covid vaccines, 6/19 HAV, 11/19 [id number], 3/17 booster [id number], 12/22 [id number], 6/21 TDaP, 4/13 zoster, 4/22 Shingrix #2, 3/23 now on drug holiday rep 2y, 1/23 EGD, 3/23 Cscope rep 3y, 2/23 MMG/me, 11/17 Gyn Dr. Jimenez, 3/17 HCV neg.   10/22 Cologuard negative.     Review of Systems   Constitutional:  Negative for chills, diaphoresis and fever.   Respiratory:  Negative for cough and shortness of breath.    Cardiovascular:  Negative for chest pain, palpitations and leg swelling.   Gastrointestinal:  Negative for blood in stool, constipation, diarrhea, nausea and vomiting.   Genitourinary:  Negative for dysuria, frequency and hematuria.   Psychiatric/Behavioral:  The patient is not nervous/anxious.        Objective:   /64 (BP Location: Left arm)   Pulse 86   Temp 98.1 °F (36.7 °C) (Oral)   Ht 5' 5" (1.651 m)   Wt 70.9 kg (156 lb 3.2 oz)   SpO2 96%   BMI 25.99 kg/m²     Physical Exam  Constitutional:       Appearance: She is well-developed.   Neck:      Thyroid: No thyroid mass.      Vascular: No carotid bruit.   Cardiovascular:      Rate and Rhythm: Normal rate and regular rhythm.      Pulses:           Dorsalis pedis pulses are 2+ on the right side and 2+ on the left side.        Posterior tibial pulses are 2+ on the right side and 2+ on the left side.      Heart sounds: No murmur heard.    No friction rub. No gallop.   Pulmonary:      Effort: Pulmonary effort is normal.      Breath sounds: Normal breath sounds. No wheezing or rales. "   Abdominal:      General: Bowel sounds are normal.      Palpations: Abdomen is soft. There is no mass.      Tenderness: There is no abdominal tenderness.   Musculoskeletal:      Cervical back: Neck supple.   Feet:      Comments: Slight coolness distally.  Lymphadenopathy:      Cervical: No cervical adenopathy.   Neurological:      Mental Status: She is alert and oriented to person, place, and time.         Assessment:       1. Essential hypertension    2. Bilateral cold feet    3. Acquired hypothyroidism    4. Rheumatoid arthritis involving multiple sites with positive rheumatoid factor    5. Recurrent major depressive disorder, in partial remission    6. Primary insomnia          Plan:     Essential hypertension- exchange digital med cuff, running 20 points higher.    Bilateral cold feet x 2mo-  -     GIOVANNA Screen w/Reflex; Future; Expected date: 07/26/2023  -     C-Reactive Protein; Future; Expected date: 07/26/2023  -     Sedimentation rate; Future; Expected date: 07/26/2023  -     TSH; Future; Expected date: 07/26/2023  -     Vitamin B12; Future; Expected date: 07/26/2023    Acquired hypothyroidism    Rheumatoid arthritis involving multiple sites with positive rheumatoid factor- cont meds.    Recurrent major depressive disorder, in partial remission- cont buprop.    Primary insomnia- mirtaz 15mg nightly.  -     mirtazapine (REMERON) 15 MG tablet; Take 1/2 to 2 tablets at bedtime as needed for sleep.  Dispense: 30 tablet; Refill: 6    RTC 6wk.

## 2023-07-16 DIAGNOSIS — I10 ESSENTIAL HYPERTENSION: ICD-10-CM

## 2023-07-17 RX ORDER — VALSARTAN 320 MG/1
TABLET ORAL
Qty: 90 TABLET | Refills: 3 | Status: SHIPPED | OUTPATIENT
Start: 2023-07-17 | End: 2023-10-19

## 2023-07-17 NOTE — TELEPHONE ENCOUNTER
No care due was identified.  [facility] Embedded Care Due Messages. Reference number: [id number].   7/17/2023 11:34:10 AM CDT

## 2023-07-17 NOTE — TELEPHONE ENCOUNTER
Refill Routing Note   Medication(s) are not appropriate for processing by [facility] for the following reason(s):      Required vitals abnormal: BP (!) 154/80    [facility] action(s):  Defer Care Due:  None identified          Appointments  past 12m or future 3m with PCP    Date Provider   Last Visit   6/28/2023 Nicole Jasmine MD   Next Visit   7/26/2023 Nicole Jasmine MD   ED visits in past 90 days: 0        Note composed:11:57 AM 07/17/2023

## 2023-07-21 RX ORDER — BUPROPION HYDROCHLORIDE 300 MG/1
300 TABLET ORAL DAILY
Qty: 90 TABLET | Refills: 0 | Status: SHIPPED | OUTPATIENT
Start: 2023-07-21 | End: 2023-08-31 | Stop reason: SDUPTHER

## 2023-07-26 ENCOUNTER — OFFICE VISIT (OUTPATIENT)
Dept: PRIMARY CARE CLINIC | Facility: CLINIC | Age: 73
End: 2023-07-26
Payer: MEDICARE

## 2023-07-26 ENCOUNTER — LAB VISIT (OUTPATIENT)
Dept: LAB | Facility: HOSPITAL | Age: 73
End: 2023-07-26
Attending: INTERNAL MEDICINE
Payer: MEDICARE

## 2023-07-26 VITALS
BODY MASS INDEX: 26.02 KG/M2 | TEMPERATURE: 98 F | DIASTOLIC BLOOD PRESSURE: 64 MMHG | OXYGEN SATURATION: 96 % | WEIGHT: 156.19 LBS | SYSTOLIC BLOOD PRESSURE: 136 MMHG | HEART RATE: 86 BPM | HEIGHT: 65 IN

## 2023-07-26 DIAGNOSIS — F33.41 RECURRENT MAJOR DEPRESSIVE DISORDER, IN PARTIAL REMISSION: ICD-10-CM

## 2023-07-26 DIAGNOSIS — F51.01 PRIMARY INSOMNIA: ICD-10-CM

## 2023-07-26 DIAGNOSIS — M05.79 RHEUMATOID ARTHRITIS INVOLVING MULTIPLE SITES WITH POSITIVE RHEUMATOID FACTOR: ICD-10-CM

## 2023-07-26 DIAGNOSIS — I10 ESSENTIAL HYPERTENSION: Primary | ICD-10-CM

## 2023-07-26 DIAGNOSIS — R20.9 BILATERAL COLD FEET: ICD-10-CM

## 2023-07-26 DIAGNOSIS — E03.9 ACQUIRED HYPOTHYROIDISM: ICD-10-CM

## 2023-07-26 LAB
CRP SERPL-MCNC: 10.5 MG/L (ref 0–8.2)
ERYTHROCYTE [SEDIMENTATION RATE] IN BLOOD BY PHOTOMETRIC METHOD: 19 MM/HR (ref 0–36)
TSH SERPL DL<=0.005 MIU/L-ACNC: 1.4 UIU/ML (ref 0.4–4)
VIT B12 SERPL-MCNC: >2000 PG/ML (ref 210–950)

## 2023-07-26 PROCEDURE — 99999 PR PBB SHADOW E&M-EST. PATIENT-LVL IV: ICD-10-PCS | Mod: PBBFAC,,, | Performed by: INTERNAL MEDICINE

## 2023-07-26 PROCEDURE — 84443 ASSAY THYROID STIM HORMONE: CPT | Performed by: INTERNAL MEDICINE

## 2023-07-26 PROCEDURE — 86039 ANTINUCLEAR ANTIBODIES (ANA): CPT | Performed by: INTERNAL MEDICINE

## 2023-07-26 PROCEDURE — 99214 OFFICE O/P EST MOD 30 MIN: CPT | Mod: PBBFAC,PN | Performed by: INTERNAL MEDICINE

## 2023-07-26 PROCEDURE — 99214 PR OFFICE/OUTPT VISIT, EST, LEVL IV, 30-39 MIN: ICD-10-PCS | Mod: S$PBB,,, | Performed by: INTERNAL MEDICINE

## 2023-07-26 PROCEDURE — 99999 PR PBB SHADOW E&M-EST. PATIENT-LVL IV: CPT | Mod: PBBFAC,,, | Performed by: INTERNAL MEDICINE

## 2023-07-26 PROCEDURE — 86140 C-REACTIVE PROTEIN: CPT | Performed by: INTERNAL MEDICINE

## 2023-07-26 PROCEDURE — 85652 RBC SED RATE AUTOMATED: CPT | Performed by: INTERNAL MEDICINE

## 2023-07-26 PROCEDURE — 86235 NUCLEAR ANTIGEN ANTIBODY: CPT | Mod: 59 | Performed by: INTERNAL MEDICINE

## 2023-07-26 PROCEDURE — 82607 VITAMIN B-12: CPT | Performed by: INTERNAL MEDICINE

## 2023-07-26 PROCEDURE — 99214 OFFICE O/P EST MOD 30 MIN: CPT | Mod: S$PBB,,, | Performed by: INTERNAL MEDICINE

## 2023-07-26 PROCEDURE — 86038 ANTINUCLEAR ANTIBODIES: CPT | Performed by: INTERNAL MEDICINE

## 2023-07-26 PROCEDURE — 36415 COLL VENOUS BLD VENIPUNCTURE: CPT | Performed by: INTERNAL MEDICINE

## 2023-07-26 RX ORDER — MIRTAZAPINE 15 MG/1
TABLET, FILM COATED ORAL
Qty: 30 TABLET | Refills: 6
Start: 2023-07-26 | End: 2023-08-31 | Stop reason: SDUPTHER

## 2023-07-27 ENCOUNTER — PATIENT MESSAGE (OUTPATIENT)
Dept: PRIMARY CARE CLINIC | Facility: CLINIC | Age: 73
End: 2023-07-27
Payer: MEDICARE

## 2023-07-27 LAB
ANA PATTERN 1: NORMAL
ANA SER QL IF: POSITIVE
ANA TITR SER IF: NORMAL {TITER}

## 2023-07-28 LAB
ANTI SM ANTIBODY: 0.09 RATIO (ref 0–0.99)
ANTI SM/RNP ANTIBODY: 0.11 RATIO (ref 0–0.99)
ANTI-SM INTERPRETATION: NEGATIVE
ANTI-SM/RNP INTERPRETATION: NEGATIVE
ANTI-SSA ANTIBODY: 2.15 RATIO (ref 0–0.99)
ANTI-SSA INTERPRETATION: POSITIVE
ANTI-SSB ANTIBODY: 2.96 RATIO (ref 0–0.99)
ANTI-SSB INTERPRETATION: POSITIVE
DSDNA AB SER-ACNC: ABNORMAL [IU]/ML

## 2023-08-01 ENCOUNTER — TELEPHONE (OUTPATIENT)
Dept: PRIMARY CARE CLINIC | Facility: CLINIC | Age: 73
End: 2023-08-01
Payer: MEDICARE

## 2023-08-01 ENCOUNTER — PATIENT MESSAGE (OUTPATIENT)
Dept: PRIMARY CARE CLINIC | Facility: CLINIC | Age: 73
End: 2023-08-01
Payer: MEDICARE

## 2023-08-01 DIAGNOSIS — R20.9 BILATERAL COLD FEET: Primary | ICD-10-CM

## 2023-08-01 NOTE — TELEPHONE ENCOUNTER
Spoke with pt to let her know that a EMG nerve conduction test was ordered for her. I reached out to the department to let them know, but didn't get an answer but I left a message with pt information as instructed. Advised pt to let us know if she doesn't hear from US within two days.     SJ

## 2023-08-03 ENCOUNTER — PATIENT MESSAGE (OUTPATIENT)
Dept: ADMINISTRATIVE | Facility: OTHER | Age: 73
End: 2023-08-03
Payer: MEDICARE

## 2023-08-04 ENCOUNTER — PATIENT MESSAGE (OUTPATIENT)
Dept: ADMINISTRATIVE | Facility: OTHER | Age: 73
End: 2023-08-04
Payer: MEDICARE

## 2023-08-18 ENCOUNTER — TELEPHONE (OUTPATIENT)
Dept: CARDIOLOGY | Facility: HOSPITAL | Age: 73
End: 2023-08-18
Payer: MEDICARE

## 2023-08-22 ENCOUNTER — PROCEDURE VISIT (OUTPATIENT)
Dept: NEUROLOGY | Facility: CLINIC | Age: 73
End: 2023-08-22
Payer: MEDICARE

## 2023-08-22 DIAGNOSIS — R20.9 BILATERAL COLD FEET: ICD-10-CM

## 2023-08-22 PROCEDURE — 95910 PR NERVE CONDUCTION STUDY; 7-8 STUDIES: ICD-10-PCS | Mod: 26,S$PBB,, | Performed by: PSYCHIATRY & NEUROLOGY

## 2023-08-22 PROCEDURE — 95910 NRV CNDJ TEST 7-8 STUDIES: CPT | Mod: 26,S$PBB,, | Performed by: PSYCHIATRY & NEUROLOGY

## 2023-08-22 PROCEDURE — 95910 NRV CNDJ TEST 7-8 STUDIES: CPT | Mod: PBBFAC | Performed by: PSYCHIATRY & NEUROLOGY

## 2023-08-23 ENCOUNTER — PATIENT MESSAGE (OUTPATIENT)
Dept: PRIMARY CARE CLINIC | Facility: CLINIC | Age: 73
End: 2023-08-23
Payer: MEDICARE

## 2023-08-24 ENCOUNTER — HOSPITAL ENCOUNTER (OUTPATIENT)
Dept: CARDIOLOGY | Facility: HOSPITAL | Age: 73
Discharge: HOME | End: 2023-08-24
Attending: INTERNAL MEDICINE
Payer: MEDICARE

## 2023-08-24 ENCOUNTER — PATIENT MESSAGE (OUTPATIENT)
Dept: PRIMARY CARE CLINIC | Facility: CLINIC | Age: 73
End: 2023-08-24
Payer: MEDICARE

## 2023-08-24 VITALS
HEIGHT: 65 IN | BODY MASS INDEX: 25.99 KG/M2 | DIASTOLIC BLOOD PRESSURE: 82 MMHG | SYSTOLIC BLOOD PRESSURE: 130 MMHG | WEIGHT: 156 LBS

## 2023-08-24 DIAGNOSIS — G47.33 OSA (OBSTRUCTIVE SLEEP APNEA): ICD-10-CM

## 2023-08-24 DIAGNOSIS — R93.1 ABNORMAL ECHOCARDIOGRAM: Primary | ICD-10-CM

## 2023-08-24 LAB
ASCENDING AORTA: 3.2 CM
AV INDEX (PROSTH): 0.85
AV MEAN GRADIENT: 6 MMHG
AV PEAK GRADIENT: 10 MMHG
AV REGURGITATION PRESSURE HALF TIME: 373.14 MS
AV VALVE AREA BY VELOCITY RATIO: 3.29 CM²
AV VALVE AREA: 3.09 CM²
AV VELOCITY RATIO: 0.91
BSA FOR ECHO PROCEDURE: 1.8 M2
CV ECHO LV RWT: 0.6 CM
DOP CALC AO PEAK VEL: 1.61 M/S
DOP CALC AO VTI: 36.3 CM
DOP CALC LVOT AREA: 3.6 CM2
DOP CALC LVOT DIAMETER: 2.15 CM
DOP CALC LVOT PEAK VEL: 1.46 M/S
DOP CALC LVOT STROKE VOLUME: 112.13 CM3
DOP CALC RVOT PEAK VEL: 1.03 M/S
DOP CALC RVOT VTI: 19.5 CM
DOP CALCLVOT PEAK VEL VTI: 30.9 CM
E WAVE DECELERATION TIME: 283.77 MSEC
E/A RATIO: 0.73
E/E' RATIO: 6.38 M/S
ECHO LV POSTERIOR WALL: 1.15 CM (ref 0.6–1.1)
FRACTIONAL SHORTENING: 34 % (ref 28–44)
INTERVENTRICULAR SEPTUM: 1.05 CM (ref 0.6–1.1)
IVC DIAMETER: 0.83 CM
IVRT: 71.36 MSEC
LA MAJOR: 5.05 CM
LA MINOR: 5.07 CM
LA WIDTH: 3.3 CM
LEFT ATRIUM SIZE: 2.71 CM
LEFT ATRIUM VOLUME INDEX: 21.6 ML/M2
LEFT ATRIUM VOLUME: 38.46 CM3
LEFT INTERNAL DIMENSION IN SYSTOLE: 2.54 CM (ref 2.1–4)
LEFT VENTRICLE DIASTOLIC VOLUME INDEX: 35.31 ML/M2
LEFT VENTRICLE DIASTOLIC VOLUME: 62.86 ML
LEFT VENTRICLE MASS INDEX: 76 G/M2
LEFT VENTRICLE SYSTOLIC VOLUME INDEX: 13.1 ML/M2
LEFT VENTRICLE SYSTOLIC VOLUME: 23.29 ML
LEFT VENTRICULAR INTERNAL DIMENSION IN DIASTOLE: 3.82 CM (ref 3.5–6)
LEFT VENTRICULAR MASS: 135.74 G
LV LATERAL E/E' RATIO: 7.55 M/S
LV SEPTAL E/E' RATIO: 5.53 M/S
LVOT MG: 4.35 MMHG
LVOT MV: 0.99 CM/S
MV PEAK A VEL: 1.13 M/S
MV PEAK E VEL: 0.83 M/S
MV STENOSIS PRESSURE HALF TIME: 82.29 MS
MV VALVE AREA P 1/2 METHOD: 2.67 CM2
PISA AR MAX VEL: 4.39 M/S
PISA MRMAX VEL: 5.8 M/S
PISA TR MAX VEL: 3.21 M/S
PV MEAN GRADIENT: 2 MMHG
RA MAJOR: 4.53 CM
RA PRESSURE ESTIMATED: 3 MMHG
RA WIDTH: 3.45 CM
RV TB RVSP: 6 MMHG
SINUS: 3.25 CM
STJ: 3.13 CM
TDI LATERAL: 0.11 M/S
TDI SEPTAL: 0.15 M/S
TDI: 0.13 M/S
TR MAX PG: 41 MMHG
TRICUSPID ANNULAR PLANE SYSTOLIC EXCURSION: 2.42 CM
TV REST PULMONARY ARTERY PRESSURE: 44 MMHG
Z-SCORE OF LEFT VENTRICULAR DIMENSION IN END DIASTOLE: -2.53
Z-SCORE OF LEFT VENTRICULAR DIMENSION IN END SYSTOLE: -1.43

## 2023-08-24 PROCEDURE — 93306 TTE W/DOPPLER COMPLETE: CPT | Mod: PO

## 2023-08-24 PROCEDURE — 93306 TTE W/DOPPLER COMPLETE: CPT | Mod: 26,,, | Performed by: INTERNAL MEDICINE

## 2023-08-24 PROCEDURE — 93306 ECHO (CUPID ONLY): ICD-10-PCS | Mod: 26,,, | Performed by: INTERNAL MEDICINE

## 2023-08-26 DIAGNOSIS — F41.8 MIXED ANXIETY AND DEPRESSIVE DISORDER: ICD-10-CM

## 2023-08-28 RX ORDER — DULOXETIN HYDROCHLORIDE 60 MG/1
60 CAPSULE, DELAYED RELEASE ORAL DAILY
Qty: 90 CAPSULE | Refills: 0 | Status: SHIPPED | OUTPATIENT
Start: 2023-08-28 | End: 2023-08-31 | Stop reason: SDUPTHER

## 2023-08-30 ENCOUNTER — TELEPHONE (OUTPATIENT)
Dept: CARDIOLOGY | Facility: CLINIC | Age: 73
End: 2023-08-30
Payer: MEDICARE

## 2023-08-30 DIAGNOSIS — I10 HYPERTENSION, UNSPECIFIED TYPE: Primary | ICD-10-CM

## 2023-08-31 ENCOUNTER — OFFICE VISIT (OUTPATIENT)
Dept: PSYCHIATRY | Facility: CLINIC | Age: 73
End: 2023-08-31
Payer: MEDICARE

## 2023-08-31 DIAGNOSIS — F41.8 MIXED ANXIETY AND DEPRESSIVE DISORDER: ICD-10-CM

## 2023-08-31 DIAGNOSIS — G47.00 INSOMNIA, UNSPECIFIED TYPE: Primary | ICD-10-CM

## 2023-08-31 PROCEDURE — 99214 OFFICE O/P EST MOD 30 MIN: CPT | Mod: 95,,, | Performed by: PSYCHIATRY & NEUROLOGY

## 2023-08-31 PROCEDURE — 99214 PR OFFICE/OUTPT VISIT, EST, LEVL IV, 30-39 MIN: ICD-10-PCS | Mod: 95,,, | Performed by: PSYCHIATRY & NEUROLOGY

## 2023-08-31 RX ORDER — DULOXETIN HYDROCHLORIDE 60 MG/1
60 CAPSULE, DELAYED RELEASE ORAL DAILY
Qty: 90 CAPSULE | Refills: 1 | Status: SHIPPED | OUTPATIENT
Start: 2023-08-31 | End: 2023-12-02 | Stop reason: SDUPTHER

## 2023-08-31 RX ORDER — LORAZEPAM 1 MG/1
1 TABLET ORAL NIGHTLY PRN
Qty: 30 TABLET | Refills: 3 | Status: SHIPPED | OUTPATIENT
Start: 2023-08-31 | End: 2023-12-02 | Stop reason: SDUPTHER

## 2023-08-31 RX ORDER — MIRTAZAPINE 15 MG/1
TABLET, FILM COATED ORAL
Qty: 30 TABLET | Refills: 5
Start: 2023-08-31 | End: 2023-09-07

## 2023-08-31 RX ORDER — BUPROPION HYDROCHLORIDE 300 MG/1
300 TABLET ORAL DAILY
Qty: 90 TABLET | Refills: 1 | Status: SHIPPED | OUTPATIENT
Start: 2023-08-31 | End: 2023-10-27 | Stop reason: SDUPTHER

## 2023-08-31 NOTE — PROGRESS NOTES
Outpatient Psychiatry Follow-up Visit (MD/NP)    8/31/2023    Leticia Piña, a 73 y.o. female, presenting for follow-up visit. Met with patient.    Reason for Encounter: Patient complains of anxiety, depression    Interval Hx: Patient seen and interviewed for follow-up, last seen about four months ago. This was a VIDEO VISIT. Describes ongoing sleep problems, off trazodone since last visit. Tried doxepin. No new health problems. No other new medications. Using a jaw splint for sleep apnea. Has helped apnea & snoring per 's observations. Rechecking sleep study post-treatment. No longer moving or vocalizing during sleep; stopped after off trazodone, hasn't returned. Grief ok. No succession stress. Doing pilates, walking 3x/week, visiting grandkids. Moods ok. Medication adherent. Denies side effects.     Background: 68 year old F with hx of anxiety and depression x ~20 years, with onset roughly corresponding to menopause in 1997. Describes following illness and treatment course:     Perimenopause - 1997 - insomnia roblems ; start ambien.   August 2002 - emotional distress  Can't accomplish anything (decreased functioning), feeling overwhelmed, going around in circles.   September '02 - first treatment - citalopram 20 mg. Thought it it was causing dry mouth (later dx'ed sjogrens). Feels helped symptoms present all her life that she later recognized as depression. Reduced citalopram.   November '03- more perimenopausal symptoms. Started muliti-hormonal treatment for menopausal symptoms. Had temporary benefit, stopped in '04 due to negligible benefit by that time. Remained on estradiol  Eventually went back to 20 mg citalopram.   Stress and fatigue in May '06. More dryness. Early sjogren's. Stopped citalopram, tried duloxetine.   2.07-stopped menses, 3.07 - back to progest/test, off est.   Ativan helped anxiety, caused lethargy.   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 Insomnia ongoing despite ambien.   Increased ambien to 15.   In '09 added trazodone 75 mg - helped in combo with ambien  2012 - cymbalta to 90 mg.   '13 - added wellbutrin 100 mg' diagnosed with sjogren's.   '15 - wellbutrin 150, trazodone 100 mg. Stopped ambien  Had a dip in moods in early summer.   Taking 225 mg bupropion - starting about 1 month ago. 300 mg was too intense. Tolerating this lower dose.     Worrying too much about different things   Trouble relaxing   SUSAN-7 = 2    Sleep Problems   Sad Mood  Appetite and weight changes  Concentration problems  Guilt  Thoughts of Emptiness/Death/Suicide  Anhedonia  Anergia  Slowing/PMR    QIDS = 7    Rested on rising. Wakes only briefly. Sometimes has trazodone hangover. Doesn't interfere with functioning. Interest is good.   Anxiety fluctuates. Recently taking ativan about 1-2x/week.     Psych Hx: as above. No avh, no hospitalization, no serious SI, elaine. One previous psych assessment in [facility] - "horrible experience". She changed meds (to escitalopram), felt terrible.    No other interactions with psychiatrist.     Social history: When younger - low self-esteem, dad was pessimistic and fault-finding. Dad critical. No nancy abuse. No serious maltreatment or trauma. Fewer than most friends (mostly because they were outgoing; thought poorly of one's self, inhibited. Denies teasing and bullying. Above average in school. Graduated HS, went to work x 1 year then went to [employer]. Started dating . Didn't graduate. 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 Gets good feedback on performance. Good relationship with .  x 43 years. 2 grown kids, 4 grandkids. Kids live locally. Doesn't see her son much. Dtr-in-law leads them to not visit with them.  lost business as late consequence of bad economy & a "misappropriation of funds" issue & " " health issues. Was primary caregiver to mother-in-law (now in [place]). Was primary caregiver to granddaughter who had medical disabilities. "my lowest point".  now working, "see good future ahead".       From PCP note: 3.2.18 - Here for follow up of medical problems. Works long hours. Works & then goes to sleep right after she gets home. RA pain is doing well. [place] weird so started checking BP. Has been monitoring BPs, range 130-174/ . Has no past history of HTN. No med change. Only major change is started working 3 months ago, , & babysits. No exercise. Sleeping ok with trazodone 100mg. Mixed anxiety & depressive disorder- incr wellbutrin to 300, cont  cymbalta 60mg, trazodone 100 hs, try wean ativan to half hs.  Refer for counseling, refer to Psychiatry if not better in 6 weeks.  RTC 6 weeks.    From psychotherapy eval    Chief complaint/reason for encounter: depression, anxiety, sleep and interpersonal     History of present illness:  67 year old  female presented for initial evaluation, with chief complaint of "I've always been kind of anxious & depressed for as long as I can remember, but it just really started getting worse since around 2009..." Pt described a few challenging stressful events starting then, that she feels have accumulate in her life. These include financial strain,her own worsening Rhumatoid Arthritis--diagnosed in 1994, loss of home & the family business in 2013, 's life threatening health scare starting in 2009, legal troubles related to his business, & a feeling of social abandonment from mother, sister, some friends, & daughter-in-law (which means her son & his 2 kids don't see her much anymore.) Pt described in recent months worsening symptoms of sudden uncontrolled tearfulness, increased muscle tension, being distracted, pervasive ruminating worries, loss of senthil, trouble sleeping unless she takes a sleeping aid--currently " Trazodone, & tendency to socially isolate. She stated she has never been very sociable. She now feels more rejection & scrutiny from others & finds herself withdrawing. She said much of the negativity from some family & friends relates to 's legal trouble, having been arrested for business related fraud from a period when he was juggling inadequate customer funds & misappropriated a customer's money to cover expenses of someone else's job; all that while he was battling cancer. Pt said she marvels that her  seems able to stay generally optimistic & pleasant & that he has a lot of friends. Meanwhile, she lacks much social support & stated she isn;t comfortable reaching out to connect with people, so she has a small support network to begin with. She feels devastated by judgment against her  by her own sister, mother, & daughter in law, with the result that she is often unable to spend time with 2 of her grandchildren; she noted those children visit with the daughter-in-law's parents virtually every weekend. Pt described a certain pressure level of social expectation- -material & financial standards, & she & her  have lose their home, as well as the business, & they are living with her parents. 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 She said they have a lot of financial backlog of bills to catch up on. Pt denied any suicidal or homicidal ideation, denied any cognitive deficit other than poor focus at times, denied psychosis, mood swings, rages, or substance abuse. Identified therapeutic goals include reducing anxiety & depression & improving coping skills; possibly finding a way to somewhat reduce social isolation, perhaps by reconnecting with old friends.       Pain: not quantified but described as moderate RA  "pain     Symptoms:   Mood: depressed mood, insomnia, worthlessness/guilt, poor concentration, tearfulness and social isolation  Anxiety: excessive anxiety/worry, restlessness/keyed up, muscle tension and social phobia  Substance abuse: denied  Cognitive functioning: denied  Health behaviors: noncontributory     Psychiatric history: none     Medical history: RA--diagnosed in 1994; recent hypertension; Hypothyroidism     Family history of psychiatric illness: mother chronically anxious (starting when older); father apparently depressed--very pessimistic & irritable, doesn't acknowledge problem. Suspects paternal grandfather     Social history: Grew up locally, the oldest of 3 sisters, the other two 7 & 8 & 1/2 years younger than she. Raised by both parents; recalled being markedly shy her entire life; not particularly close to her sisters, who were much younger & had each other. Lifelong active [employer]; has a home [employer]. Not currently involved in any smaller groups within the [employer]. Graduated high school & attended 2 years of college;  at age 24. Still with , Alpesh, today.  Mother of 2, a son, age 42, & daughter, age 39. Both each have 2 children of their own. Described daughter as a close source of support; also talks with  a lot, processing the struggles they have come through together. 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 Pt described recent loss of a few friend connections she had; her middle sister verbally declared she wouldn't associate with the pt & her , due to his legal "disgrace."  Denied any  experience.  No tobacco, light to moderate wine intake. Minimal caffeine, no recreational drugs.       Substance use:   Alcohol: occasional   Drugs: none   Tobacco: none   Caffeine: a cup of green tea most mornings.     Review Of Systems:     GENERAL:  No weight gain or loss  SKIN:  No rashes or lacerations  HEAD:  No " headaches  EYES:  No exophthalmos, jaundice or blindness  EARS:  No dizziness, tinnitus or hearing loss  NOSE:  No changes in smell  MOUTH & THROAT:  No dyskinetic movements or obvious goiter  CHEST:  No shortness of breath, hyperventilation or cough  CARDIOVASCULAR:  No tachycardia or chest pain  ABDOMEN:  No nausea, vomiting, pain, constipation or diarrhea  URINARY:  No frequency, dysuria or sexual dysfunction  ENDOCRINE:  No polydipsia, polyuria  MUSCULOSKELETAL:  No pain or stiffness of the joints  NEUROLOGIC:  No weakness, sensory changes, seizures, confusion, memory loss, tremor or other abnormal movements    Current Evaluation:     Nutritional Screening: Considering the patient's height and weight, medications, medical history and preferences, should a referral be made to the dietitian? no    Constitutional  Vitals:  Most recent vital signs, dated less than 90 days prior to this appointment, were not reviewed.    There were no vitals filed for this visit.     General:  unremarkable, age appropriate     Musculoskeletal  Muscle Strength/Tone:  no tremor, no tic   Gait & Station:  non-ataxic     Psychiatric  Appearance: casually dressed & groomed;   Behavior: calm,   Cooperation: cooperative with assessment  Speech: normal rate, volume, tone  Thought Process: linear, goal-directed  Thought Content: No suicidal or homicidal ideation; no delusions  Affect: reactive  Mood: euthymic  Perceptions: No auditory or visual hallucinations  Level of Consciousness: alert throughout interview  Insight: fair  Cognition: Oriented to person, place, time, & situation  Memory: no apparent deficits to general clinical interview; not formally assessed  Attention/Concentration: no apparent deficits to general clinical interview; not formally assessed  Fund of Knowledge: average by vocabulary/education    Laboratory Data  Hospital Outpatient Visit on 08/24/2023   Component Date Value Ref Range Status    BSA 08/24/2023 1.8  m2 Final     LA WIDTH 08/24/2023 3.3  cm Final    TAPSE 08/24/2023 2.42  cm Final    LVOT stroke volume 08/24/2023 112.13  cm3 Final    LVIDd 08/24/2023 3.82  3.5 - 6.0 cm Final    LV Systolic Volume 08/24/2023 23.29  mL Final    LV Systolic Volume Index 08/24/2023 13.1  mL/m2 Final    LVIDs 08/24/2023 2.54  2.1 - 4.0 cm Final    LV Diastolic Volume 08/24/2023 62.86  mL Final    LV Diastolic Volume Index 08/24/2023 35.31  mL/m2 Final    IVS 08/24/2023 1.05  0.6 - 1.1 cm Final    LVOT diameter 08/24/2023 2.15  cm Final    LVOT area 08/24/2023 3.6  cm2 Final    FS 08/24/2023 34  28 - 44 % Final    Left Ventricle Relative Wall Thick* 08/24/2023 0.60  cm Final    Posterior Wall 08/24/2023 1.15 (A)  0.6 - 1.1 cm Final    LV mass 08/24/2023 135.74  g Final    LV Mass Index 08/24/2023 76  g/m2 Final    MV Peak E Luis 08/24/2023 0.83  m/s Final    TDI LATERAL 08/24/2023 0.11  m/s Final    TDI SEPTAL 08/24/2023 0.15  m/s Final    E/E' ratio 08/24/2023 6.38  m/s Final    MV Peak A Luis 08/24/2023 1.13  m/s Final    TR Max Luis 08/24/2023 3.21  m/s Final    E/A ratio 08/24/2023 0.73   Final    IVRT 08/24/2023 71.36  msec Final    E wave deceleration time 08/24/2023 283.77  msec Final    LV SEPTAL E/E' RATIO 08/24/2023 5.53  m/s Final    LA Volume Index 08/24/2023 21.6  mL/m2 Final    LV LATERAL E/E' RATIO 08/24/2023 7.55  m/s Final    LA volume 08/24/2023 38.46  cm3 Final    LVOT peak luis 08/24/2023 1.46  m/s Final    Left Ventricular Outflow Tract Bonnie* 08/24/2023 0.99  cm/s Final    Left Ventricular Outflow Tract Bonnie* 08/24/2023 4.35  mmHg Final    LA size 08/24/2023 2.71  cm Final    Left Atrium Major Axis 08/24/2023 5.05  cm Final    Left Atrium Minor Axis 08/24/2023 5.07  cm Final    RVOT peak VTI 08/24/2023 19.5  cm Final    RA Major Axis 08/24/2023 4.53  cm Final    RA Width 08/24/2023 3.45  cm Final    AV regurgitation pressure 1/2 time 08/24/2023 373.523220922515396  ms Final    AR Max Luis 08/24/2023 4.39  m/s Final    AV mean  gradient 08/24/2023 6  mmHg Final    AV peak gradient 08/24/2023 10  mmHg Final    Ao peak john 08/24/2023 1.61  m/s Final    Ao VTI 08/24/2023 36.30  cm Final    LVOT peak VTI 08/24/2023 30.90  cm Final    AV valve area 08/24/2023 3.09  cm² Final    AV Velocity Ratio 08/24/2023 0.91   Final    AV index (prosthetic) 08/24/2023 0.85   Final    ALIDA by Velocity Ratio 08/24/2023 3.29  cm² Final    Mr max john 08/24/2023 5.80  m/s Final    MV stenosis pressure 1/2 time 08/24/2023 82.29  ms Final    MV valve area p 1/2 method 08/24/2023 2.67  cm2 Final    Triscuspid Valve Regurgitation Pea* 08/24/2023 41  mmHg Final    PV mean gradient 08/24/2023 2  mmHg Final    RVOT peak john 08/24/2023 1.03  m/s Final    Sinus 08/24/2023 3.25  cm Final    STJ 08/24/2023 3.13  cm Final    Ascending aorta 08/24/2023 3.20  cm Final    IVC diameter 08/24/2023 0.83  cm Final    Mean e' 08/24/2023 0.13  m/s Final    ZLVIDS 08/24/2023 -1.43   Final    ZLVIDD 08/24/2023 -2.53   Final    TV resting pulmonary artery pressu* 08/24/2023 44  mmHg Final    RV TB RVSP 08/24/2023 6  mmHg Final    Est. RA pres 08/24/2023 3  mmHg Final     Medications  Outpatient Encounter Medications as of 8/31/2023   Medication Sig Dispense Refill    albuterol (VENTOLIN HFA) 90 mcg/actuation inhaler Inhale 2 puffs into the lungs every 4 to 6 hours as needed for Wheezing or Shortness of Breath. Rescue 18 g 2    azelastine (ASTELIN) 137 mcg (0.1 %) nasal spray 1 spray (137 mcg total) by Nasal route 2 (two) times daily. 30 mL 11    benzonatate (TESSALON) 100 MG capsule Take 2 capsules (200 mg total) by mouth 3 (three) times daily as needed. 60 capsule 3    buPROPion (WELLBUTRIN XL) 300 MG 24 hr tablet Take 1 tablet (300 mg total) by mouth once daily. 90 tablet 0    cholecalciferol, vitamin D3, 5,000 unit/mL Drop Take 1 drop by mouth Daily.      cyanocobalamin, vitamin B-12, 5,000 mcg Subl Place under the tongue once daily.      dexbrompheniramine maleate (ALA-HIST IR) 2 mg  Tab Take 1 tablet by mouth once daily. 30 tablet 2    diclofenac sodium (VOLTAREN) 1 % Gel Apply 2 g topically 4 (four) times daily. 300 g 3    DULoxetine (CYMBALTA) 60 MG capsule Take 1 capsule (60 mg total) by mouth once daily. 90 capsule 0    fluticasone propionate (FLONASE) 50 mcg/actuation nasal spray 2 sprays (100 mcg total) by Each Nostril route once daily. 16 g 2    folic acid (FOLVITE) 1 MG tablet Take 1 tablet (1 mg total) by mouth once daily. 90 tablet 3    hydroCHLOROthiazide (HYDRODIURIL) 25 MG tablet TAKE ONE TABLET BY MOUTH ONE TIME DAILY 90 tablet 3    hydrOXYchloroQUINE (PLAQUENIL) 200 mg tablet TAKE ONE TABLET BY MOUTH TWICE DAILY 180 tablet 0    levothyroxine (EUTHYROX) 75 MCG tablet Take 1 tablet (75 mcg total) by mouth once daily. 90 tablet 3    meloxicam (MOBIC) 15 MG tablet TAKE ONE TABLET BY MOUTH ONE TIME DAILY 30 tablet 1    mirtazapine (REMERON) 15 MG tablet Take 1/2 to 2 tablets at bedtime as needed for sleep. 30 tablet 6    potassium chloride SA (K-DUR,KLOR-CON) 20 MEQ tablet TAKE ONE TABLET BY MOUTH ONE TIME DAILY 90 tablet 3    propranoloL (INDERAL) 20 MG tablet Take 1 tablet (20 mg total) by mouth 2 (two) times daily as needed (BP over 150sys). 60 tablet 11    upadacitinib (RINVOQ) 15 mg 24 hr tablet Take by mouth. Test claim only 30 tablet 0    valsartan (DIOVAN) 320 MG tablet TAKE ONE TABLET BY MOUTH ONE TIME DAILY 90 tablet 3    [DISCONTINUED] DULoxetine (CYMBALTA) 60 MG capsule Take 1 capsule (60 mg total) by mouth once daily. 90 capsule 0     No facility-administered encounter medications on file as of 8/31/2023.     Assessment - Diagnosis - Goals:     Impression: 73 year old F with chronic anxiety and depression, RA and sjogren's. Had exacerbation of illness in spring '18, currently doing somewhat better following medication changes, psychotherapy. Generally stable, tolerating ongoing, beneficial treatment. Ongoing sleep problems, getting follow-up with sleep  medicine.    Treatment Goals:  Specify outcomes written in observable, behavioral terms:   Minimize recurrences    Treatment Plan/Recommendations:   Trial of mirtazapine. Duloxetine (consider adjustment), bupropion, lorazepam prn. Takes trazodone prn sleep.    psychoeducation including behavioral activation, psychotherapy, meds.   Discussed risks, benefits, and alternatives to treatment plan documented above with patient. I answered all patient questions related to this plan and patient expressed understanding and agreement.     Return to Clinic: 3-4 months    C. Jimbo Long MD  Psychiatry  [facility]  [address] , [place] [address]  [phone]

## 2023-09-01 ENCOUNTER — PATIENT MESSAGE (OUTPATIENT)
Dept: PSYCHIATRY | Facility: CLINIC | Age: 73
End: 2023-09-01
Payer: MEDICARE

## 2023-09-04 PROBLEM — I35.1 NONRHEUMATIC AORTIC VALVE INSUFFICIENCY: Status: ACTIVE | Noted: 2023-09-04

## 2023-09-04 PROBLEM — I34.0 NONRHEUMATIC MITRAL VALVE REGURGITATION: Status: ACTIVE | Noted: 2023-09-04

## 2023-09-04 PROBLEM — I27.20 PULMONARY HYPERTENSION: Status: ACTIVE | Noted: 2023-09-04

## 2023-09-04 NOTE — PROGRESS NOTES
Subjective:   Patient ID:  Leticia Piña is a 73 y.o. female who presents for evaluation of Hypertension and Valvular Heart Disease      Pt referred by Dr. Nicole Jasmine      HPI  Pt presents for cardiac eval;  Current med conditions include HTN.  Nonsmoker.  Mother had CHF/PPM but lived long life.  Father had valve replacement/PPM, lived long life.  Echo Aug 2023: normal LVEF, mild-mod AI, mild-mod MR, mod TR, PAP 44 mmHg  Ecg 2018, 2021: NSR, normal ecg.  Had recent - nerve conduction studies for cool feet.  Ecg today 9/5/23 NSR, normal ecg.  Has put on weight.  BP has been above goal.  Some atypical chest discomfort at night, until she relaxes.  Has stress/anxiety, poor sleep.  No CP w exertion.  No CHF sxs.    Walks on treadmill 2 - 3 days/week.      Past Medical History:   Diagnosis Date    Allergic rhinitis     Cutaneous lupus erythematosus     drug induced.    Essential hypertension 02/05/2019    GERD (gastroesophageal reflux disease)     Hypothyroid     Insomnia     Mixed anxiety and depressive disorder     Nonrheumatic aortic valve insufficiency 9/4/2023    Nonrheumatic mitral valve regurgitation 9/4/2023    Normal cardiac stress test     11/07    Pulmonary hypertension 9/4/2023    Rheumatoid arthritis(714.0)     Sjogren's syndrome        Current Outpatient Medications:     albuterol (VENTOLIN HFA) 90 mcg/actuation inhaler, Inhale 2 puffs into the lungs every 4 to 6 hours as needed for Wheezing or Shortness of Breath. Rescue, Disp: 18 g, Rfl: 2    azelastine (ASTELIN) 137 mcg (0.1 %) nasal spray, 1 spray (137 mcg total) by Nasal route 2 (two) times daily. (Patient taking differently: 1 spray by Nasal route once daily.), Disp: 30 mL, Rfl: 11    benzonatate (TESSALON) 100 MG capsule, Take 2 capsules (200 mg total) by mouth 3 (three) times daily as needed., Disp: 60 capsule, Rfl: 3    buPROPion (WELLBUTRIN XL) 300 MG 24 hr tablet, Take 1 tablet (300 mg total) by mouth once daily., Disp: 90 tablet, Rfl:  1    cholecalciferol, vitamin D3, 5,000 unit/mL Drop, Take 1 drop by mouth Daily., Disp: , Rfl:     cyanocobalamin, vitamin B-12, 5,000 mcg Subl, Place under the tongue once daily., Disp: , Rfl:     diclofenac sodium (VOLTAREN) 1 % Gel, Apply 2 g topically 4 (four) times daily., Disp: 300 g, Rfl: 3    DULoxetine (CYMBALTA) 60 MG capsule, Take 1 capsule (60 mg total) by mouth once daily., Disp: 90 capsule, Rfl: 1    fluticasone propionate (FLONASE) 50 mcg/actuation nasal spray, 2 sprays (100 mcg total) by Each Nostril route once daily., Disp: 16 g, Rfl: 2    folic acid (FOLVITE) 1 MG tablet, Take 1 tablet (1 mg total) by mouth once daily., Disp: 90 tablet, Rfl: 3    hydroCHLOROthiazide (HYDRODIURIL) 25 MG tablet, TAKE ONE TABLET BY MOUTH ONE TIME DAILY, Disp: 90 tablet, Rfl: 3    hydrOXYchloroQUINE (PLAQUENIL) 200 mg tablet, TAKE ONE TABLET BY MOUTH TWICE DAILY, Disp: 180 tablet, Rfl: 0    levothyroxine (EUTHYROX) 75 MCG tablet, Take 1 tablet (75 mcg total) by mouth once daily., Disp: 90 tablet, Rfl: 3    LORazepam (ATIVAN) 1 MG tablet, Take 1 tablet (1 mg total) by mouth nightly as needed for Anxiety., Disp: 30 tablet, Rfl: 3    meloxicam (MOBIC) 15 MG tablet, TAKE ONE TABLET BY MOUTH ONE TIME DAILY (Patient taking differently: daily as needed.), Disp: 30 tablet, Rfl: 1    potassium chloride SA (K-DUR,KLOR-CON) 20 MEQ tablet, TAKE ONE TABLET BY MOUTH ONE TIME DAILY, Disp: 90 tablet, Rfl: 3    propranoloL (INDERAL) 20 MG tablet, Take 1 tablet (20 mg total) by mouth 2 (two) times daily as needed (BP over 150sys)., Disp: 60 tablet, Rfl: 11    upadacitinib (RINVOQ) 15 mg 24 hr tablet, Take by mouth. Test claim only, Disp: 30 tablet, Rfl: 0    valsartan (DIOVAN) 320 MG tablet, TAKE ONE TABLET BY MOUTH ONE TIME DAILY, Disp: 90 tablet, Rfl: 3    dexbrompheniramine maleate (ALA-HIST IR) 2 mg Tab, Take 1 tablet by mouth once daily., Disp: 30 tablet, Rfl: 2    mirtazapine (REMERON) 15 MG tablet, Take 1/2 to 2 tablets at  "bedtime as needed for sleep. (Patient not taking: Reported on 9/5/2023), Disp: 30 tablet, Rfl: 5  No current facility-administered medications for this visit.      Review of Systems   Constitutional: Positive for weight gain.   HENT: Negative.     Eyes: Negative.    Cardiovascular:  Positive for chest pain.   Respiratory: Negative.     Endocrine: Negative.    Hematologic/Lymphatic: Negative.    Skin: Negative.    Musculoskeletal: Negative.    Gastrointestinal: Negative.    Genitourinary: Negative.    Neurological: Negative.    Psychiatric/Behavioral:  The patient has insomnia.    Allergic/Immunologic: Negative.        BP (!) 150/72 (BP Location: Left arm, Patient Position: Sitting, BP Method: Medium (Manual))   Pulse 80   Resp 16   Ht 5' 5" (1.651 m)   Wt 76.7 kg (169 lb 1.5 oz)   SpO2 96%   BMI 28.14 kg/m²     Wt Readings from Last 3 Encounters:   09/05/23 76.7 kg (169 lb 1.5 oz)   09/05/23 76.7 kg (169 lb 1.5 oz)   09/05/23 70.8 kg (156 lb)     Temp Readings from Last 3 Encounters:   07/26/23 98.1 °F (36.7 °C) (Oral)   06/28/23 98.5 °F (36.9 °C) (Oral)   03/15/23 98 °F (36.7 °C) (Oral)     BP Readings from Last 3 Encounters:   09/05/23 (!) 150/72   08/24/23 130/82   07/26/23 136/64     Pulse Readings from Last 3 Encounters:   09/05/23 80   07/26/23 86   06/28/23 91         Objective:   Physical Exam  Vitals and nursing note reviewed.   Constitutional:       General: She is not in acute distress.     Appearance: Normal appearance. She is well-developed. She is not ill-appearing or diaphoretic.   HENT:      Head: Normocephalic.   Neck:      Thyroid: No thyromegaly.      Vascular: Normal carotid pulses. No carotid bruit, hepatojugular reflux or JVD.   Cardiovascular:      Rate and Rhythm: Normal rate and regular rhythm.      Chest Wall: PMI is not displaced.      Pulses: Normal pulses.           Radial pulses are 2+ on the right side and 2+ on the left side.      Heart sounds: Normal heart sounds, S1 normal and " "S2 normal. No murmur heard.     No friction rub. No gallop.   Pulmonary:      Effort: Pulmonary effort is normal.      Breath sounds: Normal breath sounds. No wheezing or rales.   Abdominal:      General: Bowel sounds are normal. There is no abdominal bruit.      Palpations: Abdomen is soft. There is no hepatomegaly, splenomegaly or mass.      Tenderness: There is no abdominal tenderness.   Musculoskeletal:      Cervical back: Neck supple.   Lymphadenopathy:      Cervical: No cervical adenopathy.   Skin:     General: Skin is warm.   Neurological:      Mental Status: She is alert and oriented to person, place, and time.   Psychiatric:         Behavior: Behavior normal. Behavior is cooperative.         I have reviewed all pertinent labs and cardiac studies.          Chemistry        Component Value Date/Time     05/15/2023 1349    K 4.3 05/15/2023 1349    CL 96 05/15/2023 1349    CO2 32 (H) 05/15/2023 1349    BUN 20 05/15/2023 1349    CREATININE 0.8 05/15/2023 1349     05/15/2023 1349        Component Value Date/Time    CALCIUM 9.0 05/15/2023 1349    ALKPHOS 50 (L) 11/01/2022 1026    AST 24 11/01/2022 1026    ALT 21 11/01/2022 1026    BILITOT 0.5 11/01/2022 1026    ESTGFRAFRICA >60 10/14/2021 1615    EGFRNONAA >60 10/14/2021 1615        Lab Results   Component Value Date    WBC 4.86 05/15/2023    HGB 10.8 (L) 05/15/2023    HCT 32.2 (L) 05/15/2023    MCV 91 05/15/2023     05/15/2023       Lab Results   Component Value Date    TSH 1.397 07/26/2023     No results found for: "LABA1C", "HGBA1C"    Lab Results   Component Value Date    CHOL 208 (H) 11/01/2022    CHOL 192 06/08/2021    CHOL 179 05/28/2019     Lab Results   Component Value Date    HDL 69 11/01/2022    HDL 86 (H) 06/08/2021    HDL 62 05/28/2019     Lab Results   Component Value Date    LDLCALC 114.0 11/01/2022    LDLCALC 61.6 (L) 06/08/2021    LDLCALC 94.2 05/28/2019     No results found for: "DLDL"  Lab Results   Component Value Date    " TRIG 125 11/01/2022    TRIG 132 11/01/2022    TRIG 222 (H) 06/08/2021       f1   Lab Results   Component Value Date    CHOLHDL 33.2 11/01/2022    CHOLHDL 44.8 06/08/2021    CHOLHDL 34.6 05/28/2019         Results for orders placed during the hospital encounter of 08/24/23    Echo    Interpretation Summary    Left Ventricle: The left ventricle is normal in size. Normal wall thickness. There is concentric remodeling. Normal wall motion. There is normal systolic function with a visually estimated ejection fraction of 55 - 70%. There is diastolic dysfunction.    Right Ventricle: Normal right ventricular cavity size. Wall thickness is normal. Right ventricle wall motion  is normal. Systolic function is normal.    Aortic Valve: The aortic valve is a trileaflet valve. Aortic valve peak velocity is 1.61 m/s. Mean gradient is 6 mmHg. There is mild to moderate aortic regurgitation with a centrally directed jet.    Mitral Valve: There is mild to moderate regurgitation with a centrally directed jet.    Tricuspid Valve: There is moderate regurgitation.    Pulmonic Valve: There is no stenosis. There is mild regurgitation with a centrally directed jet.    Pulmonary Artery: The estimated pulmonary artery systolic pressure is 44 mmHg.    IVC/SVC: Normal venous pressure at 3 mmHg.      Assessment:      1. Atypical chest pain    2. Essential hypertension    3. Bilateral cold feet    4. Nonrheumatic aortic valve insufficiency    5. Nonrheumatic mitral valve regurgitation    6. Pulmonary hypertension    7. Abnormal echocardiogram    8. Family history of cardiovascular disease        Plan:       Discussed echo findings with pt.  Monitoring advised in future.  BP control.  Goal < 130/80.  Continue digital HTN program f/u.  Try taking Propranolol 20 mg bid vs prn as currently prescribed and see if it helps.  Continue Valsartan, HCTZ.  See PCP later this week.  Atypical CP, likely stress related.  At risk for CAD.  Treadmill stress  test.  Discussed possible LHC if stress test + for ischemia.  Risks/benefits to include PCI discussed.  Cardiac diet.  Daily exercise.  JERRELL test.    PHONE REVIEW.      F/u 1 year if CV tests ok.        I have reviewed all pertinent labs and cardiac studies independently. Plans and recommendations have been formulated under my direct supervision. All questions answered and patient voiced understanding.

## 2023-09-05 ENCOUNTER — HOSPITAL ENCOUNTER (OUTPATIENT)
Dept: CARDIOLOGY | Facility: HOSPITAL | Age: 73
Discharge: HOME | End: 2023-09-05
Attending: INTERNAL MEDICINE
Payer: MEDICARE

## 2023-09-05 ENCOUNTER — HOSPITAL ENCOUNTER (OUTPATIENT)
Dept: RADIOLOGY | Facility: HOSPITAL | Age: 73
Discharge: HOME | End: 2023-09-05
Attending: PODIATRIST
Payer: MEDICARE

## 2023-09-05 ENCOUNTER — OFFICE VISIT (OUTPATIENT)
Dept: PODIATRY | Facility: CLINIC | Age: 73
End: 2023-09-05
Payer: MEDICARE

## 2023-09-05 ENCOUNTER — OFFICE VISIT (OUTPATIENT)
Dept: CARDIOLOGY | Facility: CLINIC | Age: 73
End: 2023-09-05
Payer: MEDICARE

## 2023-09-05 VITALS
WEIGHT: 169.06 LBS | HEIGHT: 65 IN | RESPIRATION RATE: 16 BRPM | SYSTOLIC BLOOD PRESSURE: 150 MMHG | OXYGEN SATURATION: 96 % | HEART RATE: 80 BPM | BODY MASS INDEX: 28.14 KG/M2 | WEIGHT: 169.06 LBS | DIASTOLIC BLOOD PRESSURE: 72 MMHG | BODY MASS INDEX: 28.17 KG/M2

## 2023-09-05 VITALS — HEIGHT: 65 IN | BODY MASS INDEX: 25.99 KG/M2 | WEIGHT: 156 LBS

## 2023-09-05 DIAGNOSIS — I10 ESSENTIAL HYPERTENSION: ICD-10-CM

## 2023-09-05 DIAGNOSIS — R20.9 BILATERAL COLD FEET: ICD-10-CM

## 2023-09-05 DIAGNOSIS — I27.20 PULMONARY HYPERTENSION: ICD-10-CM

## 2023-09-05 DIAGNOSIS — R07.89 ATYPICAL CHEST PAIN: Primary | ICD-10-CM

## 2023-09-05 DIAGNOSIS — I35.1 NONRHEUMATIC AORTIC VALVE INSUFFICIENCY: ICD-10-CM

## 2023-09-05 DIAGNOSIS — I10 HYPERTENSION, UNSPECIFIED TYPE: ICD-10-CM

## 2023-09-05 DIAGNOSIS — M79.675 GREAT TOE PAIN, LEFT: Primary | ICD-10-CM

## 2023-09-05 DIAGNOSIS — Z82.49 FAMILY HISTORY OF CARDIOVASCULAR DISEASE: ICD-10-CM

## 2023-09-05 DIAGNOSIS — M19.072 DEGENERATIVE ARTHRITIS OF TOE JOINT, LEFT: ICD-10-CM

## 2023-09-05 DIAGNOSIS — M79.672 LEFT FOOT PAIN: ICD-10-CM

## 2023-09-05 DIAGNOSIS — R93.1 ABNORMAL ECHOCARDIOGRAM: ICD-10-CM

## 2023-09-05 DIAGNOSIS — M79.672 LEFT FOOT PAIN: Primary | ICD-10-CM

## 2023-09-05 DIAGNOSIS — I34.0 NONRHEUMATIC MITRAL VALVE REGURGITATION: ICD-10-CM

## 2023-09-05 PROCEDURE — 99214 OFFICE O/P EST MOD 30 MIN: CPT | Mod: 25,S$PBB,, | Performed by: PODIATRIST

## 2023-09-05 PROCEDURE — 99205 OFFICE O/P NEW HI 60 MIN: CPT | Mod: S$PBB,,, | Performed by: INTERNAL MEDICINE

## 2023-09-05 PROCEDURE — 99999PBSHW PR PBB SHADOW TECHNICAL ONLY FILED TO HB: Mod: PBBFAC,,,

## 2023-09-05 PROCEDURE — 20600 PR DRAIN/INJECT SMALL JOINT/BURSA: ICD-10-PCS | Mod: S$PBB,LT,, | Performed by: PODIATRIST

## 2023-09-05 PROCEDURE — 93005 ELECTROCARDIOGRAM TRACING: CPT

## 2023-09-05 PROCEDURE — 99214 PR OFFICE/OUTPT VISIT, EST, LEVL IV, 30-39 MIN: ICD-10-PCS | Mod: 25,S$PBB,, | Performed by: PODIATRIST

## 2023-09-05 PROCEDURE — 73630 X-RAY EXAM OF FOOT: CPT | Mod: TC,FY,PO,LT

## 2023-09-05 PROCEDURE — 99999 PR PBB SHADOW E&M-EST. PATIENT-LVL IV: ICD-10-PCS | Mod: PBBFAC,,, | Performed by: PODIATRIST

## 2023-09-05 PROCEDURE — 99205 PR OFFICE/OUTPT VISIT, NEW, LEVL V, 60-74 MIN: ICD-10-PCS | Mod: S$PBB,,, | Performed by: INTERNAL MEDICINE

## 2023-09-05 PROCEDURE — 20600 DRAIN/INJ JOINT/BURSA W/O US: CPT | Mod: S$PBB,LT,, | Performed by: PODIATRIST

## 2023-09-05 PROCEDURE — 99999 PR PBB SHADOW E&M-EST. PATIENT-LVL V: ICD-10-PCS | Mod: PBBFAC,,, | Performed by: INTERNAL MEDICINE

## 2023-09-05 PROCEDURE — 93010 EKG 12-LEAD: ICD-10-PCS | Mod: ,,, | Performed by: INTERNAL MEDICINE

## 2023-09-05 PROCEDURE — 73630 X-RAY EXAM OF FOOT: CPT | Mod: 26,LT,, | Performed by: RADIOLOGY

## 2023-09-05 PROCEDURE — 99999 PR PBB SHADOW E&M-EST. PATIENT-LVL IV: CPT | Mod: PBBFAC,,, | Performed by: PODIATRIST

## 2023-09-05 PROCEDURE — 93010 ELECTROCARDIOGRAM REPORT: CPT | Mod: ,,, | Performed by: INTERNAL MEDICINE

## 2023-09-05 PROCEDURE — 99999PBSHW PR PBB SHADOW TECHNICAL ONLY FILED TO HB: ICD-10-PCS | Mod: PBBFAC,,,

## 2023-09-05 PROCEDURE — 73630 XR FOOT COMPLETE 3 VIEW LEFT: ICD-10-PCS | Mod: 26,LT,, | Performed by: RADIOLOGY

## 2023-09-05 PROCEDURE — 99214 OFFICE O/P EST MOD 30 MIN: CPT | Mod: PBBFAC,PO,25 | Performed by: PODIATRIST

## 2023-09-05 PROCEDURE — 99215 OFFICE O/P EST HI 40 MIN: CPT | Mod: PBBFAC,27,25 | Performed by: INTERNAL MEDICINE

## 2023-09-05 PROCEDURE — 99999 PR PBB SHADOW E&M-EST. PATIENT-LVL V: CPT | Mod: PBBFAC,,, | Performed by: INTERNAL MEDICINE

## 2023-09-05 PROCEDURE — 20600 DRAIN/INJ JOINT/BURSA W/O US: CPT | Mod: PBBFAC,PO | Performed by: PODIATRIST

## 2023-09-05 RX ORDER — TRIAMCINOLONE ACETONIDE 40 MG/ML
20 INJECTION, SUSPENSION INTRA-ARTICULAR; INTRAMUSCULAR ONCE
Status: COMPLETED | OUTPATIENT
Start: 2023-09-05 | End: 2023-09-05

## 2023-09-05 RX ORDER — PROPRANOLOL HYDROCHLORIDE 20 MG/1
20 TABLET ORAL 2 TIMES DAILY
Qty: 60 TABLET | Refills: 11
Start: 2023-09-05 | End: 2023-09-07

## 2023-09-05 RX ADMIN — TRIAMCINOLONE ACETONIDE 20 MG: 40 INJECTION, SUSPENSION INTRA-ARTICULAR; INTRAMUSCULAR at 09:09

## 2023-09-05 NOTE — PATIENT INSTRUCTIONS
Thank you for choosing [facility] and Dr. Brenda Heaton and her team to take care of you.      I recommend [employer] tennis shoes    I have provided further information for you about your diagnoses and/or aftercare for treatment.     You may receive a survey asking you about your visit today. We hope that we have provided you with a 5-star experience! If you felt that you received exemplary care today, please consider leaving us this feedback on the survey that you will receive in the next few days.     The survey might arrive via email, [email or web address] messages, text messages, or paper mail.    We sincerely appreciate you!      Sincerely,  Dr. Brenda Heaton

## 2023-09-05 NOTE — PROGRESS NOTES
PODIATRIC MEDICINE AND SURGERY  9/5/2023    PCP: Dr. Jasmine, Nicole BELL MD    CHIEF COMPLAINT   Chief Complaint   Patient presents with    Toe Pain     C/o left great toe pain and swelling, rates pain 3/10, 0 injury, x-rays today, non-diabetic, wears sandals       HPI:    Leticia Piña is a 73 y.o. female who has a past medical history of Allergic rhinitis, Cutaneous lupus erythematosus, Essential hypertension (02/05/2019), GERD (gastroesophageal reflux disease), Hypothyroid, Insomnia, Mixed anxiety and depressive disorder, Nonrheumatic aortic valve insufficiency (9/4/2023), Nonrheumatic mitral valve regurgitation (9/4/2023), Normal cardiac stress test, Pulmonary hypertension (9/4/2023), Rheumatoid arthritis(714.0), and Sjogren's syndrome.   Leticia presents to clinic today complaining of chronic toe joint pain to left foot. Symptoms have been present for years. Pain is 3/10. She denies any trauma.      Patient denies other pedal complaints at this time.     PMH  Past Medical History:   Diagnosis Date    Allergic rhinitis     Cutaneous lupus erythematosus     drug induced.    Essential hypertension 02/05/2019    GERD (gastroesophageal reflux disease)     Hypothyroid     Insomnia     Mixed anxiety and depressive disorder     Nonrheumatic aortic valve insufficiency 9/4/2023    Nonrheumatic mitral valve regurgitation 9/4/2023    Normal cardiac stress test     11/07    Pulmonary hypertension 9/4/2023    Rheumatoid arthritis(714.0)     Sjogren's syndrome        PROBLEM LIST  Patient Active Problem List    Diagnosis Date Noted    Nonrheumatic aortic valve insufficiency 09/04/2023    Nonrheumatic mitral valve regurgitation 09/04/2023    Pulmonary hypertension 09/04/2023    Bilateral cold feet 08/22/2023    Other sleep apnea 06/28/2023    Balance problem 03/27/2023    Sensorineural hearing loss (SNHL) of both ears 04/22/2022    Flare of rheumatoid arthritis 05/27/2021    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/09/2021    Osteopenia with high risk of fracture 11/24/2020    Gastroesophageal reflux disease with esophagitis without hemorrhage 11/24/2020    Iron deficiency anemia 01/17/2020    Trochanteric bursitis of both hips 06/04/2019    Essential hypertension 02/05/2019    Immunocompromised 12/04/2018    Recurrent major depressive disorder, in partial remission 07/15/2018    Bursitis of left foot 11/30/2016    High risk medication use 05/23/2016    Lymphocytic vasculitis of skin 05/28/2015    Allergic rhinitis 05/27/2015    Drug-induced lupus erythematosus 04/10/2014    Vitamin D deficiency disease 07/17/2013    Rheumatoid arthritis involving multiple sites with positive rheumatoid factor     Anxiety associated with depression     Primary insomnia     Sjogren's syndrome     Acquired hypothyroidism     Atrophy of vulva 02/21/2010    Facial nerve disorder, unspecified 02/09/2010       MEDS  Current Outpatient Medications on File Prior to Visit   Medication Sig Dispense Refill    albuterol (VENTOLIN HFA) 90 mcg/actuation inhaler Inhale 2 puffs into the lungs every 4 to 6 hours as needed for Wheezing or Shortness of Breath. Rescue 18 g 2    benzonatate (TESSALON) 100 MG capsule Take 2 capsules (200 mg total) by mouth 3 (three) times daily as needed. 60 capsule 3    buPROPion (WELLBUTRIN XL) 300 MG 24 hr tablet Take 1 tablet (300 mg total) by mouth once daily. 90 tablet 1    cholecalciferol, vitamin D3, 5,000 unit/mL Drop Take 1 drop by mouth Daily.      cyanocobalamin, vitamin B-12, 5,000 mcg Subl Place under the tongue once daily.      dexbrompheniramine maleate (ALA-HIST IR) 2 mg Tab Take 1 tablet by mouth once daily. 30 tablet 2    diclofenac sodium (VOLTAREN) 1 % Gel Apply 2 g topically 4 (four) times daily. 300 g 3    DULoxetine (CYMBALTA) 60 MG capsule Take 1 capsule (60 mg total) by mouth once daily. 90 capsule 1    fluticasone propionate (FLONASE) 50 mcg/actuation nasal spray 2 sprays (100 mcg total) by  Each Nostril route once daily. 16 g 2    folic acid (FOLVITE) 1 MG tablet Take 1 tablet (1 mg total) by mouth once daily. 90 tablet 3    hydroCHLOROthiazide (HYDRODIURIL) 25 MG tablet TAKE ONE TABLET BY MOUTH ONE TIME DAILY 90 tablet 3    hydrOXYchloroQUINE (PLAQUENIL) 200 mg tablet TAKE ONE TABLET BY MOUTH TWICE DAILY 180 tablet 0    levothyroxine (EUTHYROX) 75 MCG tablet Take 1 tablet (75 mcg total) by mouth once daily. 90 tablet 3    LORazepam (ATIVAN) 1 MG tablet Take 1 tablet (1 mg total) by mouth nightly as needed for Anxiety. 30 tablet 3    meloxicam (MOBIC) 15 MG tablet TAKE ONE TABLET BY MOUTH ONE TIME DAILY 30 tablet 1    mirtazapine (REMERON) 15 MG tablet Take 1/2 to 2 tablets at bedtime as needed for sleep. 30 tablet 5    potassium chloride SA (K-DUR,KLOR-CON) 20 MEQ tablet TAKE ONE TABLET BY MOUTH ONE TIME DAILY 90 tablet 3    propranoloL (INDERAL) 20 MG tablet Take 1 tablet (20 mg total) by mouth 2 (two) times daily as needed (BP over 150sys). 60 tablet 11    upadacitinib (RINVOQ) 15 mg 24 hr tablet Take by mouth. Test claim only 30 tablet 0    valsartan (DIOVAN) 320 MG tablet TAKE ONE TABLET BY MOUTH ONE TIME DAILY 90 tablet 3    azelastine (ASTELIN) 137 mcg (0.1 %) nasal spray 1 spray (137 mcg total) by Nasal route 2 (two) times daily. 30 mL 11     No current facility-administered medications on file prior to visit.       Medication List with Changes/Refills   Current Medications    ALBUTEROL (VENTOLIN HFA) 90 MCG/ACTUATION INHALER    Inhale 2 puffs into the lungs every 4 to 6 hours as needed for Wheezing or Shortness of Breath. Rescue    AZELASTINE (ASTELIN) 137 MCG (0.1 %) NASAL SPRAY    1 spray (137 mcg total) by Nasal route 2 (two) times daily.    BENZONATATE (TESSALON) 100 MG CAPSULE    Take 2 capsules (200 mg total) by mouth 3 (three) times daily as needed.    BUPROPION (WELLBUTRIN XL) 300 MG 24 HR TABLET    Take 1 tablet (300 mg total) by mouth once daily.    CHOLECALCIFEROL, VITAMIN D3, 5,000  UNIT/ML DROP    Take 1 drop by mouth Daily.    CYANOCOBALAMIN, VITAMIN B-12, 5,000 MCG SUBL    Place under the tongue once daily.    DEXBROMPHENIRAMINE MALEATE (ALA-HIST IR) 2 MG TAB    Take 1 tablet by mouth once daily.    DICLOFENAC SODIUM (VOLTAREN) 1 % GEL    Apply 2 g topically 4 (four) times daily.    DULOXETINE (CYMBALTA) 60 MG CAPSULE    Take 1 capsule (60 mg total) by mouth once daily.    FLUTICASONE PROPIONATE (FLONASE) 50 MCG/ACTUATION NASAL SPRAY    2 sprays (100 mcg total) by Each Nostril route once daily.    FOLIC ACID (FOLVITE) 1 MG TABLET    Take 1 tablet (1 mg total) by mouth once daily.    HYDROCHLOROTHIAZIDE (HYDRODIURIL) 25 MG TABLET    TAKE ONE TABLET BY MOUTH ONE TIME DAILY    HYDROXYCHLOROQUINE (PLAQUENIL) 200 MG TABLET    TAKE ONE TABLET BY MOUTH TWICE DAILY    LEVOTHYROXINE (EUTHYROX) 75 MCG TABLET    Take 1 tablet (75 mcg total) by mouth once daily.    LORAZEPAM (ATIVAN) 1 MG TABLET    Take 1 tablet (1 mg total) by mouth nightly as needed for Anxiety.    MELOXICAM (MOBIC) 15 MG TABLET    TAKE ONE TABLET BY MOUTH ONE TIME DAILY    MIRTAZAPINE (REMERON) 15 MG TABLET    Take 1/2 to 2 tablets at bedtime as needed for sleep.    POTASSIUM CHLORIDE SA (K-DUR,KLOR-CON) 20 MEQ TABLET    TAKE ONE TABLET BY MOUTH ONE TIME DAILY    PROPRANOLOL (INDERAL) 20 MG TABLET    Take 1 tablet (20 mg total) by mouth 2 (two) times daily as needed (BP over 150sys).    UPADACITINIB (RINVOQ) 15 MG 24 HR TABLET    Take by mouth. Test claim only    VALSARTAN (DIOVAN) 320 MG TABLET    TAKE ONE TABLET BY MOUTH ONE TIME DAILY       PSH     Past Surgical History:   Procedure Laterality Date    AUGMENTATION OF BREAST      breast implants      breast surgery for rupture      COLONOSCOPY N/A 3/7/2023    Procedure: COLONOSCOPY;  Surgeon: Susan Escobedo MD;  Location: [facility];  Service: Endoscopy;  Laterality: N/A;    ERCP N/A 03/25/2021    Procedure: ERCP (ENDOSCOPIC RETROGRADE CHOLANGIOPANCREATOGRAPHY);  Surgeon:  Preston Madrigal MD;  Location: [facility];  Service: Endoscopy;  Laterality: N/A;    ERCP N/A 06/21/2021    Procedure: ERCP (ENDOSCOPIC RETROGRADE CHOLANGIOPANCREATOGRAPHY);  Surgeon: Preston Madrigal MD;  Location: [facility] ENDO;  Service: Endoscopy;  Laterality: N/A;    ESOPHAGOGASTRODUODENOSCOPY N/A 01/24/2023    Procedure: ESOPHAGOGASTRODUODENOSCOPY (EGD);  Surgeon: Susan Escobedo MD;  Location: [facility] ENDO;  Service: Endoscopy;  Laterality: N/A;    LAPAROSCOPIC CHOLECYSTECTOMY N/A 03/26/2021    Procedure: CHOLECYSTECTOMY, LAPAROSCOPIC;  Surgeon: Jose Blue MD;  Location: [facility] OR;  Service: General;  Laterality: N/A;    TONSILLECTOMY, ADENOIDECTOMY      TUBAL LIGATION          ALL  Review of patient's allergies indicates:   Allergen Reactions    Humira  [adalimumab]      Other reaction(s): drug-induced lupus  Other reaction(s): drug-induced lupus    Sulfa (sulfonamide antibiotics)      Other reaction(s): Hives  Other reaction(s): Rash  Other reaction(s): Hives       SOC     Social History     Tobacco Use    Smoking status: Never    Smokeless tobacco: Never   Substance Use Topics    Alcohol use: Not Currently     Comment: socially     Drug use: No         FAMILY HX    Family History   Problem Relation Age of Onset    Heart disease Mother     Heart disease Father             REVIEW OF SYSTEMS  General: This patient is well-developed, well-nourished and appears stated age, well-oriented to person, place and time, and cooperative and pleasant on today's visit   Constitutional: Negative for chills and fever.   Respiratory: Negative for shortness of breath.    Cardiovascular: Negative for chest pain, palpitations, orthopnea  Gastrointestinal: Negative for diarrhea, nausea and vomiting.   Musculoskeletal: Positive for above noted in HPI  Skin: Positive  for skin and/or nail changes   Neurological: Negative for tingling and sensory changes  Peripheral Vascular: no claudication or  "cyanosis  Psychiatric/Behavioral: Negative for altered mental status     PHYSICAL EXAM:      Vitals:    09/05/23 0818   Weight: 70.8 kg (156 lb)   Height: 5' 5" (1.651 m)   PainSc:   3         LOWER EXTREMITY PHYSICAL EXAM  VASCULAR  Dorsalis pedis and posterior tibial pulses palpable 2/4 bilaterally. Capillary refill time immediate to the toes. Feet are warm to the touch. Skin temperature warm to warm from proximally to distally There are no varicosities, telangiectasias noted to bilateral foot and ankle regions. There are no ecchymoses noted to bilateral foot and ankle regions. There is no gross lower extremity edema.    DERMATOLOGIC  Skin moist with healthy texture and turgor.There are no open ulcerations, lacerations, or fissures to bilateral foot and ankle regions. There are no signs of infection as there is no erythema, no proximal-extending lymphangiitis, no fluctuance, or crepitus noted on palpation to bilateral foot and ankle regions. There is no interdigital maceration.   There are no hyperkeratotic lesions noted to feet. Nails are well-trimmed.    NEUROLOGIC  Epicritic sensation is intact as the patient is able to sense light touch to bilateral foot and ankle regions. Achilles and patellar deep tendon reflexes intact. Babinski reflex absent    ORTHOPEDIC/BIOMECHANICAL  TTP Left DIPJ of hallux. Muscle strength AT/EHL/EDL/PT: 5/5; Achilles/Gastroc/Soleus: 5/5; PB/PL: 5/5 Muscle tone is normal. Ankle joint ROM non painful with DF/PF, non-crepitus    IMAGING  Results for orders placed during the hospital encounter of 08/19/21    X-Ray Foot Complete Left    Narrative  EXAMINATION:  XR FOOT COMPLETE 3 VIEW LEFT    CLINICAL HISTORY:  .  Effusion, left foot    TECHNIQUE:  AP, lateral and oblique views of the left foot were performed.    COMPARISON:  June 4, 2019    FINDINGS:  Pes planus and mild degenerative change 1st MTP joint.  Lateral marginal spurring/osteophyte at the 1st MTP joint.  No acute or healing " fracture.  Dorsal calcaneal spur.  Minimal multi articular degenerative change in the midfoot.  Remaining osseous structures remarkable for minimal degenerative change throughout the IP joints.  No radiopaque foreign body or abnormal calcification.  No erosion or periosteal reaction.    Impression  As above.  Follow-up and or further evaluation as warranted.      Electronically signed by: Anuel Mae MD  Date:    08/19/2021            ASSESSMENT     Encounter Diagnoses   Name Primary?    Great toe pain, left Yes    Degenerative arthritis of toe joint, left          PLAN  Patient was educated about clinical and imaging findings, and verbalizes understanding of above.     Diagnoses and all orders for this visit:  Great toe pain, left    Degenerative arthritis of toe joint, left    Other orders  -     triamcinolone acetonide injection 20 mg          Reviewed findings and explained condition to the patient with visual reference to the foot and x-rays. Patient was educated and counseled regarding OA. I explained the abnormal mechanics have lead to the jamming and degenerative changes of the big toe joint. Sometimes surgical intervention involving remodeling of the joint is necessary to resolve symptoms if conservative treatment measures fail. We discussed the use of NSAIDs, injections, and functional foot orthotics to improve pain and mechanics of the foot.     A discussion of the risks, benefits, and alternatives of the corticosteroid injection occurred.  All questions were answered.  After gaining oral consent and verifying the injection site, the skin was prepped with betadine, then alcohol swabs.  Under aseptic conditions, injection of 1ml of 0.5% marcaine plain and .5ml of kenalog 40 was administered to the Hallux interphalangeal joint MPJ, left foot.  The area was cleansed and then covered with a band-aid . The patient tolerated the injection well and no apparent adverse reactions observed.    A discussion  regarding the time course of the medications was undertaken and questions were answered.  Patient counseled to look for signs of infection and if any report to Emergency Department          Disclaimer:  This note may have been prepared using voice recognition software, it may have not been extensively proofed, as such there could be errors within the text such as sound alike errors.         Future Appointments   Date Time Provider Department Center   9/5/2023 10:30 AM EKG, [facility] SPECCPR [facility]   9/5/2023 11:00 AM Alen Geller MD [facility] CARDIO [facility]   9/6/2023  8:00 AM Renzo Thomas MD [facility] RHEUM [facility]   9/7/2023  4:20 PM Nicole Jasmine MD BS [id number]   9/21/2023 10:40 AM Edna Hendricks MD [facility] DERM [facility]   12/7/2023  4:00 PM Zeus Johnson MD [facility] PSYCH [facility]   1/9/2024  1:00 PM Zeus Johnson MD HG PSYCH [facility]       Report Electronically Signed By:     Brenda Heaton DPM   Podiatry  [facility]  9/5/2023

## 2023-09-07 ENCOUNTER — OFFICE VISIT (OUTPATIENT)
Dept: PRIMARY CARE CLINIC | Facility: CLINIC | Age: 73
End: 2023-09-07
Payer: MEDICARE

## 2023-09-07 ENCOUNTER — DOCUMENTATION ONLY (OUTPATIENT)
Dept: PRIMARY CARE CLINIC | Facility: CLINIC | Age: 73
End: 2023-09-07
Payer: MEDICARE

## 2023-09-07 VITALS
SYSTOLIC BLOOD PRESSURE: 144 MMHG | DIASTOLIC BLOOD PRESSURE: 60 MMHG | HEART RATE: 64 BPM | OXYGEN SATURATION: 96 % | TEMPERATURE: 98 F | WEIGHT: 169.13 LBS | BODY MASS INDEX: 28.18 KG/M2 | HEIGHT: 65 IN

## 2023-09-07 DIAGNOSIS — M05.79 RHEUMATOID ARTHRITIS INVOLVING MULTIPLE SITES WITH POSITIVE RHEUMATOID FACTOR: ICD-10-CM

## 2023-09-07 DIAGNOSIS — I10 ESSENTIAL HYPERTENSION: Primary | ICD-10-CM

## 2023-09-07 DIAGNOSIS — F51.01 PRIMARY INSOMNIA: ICD-10-CM

## 2023-09-07 DIAGNOSIS — G47.33 OSA (OBSTRUCTIVE SLEEP APNEA): ICD-10-CM

## 2023-09-07 DIAGNOSIS — F33.41 RECURRENT MAJOR DEPRESSIVE DISORDER, IN PARTIAL REMISSION: ICD-10-CM

## 2023-09-07 PROCEDURE — 99214 OFFICE O/P EST MOD 30 MIN: CPT | Mod: PBBFAC,PN | Performed by: INTERNAL MEDICINE

## 2023-09-07 PROCEDURE — 99999 PR PBB SHADOW E&M-EST. PATIENT-LVL IV: CPT | Mod: PBBFAC,,, | Performed by: INTERNAL MEDICINE

## 2023-09-07 PROCEDURE — 99213 PR OFFICE/OUTPT VISIT, EST, LEVL III, 20-29 MIN: ICD-10-PCS | Mod: S$PBB,,, | Performed by: INTERNAL MEDICINE

## 2023-09-07 PROCEDURE — 99213 OFFICE O/P EST LOW 20 MIN: CPT | Mod: S$PBB,,, | Performed by: INTERNAL MEDICINE

## 2023-09-07 PROCEDURE — 99999 PR PBB SHADOW E&M-EST. PATIENT-LVL IV: ICD-10-PCS | Mod: PBBFAC,,, | Performed by: INTERNAL MEDICINE

## 2023-09-07 RX ORDER — AMLODIPINE BESYLATE 2.5 MG/1
2.5 TABLET ORAL DAILY
Qty: 30 TABLET | Refills: 11 | Status: SHIPPED | OUTPATIENT
Start: 2023-09-07 | End: 2023-10-16

## 2023-09-12 ENCOUNTER — PATIENT MESSAGE (OUTPATIENT)
Dept: PRIMARY CARE CLINIC | Facility: CLINIC | Age: 73
End: 2023-09-12
Payer: MEDICARE

## 2023-09-15 ENCOUNTER — CLINICAL SUPPORT (OUTPATIENT)
Dept: PRIMARY CARE CLINIC | Facility: CLINIC | Age: 73
End: 2023-09-15
Payer: MEDICARE

## 2023-09-15 DIAGNOSIS — F33.41 RECURRENT MAJOR DEPRESSIVE DISORDER, IN PARTIAL REMISSION: ICD-10-CM

## 2023-09-15 PROCEDURE — 99499 NO LOS: ICD-10-PCS | Mod: S$PBB,,,

## 2023-09-15 PROCEDURE — 99499 UNLISTED E&M SERVICE: CPT | Mod: S$PBB,,,

## 2023-09-15 NOTE — PROGRESS NOTES
[facility] INTAKE    DATE:  9/15/2023  REFERRAL SOURCE:  Nicole Jasmine MD  TYPE OF VISIT:  In person  LENGTH OF SESSION: 60  .  HISTORY OF PRESENTING ILLNESS:  Leticia Piña, a 73 y.o. female with history of Anxiety disorders; anxiety, unspecified [F41.9] and Major Depressive Disorder, Recurrent, Mild (F33.0).    CHIEF COMPLAINT/REASON FOR ENCOUNTER: Pt presented for initial assessment. Pt's chief complaint includes the following: depression, anxiety, sleep, interpersonal, and sexual problems.    PSYCHIATRIC HISTORY:  Patient does currently have a psychiatrist. Dr. Holden   Patient does not currently have a therapist.  Has seen a therapist in the past and did not find it helpful.    Pt is taking bupropion SR (Wellbutrin) 300mg once daily and duloxetine (Cymbalta) 60mg once daily for Depression. They are not interested in medication changes. She takes Trazodone and uses CBD oil for Sleep. Many issues with sleep medications.   Previous Psychiatric Hospitalizations:  No  History of Trauma:  Denies  History of Violence:  No  Access to a gun/weapon:  No  Previous Suicide Attempts:  No    Risk assessment:  Patient reports no suicidal ideation  Patient reports no homicidal ideation  Patient reports no self-injurious behavior  Patient reports no violent behavior    PSYCHIATRIC FAMILY HISTORY:  Denies.     SUBSTANCE ABUSE HISTORY:  Tobacco:  No   Alcohol:  *Drinks 5-6 times/week; 2 glasses of wine*  Illicit Substances: No  Misuse of Prescription Medications:  No    MEDICAL HISTORY:  Past Medical History:   Diagnosis Date    Allergic rhinitis     Cutaneous lupus erythematosus     drug induced.    Essential hypertension 02/05/2019    GERD (gastroesophageal reflux disease)     Hypothyroid     Insomnia     Mixed anxiety and depressive disorder     Nonrheumatic aortic valve insufficiency 9/4/2023    Nonrheumatic mitral valve regurgitation 9/4/2023    Normal cardiac stress test     11/07    Pulmonary  "hypertension 2023    Rheumatoid arthritis(714.0)     Sjogren's syndrome          SOCIAL HISTORY (MARRIAGE, EMPLOYMENT, etc.):  Living Situation: With spouse, Alpesh.   Relationship status  x1 for 49 years.   Children/Family: DtrYadira and Prabhu RODRIGUEZic. SonChad and Susana GLOVER.   Supports:Spouse,Alpesh and Daughter, Yadira.  Education/Vocation: Some college. Worked in [employer] business.  Nondenominational/Spirituality: [employer].  Hobbies and Interests: Pilates. Watching nature. "Piddling"    Current social stressors:     Estrangement from her family. Lack of connection with her kids and their families. Medical issues for she and spouse causing lack of desire, loss of intimacy between them.  Difficulty sleeping including having nightmares. This worsened after menopause and her RA diagnosis. She has used Ambien and Trazodone at different times. Says she used to have night terrors. She would thrash about in bed and actually fell out of bed on one occasion. She is now using trazodone and CBD oil. Still dreaming, but not having nightmares any longer.  Death of both parents in the last few years; Dad  in .  Mom  in May 2023. She cared for both parents until their deaths. Dad's death was peaceful.  Mom's death was difficult, she struggled and was in pain.  Patient had to give her mom morphine. Because of that patient feels guilty for "causing her death". She says "I didn't want to do it but she was miserable."     Current symptoms:  Depression: anhedonia, insomnia, worthlessness/guilt, and hopelessness.  Anxiety: excessive worrying.  Insomnia: frequent night time awakening, difficulty falling asleep, and non-restful sleep.  Brianda:  denies.  Psychosis: denies .    MENTAL HEALTH STATUS EXAM  General Appearance:  unremarkable, age appropriate   Speech: normal tone, normal rate, normal pitch, normal volume      Level of Cooperation: cooperative      Thought Processes: normal and logical   Mood: depressed      Thought " "Content: normal, no suicidality, no homicidality, delusions, or paranoia   Affect: blunted   Orientation: Oriented x3   Memory: recent >  intact, remote >  intact   Attention Span & Concentration: intact   Fund of General Knowledge: intact and appropriate to age and level of education   Judgment & Insight: limited   Language  intact     Session Content/Presenting Problem Hx:    Patient reports a number of areas in her life causing her difficulty.  A number of years ago her  had some problems in his business, he was arrested for misuse of client funds. 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 She did not share what they were going through with her parents; she says "they would never understand."  Patient describes her family as cold with no expression of affection. After the business fell apart her mom said "how could you be so stupid". Says her sister Alida rejected them because "we failed." Patient describes staying inside with the curtains closed, doing nothing. She describes her depression as being in a dark hole; she denies having had suicidal ideation. They lost the business, their savings, their home. They had to live with her parents for a number of years. Her daughter in law, Susana, was critical and has kept her distance from them. Patient says she lost her friends at this time as well.  Alpesh had prostate cancer and heart surgery; patient says she was too depressed to help him at that time. Patient says she and Alpesh have a good marriage and she feels loved by him.  However, she has lost all desire for physical intimacy and she feels guilty about this. She admits to never having had much desire. She and Alpesh have fought about having sex. This bothers her very much. Other than Alpesh, she is close with her daughter,Yadira. She does not care for her son in law,Ruel though.  Of her son and dtr in law she says "they have better things to do". Dtr in law has a wealthy family with homes in [place] and " [place].  She believes she and Alpesh are often left out of activities and this hurts her. She would like to have a closer relationship with her grandkids but finds it hard to get time with them.  She and Alpesh are most close with Alpesh's brother and his wife. His brother has dementia and they go there often to help out. She does worry about the prevalence of dementia in Alpesh's family.  She does say she is thankful that they were with her parents and able to care for them in their last years.  She describes doing Pilates as her primary hobby.         STRENGTHS AND LIABILITIES: Strength: Patient is intelligent., Strength: Patient is physically healthy., Strength: Patient is stable.    IMPRESSION:   My diagnostic impression is Anxiety disorders; anxiety, unspecified [F41.9] and Major Depressive Disorder, Recurrent, Moderate (F33.1), as evidenced by patient report of having little interest in activities, poor concentration, feeling low energy, feeling guilt, worrying too much.       TREATMENT GOALS: Depression: decreasing worthlessness (or other schemata-specify  ), increasing energy, increasing self-reward for positive thoughts (one/day), reducing excessive guilt, and reducing negative automatic thoughts    Patient's stated potential goals: To be more assertive. To improve  attention span/memory. To improve relationship with spouse, family, grandkids.     PLAN: In this session a psychosocial evaluation was conducted to get history and determine a treatment plan. CBT will be utilized in future individual  therapy sessions to increase interaction, insight, and support.     NEXT APPOINTMENT: September 25, 2023

## 2023-09-21 ENCOUNTER — OFFICE VISIT (OUTPATIENT)
Dept: DERMATOLOGY | Facility: CLINIC | Age: 73
End: 2023-09-21
Payer: MEDICARE

## 2023-09-21 DIAGNOSIS — L57.0 ACTINIC KERATOSIS: ICD-10-CM

## 2023-09-21 DIAGNOSIS — L82.1 SEBORRHEIC KERATOSES: ICD-10-CM

## 2023-09-21 DIAGNOSIS — L98.8 RHYTIDES: Primary | ICD-10-CM

## 2023-09-21 DIAGNOSIS — L82.0 INFLAMED SEBORRHEIC KERATOSIS: ICD-10-CM

## 2023-09-21 PROCEDURE — 17000 DESTRUCT PREMALG LESION: CPT | Mod: S$PBB,XS,51,ICN | Performed by: STUDENT IN AN ORGANIZED HEALTH CARE EDUCATION/TRAINING PROGRAM

## 2023-09-21 PROCEDURE — 99212 OFFICE O/P EST SF 10 MIN: CPT | Mod: PBBFAC | Performed by: STUDENT IN AN ORGANIZED HEALTH CARE EDUCATION/TRAINING PROGRAM

## 2023-09-21 PROCEDURE — 17110 DESTRUCTION B9 LES UP TO 14: CPT | Mod: PBBFAC,ICN | Performed by: STUDENT IN AN ORGANIZED HEALTH CARE EDUCATION/TRAINING PROGRAM

## 2023-09-21 PROCEDURE — 99999 PR PBB SHADOW E&M-EST. PATIENT-LVL II: CPT | Mod: PBBFAC,,, | Performed by: STUDENT IN AN ORGANIZED HEALTH CARE EDUCATION/TRAINING PROGRAM

## 2023-09-21 PROCEDURE — 17000 PR DESTRUCTION(LASER SURGERY,CRYOSURGERY,CHEMOSURGERY),PREMALIGNANT LESIONS,FIRST LESION: ICD-10-PCS | Mod: S$PBB,XS,51,ICN | Performed by: STUDENT IN AN ORGANIZED HEALTH CARE EDUCATION/TRAINING PROGRAM

## 2023-09-21 PROCEDURE — 99214 PR OFFICE/OUTPT VISIT, EST, LEVL IV, 30-39 MIN: ICD-10-PCS | Mod: 25,S$PBB,ICN, | Performed by: STUDENT IN AN ORGANIZED HEALTH CARE EDUCATION/TRAINING PROGRAM

## 2023-09-21 PROCEDURE — 99999 PR PBB SHADOW E&M-EST. PATIENT-LVL II: ICD-10-PCS | Mod: PBBFAC,,, | Performed by: STUDENT IN AN ORGANIZED HEALTH CARE EDUCATION/TRAINING PROGRAM

## 2023-09-21 PROCEDURE — 17110 PR DESTRUCTION BENIGN LESIONS UP TO 14: ICD-10-PCS | Mod: S$PBB,ICN,, | Performed by: STUDENT IN AN ORGANIZED HEALTH CARE EDUCATION/TRAINING PROGRAM

## 2023-09-21 PROCEDURE — 99214 OFFICE O/P EST MOD 30 MIN: CPT | Mod: 25,S$PBB,ICN, | Performed by: STUDENT IN AN ORGANIZED HEALTH CARE EDUCATION/TRAINING PROGRAM

## 2023-09-21 PROCEDURE — 17000 DESTRUCT PREMALG LESION: CPT | Mod: PBBFAC,XS | Performed by: STUDENT IN AN ORGANIZED HEALTH CARE EDUCATION/TRAINING PROGRAM

## 2023-09-21 PROCEDURE — 17110 DESTRUCTION B9 LES UP TO 14: CPT | Mod: S$PBB,ICN,, | Performed by: STUDENT IN AN ORGANIZED HEALTH CARE EDUCATION/TRAINING PROGRAM

## 2023-09-21 RX ORDER — TRETINOIN 0.25 MG/G
CREAM TOPICAL NIGHTLY
Qty: 45 G | Refills: 5 | Status: SHIPPED | OUTPATIENT
Start: 2023-09-21 | End: 2023-12-02 | Stop reason: SDUPTHER

## 2023-09-21 NOTE — PROGRESS NOTES
Patient Information  Name: Leticia Piña  : 1950  MRN: [id number]     Referring Physician:  Dr. Roman ref. provider found   Primary Care Physician:  Nicole Khoury MD   Date of Visit: 2023      Subjective:       Leticia Piña is a 73 y.o. female who presents for skin lesions    HPI  Patient with new complaint of lesion(s)  Location: chest, back  Duration: months  Symptoms: itchy  Relieving factors/Previous treatments: none    Patient was last seen:Visit date not found     Prior notes by myself reviewed.   Clinical documentation obtained by nursing staff reviewed.    Review of Systems   Skin:  Positive for itching. Negative for rash.        Objective:    Physical Exam   Constitutional: She appears well-developed and well-nourished. No distress.   Neurological: She is alert and oriented to person, place, and time. She is not disoriented.   Psychiatric: She has a normal mood and affect.   Skin:   Areas Examined (abnormalities noted in diagram):   Head / Face Inspection Performed  Neck Inspection Performed  Chest / Axilla Inspection Performed  Back Inspection Performed              Diagram Legend     Erythematous scaling macule/papule c/w actinic keratosis       Vascular papule c/w angioma      Pigmented verrucoid papule/plaque c/w seborrheic keratosis      Yellow umbilicated papule c/w sebaceous hyperplasia      Irregularly shaped tan macule c/w lentigo     1-2 mm smooth white papules consistent with Milia      Movable subcutaneous cyst with punctum c/w epidermal inclusion cyst      Subcutaneous movable cyst c/w pilar cyst      Firm pink to brown papule c/w dermatofibroma      Pedunculated fleshy papule(s) c/w skin tag(s)      Evenly pigmented macule c/w junctional nevus     Mildly variegated pigmented, slightly irregular-bordered macule c/w mildly atypical nevus      Flesh colored to evenly pigmented papule c/w intradermal nevus       Pink pearly papule/plaque c/w basal cell carcinoma       Erythematous hyperkeratotic cursted plaque c/w SCC      Surgical scar with no sign of skin cancer recurrence      Open and closed comedones      Inflammatory papules and pustules      Verrucoid papule consistent consistent with wart     Erythematous eczematous patches and plaques     Dystrophic onycholytic nail with subungual debris c/w onychomycosis     Umbilicated papule    Erythematous-base heme-crusted tan verrucoid plaque consistent with inflamed seborrheic keratosis     Erythematous Silvery Scaling Plaque c/w Psoriasis     See annotation      No images are attached to the encounter or orders placed in the encounter.    [] Data reviewed  [] Independent review of test  [] Management discussed with another provider    Assessment / Plan:        Rhytides  -     tretinoin (RETIN-A) 0.025 % cream; Apply topically every evening.  Dispense: 45 g; Refill: 5    Actinic keratosis  Cryosurgery Procedure Note    Verbal consent from the patient is obtained including, but not limited to, risk of hypopigmentation/hyperpigmentation, scar, recurrence of lesion. The patient is aware of the precancerous quality and need for treatment of these lesions. Liquid nitrogen cryosurgery is applied to the 1 actinic keratoses, as detailed in the physical exam, to produce a freeze injury. The patient is aware that blisters may form and is instructed on wound care with gentle cleansing and use of vaseline ointment to keep moist until healed. The patient is supplied a handout on cryosurgery and is instructed to call if lesions do not completely resolve.    Inflamed seborrheic keratosis  Cryosurgery procedure note:    Verbal consent from the patient is obtained including, but not limited to, risk of hypopigmentation/hyperpigmentation, scar, recurrence of lesion. Liquid nitrogen cryosurgery is applied to 5 lesions to produce a freeze injury. The patient is aware that blisters may form and is instructed on wound care with gentle cleansing  and use of vaseline ointment to keep moist until healed. The patient is supplied a handout on cryosurgery and is instructed to call if lesions do not completely resolve.    Seborrheic keratoses  These are benign inherited growths without a malignant potential. Reassurance given to patient. No treatment is necessary.              LOS NUMBER AND COMPLEXITY OF PROBLEMS    COMPLEXITY OF DATA RISK TOTAL TIME (m)   37176  31155 [] 1 self-limited or minor problem [x] Minimal to none [] No treatment recommended or patient to monitor 15-29  10-19   34713  45131 Low  [] 2 or > self limited or minor problems  [] 1 stable chronic illness  [] 1 acute, uncomplicated illness or injury Limited (2)  [] Prior external notes from each unique source  [] Review result of each unique test  [] Order each unique test []  Low  OTC medications, minor skin biopsy 30-44  20-29   00815  77291 Moderate  []  1 or > chronic illness with progression, exacerbation or SE of treatment  [x]  2 or more stable chronic illnesses  []  1 acute illness with systemic symptoms  []  1 acute complicated injury  []  1 undiagnosed new problem with uncertain prognosis Moderate (1/3 below)  []  3 or more data items        *Now includes assessment requiring independent historian  []  Independent interpretation of a test  []  Discuss management/test with another provider Moderate  [x]  Prescription drug mgmt  []  Minor surgery with risk discussed  []  Mgmt limited by social determinates 45-59  30-39   43521  87282 High  []  1 or more chronic illness with severe exacerbation, progression or SE of treatment  []  1 acute or chronic illness/injury that poses a threat to life or bodily function Extensive (2/3 below)  []  3 or more data items        *Now includes assessment requiring independent historian.  []  Independent interpretation of a test  []  Discuss management/test with another provider High  []  Major surgery with risk discussed  []  Drug therapy requiring  intensive monitoring for toxicity  []  Hospitalization  []  Decision for DNR 60-74  40-54      No follow-ups on file.    Edna Hendricks MD, FAAD  [facility]

## 2023-09-21 NOTE — PATIENT INSTRUCTIONS

## 2023-09-25 ENCOUNTER — PATIENT MESSAGE (OUTPATIENT)
Dept: PRIMARY CARE CLINIC | Facility: CLINIC | Age: 73
End: 2023-09-25

## 2023-09-25 ENCOUNTER — CLINICAL SUPPORT (OUTPATIENT)
Dept: PRIMARY CARE CLINIC | Facility: CLINIC | Age: 73
End: 2023-09-25
Payer: MEDICARE

## 2023-09-25 DIAGNOSIS — F41.8 DEPRESSION WITH ANXIETY: Primary | ICD-10-CM

## 2023-09-25 PROCEDURE — 99499 UNLISTED E&M SERVICE: CPT | Mod: S$PBB,,,

## 2023-09-25 PROCEDURE — 99499 NO LOS: ICD-10-PCS | Mod: S$PBB,,,

## 2023-09-25 NOTE — PROGRESS NOTES
"Individual Psychotherapy ([facility])  Leticia DEVINE Wang,  9/25/2023  DATE:  9/25/2023  TYPE OF VISIT:  In person  LENGTH OF SESSION: 60    Therapeutic Intervention: Met with patient for individual psychotherapy.    Chief complaint/reason for encounter: depression, anxiety, interpersonal, and sexual problems       Session Content/Presenting Problem:    Continued discussion of her relationship with her spouse, Alpesh. She had issues with lack of desire in the past and had seen a gynecologist for help in the past. She has not been back to see a gynecologist in many years.  She blames much of their current issue on lack of sleep on her part. Encouraged her to remember times in the past when she felt attraction for her spouse; patient will think about ways to plan for physical encounters with Alpesh, rather than waiting for Alpesh to "pressure" her. They are going to the beach next week and she is hopeful this will be a good opportunity for them to feel closer to one another.  Patient talks about ongoing tension between her and her 2 sisters in dealing with selling their parents' home. Her older sister Alida was angry at Alpesh over some issues with roof repair work and called him a liar.  Patient also talks about her former friend who had been an emotional support; that patient backed off after Alpesh was arrested. The friend had a lot of emotional turmoil in her own family and did not want to be involved in their turmoil.  Discussed with patient her thought of "I feel silly for being upset" and "other people have it worse than you/your problem is frivolous." Normalized her grief and sadness after Alpesh was arrested. They went through a traumatic situation. Then she ended up caring for each of her parents' before their deaths. Encouraged patient to identify alternate thoughts that validate her appropriate sadness and grief.  Patient will consider starting to journal her thoughts; she may also look for books on improving " "relationships in later life.       Estrangement from her family. Lack of connection with her kids and their families. Medical issues for she and spouse causing lack of desire, loss of intimacy between them.  Difficulty sleeping including having nightmares. This worsened after menopause and her RA diagnosis. She has used Ambien and Trazodone at different times. Says she used to have night terrors. She would thrash about in bed and actually fell out of bed on one occasion. She is now using trazodone and CBD oil. Still dreaming, but not having nightmares any longer.  Death of both parents in the last few years; Dad  in .  Mom  in May 2023. She cared for both parents until their deaths. Dad's death was peaceful.  Mom's death was difficult, she struggled and was in pain.  Patient had to give her mom morphine. Because of that patient feels guilty for "causing her death". She says "I didn't want to do it but she was miserable."    Current symptoms:  Depression: anhedonia, worthlessness/guilt, hopelessness, and fatigue.  Anxiety: excessive worrying and restlessness.  Insomnia: difficulty falling asleep and non-restful sleep.  Brianda:  denies.  Psychosis: denies .      Risk parameters:  Patient reports no suicidal ideation  Patient reports no homicidal ideation  Patient reports no self-injurious behavior  Patient reports no violent behavior    MENTAL HEALTH STATUS EXAM  General Appearance:  unremarkable, age appropriate   Speech: normal tone, normal rate, normal pitch, normal volume      Level of Cooperation: cooperative      Thought Processes: normal and logical   Mood: depressed, sad      Thought Content: normal, no suicidality, no homicidality, delusions, or paranoia   Affect: sad, anxious   Orientation: Oriented x3   Attention Span & Concentration: intact   Fund of General Knowledge: intact and appropriate to age and level of education   Judgment & Insight: good     Language  intact       STRENGTHS AND " LIABILITIES: Strength: Patient is expressive/articulate., Strength: Patient is intelligent., Strength: Patient has reasonable judgment., Strength: Patient is stable.    IMPRESSION:   My diagnostic impression is Anxiety disorders; anxiety, unspecified [F41.9] and Major Depressive Disorder, Recurrent, Moderate (F33.1), as evidenced by patient report of anhedonia, feelings of guilt and worthlessness, fatigue, nightmares, poor sleep, worry, loneliness.     TREATMENT GOALS (per patient):  To improve relationship with , Alpesh. To address memory issues. To have a closer, more meaningful relationship with her grandkids. To be more assertive.    Treatment plan:  Target symptoms: recurrent depression, grief  Why chosen therapy is appropriate versus another modality: relevant to diagnosis, patient responds to this modality, evidence based practice  Outcome monitoring methods: self-report, observation, checklist/rating scale  Therapeutic intervention type: behavior modifying psychotherapy    Patient's response to intervention:  The patient's response to intervention is accepting.    Progress toward goals and other mental status changes:  The patient's progress toward goals is fair .    Plan: Pt plans to continue individual psychotherapy CBT will be utilized in future individual therapy sessions to increase interaction, insight, and support.     Return to clinic: 2 weeks

## 2023-09-26 ENCOUNTER — TELEPHONE (OUTPATIENT)
Dept: PRIMARY CARE CLINIC | Facility: CLINIC | Age: 73
End: 2023-09-26
Payer: MEDICARE

## 2023-09-26 ENCOUNTER — PATIENT MESSAGE (OUTPATIENT)
Dept: PRIMARY CARE CLINIC | Facility: CLINIC | Age: 73
End: 2023-09-26
Payer: MEDICARE

## 2023-09-26 DIAGNOSIS — R39.15 URINARY URGENCY: Primary | ICD-10-CM

## 2023-09-26 RX ORDER — NITROFURANTOIN 25; 75 MG/1; MG/1
100 CAPSULE ORAL 2 TIMES DAILY
Qty: 20 CAPSULE | Refills: 0 | Status: SHIPPED | OUTPATIENT
Start: 2023-09-26 | End: 2023-10-06

## 2023-09-28 ENCOUNTER — PATIENT MESSAGE (OUTPATIENT)
Dept: DERMATOLOGY | Facility: CLINIC | Age: 73
End: 2023-09-28
Payer: MEDICARE

## 2023-10-06 NOTE — PROGRESS NOTES
"Subjective:      Patient ID: Leticia Piña is a 73 y.o. female.    Chief Complaint: Follow-up (Follow up. Pt is here to discuss elevated blood pressure. )      HPI  Here for f/u medical problems and preventive exam.  Amlodipine increased to 10mg yesterday, no propranolol.  Down 3#.  Working out at least 3x per week, pilates and cardio.  Counseling doing well.  HNERI better with Jacy,  doesn't c/o pt snoring anymore.  No f/c/sw/cough.  RA pain controlled on Rinvoq/plaquenil.    No cp/sob/palp.  BMs and urine normal.    HM: 10/23 fluvax, 3/21 covid vaccines, 6/19 HAV, 11/19 [id number], 3/17 booster [id number], 12/22 [id number], 6/21 TDaP, 4/13 zoster, 4/22 Shingrix #2, 3/23 BMD now on drug holiday rep 2y, 1/23 EGD, 3/23 Cscope rep 3y, 2/23 MMG, 11/17 Gyn Dr. Jimenez, 3/17 HCV neg.   10/22 Cologuard negative.     Review of Systems   Constitutional:  Negative for appetite change, chills, diaphoresis and fever.   HENT:  Negative for congestion, ear pain, rhinorrhea, sinus pressure and sore throat.    Respiratory:  Negative for cough, chest tightness and shortness of breath.    Cardiovascular:  Negative for chest pain and palpitations.   Gastrointestinal:  Negative for blood in stool, constipation, diarrhea, nausea and vomiting.   Genitourinary:  Negative for dysuria, frequency, hematuria, menstrual problem, urgency and vaginal discharge.   Musculoskeletal:  Negative for arthralgias.   Skin:  Negative for rash.   Neurological:  Negative for dizziness and headaches.   Psychiatric/Behavioral:  Negative for sleep disturbance. The patient is not nervous/anxious.          Objective:   /60 (BP Location: Left arm)   Pulse 88   Temp 97.8 °F (36.6 °C) (Oral)   Ht 5' 5" (1.651 m)   Wt 75.3 kg (166 lb)   SpO2 97%   BMI 27.62 kg/m²     Physical Exam  Constitutional:       Appearance: She is well-developed.   HENT:      Right Ear: External ear normal. Tympanic membrane is not injected.      Left Ear: External ear " normal. Tympanic membrane is not injected.   Eyes:      Conjunctiva/sclera: Conjunctivae normal.   Neck:      Thyroid: No thyromegaly.   Cardiovascular:      Rate and Rhythm: Normal rate and regular rhythm.      Heart sounds: No murmur heard.     No friction rub. No gallop.   Pulmonary:      Effort: Pulmonary effort is normal.      Breath sounds: Normal breath sounds. No wheezing or rales.   Abdominal:      General: Bowel sounds are normal.      Palpations: Abdomen is soft. There is no mass.      Tenderness: There is no abdominal tenderness.   Musculoskeletal:      Cervical back: Normal range of motion and neck supple.   Lymphadenopathy:      Cervical: No cervical adenopathy.   Skin:     General: Skin is warm.      Findings: No rash.   Neurological:      Mental Status: She is alert and oriented to person, place, and time.          Latest Reference Range & Units 05/15/23 13:49   WBC 3.90 - 12.70 K/uL 4.86   RBC 4.00 - 5.40 M/uL 3.53 (L)   Hemoglobin 12.0 - 16.0 g/dL 10.8 (L)   Hematocrit 37.0 - 48.5 % 32.2 (L)   MCV 82 - 98 fL 91   MCH 27.0 - 31.0 pg 30.6   MCHC 32.0 - 36.0 g/dL 33.5   RDW 11.5 - 14.5 % 11.3 (L)   Platelet Count 150 - 450 K/uL 304   MPV 9.2 - 12.9 fL 9.7   Gran % 38.0 - 73.0 % 53.6   Lymph % 18.0 - 48.0 % 29.6   Mono % 4.0 - 15.0 % 9.9   Eosinophil % 0.0 - 8.0 % 5.1   Basophil % 0.0 - 1.9 % 1.4   Immature Granulocytes 0.0 - 0.5 % 0.4   Gran # (ANC) 1.8 - 7.7 K/uL 2.6   Lymph # 1.0 - 4.8 K/uL 1.4   Mono # 0.3 - 1.0 K/uL 0.5   Eos # 0.0 - 0.5 K/uL 0.3   Baso # 0.00 - 0.20 K/uL 0.07   Immature Grans (Abs) 0.00 - 0.04 K/uL 0.02   nRBC 0 /100 WBC 0   Differential Method  Automated   Iron 30 - 160 ug/dL 133   TIBC 250 - 450 ug/dL 457 (H)   Saturated Iron 20 - 50 % 29   Transferrin 200 - 375 mg/dL 309   Ferritin 20.0 - 300.0 ng/mL 303 (H)   Sodium 136 - 145 mmol/L 139   Potassium 3.5 - 5.1 mmol/L 4.3   Chloride 95 - 110 mmol/L 96   CO2 23 - 29 mmol/L 32 (H)   Anion Gap 8 - 16 mmol/L 11   BUN 8 - 23 mg/dL 20  "  Creatinine 0.5 - 1.4 mg/dL 0.8   eGFR >60 mL/min/1.73 m^2 >60   Glucose 70 - 110 mg/dL 101   Calcium 8.7 - 10.5 mg/dL 9.0       Assessment:       1. Essential hypertension    2. Acquired hypothyroidism    3. Rheumatoid arthritis involving multiple sites with positive rheumatoid factor    4. Immunocompromised    5. Gastroesophageal reflux disease with esophagitis without hemorrhage    6. Sjogren's syndrome, with unspecified organ involvement    7. Osteopenia with high risk of fracture    8. Pulmonary hypertension    9. Recurrent major depressive disorder, in partial remission    10. HENRI (obstructive sleep apnea)    11. Encounter for preventive health examination          Plan:     Essential hypertension- stable, watch at home with wt loss.  -     Lipid Panel; Future; Expected date: 10/19/2023    Acquired hypothyroidism- Clinically stable, continue present treatment.  -     TSH; Future; Expected date: 10/19/2023    Rheumatoid arthritis involving multiple sites with positive rheumatoid factor, Immunocompromised- needs to see Rheum again.  -     Ambulatory referral/consult to Rheumatology; Future; Expected date: 10/26/2023    Gastroesophageal reflux disease with esophagitis without hemorrhage- on PPI, doing well.    Sjogren's syndrome, with unspecified organ involvement    Osteopenia with high risk of fracture, on drug holiday.    Pulmonary hypertension, HFpEF- intol of propranolol, "felt horrible".  Monitor HR, poss bystolic.    Recurrent major depressive disorder, in partial remission- cont rx, counseling, Psych.    HENRI (obstructive sleep apnea)- doing well on Nessa.    Encounter for preventive health examination- fluvax labs with next lab draw.    Other orders  -     Influenza - Quadrivalent (Adjuvanted)    RTC 3mo.  Audiology for right hearing loss after covid vaccine.    "

## 2023-10-09 ENCOUNTER — PATIENT MESSAGE (OUTPATIENT)
Dept: PRIMARY CARE CLINIC | Facility: CLINIC | Age: 73
End: 2023-10-09
Payer: MEDICARE

## 2023-10-10 ENCOUNTER — HOSPITAL ENCOUNTER (OUTPATIENT)
Dept: CARDIOLOGY | Facility: HOSPITAL | Age: 73
Discharge: HOME | End: 2023-10-10
Attending: INTERNAL MEDICINE
Payer: MEDICARE

## 2023-10-10 VITALS
HEIGHT: 65 IN | WEIGHT: 169 LBS | DIASTOLIC BLOOD PRESSURE: 67 MMHG | SYSTOLIC BLOOD PRESSURE: 148 MMHG | BODY MASS INDEX: 28.16 KG/M2

## 2023-10-10 DIAGNOSIS — R20.9 BILATERAL COLD FEET: ICD-10-CM

## 2023-10-10 DIAGNOSIS — R07.89 ATYPICAL CHEST PAIN: ICD-10-CM

## 2023-10-10 DIAGNOSIS — R93.1 ABNORMAL ECHOCARDIOGRAM: ICD-10-CM

## 2023-10-10 DIAGNOSIS — I35.1 NONRHEUMATIC AORTIC VALVE INSUFFICIENCY: ICD-10-CM

## 2023-10-10 DIAGNOSIS — I34.0 NONRHEUMATIC MITRAL VALVE REGURGITATION: ICD-10-CM

## 2023-10-10 DIAGNOSIS — I10 ESSENTIAL HYPERTENSION: ICD-10-CM

## 2023-10-10 LAB
CV STRESS BASE HR: 68 BPM
DIASTOLIC BLOOD PRESSURE: 77 MMHG
LEFT ABI: 1.2
LEFT ARM BP: 146 MMHG
LEFT DORSALIS PEDIS: 177 MMHG
LEFT POSTERIOR TIBIAL: 171 MMHG
LEFT TBI: 1.14
LEFT TOE PRESSURE: 169 MMHG
OHS CV CPX 85 PERCENT MAX PREDICTED HEART RATE MALE: 122
OHS CV CPX MAX PREDICTED HEART RATE: 144
OHS CV CPX PATIENT IS FEMALE: 1
OHS CV CPX PATIENT IS MALE: 0
OHS CV CPX PEAK DIASTOLIC BLOOD PRESSURE: 56 MMHG
OHS CV CPX PEAK HEAR RATE: 127 BPM
OHS CV CPX PEAK RATE PRESSURE PRODUCT: NORMAL
OHS CV CPX PEAK SYSTOLIC BLOOD PRESSURE: 207 MMHG
OHS CV CPX PERCENT MAX PREDICTED HEART RATE ACHIEVED: 88
OHS CV CPX RATE PRESSURE PRODUCT PRESENTING: 9724
RIGHT ABI: 1.21
RIGHT ARM BP: 148 MMHG
RIGHT DORSALIS PEDIS: 153 MMHG
RIGHT POSTERIOR TIBIAL: 179 MMHG
RIGHT TBI: 1.25
RIGHT TOE PRESSURE: 185 MMHG
STRESS ECHO POST EXERCISE DUR MIN: 6 MINUTES
STRESS ECHO POST EXERCISE DUR SEC: 17 SECONDS
SYSTOLIC BLOOD PRESSURE: 143 MMHG

## 2023-10-10 PROCEDURE — 93922 ANKLE BRACHIAL INDICES (ABI): ICD-10-PCS | Mod: 26,,, | Performed by: INTERNAL MEDICINE

## 2023-10-10 PROCEDURE — 93922 UPR/L XTREMITY ART 2 LEVELS: CPT | Mod: 26,,, | Performed by: INTERNAL MEDICINE

## 2023-10-10 PROCEDURE — 93018 EXERCISE STRESS - EKG (CUPID ONLY): ICD-10-PCS | Mod: ,,, | Performed by: INTERNAL MEDICINE

## 2023-10-10 PROCEDURE — 93016 EXERCISE STRESS - EKG (CUPID ONLY): ICD-10-PCS | Mod: ,,, | Performed by: INTERNAL MEDICINE

## 2023-10-10 PROCEDURE — 93017 CV STRESS TEST TRACING ONLY: CPT

## 2023-10-10 PROCEDURE — 93922 UPR/L XTREMITY ART 2 LEVELS: CPT

## 2023-10-10 PROCEDURE — 93018 CV STRESS TEST I&R ONLY: CPT | Mod: ,,, | Performed by: INTERNAL MEDICINE

## 2023-10-10 PROCEDURE — 93016 CV STRESS TEST SUPVJ ONLY: CPT | Mod: ,,, | Performed by: INTERNAL MEDICINE

## 2023-10-15 ENCOUNTER — PATIENT MESSAGE (OUTPATIENT)
Dept: PRIMARY CARE CLINIC | Facility: CLINIC | Age: 73
End: 2023-10-15
Payer: MEDICARE

## 2023-10-16 DIAGNOSIS — I10 ESSENTIAL HYPERTENSION: Primary | ICD-10-CM

## 2023-10-16 RX ORDER — AMLODIPINE BESYLATE 10 MG/1
10 TABLET ORAL DAILY
Qty: 90 EACH | Refills: 3 | Status: SHIPPED | OUTPATIENT
Start: 2023-10-16 | End: 2023-11-10

## 2023-10-19 ENCOUNTER — OFFICE VISIT (OUTPATIENT)
Dept: PRIMARY CARE CLINIC | Facility: CLINIC | Age: 73
End: 2023-10-19
Payer: MEDICARE

## 2023-10-19 ENCOUNTER — CLINICAL SUPPORT (OUTPATIENT)
Dept: PRIMARY CARE CLINIC | Facility: CLINIC | Age: 73
End: 2023-10-19
Payer: MEDICARE

## 2023-10-19 ENCOUNTER — OFFICE VISIT (OUTPATIENT)
Dept: RHEUMATOLOGY | Facility: CLINIC | Age: 73
End: 2023-10-19
Payer: MEDICARE

## 2023-10-19 VITALS
HEART RATE: 86 BPM | WEIGHT: 167.13 LBS | BODY MASS INDEX: 27.81 KG/M2 | SYSTOLIC BLOOD PRESSURE: 122 MMHG | DIASTOLIC BLOOD PRESSURE: 67 MMHG

## 2023-10-19 VITALS
DIASTOLIC BLOOD PRESSURE: 60 MMHG | TEMPERATURE: 98 F | BODY MASS INDEX: 27.66 KG/M2 | WEIGHT: 166 LBS | HEIGHT: 65 IN | HEART RATE: 88 BPM | SYSTOLIC BLOOD PRESSURE: 136 MMHG | OXYGEN SATURATION: 97 %

## 2023-10-19 DIAGNOSIS — M05.79 RHEUMATOID ARTHRITIS INVOLVING MULTIPLE SITES WITH POSITIVE RHEUMATOID FACTOR: ICD-10-CM

## 2023-10-19 DIAGNOSIS — F33.41 RECURRENT MAJOR DEPRESSIVE DISORDER, IN PARTIAL REMISSION: ICD-10-CM

## 2023-10-19 DIAGNOSIS — K21.00 GASTROESOPHAGEAL REFLUX DISEASE WITH ESOPHAGITIS WITHOUT HEMORRHAGE: ICD-10-CM

## 2023-10-19 DIAGNOSIS — I50.32 CHRONIC HEART FAILURE WITH PRESERVED EJECTION FRACTION: ICD-10-CM

## 2023-10-19 DIAGNOSIS — Z00.00 ENCOUNTER FOR PREVENTIVE HEALTH EXAMINATION: ICD-10-CM

## 2023-10-19 DIAGNOSIS — K11.7 XEROSTOMIA DUE TO AUTOIMMUNE DISEASE: ICD-10-CM

## 2023-10-19 DIAGNOSIS — M85.80 OSTEOPENIA WITH HIGH RISK OF FRACTURE: ICD-10-CM

## 2023-10-19 DIAGNOSIS — D84.9 IMMUNOCOMPROMISED: ICD-10-CM

## 2023-10-19 DIAGNOSIS — G47.33 OSA (OBSTRUCTIVE SLEEP APNEA): ICD-10-CM

## 2023-10-19 DIAGNOSIS — H90.5 SENSORINEURAL HEARING LOSS (SNHL) OF RIGHT EAR, UNSPECIFIED HEARING STATUS ON CONTRALATERAL SIDE: ICD-10-CM

## 2023-10-19 DIAGNOSIS — E03.9 ACQUIRED HYPOTHYROIDISM: ICD-10-CM

## 2023-10-19 DIAGNOSIS — M35.00 SJOGREN'S SYNDROME, WITH UNSPECIFIED ORGAN INVOLVEMENT: ICD-10-CM

## 2023-10-19 DIAGNOSIS — F41.8 DEPRESSION WITH ANXIETY: Primary | ICD-10-CM

## 2023-10-19 DIAGNOSIS — I27.20 PULMONARY HYPERTENSION: ICD-10-CM

## 2023-10-19 DIAGNOSIS — M05.79 RHEUMATOID ARTHRITIS INVOLVING MULTIPLE SITES WITH POSITIVE RHEUMATOID FACTOR: Primary | ICD-10-CM

## 2023-10-19 DIAGNOSIS — M35.01 SJOGREN'S SYNDROME WITH KERATOCONJUNCTIVITIS SICCA: ICD-10-CM

## 2023-10-19 DIAGNOSIS — M35.9 XEROSTOMIA DUE TO AUTOIMMUNE DISEASE: ICD-10-CM

## 2023-10-19 DIAGNOSIS — I10 ESSENTIAL HYPERTENSION: Primary | ICD-10-CM

## 2023-10-19 PROCEDURE — 99999 PR PBB SHADOW E&M-EST. PATIENT-LVL IV: ICD-10-PCS | Mod: PBBFAC,,, | Performed by: INTERNAL MEDICINE

## 2023-10-19 PROCEDURE — 99214 PR OFFICE/OUTPT VISIT, EST, LEVL IV, 30-39 MIN: ICD-10-PCS | Mod: S$PBB,,, | Performed by: INTERNAL MEDICINE

## 2023-10-19 PROCEDURE — 99214 OFFICE O/P EST MOD 30 MIN: CPT | Mod: S$PBB,,, | Performed by: INTERNAL MEDICINE

## 2023-10-19 PROCEDURE — 99999PBSHW FLU VACCINE - QUADRIVALENT - ADJUVANTED: Mod: PBBFAC,,,

## 2023-10-19 PROCEDURE — G0008 ADMIN INFLUENZA VIRUS VAC: HCPCS | Mod: PBBFAC,PN

## 2023-10-19 PROCEDURE — 99999 PR PBB SHADOW E&M-EST. PATIENT-LVL IV: CPT | Mod: PBBFAC,,, | Performed by: INTERNAL MEDICINE

## 2023-10-19 PROCEDURE — 99499 NO LOS: ICD-10-PCS | Mod: S$PBB,,,

## 2023-10-19 PROCEDURE — 99214 OFFICE O/P EST MOD 30 MIN: CPT | Mod: PBBFAC,27,25 | Performed by: INTERNAL MEDICINE

## 2023-10-19 PROCEDURE — 99215 OFFICE O/P EST HI 40 MIN: CPT | Mod: PBBFAC,PN | Performed by: INTERNAL MEDICINE

## 2023-10-19 PROCEDURE — 99999PBSHW FLU VACCINE - QUADRIVALENT - ADJUVANTED: ICD-10-PCS | Mod: PBBFAC,,,

## 2023-10-19 PROCEDURE — 99999 PR PBB SHADOW E&M-EST. PATIENT-LVL V: ICD-10-PCS | Mod: PBBFAC,,, | Performed by: INTERNAL MEDICINE

## 2023-10-19 PROCEDURE — 99999 PR PBB SHADOW E&M-EST. PATIENT-LVL V: CPT | Mod: PBBFAC,,, | Performed by: INTERNAL MEDICINE

## 2023-10-19 PROCEDURE — 99499 UNLISTED E&M SERVICE: CPT | Mod: S$PBB,,,

## 2023-10-19 RX ORDER — HYDROXYCHLOROQUINE SULFATE 200 MG/1
200 TABLET, FILM COATED ORAL DAILY
Qty: 180 TABLET | Refills: 0 | Status: SHIPPED | OUTPATIENT
Start: 2023-10-19 | End: 2023-12-02 | Stop reason: SDUPTHER

## 2023-10-19 NOTE — PROGRESS NOTES
"[facility] BEHAVIORAL HEALTH INTAKE    DATE:  10/19/2023  REFERRAL SOURCE:  Nicole Jasmine MD  TYPE OF VISIT:  {[facility] VISIT TYPE:52299}  LENGTH OF SESSION: {Length of Service:73629}  .  HISTORY OF PRESENTING ILLNESS:  Leticia Piña, a 73 y.o. female with history of {BHI DIAGNOSIS LIST:23505}.    CHIEF COMPLAINT/REASON FOR ENCOUNTER: Pt presented for initial assessment. Pt's chief complaint includes the following: {PSY  CHIEF COMPLAINTS:15096}.    PSYCHIATRIC HISTORY:  Patient {DOES_DOES NOT:67749} currently have a psychiatrist. ***   Patient {DOES_DOES NOT:64336} currently have a therapist.  ***   {Psychiatric Medications:51822} They {Actions; are/are not:27099} interested in medication changes.  Previous Psychiatric Hospitalizations:  {YES **/NO:57538}  History of Trauma:  {YES:25051}  History of Violence:  {YES:17094}  Access to a gun/weapon:  {YES:47368}  Previous Suicide Attempts:  {YES **/NO:55597}    Risk assessment:  {parameters:06382::"Patient reports no suicidal ideation","Patient reports no homicidal ideation","Patient reports no self-injurious behavior","Patient reports no violent behavior"}    PSYCHIATRIC FAMILY HISTORY: {None/Not Known:75933}    SUBSTANCE ABUSE HISTORY:  Tobacco:  {YES **/NO:92258}   Alcohol: {HISTORY; Alcohol Freq:66698}  Illicit Substances: {YES **/NO:73583}  Misuse of Prescription Medications:  {YES **/NO:71540}    MEDICAL HISTORY:  Past Medical History:   Diagnosis Date    Allergic rhinitis     Cutaneous lupus erythematosus     drug induced.    Essential hypertension 02/05/2019    GERD (gastroesophageal reflux disease)     Hypothyroid     Insomnia     Mixed anxiety and depressive disorder     Nonrheumatic aortic valve insufficiency 9/4/2023    Nonrheumatic mitral valve regurgitation 9/4/2023    Normal cardiac stress test     11/07    Pulmonary hypertension 9/4/2023    Rheumatoid arthritis(714.0)     Sjogren's syndrome          SOCIAL HISTORY (MARRIAGE, EMPLOYMENT, " "etc.):  Living Situation: ***  Relationship status ***  Children/Family: ***  Supports:***  Education/Vocation: ***  Sikh/Spirituality: ***  Hobbies and Interests: ***    Current social stressors:   ***    Current symptoms:  Depression: dysphoric mood.  Anxiety: {Anxiety Symptoms:58199}.  Insomnia: {Insomnia Sx:3110522575}.  Brianda:  {Bipolar SX:35890}.  Psychosis: {Psychosis SX:35870}.Individual Psychotherapy (LCSW)  Leticia Piña,  10/19/2023  DATE:  10/19/2023  TYPE OF VISIT:  {[facility] VISIT TYPE:37894}  LENGTH OF SESSION: {Length of Service:31991}    Therapeutic Intervention: Met with {Relatives of adult:68870::"patient"} for {PSY PLAN PHD/LCSW:85664}.    Chief complaint/reason for encounter: {PSY SW CHIEF COMPLAINTS:14343}       Session Content/Presenting Problem:      Current symptoms:  Depression: {Depression Symptoms:91911}.  Anxiety: {Anxiety Symptoms:32932}.  Insomnia: {Insomnia Sx:7513100375}.  Brianda:  {Bipolar SX:13890}.  Psychosis: {Psychosis SX:26257}.  ***    Risk parameters:  {parameters:71574::"Patient reports no suicidal ideation","Patient reports no homicidal ideation","Patient reports no self-injurious behavior","Patient reports no violent behavior"}    MENTAL HEALTH STATUS EXAM  General Appearance:  {appearance:00525::"unremarkable","age appropriate"}   Speech: {findings; speech psych:99927::"normal tone","normal rate","normal pitch","normal volume"}      Level of Cooperation: {cooperation:37690}      Thought Processes: {thought process:06983::"normal and logical"}   Mood: {mood:71844}      Thought Content: {thought content:65545::"normal, no suicidality, no homicidality, delusions, or paranoia"}   Affect: {desc; affect:27940::"congruent and appropriate"}   Orientation: {orientation:38028}   Attention Span & Concentration: {attention span:65982}   Fund of General Knowledge: {education:31287}   Judgment & Insight: {judgment/insight:01398}     Language  {PSY LANGUAGE:23702}       STRENGTHS " "AND LIABILITIES: {PSY STRENGTHS/LIABILITIES:20514}    IMPRESSION:   My diagnostic impression is {BHI DIAGNOSIS LIST:84168}, as evidenced by ***.     TREATMENT GOALS (per patient):    Treatment plan:  Target symptoms: {PSY TARGET SYMPTOMS:82154}  Why chosen therapy is appropriate versus another modality: {PSY THERAPY VS MODALITY:01057}  Outcome monitoring methods: {PSY OUTCOME MONITORING METHODS:23156}  Therapeutic intervention type: {types:24005}    Patient's response to intervention:  The patient's response to intervention is {response:14726}.    Progress toward goals and other mental status changes:  The patient's progress toward goals is {progress:84991}.    Plan: Pt plans to continue {PSY PLAN PHD/LCSW:78737} {Therapy Types:21552} will be utilized in future {Treatment Types:23407} therapy sessions to increase {Treatment Types:90395}.     Return to clinic: {return:54697}                MENTAL HEALTH STATUS EXAM  General Appearance:  {appearance:97922::"unremarkable","age appropriate"}   Speech: {findings; speech psych:02569::"normal tone","normal rate","normal pitch","normal volume"}      Level of Cooperation: {cooperation:54100}      Thought Processes: {thought process:36617::"normal and logical"}   Mood: {mood:83063}      Thought Content: {thought content:06115::"normal, no suicidality, no homicidality, delusions, or paranoia"}   Affect: {desc; affect:08992::"congruent and appropriate"}   Orientation: {orientation:69404}   Memory: {memory:37782}   Attention Span & Concentration: {attention span:06763}   Fund of General Knowledge: {education:80901}   Judgment & Insight: {judgment/insight:14863}   Language  {PSY LANGUAGE:21096}     Session Content/Presenting Problem Hx:            STRENGTHS AND LIABILITIES: {PSY STRENGTHS/LIABILITIES:20514}    IMPRESSION:   My diagnostic impression is {BHI DIAGNOSIS LIST:72042}, as evidenced by ***.     TREATMENT GOALS: {PCBHI TREATMENT GOALS:26554}    PLAN: In this session a " psychosocial evaluation was conducted to get history and determine a treatment plan. {Therapy Types:26198} will be utilized in future {Treatment Types:28501}  therapy sessions to increase {Treatment Types:19572}.     NEXT APPOINTMENT:

## 2023-10-19 NOTE — PROGRESS NOTES
Individual Psychotherapy ([facility])  Leticia Garlandcarlosreuben,  10/19/2023  DATE:  10/19/2023  TYPE OF VISIT:  In person  LENGTH OF SESSION: 60    Therapeutic Intervention: Met with patient for individual psychotherapy.    Chief complaint/reason for encounter: depression, anxiety, interpersonal, and sexual problems       Session Content/Presenting Problem:    Patient reports having had a very good beach trip with her . She is concerned about her 's apparent memory loss; says he is frequently losing his keys, doing sloppy repair work around the house, and talking excessively. She says he loses his phone at least twice/day. This is how his mother was late in life also. There is a history of dementia in his family. Discussed possible need for notifying his pcp, possible need for neurologist referral.  Patient discusses her son and dtr in law, Susana. She expresses frustration and sadness with Susana; she feels rejected by Susana and also by her son. She has reached out to Susana, but never gotten anywhere in building a relationship. She believes Susana does not like Alpesh and prefers her own family.  Patient's perception is that Susana has a great life with lots of friends, activities, etc. However, she noted that Susana's brother committed suicide a number of years ago. When asked what she would like to express to her son and dtr in law, patient says she wants to apologize for hurting them; says she does not believe they will ever forgive patient and spouse.  She becomes tearful and expresses hopelessness at the thought of improving the relationship. Encouraged patient to consider reaching out just to her son for now.  Patient to write down thoughts related to her desire for forgiveness from family members.        Previous Session:  Continued discussion of her relationship with her spouse, Alpesh. She had issues with lack of desire in the past and had seen a gynecologist for help in the past. She has not been back to see a  "gynecologist in many years.  She blames much of their current issue on lack of sleep on her part. Encouraged her to remember times in the past when she felt attraction for her spouse; patient will think about ways to plan for physical encounters with Alpesh, rather than waiting for Alpesh to "pressure" her. They are going to the beach next week and she is hopeful this will be a good opportunity for them to feel closer to one another.  Patient talks about ongoing tension between her and her 2 sisters in dealing with selling their parents' home. Her older sister Alida was angry at Alpesh over some issues with roof repair work and called him a liar.  Patient also talks about her former friend who had been an emotional support; that patient backed off after Alpesh was arrested. The friend had a lot of emotional turmoil in her own family and did not want to be involved in their turmoil.  Discussed with patient her thought of "I feel silly for being upset" and "other people have it worse than you/your problem is frivolous." Normalized her grief and sadness after Alpesh was arrested. They went through a traumatic situation. Then she ended up caring for each of her parents' before their deaths. Encouraged patient to identify alternate thoughts that validate her appropriate sadness and grief.  Patient will consider starting to journal her thoughts; she may also look for books on improving relationships in later life.           Current symptoms:  Depression: anhedonia, worthlessness/guilt, hopelessness, and fatigue.  Anxiety: excessive worrying and restlessness.  Insomnia: difficulty falling asleep and non-restful sleep.  Brianda:  denies.  Psychosis: denies .      Risk parameters:  Patient reports no suicidal ideation  Patient reports no homicidal ideation  Patient reports no self-injurious behavior  Patient reports no violent behavior    MENTAL HEALTH STATUS EXAM  General Appearance:  unremarkable, age appropriate   Speech: normal " tone, normal rate, normal pitch, normal volume      Level of Cooperation: cooperative      Thought Processes: normal and logical   Mood: depressed, sad      Thought Content: normal, no suicidality, no homicidality, delusions, or paranoia   Affect: sad, anxious   Orientation: Oriented x3   Attention Span & Concentration: intact   Fund of General Knowledge: intact and appropriate to age and level of education   Judgment & Insight: good     Language  intact       STRENGTHS AND LIABILITIES: Strength: Patient is expressive/articulate., Strength: Patient is intelligent., Strength: Patient has reasonable judgment., Strength: Patient is stable.    IMPRESSION:   My diagnostic impression is Anxiety disorders; anxiety, unspecified [F41.9] and Major Depressive Disorder, Recurrent, Moderate (F33.1), as evidenced by patient report of anhedonia, feelings of guilt and worthlessness, fatigue, nightmares, poor sleep, worry, loneliness.     TREATMENT GOALS (per patient):  To improve relationship with , Alpesh. To address memory issues. To have a closer, more meaningful relationship with her grandkids. To be more assertive.    Treatment plan:  Target symptoms: recurrent depression, grief  Why chosen therapy is appropriate versus another modality: relevant to diagnosis, patient responds to this modality, evidence based practice  Outcome monitoring methods: self-report, observation, checklist/rating scale  Therapeutic intervention type: behavior modifying psychotherapy    Patient's response to intervention:  The patient's response to intervention is accepting.    Progress toward goals and other mental status changes:  The patient's progress toward goals is fair .    Plan: Pt plans to continue individual psychotherapy CBT will be utilized in future individual therapy sessions to increase interaction, insight, and support.     Return to clinic: 2 weeks November 2nd

## 2023-10-19 NOTE — PROGRESS NOTES
RHEUMATOLOGY CLINIC FOLLOW UP VISIT  Chief complaints, HPI, ROS, EXAM, Assessment & Plans:-  Leticia Strickland a 73 y.o. pleasant female comes in for follow-up visit.  Seropositive nonerosive rheumatoid arthritis associated with Sjogren's syndrome here for follow-up.  On Plaquenil and Rinvoq.  Failed other medications in the past.  Reports doing well .  No significant flares.  Mild chronic sicca syndrome.  Gets Rinvoq from the drug company.  Last labs in May were stable done by primary care physician.  Rheumatological review of system negative otherwise.  Exam shows no synovitis, dactylitis, enthesitis or effusion.    1. Rheumatoid arthritis involving multiple sites with positive rheumatoid factor    2. Sjogren's syndrome, with unspecified organ involvement    3. Sjogren's syndrome with keratoconjunctivitis sicca    4. 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        Relevant Medications    hydroxychloroquine (PLAQUENIL) 200 mg tablet            Labs reviewed today:-   Latest Reference Range & Units 05/15/23 13:49   WBC 3.90 - 12.70 K/uL 4.86   RBC 4.00 - 5.40 M/uL 3.53 (L)   Hemoglobin 12.0 - 16.0 g/dL 10.8 (L)   Hematocrit 37.0 - 48.5 % 32.2 (L)   MCV 82 - 98 fL 91   MCH 27.0 - 31.0 pg 30.6   MCHC 32.0 - 36.0 g/dL 33.5   RDW 11.5 - 14.5 % 11.3 (L)   Platelet Count 150 - 450 K/uL 304   MPV 9.2 - 12.9 fL 9.7   Gran % 38.0 - 73.0 % 53.6   Lymph % 18.0 - 48.0 % 29.6   Mono % 4.0 - 15.0 % 9.9   Eosinophil % 0.0 - 8.0 % 5.1   Basophil % 0.0 - 1.9 % 1.4   Immature Granulocytes 0.0 - 0.5 % 0.4   Gran #  (ANC) 1.8 - 7.7 K/uL 2.6   Lymph # 1.0 - 4.8 K/uL 1.4   Mono # 0.3 - 1.0 K/uL 0.5   Eos # 0.0 - 0.5 K/uL 0.3   Baso # 0.00 - 0.20 K/uL 0.07   Immature Grans (Abs) 0.00 - 0.04 K/uL 0.02   nRBC 0 /100 WBC 0   Differential Method  Automated   Iron 30 - 160 ug/dL 133   TIBC 250 - 450 ug/dL 457 (H)   Saturated Iron 20 - 50 % 29   Transferrin 200 - 375 mg/dL 309   Ferritin 20.0 - 300.0 ng/mL 303 (H)   Sodium 136 - 145 mmol/L 139   Potassium 3.5 - 5.1 mmol/L 4.3   Chloride 95 - 110 mmol/L 96   CO2 23 - 29 mmol/L 32 (H)   Anion Gap 8 - 16 mmol/L 11   BUN 8 - 23 mg/dL 20   Creatinine 0.5 - 1.4 mg/dL 0.8   eGFR >60 mL/min/1.73 m^2 >60   Glucose 70 - 110 mg/dL 101   Calcium 8.7 - 10.5 mg/dL 9.0   (L): Data is abnormally low  (H): Data is abnormally high      Well controlled seropositive nonerosive rheumatoid arthritis on Rinvoq and Plaquenil.  Try to reduce the dose of Plaquenil to once daily for the next 6 months.  Discontinue later if no flares.  Continue Rinvoq once daily.  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 Monitor carefully for infections. Advised to get immediate medical care if any infection. Also advised strict adherence to age appropriate vaccinations and cancer screenings with PCP.  Exercising regularly for osteopenia.  Had Reclast once in 2020.  History of Fosamax therapy in the past.  Recent DEXA showed osteopenia with high fracture risk but significant improvement compared to last DEXA in 2020.  Continue exercise regimen.  Repeat DEXA scan in 2025.  I have explained all of the above in detail and the patient understands all of the current recommendation(s). I have answered all questions to the best of my ability and to their complete satisfaction.           # Follow up in about 1 year (around 10/19/2024).      Disclaimer: This note was prepared using voice recognition system and is likely to have sound alike errors and is not proof read.  Please call me with any questions.

## 2023-10-27 RX ORDER — BUPROPION HYDROCHLORIDE 300 MG/1
300 TABLET ORAL DAILY
Qty: 90 TABLET | Refills: 1 | Status: SHIPPED | OUTPATIENT
Start: 2023-10-27 | End: 2023-12-02 | Stop reason: SDUPTHER

## 2023-11-01 ENCOUNTER — SOCIAL WORK (OUTPATIENT)
Dept: PRIMARY CARE CLINIC | Facility: CLINIC | Age: 73
End: 2023-11-01
Payer: MEDICARE

## 2023-11-01 DIAGNOSIS — F41.8 DEPRESSION WITH ANXIETY: Primary | ICD-10-CM

## 2023-11-01 PROCEDURE — 99499 NO LOS: ICD-10-PCS | Mod: S$PBB,,,

## 2023-11-01 PROCEDURE — 99499 UNLISTED E&M SERVICE: CPT | Mod: S$PBB,,,

## 2023-11-02 ENCOUNTER — DOCUMENTATION ONLY (OUTPATIENT)
Dept: PRIMARY CARE CLINIC | Facility: CLINIC | Age: 73
End: 2023-11-02
Payer: MEDICARE

## 2023-11-02 ENCOUNTER — PATIENT MESSAGE (OUTPATIENT)
Dept: PRIMARY CARE CLINIC | Facility: CLINIC | Age: 73
End: 2023-11-02
Payer: MEDICARE

## 2023-11-06 ENCOUNTER — CLINICAL SUPPORT (OUTPATIENT)
Dept: REHABILITATION | Facility: HOSPITAL | Age: 73
End: 2023-11-06
Payer: MEDICARE

## 2023-11-06 ENCOUNTER — OFFICE VISIT (OUTPATIENT)
Dept: RHEUMATOLOGY | Facility: CLINIC | Age: 73
End: 2023-11-06
Payer: MEDICARE

## 2023-11-06 VITALS
HEART RATE: 92 BPM | BODY MASS INDEX: 27.58 KG/M2 | HEIGHT: 65 IN | SYSTOLIC BLOOD PRESSURE: 155 MMHG | DIASTOLIC BLOOD PRESSURE: 72 MMHG | WEIGHT: 165.56 LBS

## 2023-11-06 DIAGNOSIS — R60.9 LIPEDEMA: ICD-10-CM

## 2023-11-06 DIAGNOSIS — M76.31 ILIOTIBIAL BAND SYNDROME OF RIGHT SIDE: ICD-10-CM

## 2023-11-06 DIAGNOSIS — D84.821 DRUG-INDUCED IMMUNODEFICIENCY: ICD-10-CM

## 2023-11-06 DIAGNOSIS — Z79.899 HIGH RISK MEDICATION USE: ICD-10-CM

## 2023-11-06 DIAGNOSIS — M05.79 RHEUMATOID ARTHRITIS INVOLVING MULTIPLE SITES WITH POSITIVE RHEUMATOID FACTOR: Primary | ICD-10-CM

## 2023-11-06 DIAGNOSIS — I83.893 VARICOSE VEINS OF BOTH LEGS WITH EDEMA: ICD-10-CM

## 2023-11-06 DIAGNOSIS — Z79.899 DRUG-INDUCED IMMUNODEFICIENCY: ICD-10-CM

## 2023-11-06 DIAGNOSIS — Z51.81 ENCOUNTER FOR MEDICATION MONITORING: ICD-10-CM

## 2023-11-06 DIAGNOSIS — I89.0 LYMPHEDEMA: ICD-10-CM

## 2023-11-06 DIAGNOSIS — M18.0 ARTHRITIS OF CARPOMETACARPAL (CMC) JOINT OF BOTH THUMBS: ICD-10-CM

## 2023-11-06 DIAGNOSIS — M35.01 SJOGREN'S SYNDROME WITH KERATOCONJUNCTIVITIS SICCA: ICD-10-CM

## 2023-11-06 PROCEDURE — 99999 PR PBB SHADOW E&M-EST. PATIENT-LVL V: CPT | Mod: PBBFAC,,, | Performed by: INTERNAL MEDICINE

## 2023-11-06 PROCEDURE — 97161 PT EVAL LOW COMPLEX 20 MIN: CPT | Mod: PN

## 2023-11-06 PROCEDURE — 99215 PR OFFICE/OUTPT VISIT, EST, LEVL V, 40-54 MIN: ICD-10-PCS | Mod: S$PBB,,, | Performed by: INTERNAL MEDICINE

## 2023-11-06 PROCEDURE — 99215 OFFICE O/P EST HI 40 MIN: CPT | Mod: S$PBB,,, | Performed by: INTERNAL MEDICINE

## 2023-11-06 PROCEDURE — 97535 SELF CARE MNGMENT TRAINING: CPT | Mod: PN

## 2023-11-06 PROCEDURE — 99999 PR PBB SHADOW E&M-EST. PATIENT-LVL V: ICD-10-PCS | Mod: PBBFAC,,, | Performed by: INTERNAL MEDICINE

## 2023-11-06 PROCEDURE — 99215 OFFICE O/P EST HI 40 MIN: CPT | Mod: PBBFAC,PO | Performed by: INTERNAL MEDICINE

## 2023-11-06 RX ORDER — DICLOFENAC SODIUM 10 MG/G
2 GEL TOPICAL 4 TIMES DAILY
Qty: 300 G | Refills: 3 | Status: SHIPPED | OUTPATIENT
Start: 2023-11-06 | End: 2023-12-02 | Stop reason: SDUPTHER

## 2023-11-06 NOTE — PATIENT INSTRUCTIONS
Take Arthritis strength extended release Tylenol 650 mg-  1 tablet 3 times daily as needed for pain.  Start turmeric supplements 1000 mg 2 times daily for anti-inflammatory benefit.  Start Osteo Bi-Flex triple strength 1 capsule 2 times daily for joint protection.  Try high fiber, whole foods, plant based diet with less animal products with less sugars.

## 2023-11-06 NOTE — PROGRESS NOTES
RHEUMATOLOGY CLINIC FOLLOW UP VISIT  Chief complaints, HPI, ROS, EXAM, Assessment & Plans:-  Leticia Strickland a 73 y.o. pleasant female comes in for worsening pain.  Recently noted lumps in her legs and pain around bilateral hips on the lateral side extending up to the knee joints.  Worsening pain of bilateral thumb joints radiating into her forearm associated with swelling.  Rheumatological review of system negative otherwise.  Physical examination shows significant carpometacarpal joint tenderness without any active synovitis.  No synovitis of rheumatoid knuckles.  MCP joints unremarkable.  Mild nonpitting edema bilateral lower extremities and increase in varicose veins of both lower legs.  Mild tenderness of iliotibial bands bilateral..    1. Rheumatoid arthritis involving multiple sites with positive rheumatoid factor    2. Sjogren's syndrome with keratoconjunctivitis sicca    3. Drug-induced immunodeficiency    4. Encounter for medication monitoring    5. Varicose veins of both legs with edema    6. Lymphedema    7. Iliotibial band syndrome of right side    8. Arthritis of carpometacarpal (CMC) joint of both thumbs      Problem List Items Addressed This Visit       Rheumatoid arthritis involving multiple sites with positive rheumatoid factor - Primary    Relevant Medications    diclofenac sodium (VOLTAREN) 1 % Gel    Sjogren's syndrome with keratoconjunctivitis sicca    Drug-induced immunodeficiency    Encounter for medication monitoring    Varicose veins of both legs with edema    Relevant Orders    Ambulatory referral/consult to Physical/Occupational Therapy    Lymphedema    Relevant Orders    Ambulatory referral/consult to Physical/Occupational Therapy    Iliotibial band syndrome of right side    Relevant Orders    Ambulatory referral/consult to Physical/Occupational Therapy    Arthritis of carpometacarpal (CMC) joint of both thumbs    Relevant  Medications    diclofenac sodium (VOLTAREN) 1 % Gel       Labs reviewed today:-   Latest Reference Range & Units Most Recent   GIOVANNA Neg <1:160  Negative  8/25/10 10:35   GIOVANNA Screen Negative <1:80  Positive !  7/26/23 11:37   GIOVANNA HEP-2 Titer  Positive 1:160 Speckled  3/5/19 13:40   GIOVANNA Titer 1  1:80  7/26/23 11:37   GIOVANNA PATTERN 1  Homogeneous  7/26/23 11:37   ds DNA Ab Negative 1:10  Negative 1:10  7/26/23 11:37   Anti-SSA Antibody 0.00 - 0.99 Ratio 2.15 (H)  7/26/23 11:37   Anti-SSA Interpretation Negative  Positive !  7/26/23 11:37   Anti-SSB Antibody 0.00 - 0.99 Ratio 2.96 (H)  7/26/23 11:37   Anti-SSB Interpretation Negative  Positive !  7/26/23 11:37   Anti Sm Antibody 0.00 - 0.99 Ratio 0.09  7/26/23 11:37   Anti-Sm Interpretation Negative  Negative  7/26/23 11:37   Anti Sm/RNP Antibody 0.00 - 0.99 Ratio 0.11  7/26/23 11:37   Anti-Sm/RNP Interpretation Negative  Negative  7/26/23 11:37   GIOVANNA Hep-2 Pattern  Speckled !  2/12/08 10:40   Smooth Muscle Ab Negative  Negative 1:40  1/15/20 16:41   Anti-Histone Antibody 0 - 1.0 U 0.7  2/12/08 10:40   Anti-Mitochon Ab IFA Negative  Negative 1:40  1/15/20 16:41   Antigliadin Ab IgG 0 - 9.9 U/mL Less than 10.0  10/22/09 10:23   Antigliadin Abs, IgA 0 - 4.9 U/mL Less than 5.0  10/22/09 10:23   Endomysial Abs, IgA  Negative  10/22/09 10:23   TTG IgA 0 - 10.0 U/mL <1.2  10/22/09 10:23   TTG IgG 0 - 9.0 U/mL <1.2  10/22/09 10:23   Cytoplasmic Neutrophilic Ab <1:20 Titer <1:20  5/27/15 15:23   Perinuclear (P-ANCA) <1:20 Titer <1:20  5/27/15 15:23   CCP Antibodies <5.0 U/mL 21.6 !  2/10/09 10:14   Interpretation  Comment  7/14/17 13:30   Complement (C-3) 50 - 180 mg/dL 98  5/27/15 15:23   Complement (C-4) 11 - 44 mg/dL 22  5/27/15 15:23   IgG 650 - 1600 mg/dL 896  11/6/19 16:46   IgM 50 - 300 mg/dL 134  11/6/19 16:46   IgA 40 - 350 mg/dL 300  11/6/19 16:46   IgE 0 - 100 IU/mL <35  11/6/19 16:46   Rheumatoid Factor 0 - 15 IU/ml Negative  2/10/09 10:14   !: Data is abnormal  (H):  Data is abnormally high      Excellent improvement of seropositive nonerosive rheumatoid on Rinvoq.  Stable Sjogren's syndrome.  Worsening bilateral leg swelling since the recent dose increase of amlodipine associated with increased varicosities and lymphedema.  Consult physical therapy for lymphedema treatment.  Advised to discuss with primary care physician team to consider alternate anti hypertensive therapy instead of calcium channel blockers due to recent worsening leg swelling.  Consult physical therapy for IT band syndrome bilateral right more than left with mild trochanteric bursitis.  Bilateral thumb pain secondary to osteoarthritis.  No evidence of synovitis.  Continue Voltaren gel and take osteoarthritis supplements as advised.  Tylenol p.r.n. for pain.  I have explained all of the above in detail and the patient understands all of the current recommendation(s). I have answered all questions to the best of my ability and to their complete satisfaction.       Total time spent 40 minutes including time needed to review the records, the   patient evaluation, documentation, face-to-face discussion with the patient,   coordination of care and counseling.    Level V visit.  # Follow up in about 6 months (around 5/6/2024).      Disclaimer: This note was prepared using voice recognition system and is likely to have sound alike errors and is not proof read.  Please call me with any questions.    Answers submitted by the patient for this visit:  Rheumatology Questionnaire (Submitted on 11/5/2023)  fever: No  eye redness: No  mouth sores: No  headaches: No  shortness of breath: No  chest pain: No  trouble swallowing: No  diarrhea: No  constipation: Yes  unexpected weight change: No  genital sore: No  dysuria: No  During the last 3 days, have you had a skin rash?: No  Bruises or bleeds easily: No  cough: No

## 2023-11-06 NOTE — PLAN OF CARE
[facility]   Physical Therapy Initial Evaluation / Lymphedema         Name: Leticia Piña  Clinic Number: [id number]    Therapy Diagnosis:   Encounter Diagnoses   Name Primary?    Varicose veins of both legs with edema     Lymphedema     Lipedema       Physician: Rolando Silva,*    Physician Orders: PT Eval and Treat   Medical Diagnosis from Referral: varicose veins both legs with edema, lymphedema  Evaluation Date: 11/6/2023  Authorization Period Expiration: 11/5/2024  Plan of Care Expiration: 1/15/2023  Progress note due: next visit  Visit # / Visits authorized: 1/ 1    Time In: 12:30 pm  Time Out: 1:30 pm  Total Billable Time: 40 minutes    Precautions: Standard    Subjective   swelling started in June of 2023. Prior to swelling of the legs, she has had tenderness in both upper and lower legs with lots of palpable knots  Leticia reports: family hx of mother having lymphedema. Her mother did not have swelling in her feet and she denies having swelling in her own feet. She reports that swelling is worsening along with the pain     Medical History:   Past Medical History:   Diagnosis Date    Allergic rhinitis     Cutaneous lupus erythematosus     drug induced.    Essential hypertension 02/05/2019    GERD (gastroesophageal reflux disease)     Hypothyroid     Insomnia     Mixed anxiety and depressive disorder     Nonrheumatic aortic valve insufficiency 9/4/2023    Nonrheumatic mitral valve regurgitation 9/4/2023    Normal cardiac stress test     11/07    Pulmonary hypertension 9/4/2023    Rheumatoid arthritis(714.0)     Sjogren's syndrome        Surgical History:   Leticia Piña  has a past surgical history that includes breast implants; breast surgery for rupture; TONSILLECTOMY, ADENOIDECTOMY; Tubal ligation; Augmentation of breast; ERCP (N/A, 03/25/2021); Laparoscopic cholecystectomy (N/A, 03/26/2021); ERCP (N/A, 06/21/2021); Esophagogastroduodenoscopy (N/A,  01/24/2023); and Colonoscopy (N/A, 3/7/2023).    Medications:   Leticia has a current medication list which includes the following prescription(s): albuterol, amlodipine, azelastine, benzonatate, bupropion, cholecalciferol (vitamin d3), cyanocobalamin (vitamin b-12), ala-hist ir, diclofenac sodium, duloxetine, fluticasone propionate, folic acid, hydrochlorothiazide, hydroxychloroquine, levothyroxine, lorazepam, potassium chloride sa, tretinoin, and rinvoq.    Allergies:   Review of patient's allergies indicates:   Allergen Reactions    Humira  [adalimumab]      Other reaction(s): drug-induced lupus  Other reaction(s): drug-induced lupus    Sulfa (sulfonamide antibiotics)      Other reaction(s): Hives  Other reaction(s): Rash  Other reaction(s): Hives          Pt denies CHF, KF, and DM.  Previous Lymphedema Treatment: none  Social History:  lives with their family  Family Support:  Nutritional status: adequate; healthy BMI   Educational needs: minimal knowledge of lymphedema or lipedema   Fall risk: no   Occupation: retired  Prior Level of Function: no swelling prior to June 2023, has family hx of swelling in legs  Current Level of Function: worsening of swelling in legs, painful/tender legs  Gait: indep   Transfers:indep   Bed Mobility: indep     Pain:  Current 5/10, worst 5/10, best 5/10   Location: bilateral hips and lower legs with touch   Description: tender  Aggravating Factors: touch  Easing Factors:  no touching    Pts goals:  manage swelling    Objective     STAGE II Secondary Lymphedema (possibly due to Lipedema and/or varicose veins)  Amount of Swelling: moderate   Location of Swelling: full leg swelling with pitting edema both lower distal legs  Skin Integrity: intact  Palpation/Texture: 3+ pitting edema - bilateral lower distal legs  Circulation: adequate for compression   Posture: good     Sensation: intact       Girth Measurements (in centimeters)  LANDMARK LEFT LE  11/6/2023 RIGHT LE  11/6/2023  DIFF   at eval   SBP + 20 cm - cm - cm - cm   SBP + 10 cm 49.5 cm 50 cm 0.5 cm   SBP 41 cm 42 cm 1 cm   10 cm below SBP 37 cm 36 cm 1 cm   20 cm below SBP 38 cm 38.5 cm 0.5 cm   30 cm below SBP 28.5 cm 29.5 cm 1 cm   35 cm below SBP - cm - cm - cm   Ankle 22.5 cm 22.5 cm - cm   Forefoot 20.5 cm 21 cm  cm       CMS Impairment/Limitation/Restriction for FOTO lower quadrant swelling Survey    Therapist reviewed FOTO scores for Leticia Piña on 11/6/2023  FOTO documents entered into EPIC - see Media section.    Functional Score: 68%         TREATMENT   Treatment Time In: 12:50 pm  Treatment Time Out: 1:30 pm  Total Treatment time separate from Evaluation: 40 minutes Leticia received self care/home management to develop knowledge/understanding of lymphedema etiology, progression and treatment options x 40 minutes including:    PROVIDED LYMPHEDEMA AND LIPEDEMA HANDOUT AND REVIEWED THE FOLLOWING INFORMATION:  -discussed etiology of lymphedema and the lymphatic system and of Lipedema  -discussed cellulitis and ways to decrease risk of cellulitis  -provided lymphedema and lipedema resources   -recommendation made for both lymphedema and lipedema (see below)  ------------  -obtained information and faxed to [employer]: compression pump and garment order; provided measurements of current size of lower legs and circumference of knees/thighs  -provided Leticia size needed for velcro compression calf wraps. Recommended using [employer] to order Sigvaris Compreflex complete Size Small, Tall if she did not want to wait on what insurance will pay. For the knees, she plans to use some velcro knee braces that she already owns  -provided link for [employer]rom[employer]age leggins - size M/L and compression Z leggings  -provided diet recommendations for  lipedema/lymphedema  -recommended compression during exercise      Home Exercises and Patient Education Provided    Education provided:   - see self care section above  - Pt  was educated in lymphedema and lipedema due to having characteristics of both      This patient is in agreement to participate in Lymphedema treatment. She plans to purchase velcro compression wraps and start pushing the fluid out of her lower legs. She has a compression knee sleeve that she can use at home to compress the knees    Written Home Exercises and/or information Provided: yes.  Information was reviewed and Leticia was able to demonstrate understanding prior to the end of the session.  Leticia demonstrated good  understanding of the education provided.     See EMR under Patient Instructions for exercises provided 11/6/2023.    Assessment   Leticia is a 73 y.o. female referred to [facility] with a medical diagnosis of lymphedema due to varicose veins. This patient presents with characteristics of Lipedema which can lead to lymphedema. She presents with 3+ pitting edema in both lower distal legs and fibrosis in the areas of the anterior medial lower leg and medial distal thigh.  She presents with Stage II  lymphedema of the legs, increased pain, increased stiffness in the skin + palpable knots on her legs, and placing the pt at higher risk of infection.     Pt prognosis is Excellent.   Pt will benefit from skilled outpatient Physical Therapy to address the deficits stated above and in the chart below, provide pt/family education, and to maximize pt's level of independence.     Plan of care discussed with patient: Yes  Pt's spiritual, cultural and educational needs considered and patient is agreeable to the plan of care and goals as stated below:     Anticipated Barriers for therapy: none    Medical Necessity is demonstrated by the following  History  Co-morbidities and personal factors that may impact the plan of care [x] LOW: no personal factors / co-morbidities  [] MODERATE: 1-2 personal factors / co-morbidities  [] HIGH: 3+ personal factors / co-morbidities    Moderate / High Support  Documentation:   Co-morbidities affecting plan of care: -    Personal Factors:   no deficits     Examination  Body Structures and Functions, activity limitations and participation restrictions that may impact the plan of care [x] LOW: addressing 1-2 elements  [] MODERATE: 3+ elements  [] HIGH: 4+ elements (please support below)    Moderate / High Support Documentation: -     Clinical Presentation [x] LOW: stable  [] MODERATE: Evolving  [] HIGH: Unstable     Decision Making/ Complexity Score: low         The following goals were discussed with the patient and patient is in agreement with them as to be addressed in the treatment plan.     Short Term Goals: (6 weeks)  1. Patient will show decreased girth in B LE by up to 1 cm to allow for LE symmetry, shoe and clothing choice, and ability to apply needed compression.  (progressing, not met)   2. Patient will demonstrate 100% knowledge of lipedema and lymphedema precautions and signs of infection to allow for reduced lymphedema risk, infection risk, and/or exacerbation of condition.  (progressing, not met)  3. Patient or caregiver will perform self-bandaging techniques and/or wearing of compression garments to allow for lymphatic drainage support, skin elasticity, and reduction in shape and size of limb. (progressing, not met)  4. Patient will perform self lymph drainage techniques to areas within reach to enhance lymphatic drainage and skin condition.  (progressing, not met)  5. Patient will tolerate daily activities with multilayered bandaging to allow for lymphatic and venous support.  (progressing, not met)    Long Term Goals: (10  weeks)  1. Patient will show decreased girth in B LE by up to 2 cm  to allow for LE symmetry, shoe and clothing choice, and ability to apply needed compression daily.  (progressing, not met)  2. Patient will show reduction in density to mild or less with improved contour of limb to allow for cosmesis, LE symmetry, infection risk  reduction, and clothing and compression choice.   (progressing, not met)  3. Patient to rivka/doff compression garment with daily compliance to assist in lymphedema management, skin elasticity, and tissue density.  (progressing, not met)  4. Pt to show improved postural awareness and alignment.  (progressing, not met)  5. Pt to be I and compliant with HEP to allow for increased function in affected limb.   (progressing, not met)      Plan   Plan of care Certification: 11/6/2023 to 1/15/2023.    Outpatient Physical Therapy 1 times weekly for 10 weeks to include the following interventions: Patient Education, Self Care, Therapeutic Activities, Therapeutic Exercise, and vaso-pneumatic compression . Complete Decongestive Therapy- compression and home equipment needs to be addressed and assisted.    Claudia Richard, PT, CLT-ZAYNAB

## 2023-11-07 ENCOUNTER — PATIENT MESSAGE (OUTPATIENT)
Dept: PRIMARY CARE CLINIC | Facility: CLINIC | Age: 73
End: 2023-11-07
Payer: MEDICARE

## 2023-11-07 ENCOUNTER — CLINICAL SUPPORT (OUTPATIENT)
Dept: AUDIOLOGY | Facility: CLINIC | Age: 73
End: 2023-11-07
Payer: MEDICARE

## 2023-11-07 ENCOUNTER — OFFICE VISIT (OUTPATIENT)
Dept: OTOLARYNGOLOGY | Facility: CLINIC | Age: 73
End: 2023-11-07
Payer: MEDICARE

## 2023-11-07 DIAGNOSIS — H90.A21 SENSORINEURAL HEARING LOSS (SNHL) OF RIGHT EAR WITH RESTRICTED HEARING OF LEFT EAR: ICD-10-CM

## 2023-11-07 DIAGNOSIS — J34.89 NASAL VESTIBULITIS: Primary | ICD-10-CM

## 2023-11-07 DIAGNOSIS — H90.3 SENSORINEURAL HEARING LOSS (SNHL), BILATERAL: Primary | ICD-10-CM

## 2023-11-07 DIAGNOSIS — H69.93 DYSFUNCTION OF BOTH EUSTACHIAN TUBES: ICD-10-CM

## 2023-11-07 PROCEDURE — 99213 OFFICE O/P EST LOW 20 MIN: CPT | Mod: PBBFAC,PO | Performed by: OTOLARYNGOLOGY

## 2023-11-07 PROCEDURE — 99212 OFFICE O/P EST SF 10 MIN: CPT | Mod: PBBFAC,27,PO

## 2023-11-07 PROCEDURE — 99214 OFFICE O/P EST MOD 30 MIN: CPT | Mod: S$PBB,,, | Performed by: OTOLARYNGOLOGY

## 2023-11-07 PROCEDURE — 92567 TYMPANOMETRY: CPT | Mod: PBBFAC,PO

## 2023-11-07 PROCEDURE — 99999 PR PBB SHADOW E&M-EST. PATIENT-LVL II: ICD-10-PCS | Mod: PBBFAC,,,

## 2023-11-07 PROCEDURE — 99999 PR PBB SHADOW E&M-EST. PATIENT-LVL III: ICD-10-PCS | Mod: PBBFAC,,, | Performed by: OTOLARYNGOLOGY

## 2023-11-07 PROCEDURE — 99999 PR PBB SHADOW E&M-EST. PATIENT-LVL II: CPT | Mod: PBBFAC,,,

## 2023-11-07 PROCEDURE — 92557 COMPREHENSIVE HEARING TEST: CPT | Mod: PBBFAC,PO

## 2023-11-07 PROCEDURE — 99214 PR OFFICE/OUTPT VISIT, EST, LEVL IV, 30-39 MIN: ICD-10-PCS | Mod: S$PBB,,, | Performed by: OTOLARYNGOLOGY

## 2023-11-07 PROCEDURE — 99999 PR PBB SHADOW E&M-EST. PATIENT-LVL III: CPT | Mod: PBBFAC,,, | Performed by: OTOLARYNGOLOGY

## 2023-11-07 RX ORDER — MUPIROCIN 20 MG/G
OINTMENT TOPICAL 2 TIMES DAILY
Qty: 15 G | Refills: 3 | Status: SHIPPED | OUTPATIENT
Start: 2023-11-07 | End: 2023-11-17

## 2023-11-07 NOTE — PROGRESS NOTES
Referring Provider: Dr. Nicole Jasmine    Leticia Piña was seen 11/07/2023 for an audiological evaluation. Previous audiological evaluation on 2/23/2022 revealed a mild-to-moderate mixed hearing loss 250-8000 Hz for the right ear, and a borderline normal-to-moderate sensorineural hearing loss 250-8000 Hz for the left ear. Patient complains of difficulties hearing in some situations. She denied tinnitus, dizziness, and history of noise exposure.     Otoscopy revealed clear canals with visualization of the tympanic membrane in both ears. Tympanograms were Type A for the right ear and Type A for the left ear. Audiometry revealed normal hearing sensitivity through 2000 Hz sloping to a mild to moderate sensorineural hearing loss for the right ear, and normal hearing sensitivity through 2000 Hz sloping to a mild sensorineural hearing loss 0147-3679 Hz rising to normal hearing sensitivity for the left ear. Speech Reception Thresholds were  25 dBHL for the right ear and 15 dBHL for the left ear. Word recognition scores were excellent for the right ear and excellent for the left ear.    Patient was counseled on the above findings.    Recommendations:  Repeat audiological evaluation in one to two years to monitor hearing, or sooner if needed.  Consider hearing aid consult.

## 2023-11-07 NOTE — PROGRESS NOTES
Referring Provider:    Nicole Castellon Md  [address]  [place] [address]  Subjective:   Patient: Leticia Piña [id number], :1950   Visit date:2023 2:16 PM    Chief Complaint:  Hearing Loss (Pt  is coming in today for hearing loss in both ears )    HPI:    Prior notes reviewed by myself.  Clinical documentation obtained by nursing staff reviewed.      73-year-old female presents for evaluation of hearing loss.  She is been seen multiple times in the past for hearing loss and Eustachian tube dysfunction.  She feels that her hearing is stable but she occasionally has episodes of muffled hearing mostly on the right side.  These episodes resolved spontaneously without any specific treatment. She has a history of Sjogrens syndrome.        Objective:     Physical Exam:  Vitals:  There were no vitals taken for this visit.  General appearance:  Well developed, well nourished    Ears:  Otoscopy of external auditory canals and tympanic membranes was normal, clinical speech reception thresholds grossly intact, no mass/lesion of auricle.    Nose:  No masses/lesions of external nose, dry nasal mucosa with fissured vestibular skin and inflammation inferiorly, septum, and turbinates were within normal limits.    Mouth:  No mass/lesion of lips, teeth, gums, hard/soft palate, tongue, tonsils, or oropharynx.    Neck & Lymphatics:  No cervical lymphadenopathy, no neck mass/crepitus/ asymmetry, trachea is midline, no thyroid enlargement/tenderness/mass.        [x]  Data Reviewed:    Lab Results   Component Value Date    WBC 4.86 05/15/2023    HGB 10.8 (L) 05/15/2023    HCT 32.2 (L) 05/15/2023    MCV 91 05/15/2023    EOSINOPHIL 5.1 05/15/2023         [x]  Independent interpretation of test: slight asymmetry in HF, otherwise stable, type A tymps     Media Information    File Link    Annotation on 2023  2:20 PM by Mary Ordonez Au.D, CCC-A: AUDIOGRAM        Key Information    Document ID  File Type Document Type Description   [id number] Annotation Annotation AUDIOGRAM     Import Information    Attached At Date Time User Dept   Encounter Level 11/7/2023  2:20 PM Mary Ordonez Au.D, CCC-A [facility] Audiology     Encounter    Clinical Support on 11/7/23 with Mary Ordonez Au.D, CCC-A     Media Audit Information    Patient Entered Attachment was moved from this patient by [facility], System Message on 7/28/2021 at 1:34 PM (DCS ID: [id number], File: [id number])   Patient Entered Attachment was moved from this patient by [facility], System Message on 7/28/2021 at 1:37 PM (DCS ID: [id number], File: [id number])   Patient Entered Attachment was moved from this patient by [facility], System Message on 7/28/2021 at 2:28 PM (DCS ID: [id number], File: [id number])   Patient Entered Attachment was moved from this patient by [facility], System Message on 8/12/2021 at 9:09 PM (DCS ID: [id number], File: [id number])   Patient Entered Attachment was deleted from this patient by Epic User on 8/12/2021 at 9:07 PM (DCS ID: [id number], File: [id number])   Patient Entered Attachment was moved from this patient by [facility], System Message on 8/12/2021 at 9:09 PM (DCS ID: [id number], File: [id number])   Patient Entered Attachment was moved from this patient by [facility], System Message on 8/12/2021 at 9:09 PM (DCS ID: [id number], File: [id number])   Patient Entered Attachment was moved from this patient by [facility], System Message on 9/24/2021 at 7:23 AM (DCS ID: [id number], File: [id number])   Patient Entered Attachment was moved from this patient by [facility], System Message on 9/24/2021 at 7:23 AM (DCS ID: [id number], File: [id number])   Patient Entered Attachment was moved from this patient by [facility], System Message on 1/9/2022 at 9:20 AM (DCS ID: [id number], File: [id number])   Patient Entered Attachment was  moved from this patient by [facility], System Message on 1/9/2022 at 9:20 AM (DCS ID: [id number], File: [id number])   Patient Entered Attachment was deleted from this patient by Epic, User on 1/25/2023 at 10:06 AM (DCS ID: [id number], File: [id number])   Patient Entered Attachment was moved from this patient by [facility], System Message on 1/25/2023 at 10:09 AM (DCS ID: [id number], File: [id number])   Patient Entered Attachment was deleted from this patient by Epic, User on 1/31/2023 at 10:36 AM (DCS ID: [id number], File: [id number])   Patient Entered Attachment was deleted from this patient by [facility], System Message on 1/31/2023 at 2:22 PM (DCS ID: [id number], File: [id number])   Patient Entered Attachment was deleted from this patient by [facility], System Message on 1/31/2023 at 2:22 PM (DCS ID: [id number], File: [id number])   Patient Entered Attachment was moved from this patient by [facility], System Message on 9/25/2023 at 1:32 PM (DCS ID: [id number], File: [id number])   Patient Entered Attachment was moved from this patient by [facility], System Message on 9/28/2023 at 10:25 AM (DCS ID: [id number], File: [id number])             Assessment & Plan:   Nasal vestibulitis  -     mupirocin (BACTROBAN) 2 % ointment; by Nasal route 2 (two) times daily. Apply to nose twice daily for 10 days  Dispense: 15 g; Refill: 3    Sensorineural hearing loss (SNHL) of right ear with restricted hearing of left ear    Dysfunction of both eustachian tubes          She has significant inflammation and skin breakdown in her nasal vestibule bilaterally.  Part of this may be dryness secondary to her Sjogren's syndrome.  I recommended that she increase nasal moisture and start using Bactroban in her nostrils b.I.d. for the next 10 days.  We reviewed her audiogram which does demonstrate a small asymmetry in the high frequencies.  I recommended that we schedule  another audiogram in 6 months to monitor this.

## 2023-11-08 ENCOUNTER — PATIENT MESSAGE (OUTPATIENT)
Dept: PRIMARY CARE CLINIC | Facility: CLINIC | Age: 73
End: 2023-11-08
Payer: MEDICARE

## 2023-11-08 NOTE — PROGRESS NOTES
"Subjective:      Patient ID: Leticia Piña is a 73 y.o. female.    Chief Complaint: Follow-up (Pt is here for a follow up with elevated blood pressure. )      HPI  Here for follow up of medical problems.  BP at home 141/70s.  Off amlodipine 10mg, legs stopped swelling.  Working out regularly.  On HCTZ 25mg only.  Intol of propranolol.  Not taking valsartan, not sure why- last rx filled in July.    Updated/ annual due 10/24:  HM: 10/23 fluvax, 3/21 covid vaccines, 6/19 HAV, 11/19 [id number], 3/17 booster [id number], 12/22 [id number], 6/21 TDaP, 4/13 zoster, 4/22 Shingrix #2, 3/23 BMD now on drug holiday rep 2y, 1/23 EGD, 3/23 Cscope rep 3y, 2/23 MMG, 11/17 Gyn Dr. Jimenez, 3/17 HCV neg.   10/22 Cologuard negative.     Review of Systems   Constitutional:  Negative for chills, diaphoresis and fever.   Respiratory:  Negative for cough and shortness of breath.    Cardiovascular:  Negative for chest pain, palpitations and leg swelling.   Gastrointestinal:  Negative for blood in stool, constipation, diarrhea, nausea and vomiting.   Genitourinary:  Negative for dysuria, frequency and hematuria.   Psychiatric/Behavioral:  The patient is not nervous/anxious.          Objective:   BP (!) 142/72 (BP Location: Right arm)   Pulse 81   Temp 98.3 °F (36.8 °C) (Oral)   Ht 5' 5" (1.651 m)   Wt 75.3 kg (166 lb)   SpO2 97%   BMI 27.62 kg/m²     Physical Exam  Constitutional:       Appearance: She is well-developed.   Neck:      Thyroid: No thyroid mass.      Vascular: No carotid bruit.   Cardiovascular:      Rate and Rhythm: Normal rate and regular rhythm.      Heart sounds: No murmur heard.     No friction rub. No gallop.   Pulmonary:      Effort: Pulmonary effort is normal.      Breath sounds: Normal breath sounds. No wheezing or rales.   Abdominal:      General: Bowel sounds are normal.      Palpations: Abdomen is soft. There is no mass.      Tenderness: There is no abdominal tenderness.   Musculoskeletal:      Cervical back: " Neck supple.   Lymphadenopathy:      Cervical: No cervical adenopathy.   Neurological:      Mental Status: She is alert and oriented to person, place, and time.           Assessment:       1. Essential hypertension    2. Pulmonary hypertension    3. Recurrent major depressive disorder, in partial remission    4. Rheumatoid arthritis involving multiple sites with positive rheumatoid factor          Plan:     Essential hypertension- restart arb.  Monitor BPs, RTC 2wk.  BMD in 4d.  -     valsartan (DIOVAN) 160 MG tablet; Take 1 tablet (160 mg total) by mouth once daily.  Dispense: 90 tablet; Refill: 3    Pulmonary hypertension    Recurrent major depressive disorder, in partial remission    Rheumatoid arthritis involving multiple sites with positive rheumatoid factor

## 2023-11-10 ENCOUNTER — OFFICE VISIT (OUTPATIENT)
Dept: PRIMARY CARE CLINIC | Facility: CLINIC | Age: 73
End: 2023-11-10
Payer: MEDICARE

## 2023-11-10 VITALS
BODY MASS INDEX: 27.66 KG/M2 | TEMPERATURE: 98 F | DIASTOLIC BLOOD PRESSURE: 72 MMHG | HEIGHT: 65 IN | SYSTOLIC BLOOD PRESSURE: 142 MMHG | HEART RATE: 81 BPM | WEIGHT: 166 LBS | OXYGEN SATURATION: 97 %

## 2023-11-10 DIAGNOSIS — I10 ESSENTIAL HYPERTENSION: Primary | ICD-10-CM

## 2023-11-10 DIAGNOSIS — I27.20 PULMONARY HYPERTENSION: ICD-10-CM

## 2023-11-10 DIAGNOSIS — F33.41 RECURRENT MAJOR DEPRESSIVE DISORDER, IN PARTIAL REMISSION: ICD-10-CM

## 2023-11-10 DIAGNOSIS — M05.79 RHEUMATOID ARTHRITIS INVOLVING MULTIPLE SITES WITH POSITIVE RHEUMATOID FACTOR: ICD-10-CM

## 2023-11-10 PROCEDURE — 99999 PR PBB SHADOW E&M-EST. PATIENT-LVL IV: CPT | Mod: PBBFAC,,, | Performed by: INTERNAL MEDICINE

## 2023-11-10 PROCEDURE — 99213 PR OFFICE/OUTPT VISIT, EST, LEVL III, 20-29 MIN: ICD-10-PCS | Mod: S$PBB,,, | Performed by: INTERNAL MEDICINE

## 2023-11-10 PROCEDURE — 99214 OFFICE O/P EST MOD 30 MIN: CPT | Mod: PBBFAC,PN | Performed by: INTERNAL MEDICINE

## 2023-11-10 PROCEDURE — 99213 OFFICE O/P EST LOW 20 MIN: CPT | Mod: S$PBB,,, | Performed by: INTERNAL MEDICINE

## 2023-11-10 PROCEDURE — 99999 PR PBB SHADOW E&M-EST. PATIENT-LVL IV: ICD-10-PCS | Mod: PBBFAC,,, | Performed by: INTERNAL MEDICINE

## 2023-11-10 RX ORDER — VALSARTAN 160 MG/1
160 TABLET ORAL DAILY
Qty: 90 TABLET | Refills: 3 | Status: SHIPPED | OUTPATIENT
Start: 2023-11-10 | End: 2023-11-29 | Stop reason: SDUPTHER

## 2023-11-14 ENCOUNTER — LAB VISIT (OUTPATIENT)
Dept: LAB | Facility: HOSPITAL | Age: 73
End: 2023-11-14
Attending: NURSE PRACTITIONER
Payer: MEDICARE

## 2023-11-14 DIAGNOSIS — Z79.899 HIGH RISK MEDICATION USE: ICD-10-CM

## 2023-11-14 DIAGNOSIS — Z86.2 HISTORY OF IRON DEFICIENCY ANEMIA: ICD-10-CM

## 2023-11-14 DIAGNOSIS — M05.79 RHEUMATOID ARTHRITIS INVOLVING MULTIPLE SITES WITH POSITIVE RHEUMATOID FACTOR: ICD-10-CM

## 2023-11-14 DIAGNOSIS — D63.8 ANEMIA OF CHRONIC DISEASE: ICD-10-CM

## 2023-11-14 DIAGNOSIS — D64.9 NORMOCYTIC ANEMIA: ICD-10-CM

## 2023-11-14 DIAGNOSIS — I10 ESSENTIAL HYPERTENSION: ICD-10-CM

## 2023-11-14 DIAGNOSIS — E03.9 ACQUIRED HYPOTHYROIDISM: ICD-10-CM

## 2023-11-14 LAB
ALBUMIN SERPL BCP-MCNC: 4.3 G/DL (ref 3.5–5.2)
ALP SERPL-CCNC: 41 U/L (ref 55–135)
ALT SERPL W/O P-5'-P-CCNC: 27 U/L (ref 10–44)
ANION GAP SERPL CALC-SCNC: 13 MMOL/L (ref 8–16)
AST SERPL-CCNC: 27 U/L (ref 10–40)
BASOPHILS # BLD AUTO: 0.05 K/UL (ref 0–0.2)
BASOPHILS NFR BLD: 1.2 % (ref 0–1.9)
BILIRUB SERPL-MCNC: 0.3 MG/DL (ref 0.1–1)
BUN SERPL-MCNC: 22 MG/DL (ref 8–23)
CALCIUM SERPL-MCNC: 9.1 MG/DL (ref 8.7–10.5)
CHLORIDE SERPL-SCNC: 95 MMOL/L (ref 95–110)
CO2 SERPL-SCNC: 31 MMOL/L (ref 23–29)
CREAT SERPL-MCNC: 0.9 MG/DL (ref 0.5–1.4)
CRP SERPL-MCNC: 0.4 MG/L (ref 0–8.2)
DIFFERENTIAL METHOD: ABNORMAL
EOSINOPHIL # BLD AUTO: 0.1 K/UL (ref 0–0.5)
EOSINOPHIL NFR BLD: 1.7 % (ref 0–8)
ERYTHROCYTE [DISTWIDTH] IN BLOOD BY AUTOMATED COUNT: 11.5 % (ref 11.5–14.5)
ERYTHROCYTE [SEDIMENTATION RATE] IN BLOOD BY WESTERGREN METHOD: 11 MM/HR (ref 0–20)
EST. GFR  (NO RACE VARIABLE): >60 ML/MIN/1.73 M^2
FERRITIN SERPL-MCNC: 39 NG/ML (ref 20–300)
GLUCOSE SERPL-MCNC: 86 MG/DL (ref 70–110)
HCT VFR BLD AUTO: 37 % (ref 37–48.5)
HGB BLD-MCNC: 11.9 G/DL (ref 12–16)
IMM GRANULOCYTES # BLD AUTO: 0.01 K/UL (ref 0–0.04)
IMM GRANULOCYTES NFR BLD AUTO: 0.2 % (ref 0–0.5)
LYMPHOCYTES # BLD AUTO: 0.9 K/UL (ref 1–4.8)
LYMPHOCYTES NFR BLD: 22.1 % (ref 18–48)
MCH RBC QN AUTO: 30.1 PG (ref 27–31)
MCHC RBC AUTO-ENTMCNC: 32.2 G/DL (ref 32–36)
MCV RBC AUTO: 93 FL (ref 82–98)
MONOCYTES # BLD AUTO: 0.4 K/UL (ref 0.3–1)
MONOCYTES NFR BLD: 10.6 % (ref 4–15)
NEUTROPHILS # BLD AUTO: 2.7 K/UL (ref 1.8–7.7)
NEUTROPHILS NFR BLD: 64.2 % (ref 38–73)
NRBC BLD-RTO: 0 /100 WBC
PLATELET # BLD AUTO: 334 K/UL (ref 150–450)
PMV BLD AUTO: 9.6 FL (ref 9.2–12.9)
POTASSIUM SERPL-SCNC: 3.3 MMOL/L (ref 3.5–5.1)
PROT SERPL-MCNC: 7.3 G/DL (ref 6–8.4)
RBC # BLD AUTO: 3.96 M/UL (ref 4–5.4)
SODIUM SERPL-SCNC: 139 MMOL/L (ref 136–145)
TSH SERPL DL<=0.005 MIU/L-ACNC: 0.76 UIU/ML (ref 0.4–4)
WBC # BLD AUTO: 4.17 K/UL (ref 3.9–12.7)

## 2023-11-14 PROCEDURE — 84443 ASSAY THYROID STIM HORMONE: CPT | Performed by: INTERNAL MEDICINE

## 2023-11-14 PROCEDURE — 80061 LIPID PANEL: CPT | Performed by: INTERNAL MEDICINE

## 2023-11-14 PROCEDURE — 84466 ASSAY OF TRANSFERRIN: CPT | Performed by: NURSE PRACTITIONER

## 2023-11-14 PROCEDURE — 85025 COMPLETE CBC W/AUTO DIFF WBC: CPT | Performed by: NURSE PRACTITIONER

## 2023-11-14 PROCEDURE — 80053 COMPREHEN METABOLIC PANEL: CPT | Performed by: NURSE PRACTITIONER

## 2023-11-14 PROCEDURE — 86140 C-REACTIVE PROTEIN: CPT | Performed by: INTERNAL MEDICINE

## 2023-11-14 PROCEDURE — 85651 RBC SED RATE NONAUTOMATED: CPT | Performed by: INTERNAL MEDICINE

## 2023-11-14 PROCEDURE — 82728 ASSAY OF FERRITIN: CPT | Performed by: NURSE PRACTITIONER

## 2023-11-14 PROCEDURE — 36415 COLL VENOUS BLD VENIPUNCTURE: CPT | Mod: PO | Performed by: NURSE PRACTITIONER

## 2023-11-15 ENCOUNTER — SOCIAL WORK (OUTPATIENT)
Dept: PRIMARY CARE CLINIC | Facility: CLINIC | Age: 73
End: 2023-11-15
Payer: MEDICARE

## 2023-11-15 ENCOUNTER — CLINICAL SUPPORT (OUTPATIENT)
Dept: REHABILITATION | Facility: HOSPITAL | Age: 73
End: 2023-11-15
Payer: MEDICARE

## 2023-11-15 DIAGNOSIS — R52 PAIN: Primary | ICD-10-CM

## 2023-11-15 DIAGNOSIS — F41.8 DEPRESSION WITH ANXIETY: Primary | ICD-10-CM

## 2023-11-15 DIAGNOSIS — M76.31 ILIOTIBIAL BAND SYNDROME OF RIGHT SIDE: ICD-10-CM

## 2023-11-15 LAB
CHOLEST SERPL-MCNC: 210 MG/DL (ref 120–199)
CHOLEST/HDLC SERPL: 3 {RATIO} (ref 2–5)
HDLC SERPL-MCNC: 70 MG/DL (ref 40–75)
HDLC SERPL: 33.3 % (ref 20–50)
IRON SERPL-MCNC: 143 UG/DL (ref 30–160)
LDLC SERPL CALC-MCNC: 104.2 MG/DL (ref 63–159)
NONHDLC SERPL-MCNC: 140 MG/DL
SATURATED IRON: 25 % (ref 20–50)
TOTAL IRON BINDING CAPACITY: 564 UG/DL (ref 250–450)
TRANSFERRIN SERPL-MCNC: 381 MG/DL (ref 200–375)
TRIGL SERPL-MCNC: 179 MG/DL (ref 30–150)

## 2023-11-15 PROCEDURE — 97161 PT EVAL LOW COMPLEX 20 MIN: CPT | Mod: PN

## 2023-11-15 PROCEDURE — 99499 UNLISTED E&M SERVICE: CPT | Mod: S$PBB,,,

## 2023-11-15 PROCEDURE — 97110 THERAPEUTIC EXERCISES: CPT | Mod: PN

## 2023-11-15 PROCEDURE — 97530 THERAPEUTIC ACTIVITIES: CPT | Mod: PN

## 2023-11-15 PROCEDURE — 99499 NO LOS: ICD-10-PCS | Mod: S$PBB,,,

## 2023-11-15 NOTE — PLAN OF CARE
[facility]   Physical Therapy Initial Evaluation        Name: Leticia Piña  Clinic Number: [id number]    Therapy Diagnosis:   Encounter Diagnoses   Name Primary?    Iliotibial band syndrome of right side     Pain Yes     Physician: Rolando Silva,*    Physician Orders: PT Eval and Treat   Medical Diagnosis from Referral: Iliotibial band syndrome of right side  Evaluation Date: 11/15/2023  Authorization Period Expiration: 11/05/2024  Plan of Care Expiration: 12/29/23  Progress Note Due: 30 days  Visit # / Visits authorized: 1/ 1   FOTO: 1/3  Precautions: Standard, Standard    Time In: 2:35  Time Out: 3:21  Total Billable Time: 46 minutes (low Complexity Evaluation, Therapeutic Exercise - 10, Manual Therapy - 0, Therapeutic activities- 13, Neuro muscular re education- 3)      Subjective   Date of onset: for years but has been mild  History of current condition - Leticia reports: her BP medicine was changed which caused a lot of swelling and pain and tenderness to touch. Patient reports her leg is not as bad and swelling has decreased. Patient reports she has OA and thought she was being seen by OT for hand therapy today.  Patient reports no numbness or tingling down her legs.      Medical History:   Past Medical History:   Diagnosis Date    Allergic rhinitis     Cutaneous lupus erythematosus     drug induced.    Essential hypertension 02/05/2019    GERD (gastroesophageal reflux disease)     Hypothyroid     Insomnia     Mixed anxiety and depressive disorder     Nonrheumatic aortic valve insufficiency 9/4/2023    Nonrheumatic mitral valve regurgitation 9/4/2023    Normal cardiac stress test     11/07    Pulmonary hypertension 9/4/2023    Rheumatoid arthritis(714.0)     Sjogren's syndrome        Surgical History:   Leticia Piña  has a past surgical history that includes breast implants; breast surgery for rupture; TONSILLECTOMY, ADENOIDECTOMY; Tubal ligation;  Augmentation of breast; ERCP (N/A, 03/25/2021); Laparoscopic cholecystectomy (N/A, 03/26/2021); ERCP (N/A, 06/21/2021); Esophagogastroduodenoscopy (N/A, 01/24/2023); and Colonoscopy (N/A, 3/7/2023).    Medications:   Leticia has a current medication list which includes the following prescription(s): albuterol, azelastine, benzonatate, bupropion, cholecalciferol (vitamin d3), cyanocobalamin (vitamin b-12), ala-hist ir, diclofenac sodium, duloxetine, fluticasone propionate, folic acid, hydrochlorothiazide, hydroxychloroquine, levothyroxine, lorazepam, mupirocin, potassium chloride sa, tretinoin, rinvoq, and valsartan.    Allergies:   Review of patient's allergies indicates:   Allergen Reactions    Humira  [adalimumab]      Other reaction(s): drug-induced lupus  Other reaction(s): drug-induced lupus    Sulfa (sulfonamide antibiotics)      Other reaction(s): Hives  Other reaction(s): Rash  Other reaction(s): Hives        Imaging, : none    Prior Therapy: no  Social History:  lives with their spouse  Occupation: retired  Prior Level of Function: independent  Current Level of Function: pain / tenderness/ difficulty sleeping on R side > L, piliates, resistance training, cardio at least 3 x a week    Pain:   Current 2/10, worst 5/10, best 2/10   Location: bilateral hips R >L   Description: tender to touch, dull, sharp with pressure  Aggravating Factors: SL  Easing Factors:  nothing    Pts goals: decrease pain and tenderness    Objective     Posture: R hand dominant, slight forward head and rounding shoulders, APT, genu recurvatum, patient stands on outside of feet  Palpation: TTP upper ITB B , TTP R piriformis musculature  Sensation: intact to light touch  Range of Motion/Strength:     Thoracolumbar AROM Pain/Dysfunction with Movement   Flexion WNL's    Extension WNL's    Right side bending Past joint line    Left side bending Past joint line    Right rotation WNL's    Left rotation WNL's          L/E MMT Right Left  Pain/Dysfunction with Movement   Hip Flexion 5/5 5/5    Hip Extension 5/5 5/5    Hip Abduction 5/5 5/5    Hip Adduction 5/5 5/5    Knee Flexion 5/5 5/5    Knee Extension 5/5 5/5    Ankle DF 5/5 5/5        Flexibility: mild hamstring tightness,     Special Tests: no difficulty with SLR          Intake Outcome Measure for FOTO hip Survey    Therapist reviewed FOTO scores for Leticia Piña on 11/15/2023.   FOTO documents entered into [employer] - see Media section.    Intake Score: 61%       TREATMENT   Treatment Time In: 2:35  Treatment Time Out: 3:21  Total Treatment time separate from Evaluation: 26 minutes    Leticia received therapeutic exercises to develop ROM and flexibility for 10 minutes including:  Hamstring stretch sitting and long sitting  Piriformis stretch in sitting  ITB stretch    Leticia received the following manual therapy techniques:  were applied to the: for 0 minutes, includin      neuromuscular re-education activities to improve: Balance, Coordination, Kinesthetic, Sense, Proprioception, and Posture for 3 minutes. The following activities were included:  Pelvic tilts with TA activation    therapeutic activities to improve functional performance for 13  minutes, including:  - HEP issued and reviewed, form and techniques reviewed , and benefits and frequency discussed  - bed mobility  - posture education initiated and benefits of good supportive arch foot wear discussed      Home Exercises Provided and Patient Education Provided       Education/Self-Care provided:  Patient educated on the impairments noted above and the effects of physical therapy intervention to improve overall condition and QOL.   Patient was educated on all the above exercise prior/during/after for proper posture, positioning, and execution for safe performance with home exercise program.   Patient educated on the importance of improved core and lower extremity strength in order to improve alignment of the spine and lower  extremities with static positions and dynamic movement.   Patient educated on the importance of strong core and lower extremity musculature in order to improve both static and dynamic balance, improve gait mechanics, reduce fall risk and improve household and community mobility.       Written Home Exercises Provided: yes.  Exercises were reviewed and Leticia was able to demonstrate them prior to the end of the session.  Leticia demonstrated good  understanding of the education provided.     See EMR under Patient Instructions for exercises provided 11/15/2023.      Assessment   Leticia is a 73 y.o. female referred to outpatient Physical Therapy with a medical diagnosis of Iliotibial band syndrome of right side.  Pt presents with pain and tenderness and posture deficits. Patient would benefit from skilled PT intervention for pain management, ROM/ flexibility, and core strengthening to improve quality of life.     Pt prognosis is Good.   Pt will benefit from skilled outpatient Physical Therapy to address the deficits stated above and in the chart below, provide pt/family education, and to maximize pt's level of independence.     Plan of care discussed with patient: Yes  Pt's spiritual, cultural and educational needs considered and patient is agreeable to the plan of care and goals as stated below:     Anticipated Barriers for therapy: chronicity of condition       Medical Necessity is demonstrated by the following  History  Co-morbidities and personal factors that may impact the plan of care [x] LOW: no personal factors / co-morbidities  [] MODERATE: 1-2 personal factors / co-morbidities  [] HIGH: 3+ personal factors / co-morbidities     Moderate / High Support Documentation:   Co-morbidities affecting plan of care:      Personal Factors:   no deficits      Examination  Body Structures and Functions, activity limitations and participation restrictions that may impact the plan of care [x] LOW: addressing 1-2  elements  [] MODERATE: 3+ elements  [] HIGH: 4+ elements (please support below)     Moderate / High Support Documentation:       Clinical Presentation [x] LOW: stable  [] MODERATE: Evolving  [] HIGH: Unstable      Decision Making/ Complexity Score: low         Goals:  STG's 2 weeks  Patient will be independent with 50% of HEP    LTG's 6 weeks  Patient will improve FOTO functional measure score  to 69 %  in order to improve overall QOL & return to PLOF   2. Patient will report an overall decrease in pain with ADL's and functional mobility  3. Patient will be more aware of posture throughout the day to reduce stress  and maintain optimal alignment of the spine  4. Patient will be independent with HEP and pain management  Plan   Plan of care Certification: 11/15/2023 to 12/29/23.    Outpatient Physical Therapy 1-2  times weekly for 6 weeks to include the following interventions: Cervical/Lumbar Traction, Electrical Stimulation PRN, Gait Training, Manual Therapy, Moist Heat/ Ice, Neuromuscular Re-ed, Patient Education, Self Care, Therapeutic Activities, Therapeutic Exercise, and Ultrasound, ASTYM, Kinesiotaping PRN, Functional Dry Needling      Mary Ocampo, PT        I CERTIFY THE NEED FOR THESE SERVICES FURNISHED UNDER THIS PLAN OF TREATMENT AND WHILE UNDER MY CARE   Physician's comments:     Physician's Signature: ___________________________________________________

## 2023-11-16 ENCOUNTER — PATIENT MESSAGE (OUTPATIENT)
Dept: PRIMARY CARE CLINIC | Facility: CLINIC | Age: 73
End: 2023-11-16
Payer: MEDICARE

## 2023-11-16 NOTE — PROGRESS NOTES
[facility]   Physical Therapy Treatment Note      [facility]  [facility] [id number]    Therapy Diagnosis:   Encounter Diagnoses   Name Primary?    Pain Yes    Iliotibial band syndrome of right side      Physician: Rolando Silva,*    Visit Date: 11/21/2023      Physician Orders: PT Eval and Treat   Medical Diagnosis from Referral: Iliotibial band syndrome of right side  Evaluation Date: 11/15/2023  Authorization Period Expiration: 11/05/2024  Plan of Care Expiration: 12/29/23  Progress Note Due: 30 days  Visit # / Visits authorized: 1/ 20 + eval  FOTO: 1/3  Precautions: Standard    PTA Visit #: 0/5     Time In: 8:47  Time Out: 9:29  Total Billable Time: 42 minutes    Subjective     Pt reports: she is feeling better and has been taking an easy.  She was compliant with home exercise program and seems to be helping  Response to previous treatment: good  Functional change: n/a at this time    Pain: 0/10  Location:  none     Objective      Objective Measures updated at progress report unless specified.     Treatment     Leticia received the following treatments:    Leticia received the following manual therapy techniques applied for (8) minutes, including:  Foam roller to B IT bands and hip    Leticia received therapeutic exercises to develop strength, endurance, ROM, flexibility, posture, and core stabilization for (8)  minutes including:  Nustep for ROM, strength, and endurance x 6 minutes R:4  Incline gastroc stretch 3 x 30 secs    Leticia participated in neuromuscular re-education activities to improve: Balance, Coordination, Kinesthetic, Sense, Proprioception, and Posture for (26)  minutes., including:  Side stepping in // bars  Open books 1 x 15 reps each way  Bent knee fall outs 2 x 10 reps B  Ball squeezes 1 x 30 reps  SL clams 2 x 10 reps/ pain on R         Leticia participated in dynamic functional therapeutic activities to improve functional performance for  (0)  minutes, including:    Patient Education and Home Exercises       Education provided:   - HEP reviewed    Written Home Exercises Provided: Patient instructed to cont prior HEP. Exercises were reviewed and Leticia was able to demonstrate them prior to the end of the session.  Leticia demonstrated good  understanding of the education provided. See EMR under Patient Instructions for exercises provided during therapy sessions    Assessment     First visit after initial evalution. Patient tolerated treatment with difficulty. She was tearful throughout the session due to personal issues. She had difficulty with SL clams especially on R side.  PT will progress patient as tolerated.     Leticia Is progressing well towards her goals.   Pt prognosis is Good.     Pt will continue to benefit from skilled outpatient physical therapy to address the deficits listed in the problem list box on initial evaluation, provide pt/family education and to maximize pt's level of independence in the home and community environment.     Pt's spiritual, cultural and educational needs considered and pt agreeable to plan of care and goals.     Anticipated barriers to physical therapy: chronicity of condition     Goals: STG's 2 weeks  Patient will be independent with 50% of HEP Progressing     LTG's 6 weeks  Patient will improve FOTO functional measure score  to 69 %  in order to improve overall QOL & return to PLOF   2. Patient will report an overall decrease in pain with ADL's and functional mobility  3. Patient will be more aware of posture throughout the day to reduce stress  and maintain optimal alignment of the spine  4. Patient will be independent with HEP and pain management    Plan     Plan of care Certification: 11/15/2023 to 12/29/23.     Outpatient Physical Therapy 1-2  times weekly for 6 weeks to include the following interventions: Cervical/Lumbar Traction, Electrical Stimulation PRN, Gait Training, Manual Therapy, Moist Heat/  Ice, Neuromuscular Re-ed, Patient Education, Self Care, Therapeutic Activities, Therapeutic Exercise, and Ultrasound, ASTYM, Kinesiotaping PRN, Functional Dry Needling      Mary Ocampo, PT

## 2023-11-16 NOTE — PROGRESS NOTES
Your lab results show no significant abnormalities.  Please continue plan as discussed during the visit.

## 2023-11-20 ENCOUNTER — TELEPHONE (OUTPATIENT)
Dept: HEMATOLOGY/ONCOLOGY | Facility: CLINIC | Age: 73
End: 2023-11-20
Payer: MEDICARE

## 2023-11-20 ENCOUNTER — OFFICE VISIT (OUTPATIENT)
Dept: HEMATOLOGY/ONCOLOGY | Facility: CLINIC | Age: 73
End: 2023-11-20
Payer: MEDICARE

## 2023-11-20 DIAGNOSIS — E78.2 ELEVATED TRIGLYCERIDES WITH HIGH CHOLESTEROL: ICD-10-CM

## 2023-11-20 DIAGNOSIS — M05.79 RHEUMATOID ARTHRITIS INVOLVING MULTIPLE SITES WITH POSITIVE RHEUMATOID FACTOR: ICD-10-CM

## 2023-11-20 DIAGNOSIS — M35.00 SJOGREN'S SYNDROME, WITH UNSPECIFIED ORGAN INVOLVEMENT: ICD-10-CM

## 2023-11-20 DIAGNOSIS — D50.9 IRON DEFICIENCY ANEMIA, UNSPECIFIED IRON DEFICIENCY ANEMIA TYPE: Primary | ICD-10-CM

## 2023-11-20 PROCEDURE — 99214 PR OFFICE/OUTPT VISIT, EST, LEVL IV, 30-39 MIN: ICD-10-PCS | Mod: 95,,, | Performed by: NURSE PRACTITIONER

## 2023-11-20 PROCEDURE — 99214 OFFICE O/P EST MOD 30 MIN: CPT | Mod: 95,,, | Performed by: NURSE PRACTITIONER

## 2023-11-20 RX ORDER — SODIUM CHLORIDE 0.9 % (FLUSH) 0.9 %
10 SYRINGE (ML) INJECTION
Status: CANCELLED | OUTPATIENT
Start: 2023-11-20

## 2023-11-20 RX ORDER — HEPARIN 100 UNIT/ML
500 SYRINGE INTRAVENOUS
Status: CANCELLED | OUTPATIENT
Start: 2023-11-20

## 2023-11-20 NOTE — PROGRESS NOTES
The patient location is: home  The chief complaint leading to consultation is: fatigue    Visit type: audiovisual    Face to Face time with patient: 15  30 minutes of total time spent on the encounter, which includes face to face time and non-face to face time preparing to see the patient (eg, review of tests), Obtaining and/or reviewing separately obtained history, Documenting clinical information in the electronic or other health record, Independently interpreting results (not separately reported) and communicating results to the patient/family/caregiver, or Care coordination (not separately reported).     Each patient to whom he or she provides medical services by telemedicine is:  (1) informed of the relationship between the physician and patient and the respective role of any other health care provider with respect to management of the patient; and (2) notified that he or she may decline to receive medical services by telemedicine and may withdraw from such care at any time.    Patient ID: Leticia Piña is a 73 y.o. female.    Chief Complaint: fatigue    HPI:  72 y/o female who presents today for f/u of her longstanding chronic on/off anemia for several years.  She states that she takes folic acid and B12 daily.  Denies any previous diagnosis of B12 of folic acid deficiency.  She has had on/off iron deficiency noted with history of IV Injectafer infusion 3/2020 and then again on 2/2021 she received 1 dose Feraheme due to recurrent ID.      She had colonoscopy in 2011 - no polyps found.  Up to date on mammogram and pap.     She complains of acid reflux symptoms for years.  She also has Sjogren's disease and cutaneous lupus erythematous, RA followed by rheumatology.       Most recent hgb 12.3 g/dL and iron is repleated.  Had ERCP done with Dr. Madrigal with stent placement and pathology showing Chronic cholecystitis with cholelithiasis and Rokitansky-Aschoff sinuses   Negative for atypia or malignancy       Had emergency gallbladder surgery removal on 3/26/2020 with noted elevated LFTs with improvement in symptoms post surgery.     Interval History:    9/1/2022 Will need to repeat labs today to assess for recurrent Iron deficiency anemia.   6/21/2021 ERCP Biliary stent removed with biliary sludge and a stone removed.   Is currently being treated with Rinvoq once daily for treatment of RA and is tolerating well.  Denies any s/s of infection.   C/o fatigue.  Denies any abnormal bleeding.  Is not currently taking oral iron daily. Denies f/c/ns or unintentional weight loss.  Denies abnormal lymphadenopathy.      11/3/2022 Presents today to evaluate response of IV Feraheme treatments x 2.  Last received on 9/21/2022.  Awaiting iron and tibc lab results.   Continues with chronic fatigue. Denies any abnormal bleeding.  Continues to take B12 and folate daily. is a 72 year old female who presents today for follow up of her longstanding chronic on/off anemia for several years.  She states that she takes folic acid and B12 daily.  Denies any previous diagnosis of B12 of folic acid deficiency.  She has had on/off iron deficiency noted with history of IV Injectafer infusion 3/2020 and then again on 2/2021 she received 1 dose Feraheme due to recurrent ID.      She had colonoscopy in 2011 - no polyps found.  Up to date on mammogram and pap.     She complains of acid reflux symptoms for years.  She also has Sjogren's disease and cutaneous lupus erythematous, RA followed by rheumatology.       Most recent hgb 12.3 g/dL and iron is repleated.  Had ERCP done with Dr. Madrigal with stent placement and pathology showing Chronic cholecystitis with cholelithiasis and Rokitansky-Aschoff sinuses   Negative for atypia or malignancy      Had emergency gallbladder surgery removal on 3/26/2020 with noted elevated LFTs with improvement in symptoms post surgery.     Interval History:    9/1/2022 Will need to repeat labs today to assess for  recurrent Iron deficiency anemia.   6/21/2021 ERCP Biliary stent removed with biliary sludge and a stone removed.   Is currently being treated with Rinvoq once daily for treatment of RA and is tolerating well.  Denies any s/s of infection.   C/o fatigue.  Denies any abnormal bleeding.  Is not currently taking oral iron daily. Denies f/c/ns or unintentional weight loss.  Denies abnormal lymphadenopathy.      11/3/2022 Presents today to evaluate response of IV Feraheme treatments x 2.  Last received on 9/21/2022.  Awaiting iron and tibc lab results.   Continues with chronic fatigue. Denies any abnormal bleeding.  Continues to take B12 and folate daily.      12/12/2022  EGD scheduled 1/24/2022.  Continues to take B12 and folate daily. Denies any GI symptoms or abnormal blood loss.  Continues treatment for RA - Rinvoqu and plaqunenil.  Currently not taking oral iron.  Has taking oral iron in the past and is unable to tolerate due to constipation     2/6/2023 Last dose of Feraheme on 12/27/2022 and has been evaluated with EGD.  Presents today to review response. Currently with normocytic anemia- hgb 11.3, mcv 91 iron repleated.  EGDNormal duodenal bulb and second portion of the duodenum.   - Gastritis.  3 cm hiatal hernia. Z-line irregular, 35 cm from the incisors. Normal upper third of esophagus and middle third of esophagus.   Pathology: A.   SMALL BOWEL, BIOPSY:   - Fragments of benign small bowel mucosa, without diagnostic abnormality.   B.   STOMACH, BIOPSY:   - Mild inactive chronic gastritis with reactive mucosal changes and focal intestinal metaplasia.   - No Helicobacter pylori organisms identified by *[facility].   - Negative for malignancy.   C.   GASTROESOPHAGEAL JUNCTION BIOPSY:   - Fragments of benign reactive squamous and gastric-type mucosa with rare   intraepithelial eosinophils and neutrophils and mild chronic active   inflammation, consistent with reflux.   - No intestinal metaplasia identified.   - Negative  for dysplasia or malignancy.   COMMENT:   A). Sections show fragments of benign small bowel mucosa with normal villous   architecture. No villous atrophy, crypt hyperplasia, increased   intraepithelial lymphocytes or evidence of celiac sprue identified.   PPI increased from once per day to twice per day.      Interval History:  5/26/2023 Not currently taking oral iron tablets.  Currently taking B12 and folic acid daily.  States that she was diagnosed at the beginning of the year with sleep apnea.  Uses dental device that was made by her dentist that helps her breath right.  RA is currently control and is being followed by rheumatology.  She has no complaints today.  Denies any abnormal bleeding.     Interval History:  11/20/2023  States has been feeling fatigued over the last couple of days with noted slightly low hgb and elevated tibc.  Denies any abnormal bleeding.       Social History     Socioeconomic History    Marital status:    Tobacco Use    Smoking status: Never    Smokeless tobacco: Never   Substance and Sexual Activity    Alcohol use: Not Currently     Comment: socially     Drug use: No    Sexual activity: Not Currently     Social Determinants of Health     Financial Resource Strain: Low Risk  (9/2/2023)    Overall Financial Resource Strain (CARDIA)     Difficulty of Paying Living Expenses: Not hard at all   Food Insecurity: No Food Insecurity (9/2/2023)    Hunger Vital Sign     Worried About Running Out of Food in the Last Year: Never true     Ran Out of Food in the Last Year: Never true   Transportation Needs: No Transportation Needs (9/2/2023)    PRAPARE - Transportation     Lack of Transportation (Medical): No     Lack of Transportation (Non-Medical): No   Physical Activity: Sufficiently Active (9/2/2023)    Exercise Vital Sign     Days of Exercise per Week: 4 days     Minutes of Exercise per Session: 60 min   Stress: Unknown (9/2/2023)    [employer] [place] [employer] - Occupational  Stress Questionnaire     Feeling of Stress : Patient refused   Social Connections: Unknown (9/2/2023)    Social Connection and Isolation Panel [NHANES]     Frequency of Communication with Friends and Family: Patient refused     Frequency of Social Gatherings with Friends and Family: Patient refused     Active Member of Clubs or Organizations: Patient refused     Attends Club or Organization Meetings: Patient refused     Marital Status:    Housing Stability: Low Risk  (9/2/2023)    Housing Stability Vital Sign     Unable to Pay for Housing in the Last Year: No     Number of Places Lived in the Last Year: 1     Unstable Housing in the Last Year: No       Family History   Problem Relation Age of Onset    Heart disease Mother     Heart disease Father        Past Surgical History:   Procedure Laterality Date    AUGMENTATION OF BREAST      breast implants      breast surgery for rupture      COLONOSCOPY N/A 3/7/2023    Procedure: COLONOSCOPY;  Surgeon: Susan Escobedo MD;  Location: [facility];  Service: Endoscopy;  Laterality: N/A;    ERCP N/A 03/25/2021    Procedure: ERCP (ENDOSCOPIC RETROGRADE CHOLANGIOPANCREATOGRAPHY);  Surgeon: Preston Madrigal MD;  Location: [facility];  Service: Endoscopy;  Laterality: N/A;    ERCP N/A 06/21/2021    Procedure: ERCP (ENDOSCOPIC RETROGRADE CHOLANGIOPANCREATOGRAPHY);  Surgeon: Preston Madrigal MD;  Location: [facility];  Service: Endoscopy;  Laterality: N/A;    ESOPHAGOGASTRODUODENOSCOPY N/A 01/24/2023    Procedure: ESOPHAGOGASTRODUODENOSCOPY (EGD);  Surgeon: Susan Escobedo MD;  Location: [facility];  Service: Endoscopy;  Laterality: N/A;    LAPAROSCOPIC CHOLECYSTECTOMY N/A 03/26/2021    Procedure: CHOLECYSTECTOMY, LAPAROSCOPIC;  Surgeon: Jose Blue MD;  Location: [facility];  Service: General;  Laterality: N/A;    TONSILLECTOMY, ADENOIDECTOMY      TUBAL LIGATION         Past Medical History:   Diagnosis Date    Allergic rhinitis     Cutaneous lupus  erythematosus     drug induced.    Essential hypertension 02/05/2019    GERD (gastroesophageal reflux disease)     Hypothyroid     Insomnia     Mixed anxiety and depressive disorder     Nonrheumatic aortic valve insufficiency 9/4/2023    Nonrheumatic mitral valve regurgitation 9/4/2023    Normal cardiac stress test     11/07    Pulmonary hypertension 9/4/2023    Rheumatoid arthritis(714.0)     Sjogren's syndrome        Review of Systems   Constitutional:  Positive for fatigue.   HENT: Negative.     Eyes: Negative.    Respiratory: Negative.     Cardiovascular: Negative.    Gastrointestinal: Negative.    Endocrine: Negative.    Genitourinary: Negative.    Musculoskeletal: Negative.    Skin: Negative.    Allergic/Immunologic: Negative.    Neurological: Negative.    Hematological: Negative.    Psychiatric/Behavioral: Negative.            Medication List with Changes/Refills   Current Medications    ALBUTEROL (VENTOLIN HFA) 90 MCG/ACTUATION INHALER    Inhale 2 puffs into the lungs every 4 to 6 hours as needed for Wheezing or Shortness of Breath. Rescue    AZELASTINE (ASTELIN) 137 MCG (0.1 %) NASAL SPRAY    1 spray (137 mcg total) by Nasal route 2 (two) times daily.    BENZONATATE (TESSALON) 100 MG CAPSULE    Take 2 capsules (200 mg total) by mouth 3 (three) times daily as needed.    BUPROPION (WELLBUTRIN XL) 300 MG 24 HR TABLET    Take 1 tablet (300 mg total) by mouth once daily.    CHOLECALCIFEROL, VITAMIN D3, 5,000 UNIT/ML DROP    Take 1 drop by mouth Daily.    CYANOCOBALAMIN, VITAMIN B-12, 5,000 MCG SUBL    Place under the tongue once daily.    DEXBROMPHENIRAMINE MALEATE (ALA-HIST IR) 2 MG TAB    Take 1 tablet by mouth once daily.    DICLOFENAC SODIUM (VOLTAREN) 1 % GEL    Apply 2 g topically 4 (four) times daily.    DULOXETINE (CYMBALTA) 60 MG CAPSULE    Take 1 capsule (60 mg total) by mouth once daily.    FLUTICASONE PROPIONATE (FLONASE) 50 MCG/ACTUATION NASAL SPRAY    2 sprays (100 mcg total) by Each Nostril route  once daily.    FOLIC ACID (FOLVITE) 1 MG TABLET    Take 1 tablet (1 mg total) by mouth once daily.    HYDROCHLOROTHIAZIDE (HYDRODIURIL) 25 MG TABLET    TAKE ONE TABLET BY MOUTH ONE TIME DAILY    HYDROXYCHLOROQUINE (PLAQUENIL) 200 MG TABLET    Take 1 tablet (200 mg total) by mouth once daily.    LEVOTHYROXINE (EUTHYROX) 75 MCG TABLET    Take 1 tablet (75 mcg total) by mouth once daily.    LORAZEPAM (ATIVAN) 1 MG TABLET    Take 1 tablet (1 mg total) by mouth nightly as needed for Anxiety.    POTASSIUM CHLORIDE SA (K-DUR,KLOR-CON) 20 MEQ TABLET    TAKE ONE TABLET BY MOUTH ONE TIME DAILY    TRETINOIN (RETIN-A) 0.025 % CREAM    Apply topically every evening.    UPADACITINIB (RINVOQ) 15 MG 24 HR TABLET    Take by mouth. Test claim only    VALSARTAN (DIOVAN) 160 MG TABLET    Take 1 tablet (160 mg total) by mouth once daily.        Objective:   There were no vitals filed for this visit.    Physical Exam  Constitutional:       Appearance: Normal appearance.   Pulmonary:      Effort: Pulmonary effort is normal.   Neurological:      General: No focal deficit present.      Mental Status: She is alert.   Psychiatric:         Mood and Affect: Mood normal.         Behavior: Behavior normal.         Thought Content: Thought content normal.         Judgment: Judgment normal.         Assessment:     Problem List Items Addressed This Visit          Immunology/Multi System    Rheumatoid arthritis involving multiple sites with positive rheumatoid factor       Oncology    Iron deficiency anemia - Primary    Relevant Orders    CBC Auto Differential    Iron and TIBC    Ferritin     Other Visit Diagnoses       Sjogren's syndrome, with unspecified organ involvement        Elevated triglycerides with high cholesterol        Relevant Orders    Lipid panel            Lab Results   Component Value Date    WBC 4.17 11/14/2023    RBC 3.96 (L) 11/14/2023    HGB 11.9 (L) 11/14/2023    HCT 37.0 11/14/2023    MCV 93 11/14/2023    MCH 30.1 11/14/2023     MCHC 32.2 11/14/2023    RDW 11.5 11/14/2023     11/14/2023    MPV 9.6 11/14/2023    GRAN 2.7 11/14/2023    GRAN 64.2 11/14/2023    LYMPH 0.9 (L) 11/14/2023    LYMPH 22.1 11/14/2023    MONO 0.4 11/14/2023    MONO 10.6 11/14/2023    EOS 0.1 11/14/2023    BASO 0.05 11/14/2023    EOSINOPHIL 1.7 11/14/2023    BASOPHIL 1.2 11/14/2023      Lab Results   Component Value Date     11/14/2023    K 3.3 (L) 11/14/2023    CL 95 11/14/2023    CO2 31 (H) 11/14/2023    BUN 22 11/14/2023    CREATININE 0.9 11/14/2023    CALCIUM 9.1 11/14/2023    ANIONGAP 13 11/14/2023    ESTGFRAFRICA >60 10/14/2021    EGFRNONAA >60 10/14/2021     Lab Results   Component Value Date    ALT 27 11/14/2023    AST 27 11/14/2023    ALKPHOS 41 (L) 11/14/2023    BILITOT 0.3 11/14/2023     Lab Results   Component Value Date    UIBC 280 01/11/2013    IRON 143 11/14/2023    TRANSFERRIN 381 (H) 11/14/2023    TIBC 564 (H) 11/14/2023    FESATURATED 25 11/14/2023      Lab Results   Component Value Date    FERRITIN 39 11/14/2023     Lab Results   Component Value Date    BNPJUIWX11 >2000 (H) 07/26/2023     Lab Results   Component Value Date    FOLATE >24 11/07/2007       Med Onc Chart Routing      Follow up with physician    Follow up with RONNY . F/u 6 weeks after iron treatment with labs prior in [place] - VV   Infusion scheduling note   schedule feraheme infusion x 1 at [facility]   Injection scheduling note n/a   Labs   Scheduling:  Preferred lab: [facility]  Lab interval:  lipid panel to be done on 11/22/2023 at [place].  cbc, iron studies prior to next visit   Imaging   N/a   Pharmacy appointment No pharmacy appointment needed      Other referrals       No additional referrals needed  n/a              Plan:   Iron deficiency anemia, unspecified iron deficiency anemia type  -     CBC Auto Differential; Future; Expected date: 11/20/2023  -     Iron and TIBC; Future; Expected date: 11/20/2023  -     Ferritin; Future; Expected date:  11/20/2023    Sjogren's syndrome, with unspecified organ involvement    Rheumatoid arthritis involving multiple sites with positive rheumatoid factor    Elevated triglycerides with high cholesterol  -     Lipid panel; Future; Expected date: 11/20/2023    Other orders  -     ferumoxytoL (FERAHEME) 510 mg in dextrose 5 % (D5W) 100 mL IVPB  -     sodium chloride 0.9% flush 10 mL  -     heparin, porcine (PF) 100 unit/mL injection flush 500 Units  -     alteplase injection 2 mg      Continue folate and B12 supplementation daily.   Possible BMBx if hgb <10 or development of additional cytopenias.  F/u in 6 months with labs prior and VV to discuss results.       States that she did not fast for lipid panel and would like to have redrawn    Collaborating Provider:  Dr. Rodger Zamudio    Thank You,  Oj Kim, FNP-C  Hematology Oncology

## 2023-11-21 ENCOUNTER — PATIENT MESSAGE (OUTPATIENT)
Dept: RHEUMATOLOGY | Facility: CLINIC | Age: 73
End: 2023-11-21
Payer: MEDICARE

## 2023-11-21 ENCOUNTER — CLINICAL SUPPORT (OUTPATIENT)
Dept: REHABILITATION | Facility: HOSPITAL | Age: 73
End: 2023-11-21
Payer: MEDICARE

## 2023-11-21 DIAGNOSIS — M76.31 ILIOTIBIAL BAND SYNDROME OF RIGHT SIDE: ICD-10-CM

## 2023-11-21 DIAGNOSIS — R52 PAIN: Primary | ICD-10-CM

## 2023-11-21 DIAGNOSIS — M18.0 ARTHRITIS OF CARPOMETACARPAL (CMC) JOINT OF BOTH THUMBS: Primary | ICD-10-CM

## 2023-11-21 PROCEDURE — 97112 NEUROMUSCULAR REEDUCATION: CPT | Mod: PN

## 2023-11-21 PROCEDURE — 97110 THERAPEUTIC EXERCISES: CPT | Mod: PN

## 2023-11-22 NOTE — PROGRESS NOTES
Discussed patient with psych PAJuli. Medications, history  and recent assessments reviewed.  Patient on medication for treatment of depression.  Discussed methods to assist patient with current symptoms.

## 2023-11-24 ENCOUNTER — CLINICAL SUPPORT (OUTPATIENT)
Dept: REHABILITATION | Facility: HOSPITAL | Age: 73
End: 2023-11-24
Payer: MEDICARE

## 2023-11-24 DIAGNOSIS — R52 PAIN: Primary | ICD-10-CM

## 2023-11-24 PROCEDURE — 97112 NEUROMUSCULAR REEDUCATION: CPT | Mod: PN

## 2023-11-24 PROCEDURE — 97530 THERAPEUTIC ACTIVITIES: CPT | Mod: PN

## 2023-11-24 NOTE — PROGRESS NOTES
[facility]   Physical Therapy Treatment Note      [facility]  [facility] [id number]    Therapy Diagnosis:   Encounter Diagnosis   Name Primary?    Pain Yes       Physician: Rolando Silva,*    Visit Date: 11/24/2023      Physician Orders: PT Eval and Treat   Medical Diagnosis from Referral: Iliotibial band syndrome of right side  Evaluation Date: 11/15/2023  Authorization Period Expiration: 11/05/2024  Plan of Care Expiration: 12/29/23  Progress Note Due: 30 days  Visit # / Visits authorized: 2/ 20 + eval  FOTO: 1/3  Precautions: Standard    PTA Visit #: 0/5     Time In: 10:15  Time Out: 10:59  Total Billable Time:44 minutes    Subjective     Pt reports: she is feeling better and has been stretching at home  She was compliant with home exercise program and seems to be helping  Response to previous treatment: good  Functional change: n/a at this time    Pain: 0/10  Location:  none     Objective      Objective Measures updated at progress report unless specified.     Treatment     Leticia received the following treatments:    Leticia received the following manual therapy techniques applied for 0 minutes, including:  Foam roller to B IT bands and hip    Leticia received therapeutic exercises to develop strength, endurance, ROM, flexibility, posture, and core stabilization for 8  minutes including:  Nustep for ROM, strength, and endurance x 6 minutes R:4  Incline gastroc stretch 3 x 30 secs    Leticia participated in neuromuscular re-education activities to improve: Balance, Coordination, Kinesthetic, Sense, Proprioception, and Posture for 26 minutes., including:  Side stepping with G TB  Hip hikes in // bars 2 x 10 reps  Braiding with assistance for balance  Alternating marches with assistance for balance  Bent knee fall outs 2 x 10 reps B  Ball squeezes 1 x 30 reps  SL clams 2 x 10 reps/  NO pain on R     deferred  Open books 1 x 15 reps each way        Leticia  participated in dynamic functional therapeutic activities to improve functional performance for 10 minutes, including:  TRX mini squats 2 x 10 reps with core engaged  Push and pull sled with no added #    Patient Education and Home Exercises       Education provided:   - HEP reviewed    Written Home Exercises Provided: Patient instructed to cont prior HEP. Exercises were reviewed and Leticia was able to demonstrate them prior to the end of the session.  Leticia demonstrated good  understanding of the education provided. See EMR under Patient Instructions for exercises provided during therapy sessions    Assessment   Patient is responding well to treatment with decrease pain complaints. She progressed with activities today. She required assistance for balance with higher level dynamic activities. No pain reported with SL clams today. Patient was tired after the session.  PT will progress patient as tolerated.     Leticia Is progressing well towards her goals.   Pt prognosis is Good.     Pt will continue to benefit from skilled outpatient physical therapy to address the deficits listed in the problem list box on initial evaluation, provide pt/family education and to maximize pt's level of independence in the home and community environment.     Pt's spiritual, cultural and educational needs considered and pt agreeable to plan of care and goals.     Anticipated barriers to physical therapy: chronicity of condition     Goals: STG's 2 weeks  Patient will be independent with 50% of HEP Progressing     LTG's 6 weeks  Patient will improve FOTO functional measure score  to 69 %  in order to improve overall QOL & return to PLOF   2. Patient will report an overall decrease in pain with ADL's and functional mobility  3. Patient will be more aware of posture throughout the day to reduce stress  and maintain optimal alignment of the spine  4. Patient will be independent with HEP and pain management    Plan     Plan of care  Certification: 11/15/2023 to 12/29/23.     Outpatient Physical Therapy 1-2  times weekly for 6 weeks to include the following interventions: Cervical/Lumbar Traction, Electrical Stimulation PRN, Gait Training, Manual Therapy, Moist Heat/ Ice, Neuromuscular Re-ed, Patient Education, Self Care, Therapeutic Activities, Therapeutic Exercise, and Ultrasound, ASTYM, Kinesiotaping PRN, Functional Dry Needling      Mary Ocampo, PT

## 2023-11-27 ENCOUNTER — TELEPHONE (OUTPATIENT)
Dept: RHEUMATOLOGY | Facility: CLINIC | Age: 73
End: 2023-11-27
Payer: MEDICARE

## 2023-11-27 NOTE — TELEPHONE ENCOUNTER
----- Message from Genevieve Edmonds sent at 11/27/2023  2:49 PM CST -----  Contact: Dionne [phone]  Dionne calling from [employer] in regards to office notes that they need faxed over.  They received orders but not the notes,  Please fax to [phone].

## 2023-11-29 ENCOUNTER — OFFICE VISIT (OUTPATIENT)
Dept: PRIMARY CARE CLINIC | Facility: CLINIC | Age: 73
End: 2023-11-29
Payer: MEDICARE

## 2023-11-29 ENCOUNTER — CLINICAL SUPPORT (OUTPATIENT)
Dept: REHABILITATION | Facility: HOSPITAL | Age: 73
End: 2023-11-29
Payer: MEDICARE

## 2023-11-29 VITALS
BODY MASS INDEX: 27.29 KG/M2 | OXYGEN SATURATION: 97 % | HEART RATE: 84 BPM | SYSTOLIC BLOOD PRESSURE: 148 MMHG | TEMPERATURE: 98 F | DIASTOLIC BLOOD PRESSURE: 78 MMHG | HEIGHT: 65 IN | WEIGHT: 163.81 LBS

## 2023-11-29 DIAGNOSIS — R53.83 FATIGUE, UNSPECIFIED TYPE: Primary | ICD-10-CM

## 2023-11-29 DIAGNOSIS — R53.83 FATIGUE, UNSPECIFIED TYPE: ICD-10-CM

## 2023-11-29 DIAGNOSIS — E78.00 HYPERCHOLESTEROLEMIA: Primary | ICD-10-CM

## 2023-11-29 DIAGNOSIS — R52 PAIN: Primary | ICD-10-CM

## 2023-11-29 DIAGNOSIS — I10 ESSENTIAL HYPERTENSION: ICD-10-CM

## 2023-11-29 DIAGNOSIS — F41.8 ANXIETY ASSOCIATED WITH DEPRESSION: ICD-10-CM

## 2023-11-29 LAB
BASOPHILS # BLD AUTO: 0.04 K/UL (ref 0–0.2)
BASOPHILS NFR BLD: 0.8 % (ref 0–1.9)
DIFFERENTIAL METHOD: ABNORMAL
EOSINOPHIL # BLD AUTO: 0.1 K/UL (ref 0–0.5)
EOSINOPHIL NFR BLD: 2 % (ref 0–8)
ERYTHROCYTE [DISTWIDTH] IN BLOOD BY AUTOMATED COUNT: 11.7 % (ref 11.5–14.5)
HCT VFR BLD AUTO: 33.7 % (ref 37–48.5)
HGB BLD-MCNC: 11.4 G/DL (ref 12–16)
IMM GRANULOCYTES # BLD AUTO: 0.02 K/UL (ref 0–0.04)
IMM GRANULOCYTES NFR BLD AUTO: 0.4 % (ref 0–0.5)
LYMPHOCYTES # BLD AUTO: 1.7 K/UL (ref 1–4.8)
LYMPHOCYTES NFR BLD: 32.9 % (ref 18–48)
MCH RBC QN AUTO: 29.7 PG (ref 27–31)
MCHC RBC AUTO-ENTMCNC: 33.8 G/DL (ref 32–36)
MCV RBC AUTO: 88 FL (ref 82–98)
MONOCYTES # BLD AUTO: 0.4 K/UL (ref 0.3–1)
MONOCYTES NFR BLD: 8.6 % (ref 4–15)
NEUTROPHILS # BLD AUTO: 2.8 K/UL (ref 1.8–7.7)
NEUTROPHILS NFR BLD: 55.3 % (ref 38–73)
NRBC BLD-RTO: 0 /100 WBC
PLATELET # BLD AUTO: 315 K/UL (ref 150–450)
PMV BLD AUTO: 9.6 FL (ref 9.2–12.9)
RBC # BLD AUTO: 3.84 M/UL (ref 4–5.4)
WBC # BLD AUTO: 5.01 K/UL (ref 3.9–12.7)

## 2023-11-29 PROCEDURE — 85025 COMPLETE CBC W/AUTO DIFF WBC: CPT | Performed by: NURSE PRACTITIONER

## 2023-11-29 PROCEDURE — 99215 OFFICE O/P EST HI 40 MIN: CPT | Mod: S$PBB,,, | Performed by: NURSE PRACTITIONER

## 2023-11-29 PROCEDURE — 97112 NEUROMUSCULAR REEDUCATION: CPT | Mod: PN

## 2023-11-29 PROCEDURE — 97530 THERAPEUTIC ACTIVITIES: CPT | Mod: PN

## 2023-11-29 PROCEDURE — 99999 PR PBB SHADOW E&M-EST. PATIENT-LVL III: ICD-10-PCS | Mod: PBBFAC,,, | Performed by: NURSE PRACTITIONER

## 2023-11-29 PROCEDURE — 99215 PR OFFICE/OUTPT VISIT, EST, LEVL V, 40-54 MIN: ICD-10-PCS | Mod: S$PBB,,, | Performed by: NURSE PRACTITIONER

## 2023-11-29 PROCEDURE — 99999 PR PBB SHADOW E&M-EST. PATIENT-LVL III: CPT | Mod: PBBFAC,,, | Performed by: NURSE PRACTITIONER

## 2023-11-29 PROCEDURE — 99213 OFFICE O/P EST LOW 20 MIN: CPT | Mod: PBBFAC,PN | Performed by: NURSE PRACTITIONER

## 2023-11-29 PROCEDURE — 97110 THERAPEUTIC EXERCISES: CPT | Mod: PN

## 2023-11-29 RX ORDER — VALSARTAN 160 MG/1
320 TABLET ORAL DAILY
Qty: 90 TABLET | Refills: 3 | Status: SHIPPED | OUTPATIENT
Start: 2023-11-29

## 2023-11-29 NOTE — PROGRESS NOTES
[facility]   Physical Therapy Treatment Note      Name: Leticia Garlandreuben  Clinic Number: [id number]    Therapy Diagnosis:   Encounter Diagnosis   Name Primary?    Pain Yes         Physician: Rolando Silva,*    Visit Date: 11/29/2023      Physician Orders: PT Eval and Treat   Medical Diagnosis from Referral: Iliotibial band syndrome of right side  Evaluation Date: 11/15/2023  Authorization Period Expiration: 11/05/2024  Plan of Care Expiration: 12/29/23  Progress Note Due: 30 days  Visit # / Visits authorized: 3/ 20 + eval  FOTO: 1/3  Precautions: Standard    PTA Visit #: 0/5     Time In: 10:16  Time Out: 11:00  Total Billable Time:44 minutes    Subjective     Pt reports: she is feeling better. She reports she is tired from having company for the last 2 weeks.   She was compliant with home exercise program and seems to be helping  Response to previous treatment: good  Functional change: n/a at this time    Pain: 0/10  Location:  none     Objective      Objective Measures updated at progress report unless specified.     Treatment     Leticia received the following treatments:    Leticia received the following manual therapy techniques applied for 0 minutes, including:  Foam roller to B IT bands and hip    Leticia received therapeutic exercises to develop strength, endurance, ROM, flexibility, posture, and core stabilization for 16  minutes including:  Nustep for ROM, strength, and endurance x 6 minutes R:5  Incline gastroc stretch 3 x 30 secs  Hip abduction 50# 3 x 10 reps  Hip adduction 50# 3 x 10 reps  Leg press seat 7 50# 3 x 10 reps    Leticia participated in neuromuscular re-education activities to improve: Balance, Coordination, Kinesthetic, Sense, Proprioception, and Posture for 12 minutes., including:  Core lumbar 40# 3 x 10 reps ( increase # next visit)  Hip hikes in // bars 2 x 10 reps/ felt good on low back per patient  Braiding     deferred  Side stepping with G  TB    Alternating marches with assistance for balance  Bent knee fall outs 2 x 10 reps B  Ball squeezes 1 x 30 reps  SL clams 2 x 10 reps/  NO pain on R       Open books 1 x 15 reps each way        Leticia participated in dynamic functional therapeutic activities to improve functional performance for 16  minutes, including:  TRX mini squats 2 x 10 reps with core engaged  Push and pull sled with no added #  TRX low rows with core engaged 2 x 10 reps    Patient Education and Home Exercises       Education provided:   - HEP reviewed    Written Home Exercises Provided: Patient instructed to cont prior HEP. Exercises were reviewed and Leticia was able to demonstrate them prior to the end of the session.  Leticia demonstrated good  understanding of the education provided. See EMR under Patient Instructions for exercises provided during therapy sessions    Assessment   Patient continues to respond well to treatment. She presents to therapy with no pain complaints. She progressed with core and resistive exercises today with no pain complaints. She did better with braiding with SBA today.  PT will progress patient as tolerated.     Leticia Is progressing well towards her goals.   Pt prognosis is Good.     Pt will continue to benefit from skilled outpatient physical therapy to address the deficits listed in the problem list box on initial evaluation, provide pt/family education and to maximize pt's level of independence in the home and community environment.     Pt's spiritual, cultural and educational needs considered and pt agreeable to plan of care and goals.     Anticipated barriers to physical therapy: chronicity of condition     Goals: STG's 2 weeks  Patient will be independent with 50% of HEP MET 11/29/23     LTG's 6 weeks  Patient will improve FOTO functional measure score  to 69 %  in order to improve overall QOL & return to PLOF   2. Patient will report an overall decrease in pain with ADL's and functional mobility  Progressing  3. Patient will be more aware of posture throughout the day to reduce stress  and maintain optimal alignment of the spine Progressing  4. Patient will be independent with HEP and pain management Progressing    Plan     Plan of care Certification: 11/15/2023 to 12/29/23.     Outpatient Physical Therapy 1-2  times weekly for 6 weeks to include the following interventions: Cervical/Lumbar Traction, Electrical Stimulation PRN, Gait Training, Manual Therapy, Moist Heat/ Ice, Neuromuscular Re-ed, Patient Education, Self Care, Therapeutic Activities, Therapeutic Exercise, and Ultrasound, ASTYM, Kinesiotaping PRN, Functional Dry Needling      Mary Ocampo, PT

## 2023-11-29 NOTE — PROGRESS NOTES
Leticia Piña  11/29/2023  [id number]    Nicole Jasmine MD  Patient Care Team:  Nicole Jasmine MD as PCP - General (Internal Medicine)  Hernando Puentes LPN as Care Coordinator  Jami Ureña as Digital Medicine Health   Samantha Frazier as Hypertension Digital Medicine Clinician  Nicole Jasmine MD as Hypertension Digital Medicine Responsible Provider (Internal Medicine)  Program, Medicare Shared Savings as Hypertension Digital Medicine Contract      [facility] Note      Chief Complaint:  Chief Complaint   Patient presents with    Follow-up     Follow up on BP        History of Present Illness:    Pt returns for blood pressure f/u. Last visit, Dr. Jasmine stopped her amlodipine 10 mg due to leg swelling.  Currently, Valsartan 160 mg daily, HCTZ 25 mg prescribed  Leg swelling resolved.     Per patient SBP noted mostly 140s - nothing greater  She describes fatigue, will receive iron infusion with Hematology    Pt describes sensation of holding her breath. Onset - long time, when doing normal activities. Denies SOB or anxiety.               The following were reviewed: Active problem list, medication list, allergies, family history, social history, and Health Maintenance.     History:  Past Medical History:   Diagnosis Date    Allergic rhinitis     Cutaneous lupus erythematosus     drug induced.    Essential hypertension 02/05/2019    GERD (gastroesophageal reflux disease)     Hypothyroid     Insomnia     Mixed anxiety and depressive disorder     Nonrheumatic aortic valve insufficiency 9/4/2023    Nonrheumatic mitral valve regurgitation 9/4/2023    Normal cardiac stress test     11/07    Pulmonary hypertension 9/4/2023    Rheumatoid arthritis(714.0)     Sjogren's syndrome      Past Surgical History:   Procedure Laterality Date    AUGMENTATION OF BREAST      breast implants      breast surgery for rupture      COLONOSCOPY N/A 3/7/2023    Procedure: COLONOSCOPY;  Surgeon: Susan GARCIA  MD Fanny;  Location: [facility] ENDO;  Service: Endoscopy;  Laterality: N/A;    ERCP N/A 03/25/2021    Procedure: ERCP (ENDOSCOPIC RETROGRADE CHOLANGIOPANCREATOGRAPHY);  Surgeon: Preston Madrigal MD;  Location: [facility] ENDO;  Service: Endoscopy;  Laterality: N/A;    ERCP N/A 06/21/2021    Procedure: ERCP (ENDOSCOPIC RETROGRADE CHOLANGIOPANCREATOGRAPHY);  Surgeon: Preston Madrigal MD;  Location: [facility] ENDO;  Service: Endoscopy;  Laterality: N/A;    ESOPHAGOGASTRODUODENOSCOPY N/A 01/24/2023    Procedure: ESOPHAGOGASTRODUODENOSCOPY (EGD);  Surgeon: Susan Escobedo MD;  Location: [facility] ENDO;  Service: Endoscopy;  Laterality: N/A;    LAPAROSCOPIC CHOLECYSTECTOMY N/A 03/26/2021    Procedure: CHOLECYSTECTOMY, LAPAROSCOPIC;  Surgeon: Jose Blue MD;  Location: [facility] OR;  Service: General;  Laterality: N/A;    TONSILLECTOMY, ADENOIDECTOMY      TUBAL LIGATION       Family History   Problem Relation Age of Onset    Heart disease Mother     Heart disease Father      Patient Active Problem List   Diagnosis    Atrophy of vulva    Facial nerve disorder, unspecified    Rheumatoid arthritis involving multiple sites with positive rheumatoid factor    Anxiety associated with depression    Primary insomnia    Sjogren's syndrome with keratoconjunctivitis sicca    Acquired hypothyroidism    Vitamin D deficiency disease    Drug-induced lupus erythematosus    Allergic rhinitis    Lymphocytic vasculitis of skin    High risk medication use    Bursitis of left foot    Recurrent major depressive disorder, in partial remission    Immunocompromised    Essential hypertension    Trochanteric bursitis of both hips    Iron deficiency anemia    Osteopenia with high risk of fracture    Gastroesophageal reflux disease with esophagitis without hemorrhage    Methylenetetrahydrofolate reductase deficiency    Flare of rheumatoid arthritis    Sensorineural hearing loss (SNHL) of both ears    Balance problem    HENRI (obstructive sleep apnea)     Bilateral cold feet    Nonrheumatic aortic valve insufficiency    Nonrheumatic mitral valve regurgitation    Pulmonary hypertension    Atypical chest pain    Family history of cardiovascular disease    Chronic heart failure with preserved ejection fraction    Drug-induced immunodeficiency    Encounter for medication monitoring    Varicose veins of both legs with edema    Lymphedema    Iliotibial band syndrome of right side    Arthritis of carpometacarpal (CMC) joint of both thumbs    Lipedema    Pain    Hypercholesterolemia    Fatigue     Review of patient's allergies indicates:   Allergen Reactions    Humira  [adalimumab]      Other reaction(s): drug-induced lupus  Other reaction(s): drug-induced lupus    Sulfa (sulfonamide antibiotics)      Other reaction(s): Hives  Other reaction(s): Rash  Other reaction(s): Hives       Medications:  Current Outpatient Medications on File Prior to Visit   Medication Sig Dispense Refill    albuterol (VENTOLIN HFA) 90 mcg/actuation inhaler Inhale 2 puffs into the lungs every 4 to 6 hours as needed for Wheezing or Shortness of Breath. Rescue 18 g 2    benzonatate (TESSALON) 100 MG capsule Take 2 capsules (200 mg total) by mouth 3 (three) times daily as needed. 60 capsule 3    buPROPion (WELLBUTRIN XL) 300 MG 24 hr tablet Take 1 tablet (300 mg total) by mouth once daily. 90 tablet 1    cholecalciferol, vitamin D3, 5,000 unit/mL Drop Take 1 drop by mouth Daily.      cyanocobalamin, vitamin B-12, 5,000 mcg Subl Place under the tongue once daily.      dexbrompheniramine maleate (ALA-HIST IR) 2 mg Tab Take 1 tablet by mouth once daily. 30 tablet 2    diclofenac sodium (VOLTAREN) 1 % Gel Apply 2 g topically 4 (four) times daily. 300 g 3    DULoxetine (CYMBALTA) 60 MG capsule Take 1 capsule (60 mg total) by mouth once daily. 90 capsule 1    fluticasone propionate (FLONASE) 50 mcg/actuation nasal spray 2 sprays (100 mcg total) by Each Nostril route once daily. 16 g 2     hydroCHLOROthiazide (HYDRODIURIL) 25 MG tablet TAKE ONE TABLET BY MOUTH ONE TIME DAILY 90 tablet 3    hydroxychloroquine (PLAQUENIL) 200 mg tablet Take 1 tablet (200 mg total) by mouth once daily. 180 tablet 0    levothyroxine (EUTHYROX) 75 MCG tablet Take 1 tablet (75 mcg total) by mouth once daily. 90 tablet 3    LORazepam (ATIVAN) 1 MG tablet Take 1 tablet (1 mg total) by mouth nightly as needed for Anxiety. 30 tablet 3    potassium chloride SA (K-DUR,KLOR-CON) 20 MEQ tablet TAKE ONE TABLET BY MOUTH ONE TIME DAILY 90 tablet 3    tretinoin (RETIN-A) 0.025 % cream Apply topically every evening. 45 g 5    upadacitinib (RINVOQ) 15 mg 24 hr tablet Take by mouth. Test claim only 30 tablet 0    [DISCONTINUED] valsartan (DIOVAN) 160 MG tablet Take 1 tablet (160 mg total) by mouth once daily. 90 tablet 3    azelastine (ASTELIN) 137 mcg (0.1 %) nasal spray 1 spray (137 mcg total) by Nasal route 2 (two) times daily. (Patient taking differently: 1 spray by Nasal route once daily.) 30 mL 11    folic acid (FOLVITE) 1 MG tablet Take 1 tablet (1 mg total) by mouth once daily. 90 tablet 3     No current facility-administered medications on file prior to visit.       Medications have been reviewed and reconciled with patient at visit today.      Exam:  Vitals:    11/29/23 1404   BP: (!) 148/78   Pulse: 84   Temp: 98 °F (36.7 °C)     Weight: 74.3 kg (163 lb 12.8 oz)   Body mass index is 27.26 kg/m².      BP Readings from Last 3 Encounters:   11/29/23 (!) 148/78   11/10/23 (!) 142/72   11/06/23 (!) 155/72     Wt Readings from Last 3 Encounters:   11/29/23 1404 74.3 kg (163 lb 12.8 oz)   11/10/23 1306 75.3 kg (166 lb)   11/06/23 0922 75.1 kg (165 lb 9.1 oz)        REVIEW OF SYSTEMS  Review of Systems   Constitutional:  Positive for malaise/fatigue. Negative for chills and fever.   HENT:  Negative for congestion, ear pain and sore throat.    Respiratory:  Negative for cough and shortness of breath.         Holding breath    Cardiovascular:  Negative for chest pain, palpitations and leg swelling.   Gastrointestinal:  Negative for abdominal pain and constipation.   Genitourinary:  Negative for dysuria and frequency.   Musculoskeletal:  Negative for back pain and myalgias.   Neurological:  Negative for dizziness, focal weakness and headaches.   Psychiatric/Behavioral:  Negative for depression. The patient is not nervous/anxious.        Physical Exam  Vitals reviewed.   Constitutional:       General: She is not in acute distress.     Appearance: Normal appearance.   HENT:      Head: Normocephalic and atraumatic.   Eyes:      Conjunctiva/sclera: Conjunctivae normal.   Cardiovascular:      Rate and Rhythm: Normal rate and regular rhythm.      Heart sounds: No murmur heard.  Pulmonary:      Effort: Pulmonary effort is normal. No respiratory distress.      Breath sounds: Normal breath sounds.   Abdominal:      Palpations: Abdomen is soft.      Tenderness: There is no abdominal tenderness.   Musculoskeletal:         General: No swelling or deformity. Normal range of motion.      Cervical back: Normal range of motion and neck supple.      Right lower leg: No edema.      Left lower leg: No edema.   Skin:     General: Skin is warm and dry.   Neurological:      Mental Status: She is alert and oriented to person, place, and time. Mental status is at baseline.   Psychiatric:         Mood and Affect: Mood normal.         Thought Content: Thought content normal.      Comments: Jittery - takes deep breaths like sighing         Laboratory Reviewed:     Lab Results   Component Value Date    WBC 4.17 11/14/2023    HGB 11.9 (L) 11/14/2023    HCT 37.0 11/14/2023     11/14/2023    CHOL 210 (H) 11/14/2023    TRIG 179 (H) 11/14/2023    HDL 70 11/14/2023    ALT 27 11/14/2023    AST 27 11/14/2023     11/14/2023    K 3.3 (L) 11/14/2023    CL 95 11/14/2023    CREATININE 0.9 11/14/2023    BUN 22 11/14/2023    CO2 31 (H) 11/14/2023    TSH 0.757  "11/14/2023    INR 0.9 01/15/2020       Screening or Prevention Patient's value Goal Complete/Controlled?   HgA1C Testing and Control   No results found for: "HGBA1C"   Annually/Less than 8% No   Lipid profile : 11/14/2023 Annually Yes   LDL control Lab Results   Component Value Date    LDLCALC 104.2 11/14/2023    Annually/Less than 100 mg/dl  No   Nephropathy screening No results found for: "LABMICR"  Lab Results   Component Value Date    PROTEINUA Negative 06/23/2021    Annually No   Blood pressure BP Readings from Last 1 Encounters:   11/29/23 (!) 148/78    Less than 140/90 No   Dilated retinal exam Most Recent Eye Exam Date: Not Found Annually Yes   Foot exam   Most Recent Foot Exam Date: Not Found Annually Yes       Health Maintenance  Health Maintenance Topics with due status: Not Due       Topic Last Completion Date    TETANUS VACCINE 06/21/2021    Colorectal Cancer Screening 03/07/2023    DEXA Scan 03/23/2023    Lipid Panel 11/14/2023     Health Maintenance Due   Topic Date Due    RSV Vaccine (Age 60+ and Pregnant patients) (1 - 1-dose 60+ series) Never done    COVID-19 Vaccine (3 - 2023-24 season) 09/01/2023    Mammogram  02/15/2024       Assessment and Plan:  1. Hypercholesterolemia  -     Cancel: Lipid Panel; Future; Expected date: 11/29/2023  -     Lipid Panel; Future; Expected date: 12/13/2023    2. Essential hypertension  Assessment & Plan:  Change valsartan 160 mg >>> 320 mg daily  Continue HCTZ 25 mg daily  Monitor and record -  F/U    Orders:  -     valsartan (DIOVAN) 160 MG tablet; Take 2 tablets (320 mg total) by mouth once daily.  Dispense: 90 tablet; Refill: 3    3. Fatigue, unspecified type  -     CBC Auto Differential    4. Anxiety associated with depression  Assessment & Plan:  Continue Cymbalta  Iron infusion with Hematology  Continue iron infusion  Breathing meditation exercises                     -Patient's lab results were reviewed and discussed with patient  -Treatment options and " alternatives were discussed with the patient. Patient expressed understanding. Patient was given the opportunity to ask questions and be an active participant in their medical care. Patient had no further questions or concerns at this time.         Future Appointments   Date Time Provider Department Center   12/4/2023 10:15 AM Mary Ocampo, PT [facility] [place]   12/4/2023 11:00 AM Ankita Ruff, OT [facility] [place]   12/6/2023 10:15 AM Mary Ocampo, PT [facility] [place]   12/7/2023  1:00 PM CHAIR 12 [facility]   12/8/2023  1:30 PM Claudia Richard, PT [facility] [place]   12/11/2023 10:15 AM Mary Ocampo, PT [facility] [place]   12/13/2023 11:00 AM Mary Ocampo, PT [facility] [place]   12/22/2023  3:40 PM Nicole Jasmine MD [facility]   1/18/2024 10:40 AM LABORATORY, [place] [facility] [place]   1/19/2024  9:00 AM Nicole Jasmine MD [facility]   1/22/2024  7:30 AM Vivian Kim NP [facility]   6/3/2024  9:40 AM LAB[facility]ATORY, [place] [facility] [place]   6/10/2024  1:45 PM Rolando Silva MD [facility] [place]   8/21/2024 10:15 AM ECHOCARDIOGRAM, [facility] SPECCPR [place]   8/27/2024 11:40 AM Alen Geller MD [facility] CARDIO [place]   10/24/2024  1:45 PM Rolando Silva MD [facility] RHEUM [place]          After visit summary printed and given to patient upon discharge.  Patient goals and care plan are included in After visit summary.      The following issues were discussed: The primary encounter diagnosis was Hypercholesterolemia. Diagnoses of Essential hypertension, Fatigue, unspecified type, and Anxiety associated with depression were also pertinent to this visit.    Health maintenance needs, recent test results and goals of care discussed with pt and questions answered.           AAMIR Degroot, NP-C  [facility] [id number] [place]  [id number]

## 2023-11-29 NOTE — ASSESSMENT & PLAN NOTE
Continue Cymbalta  Iron infusion with Hematology  Continue iron infusion  Breathing meditation exercises

## 2023-12-02 DIAGNOSIS — L98.8 RHYTIDES: ICD-10-CM

## 2023-12-02 DIAGNOSIS — E03.9 ACQUIRED HYPOTHYROIDISM: ICD-10-CM

## 2023-12-02 DIAGNOSIS — F41.8 MIXED ANXIETY AND DEPRESSIVE DISORDER: ICD-10-CM

## 2023-12-03 ENCOUNTER — PATIENT MESSAGE (OUTPATIENT)
Dept: PRIMARY CARE CLINIC | Facility: CLINIC | Age: 73
End: 2023-12-03
Payer: MEDICARE

## 2023-12-04 ENCOUNTER — TELEPHONE (OUTPATIENT)
Dept: INFUSION THERAPY | Facility: HOSPITAL | Age: 73
End: 2023-12-04
Payer: MEDICARE

## 2023-12-04 ENCOUNTER — CLINICAL SUPPORT (OUTPATIENT)
Dept: REHABILITATION | Facility: HOSPITAL | Age: 73
End: 2023-12-04
Payer: MEDICARE

## 2023-12-04 ENCOUNTER — PATIENT MESSAGE (OUTPATIENT)
Dept: PSYCHIATRY | Facility: CLINIC | Age: 73
End: 2023-12-04

## 2023-12-04 DIAGNOSIS — R52 PAIN: Primary | ICD-10-CM

## 2023-12-04 DIAGNOSIS — B96.89 BACTERIAL VAGINOSIS: Primary | ICD-10-CM

## 2023-12-04 DIAGNOSIS — R29.898 DECREASED PINCH STRENGTH: ICD-10-CM

## 2023-12-04 DIAGNOSIS — N76.0 BACTERIAL VAGINOSIS: Primary | ICD-10-CM

## 2023-12-04 DIAGNOSIS — M18.0 ARTHRITIS OF CARPOMETACARPAL (CMC) JOINT OF BOTH THUMBS: ICD-10-CM

## 2023-12-04 DIAGNOSIS — G89.29 CHRONIC PAIN OF LEFT HAND: Primary | ICD-10-CM

## 2023-12-04 DIAGNOSIS — M79.642 CHRONIC PAIN OF LEFT HAND: Primary | ICD-10-CM

## 2023-12-04 PROCEDURE — 97165 OT EVAL LOW COMPLEX 30 MIN: CPT | Mod: PN

## 2023-12-04 PROCEDURE — 97110 THERAPEUTIC EXERCISES: CPT | Mod: PN

## 2023-12-04 PROCEDURE — 97530 THERAPEUTIC ACTIVITIES: CPT | Mod: PN

## 2023-12-04 PROCEDURE — 97112 NEUROMUSCULAR REEDUCATION: CPT | Mod: PN

## 2023-12-04 PROCEDURE — 97535 SELF CARE MNGMENT TRAINING: CPT | Mod: PN

## 2023-12-04 RX ORDER — DULOXETIN HYDROCHLORIDE 60 MG/1
60 CAPSULE, DELAYED RELEASE ORAL DAILY
Qty: 90 CAPSULE | Refills: 0 | Status: SHIPPED | OUTPATIENT
Start: 2023-12-04 | End: 2024-02-14 | Stop reason: SDUPTHER

## 2023-12-04 RX ORDER — METRONIDAZOLE 500 MG/1
500 TABLET ORAL 2 TIMES DAILY
Qty: 20 TABLET | Refills: 0 | Status: SHIPPED | OUTPATIENT
Start: 2023-12-04 | End: 2023-12-14

## 2023-12-04 RX ORDER — BUPROPION HYDROCHLORIDE 300 MG/1
300 TABLET ORAL DAILY
Qty: 90 TABLET | Refills: 0 | Status: SHIPPED | OUTPATIENT
Start: 2023-12-04 | End: 2024-02-14 | Stop reason: SDUPTHER

## 2023-12-04 RX ORDER — LEVOTHYROXINE SODIUM 75 UG/1
75 TABLET ORAL DAILY
Qty: 90 TABLET | Refills: 3 | Status: SHIPPED | OUTPATIENT
Start: 2023-12-04 | End: 2024-03-25 | Stop reason: SDUPTHER

## 2023-12-04 RX ORDER — LORAZEPAM 1 MG/1
1 TABLET ORAL NIGHTLY PRN
Qty: 30 TABLET | Refills: 0 | Status: SHIPPED | OUTPATIENT
Start: 2023-12-04 | End: 2024-03-25 | Stop reason: SDUPTHER

## 2023-12-04 RX ORDER — TRAZODONE HYDROCHLORIDE 50 MG/1
TABLET ORAL
Qty: 180 TABLET | Refills: 0 | Status: SHIPPED | OUTPATIENT
Start: 2023-12-04 | End: 2024-02-06 | Stop reason: SDUPTHER

## 2023-12-04 NOTE — PLAN OF CARE
"  [facility]  Occupational Therapy Initial Evaluation     Date: 12/4/2023  Name: Leticia Piña  Clinic Number: [id number]    Therapy Diagnosis:   Encounter Diagnoses   Name Primary?    Arthritis of carpometacarpal (CMC) joint of both thumbs     Chronic pain of left hand Yes    Decreased pinch strength      Physician: Rolando Silva,*    Physician Orders: OT eval and tx  Medical Diagnosis: Arthritis of carpometacarpal (CMC) joint of both thumbs [M18.0]   Evaluation Date: 12/4/2023  Insurance Authorization Period Expiration: 12/31/2023  Plan of Care Certification Period: 12/4/2023 to 1/19/2024  Progress Note Due: 30 days (or 1/4/2024)     Visit # / Visits authorized: 1/1  FOTO: 1/3    Surgical Procedure: none     Date of Return to MD: PRN    Precautions:  Standard, RA    Time In: 1100  Time Out: 1140  Total Appointment Time (timed & untimed codes): 40 minutes    Subjective     Involved Side: bilateral  Dominant Side: Right    Date of Onset: 6-8 months  Mechanism of Injury/ History of Current Condition: Pt reports left thumb and wrist pain. She reports pain at CMC joint, radiating up 1st dorsal interossei and across dorsal left wrist. She reports no hx of fall, injury, or trauma. She reports when she first developed RA (1994), it started in her left hand. She reports her pain bothers her at night and she has to wear a splint. She reports that her left hand is weaker but she's scared to use it because she thinks she will make it worse. She reports being unable to use anti-inflammatories because it upsets her stomach, but she uses Voltaren gel.    Falls: no    Imaging: none    Previous Therapy: no    Patient's Goals for Therapy: "decrease my pain and strengthen my left hand"    Pain:  Functional Pain Scale Rating 0-10:   4/10 on average  2/10 at best  5/10 at worst  Location: left hand  Description: Aching and Dull  Aggravating Factors: working in the kitchen - holding dishes, " unscrewing lids  Easing Factors: brace and ice    Functional Limitations/Social History:    Previous Functional Status: Independent with all ADL/IADL tasks     Current Functional Status: requires assistance from her  with opening jars and holding/carrying heavy pots    Home/Living environment: lives with her spouse     Driving: pain in left hand when grasping steering wheel    Occupation:   Working presently: retired  Duties: household chores        Past Medical History/Physical Systems Review:   Medical History:   Past Medical History:   Diagnosis Date    Allergic rhinitis     Cutaneous lupus erythematosus     drug induced.    Essential hypertension 02/05/2019    GERD (gastroesophageal reflux disease)     Hypothyroid     Insomnia     Mixed anxiety and depressive disorder     Nonrheumatic aortic valve insufficiency 9/4/2023    Nonrheumatic mitral valve regurgitation 9/4/2023    Normal cardiac stress test     11/07    Pulmonary hypertension 9/4/2023    Rheumatoid arthritis(714.0)     Sjogren's syndrome        Surgical History:    has a past surgical history that includes breast implants; breast surgery for rupture; TONSILLECTOMY, ADENOIDECTOMY; Tubal ligation; Augmentation of breast; ERCP (N/A, 03/25/2021); Laparoscopic cholecystectomy (N/A, 03/26/2021); ERCP (N/A, 06/21/2021); Esophagogastroduodenoscopy (N/A, 01/24/2023); and Colonoscopy (N/A, 3/7/2023).    Medications:   has a current medication list which includes the following prescription(s): albuterol, azelastine, benzonatate, bupropion, cholecalciferol (vitamin d3), cyanocobalamin (vitamin b-12), ala-hist ir, diclofenac sodium, duloxetine, fluticasone propionate, folic acid, hydrochlorothiazide, hydroxychloroquine, levothyroxine, lorazepam, metronidazole, potassium chloride sa, trazodone, tretinoin, rinvoq, and valsartan.    Allergies:   Review of patient's allergies indicates:   Allergen Reactions    Humira  [adalimumab]      Other reaction(s):  drug-induced lupus  Other reaction(s): drug-induced lupus    Sulfa (sulfonamide antibiotics)      Other reaction(s): Hives  Other reaction(s): Rash  Other reaction(s): Hives          Objective     Observation/Inspection: left thumb shoulder sign    Sensation: Pt denies paresthesias. Pt denies hypersensitivity. Intact to light touch.        bilateral Upper Extremity Active Range of Motion:     Right Left   ROM (in degrees) 12/4/2023 12/4/2023   Radial deviation 17 17   Ulnar deviation 28 28   Wrist flexion 73 66   Wrist extension 70 75       bilateral Hand Active Range of Motion:   Able to form full bilateral composite fists  Unable to form full bilateral hook fists 2* intrinsic tightness  Weakness noted with bilateral lumbrical fists    bilateral Thumb Active Range of Motion:   Able to oppose bilateral thumbs to each digit and DPC     and Pinch Strength (in pounds, psi's):   Right Left    12/4/2023 12/4/2023    II 50 40   Lateral 9 5   Tripod 6 3   Tip 2 2       Intake Outcome Measure for FOTO Left Hand Survey    Therapist reviewed FOTO scores for Leticia Hornerbrnet on 12/4/2023.   FOTO documents entered into [employer] - see Media section.    Intake Score: 50%     Predicted Functional Score: 62% in 9 visits    Treatment     Total Treatment time (time-based codes) separate from Evaluation: 25 minutes    Leticia received the treatments listed below:      therapeutic exercises to develop ROM for 10 minutes including:  - HEP    self-care techniques to improve independence and safety with ADL/IADL tasks for 15 minutes including:  - precautions for arthritis  - use of heat for pain management and joint stiffness  - use of paraffin home units    Home Exercise Program/Education:    Education provided:   - Role of OT, goals for OT, scheduling/cancellations, therapy attendance policy    Written Home Exercises Provided: Yes  Exercises were reviewed and Leticia was able to demonstrate them prior to the end of the session.  Leticia demonstrated good understanding of the education provided. See EMR under Patient Instructions for exercises provided during therapy sessions.     Pt was advised to perform these exercises free of pain, and to stop performing them if pain occurs.    Assessment     Leticia Piña is a 73 y.o. female referred to outpatient occupational therapy and presents with a medical diagnosis of Arthritis of carpometacarpal (CMC) joint of both thumbs [M18.0] .  Patient presents with the following therapy deficits: Decreased  strength, Decreased pinch strength, Decreased muscle strength, Decreased functional hand use, Increased pain, Joint Stiffness, and Diminished/Impaired Coordination and demonstrates limitations as described in the chart below.     Following medical record review, it is determined that the pt will benefit from occupational therapy services in order to maximize pain free and/or functional use of her left hand. The following goals were discussed with the patient and patient is in agreement with them as to be addressed in the treatment plan. The patient's rehab potential is Good.     Anticipated barriers to occupational therapy: chronic symptoms  Pt has no cultural, educational or language barriers to learning provided.    Medical Necessity is demonstrated by the following  Occupational Profile/History  Co-morbidities and personal factors that may impact the plan of care [x] LOW: Brief chart review  [] MODERATE: Expanded chart review   [] HIGH: Extensive chart review    Moderate / High Support Documentation: n/a     Examination  Performance deficits relating to physical, cognitive or psychosocial skills that result in activity limitations and/or participation restrictions  [] LOW: addressing 1-3 Performance deficits  [x] MODERATE: 3-5 Performance deficits  [] HIGH: 5+ Performance deficits (please support below)    Moderate / High Support Documentation:    Physical:  Joint Stability  Muscle  Power/Strength  Muscle Endurance   Strength  Pinch Strength  Fine Motor Coordination  Pain    Cognitive:  Safety Awareness/Insight to Disability    Psychosocial:    Habits  Routines     Treatment Options [x] LOW: Limited options  [] MODERATE: Several options  [] HIGH: Multiple options      Decision Making/ Complexity Score: low       The following goals were discussed with the patient and patient is in agreement with them as to be addressed in the treatment plan.     Goals:   Short Term Goals: (4 weeks)  1. Pt will be independent with HEP in 2 visits.  2. Pt will report decreased left hand pain to a 3/10 with use of orthosis.  3. Pt will exhibit improved dynamic stability of her left thumb.      Long Term Goals: (8 weeks)  1. Pt will report decreased left hand pain to 1-2/10 with ADL/IADL tasks.   2. Pt will be I with incorporation of joint protection techniques into ADL/IADL tasks.  3. Pt will increase L  strength by 5-10# to allow a firm grasp on cooking utensils, steering wheel, etc.  4. Pt will exhibit 6+# of functional left pinch strength to allow writing, opening containers, and turning keys.  5. Pt will report an increase in FOTO intake score of > 55%, which would indicate an improvement in quality of life.  6. Pt will be independent with management of future flares/exacerbations of left hand.       Plan   Plan of Care Certification: 12/4/2023 to 1/19/2024     Outpatient Occupational Therapy 1-2 times weekly for 6 weeks to include the following interventions PRN: Fluidotherapy, Manual Therapy, Moist Heat/ Ice, Neuromuscular Re-ed, Orthotic Management and Training, Paraffin, Patient Education, Self Care, Therapeutic Activities, Therapeutic Exercise, and Ultrasound      EMMANUEL Christie, JUAREZ, PAN

## 2023-12-04 NOTE — TELEPHONE ENCOUNTER
Patient calling to r/s her iron infusion. Infusion r/s'd to 12/12 per patients preferences. Call ended well.

## 2023-12-04 NOTE — PROGRESS NOTES
[facility]   Physical Therapy Treatment Note      Name: Leticia Garlandreuben  Clinic Number: [id number]    Therapy Diagnosis:   Encounter Diagnosis   Name Primary?    Pain Yes           Physician: Rolando Silva,*    Visit Date: 12/4/2023      Physician Orders: PT Eval and Treat   Medical Diagnosis from Referral: Iliotibial band syndrome of right side  Evaluation Date: 11/15/2023  Authorization Period Expiration: 11/05/2024  Plan of Care Expiration: 12/29/23  Progress Note Due: 30 days  Visit # / Visits authorized: 4/ 20 + eval  FOTO: 1/3  Precautions: Standard    PTA Visit #: 0/5     Time In: 10:16  Time Out: 11:00  Total Billable Time:44 minutes    Subjective     Pt reports: she is feeling better and has no new complaints. She reports no pain at night anymore. She still reports tenderness when she presses on B Lateral upper thighs.   She was compliant with home exercise program and seems to be helping  Response to previous treatment: good  Functional change: n/a at this time    Pain: 0/10  Location:  none     Objective      Objective Measures updated at progress report unless specified.     Treatment     Leticia received the following treatments:    Leticia received the following manual therapy techniques applied for 0 minutes, including:  Foam roller to B IT bands and hip    FOTO next visit    Leticia received therapeutic exercises to develop strength, endurance, ROM, flexibility, posture, and core stabilization for 18 minutes including:  Nustep for ROM, strength, and endurance x 6 minutes R:5  Incline gastroc stretch 3 x 30 secs  Hip abduction 50# 3 x 10 reps  Hip adduction 50# 3 x 10 reps  Leg press seat 7 50# 3 x 10 reps  Low back stretch    Leticia participated in neuromuscular re-education activities to improve: Balance, Coordination, Kinesthetic, Sense, Proprioception, and Posture for 14  minutes., including:  Core lumbar 50# 3 x 10 reps   Hip hikes in // bars 2 x 10 reps/  felt good on low back per patient  Braiding     deferred  Side stepping with G TB    Alternating marches with assistance for balance  Bent knee fall outs 2 x 10 reps B  Ball squeezes 1 x 30 reps  SL clams 2 x 10 reps/  NO pain on R       Open books 1 x 15 reps each way        Leticia participated in dynamic functional therapeutic activities to improve functional performance for 12  minutes, including:  TRX mini squats 2 x 10 reps with core engaged  Push sled 25# too difficult and  pain reported in L wrist  TRX low rows with core engaged 2 x 10 reps    Patient Education and Home Exercises       Education provided:   - HEP reviewed    Written Home Exercises Provided: Patient instructed to cont prior HEP. Exercises were reviewed and Leticia was able to demonstrate them prior to the end of the session.  Leticia demonstrated good  understanding of the education provided. See EMR under Patient Instructions for exercises provided during therapy sessions    Assessment   Patient continues to respond well to treatment. She presents to therapy with no pain complaints. She reports she does not have pain at night anymore. She still reports tenderness when pressing on B ITB region. She was not able to push sled with 25# added due to L wrist pain.   She tolerated treatment well.  PT will progress patient as tolerated.     Leticia Is progressing well towards her goals.   Pt prognosis is Good.     Pt will continue to benefit from skilled outpatient physical therapy to address the deficits listed in the problem list box on initial evaluation, provide pt/family education and to maximize pt's level of independence in the home and community environment.     Pt's spiritual, cultural and educational needs considered and pt agreeable to plan of care and goals.     Anticipated barriers to physical therapy: chronicity of condition     Goals: STG's 2 weeks  Patient will be independent with 50% of HEP MET 11/29/23     LTG's 6 weeks  Patient  will improve FOTO functional measure score  to 69 %  in order to improve overall QOL & return to PLOF   2. Patient will report an overall decrease in pain with ADL's and functional mobility Progressing  3. Patient will be more aware of posture throughout the day to reduce stress  and maintain optimal alignment of the spine Progressing  4. Patient will be independent with HEP and pain management Progressing    Plan     Plan of care Certification: 11/15/2023 to 12/29/23.     Outpatient Physical Therapy 1-2  times weekly for 6 weeks to include the following interventions: Cervical/Lumbar Traction, Electrical Stimulation PRN, Gait Training, Manual Therapy, Moist Heat/ Ice, Neuromuscular Re-ed, Patient Education, Self Care, Therapeutic Activities, Therapeutic Exercise, and Ultrasound, ASTYM, Kinesiotaping PRN, Functional Dry Needling      Mary Ocampo, PT

## 2023-12-06 ENCOUNTER — CLINICAL SUPPORT (OUTPATIENT)
Dept: REHABILITATION | Facility: HOSPITAL | Age: 73
End: 2023-12-06
Payer: MEDICARE

## 2023-12-06 DIAGNOSIS — R52 PAIN: Primary | ICD-10-CM

## 2023-12-06 PROCEDURE — 97530 THERAPEUTIC ACTIVITIES: CPT | Mod: PN

## 2023-12-06 PROCEDURE — 97110 THERAPEUTIC EXERCISES: CPT | Mod: PN

## 2023-12-06 PROCEDURE — 97112 NEUROMUSCULAR REEDUCATION: CPT | Mod: PN

## 2023-12-06 RX ORDER — TRETINOIN 0.25 MG/G
CREAM TOPICAL NIGHTLY
Qty: 45 G | Refills: 5 | Status: SHIPPED | OUTPATIENT
Start: 2023-12-06 | End: 2024-03-14 | Stop reason: SDUPTHER

## 2023-12-06 NOTE — PROGRESS NOTES
[facility]   Physical Therapy Treatment Note      [facility]  [facility] Number: [id number]    Therapy Diagnosis:   Encounter Diagnosis   Name Primary?    Pain Yes       Physician: Rolando Silva,*    Visit Date: 12/6/2023      Physician Orders: PT Eval and Treat   Medical Diagnosis from Referral: Iliotibial band syndrome of right side  Evaluation Date: 11/15/2023  Authorization Period Expiration: 11/05/2024  Plan of Care Expiration: 12/29/23  Progress Note Due: 30 days  Visit # / Visits authorized: 5/ 20 + eval  FOTO: 2/3  Precautions: Standard    PTA Visit #: 0/5     Time In: 10:15  Time Out: 11:01  Total Billable Time:46 minutes    Subjective     Pt reports: no pain and no new complaints.  She was compliant with home exercise program and seems to be helping  Response to previous treatment: good  Functional change: n/a at this time    Pain: 0/10  Location:  none     Objective    12/6/23 FOTO completed      Treatment     Leticia received the following treatments:    Leticia received the following manual therapy techniques applied for 0 minutes, including:  Foam roller to B IT bands and hip        Leticia received therapeutic exercises to develop strength, endurance, ROM, flexibility, posture, and core stabilization for 18 minutes including:  Bike  for ROM, strength, and endurance x 6 minutes R:4  Incline gastroc stretch 3 x 30 secs  Hip abduction 50# 3 x 10 reps  Hip adduction 50# 3 x 10 reps  Leg press seat 7 50# 3 x 10 reps  Low back stretch    Leticia participated in neuromuscular re-education activities to improve: Balance, Coordination, Kinesthetic, Sense, Proprioception, and Posture for 16 minutes., including:  Core lumbar 50# 3 x 10 reps not performed  Hip hikes in // bars 3 x 10 reps/ felt good on low back per patient  Braiding   Land mine press 15# half kneeling 1 x 10 reps very difficult/ strained the hips per patient      deferred  Side stepping with G  TB    Alternating marches with assistance for balance  Bent knee fall outs 2 x 10 reps B  Ball squeezes 1 x 30 reps  SL clams 2 x 10 reps/  NO pain on R       Open books 1 x 15 reps each way        Leticia participated in dynamic functional therapeutic activities to improve functional performance for 12 minutes, including:  TRX mini squats 2 x 10 reps with core engaged  Push sled  no added #  TRX low rows with core engaged 2 x 10 reps    Patient Education and Home Exercises       Education provided:   - HEP reviewed    Written Home Exercises Provided: Patient instructed to cont prior HEP. Exercises were reviewed and Leticia was able to demonstrate them prior to the end of the session.  Leticia demonstrated good  understanding of the education provided. See EMR under Patient Instructions for exercises provided during therapy sessions    Assessment   Patient is responding well to treatment. FOTO score is improving. She had difficulty stabilizing in half kneeling with 15# bar. Patient demonstrates improved balance with braiding. Patient was tired after the session. PT will progress patient as tolerated.     Leticia Is progressing well towards her goals.   Pt prognosis is Good.     Pt will continue to benefit from skilled outpatient physical therapy to address the deficits listed in the problem list box on initial evaluation, provide pt/family education and to maximize pt's level of independence in the home and community environment.     Pt's spiritual, cultural and educational needs considered and pt agreeable to plan of care and goals.     Anticipated barriers to physical therapy: chronicity of condition     Goals: STG's 2 weeks  Patient will be independent with 50% of HEP MET 11/29/23     LTG's 6 weeks  Patient will improve FOTO functional measure score  to 69 %  in order to improve overall QOL & return to PLOF MET 12/6/23 99  2. Patient will report an overall decrease in pain with ADL's and functional mobility  Progressing  3. Patient will be more aware of posture throughout the day to reduce stress  and maintain optimal alignment of the spine Progressing  4. Patient will be independent with HEP and pain management Progressing    Plan     Plan of care Certification: 11/15/2023 to 12/29/23.     Outpatient Physical Therapy 1-2  times weekly for 6 weeks to include the following interventions: Cervical/Lumbar Traction, Electrical Stimulation PRN, Gait Training, Manual Therapy, Moist Heat/ Ice, Neuromuscular Re-ed, Patient Education, Self Care, Therapeutic Activities, Therapeutic Exercise, and Ultrasound, ASTYM, Kinesiotaping PRN, Functional Dry Needling      Mary Ocampo, PT

## 2023-12-07 ENCOUNTER — PATIENT MESSAGE (OUTPATIENT)
Dept: PRIMARY CARE CLINIC | Facility: CLINIC | Age: 73
End: 2023-12-07
Payer: MEDICARE

## 2023-12-07 ENCOUNTER — TELEPHONE (OUTPATIENT)
Dept: PRIMARY CARE CLINIC | Facility: CLINIC | Age: 73
End: 2023-12-07
Payer: MEDICARE

## 2023-12-07 VITALS — DIASTOLIC BLOOD PRESSURE: 69 MMHG | SYSTOLIC BLOOD PRESSURE: 131 MMHG

## 2023-12-07 NOTE — TELEPHONE ENCOUNTER
Do you know how to enter an outside BP reading.    Pt checks BP at home when not in pain, and gets avg 130/76.  Are you able to put this in?  SM

## 2023-12-12 ENCOUNTER — INFUSION (OUTPATIENT)
Dept: INFUSION THERAPY | Facility: HOSPITAL | Age: 73
End: 2023-12-12
Attending: NURSE PRACTITIONER
Payer: MEDICARE

## 2023-12-12 VITALS
HEART RATE: 77 BPM | TEMPERATURE: 98 F | OXYGEN SATURATION: 94 % | SYSTOLIC BLOOD PRESSURE: 119 MMHG | RESPIRATION RATE: 16 BRPM | DIASTOLIC BLOOD PRESSURE: 59 MMHG

## 2023-12-12 DIAGNOSIS — D50.8 OTHER IRON DEFICIENCY ANEMIA: Primary | ICD-10-CM

## 2023-12-12 PROCEDURE — 25000003 PHARM REV CODE 250: Performed by: NURSE PRACTITIONER

## 2023-12-12 PROCEDURE — 96365 THER/PROPH/DIAG IV INF INIT: CPT

## 2023-12-12 PROCEDURE — 63600175 PHARM REV CODE 636 W HCPCS: Mod: JZ,JG | Performed by: NURSE PRACTITIONER

## 2023-12-12 RX ADMIN — FERUMOXYTOL 510 MG: 510 INJECTION INTRAVENOUS at 01:12

## 2023-12-12 NOTE — PLAN OF CARE
Problem: Anemia  Goal: Anemia Symptom Improvement  Outcome: Ongoing, Progressing  Intervention: Monitor and Manage Anemia  Flowsheets (Taken 12/12/2023 8843)  Safety Promotion/Fall Prevention:   nonskid shoes/socks when out of bed   in recliner, wheels locked   medications reviewed  Fatigue Management:   frequent rest breaks encouraged   paced activity encouraged

## 2023-12-12 NOTE — NURSING
Reviewed plan of care and discussed treatment with patient. Receiving Feraheme 1 of 1 to infuse over 15 minutes with a 45 minute post observation. Patient verbalized understanding. Addressed any ongoing symptoms; states feeling fatigue.     Tolerated treatment. No adverse reaction. Ambulatory out of the clinic.

## 2023-12-12 NOTE — PLAN OF CARE
Problem: Adult Inpatient Plan of Care  Goal: Plan of Care Review  Outcome: Ongoing, Progressing  Flowsheets (Taken 12/12/2023 1354)  Plan of Care Reviewed With: patient  Goal: Patient-Specific Goal (Individualized)  Outcome: Ongoing, Progressing  Flowsheets (Taken 12/12/2023 1354)  Anxieties, Fears or Concerns: denies  Individualized Care Needs:   feet elevated   lights off   snack/drink offered - denies   blanket  Goal: Optimal Comfort and Wellbeing  Outcome: Ongoing, Progressing  Intervention: Monitor Pain and Promote Comfort  Flowsheets (Taken 12/12/2023 1354)  Pain Management Interventions:   quiet environment facilitated   relaxation techniques promoted   warm blanket provided  Intervention: Provide Person-Centered Care  Flowsheets (Taken 12/12/2023 1354)  Trust Relationship/Rapport:   care explained   reassurance provided   thoughts/feelings acknowledged   choices provided   emotional support provided   empathic listening provided   questions answered   questions encouraged

## 2023-12-13 ENCOUNTER — OFFICE VISIT (OUTPATIENT)
Dept: PRIMARY CARE CLINIC | Facility: CLINIC | Age: 73
End: 2023-12-13
Payer: MEDICARE

## 2023-12-13 ENCOUNTER — CLINICAL SUPPORT (OUTPATIENT)
Dept: REHABILITATION | Facility: HOSPITAL | Age: 73
End: 2023-12-13
Payer: MEDICARE

## 2023-12-13 VITALS
HEART RATE: 92 BPM | TEMPERATURE: 98 F | BODY MASS INDEX: 27.53 KG/M2 | OXYGEN SATURATION: 98 % | HEIGHT: 65 IN | DIASTOLIC BLOOD PRESSURE: 72 MMHG | SYSTOLIC BLOOD PRESSURE: 148 MMHG | WEIGHT: 165.25 LBS

## 2023-12-13 DIAGNOSIS — F33.41 RECURRENT MAJOR DEPRESSIVE DISORDER, IN PARTIAL REMISSION: Primary | ICD-10-CM

## 2023-12-13 DIAGNOSIS — R52 PAIN: Primary | ICD-10-CM

## 2023-12-13 DIAGNOSIS — M76.31 ILIOTIBIAL BAND SYNDROME OF RIGHT SIDE: ICD-10-CM

## 2023-12-13 DIAGNOSIS — I10 ESSENTIAL HYPERTENSION: ICD-10-CM

## 2023-12-13 DIAGNOSIS — F41.8 ANXIETY ASSOCIATED WITH DEPRESSION: ICD-10-CM

## 2023-12-13 DIAGNOSIS — E72.12 METHYLENETETRAHYDROFOLATE REDUCTASE DEFICIENCY: ICD-10-CM

## 2023-12-13 PROCEDURE — 97530 THERAPEUTIC ACTIVITIES: CPT | Mod: PN

## 2023-12-13 PROCEDURE — 99213 PR OFFICE/OUTPT VISIT, EST, LEVL III, 20-29 MIN: ICD-10-PCS | Mod: S$PBB,,, | Performed by: INTERNAL MEDICINE

## 2023-12-13 PROCEDURE — 99214 OFFICE O/P EST MOD 30 MIN: CPT | Mod: PBBFAC,PN | Performed by: INTERNAL MEDICINE

## 2023-12-13 PROCEDURE — 97110 THERAPEUTIC EXERCISES: CPT | Mod: PN

## 2023-12-13 PROCEDURE — 99999 PR PBB SHADOW E&M-EST. PATIENT-LVL IV: ICD-10-PCS | Mod: PBBFAC,,, | Performed by: INTERNAL MEDICINE

## 2023-12-13 PROCEDURE — 99999 PR PBB SHADOW E&M-EST. PATIENT-LVL IV: CPT | Mod: PBBFAC,,, | Performed by: INTERNAL MEDICINE

## 2023-12-13 PROCEDURE — 99213 OFFICE O/P EST LOW 20 MIN: CPT | Mod: S$PBB,,, | Performed by: INTERNAL MEDICINE

## 2023-12-13 RX ORDER — BUPROPION HYDROCHLORIDE 150 MG/1
150 TABLET ORAL DAILY
Qty: 90 TABLET | Refills: 3 | Status: SHIPPED | OUTPATIENT
Start: 2023-12-13 | End: 2024-02-14

## 2023-12-13 NOTE — PROGRESS NOTES
"Subjective:      Patient ID: Leticia Piña is a 73 y.o. female.    Chief Complaint: Stress (Pt states she's been feeling stressed for about a month.)      HPI  Pt here for follow up of medical problems and more depressed/worrying lately.  Psychiatry has not returned her calls/messages.  Having conflicts with friends that she used to rely on.  BP for infusion yesterday sys 115.  RA pain doing well.      Updated/ annual due 10/24:  HM: 10/23 fluvax, 3/21 covid vaccines, 6/19 HAV, 11/19 [id number], 3/17 booster [id number], 12/22 [id number], 6/21 TDaP, 4/13 zoster, 4/22 Shingrix #2, 3/23 BMD now on drug holiday rep 2y, 1/23 EGD, 3/23 Cscope rep 3y, 2/23 MMG, 11/17 Gyn Dr. Jimenez, 3/17 HCV neg.   10/22 Cologuard negative.     Review of Systems   Constitutional:  Negative for chills, diaphoresis and fever.   Respiratory:  Negative for cough and shortness of breath.    Cardiovascular:  Negative for chest pain, palpitations and leg swelling.   Gastrointestinal:  Negative for blood in stool, constipation, diarrhea, nausea and vomiting.   Genitourinary:  Negative for dysuria, frequency and hematuria.   Psychiatric/Behavioral:  The patient is not nervous/anxious.          Objective:   BP (!) 148/72   Pulse 92   Temp 98.1 °F (36.7 °C) (Oral)   Ht 5' 5" (1.651 m)   Wt 75 kg (165 lb 3.8 oz)   SpO2 98%   BMI 27.50 kg/m²     Physical Exam  Constitutional:       Appearance: She is well-developed.   Neck:      Thyroid: No thyroid mass.      Vascular: No carotid bruit.   Cardiovascular:      Rate and Rhythm: Normal rate and regular rhythm.      Heart sounds: No murmur heard.     No friction rub. No gallop.   Pulmonary:      Effort: Pulmonary effort is normal.      Breath sounds: Normal breath sounds. No wheezing or rales.   Abdominal:      General: Bowel sounds are normal.      Palpations: Abdomen is soft. There is no mass.      Tenderness: There is no abdominal tenderness.   Musculoskeletal:      Cervical back: Neck supple. "   Lymphadenopathy:      Cervical: No cervical adenopathy.   Neurological:      Mental Status: She is alert and oriented to person, place, and time.             Assessment:       1. Recurrent major depressive disorder, in partial remission    2. Anxiety associated with depression    3. Essential hypertension    4. Methylenetetrahydrofolate reductase deficiency          Plan:     Recurrent major depressive disorder, in partial remission- increase wellbutrin, cont cymbalta 60mg.  -     Ambulatory referral/consult to [facility]; Future; Expected date: 12/20/2023  -     buPROPion (WELLBUTRIN XL) 150 MG TB24 tablet; Take 1 tablet (150 mg total) by mouth once daily. Take in addition to 300mg XL tab.  Dispense: 90 tablet; Refill: 3    Anxiety associated with depression    Essential hypertension- bring cuff to nurse visit to be checked tomorrow.    Methylenetetrahydrofolate reductase deficiency- no rx needed.    RTC 1wk.

## 2023-12-13 NOTE — PROGRESS NOTES
[facility]   Physical Therapy Treatment Note      Name: Leticia Garlandreuben  Clinic Number: [id number]    Therapy Diagnosis:   Encounter Diagnoses   Name Primary?    Pain Yes    Iliotibial band syndrome of right side          Physician: Rolando Silva,*    Visit Date: 12/13/2023      Physician Orders: PT Eval and Treat   Medical Diagnosis from Referral: Iliotibial band syndrome of right side  Evaluation Date: 11/15/2023  Authorization Period Expiration: 11/05/2024  Plan of Care Expiration: 12/29/23  Progress Note Due: 30 days  Visit # / Visits authorized: 6/ 20 + eval  FOTO: 2/3  Precautions: Standard    PTA Visit #: 0/5     Time In: 11:05  Time Out: 11:55  Total Billable Time:50  minutes    Subjective     Pt reports: she has been experiencing SOB and fatigue LE's for awhile. She reports she is feeling over whelmed. She has an appointment with PCP this afternoon regarding SOB and fatigue.   She was compliant with home exercise program and seems to be helping  Response to previous treatment: good  Functional change: n/a at this time    Pain: 0/10  Location:  none     Objective    12/13/23 O2 SAT 99% and  bpm after the recumbent bike/ after a break HR decreased 92 bpm    12/6/23 FOTO completed      Treatment     Leticia received the following treatments:    Leticia received the following manual therapy techniques applied for 0 minutes, including:  Foam roller to B IT bands and hip        Leticia received therapeutic exercises to develop strength, endurance, ROM, flexibility, posture, and core stabilization for 35 minutes including:  Bike  for ROM, strength, and endurance x 6 minutes R:4/ patient stopping and starting throughout and reports SOB  Incline gastroc stretch 3 x 30 secs  Hip abduction 50# 3 x 10 reps  Hip adduction 50# 2 x 10 reps/ fatigue  Leg press seat 7 50# 3 x 10 reps  Low back stretch not performed     Leticia participated in neuromuscular re-education  activities to improve: Balance, Coordination, Kinesthetic, Sense, Proprioception, and Posture for 0 minutes., including:  Core lumbar 50# 3 x 10 reps not performed  Hip hikes in // bars 3 x 10 reps/ felt good on low back per patient  Braiding   Land mine press 15# half kneeling 1 x 10 reps very difficult/ strained the hips per patient      deferred  Side stepping with G TB    Alternating marches with assistance for balance  Bent knee fall outs 2 x 10 reps B  Ball squeezes 1 x 30 reps  SL clams 2 x 10 reps/  NO pain on R       Open books 1 x 15 reps each way        Leticia participated in dynamic functional therapeutic activities to improve functional performance for 15 minutes, including:  TRX mini squats 2 x 10 reps with core engaged  Push sled  no added #  TRX low rows with core engaged 2 x 10 reps    Patient Education and Home Exercises       Education provided:   - HEP reviewed    Written Home Exercises Provided: Patient instructed to cont prior HEP. Exercises were reviewed and Leticia was able to demonstrate them prior to the end of the session.  Leticia demonstrated good  understanding of the education provided. See EMR under Patient Instructions for exercises provided during therapy sessions    Assessment   Patient was very emotional today during the session and complained of SOB and fatigue. O2 SAT 99%. She tolerated treatment with difficulty. She was very upset due to personal issues in her life.  Patient has an appointment with PCP this afternoon and stated she had a virtual  appointment with her psychiatrist set up, but something happened and she did not see them.  PT will progress patient as tolerated.     Leticia Is progressing well towards her goals.   Pt prognosis is Good.     Pt will continue to benefit from skilled outpatient physical therapy to address the deficits listed in the problem list box on initial evaluation, provide pt/family education and to maximize pt's level of independence in the  home and community environment.     Pt's spiritual, cultural and educational needs considered and pt agreeable to plan of care and goals.     Anticipated barriers to physical therapy: chronicity of condition     Goals: STG's 2 weeks  Patient will be independent with 50% of HEP MET 11/29/23     LTG's 6 weeks  Patient will improve FOTO functional measure score  to 69 %  in order to improve overall QOL & return to PLOF MET 12/6/23 99  2. Patient will report an overall decrease in pain with ADL's and functional mobility Progressing  3. Patient will be more aware of posture throughout the day to reduce stress  and maintain optimal alignment of the spine Progressing  4. Patient will be independent with HEP and pain management Progressing    Plan     Plan of care Certification: 11/15/2023 to 12/29/23.     Outpatient Physical Therapy 1-2  times weekly for 6 weeks to include the following interventions: Cervical/Lumbar Traction, Electrical Stimulation PRN, Gait Training, Manual Therapy, Moist Heat/ Ice, Neuromuscular Re-ed, Patient Education, Self Care, Therapeutic Activities, Therapeutic Exercise, and Ultrasound, ASTYM, Kinesiotaping PRN, Functional Dry Needling      Mary Ocampo, PT

## 2023-12-14 ENCOUNTER — CLINICAL SUPPORT (OUTPATIENT)
Dept: PRIMARY CARE CLINIC | Facility: CLINIC | Age: 73
End: 2023-12-14
Payer: MEDICARE

## 2023-12-14 ENCOUNTER — CLINICAL SUPPORT (OUTPATIENT)
Dept: REHABILITATION | Facility: HOSPITAL | Age: 73
End: 2023-12-14
Payer: MEDICARE

## 2023-12-14 VITALS — SYSTOLIC BLOOD PRESSURE: 134 MMHG | DIASTOLIC BLOOD PRESSURE: 70 MMHG

## 2023-12-14 DIAGNOSIS — I83.893 VARICOSE VEINS OF BOTH LEGS WITH EDEMA: Primary | ICD-10-CM

## 2023-12-14 DIAGNOSIS — R60.9 LIPEDEMA: ICD-10-CM

## 2023-12-14 DIAGNOSIS — I10 ESSENTIAL HYPERTENSION: Primary | ICD-10-CM

## 2023-12-14 PROCEDURE — 99999 PR PBB SHADOW E&M-EST. PATIENT-LVL III: ICD-10-PCS | Mod: PBBFAC,,,

## 2023-12-14 PROCEDURE — 99999 PR PBB SHADOW E&M-EST. PATIENT-LVL III: CPT | Mod: PBBFAC,,,

## 2023-12-14 PROCEDURE — 97535 SELF CARE MNGMENT TRAINING: CPT | Mod: PN

## 2023-12-14 PROCEDURE — 99213 OFFICE O/P EST LOW 20 MIN: CPT | Mod: PBBFAC,PN

## 2023-12-14 NOTE — PROGRESS NOTES
[facility] OUTPATIENT THERAPY AND WELL[facility]   Physical Therapy Treatment Note/Lymphedema       Name: Leticia Piña  Clinic Number: [id number]    Therapy Diagnosis:   Encounter Diagnoses   Name Primary?    Varicose veins of both legs with edema Yes    Lipedema       Physician: Rolando Silva,*    Visit Date: 12/14/2023    Physician Orders: PT Eval and Treat   Medical Diagnosis from Referral: varicose veins both legs with edema, lymphedema  Evaluation Date: 11/6/2023  Authorization Period Expiration: 11/5/2024  Plan of Care Expiration: 1/15/2023  Progress note due: next visit  Visit # / Visits authorized: 2       Time In: 1058  am  Time Out: 1207 pm  Total Billable Time: 70 minutes    Precautions: Standard    Subjective     Pt reports: been wearing compression knee highs daily; she uses the velcro calf wraps at times when needed. Fluid from lower legs tends to increase size of knees  She was compliant with home exercise program.  Response to previous treatment: good  Functional change:  improvement in ankle size and pain    Pain: 2/10  Location: right leg     Objective     Objective Measures updated at progress report unless specified    FUNCTION:  CMS Impairment/Limitation/Restriction for FOTO lower quadrant swelling Survey    Therapist reviewed FOTO scores for Leticia Piña on 11/6/2023.   FOTO documents entered into [employer] - see Media section.    Limitation Score: 68%       STAGE II Secondary Lymphedema (possibly due to Lipedema and/or varicose veins)  Amount of Swelling: moderate   Location of Swelling: full leg swelling with pitting edema both lower distal legs  Skin Integrity: intact  Palpation/Texture: 1+ pitting edema - bilateral lower distal legs; improved skin elasticity  Circulation: adequate for compression   Posture: good      Sensation: intact         Girth Measurements (in centimeters)  LANDMARK LEFT LE  11/6/2023 RIGHT LE  11/6/2023 DIFF   at eval   SBP + 20 cm - cm - cm - cm   SBP + 10 cm  49.5 cm 50 cm 0.5 cm   SBP 41 cm 42 cm 1 cm   10 cm below SBP 37 cm 36 cm 1 cm   20 cm below SBP 38 cm 38.5 cm 0.5 cm   30 cm below SBP 28.5 cm 29.5 cm 1 cm   35 cm below SBP - cm - cm - cm   Ankle 22.5 cm 22.5 cm - cm   Forefoot 20.5 cm 21 cm  cm      Girth Measurements (in centimeters)  LANDMARK LEFT LE  12/14/2023 RIGHT LE  12/14/2023    SBP + 20 cm - cm - cm    SBP + 10 cm 48.5 cm 48.5 cm    SBP 39.5 cm 41 cm    10 cm below SBP 38 cm 36.5 cm    20 cm below SBP 38.5 cm 37.5 cm    30 cm below SBP 29 cm 30 cm    35 cm below SBP - cm - cm    Ankle 21 cm 21.5 cm    Forefoot 19.5 cm 20.5 cm        Treatment:     Leticia received the following self care and home management x 70 minutes:    -obtained measurements of both legs and fax documentation to [employer] for home compression pump (faxed plan of care and 28+ day follow up note)  -instructed how to applying 12 cm short stretch bandages to both knees and thighs  -recommended wearing bilateral velcro calf wraps when wearing bandages  -sent link for compression leggings  -sent link for Manual Lymph Drainage bilateral lower extremities (more education needed on Manual Lymph Drainage at later sessions. Time did not permit Manual Lymph Drainage training today)    MULTILAYERED BANDAGING:  Issued supplies and bandaged  LE with cotton stockinet, Arteflex padding, 12 cm short stretch bandages - bandaged from below knee to upper thigh,  To leave intact 24-48 hours if tolerated, discontinue bandages with any problems,  Return rolled bandages next session.    Leticia received supervised modalities without adverse effects x - minutes:  SEQUENTIAL COMPRESSION PUMP:  BLE- Lympha press with full sleeve - with distal pressures at 40mmHg   Time did not permit compression pump therapy      Home Exercises Provided and Patient Education Provided:  See self care section above    PATIENT/FAMILY Education: bandaging wear schedule,  HEP,  Beginning of self massage,  Self or assisted  bandaging, and Risk reduction to decrease risk for cellulitis,  To remove bandages from lower extremity if there are signs of arterial compromise (pain that does not improve, pain increases with elevation, toes turn blue)    Written Home Exercises Provided: yes.  Exercises were reviewed and Leticia was able to demonstrate them prior to the end of the session.  Leticia demonstrated good  understanding of the education provided.     Assessment     Leticia has been compliant with compression therapy and continues to exercise to improve swelling. She has decrease intensity of pain in legs since starting compression therapy. Currently she is only compression lower legs using velcro wraps or compression socks. She does has compression leggings that she will wear when exercising. She does not feel like the upper legs/thighs are improving but she does feel like the lower legs are improving.    Lteicia Is progressing well towards her goals. She has tried elevation, exercise, compression garments/bandaging for since 11/6/2023 and swelling still persists. Home pump is recommended. Due to having characteristics of Lipedema, the pants attachment for the pump is recommended  Pt prognosis is Excellent.     Pt will continue to benefit from skilled outpatient physical therapy to address the deficits listed in the problem list box on initial evaluation, provide pt/family education and to maximize pt's level of independence in the home and community environment.     Pt's spiritual, cultural and educational needs considered and pt agreeable to plan of care and goals.     Anticipated barriers to physical therapy: none    Goals:   Short Term Goals: (6 weeks)  1. Patient will show decreased girth in B LE by up to 1 cm to allow for LE symmetry, shoe and clothing choice, and ability to apply needed compression.  (progressing, not met)   2. Patient will demonstrate 100% knowledge of lipedema and lymphedema precautions and signs of infection  to allow for reduced lymphedema risk, infection risk, and/or exacerbation of condition.  (progressing, not met)  3. Patient or caregiver will perform self-bandaging techniques and/or wearing of compression garments to allow for lymphatic drainage support, skin elasticity, and reduction in shape and size of limb. (progressing, not met)  4. Patient will perform self lymph drainage techniques to areas within reach to enhance lymphatic drainage and skin condition.  (progressing, not met)  5. Patient will tolerate daily activities with multilayered bandaging to allow for lymphatic and venous support.  (progressing, not met)     Long Term Goals: (10  weeks)  1. Patient will show decreased girth in B LE by up to 2 cm  to allow for LE symmetry, shoe and clothing choice, and ability to apply needed compression daily.  (progressing, not met)  2. Patient will show reduction in density to mild or less with improved contour of limb to allow for cosmesis, LE symmetry, infection risk reduction, and clothing and compression choice.   (progressing, not met)  3. Patient to rivka/doff compression garment with daily compliance to assist in lymphedema management, skin elasticity, and tissue density.  (GOAL MET)  4. Pt to show improved postural awareness and alignment.  (progressing, not met)  5. Pt to be I and compliant with HEP to allow for increased function in affected limb.   (progressing, not met)        Plan   Plan of care Certification: 11/6/2023 to 1/15/2023.     Outpatient Physical Therapy 1 times weekly for 10 weeks to include the following interventions: Patient Education, Self Care, Therapeutic Activities, Therapeutic Exercise, and vaso-pneumatic compression . Complete Decongestive Therapy- compression and home equipment needs to be addressed and assisted.      Claudia Richard, PT

## 2023-12-18 ENCOUNTER — CLINICAL SUPPORT (OUTPATIENT)
Dept: PRIMARY CARE CLINIC | Facility: CLINIC | Age: 73
End: 2023-12-18
Payer: MEDICARE

## 2023-12-18 ENCOUNTER — CLINICAL SUPPORT (OUTPATIENT)
Dept: REHABILITATION | Facility: HOSPITAL | Age: 73
End: 2023-12-18
Payer: MEDICARE

## 2023-12-18 DIAGNOSIS — G89.29 CHRONIC PAIN OF LEFT HAND: Primary | ICD-10-CM

## 2023-12-18 DIAGNOSIS — F33.41 RECURRENT MAJOR DEPRESSIVE DISORDER, IN PARTIAL REMISSION: ICD-10-CM

## 2023-12-18 DIAGNOSIS — M79.642 CHRONIC PAIN OF LEFT HAND: Primary | ICD-10-CM

## 2023-12-18 PROCEDURE — 99499 UNLISTED E&M SERVICE: CPT | Mod: S$PBB,,,

## 2023-12-18 PROCEDURE — 97535 SELF CARE MNGMENT TRAINING: CPT | Mod: PN

## 2023-12-18 PROCEDURE — 97110 THERAPEUTIC EXERCISES: CPT | Mod: PN

## 2023-12-18 PROCEDURE — 97018 PARAFFIN BATH THERAPY: CPT | Mod: PN

## 2023-12-18 PROCEDURE — 99499 NO LOS: ICD-10-PCS | Mod: S$PBB,,,

## 2023-12-18 NOTE — PROGRESS NOTES
Individual Psychotherapy (MICHAEL)  Leticia Hornerbrent,  12/18/2023  DATE:  12/19/2023  TYPE OF VISIT:  In person  LENGTH OF SESSION: 60    Therapeutic Intervention: Met with patient for individual psychotherapy.    Chief complaint/reason for encounter: depression, anxiety, interpersonal, and sexual problems       Session Content/Presenting Problem:    Patient had been lost to follow up for about 2 months until a brief visit last week.  Last week the patient was very tearful and depressed. She endorsed feelings of intense loneliness and estrangement from her sisters. Also dealing with blood pressure issues.   Today patient is looking more upbeat, says she is feeling better. After speaking with LCSSHAJI, she went to see her daughter. She had a long talk with daughter who encouraged her to do something different.  Patient decided to reach out to an old friend  - the phone call with her friend went well and made her laugh. Says her friend is a very joyful and positive person. She is looking forward to seeing her soon. Patient would like to join a [employer] club. She enjoys her Pilates class and feels close with the instructor. She wants to get outside more often. Plans to walk in neighborhood with . She has started a new [employer] study group.  She did receive some bad news today - her brother in law is in hospice and has apparently taken a rapid downturn and is not eating or drinking.  She worries about how hard losing him may be on her  Alpesh. Alpesh had surgery two weeks ago; she is concerned this has made his memory issues worse.  Patient is able to recognize and discuss the positives in her life. She is feeling more optimistic about dealing with issues in her life. She is looking forward to seeing her friend soon.     Previous Session:  Patient reports having had a very good beach trip with her . She is concerned about her 's apparent memory loss; says he is frequently losing his keys, doing  sloppy repair work around the house, and talking excessively. She says he loses his phone at least twice/day. This is how his mother was late in life also. There is a history of dementia in his family. Discussed possible need for notifying his pcp, possible need for neurologist referral.  Patient discusses her son and dtr in law, Susana. She expresses frustration and sadness with Susana; she feels rejected by Susana and also by her son. She has reached out to Susana, but never gotten anywhere in building a relationship. She believes Susana does not like Alpesh and prefers her own family.  Patient's perception is that Susana has a great life with lots of friends, activities, etc. However, she noted that Susana's brother committed suicide a number of years ago. When asked what she would like to express to her son and dtr in law, patient says she wants to apologize for hurting them; says she does not believe they will ever forgive patient and spouse.  She becomes tearful and expresses hopelessness at the thought of improving the relationship. Encouraged patient to consider reaching out just to her son for now.  Patient to write down thoughts related to her desire for forgiveness from family members.        Current symptoms:  Depression: anhedonia, worthlessness/guilt, hopelessness, and fatigue.  Anxiety: excessive worrying and restlessness.  Insomnia: difficulty falling asleep and non-restful sleep.  Brianda:  denies.  Psychosis: denies .      Risk parameters:  Patient reports no suicidal ideation  Patient reports no homicidal ideation  Patient reports no self-injurious behavior  Patient reports no violent behavior    MENTAL HEALTH STATUS EXAM  General Appearance:  unremarkable, age appropriate   Speech: normal tone, normal rate, normal pitch, normal volume      Level of Cooperation: cooperative      Thought Processes: normal and logical   Mood: depressed, sad      Thought Content: normal, no suicidality, no homicidality, delusions, or  paranoia   Affect: sad, anxious   Orientation: Oriented x3   Attention Span & Concentration: intact   Fund of General Knowledge: intact and appropriate to age and level of education   Judgment & Insight: good     Language  intact       STRENGTHS AND LIABILITIES: Strength: Patient is expressive/articulate., Strength: Patient is intelligent., Strength: Patient has reasonable judgment., Strength: Patient is stable.    IMPRESSION:   My diagnostic impression is Anxiety disorders; anxiety, unspecified [F41.9] and Major Depressive Disorder, Recurrent, Moderate (F33.1), as evidenced by patient report of anhedonia, feelings of guilt and worthlessness, fatigue, nightmares, poor sleep, worry, loneliness.     TREATMENT GOALS (per patient):  To improve relationship with , Alpesh. To address memory issues. To have a closer, more meaningful relationship with her grandkids. To be more assertive.    Treatment plan:  Target symptoms: recurrent depression, grief  Why chosen therapy is appropriate versus another modality: relevant to diagnosis, patient responds to this modality, evidence based practice  Outcome monitoring methods: self-report, observation, checklist/rating scale  Therapeutic intervention type: behavior modifying psychotherapy    Patient's response to intervention:  The patient's response to intervention is accepting.    Progress toward goals and other mental status changes:  The patient's progress toward goals is fair .    Plan: Pt plans to continue individual psychotherapy CBT will be utilized in future individual therapy sessions to increase interaction, insight, and support.     Return to clinic: 3 weeks Jan 2nd

## 2023-12-18 NOTE — PROGRESS NOTES
"  [facility]  Occupational Therapy Treatment Note     Date: 12/18/2023  Name: Leticia Piña  Clinic Number: [id number]    Therapy Diagnosis:   Encounter Diagnosis   Name Primary?    Chronic pain of left hand Yes     Physician: Rolando Silva,*    Physician Orders: OT eval and tx  Medical Diagnosis: Arthritis of carpometacarpal (CMC) joint of both thumbs [M18.0]   Evaluation Date: 12/4/2023  Insurance Authorization Period Expiration: 12/31/2023  Plan of Care Certification Period: 12/4/2023 to 1/19/2024  Progress Note Due: 30 days (or 1/4/2024)      Visit # / Visits authorized: 1/40  FOTO: 1/3     Surgical Procedure: none                              Date of Return to MD: PRN     Precautions:  Standard, RA    Time In: 1005  Time Out: 1045  Total Billable Time: 40 minutes    Subjective     Pt reports: "My hand is actually feeling pretty good right now. I've been wearing my brace. I just got back from an exercise class and used weights and didn't have any problems.    she was compliant with home exercise program given on evaluation.  Response to previous treatment: good  Functional change: decrease pain    Pain: 0/10 (4/10 on evaluation)  Location: left hand    Objective   Objective measures updated on this date.    Intake Outcome Measure for FOTO Left Hand Survey     Therapist reviewed FOTO scores for Leticia Piña on 12/18/2023.   FOTO documents entered into [employer] - see Media section.     Intake Score: 61%  - 50% on evaluation on 12/4/2023      Predicted Functional Score: 62% in 9 visits    Treatment     Leticia received the treatments listed below:      therapeutic exercises to develop ROM of left hand for 15 minutes including:  - thumb AROM: radial and palmar abd/add, circumduction (both directions), opposition to tabletop, IP flexion, and lifts x 10 reps each  - composite digit abd/add AROM x 10 reps with min A for blocking   - composite, hook, and lumbrical " fisting AROM x 10 reps each  - TGE's x 10 reps    manual therapy techniques were applied to right hand for 0 minutes including:  - NT    neuromuscular re-education activities to improve Coordination and Proprioception of right hand for 0 minutes including:  - NT    therapeutic activities to improve functional performance of right hand for 2 minutes including:  - resisted 2pt pinch with red sponge x 20 reps to promote dynamic stability of thumb    self-care techniques to improve independence and safety with ADL/IADL tasks for 15 minutes including:  - reviewed precautions for arthritis (joint protection, balancing rest and activity)  - reviewed ADL modifications  - reviewed use of home paraffin unit    direct contact modalities after being cleared for contraindications for 0 minutes including:  - NT    supervised modalities after being cleared for contradictions for 10 minutes including:  - paraffin to left hand (with simultaneous MHP) at start of session    Home Exercises and Education Provided     Education provided:   - reviewed HEP issued at evaluation  - progress toward goals    Written Home Exercises Provided: Patient instructed to cont prior HEP  Exercises were reviewed and Leticia was able to demonstrate them prior to the end of the session. Leticia demonstrated good understanding of the HEP provided.     See EMR under Patient Instructions for exercises provided during prior visit.        Assessment     Leticia would continue to benefit from skilled OT. She attended her first tx session since her initial evaluation on 12/4/2023. She has been compliant with brace use and reported no pain upon arrival. Reviewed precautions for arthritis issued at evaluation and initiated left thumb dynamic stability exercises. She tolerated well.    Leticia is progressing towards her goals and there are no updates to goals at this time. Pt prognosis is Good.     Pt will continue to benefit from skilled outpatient occupational  therapy to address the deficits listed in the problem list on initial evaluation provide pt/family education and to maximize pt's level of independence in the home and community environment.     Pt's spiritual, cultural and educational needs considered and pt agreeable to plan of care and goals.    Anticipated barriers to occupational therapy: chronic symptoms    Goals:  Short Term Goals: (4 weeks)  1. Pt will be independent with HEP in 2 visits.  2. Pt will report decreased left hand pain to a 3/10 with use of orthosis. - MET 12/18/2023  3. Pt will exhibit improved dynamic stability of her left thumb.      Long Term Goals: (8 weeks)  1. Pt will report decreased left hand pain to 1-2/10 with ADL/IADL tasks. - MET 12/18/2023  2. Pt will be I with incorporation of joint protection techniques into ADL/IADL tasks.  3. Pt will increase L  strength by 5-10# to allow a firm grasp on cooking utensils, steering wheel, etc.  4. Pt will exhibit 6+# of functional left pinch strength to allow writing, opening containers, and turning keys.  5. Pt will report an increase in FOTO intake score of > 55%, which would indicate an improvement in quality of life.  6. Pt will be independent with management of future flares/exacerbations of left hand.     Plan     Plan of Care Certification: 12/4/2023 to 1/19/2024      Outpatient Occupational Therapy 1-2 times weekly for 6 weeks to include the following interventions PRN: Fluidotherapy, Manual Therapy, Moist Heat/ Ice, Neuromuscular Re-ed, Orthotic Management and Training, Paraffin, Patient Education, Self Care, Therapeutic Activities, Therapeutic Exercise, and Ultrasound    Updates/Grading for next session: progress occupational therapy as tolerated    EMMANUEL Christie, JUAREZ, CHT

## 2023-12-20 ENCOUNTER — SOCIAL WORK (OUTPATIENT)
Dept: PRIMARY CARE CLINIC | Facility: CLINIC | Age: 73
End: 2023-12-20
Payer: MEDICARE

## 2023-12-20 DIAGNOSIS — F33.41 RECURRENT MAJOR DEPRESSIVE DISORDER, IN PARTIAL REMISSION: Primary | ICD-10-CM

## 2023-12-20 PROCEDURE — 99499 NO LOS: ICD-10-PCS | Mod: S$PBB,,,

## 2023-12-20 PROCEDURE — 99499 UNLISTED E&M SERVICE: CPT | Mod: S$PBB,,,

## 2023-12-20 NOTE — PROGRESS NOTES
Discussed patient with psych PAJuli. Medications, history  and recent assessments reviewed.  Patient on recently increased dose of Wellbutrin.  Discussed methods to assist patient with current symptoms of depression.

## 2024-01-03 ENCOUNTER — PATIENT MESSAGE (OUTPATIENT)
Dept: PRIMARY CARE CLINIC | Facility: CLINIC | Age: 74
End: 2024-01-03
Payer: MEDICARE

## 2024-01-11 ENCOUNTER — PATIENT MESSAGE (OUTPATIENT)
Dept: ADMINISTRATIVE | Facility: HOSPITAL | Age: 74
End: 2024-01-11
Payer: MEDICARE

## 2024-01-11 DIAGNOSIS — Z00.00 ENCOUNTER FOR MEDICARE ANNUAL WELLNESS EXAM: ICD-10-CM

## 2024-01-18 ENCOUNTER — LAB VISIT (OUTPATIENT)
Dept: LAB | Facility: HOSPITAL | Age: 74
End: 2024-01-18
Attending: NURSE PRACTITIONER
Payer: MEDICARE

## 2024-01-18 DIAGNOSIS — E78.2 ELEVATED TRIGLYCERIDES WITH HIGH CHOLESTEROL: ICD-10-CM

## 2024-01-18 DIAGNOSIS — D50.9 IRON DEFICIENCY ANEMIA, UNSPECIFIED IRON DEFICIENCY ANEMIA TYPE: ICD-10-CM

## 2024-01-18 LAB
BASOPHILS # BLD AUTO: 0.06 K/UL (ref 0–0.2)
BASOPHILS NFR BLD: 1 % (ref 0–1.9)
CHOLEST SERPL-MCNC: 227 MG/DL (ref 120–199)
CHOLEST/HDLC SERPL: 3.7 {RATIO} (ref 2–5)
DIFFERENTIAL METHOD BLD: ABNORMAL
EOSINOPHIL # BLD AUTO: 0.1 K/UL (ref 0–0.5)
EOSINOPHIL NFR BLD: 2.3 % (ref 0–8)
ERYTHROCYTE [DISTWIDTH] IN BLOOD BY AUTOMATED COUNT: 11.9 % (ref 11.5–14.5)
FERRITIN SERPL-MCNC: 292 NG/ML (ref 20–300)
HCT VFR BLD AUTO: 34.1 % (ref 37–48.5)
HDLC SERPL-MCNC: 61 MG/DL (ref 40–75)
HDLC SERPL: 26.9 % (ref 20–50)
HGB BLD-MCNC: 11.6 G/DL (ref 12–16)
IMM GRANULOCYTES # BLD AUTO: 0.04 K/UL (ref 0–0.04)
IMM GRANULOCYTES NFR BLD AUTO: 0.6 % (ref 0–0.5)
IRON SERPL-MCNC: 124 UG/DL (ref 30–160)
LDLC SERPL CALC-MCNC: 109 MG/DL (ref 63–159)
LYMPHOCYTES # BLD AUTO: 1.4 K/UL (ref 1–4.8)
LYMPHOCYTES NFR BLD: 23 % (ref 18–48)
MCH RBC QN AUTO: 30.2 PG (ref 27–31)
MCHC RBC AUTO-ENTMCNC: 34 G/DL (ref 32–36)
MCV RBC AUTO: 89 FL (ref 82–98)
MONOCYTES # BLD AUTO: 0.5 K/UL (ref 0.3–1)
MONOCYTES NFR BLD: 8.5 % (ref 4–15)
NEUTROPHILS # BLD AUTO: 4 K/UL (ref 1.8–7.7)
NEUTROPHILS NFR BLD: 64.6 % (ref 38–73)
NONHDLC SERPL-MCNC: 166 MG/DL
NRBC BLD-RTO: 0 /100 WBC
PLATELET # BLD AUTO: 300 K/UL (ref 150–450)
PMV BLD AUTO: 9.7 FL (ref 9.2–12.9)
RBC # BLD AUTO: 3.84 M/UL (ref 4–5.4)
SATURATED IRON: 26 % (ref 20–50)
TOTAL IRON BINDING CAPACITY: 471 UG/DL (ref 250–450)
TRANSFERRIN SERPL-MCNC: 318 MG/DL (ref 200–375)
TRIGL SERPL-MCNC: 285 MG/DL (ref 30–150)
WBC # BLD AUTO: 6.21 K/UL (ref 3.9–12.7)

## 2024-01-18 PROCEDURE — 82728 ASSAY OF FERRITIN: CPT | Performed by: NURSE PRACTITIONER

## 2024-01-18 PROCEDURE — 80061 LIPID PANEL: CPT | Performed by: NURSE PRACTITIONER

## 2024-01-18 PROCEDURE — 36415 COLL VENOUS BLD VENIPUNCTURE: CPT | Mod: PO | Performed by: NURSE PRACTITIONER

## 2024-01-18 PROCEDURE — 83540 ASSAY OF IRON: CPT | Performed by: NURSE PRACTITIONER

## 2024-01-18 PROCEDURE — 85025 COMPLETE CBC W/AUTO DIFF WBC: CPT | Performed by: NURSE PRACTITIONER

## 2024-01-23 ENCOUNTER — PATIENT MESSAGE (OUTPATIENT)
Dept: PRIMARY CARE CLINIC | Facility: CLINIC | Age: 74
End: 2024-01-23
Payer: MEDICARE

## 2024-01-26 ENCOUNTER — TELEPHONE (OUTPATIENT)
Dept: PRIMARY CARE CLINIC | Facility: CLINIC | Age: 74
End: 2024-01-26
Payer: MEDICARE

## 2024-01-31 ENCOUNTER — PATIENT MESSAGE (OUTPATIENT)
Dept: PRIMARY CARE CLINIC | Facility: CLINIC | Age: 74
End: 2024-01-31
Payer: MEDICARE

## 2024-02-06 RX ORDER — TRAZODONE HYDROCHLORIDE 50 MG/1
TABLET ORAL
Qty: 180 TABLET | Refills: 0 | Status: SHIPPED | OUTPATIENT
Start: 2024-02-06 | End: 2024-03-25 | Stop reason: SDUPTHER

## 2024-02-14 ENCOUNTER — CLINICAL SUPPORT (OUTPATIENT)
Dept: PRIMARY CARE CLINIC | Facility: CLINIC | Age: 74
End: 2024-02-14
Payer: MEDICARE

## 2024-02-14 ENCOUNTER — OFFICE VISIT (OUTPATIENT)
Dept: PRIMARY CARE CLINIC | Facility: CLINIC | Age: 74
End: 2024-02-14
Payer: MEDICARE

## 2024-02-14 DIAGNOSIS — D84.9 IMMUNOCOMPROMISED: ICD-10-CM

## 2024-02-14 DIAGNOSIS — M35.01 SJOGREN'S SYNDROME WITH KERATOCONJUNCTIVITIS SICCA: ICD-10-CM

## 2024-02-14 DIAGNOSIS — Z12.31 ENCOUNTER FOR SCREENING MAMMOGRAM FOR MALIGNANT NEOPLASM OF BREAST: ICD-10-CM

## 2024-02-14 DIAGNOSIS — I27.20 PULMONARY HYPERTENSION: ICD-10-CM

## 2024-02-14 DIAGNOSIS — G47.33 OSA (OBSTRUCTIVE SLEEP APNEA): ICD-10-CM

## 2024-02-14 DIAGNOSIS — E03.9 ACQUIRED HYPOTHYROIDISM: ICD-10-CM

## 2024-02-14 DIAGNOSIS — I10 ESSENTIAL HYPERTENSION: ICD-10-CM

## 2024-02-14 DIAGNOSIS — M05.79 RHEUMATOID ARTHRITIS INVOLVING MULTIPLE SITES WITH POSITIVE RHEUMATOID FACTOR: ICD-10-CM

## 2024-02-14 DIAGNOSIS — F33.1 MODERATE EPISODE OF RECURRENT MAJOR DEPRESSIVE DISORDER: Primary | ICD-10-CM

## 2024-02-14 DIAGNOSIS — E72.12 METHYLENETETRAHYDROFOLATE REDUCTASE DEFICIENCY: ICD-10-CM

## 2024-02-14 DIAGNOSIS — F41.8 DEPRESSION WITH ANXIETY: Primary | ICD-10-CM

## 2024-02-14 DIAGNOSIS — F41.8 ANXIETY ASSOCIATED WITH DEPRESSION: ICD-10-CM

## 2024-02-14 DIAGNOSIS — F41.8 MIXED ANXIETY AND DEPRESSIVE DISORDER: ICD-10-CM

## 2024-02-14 PROBLEM — R60.9 LIPEDEMA: Status: RESOLVED | Noted: 2023-11-06 | Resolved: 2024-02-14

## 2024-02-14 PROBLEM — I50.32 CHRONIC HEART FAILURE WITH PRESERVED EJECTION FRACTION: Status: RESOLVED | Noted: 2023-10-19 | Resolved: 2024-02-14

## 2024-02-14 PROBLEM — F33.41 RECURRENT MAJOR DEPRESSIVE DISORDER, IN PARTIAL REMISSION: Status: RESOLVED | Noted: 2018-07-15 | Resolved: 2024-02-14

## 2024-02-14 PROCEDURE — 99999 PR PBB SHADOW E&M-EST. PATIENT-LVL IV: CPT | Mod: PBBFAC,,, | Performed by: INTERNAL MEDICINE

## 2024-02-14 PROCEDURE — 99499 UNLISTED E&M SERVICE: CPT | Mod: S$PBB,,,

## 2024-02-14 PROCEDURE — 99213 OFFICE O/P EST LOW 20 MIN: CPT | Mod: S$PBB,,, | Performed by: INTERNAL MEDICINE

## 2024-02-14 PROCEDURE — 99214 OFFICE O/P EST MOD 30 MIN: CPT | Mod: PBBFAC,PN | Performed by: INTERNAL MEDICINE

## 2024-02-14 RX ORDER — BUPROPION HYDROCHLORIDE 450 MG/1
450 TABLET, FILM COATED, EXTENDED RELEASE ORAL DAILY
Qty: 90 TABLET | Refills: 3 | Status: SHIPPED | OUTPATIENT
Start: 2024-02-14 | End: 2024-02-16

## 2024-02-14 RX ORDER — DULOXETIN HYDROCHLORIDE 60 MG/1
60 CAPSULE, DELAYED RELEASE ORAL DAILY
Qty: 90 CAPSULE | Refills: 3 | Status: SHIPPED | OUTPATIENT
Start: 2024-02-14

## 2024-02-14 NOTE — PROGRESS NOTES
"Individual Psychotherapy (LCSW)  Leticia Garlandcarlosreuben,  2/14/2024  DATE:  2/14/2024  TYPE OF VISIT:  In person  LENGTH OF SESSION: 60    Therapeutic Intervention: Met with patient for individual psychotherapy.    Chief complaint/reason for encounter: depression, anxiety, interpersonal problems     Met with patient; she reports that she is doing much better. She reports having processed much of her feelings regarding her relationship with her sisters. She has been working on finding forgiveness for others and for herself; she says she now believes "I did nothing wrong".  She has processed all she has been through in the last many years caring for sick and dying family members and how much that had affected her. Provided positive feedback to patient on the actions she took that contributed to the improvement in her mood. Patient reached out to an old friend, she joined a [employer] study group, she increased her exercise activities. Her  has started working 2 days per week which she believes is good for both of them. She feels more content and relaxed. Administered new PHQ and SUSAN with patient; her scores are reflective of her new positive thoughts.    Patient discusses the death of her brother in law; it was difficult but everyone has been coping well.    She is looking forward to a long road trip with her ; they will be driving to [place] and into [place]. This is to celebrate their 50th wedding anniversary. She believes this will be good for both of them. They will leave in May. Patient agrees to see LCSW again before they leave on their trip.       Session Content/Presenting Problem:    Patient had been lost to follow up for about 2 months until a brief visit last week.  Last week the patient was very tearful and depressed. She endorsed feelings of intense loneliness and estrangement from her sisters. Also dealing with blood pressure issues.   Today patient is looking more upbeat, says she is feeling " better. After speaking with LCS, she went to see her daughter. She had a long talk with daughter who encouraged her to do something different.  Patient decided to reach out to an old friend  - the phone call with her friend went well and made her laugh. Says her friend is a very joyful and positive person. She is looking forward to seeing her soon. Patient would like to join a [employer] club. She enjoys her Pilates class and feels close with the instructor. She wants to get outside more often. Plans to walk in neighborhood with . She has started a new [employer] study group.  She did receive some bad news today - her brother in law is in hospice and has apparently taken a rapid downturn and is not eating or drinking.  She worries about how hard losing him may be on her  Alpesh. Alpesh had surgery two weeks ago; she is concerned this has made his memory issues worse.  Patient is able to recognize and discuss the positives in her life. She is feeling more optimistic about dealing with issues in her life. She is looking forward to seeing her friend soon.     Previous Session:  Patient reports having had a very good beach trip with her . She is concerned about her 's apparent memory loss; says he is frequently losing his keys, doing sloppy repair work around the house, and talking excessively. She says he loses his phone at least twice/day. This is how his mother was late in life also. There is a history of dementia in his family. Discussed possible need for notifying his pcp, possible need for neurologist referral.  Patient discusses her son and dtr in law, Susana. She expresses frustration and sadness with Susana; she feels rejected by Susana and also by her son. She has reached out to Susana, but never gotten anywhere in building a relationship. She believes Susana does not like Alpesh and prefers her own family.  Patient's perception is that Susana has a great life with lots of friends, activities, etc.  However, she noted that Susana's brother committed suicide a number of years ago. When asked what she would like to express to her son and dtr in law, patient says she wants to apologize for hurting them; says she does not believe they will ever forgive patient and spouse.  She becomes tearful and expresses hopelessness at the thought of improving the relationship. Encouraged patient to consider reaching out just to her son for now.  Patient to write down thoughts related to her desire for forgiveness from family members.        Current symptoms:  Depression: fatigue.  Anxiety: denies.  Insomnia:  denies .  Brianda:  denies.  Psychosis: denies .      Risk parameters:  Patient reports no suicidal ideation  Patient reports no homicidal ideation  Patient reports no self-injurious behavior  Patient reports no violent behavior    MENTAL HEALTH STATUS EXAM  General Appearance:  unremarkable, age appropriate   Speech: normal tone, normal rate, normal pitch, normal volume      Level of Cooperation: cooperative      Thought Processes: normal and logical   Mood: steady, happy      Thought Content: normal, no suicidality, no homicidality, delusions, or paranoia   Affect: congruent and appropriate, bright   Orientation: Oriented x3   Attention Span & Concentration: intact   Fund of General Knowledge: intact and appropriate to age and level of education   Judgment & Insight: good     Language  intact       STRENGTHS AND LIABILITIES: Strength: Patient is expressive/articulate., Strength: Patient is intelligent., Strength: Patient has reasonable judgment., Strength: Patient is stable.    IMPRESSION:   My diagnostic impression is Anxiety disorders; anxiety, unspecified [F41.9] and Major Depressive Disorder, Recurrent, Moderate (F33.1), as evidenced by patient report of anhedonia, feelings of guilt and worthlessness, fatigue, nightmares, poor sleep, worry, loneliness.     TREATMENT GOALS (per patient):  To improve relationship with  , Alpesh. To address memory issues. To have a closer, more meaningful relationship with her grandkids. To be more assertive.    Treatment plan:  Target symptoms: recurrent depression, grief  Why chosen therapy is appropriate versus another modality: relevant to diagnosis, patient responds to this modality, evidence based practice  Outcome monitoring methods: self-report, observation, checklist/rating scale  Therapeutic intervention type: behavior modifying psychotherapy    Patient's response to intervention:  The patient's response to intervention is motivated.    Progress toward goals and other mental status changes:  The patient's progress toward goals is good.    Plan: Pt plans to continue individual psychotherapy CBT will be utilized in future individual therapy sessions to increase interaction, insight, and support.     Return to clinic: 6 weeks March 26

## 2024-02-14 NOTE — PROGRESS NOTES
"Subjective:      Patient ID: Leticia Piña is a 73 y.o. female.    Chief Complaint: Follow-up (Blood pressure  follow up)      HPI  Here for follow up of medical problems.  Increase in wellbutrin has helped much, on snri also.  Seeing Ronel.  RA pain controlled on Rinvoq.   Energy good.  No f/c/sw.  Chronic PND, on flonase and alahist.  BMs ok.  No cp/sob/palp.  Urine normal.   Digital BP cuff was inaccurate, so not checking.  Doing well with HENRI Jacy appliance.    Updated/ annual due 10/24:  HM: 10/23 fluvax, 3/21 covid vaccines, 6/19 HAV, 11/19 [id number], 3/17 booster [id number], 12/22 [id number], 6/21 TDaP, 4/13 zoster, 4/22 Shingrix #2, 3/23 BMD now on drug holiday rep 2y, 1/23 EGD, 3/23 Cscope rep 3y, 2/23 MMG, 11/17 Gyn Dr. Jimenez, 3/17 HCV neg.   10/22 Cologuard negative.     Review of Systems   Constitutional:  Negative for chills, diaphoresis and fever.   Respiratory:  Negative for cough and shortness of breath.    Cardiovascular:  Negative for chest pain, palpitations and leg swelling.   Gastrointestinal:  Negative for blood in stool, constipation, diarrhea, nausea and vomiting.   Genitourinary:  Negative for dysuria, frequency and hematuria.   Psychiatric/Behavioral:  The patient is not nervous/anxious.          Objective:   BP (!) 146/62 (BP Location: Left arm, Patient Position: Sitting)   Pulse 85   Temp 98.3 °F (36.8 °C) (Oral)   Ht 5' 5" (1.651 m)   Wt 72.6 kg (159 lb 15.1 oz)   SpO2 97%   BMI 26.62 kg/m²     Physical Exam  Constitutional:       Appearance: She is well-developed.   Neck:      Thyroid: No thyroid mass.      Vascular: No carotid bruit.   Cardiovascular:      Rate and Rhythm: Normal rate and regular rhythm.      Heart sounds: No murmur heard.     No friction rub. No gallop.   Pulmonary:      Effort: Pulmonary effort is normal.      Breath sounds: Normal breath sounds. No wheezing or rales.   Abdominal:      General: Bowel sounds are normal.      Palpations: Abdomen is soft. " There is no mass.      Tenderness: There is no abdominal tenderness.   Musculoskeletal:      Cervical back: Neck supple.   Lymphadenopathy:      Cervical: No cervical adenopathy.   Neurological:      Mental Status: She is alert and oriented to person, place, and time.           Assessment:       1. Moderate episode of recurrent major depressive disorder    2. Essential hypertension    3. Acquired hypothyroidism    4. Anxiety associated with depression    5. Rheumatoid arthritis involving multiple sites with positive rheumatoid factor    6. Sjogren's syndrome with keratoconjunctivitis sicca    7. Immunocompromised    8. Pulmonary hypertension    9. HENRI (obstructive sleep apnea)    10. Methylenetetrahydrofolate reductase deficiency    11. Encounter for screening mammogram for malignant neoplasm of breast    12. Mixed anxiety and depressive disorder          Plan:     Moderate episode of recurrent major depressive disorder, Anxiety associated with depression- doing well on meds, cont.  Rare ativan needed.    Essential hypertension- get new BP cuff and monitor, RTC 1 mo.    Acquired hypothyroidism- Clinically stable, continue present treatment.    Rheumatoid arthritis involving multiple sites with positive rheumatoid factor, Sjogren's syndrome with keratoconjunctivitis sicca, Immunocompromised- doing well on meds per Rheu.    Pulmonary hypertension- make sure BP well controlled.    HENRI (obstructive sleep apnea), doing well on device.    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    RTC 1mo for BP review.  RTC 3mo.  Addendum:  pt send home BP, 129/72 from home.

## 2024-02-16 ENCOUNTER — PATIENT MESSAGE (OUTPATIENT)
Dept: PRIMARY CARE CLINIC | Facility: CLINIC | Age: 74
End: 2024-02-16
Payer: MEDICARE

## 2024-02-16 RX ORDER — BUPROPION HYDROCHLORIDE 300 MG/1
300 TABLET ORAL DAILY
Qty: 90 TABLET | Refills: 3 | Status: SHIPPED | OUTPATIENT
Start: 2024-02-16 | End: 2025-02-15

## 2024-02-16 RX ORDER — BUPROPION HYDROCHLORIDE 150 MG/1
150 TABLET ORAL DAILY
Qty: 90 TABLET | Refills: 3 | Status: SHIPPED | OUTPATIENT
Start: 2024-02-16 | End: 2024-03-25 | Stop reason: SDUPTHER

## 2024-02-20 ENCOUNTER — HOSPITAL ENCOUNTER (OUTPATIENT)
Dept: RADIOLOGY | Facility: HOSPITAL | Age: 74
Discharge: HOME | End: 2024-02-20
Attending: INTERNAL MEDICINE
Payer: MEDICARE

## 2024-02-20 DIAGNOSIS — Z12.31 ENCOUNTER FOR SCREENING MAMMOGRAM FOR BREAST CANCER: ICD-10-CM

## 2024-02-20 PROCEDURE — 77067 SCR MAMMO BI INCL CAD: CPT | Mod: 26,,, | Performed by: RADIOLOGY

## 2024-02-20 PROCEDURE — 77063 BREAST TOMOSYNTHESIS BI: CPT | Mod: 26,,, | Performed by: RADIOLOGY

## 2024-02-20 PROCEDURE — 77067 SCR MAMMO BI INCL CAD: CPT | Mod: TC,PO

## 2024-02-22 ENCOUNTER — PATIENT MESSAGE (OUTPATIENT)
Dept: HEMATOLOGY/ONCOLOGY | Facility: CLINIC | Age: 74
End: 2024-02-22
Payer: MEDICARE

## 2024-02-22 DIAGNOSIS — D64.9 ANEMIA, UNSPECIFIED TYPE: Primary | ICD-10-CM

## 2024-02-23 ENCOUNTER — LAB VISIT (OUTPATIENT)
Dept: LAB | Facility: HOSPITAL | Age: 74
End: 2024-02-23
Attending: NURSE PRACTITIONER
Payer: MEDICARE

## 2024-02-23 ENCOUNTER — PATIENT MESSAGE (OUTPATIENT)
Dept: PRIMARY CARE CLINIC | Facility: CLINIC | Age: 74
End: 2024-02-23
Payer: MEDICARE

## 2024-02-23 VITALS
OXYGEN SATURATION: 97 % | TEMPERATURE: 98 F | HEIGHT: 65 IN | WEIGHT: 159.94 LBS | BODY MASS INDEX: 26.65 KG/M2 | SYSTOLIC BLOOD PRESSURE: 129 MMHG | DIASTOLIC BLOOD PRESSURE: 72 MMHG | HEART RATE: 85 BPM

## 2024-02-23 DIAGNOSIS — D64.9 ANEMIA, UNSPECIFIED TYPE: ICD-10-CM

## 2024-02-23 LAB
BASOPHILS # BLD AUTO: 0.05 K/UL (ref 0–0.2)
BASOPHILS NFR BLD: 0.8 % (ref 0–1.9)
DIFFERENTIAL METHOD BLD: ABNORMAL
EOSINOPHIL # BLD AUTO: 0.2 K/UL (ref 0–0.5)
EOSINOPHIL NFR BLD: 3.7 % (ref 0–8)
ERYTHROCYTE [DISTWIDTH] IN BLOOD BY AUTOMATED COUNT: 11.9 % (ref 11.5–14.5)
FERRITIN SERPL-MCNC: 176 NG/ML (ref 20–300)
HCT VFR BLD AUTO: 30.3 % (ref 37–48.5)
HGB BLD-MCNC: 10.2 G/DL (ref 12–16)
IMM GRANULOCYTES # BLD AUTO: 0.05 K/UL (ref 0–0.04)
IMM GRANULOCYTES NFR BLD AUTO: 0.8 % (ref 0–0.5)
IRON SERPL-MCNC: 110 UG/DL (ref 30–160)
LYMPHOCYTES # BLD AUTO: 1.3 K/UL (ref 1–4.8)
LYMPHOCYTES NFR BLD: 21.5 % (ref 18–48)
MCH RBC QN AUTO: 30.7 PG (ref 27–31)
MCHC RBC AUTO-ENTMCNC: 33.7 G/DL (ref 32–36)
MCV RBC AUTO: 91 FL (ref 82–98)
MONOCYTES # BLD AUTO: 0.6 K/UL (ref 0.3–1)
MONOCYTES NFR BLD: 9.2 % (ref 4–15)
NEUTROPHILS # BLD AUTO: 4 K/UL (ref 1.8–7.7)
NEUTROPHILS NFR BLD: 64 % (ref 38–73)
NRBC BLD-RTO: 0 /100 WBC
PLATELET # BLD AUTO: 285 K/UL (ref 150–450)
PMV BLD AUTO: 9.8 FL (ref 9.2–12.9)
RBC # BLD AUTO: 3.32 M/UL (ref 4–5.4)
SATURATED IRON: 24 % (ref 20–50)
TOTAL IRON BINDING CAPACITY: 459 UG/DL (ref 250–450)
TRANSFERRIN SERPL-MCNC: 310 MG/DL (ref 200–375)
WBC # BLD AUTO: 6.2 K/UL (ref 3.9–12.7)

## 2024-02-23 PROCEDURE — 83540 ASSAY OF IRON: CPT | Performed by: NURSE PRACTITIONER

## 2024-02-23 PROCEDURE — 82728 ASSAY OF FERRITIN: CPT | Performed by: NURSE PRACTITIONER

## 2024-02-23 PROCEDURE — 36415 COLL VENOUS BLD VENIPUNCTURE: CPT | Mod: PO | Performed by: NURSE PRACTITIONER

## 2024-02-23 PROCEDURE — 85025 COMPLETE CBC W/AUTO DIFF WBC: CPT | Performed by: NURSE PRACTITIONER

## 2024-02-26 DIAGNOSIS — D50.9 IRON DEFICIENCY ANEMIA, UNSPECIFIED IRON DEFICIENCY ANEMIA TYPE: Primary | ICD-10-CM

## 2024-03-14 DIAGNOSIS — I10 ESSENTIAL HYPERTENSION: ICD-10-CM

## 2024-03-14 DIAGNOSIS — R05.9 COUGH: ICD-10-CM

## 2024-03-14 DIAGNOSIS — L93.2 DRUG-INDUCED LUPUS ERYTHEMATOSUS: ICD-10-CM

## 2024-03-14 DIAGNOSIS — R50.9 FEVER AND CHILLS: ICD-10-CM

## 2024-03-14 DIAGNOSIS — M35.00 SJOGREN'S SYNDROME: ICD-10-CM

## 2024-03-14 DIAGNOSIS — T50.905A DRUG-INDUCED LUPUS ERYTHEMATOSUS: ICD-10-CM

## 2024-03-14 DIAGNOSIS — M05.79 RHEUMATOID ARTHRITIS INVOLVING MULTIPLE SITES WITH POSITIVE RHEUMATOID FACTOR: ICD-10-CM

## 2024-03-14 DIAGNOSIS — L98.8 RHYTIDES: ICD-10-CM

## 2024-03-14 DIAGNOSIS — E03.9 ACQUIRED HYPOTHYROIDISM: ICD-10-CM

## 2024-03-14 RX ORDER — TRETINOIN 0.25 MG/G
CREAM TOPICAL NIGHTLY
Qty: 45 G | Refills: 5 | Status: SHIPPED | OUTPATIENT
Start: 2024-03-14

## 2024-03-14 RX ORDER — HYDROCHLOROTHIAZIDE 25 MG/1
25 TABLET ORAL DAILY
Qty: 90 TABLET | Refills: 3 | OUTPATIENT
Start: 2024-03-14

## 2024-03-14 RX ORDER — LEVOTHYROXINE SODIUM 75 UG/1
75 TABLET ORAL DAILY
Qty: 90 TABLET | Refills: 3 | OUTPATIENT
Start: 2024-03-14

## 2024-03-14 RX ORDER — VALSARTAN 160 MG/1
320 TABLET ORAL DAILY
Qty: 90 TABLET | Refills: 3 | OUTPATIENT
Start: 2024-03-14

## 2024-03-14 RX ORDER — FOLIC ACID 1 MG/1
1 TABLET ORAL DAILY
Qty: 90 TABLET | Refills: 3 | OUTPATIENT
Start: 2024-03-14 | End: 2025-03-14

## 2024-03-14 RX ORDER — BENZONATATE 100 MG/1
200 CAPSULE ORAL 3 TIMES DAILY PRN
Qty: 60 CAPSULE | Refills: 3 | OUTPATIENT
Start: 2024-03-14

## 2024-03-14 RX ORDER — FLUTICASONE PROPIONATE 50 MCG
2 SPRAY, SUSPENSION (ML) NASAL DAILY
Qty: 16 G | Refills: 2 | Status: SHIPPED | OUTPATIENT
Start: 2024-03-14 | End: 2025-03-14

## 2024-03-21 ENCOUNTER — PATIENT MESSAGE (OUTPATIENT)
Dept: RHEUMATOLOGY | Facility: CLINIC | Age: 74
End: 2024-03-21
Payer: MEDICARE

## 2024-03-21 DIAGNOSIS — M05.79 RHEUMATOID ARTHRITIS INVOLVING MULTIPLE SITES WITH POSITIVE RHEUMATOID FACTOR: Primary | ICD-10-CM

## 2024-03-22 RX ORDER — UPADACITINIB 15 MG/1
15 TABLET, EXTENDED RELEASE ORAL DAILY
Qty: 30 TABLET | Refills: 11 | Status: SHIPPED | OUTPATIENT
Start: 2024-03-22

## 2024-03-25 ENCOUNTER — OFFICE VISIT (OUTPATIENT)
Dept: PRIMARY CARE CLINIC | Facility: CLINIC | Age: 74
End: 2024-03-25
Payer: MEDICARE

## 2024-03-25 ENCOUNTER — CLINICAL SUPPORT (OUTPATIENT)
Dept: PRIMARY CARE CLINIC | Facility: CLINIC | Age: 74
End: 2024-03-25
Payer: MEDICARE

## 2024-03-25 VITALS
SYSTOLIC BLOOD PRESSURE: 138 MMHG | HEIGHT: 65 IN | OXYGEN SATURATION: 95 % | BODY MASS INDEX: 26.94 KG/M2 | HEART RATE: 87 BPM | TEMPERATURE: 98 F | DIASTOLIC BLOOD PRESSURE: 78 MMHG | WEIGHT: 161.69 LBS

## 2024-03-25 DIAGNOSIS — G47.00 INSOMNIA, UNSPECIFIED TYPE: ICD-10-CM

## 2024-03-25 DIAGNOSIS — K21.00 GASTROESOPHAGEAL REFLUX DISEASE WITH ESOPHAGITIS WITHOUT HEMORRHAGE: ICD-10-CM

## 2024-03-25 DIAGNOSIS — F41.8 ANXIETY ASSOCIATED WITH DEPRESSION: Primary | ICD-10-CM

## 2024-03-25 DIAGNOSIS — E03.9 ACQUIRED HYPOTHYROIDISM: ICD-10-CM

## 2024-03-25 DIAGNOSIS — F41.8 DEPRESSION WITH ANXIETY: Primary | ICD-10-CM

## 2024-03-25 PROCEDURE — 99214 OFFICE O/P EST MOD 30 MIN: CPT | Mod: PBBFAC,PN | Performed by: NURSE PRACTITIONER

## 2024-03-25 PROCEDURE — 99999 PR PBB SHADOW E&M-EST. PATIENT-LVL IV: CPT | Mod: PBBFAC,,, | Performed by: NURSE PRACTITIONER

## 2024-03-25 PROCEDURE — 99499 UNLISTED E&M SERVICE: CPT | Mod: S$PBB,,,

## 2024-03-25 PROCEDURE — 99214 OFFICE O/P EST MOD 30 MIN: CPT | Mod: S$PBB,,, | Performed by: NURSE PRACTITIONER

## 2024-03-25 RX ORDER — OMEPRAZOLE 40 MG/1
40 CAPSULE, DELAYED RELEASE ORAL DAILY
Qty: 90 CAPSULE | Refills: 3 | Status: SHIPPED | OUTPATIENT
Start: 2024-03-25 | End: 2025-03-25

## 2024-03-25 RX ORDER — LEVOTHYROXINE SODIUM 75 UG/1
75 TABLET ORAL DAILY
Qty: 90 TABLET | Refills: 3 | Status: SHIPPED | OUTPATIENT
Start: 2024-03-25

## 2024-03-25 RX ORDER — LORAZEPAM 1 MG/1
1 TABLET ORAL NIGHTLY PRN
Qty: 30 TABLET | Refills: 0 | Status: SHIPPED | OUTPATIENT
Start: 2024-03-25 | End: 2024-06-23

## 2024-03-25 RX ORDER — BUPROPION HYDROCHLORIDE 150 MG/1
150 TABLET ORAL DAILY
Qty: 90 TABLET | Refills: 3 | Status: SHIPPED | OUTPATIENT
Start: 2024-03-25 | End: 2025-03-25

## 2024-03-25 RX ORDER — TRAZODONE HYDROCHLORIDE 50 MG/1
TABLET ORAL
Qty: 180 TABLET | Refills: 0 | Status: SHIPPED | OUTPATIENT
Start: 2024-03-25 | End: 2024-05-07 | Stop reason: SDUPTHER

## 2024-03-25 NOTE — PROGRESS NOTES
"Individual Psychotherapy (LCSW)  Leticia DEVINE Wang,  3/25/2024  DATE:  3/25/2024  TYPE OF VISIT:  In person  LENGTH OF SESSION: 60    Therapeutic Intervention: Met with patient for individual psychotherapy.    Chief complaint/reason for encounter: depression, anxiety, interpersonal problems       Session Content/Presenting Problem:    "I am feeling good"  Ready to start gardening.  Pilates is finished.  Going to the local gym. Patient attributes her newfound sense of calm with being more accepting and giving her problems to God.  Has enjoyed recently reconnecting with her old friend. Has gotten much closer to her sister in law since her  passed.  Alpesh is getting involved with the [employer] fair - may get more involved with her Yazidism. They used to be very involved when the kids were younger and always enjoyed it.  Not as worried about Alpesh's memory issues; sees issues with his short term memory but also sees how well he remembers things that interest him like history.   Still not seeing the son and dtr in law very often, or the daughter.  [place] will all be gone for [place]. "I miss them."  Very busy with planning the upcoming road trip she and her spouse are taking.  Looking forward to it. Discussed with patient that she appears to be functioning well; patient agrees and agrees with plan to cease regular sessions with LCSW at this time. She will reach out if needed in the future.     Previous Session:  Met with patient; she reports that she is doing much better. She reports having processed much of her feelings regarding her relationship with her sisters. She has been working on finding forgiveness for others and for herself; she says she now believes "I did nothing wrong".  She has processed all she has been through in the last many years caring for sick and dying family members and how much that had affected her. Provided positive feedback to patient on the actions she took that contributed to the " improvement in her mood. Patient reached out to an old friend, she joined a [employer] study group, she increased her exercise activities. Her  has started working 2 days per week which she believes is good for both of them. She feels more content and relaxed. Administered new PHQ and SUSAN with patient; her scores are reflective of her new positive thoughts.    Patient discusses the death of her brother in law; it was difficult but everyone has been coping well.    She is looking forward to a long road trip with her ; they will be driving to [place] and into [place]. This is to celebrate their 50th wedding anniversary. She believes this will be good for both of them. They will leave in May. Patient agrees to see LCSW again before they leave on their trip.       Current symptoms:  Depression: fatigue.  Anxiety: denies.  Insomnia:  denies .  Brianda:  denies.  Psychosis: denies .      Risk parameters:  Patient reports no suicidal ideation  Patient reports no homicidal ideation  Patient reports no self-injurious behavior  Patient reports no violent behavior    MENTAL HEALTH STATUS EXAM  General Appearance:  unremarkable, age appropriate   Speech: normal tone, normal rate, normal pitch, normal volume      Level of Cooperation: cooperative      Thought Processes: normal and logical   Mood: steady, happy      Thought Content: normal, no suicidality, no homicidality, delusions, or paranoia   Affect: congruent and appropriate, bright   Orientation: Oriented x3   Attention Span & Concentration: intact   Fund of General Knowledge: intact and appropriate to age and level of education   Judgment & Insight: good     Language  intact       STRENGTHS AND LIABILITIES: Strength: Patient is expressive/articulate., Strength: Patient is intelligent., Strength: Patient has reasonable judgment., Strength: Patient is stable.    IMPRESSION:   My diagnostic impression is Anxiety disorders; anxiety, unspecified [F41.9] and Major  Depressive Disorder, Recurrent, Moderate (F33.1), as evidenced by patient report of anhedonia, feelings of guilt and worthlessness, fatigue, nightmares, poor sleep, worry, loneliness.     TREATMENT GOALS (per patient):  To improve relationship with , Alpesh. To address memory issues. To have a closer, more meaningful relationship with her grandkids. To be more assertive.    Treatment plan:  Target symptoms: recurrent depression, grief  Why chosen therapy is appropriate versus another modality: relevant to diagnosis, patient responds to this modality, evidence based practice  Outcome monitoring methods: self-report, observation, checklist/rating scale  Therapeutic intervention type: behavior modifying psychotherapy    Patient's response to intervention:  The patient's response to intervention is motivated.    Progress toward goals and other mental status changes:  The patient's progress toward goals is good.    Plan: Pt plans to continue individual psychotherapy CBT will be utilized in future individual therapy sessions to increase interaction, insight, and support.     Return to clinic:  Sessions have been completed.

## 2024-03-25 NOTE — PATIENT INSTRUCTIONS
1)  Initiate bowel movement Dulcolax - take 2 tabs then likely stay home to prevent any accident - may take 4 to 8 hours        2) Miralax twice daily - one in AM and once in PM  Colace - stool softener - 100 mg once daily    Start getting too soft:  Stop Colace  If still too soft - stop dose of Miralax

## 2024-03-25 NOTE — PROGRESS NOTES
Leticia Garlandcarlosreuben  03/25/2024  [id number]    Nicole Jasmine MD  Patient Care Team:  Nicole Jasmine MD as PCP - General (Internal Medicine)  Jami Ureña as Digital Medicine Health   Samantha Frazier as Hypertension Digital Medicine Clinician  Nicole Jasmine MD as Hypertension Digital Medicine Responsible Provider (Internal Medicine)  Program, Medicare Shared Savings as Hypertension Digital Medicine Contract        [facility] Primary Care [facility]      Chief Complaint:  Chief Complaint   Patient presents with    Follow-up     1 month f/u  BP  medication refill         Assessment/Plan:  1. Anxiety associated with depression  -     buPROPion (WELLBUTRIN XL) 150 MG TB24 tablet; Take 1 tablet (150 mg total) by mouth once daily.  Dispense: 90 tablet; Refill: 3  -     LORazepam (ATIVAN) 1 MG tablet; Take 1 tablet (1 mg total) by mouth nightly as needed for Anxiety.  Dispense: 30 tablet; Refill: 0    2. Acquired hypothyroidism  -     levothyroxine (EUTHYROX) 75 MCG tablet; Take 1 tablet (75 mcg total) by mouth once daily.  Dispense: 90 tablet; Refill: 3    3. Gastroesophageal reflux disease with esophagitis without hemorrhage  -     omeprazole (PRILOSEC) 40 MG capsule; Take 1 capsule (40 mg total) by mouth once daily.  Dispense: 90 capsule; Refill: 3    4. Insomnia, unspecified type  -     traZODone (DESYREL) 50 MG tablet; TAKE ONE OR TWO TABLETS BY MOUTH AT BEDTIME AS NEEDED FOR SLEEP  Dispense: 180 tablet; Refill: 0          Worry Score: 2  History of Present Illness:    From last visit with Dr. Jasmine:  Moderate episode of recurrent major depressive disorder, Anxiety associated with depression- doing well on meds, cont.  Rare ativan needed.     Essential hypertension- get new BP cuff and monitor, RTC 1 mo.     Acquired hypothyroidism- Clinically stable, continue present treatment.     Rheumatoid arthritis involving multiple sites with positive rheumatoid factor, Sjogren's syndrome with keratoconjunctivitis  sicca, Immunocompromised- doing well on meds per Rheu.     Pulmonary hypertension- make sure BP well controlled.     HENRI (obstructive sleep apnea), doing well on device.     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     RTC 1mo for BP review.  RTC 3mo.  Addendum:  pt send home BP, 129/72 from home.        At last visit - Valsartan dose was increased from 160 mg to 320 mg.  Also, taking HCTZ 25 mg  Brought list of BP readings today but only later part of list reflects dosing of Valsartan 320 mg. Pt forgot to increase the dose to 320 mg from last visit.     More calm afer increasing dose of Wellbutrin    No rheumatoid pain  Sleeping well with trazodone and CBD oil  OTC med for allergies - working well  Has been constipated - senna no longer working, some flatus - no nausea/vomiting  Activity includes walking 3 times weekly - approx 30 minutes  Denies recent fall  Leaving for 5 week driving vacation on May 15.     Denies fever, chills, URI symptoms, chest pain, SOB, palpitations, vomiting, diarrhea, no gross neuro deficits, urinary symptoms and leg swelling         The following were reviewed: Active problem list, medication list, allergies, family history, social history, and Health Maintenance.     History:  Past Medical History:   Diagnosis Date    Allergic rhinitis     Cutaneous lupus erythematosus     drug induced.    Essential hypertension 02/05/2019    GERD (gastroesophageal reflux disease)     Hypothyroid     Insomnia     Mixed anxiety and depressive disorder     Nonrheumatic aortic valve insufficiency 9/4/2023    Nonrheumatic mitral valve regurgitation 9/4/2023    Normal cardiac stress test     11/07    Pulmonary hypertension 9/4/2023    Rheumatoid arthritis(714.0)     Sjogren's syndrome      Past Surgical History:   Procedure Laterality Date    AUGMENTATION OF BREAST      breast implants      breast surgery for rupture      COLONOSCOPY N/A 3/7/2023    Procedure: COLONOSCOPY;  Surgeon:  Susan Escobedo MD;  Location: [facility];  Service: Endoscopy;  Laterality: N/A;    ERCP N/A 03/25/2021    Procedure: ERCP (ENDOSCOPIC RETROGRADE CHOLANGIOPANCREATOGRAPHY);  Surgeon: Preston Madrigal MD;  Location: [facility] ENDO;  Service: Endoscopy;  Laterality: N/A;    ERCP N/A 06/21/2021    Procedure: ERCP (ENDOSCOPIC RETROGRADE CHOLANGIOPANCREATOGRAPHY);  Surgeon: Preston Madrigal MD;  Location: [facility] ENDO;  Service: Endoscopy;  Laterality: N/A;    ESOPHAGOGASTRODUODENOSCOPY N/A 01/24/2023    Procedure: ESOPHAGOGASTRODUODENOSCOPY (EGD);  Surgeon: Susan Escobedo MD;  Location: [facility] ENDO;  Service: Endoscopy;  Laterality: N/A;    LAPAROSCOPIC CHOLECYSTECTOMY N/A 03/26/2021    Procedure: CHOLECYSTECTOMY, LAPAROSCOPIC;  Surgeon: Jose Blue MD;  Location: [facility] OR;  Service: General;  Laterality: N/A;    TONSILLECTOMY, ADENOIDECTOMY      TUBAL LIGATION       Family History   Problem Relation Age of Onset    Heart disease Mother     Heart disease Father      Patient Active Problem List   Diagnosis    Atrophy of vulva    Facial nerve disorder, unspecified    Rheumatoid arthritis involving multiple sites with positive rheumatoid factor    Anxiety associated with depression    Primary insomnia    Sjogren's syndrome with keratoconjunctivitis sicca    Acquired hypothyroidism    Vitamin D deficiency disease    Drug-induced lupus erythematosus    Allergic rhinitis    Lymphocytic vasculitis of skin    High risk medication use    Bursitis of left foot    Immunocompromised    Essential hypertension    Trochanteric bursitis of both hips    Iron deficiency anemia    Osteopenia with high risk of fracture    Gastroesophageal reflux disease with esophagitis without hemorrhage    Methylenetetrahydrofolate reductase deficiency    Flare of rheumatoid arthritis    Sensorineural hearing loss (SNHL) of both ears    Balance problem    HENRI (obstructive sleep apnea)    Bilateral cold feet    Nonrheumatic aortic  valve insufficiency    Nonrheumatic mitral valve regurgitation    Pulmonary hypertension    Atypical chest pain    Family history of cardiovascular disease    Drug-induced immunodeficiency    Encounter for medication monitoring    Varicose veins of both legs with edema    Lymphedema    Iliotibial band syndrome of right side    Arthritis of carpometacarpal (CMC) joint of both thumbs    Pain    Hypercholesterolemia    Fatigue    Chronic pain of left hand    Decreased pinch strength    Moderate episode of recurrent major depressive disorder     Review of patient's allergies indicates:   Allergen Reactions    Humira  [adalimumab]      Other reaction(s): drug-induced lupus  Other reaction(s): drug-induced lupus    Sulfa (sulfonamide antibiotics)      Other reaction(s): Hives  Other reaction(s): Rash  Other reaction(s): Hives       Medications:  Current Outpatient Medications on File Prior to Visit   Medication Sig Dispense Refill    albuterol (VENTOLIN HFA) 90 mcg/actuation inhaler Inhale 2 puffs into the lungs every 4 to 6 hours as needed for Wheezing or Shortness of Breath. Rescue 18 g 2    benzonatate (TESSALON) 100 MG capsule Take 2 capsules (200 mg total) by mouth 3 (three) times daily as needed. 60 capsule 3    buPROPion (WELLBUTRIN XL) 300 MG 24 hr tablet Take 1 tablet (300 mg total) by mouth once daily. 90 tablet 3    cholecalciferol, vitamin D3, 5,000 unit/mL Drop Take 1 drop by mouth Daily.      cyanocobalamin, vitamin B-12, 5,000 mcg Subl Place under the tongue once daily.      dexbrompheniramine maleate (ALA-HIST IR) 2 mg Tab Take 1 tablet by mouth once daily. 30 tablet 2    DULoxetine (CYMBALTA) 60 MG capsule Take 1 capsule (60 mg total) by mouth once daily. 90 capsule 3    fluticasone propionate (FLONASE) 50 mcg/actuation nasal spray 2 sprays (100 mcg total) by Each Nostril route once daily. 16 g 2    folic acid (FOLVITE) 1 MG tablet Take 1 tablet (1 mg total) by mouth once daily. 90 tablet 3     hydroCHLOROthiazide (HYDRODIURIL) 25 MG tablet Take 1 tablet (25 mg total) by mouth once daily. 90 tablet 3    tretinoin (RETIN-A) 0.025 % cream Apply topically every evening. 45 g 5    upadacitinib (RINVOQ) 15 mg 24 hr tablet Take 1 tablet (15 mg total) by mouth once daily. Test claim only 30 tablet 11    valsartan (DIOVAN) 160 MG tablet Take 2 tablets (320 mg total) by mouth once daily. 90 tablet 3    [DISCONTINUED] buPROPion (WELLBUTRIN XL) 150 MG TB24 tablet Take 1 tablet (150 mg total) by mouth once daily. 90 tablet 3    [DISCONTINUED] levothyroxine (EUTHYROX) 75 MCG tablet Take 1 tablet (75 mcg total) by mouth once daily. 90 tablet 3    [DISCONTINUED] traZODone (DESYREL) 50 MG tablet TAKE ONE OR TWO TABLETS BY MOUTH AT BEDTIME AS NEEDED FOR SLEEP 180 tablet 0    azelastine (ASTELIN) 137 mcg (0.1 %) nasal spray 1 spray (137 mcg total) by Nasal route 2 (two) times daily. (Patient taking differently: 1 spray by Nasal route once daily.) 30 mL 11    potassium chloride SA (K-DUR,KLOR-CON) 20 MEQ tablet TAKE ONE TABLET BY MOUTH ONE TIME DAILY (Patient not taking: Reported on 3/25/2024) 90 tablet 3    [DISCONTINUED] LORazepam (ATIVAN) 1 MG tablet Take 1 tablet (1 mg total) by mouth nightly as needed for Anxiety. 30 tablet 0     No current facility-administered medications on file prior to visit.       Medications have been reviewed and reconciled with patient at visit today.      Exam:  Vitals:    03/25/24 0816   BP: 138/78   Pulse: 87   Temp: 98 °F (36.7 °C)     Weight: 73.4 kg (161 lb 11.3 oz)   Body mass index is 26.91 kg/m².      BP Readings from Last 3 Encounters:   03/25/24 138/78   02/23/24 129/72   12/14/23 134/70     Wt Readings from Last 3 Encounters:   03/25/24 0816 73.4 kg (161 lb 11.3 oz)   02/14/24 1422 72.6 kg (159 lb 15.1 oz)   12/13/23 1331 75 kg (165 lb 3.8 oz)        REVIEW OF SYSTEMS  Review of Systems   Constitutional:  Negative for chills, fatigue and fever.   HENT:  Positive for postnasal drip.  Negative for congestion, rhinorrhea and sore throat.    Eyes:  Negative for discharge and redness.   Respiratory:  Negative for cough and shortness of breath.    Cardiovascular:  Negative for chest pain, palpitations and leg swelling.   Gastrointestinal:  Positive for constipation. Negative for abdominal pain, diarrhea, nausea and vomiting.   Genitourinary:  Negative for dysuria and frequency.   Musculoskeletal:  Negative for arthralgias and myalgias.   Skin:  Negative for rash and wound.   Neurological:  Negative for dizziness, weakness, light-headedness and headaches.   Hematological:  Negative for adenopathy.   Psychiatric/Behavioral:  Positive for sleep disturbance. Negative for dysphoric mood. The patient is not nervous/anxious.         Physical Exam  Vitals reviewed.   Constitutional:       General: She is not in acute distress.     Appearance: Normal appearance.   HENT:      Head: Normocephalic and atraumatic.      Nose: Nose normal.      Mouth/Throat:      Mouth: Mucous membranes are moist.   Eyes:      General: No scleral icterus.     Conjunctiva/sclera: Conjunctivae normal.   Cardiovascular:      Rate and Rhythm: Normal rate and regular rhythm.      Heart sounds: No murmur heard.  Pulmonary:      Effort: Pulmonary effort is normal. No respiratory distress.      Breath sounds: Normal breath sounds.   Abdominal:      Palpations: Abdomen is soft. There is no mass.      Tenderness: There is no abdominal tenderness.   Musculoskeletal:      Cervical back: Normal range of motion and neck supple.      Right lower leg: No edema.      Left lower leg: No edema.   Lymphadenopathy:      Cervical: No cervical adenopathy.   Skin:     General: Skin is warm and dry.   Neurological:      Mental Status: She is alert and oriented to person, place, and time. Mental status is at baseline.   Psychiatric:         Mood and Affect: Mood normal.         Thought Content: Thought content normal.          Laboratory Reviewed:     Lab  "Results   Component Value Date    WBC 6.20 02/23/2024    HGB 10.2 (L) 02/23/2024    HCT 30.3 (L) 02/23/2024     02/23/2024    CHOL 227 (H) 01/18/2024    TRIG 285 (H) 01/18/2024    HDL 61 01/18/2024    ALT 27 11/14/2023    AST 27 11/14/2023     11/14/2023    K 3.3 (L) 11/14/2023    CL 95 11/14/2023    CREATININE 0.9 11/14/2023    BUN 22 11/14/2023    CO2 31 (H) 11/14/2023    TSH 0.757 11/14/2023    INR 0.9 01/15/2020       Screening or Prevention Patient's value Goal Complete/Controlled?   HgA1C Testing and Control   No results found for: "HGBA1C"   Annually/Less than 8% No   Lipid profile : 01/18/2024 Annually Yes   LDL control Lab Results   Component Value Date    LDLCALC 109.0 01/18/2024    Annually/Less than 100 mg/dl  No   Nephropathy screening No results found for: "LABMICR"  Lab Results   Component Value Date    PROTEINUA Negative 06/23/2021    Annually No   Blood pressure BP Readings from Last 1 Encounters:   03/25/24 138/78    Less than 140/90 Yes   Dilated retinal exam Most Recent Eye Exam Date: Not Found Annually Yes   Foot exam   Most Recent Foot Exam Date: Not Found Annually Yes       Health Maintenance  Health Maintenance Topics with due status: Not Due       Topic Last Completion Date    TETANUS VACCINE 06/21/2021    Colorectal Cancer Screening 03/07/2023    DEXA Scan 03/23/2023    Lipid Panel 01/18/2024    Mammogram 02/20/2024     Health Maintenance Due   Topic Date Due    High Dose Statin  Never done    RSV Vaccine (Age 60+ and Pregnant patients) (1 - 1-dose 60+ series) Never done    COVID-19 Vaccine (3 - 2023-24 season) 09/01/2023               -Patient's lab results were reviewed and discussed with patient  -Treatment options and alternatives were discussed with the patient. Patient expressed understanding. Patient was given the opportunity to ask questions and be an active participant in their medical care. Patient had no further questions or concerns at this time.         Future " Appointments   Date Time Provider Department Center   3/25/2024 10:00 AM Ronel Soto LCSW [facility]   3/26/2024 11:15 AM Brenda Heaton DPM [facility] POD [place]   3/27/2024  9:40 AM LABORATORY, [place] [facility] [place]   4/1/2024  2:00 PM Vivian Kim NP [facility]   5/8/2024 11:00 AM Ronel Pal MD [facility]   6/3/2024  9:40 AM LAB[facility]ATORY, [place] [facility] [place]   7/5/2024  9:00 AM Nicole Jasmine MD [facility]   8/21/2024 10:15 AM ECHOCARDIOGRAM, [facility] [place]   8/27/2024 11:40 AM Alen Geller MD [facility] CARDIO [place]   10/14/2024 12:45 PM Rolando Silva MD [facility] RHEUM [place]   10/24/2024  1:45 PM Rolando Silva MD [facility] R[facility] [place]          After visit summary printed and given to patient upon discharge.  Patient goals and care plan are included in After visit summary.      The following issues were discussed: The primary encounter diagnosis was Anxiety associated with depression. Diagnoses of Acquired hypothyroidism, Gastroesophageal reflux disease with esophagitis without hemorrhage, and Insomnia, unspecified type were also pertinent to this visit.    Health maintenance needs, recent test results and goals of care discussed with pt and questions answered.           AAMIR Degroot, NP-C  [facility] [address] [place] [address]

## 2024-03-28 ENCOUNTER — LAB VISIT (OUTPATIENT)
Dept: LAB | Facility: HOSPITAL | Age: 74
End: 2024-03-28
Attending: NURSE PRACTITIONER
Payer: MEDICARE

## 2024-03-28 DIAGNOSIS — D50.9 IRON DEFICIENCY ANEMIA, UNSPECIFIED IRON DEFICIENCY ANEMIA TYPE: ICD-10-CM

## 2024-03-28 LAB
BASOPHILS # BLD AUTO: 0.06 K/UL (ref 0–0.2)
BASOPHILS NFR BLD: 1.2 % (ref 0–1.9)
DIFFERENTIAL METHOD BLD: ABNORMAL
EOSINOPHIL # BLD AUTO: 0.2 K/UL (ref 0–0.5)
EOSINOPHIL NFR BLD: 3 % (ref 0–8)
ERYTHROCYTE [DISTWIDTH] IN BLOOD BY AUTOMATED COUNT: 11.9 % (ref 11.5–14.5)
HCT VFR BLD AUTO: 32.7 % (ref 37–48.5)
HGB BLD-MCNC: 11 G/DL (ref 12–16)
IMM GRANULOCYTES # BLD AUTO: 0.02 K/UL (ref 0–0.04)
IMM GRANULOCYTES NFR BLD AUTO: 0.4 % (ref 0–0.5)
LYMPHOCYTES # BLD AUTO: 1.3 K/UL (ref 1–4.8)
LYMPHOCYTES NFR BLD: 25.1 % (ref 18–48)
MCH RBC QN AUTO: 31 PG (ref 27–31)
MCHC RBC AUTO-ENTMCNC: 33.6 G/DL (ref 32–36)
MCV RBC AUTO: 92 FL (ref 82–98)
MONOCYTES # BLD AUTO: 0.5 K/UL (ref 0.3–1)
MONOCYTES NFR BLD: 10.4 % (ref 4–15)
NEUTROPHILS # BLD AUTO: 3 K/UL (ref 1.8–7.7)
NEUTROPHILS NFR BLD: 59.9 % (ref 38–73)
NRBC BLD-RTO: 0 /100 WBC
PLATELET # BLD AUTO: 345 K/UL (ref 150–450)
PMV BLD AUTO: 9.6 FL (ref 9.2–12.9)
RBC # BLD AUTO: 3.55 M/UL (ref 4–5.4)
WBC # BLD AUTO: 4.98 K/UL (ref 3.9–12.7)

## 2024-03-28 PROCEDURE — 85025 COMPLETE CBC W/AUTO DIFF WBC: CPT | Performed by: NURSE PRACTITIONER

## 2024-03-28 PROCEDURE — 83540 ASSAY OF IRON: CPT | Performed by: NURSE PRACTITIONER

## 2024-03-28 PROCEDURE — 36415 COLL VENOUS BLD VENIPUNCTURE: CPT | Mod: PO | Performed by: NURSE PRACTITIONER

## 2024-03-28 PROCEDURE — 82728 ASSAY OF FERRITIN: CPT | Performed by: NURSE PRACTITIONER

## 2024-03-29 LAB
FERRITIN SERPL-MCNC: 66 NG/ML (ref 20–300)
IRON SERPL-MCNC: 74 UG/DL (ref 30–160)
SATURATED IRON: 14 % (ref 20–50)
TOTAL IRON BINDING CAPACITY: 515 UG/DL (ref 250–450)
TRANSFERRIN SERPL-MCNC: 348 MG/DL (ref 200–375)

## 2024-04-01 ENCOUNTER — OFFICE VISIT (OUTPATIENT)
Dept: HEMATOLOGY/ONCOLOGY | Facility: CLINIC | Age: 74
End: 2024-04-01
Payer: MEDICARE

## 2024-04-01 DIAGNOSIS — M35.00 SJOGREN'S SYNDROME, WITH UNSPECIFIED ORGAN INVOLVEMENT: ICD-10-CM

## 2024-04-01 DIAGNOSIS — R53.83 FATIGUE, UNSPECIFIED TYPE: Primary | ICD-10-CM

## 2024-04-01 DIAGNOSIS — D50.9 IRON DEFICIENCY ANEMIA, UNSPECIFIED IRON DEFICIENCY ANEMIA TYPE: ICD-10-CM

## 2024-04-01 DIAGNOSIS — M05.79 RHEUMATOID ARTHRITIS INVOLVING MULTIPLE SITES WITH POSITIVE RHEUMATOID FACTOR: ICD-10-CM

## 2024-04-01 PROCEDURE — 99214 OFFICE O/P EST MOD 30 MIN: CPT | Mod: 95,,, | Performed by: NURSE PRACTITIONER

## 2024-04-01 RX ORDER — SODIUM CHLORIDE 0.9 % (FLUSH) 0.9 %
10 SYRINGE (ML) INJECTION
Status: CANCELLED | OUTPATIENT
Start: 2024-04-01

## 2024-04-01 RX ORDER — HEPARIN 100 UNIT/ML
500 SYRINGE INTRAVENOUS
Status: CANCELLED | OUTPATIENT
Start: 2024-04-01

## 2024-04-01 NOTE — PROGRESS NOTES
The patient location is: home  The chief complaint leading to consultation is: fatigue    Visit type: audiovisual    Face to Face time with patient: 15  30 minutes of total time spent on the encounter, which includes face to face time and non-face to face time preparing to see the patient (eg, review of tests), Obtaining and/or reviewing separately obtained history, Documenting clinical information in the electronic or other health record, Independently interpreting results (not separately reported) and communicating results to the patient/family/caregiver, or Care coordination (not separately reported).     Each patient to whom he or she provides medical services by telemedicine is:  (1) informed of the relationship between the physician and patient and the respective role of any other health care provider with respect to management of the patient; and (2) notified that he or she may decline to receive medical services by telemedicine and may withdraw from such care at any time.        Patient ID: Leticia Piña is a 73 y.o. female.    Chief Complaint: fatigue    HPI:  74 y/o female who presents today for f/u of her longstanding chronic on/off anemia for several years.  She states that she takes folic acid and B12 daily.  Denies any previous diagnosis of B12 of folic acid deficiency.  She has had on/off iron deficiency noted with history of IV Injectafer infusion 3/2020 and then again on 2/2021 she received 1 dose Feraheme due to recurrent ID.      She had colonoscopy in 2011 - no polyps found.  Up to date on mammogram and pap.     She complains of acid reflux symptoms for years.  She also has Sjogren's disease and cutaneous lupus erythematous, RA followed by rheumatology.       Most recent hgb 12.3 g/dL and iron is repleated.  Had ERCP done with Dr. Madrigal with stent placement and pathology showing Chronic cholecystitis with cholelithiasis and Rokitansky-Aschoff sinuses   Negative for atypia or malignancy       Had emergency gallbladder surgery removal on 3/26/2020 with noted elevated LFTs with improvement in symptoms post surgery.     Interval History:    9/1/2022 Will need to repeat labs today to assess for recurrent Iron deficiency anemia.   6/21/2021 ERCP Biliary stent removed with biliary sludge and a stone removed.   Is currently being treated with Rinvoq once daily for treatment of RA and is tolerating well.  Denies any s/s of infection.   C/o fatigue.  Denies any abnormal bleeding.  Is not currently taking oral iron daily. Denies f/c/ns or unintentional weight loss.  Denies abnormal lymphadenopathy.      11/3/2022 Presents today to evaluate response of IV Feraheme treatments x 2.  Last received on 9/21/2022.  Awaiting iron and tibc lab results.   Continues with chronic fatigue. Denies any abnormal bleeding.  Continues to take B12 and folate daily. is a 72 year old female who presents today for follow up of her longstanding chronic on/off anemia for several years.  She states that she takes folic acid and B12 daily.  Denies any previous diagnosis of B12 of folic acid deficiency.  She has had on/off iron deficiency noted with history of IV Injectafer infusion 3/2020 and then again on 2/2021 she received 1 dose Feraheme due to recurrent ID.      She had colonoscopy in 2011 - no polyps found.  Up to date on mammogram and pap.     She complains of acid reflux symptoms for years.  She also has Sjogren's disease and cutaneous lupus erythematous, RA followed by rheumatology.       Most recent hgb 12.3 g/dL and iron is repleated.  Had ERCP done with Dr. Madrigal with stent placement and pathology showing Chronic cholecystitis with cholelithiasis and Rokitansky-Aschoff sinuses   Negative for atypia or malignancy      Had emergency gallbladder surgery removal on 3/26/2020 with noted elevated LFTs with improvement in symptoms post surgery.     Interval History:    9/1/2022 Will need to repeat labs today to assess for  recurrent Iron deficiency anemia.   6/21/2021 ERCP Biliary stent removed with biliary sludge and a stone removed.   Is currently being treated with Rinvoq once daily for treatment of RA and is tolerating well.  Denies any s/s of infection.   C/o fatigue.  Denies any abnormal bleeding.  Is not currently taking oral iron daily. Denies f/c/ns or unintentional weight loss.  Denies abnormal lymphadenopathy.      11/3/2022 Presents today to evaluate response of IV Feraheme treatments x 2.  Last received on 9/21/2022.  Awaiting iron and tibc lab results.   Continues with chronic fatigue. Denies any abnormal bleeding.  Continues to take B12 and folate daily.      12/12/2022  EGD scheduled 1/24/2022.  Continues to take B12 and folate daily. Denies any GI symptoms or abnormal blood loss.  Continues treatment for RA - Rinvoqu and plaqunenil.  Currently not taking oral iron.  Has taking oral iron in the past and is unable to tolerate due to constipation     2/6/2023 Last dose of Feraheme on 12/27/2022 and has been evaluated with EGD.  Presents today to review response. Currently with normocytic anemia- hgb 11.3, mcv 91 iron repleated.  EGDNormal duodenal bulb and second portion of the duodenum.   - Gastritis.  3 cm hiatal hernia. Z-line irregular, 35 cm from the incisors. Normal upper third of esophagus and middle third of esophagus.   Pathology: A.   SMALL BOWEL, BIOPSY:   - Fragments of benign small bowel mucosa, without diagnostic abnormality.   B.   STOMACH, BIOPSY:   - Mild inactive chronic gastritis with reactive mucosal changes and focal intestinal metaplasia.   - No Helicobacter pylori organisms identified by *[facility].   - Negative for malignancy.   C.   GASTROESOPHAGEAL JUNCTION BIOPSY:   - Fragments of benign reactive squamous and gastric-type mucosa with rare   intraepithelial eosinophils and neutrophils and mild chronic active   inflammation, consistent with reflux.   - No intestinal metaplasia identified.   - Negative  for dysplasia or malignancy.   COMMENT:   A). Sections show fragments of benign small bowel mucosa with normal villous   architecture. No villous atrophy, crypt hyperplasia, increased   intraepithelial lymphocytes or evidence of celiac sprue identified.   PPI increased from once per day to twice per day.      Interval History:  5/26/2023 Not currently taking oral iron tablets.  Currently taking B12 and folic acid daily.  States that she was diagnosed at the beginning of the year with sleep apnea.  Uses dental device that was made by her dentist that helps her breath right.  RA is currently control and is being followed by rheumatology.  She has no complaints today.  Denies any abnormal bleeding.      Interval History:  11/20/2023  States has been feeling fatigued over the last couple of days with noted slightly low hgb and elevated tibc.  Denies any abnormal bleeding.      Interval History:  4/1/2024  Received Feraheme infusion x 1 on 12/12/2023  presents today to evaluate response and assess for need for additional IV iron due to ALISSA recurrence  with RA. Has chronic fatigue.  Denies any known abnormal bleeding.      I have reviewed all of the patient's relevant lab work available in the medical record and have utilized this in my evaluation and management recommendations today.      Social History     Socioeconomic History    Marital status:    Tobacco Use    Smoking status: Never    Smokeless tobacco: Never   Substance and Sexual Activity    Alcohol use: Not Currently     Comment: socially     Drug use: No    Sexual activity: Not Currently     Social Determinants of Health     Financial Resource Strain: Low Risk  (9/2/2023)    Overall Financial Resource Strain (CARDIA)     Difficulty of Paying Living Expenses: Not hard at all   Food Insecurity: No Food Insecurity (9/2/2023)    Hunger Vital Sign     Worried About Running Out of Food in the Last Year: Never true     Ran Out of Food in the Last Year: Never  true   Transportation Needs: No Transportation Needs (9/2/2023)    PRAPARE - Transportation     Lack of Transportation (Medical): No     Lack of Transportation (Non-Medical): No   Physical Activity: Sufficiently Active (9/2/2023)    Exercise Vital Sign     Days of Exercise per Week: 4 days     Minutes of Exercise per Session: 60 min   Stress: Patient Declined (9/2/2023)    [employer] - Occupational Stress Questionnaire     Feeling of Stress : Patient declined   Social Connections: Unknown (9/2/2023)    Social Connection and Isolation Panel [NHANES]     Frequency of Communication with Friends and Family: Patient declined     Frequency of Social Gatherings with Friends and Family: Patient declined     Active Member of Clubs or Organizations: Patient declined     Attends Club or Organization Meetings: Patient declined     Marital Status:    Housing Stability: Low Risk  (9/2/2023)    Housing Stability Vital Sign     Unable to Pay for Housing in the Last Year: No     Number of Places Lived in the Last Year: 1     Unstable Housing in the Last Year: No       Family History   Problem Relation Age of Onset    Heart disease Mother     Heart disease Father        Past Surgical History:   Procedure Laterality Date    AUGMENTATION OF BREAST      breast implants      breast surgery for rupture      COLONOSCOPY N/A 3/7/2023    Procedure: COLONOSCOPY;  Surgeon: Susan Escobedo MD;  Location: [facility];  Service: Endoscopy;  Laterality: N/A;    ERCP N/A 03/25/2021    Procedure: ERCP (ENDOSCOPIC RETROGRADE CHOLANGIOPANCREATOGRAPHY);  Surgeon: Preston Madrigal MD;  Location: [facility];  Service: Endoscopy;  Laterality: N/A;    ERCP N/A 06/21/2021    Procedure: ERCP (ENDOSCOPIC RETROGRADE CHOLANGIOPANCREATOGRAPHY);  Surgeon: Preston Madrigal MD;  Location: [facility];  Service: Endoscopy;  Laterality: N/A;    ESOPHAGOGASTRODUODENOSCOPY N/A 01/24/2023    Procedure:  ESOPHAGOGASTRODUODENOSCOPY (EGD);  Surgeon: Susan Escobedo MD;  Location: [facility] ENDO;  Service: Endoscopy;  Laterality: N/A;    LAPAROSCOPIC CHOLECYSTECTOMY N/A 03/26/2021    Procedure: CHOLECYSTECTOMY, LAPAROSCOPIC;  Surgeon: Jose Bleu MD;  Location: [facility] OR;  Service: General;  Laterality: N/A;    TONSILLECTOMY, ADENOIDECTOMY      TUBAL LIGATION         Past Medical History:   Diagnosis Date    Allergic rhinitis     Cutaneous lupus erythematosus     drug induced.    Essential hypertension 02/05/2019    GERD (gastroesophageal reflux disease)     Hypothyroid     Insomnia     Mixed anxiety and depressive disorder     Nonrheumatic aortic valve insufficiency 9/4/2023    Nonrheumatic mitral valve regurgitation 9/4/2023    Normal cardiac stress test     11/07    Pulmonary hypertension 9/4/2023    Rheumatoid arthritis(714.0)     Sjogren's syndrome        Review of Systems   Constitutional:  Positive for fatigue.   HENT: Negative.     Eyes: Negative.    Respiratory: Negative.     Cardiovascular: Negative.    Gastrointestinal: Negative.    Endocrine: Negative.    Genitourinary: Negative.    Musculoskeletal: Negative.    Skin: Negative.    Allergic/Immunologic: Negative.    Neurological: Negative.    Hematological: Negative.    Psychiatric/Behavioral: Negative.            Medication List with Changes/Refills   Current Medications    ALBUTEROL (VENTOLIN HFA) 90 MCG/ACTUATION INHALER    Inhale 2 puffs into the lungs every 4 to 6 hours as needed for Wheezing or Shortness of Breath. Rescue    AZELASTINE (ASTELIN) 137 MCG (0.1 %) NASAL SPRAY    1 spray (137 mcg total) by Nasal route 2 (two) times daily.    BENZONATATE (TESSALON) 100 MG CAPSULE    Take 2 capsules (200 mg total) by mouth 3 (three) times daily as needed.    BUPROPION (WELLBUTRIN XL) 150 MG TB24 TABLET    Take 1 tablet (150 mg total) by mouth once daily.    BUPROPION (WELLBUTRIN XL) 300 MG 24 HR TABLET    Take 1 tablet (300 mg total) by mouth once daily.     CHOLECALCIFEROL, VITAMIN D3, 5,000 UNIT/ML DROP    Take 1 drop by mouth Daily.    CYANOCOBALAMIN, VITAMIN B-12, 5,000 MCG SUBL    Place under the tongue once daily.    DEXBROMPHENIRAMINE MALEATE (ALA-HIST IR) 2 MG TAB    Take 1 tablet by mouth once daily.    DULOXETINE (CYMBALTA) 60 MG CAPSULE    Take 1 capsule (60 mg total) by mouth once daily.    FLUTICASONE PROPIONATE (FLONASE) 50 MCG/ACTUATION NASAL SPRAY    2 sprays (100 mcg total) by Each Nostril route once daily.    FOLIC ACID (FOLVITE) 1 MG TABLET    Take 1 tablet (1 mg total) by mouth once daily.    HYDROCHLOROTHIAZIDE (HYDRODIURIL) 25 MG TABLET    Take 1 tablet (25 mg total) by mouth once daily.    LEVOTHYROXINE (EUTHYROX) 75 MCG TABLET    Take 1 tablet (75 mcg total) by mouth once daily.    LORAZEPAM (ATIVAN) 1 MG TABLET    Take 1 tablet (1 mg total) by mouth nightly as needed for Anxiety.    OMEPRAZOLE (PRILOSEC) 40 MG CAPSULE    Take 1 capsule (40 mg total) by mouth once daily.    POTASSIUM CHLORIDE SA (K-DUR,KLOR-CON) 20 MEQ TABLET    TAKE ONE TABLET BY MOUTH ONE TIME DAILY    TRAZODONE (DESYREL) 50 MG TABLET    TAKE ONE OR TWO TABLETS BY MOUTH AT BEDTIME AS NEEDED FOR SLEEP    TRETINOIN (RETIN-A) 0.025 % CREAM    Apply topically every evening.    UPADACITINIB (RINVOQ) 15 MG 24 HR TABLET    Take 1 tablet (15 mg total) by mouth once daily. Test claim only    VALSARTAN (DIOVAN) 160 MG TABLET    Take 2 tablets (320 mg total) by mouth once daily.        Objective:   There were no vitals filed for this visit.    Physical Exam  Constitutional:       Appearance: Normal appearance.   Pulmonary:      Effort: Pulmonary effort is normal.   Neurological:      Mental Status: She is alert and oriented to person, place, and time.   Psychiatric:         Mood and Affect: Mood normal.         Behavior: Behavior normal.         Thought Content: Thought content normal.         Judgment: Judgment normal.         Assessment:     Problem List Items Addressed This Visit           Immunology/Multi System    Rheumatoid arthritis involving multiple sites with positive rheumatoid factor    Relevant Orders    CBC Auto Differential    Iron and TIBC    Ferritin       Oncology    Iron deficiency anemia    Relevant Orders    Occult blood x 1, stool    Occult blood x 1, stool    Occult blood x 1, stool    CBC Auto Differential    Iron and TIBC    Ferritin       Other    Fatigue - Primary    Relevant Orders    CBC Auto Differential    Iron and TIBC    Ferritin     Other Visit Diagnoses       Sjogren's syndrome, with unspecified organ involvement        Relevant Orders    CBC Auto Differential    Iron and TIBC    Ferritin            Lab Results   Component Value Date    WBC 4.98 03/28/2024    RBC 3.55 (L) 03/28/2024    HGB 11.0 (L) 03/28/2024    HCT 32.7 (L) 03/28/2024    MCV 92 03/28/2024    MCH 31.0 03/28/2024    MCHC 33.6 03/28/2024    RDW 11.9 03/28/2024     03/28/2024    MPV 9.6 03/28/2024    GRAN 3.0 03/28/2024    GRAN 59.9 03/28/2024    LYMPH 1.3 03/28/2024    LYMPH 25.1 03/28/2024    MONO 0.5 03/28/2024    MONO 10.4 03/28/2024    EOS 0.2 03/28/2024    BASO 0.06 03/28/2024    EOSINOPHIL 3.0 03/28/2024    BASOPHIL 1.2 03/28/2024      Lab Results   Component Value Date     11/14/2023    K 3.3 (L) 11/14/2023    CL 95 11/14/2023    CO2 31 (H) 11/14/2023    BUN 22 11/14/2023    CREATININE 0.9 11/14/2023    CALCIUM 9.1 11/14/2023    ANIONGAP 13 11/14/2023    ESTGFRAFRICA >60 10/14/2021    EGFRNONAA >60 10/14/2021     Lab Results   Component Value Date    ALT 27 11/14/2023    AST 27 11/14/2023    ALKPHOS 41 (L) 11/14/2023    BILITOT 0.3 11/14/2023     Lab Results   Component Value Date    IRON 74 03/28/2024    TRANSFERRIN 348 03/28/2024    TIBC 515 (H) 03/28/2024    FESATURATED 14 (L) 03/28/2024    FERRITIN 66 03/28/2024      Lab Results   Component Value Date    QIEFMBCV82 >2000 (H) 07/26/2023     Lab Results   Component Value Date    FOLATE >24 11/07/2007         Plan:   Fatigue,  unspecified type  -     CBC Auto Differential; Future; Expected date: 04/01/2024  -     Iron and TIBC; Future; Expected date: 04/01/2024  -     Ferritin; Future; Expected date: 04/01/2024    Iron deficiency anemia, unspecified iron deficiency anemia type  -     Occult blood x 1, stool; Future; Expected date: 04/01/2024  -     Occult blood x 1, stool; Future; Expected date: 04/01/2024  -     Occult blood x 1, stool; Future; Expected date: 04/01/2024  -     CBC Auto Differential; Future; Expected date: 04/01/2024  -     Iron and TIBC; Future; Expected date: 04/01/2024  -     Ferritin; Future; Expected date: 04/01/2024    Sjogren's syndrome, with unspecified organ involvement  -     CBC Auto Differential; Future; Expected date: 04/01/2024  -     Iron and TIBC; Future; Expected date: 04/01/2024  -     Ferritin; Future; Expected date: 04/01/2024    Rheumatoid arthritis involving multiple sites with positive rheumatoid factor  -     CBC Auto Differential; Future; Expected date: 04/01/2024  -     Iron and TIBC; Future; Expected date: 04/01/2024  -     Ferritin; Future; Expected date: 04/01/2024    Other orders  -     ferumoxytoL (FERAHEME) 510 mg in dextrose 5 % (D5W) 117 mL IVPB  -     sodium chloride 0.9% flush 10 mL  -     heparin, porcine (PF) 100 unit/mL injection flush 500 Units  -     alteplase injection 2 mg        Med Onc Chart Routing      Follow up with physician    Follow up with RONNY . F/u 6 weeks after last dose with labs prior - VV   Infusion scheduling note   schedule for Feraheme infusions x 2 at first available location   Injection scheduling note n/a   Labs   Scheduling:  Preferred lab: [facility]  Lab interval:  cbc, iron studies, occult stools x 3   Imaging   N/a   Pharmacy appointment No pharmacy appointment needed      Other referrals       No additional referrals needed  n/a          Occult stool cards x 3.  If positive will proceed with VCE.  Most likely acquired recurrent ALISSA due to  rheumatological disease with increased inflammation.      Collaborating Provider:  Dr. Rodger Zamudio    Thank You,  MARY PatrickP-C  Benign Hematology

## 2024-04-02 ENCOUNTER — PATIENT MESSAGE (OUTPATIENT)
Dept: PRIMARY CARE CLINIC | Facility: CLINIC | Age: 74
End: 2024-04-02
Payer: MEDICARE

## 2024-04-02 RX ORDER — SCOLOPAMINE TRANSDERMAL SYSTEM 1 MG/1
1 PATCH, EXTENDED RELEASE TRANSDERMAL
Qty: 5 PATCH | Refills: 1 | Status: SHIPPED | OUTPATIENT
Start: 2024-04-02

## 2024-04-02 RX ORDER — ALBUTEROL SULFATE 90 UG/1
2 AEROSOL, METERED RESPIRATORY (INHALATION)
Qty: 18 G | Refills: 2 | Status: SHIPPED | OUTPATIENT
Start: 2024-04-02

## 2024-04-03 ENCOUNTER — PATIENT MESSAGE (OUTPATIENT)
Dept: PULMONOLOGY | Facility: CLINIC | Age: 74
End: 2024-04-03
Payer: MEDICARE

## 2024-04-04 ENCOUNTER — PATIENT MESSAGE (OUTPATIENT)
Dept: INFUSION THERAPY | Facility: HOSPITAL | Age: 74
End: 2024-04-04
Payer: MEDICARE

## 2024-04-09 ENCOUNTER — OFFICE VISIT (OUTPATIENT)
Dept: PODIATRY | Facility: CLINIC | Age: 74
End: 2024-04-09
Payer: MEDICARE

## 2024-04-09 VITALS — HEIGHT: 65 IN | WEIGHT: 159.19 LBS | BODY MASS INDEX: 26.52 KG/M2

## 2024-04-09 DIAGNOSIS — M79.672 LEFT FOOT PAIN: Primary | ICD-10-CM

## 2024-04-09 DIAGNOSIS — G57.82 INTERDIGITAL NEUROMA OF LEFT FOOT: ICD-10-CM

## 2024-04-09 PROCEDURE — 99214 OFFICE O/P EST MOD 30 MIN: CPT | Mod: 25,S$PBB,, | Performed by: PODIATRIST

## 2024-04-09 PROCEDURE — 99213 OFFICE O/P EST LOW 20 MIN: CPT | Mod: PBBFAC,PO,25 | Performed by: PODIATRIST

## 2024-04-09 PROCEDURE — 64455 NJX AA&/STRD PLTR COM DG NRV: CPT | Mod: PBBFAC,PO | Performed by: PODIATRIST

## 2024-04-09 PROCEDURE — 99999 PR PBB SHADOW E&M-EST. PATIENT-LVL III: CPT | Mod: PBBFAC,,, | Performed by: PODIATRIST

## 2024-04-09 PROCEDURE — 64455 NJX AA&/STRD PLTR COM DG NRV: CPT | Mod: S$PBB,LT,, | Performed by: PODIATRIST

## 2024-04-09 NOTE — PROGRESS NOTES
PODIATRIC MEDICINE AND SURGERY  4/10/2024    PCP: Dr. Jasmine, Nicole BELL MD    CHIEF COMPLAINT   Chief Complaint   Patient presents with    Foot Pain     C/o pain at the top of the left foot, pt rates 3/10, pt is non-diabetic, pt states its been on and off for months, pt states it gets worse when bending,       HPI:    Leticia Piña is a 73 y.o. female who has a past medical history of Allergic rhinitis, Cutaneous lupus erythematosus, Essential hypertension (02/05/2019), GERD (gastroesophageal reflux disease), Hypothyroid, Insomnia, Mixed anxiety and depressive disorder, Nonrheumatic aortic valve insufficiency (9/4/2023), Nonrheumatic mitral valve regurgitation (9/4/2023), Normal cardiac stress test, Pulmonary hypertension (9/4/2023), Rheumatoid arthritis(714.0), and Sjogren's syndrome.   Leticia presents to clinic today complaining of pain affecting 1st interspace of left foot.  She denies any trauma.  States she has had history of neuromas in the past and similar symptoms are occurring now.  She relates pain is sharp burning numbness to area of concern.  She denies any trauma.  She does have an upcoming trip in which she will partake in a lot of walking and activities that she is concerned about symptoms.      Patient denies other pedal complaints at this time.     PMH  Past Medical History:   Diagnosis Date    Allergic rhinitis     Cutaneous lupus erythematosus     drug induced.    Essential hypertension 02/05/2019    GERD (gastroesophageal reflux disease)     Hypothyroid     Insomnia     Mixed anxiety and depressive disorder     Nonrheumatic aortic valve insufficiency 9/4/2023    Nonrheumatic mitral valve regurgitation 9/4/2023    Normal cardiac stress test     11/07    Pulmonary hypertension 9/4/2023    Rheumatoid arthritis(714.0)     Sjogren's syndrome        PROBLEM LIST  Patient Active Problem List    Diagnosis Date Noted    Moderate episode of recurrent major depressive disorder 02/14/2024     Chronic pain of left hand 12/04/2023    Decreased pinch strength 12/04/2023    Hypercholesterolemia 11/29/2023    Fatigue 11/29/2023    Pain 11/15/2023    Drug-induced immunodeficiency 11/06/2023    Encounter for medication monitoring 11/06/2023    Varicose veins of both legs with edema 11/06/2023    Lymphedema 11/06/2023    Iliotibial band syndrome of right side 11/06/2023    Arthritis of carpometacarpal (CMC) joint of both thumbs 11/06/2023    Atypical chest pain 09/05/2023    Family history of cardiovascular disease 09/05/2023    Nonrheumatic aortic valve insufficiency 09/04/2023    Nonrheumatic mitral valve regurgitation 09/04/2023    Pulmonary hypertension 09/04/2023    Bilateral cold feet 08/22/2023    HENRI (obstructive sleep apnea) 06/28/2023    Balance problem 03/27/2023    Sensorineural hearing loss (SNHL) of both ears 04/22/2022    Flare of rheumatoid arthritis 05/27/2021    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/09/2021    Osteopenia with high risk of fracture 11/24/2020    Gastroesophageal reflux disease with esophagitis without hemorrhage 11/24/2020    Iron deficiency anemia 01/17/2020    Trochanteric bursitis of both hips 06/04/2019    Essential hypertension 02/05/2019    Immunocompromised 12/04/2018    Bursitis of left foot 11/30/2016    High risk medication use 05/23/2016    Lymphocytic vasculitis of skin 05/28/2015    Allergic rhinitis 05/27/2015    Drug-induced lupus erythematosus 04/10/2014    Vitamin D deficiency disease 07/17/2013    Rheumatoid arthritis involving multiple sites with positive rheumatoid factor     Anxiety associated with depression     Primary insomnia     Sjogren's syndrome with keratoconjunctivitis sicca     Acquired hypothyroidism     Atrophy of vulva 02/21/2010    Facial nerve disorder, unspecified 02/09/2010       MEDS  Current Outpatient Medications on File Prior to Visit   Medication Sig Dispense Refill    albuterol (VENTOLIN HFA) 90 mcg/actuation inhaler Inhale  2 puffs into the lungs every 4 to 6 hours as needed for Wheezing or Shortness of Breath. Rescue 18 g 2    benzonatate (TESSALON) 100 MG capsule Take 2 capsules (200 mg total) by mouth 3 (three) times daily as needed. 60 capsule 3    buPROPion (WELLBUTRIN XL) 150 MG TB24 tablet Take 1 tablet (150 mg total) by mouth once daily. 90 tablet 3    buPROPion (WELLBUTRIN XL) 300 MG 24 hr tablet Take 1 tablet (300 mg total) by mouth once daily. 90 tablet 3    cholecalciferol, vitamin D3, 5,000 unit/mL Drop Take 1 drop by mouth Daily.      cyanocobalamin, vitamin B-12, 5,000 mcg Subl Place under the tongue once daily.      dexbrompheniramine maleate (ALA-HIST IR) 2 mg Tab Take 1 tablet by mouth once daily. 30 tablet 2    DULoxetine (CYMBALTA) 60 MG capsule Take 1 capsule (60 mg total) by mouth once daily. 90 capsule 3    fluticasone propionate (FLONASE) 50 mcg/actuation nasal spray 2 sprays (100 mcg total) by Each Nostril route once daily. 16 g 2    folic acid (FOLVITE) 1 MG tablet Take 1 tablet (1 mg total) by mouth once daily. 90 tablet 3    hydroCHLOROthiazide (HYDRODIURIL) 25 MG tablet Take 1 tablet (25 mg total) by mouth once daily. 90 tablet 3    levothyroxine (EUTHYROX) 75 MCG tablet Take 1 tablet (75 mcg total) by mouth once daily. 90 tablet 3    LORazepam (ATIVAN) 1 MG tablet Take 1 tablet (1 mg total) by mouth nightly as needed for Anxiety. 30 tablet 0    omeprazole (PRILOSEC) 40 MG capsule Take 1 capsule (40 mg total) by mouth once daily. 90 capsule 3    scopolamine (TRANSDERM-SCOP) 1.3-1.5 mg (1 mg over 3 days) Place 1 patch onto the skin every 72 hours. 5 patch 1    traZODone (DESYREL) 50 MG tablet TAKE ONE OR TWO TABLETS BY MOUTH AT BEDTIME AS NEEDED FOR SLEEP 180 tablet 0    tretinoin (RETIN-A) 0.025 % cream Apply topically every evening. 45 g 5    upadacitinib (RINVOQ) 15 mg 24 hr tablet Take 1 tablet (15 mg total) by mouth once daily. Test claim only 30 tablet 11    valsartan (DIOVAN) 160 MG tablet Take 2  tablets (320 mg total) by mouth once daily. 90 tablet 3    azelastine (ASTELIN) 137 mcg (0.1 %) nasal spray 1 spray (137 mcg total) by Nasal route 2 (two) times daily. (Patient taking differently: 1 spray by Nasal route once daily.) 30 mL 11     No current facility-administered medications on file prior to visit.       Medication List with Changes/Refills   Current Medications    ALBUTEROL (VENTOLIN HFA) 90 MCG/ACTUATION INHALER    Inhale 2 puffs into the lungs every 4 to 6 hours as needed for Wheezing or Shortness of Breath. Rescue    AZELASTINE (ASTELIN) 137 MCG (0.1 %) NASAL SPRAY    1 spray (137 mcg total) by Nasal route 2 (two) times daily.    BENZONATATE (TESSALON) 100 MG CAPSULE    Take 2 capsules (200 mg total) by mouth 3 (three) times daily as needed.    BUPROPION (WELLBUTRIN XL) 150 MG TB24 TABLET    Take 1 tablet (150 mg total) by mouth once daily.    BUPROPION (WELLBUTRIN XL) 300 MG 24 HR TABLET    Take 1 tablet (300 mg total) by mouth once daily.    CHOLECALCIFEROL, VITAMIN D3, 5,000 UNIT/ML DROP    Take 1 drop by mouth Daily.    CYANOCOBALAMIN, VITAMIN B-12, 5,000 MCG SUBL    Place under the tongue once daily.    DEXBROMPHENIRAMINE MALEATE (ALA-HIST IR) 2 MG TAB    Take 1 tablet by mouth once daily.    DULOXETINE (CYMBALTA) 60 MG CAPSULE    Take 1 capsule (60 mg total) by mouth once daily.    FLUTICASONE PROPIONATE (FLONASE) 50 MCG/ACTUATION NASAL SPRAY    2 sprays (100 mcg total) by Each Nostril route once daily.    FOLIC ACID (FOLVITE) 1 MG TABLET    Take 1 tablet (1 mg total) by mouth once daily.    HYDROCHLOROTHIAZIDE (HYDRODIURIL) 25 MG TABLET    Take 1 tablet (25 mg total) by mouth once daily.    LEVOTHYROXINE (EUTHYROX) 75 MCG TABLET    Take 1 tablet (75 mcg total) by mouth once daily.    LORAZEPAM (ATIVAN) 1 MG TABLET    Take 1 tablet (1 mg total) by mouth nightly as needed for Anxiety.    OMEPRAZOLE (PRILOSEC) 40 MG CAPSULE    Take 1 capsule (40 mg total) by mouth once daily.    SCOPOLAMINE  (TRANSDERM-SCOP) 1.3-1.5 MG (1 MG OVER 3 DAYS)    Place 1 patch onto the skin every 72 hours.    TRAZODONE (DESYREL) 50 MG TABLET    TAKE ONE OR TWO TABLETS BY MOUTH AT BEDTIME AS NEEDED FOR SLEEP    TRETINOIN (RETIN-A) 0.025 % CREAM    Apply topically every evening.    UPADACITINIB (RINVOQ) 15 MG 24 HR TABLET    Take 1 tablet (15 mg total) by mouth once daily. Test claim only    VALSARTAN (DIOVAN) 160 MG TABLET    Take 2 tablets (320 mg total) by mouth once daily.       PSH     Past Surgical History:   Procedure Laterality Date    AUGMENTATION OF BREAST      breast implants      breast surgery for rupture      COLONOSCOPY N/A 3/7/2023    Procedure: COLONOSCOPY;  Surgeon: Susan Escobedo MD;  Location: [facility] ENDO;  Service: Endoscopy;  Laterality: N/A;    ERCP N/A 03/25/2021    Procedure: ERCP (ENDOSCOPIC RETROGRADE CHOLANGIOPANCREATOGRAPHY);  Surgeon: Preston Madrigal MD;  Location: [facility] ENDO;  Service: Endoscopy;  Laterality: N/A;    ERCP N/A 06/21/2021    Procedure: ERCP (ENDOSCOPIC RETROGRADE CHOLANGIOPANCREATOGRAPHY);  Surgeon: Preston Madrigal MD;  Location: [facility] ENDO;  Service: Endoscopy;  Laterality: N/A;    ESOPHAGOGASTRODUODENOSCOPY N/A 01/24/2023    Procedure: ESOPHAGOGASTRODUODENOSCOPY (EGD);  Surgeon: Susan Escobedo MD;  Location: [facility] ENDO;  Service: Endoscopy;  Laterality: N/A;    LAPAROSCOPIC CHOLECYSTECTOMY N/A 03/26/2021    Procedure: CHOLECYSTECTOMY, LAPAROSCOPIC;  Surgeon: Jose Blue MD;  Location: [facility] OR;  Service: General;  Laterality: N/A;    TONSILLECTOMY, ADENOIDECTOMY      TUBAL LIGATION          ALL  Review of patient's allergies indicates:   Allergen Reactions    Humira  [adalimumab]      Other reaction(s): drug-induced lupus  Other reaction(s): drug-induced lupus    Sulfa (sulfonamide antibiotics)      Other reaction(s): Hives  Other reaction(s): Rash  Other reaction(s): Hives       SOC     Social History     Tobacco Use    Smoking status: Never     "Smokeless tobacco: Never   Substance Use Topics    Alcohol use: Not Currently     Comment: socially     Drug use: No         FAMILY HX    Family History   Problem Relation Age of Onset    Heart disease Mother     Heart disease Father             REVIEW OF SYSTEMS  General: This patient is well-developed, well-nourished and appears stated age, well-oriented to person, place and time, and cooperative and pleasant on today's visit   Constitutional: Negative for chills and fever.   Respiratory: Negative for shortness of breath.    Cardiovascular: Negative for chest pain, palpitations, orthopnea  Gastrointestinal: Negative for diarrhea, nausea and vomiting.   Musculoskeletal: Positive for above noted in HPI  Skin: Positive  for skin and/or nail changes   Neurological: Negative for tingling and sensory changes  Peripheral Vascular: no claudication or cyanosis  Psychiatric/Behavioral: Negative for altered mental status     PHYSICAL EXAM:      Vitals:    04/09/24 1421   Weight: 72.2 kg (159 lb 2.8 oz)   Height: 5' 5" (1.651 m)   PainSc:   3         LOWER EXTREMITY PHYSICAL EXAM  VASCULAR  Dorsalis pedis and posterior tibial pulses palpable 2/4 bilaterally. Capillary refill time immediate to the toes. Feet are warm to the touch. Skin temperature warm to warm from proximally to distally There are no varicosities, telangiectasias noted to bilateral foot and ankle regions. There are no ecchymoses noted to bilateral foot and ankle regions. There is no gross lower extremity edema.    DERMATOLOGIC  Skin moist with healthy texture and turgor.There are no open ulcerations, lacerations, or fissures to bilateral foot and ankle regions. There are no signs of infection as there is no erythema, no proximal-extending lymphangiitis, no fluctuance, or crepitus noted on palpation to bilateral foot and ankle regions. There is no interdigital maceration.   There are no hyperkeratotic lesions noted to feet. Nails are " well-trimmed.    NEUROLOGIC  Epicritic sensation is intact as the patient is able to sense light touch to bilateral foot and ankle regions. Achilles and patellar deep tendon reflexes intact. Babinski reflex absent    ORTHOPEDIC/BIOMECHANICAL  TTP Left 1st interspace. Negative guille's click. Muscle strength AT/EHL/EDL/PT: 5/5; Achilles/Gastroc/Soleus: 5/5; PB/PL: 5/5 Muscle tone is normal. Ankle joint ROM non painful with DF/PF, non-crepitus    IMAGING  Results for orders placed during the hospital encounter of 08/19/21    X-Ray Foot Complete Left    Narrative  EXAMINATION:  XR FOOT COMPLETE 3 VIEW LEFT    CLINICAL HISTORY:  .  Effusion, left foot    TECHNIQUE:  AP, lateral and oblique views of the left foot were performed.    COMPARISON:  June 4, 2019    FINDINGS:  Pes planus and mild degenerative change 1st MTP joint.  Lateral marginal spurring/osteophyte at the 1st MTP joint.  No acute or healing fracture.  Dorsal calcaneal spur.  Minimal multi articular degenerative change in the midfoot.  Remaining osseous structures remarkable for minimal degenerative change throughout the IP joints.  No radiopaque foreign body or abnormal calcification.  No erosion or periosteal reaction.    Impression  As above.  Follow-up and or further evaluation as warranted.      Electronically signed by: Anuel Mae MD  Date:    08/19/2021            ASSESSMENT     Encounter Diagnoses   Name Primary?    Left foot pain Yes    Interdigital neuroma of left foot            PLAN  Patient was educated about clinical and imaging findings, and verbalizes understanding of above.     Diagnoses and all orders for this visit:  Left foot pain    Interdigital neuroma of left foot    Other orders  -     triamcinolone acetonide injection 20 mg             A discussion of the risks, benefits, and alternatives of the corticosteroid injection occurred.  All questions were answered.  After gaining oral consent and verifying the injection site, the skin  was prepped with betadine, then alcohol swabs.  Under aseptic conditions, injection of 1ml of 0.5% marcaine plain and .5ml of kenalog 40 was administered to the first interspace plantar comon digital nerve of left foot.  The area was cleansed and then covered with a band-aid . The patient tolerated the injection well and no apparent adverse reactions observed.    A discussion regarding the time course of the medications was undertaken and questions were answered.  Patient counseled to look for signs of infection and if any report to Emergency Department          Disclaimer:  This note may have been prepared using voice recognition software, it may have not been extensively proofed, as such there could be errors within the text such as sound alike errors.         Future Appointments   Date Time Provider Department [place]   4/22/2024  8:00 AM CHAIR [address] [place] [address] INFSN [facility]   5/8/2024 11:00 AM Ronel Pal MD BS [facility]PL[facility]   7/5/2024  9:00 AM Nicole Jasmine MD [facility]   8/21/2024 10:15 AM ECHOCARDIOGRAM, [facility] SPECCPR [place]   8/27/2024 11:40 AM Alen Geller MD [facility] CARDIO [facility]   10/7/2024 10:10 AM LABORATORY, [facility] LAB [place]   10/14/2024 12:45 PM Rolando Silva MD [facility] RHEUM [place]       Report Electronically Signed By:     Brenda Heaton DPM   Podiatry  [facility]-   4/10/2024

## 2024-04-10 PROCEDURE — 99999PBSHW PR PBB SHADOW TECHNICAL ONLY FILED TO HB: Mod: PBBFAC,,,

## 2024-04-10 RX ORDER — TRIAMCINOLONE ACETONIDE 40 MG/ML
20 INJECTION, SUSPENSION INTRA-ARTICULAR; INTRAMUSCULAR ONCE
Status: COMPLETED | OUTPATIENT
Start: 2024-04-10 | End: 2024-04-10

## 2024-04-10 RX ADMIN — TRIAMCINOLONE ACETONIDE 20 MG: 40 INJECTION, SUSPENSION INTRA-ARTICULAR; INTRAMUSCULAR at 04:04

## 2024-04-16 ENCOUNTER — PATIENT MESSAGE (OUTPATIENT)
Dept: PRIMARY CARE CLINIC | Facility: CLINIC | Age: 74
End: 2024-04-16
Payer: MEDICARE

## 2024-04-16 DIAGNOSIS — N76.0 BACTERIAL VAGINOSIS: Primary | ICD-10-CM

## 2024-04-16 DIAGNOSIS — B96.89 BACTERIAL VAGINOSIS: Primary | ICD-10-CM

## 2024-04-16 RX ORDER — METRONIDAZOLE 500 MG/1
500 TABLET ORAL EVERY 12 HOURS
Qty: 20 TABLET | Refills: 0 | Status: SHIPPED | OUTPATIENT
Start: 2024-04-16 | End: 2024-04-26

## 2024-04-22 ENCOUNTER — INFUSION (OUTPATIENT)
Dept: INFUSION THERAPY | Facility: HOSPITAL | Age: 74
End: 2024-04-22
Attending: NURSE PRACTITIONER
Payer: MEDICARE

## 2024-04-22 VITALS
DIASTOLIC BLOOD PRESSURE: 72 MMHG | SYSTOLIC BLOOD PRESSURE: 141 MMHG | OXYGEN SATURATION: 96 % | RESPIRATION RATE: 16 BRPM | HEART RATE: 63 BPM | TEMPERATURE: 98 F

## 2024-04-22 DIAGNOSIS — D50.8 OTHER IRON DEFICIENCY ANEMIA: Primary | ICD-10-CM

## 2024-04-22 PROCEDURE — 25000003 PHARM REV CODE 250: Performed by: NURSE PRACTITIONER

## 2024-04-22 PROCEDURE — 96374 THER/PROPH/DIAG INJ IV PUSH: CPT

## 2024-04-22 PROCEDURE — 63600175 PHARM REV CODE 636 W HCPCS: Mod: JZ,JG | Performed by: NURSE PRACTITIONER

## 2024-04-22 RX ADMIN — FERUMOXYTOL 510 MG: 510 INJECTION INTRAVENOUS at 08:04

## 2024-04-22 NOTE — PLAN OF CARE
Patient tolerated Feraheme well today; no adverse reaction noted.  POC reviewed with pt.  NAD noted upon discharge.   Has f/u appt(s) scheduled per MD request.    Problem: Adult Inpatient Plan of Care  Goal: Plan of Care Review  Outcome: Ongoing, Progressing  Goal: Patient-Specific Goal (Individualized)  Outcome: Ongoing, Progressing  Goal: Absence of Hospital-Acquired Illness or Injury  Outcome: Ongoing, Progressing  Intervention: Identify and Manage Fall Risk  Flowsheets (Taken 4/22/2024 0908)  Safety Promotion/Fall Prevention: in recliner, wheels locked  Goal: Optimal Comfort and Wellbeing  Outcome: Ongoing, Progressing  Intervention: Provide Person-Centered Care  Flowsheets (Taken 4/22/2024 0908)  Trust Relationship/Rapport:   care explained   reassurance provided   thoughts/feelings acknowledged   choices provided   emotional support provided   empathic listening provided   questions answered   questions encouraged

## 2024-04-26 ENCOUNTER — HOSPITAL ENCOUNTER (OUTPATIENT)
Dept: RADIOLOGY | Facility: HOSPITAL | Age: 74
Discharge: HOME | End: 2024-04-26
Attending: PODIATRIST
Payer: MEDICARE

## 2024-04-26 ENCOUNTER — OFFICE VISIT (OUTPATIENT)
Dept: PODIATRY | Facility: CLINIC | Age: 74
End: 2024-04-26
Payer: MEDICARE

## 2024-04-26 VITALS — BODY MASS INDEX: 26.52 KG/M2 | HEIGHT: 65 IN | WEIGHT: 159.19 LBS

## 2024-04-26 DIAGNOSIS — M79.671 BILATERAL FOOT PAIN: ICD-10-CM

## 2024-04-26 DIAGNOSIS — M05.79 RHEUMATOID ARTHRITIS INVOLVING MULTIPLE SITES WITH POSITIVE RHEUMATOID FACTOR: Primary | ICD-10-CM

## 2024-04-26 DIAGNOSIS — M79.672 BILATERAL FOOT PAIN: ICD-10-CM

## 2024-04-26 PROCEDURE — 73630 X-RAY EXAM OF FOOT: CPT | Mod: 26,50,, | Performed by: RADIOLOGY

## 2024-04-26 PROCEDURE — 99999 PR PBB SHADOW E&M-EST. PATIENT-LVL III: CPT | Mod: PBBFAC,,, | Performed by: PODIATRIST

## 2024-04-26 PROCEDURE — 99213 OFFICE O/P EST LOW 20 MIN: CPT | Mod: PBBFAC,PO,25 | Performed by: PODIATRIST

## 2024-04-26 PROCEDURE — 99214 OFFICE O/P EST MOD 30 MIN: CPT | Mod: S$PBB,,, | Performed by: PODIATRIST

## 2024-04-26 PROCEDURE — 73630 X-RAY EXAM OF FOOT: CPT | Mod: TC,50,FY,PO

## 2024-04-26 RX ORDER — METHYLPREDNISOLONE 4 MG/1
TABLET ORAL
Qty: 1 EACH | Refills: 0 | Status: SHIPPED | OUTPATIENT
Start: 2024-04-26

## 2024-04-26 NOTE — PROGRESS NOTES
Podiatry Department    Patient ID: Leticia Piña is a 73 y.o. female.    Chief Complaint: Foot Pain (C/o nerve pain along the dorsal aspect of the right foot, x 3 weeks, rates pain 3/10, left arch pain, x several days, rates pain 3/10, 0 injury, non-diabetic, wears sandals)    History of Present Illness    CHIEF COMPLAINT:  Patient presents today for foot pain.    FOOT PAIN:  The patient reports having experienced pain in the right foot for a duration of two weeks. The pain is described as a dull ache, located in the fourth interspace and exacerbates with walking, it also causes discomfort during night. She also reports experiencing pain in the left foot, specifically located in the inner space and mentions nerve pain in this area.    MEDICATION AND TREATMENT:  She is currently on Rinvoq for rheumatoid arthritis and has an existing prescription for Voltaren pain gel. She expressed hesitation about adding new medications due to concerns about polypharmacy but is open to clinician's judgment.  ROS:  Musculoskeletal: +muscle pain, +joint pain            Physical Exam    Musculoskeletal: Tenderness to palpation along fourth interspace, right foot. Tenderness to palpation along fourth metatarsal. Right foot is more tender than left foot.           Diagnoses:  1. Bilateral foot pain  -     X-Ray Foot Complete 3 view Bilateral; Future; Expected date: 04/26/2024    Other orders  -     methylPREDNISolone (MEDROL DOSEPACK) 4 mg tablet; use as directed  Dispense: 1 each; Refill: 0        Assessment & Plan    FOOT PAIN AND INFLAMMATION:  - Recommend an x-ray of both feet to rule out potential arthritic components.  - Ordered the same x-rays, with plans to review the results and communicate any abnormal findings to the patient.  - Prescribed a short course of prednisone, specifically a Medrol Dosepak, to alleviate inflammation.  - Advised the patient to commence this medication on the 14th, one week prior to her trip,  depending on the severity of her pain.  - Educated the patient about the potential causes of her foot pain, including nerve issues and inflammation.  - Explained the purpose and timing of the prescribed medication, emphasizing the importance of starting it a week before her trip for maximum effect.  - Advised the patient to continue using Voltaren pain gel, which she already possessed, to manage her pain.  - Provided instructions on the correct usage of the Voltaren pain gel.           Future Appointments   Date Time Provider Department [place]   4/26/2024  1:45 PM PRV XR1 PRV XRAY [place]   4/29/2024  9:00 AM CHAIR 09 [facility] INFSN [facility]   5/6/2024 10:00 AM Asad Oconnell MD [facility]   5/8/2024 11:00 AM Ronel Pal MD [facility]   7/5/2024  9:00 AM Nicole Jasmine MD BS [facility]   8/21/2024 10:15 AM ECHOCARDIOGRAM, [facility] SPECCPR [place]   8/27/2024 11:40 AM Alen Geller MD [facility] CARDIO [facility]   10/7/2024 10:10 [facility] [place]   10/14/2024 12:45 PM Rolando Silva MD [facility]        This note was generated with the assistance of ambient listening technology. Verbal consent was obtained by the patient and accompanying visitor(s) for the recording of patient appointment to facilitate this note. I attest to having reviewed and edited the generated note for accuracy, though some syntax or spelling errors may persist. Please contact the author of this note for any clarification.      Report Electronically Signed By:     Brenda Heaton DPM   Podiatry  [facility]-   4/26/2024

## 2024-04-29 ENCOUNTER — INFUSION (OUTPATIENT)
Dept: INFUSION THERAPY | Facility: HOSPITAL | Age: 74
End: 2024-04-29
Attending: NURSE PRACTITIONER
Payer: MEDICARE

## 2024-04-29 VITALS
SYSTOLIC BLOOD PRESSURE: 121 MMHG | TEMPERATURE: 98 F | DIASTOLIC BLOOD PRESSURE: 71 MMHG | HEART RATE: 85 BPM | OXYGEN SATURATION: 96 % | RESPIRATION RATE: 18 BRPM

## 2024-04-29 DIAGNOSIS — D50.8 OTHER IRON DEFICIENCY ANEMIA: Primary | ICD-10-CM

## 2024-04-29 PROCEDURE — 63600175 PHARM REV CODE 636 W HCPCS: Mod: JZ,JG | Performed by: NURSE PRACTITIONER

## 2024-04-29 PROCEDURE — 25000003 PHARM REV CODE 250: Performed by: NURSE PRACTITIONER

## 2024-04-29 PROCEDURE — 96365 THER/PROPH/DIAG IV INF INIT: CPT

## 2024-04-29 RX ORDER — SODIUM CHLORIDE 0.9 % (FLUSH) 0.9 %
10 SYRINGE (ML) INJECTION
Status: CANCELLED | OUTPATIENT
Start: 2024-11-27

## 2024-04-29 RX ORDER — SODIUM CHLORIDE 0.9 % (FLUSH) 0.9 %
10 SYRINGE (ML) INJECTION
Status: DISCONTINUED | OUTPATIENT
Start: 2024-04-29 | End: 2024-04-29 | Stop reason: HOSPADM

## 2024-04-29 RX ADMIN — FERUMOXYTOL 510 MG: 510 INJECTION INTRAVENOUS at 09:04

## 2024-04-29 NOTE — PLAN OF CARE
Problem: Adult Inpatient Plan of Care  Goal: Plan of Care Review  Outcome: Progressing  Flowsheets (Taken 4/29/2024 0924)  Plan of Care Reviewed With: patient  Goal: Patient-Specific Goal (Individualized)  Outcome: Progressing  Flowsheets (Taken 4/29/2024 0924)  Anxieties, Fears or Concerns: denies  Individualized Care Needs: feet elevated, pillow and warm blanket provided  Goal: Absence of Hospital-Acquired Illness or Injury  Outcome: Progressing  Intervention: Identify and Manage Fall Risk  Flowsheets (Taken 4/29/2024 0924)  Safety Promotion/Fall Prevention:   assistive device/personal item within reach   in recliner, wheels locked   medications reviewed   instructed to call staff for mobility   Fall Risk reviewed with patient/family  Intervention: Prevent Infection  Flowsheets (Taken 4/29/2024 0924)  Infection Prevention:   environmental surveillance performed   equipment surfaces disinfected   hand hygiene promoted   personal protective equipment utilized  Goal: Optimal Comfort and Wellbeing  Outcome: Progressing  Intervention: Monitor Pain and Promote Comfort  Flowsheets (Taken 4/29/2024 0924)  Pain Management Interventions:   position adjusted   quiet environment facilitated   relaxation techniques promoted   warm blanket provided  Intervention: Provide Person-Centered Care  Flowsheets (Taken 4/29/2024 0924)  Trust Relationship/Rapport:   care explained   choices provided   reassurance provided   thoughts/feelings acknowledged   emotional support provided   empathic listening provided   questions answered   questions encouraged     Problem: Anemia  Goal: Anemia Symptom Improvement  Outcome: Progressing  Intervention: Monitor and Manage Anemia  Flowsheets (Taken 4/29/2024 0924)  Safety Promotion/Fall Prevention:   assistive device/personal item within reach   in recliner, wheels locked   medications reviewed   instructed to call staff for mobility   Fall Risk reviewed with patient/family  Fatigue Management:  frequent rest breaks encouraged

## 2024-05-06 ENCOUNTER — OFFICE VISIT (OUTPATIENT)
Dept: PRIMARY CARE CLINIC | Facility: CLINIC | Age: 74
End: 2024-05-06
Payer: MEDICARE

## 2024-05-06 VITALS
SYSTOLIC BLOOD PRESSURE: 136 MMHG | BODY MASS INDEX: 25.76 KG/M2 | HEIGHT: 65 IN | DIASTOLIC BLOOD PRESSURE: 60 MMHG | TEMPERATURE: 98 F | HEART RATE: 85 BPM | WEIGHT: 154.63 LBS | OXYGEN SATURATION: 95 %

## 2024-05-06 DIAGNOSIS — N76.0 BACTERIAL VAGINITIS: ICD-10-CM

## 2024-05-06 DIAGNOSIS — N95.2 VAGINAL ATROPHY: ICD-10-CM

## 2024-05-06 DIAGNOSIS — B96.89 BACTERIAL VAGINITIS: ICD-10-CM

## 2024-05-06 DIAGNOSIS — I10 ESSENTIAL HYPERTENSION: Primary | ICD-10-CM

## 2024-05-06 PROCEDURE — 99214 OFFICE O/P EST MOD 30 MIN: CPT | Mod: S$PBB,,, | Performed by: FAMILY MEDICINE

## 2024-05-06 PROCEDURE — 99215 OFFICE O/P EST HI 40 MIN: CPT | Mod: PBBFAC,PN | Performed by: FAMILY MEDICINE

## 2024-05-06 PROCEDURE — 99999 PR PBB SHADOW E&M-EST. PATIENT-LVL V: CPT | Mod: PBBFAC,,, | Performed by: FAMILY MEDICINE

## 2024-05-06 RX ORDER — METRONIDAZOLE 7.5 MG/G
1 GEL VAGINAL NIGHTLY
Qty: 5 APPLICATOR | Refills: 3 | Status: SHIPPED | OUTPATIENT
Start: 2024-05-06 | End: 2024-05-11

## 2024-05-06 RX ORDER — METRONIDAZOLE 7.5 MG/G
1 GEL VAGINAL 2 TIMES DAILY
Status: CANCELLED | OUTPATIENT
Start: 2024-05-06 | End: 2024-05-20

## 2024-05-06 NOTE — PROGRESS NOTES
Subjective:       Patient ID: Leticia Piña is a 73 y.o. female.    Chief Complaint: Follow-up (On BV treatment)    HPI:     Here for follow up of recurrent bacterial vaginosis and vaginal dryness atrophy. Interested in ways to prevent BV as well as trial of vaginal estrogen. Well woman visits with Dr. Jasmine.      Increase in wellbutrin has helped much, on snri also.  Seeing Ronel.  RA pain controlled on Rinvoq.   Energy good.  No f/c/sw.  Chronic PND, on flonase and alahist.  BMs ok.  No cp/sob/palp.  Urine normal.   Digital BP cuff was inaccurate, so not checking.  Doing well with HENRI Jacy appliance.    Updated/ annual due 10/24:  HM: 10/23 fluvax, 3/21 covid vaccines, 6/19 HAV, 11/19 [id number], 3/17 booster [id number], 12/22 [id number], 6/21 TDaP, 4/13 zoster, 4/22 Shingrix #2, 3/23 BMD now on drug holiday rep 2y, 1/23 EGD, 3/23 Cscope rep 3y, 2/23 MMG, 11/17 Gyn Dr. Jimenez, 3/17 HCV neg.   10/22 Cologuard negative.    Objective:     Vitals:    05/06/24 1114   BP: 136/60   Pulse: 85   Temp: 98.1 °F (36.7 °C)     Wt Readings from Last 3 Encounters:   05/06/24 70.1 kg (154 lb 10.4 oz)   04/26/24 72.2 kg (159 lb 2.8 oz)   04/09/24 72.2 kg (159 lb 2.8 oz)     Temp Readings from Last 3 Encounters:   05/06/24 98.1 °F (36.7 °C) (Oral)   04/29/24 97.6 °F (36.4 °C)   04/22/24 98 °F (36.7 °C)     BP Readings from Last 3 Encounters:   05/06/24 136/60   04/29/24 121/71   04/22/24 (!) 141/72     Pulse Readings from Last 3 Encounters:   05/06/24 85   04/29/24 85   04/22/24 63          Physical Exam  Vitals and nursing note reviewed.   Constitutional:       General: She is not in acute distress.     Appearance: She is well-developed. She is not ill-appearing.   HENT:      Head: Normocephalic and atraumatic.      Nose: Nose normal.      Mouth/Throat:      Mouth: Mucous membranes are moist.      Pharynx: No oropharyngeal exudate.   Eyes:      General: No scleral icterus.     Pupils: Pupils are equal, round, and reactive  to light.   Cardiovascular:      Rate and Rhythm: Normal rate and regular rhythm.   Pulmonary:      Effort: Pulmonary effort is normal.      Breath sounds: Normal breath sounds.   Abdominal:      General: Bowel sounds are normal.      Palpations: Abdomen is soft.   Musculoskeletal:         General: No deformity. Normal range of motion.      Cervical back: Normal range of motion and neck supple.   Skin:     General: Skin is warm and dry.   Neurological:      Mental Status: She is alert and oriented to person, place, and time.   Psychiatric:         Mood and Affect: Mood normal.         Behavior: Behavior normal.         Thought Content: Thought content normal.           Albumin   Date Value Ref Range Status   11/14/2023 4.3 3.5 - 5.2 g/dL Final     eGFR   Date Value Ref Range Status   11/14/2023 >60 >60 mL/min/1.73 m^2 Final       Assessment:       1. Essential hypertension    2. Bacterial vaginitis    3. Vaginal atrophy        Plan:           Problem List Items Addressed This Visit          Cardiac/Vascular    Essential hypertension - Primary    Relevant Orders    COMPREHENSIVE METABOLIC PANEL     Other Visit Diagnoses       Bacterial vaginitis        Relevant Medications    metroNIDAZOLE (METROGEL) 0.75 % (37.5mg/5 gram) vaginal gel    Vaginal atrophy        Relevant Medications    conjugated estrogens (PREMARIN) vaginal cream          Probiotics, boric acid suppositories for prevention. Discussed pros and cons of topical estrogen. F/u with Dr. Jasmine scheduled 7/5/24

## 2024-05-07 ENCOUNTER — PATIENT MESSAGE (OUTPATIENT)
Dept: PRIMARY CARE CLINIC | Facility: CLINIC | Age: 74
End: 2024-05-07
Payer: MEDICARE

## 2024-05-07 DIAGNOSIS — G47.00 INSOMNIA, UNSPECIFIED TYPE: ICD-10-CM

## 2024-05-07 NOTE — PATIENT INSTRUCTIONS
If you are feeling unwell, we'd like to be the first ones to know here at [facility]! Please give us a call. Same day appointments are our top priority to keep you well and out of the emergency rooms and hospitals. Call [phone] for our direct line. After hours advice is always available. Please call [phone] after hours to speak to the on-call team.

## 2024-05-08 RX ORDER — TRAZODONE HYDROCHLORIDE 50 MG/1
TABLET ORAL
Qty: 180 TABLET | Refills: 3 | Status: SHIPPED | OUTPATIENT
Start: 2024-05-08

## 2024-05-09 ENCOUNTER — TELEPHONE (OUTPATIENT)
Dept: PRIMARY CARE CLINIC | Facility: CLINIC | Age: 74
End: 2024-05-09
Payer: MEDICARE

## 2024-05-09 RX ORDER — ESTRADIOL 0.1 MG/G
CREAM VAGINAL
Qty: 30 G | Refills: 6 | Status: SHIPPED | OUTPATIENT
Start: 2024-05-09

## 2024-05-09 NOTE — TELEPHONE ENCOUNTER
Spoke with pt today states that Premarin is $350 with insurance    Pharmacist told her to ask MD about Estradiol cream - states it is much cheaper and work the same.

## 2024-05-22 PROBLEM — E78.2 MODERATE MIXED HYPERLIPIDEMIA NOT REQUIRING STATIN THERAPY: Status: ACTIVE | Noted: 2023-11-29

## 2024-07-01 ENCOUNTER — LAB VISIT (OUTPATIENT)
Dept: LAB | Facility: HOSPITAL | Age: 74
End: 2024-07-01
Attending: NURSE PRACTITIONER
Payer: MEDICARE

## 2024-07-01 DIAGNOSIS — D50.9 IRON DEFICIENCY ANEMIA, UNSPECIFIED IRON DEFICIENCY ANEMIA TYPE: ICD-10-CM

## 2024-07-01 DIAGNOSIS — M35.00 SJOGREN'S SYNDROME, WITH UNSPECIFIED ORGAN INVOLVEMENT: ICD-10-CM

## 2024-07-01 DIAGNOSIS — M05.79 RHEUMATOID ARTHRITIS INVOLVING MULTIPLE SITES WITH POSITIVE RHEUMATOID FACTOR: ICD-10-CM

## 2024-07-01 DIAGNOSIS — R53.83 FATIGUE, UNSPECIFIED TYPE: ICD-10-CM

## 2024-07-01 DIAGNOSIS — Z79.899 HIGH RISK MEDICATION USE: ICD-10-CM

## 2024-07-01 LAB
ALBUMIN SERPL BCP-MCNC: 3.9 G/DL (ref 3.5–5.2)
ALP SERPL-CCNC: 58 U/L (ref 55–135)
ALT SERPL W/O P-5'-P-CCNC: 21 U/L (ref 10–44)
ANION GAP SERPL CALC-SCNC: 10 MMOL/L (ref 8–16)
AST SERPL-CCNC: 21 U/L (ref 10–40)
BASOPHILS # BLD AUTO: 0.05 K/UL (ref 0–0.2)
BASOPHILS NFR BLD: 1 % (ref 0–1.9)
BILIRUB SERPL-MCNC: 0.3 MG/DL (ref 0.1–1)
BUN SERPL-MCNC: 22 MG/DL (ref 8–23)
CALCIUM SERPL-MCNC: 9.2 MG/DL (ref 8.7–10.5)
CHLORIDE SERPL-SCNC: 97 MMOL/L (ref 95–110)
CO2 SERPL-SCNC: 29 MMOL/L (ref 23–29)
CREAT SERPL-MCNC: 0.8 MG/DL (ref 0.5–1.4)
DIFFERENTIAL METHOD BLD: NORMAL
EOSINOPHIL # BLD AUTO: 0.1 K/UL (ref 0–0.5)
EOSINOPHIL NFR BLD: 2.8 % (ref 0–8)
ERYTHROCYTE [DISTWIDTH] IN BLOOD BY AUTOMATED COUNT: 11.7 % (ref 11.5–14.5)
EST. GFR  (NO RACE VARIABLE): >60 ML/MIN/1.73 M^2
FERRITIN SERPL-MCNC: 313 NG/ML (ref 20–300)
GLUCOSE SERPL-MCNC: 111 MG/DL (ref 70–110)
HCT VFR BLD AUTO: 37.1 % (ref 37–48.5)
HGB BLD-MCNC: 12.3 G/DL (ref 12–16)
IMM GRANULOCYTES # BLD AUTO: 0.02 K/UL (ref 0–0.04)
IMM GRANULOCYTES NFR BLD AUTO: 0.4 % (ref 0–0.5)
IRON SERPL-MCNC: 161 UG/DL (ref 30–160)
LYMPHOCYTES # BLD AUTO: 1.1 K/UL (ref 1–4.8)
LYMPHOCYTES NFR BLD: 22.6 % (ref 18–48)
MCH RBC QN AUTO: 30.8 PG (ref 27–31)
MCHC RBC AUTO-ENTMCNC: 33.2 G/DL (ref 32–36)
MCV RBC AUTO: 93 FL (ref 82–98)
MONOCYTES # BLD AUTO: 0.5 K/UL (ref 0.3–1)
MONOCYTES NFR BLD: 10.5 % (ref 4–15)
NEUTROPHILS # BLD AUTO: 3.2 K/UL (ref 1.8–7.7)
NEUTROPHILS NFR BLD: 62.7 % (ref 38–73)
NRBC BLD-RTO: 0 /100 WBC
PLATELET # BLD AUTO: 325 K/UL (ref 150–450)
PMV BLD AUTO: 9.5 FL (ref 9.2–12.9)
POTASSIUM SERPL-SCNC: 3.5 MMOL/L (ref 3.5–5.1)
PROT SERPL-MCNC: 7 G/DL (ref 6–8.4)
RBC # BLD AUTO: 4 M/UL (ref 4–5.4)
SATURATED IRON: 31 % (ref 20–50)
SODIUM SERPL-SCNC: 136 MMOL/L (ref 136–145)
TOTAL IRON BINDING CAPACITY: 517 UG/DL (ref 250–450)
TRANSFERRIN SERPL-MCNC: 349 MG/DL (ref 200–375)
WBC # BLD AUTO: 5.05 K/UL (ref 3.9–12.7)

## 2024-07-01 PROCEDURE — 83540 ASSAY OF IRON: CPT | Performed by: NURSE PRACTITIONER

## 2024-07-01 PROCEDURE — 85025 COMPLETE CBC W/AUTO DIFF WBC: CPT | Mod: PO | Performed by: NURSE PRACTITIONER

## 2024-07-01 PROCEDURE — 82728 ASSAY OF FERRITIN: CPT | Performed by: NURSE PRACTITIONER

## 2024-07-01 PROCEDURE — 36415 COLL VENOUS BLD VENIPUNCTURE: CPT | Mod: PO | Performed by: NURSE PRACTITIONER

## 2024-07-01 PROCEDURE — 80053 COMPREHEN METABOLIC PANEL: CPT | Mod: PO | Performed by: INTERNAL MEDICINE

## 2024-07-08 ENCOUNTER — PATIENT MESSAGE (OUTPATIENT)
Dept: RHEUMATOLOGY | Facility: CLINIC | Age: 74
End: 2024-07-08
Payer: MEDICARE

## 2024-07-08 ENCOUNTER — OFFICE VISIT (OUTPATIENT)
Dept: HEMATOLOGY/ONCOLOGY | Facility: CLINIC | Age: 74
End: 2024-07-08
Payer: MEDICARE

## 2024-07-08 ENCOUNTER — PATIENT MESSAGE (OUTPATIENT)
Dept: HEMATOLOGY/ONCOLOGY | Facility: CLINIC | Age: 74
End: 2024-07-08

## 2024-07-08 DIAGNOSIS — M79.676 PAIN OF TOE, UNSPECIFIED LATERALITY: Primary | ICD-10-CM

## 2024-07-08 DIAGNOSIS — Z86.39 HISTORY OF IRON DEFICIENCY: Primary | ICD-10-CM

## 2024-07-08 DIAGNOSIS — D50.8 ACQUIRED IRON DEFICIENCY ANEMIA DUE TO DECREASED ABSORPTION: ICD-10-CM

## 2024-07-08 PROCEDURE — 99214 OFFICE O/P EST MOD 30 MIN: CPT | Mod: 95,,, | Performed by: NURSE PRACTITIONER

## 2024-07-11 ENCOUNTER — PATIENT MESSAGE (OUTPATIENT)
Dept: PRIMARY CARE CLINIC | Facility: CLINIC | Age: 74
End: 2024-07-11
Payer: MEDICARE

## 2024-07-11 DIAGNOSIS — J01.00 ACUTE NON-RECURRENT MAXILLARY SINUSITIS: Primary | ICD-10-CM

## 2024-07-11 RX ORDER — AMOXICILLIN AND CLAVULANATE POTASSIUM 875; 125 MG/1; MG/1
1 TABLET, FILM COATED ORAL 2 TIMES DAILY
Qty: 20 TABLET | Refills: 0 | Status: SHIPPED | OUTPATIENT
Start: 2024-07-11 | End: 2024-07-25

## 2024-07-11 NOTE — TELEPHONE ENCOUNTER
I don't think it's a bacterial infection at this point- with the fever and just 3 days, I think it's a virus.  I have sent in the antibiotic, but I would give it 1-2 more days of just symptomatic treatment (ibuprofen, pseudoephedrine, fluids).  If not really getting better on Saturday, start the augmentin.  Eat yogurt or probiotics for your gut if you have to take the antibiotic.  SM

## 2024-07-18 DIAGNOSIS — G47.00 INSOMNIA, UNSPECIFIED TYPE: ICD-10-CM

## 2024-07-18 RX ORDER — TRAZODONE HYDROCHLORIDE 50 MG/1
TABLET ORAL
Qty: 180 TABLET | Refills: 3 | Status: SHIPPED | OUTPATIENT
Start: 2024-07-18

## 2024-07-18 NOTE — PROGRESS NOTES
"Subjective:      Patient ID: Leticia Piña is a 74 y.o. female.    Chief Complaint: Hypertension (Follow up )      HPI  Here for follow up of medical problems.  IV iron infusions with Heme.  To check occult stool.  BMs normal.  Depression doing well on meds/counseling. No cp/sob/palp.  RA pain doing well on Rinvoq.  No f/c/sw/cough.  Lots PND despite flonase daily.      Updated/ annual due 10/24:  HM: 10/23 fluvax, 3/21 covid vaccines, 6/19 HAV, 11/19 [id number], 3/17 booster [id number], 12/22 [id number], 6/21 Tdap, 4/13 zoster, 4/22 Shingrix #2, 3/23 BMD now on drug holiday rep 2y, 1/23 EGD, 3/23 Cscope rep 3y, 2/24 MMG, 11/17 Gyn Dr. Jimenez, 3/17 HCV neg.   10/22 Cologuard negative.     Review of Systems   Constitutional:  Negative for chills, diaphoresis and fever.   Respiratory:  Negative for cough and shortness of breath.    Cardiovascular:  Negative for chest pain, palpitations and leg swelling.   Gastrointestinal:  Negative for blood in stool, constipation, diarrhea, nausea and vomiting.   Genitourinary:  Negative for dysuria, frequency and hematuria.   Psychiatric/Behavioral:  The patient is not nervous/anxious.          Objective:   BP (!) 142/78 (BP Location: Left arm, Patient Position: Sitting)   Pulse 95   Temp 97.6 °F (36.4 °C) (Skin)   Ht 5' 5" (1.651 m)   Wt 69.9 kg (154 lb 1.6 oz)   SpO2 95%   BMI 25.64 kg/m²     Physical Exam  Constitutional:       Appearance: She is well-developed.   Neck:      Thyroid: No thyroid mass.      Vascular: No carotid bruit.   Cardiovascular:      Rate and Rhythm: Normal rate and regular rhythm.      Heart sounds: No murmur heard.     No friction rub. No gallop.   Pulmonary:      Effort: Pulmonary effort is normal.      Breath sounds: Normal breath sounds. No wheezing or rales.   Abdominal:      General: Bowel sounds are normal.      Palpations: Abdomen is soft. There is no mass.      Tenderness: There is no abdominal tenderness.   Musculoskeletal:      Cervical " back: Neck supple.   Lymphadenopathy:      Cervical: No cervical adenopathy.   Neurological:      Mental Status: She is alert and oriented to person, place, and time.        Latest Reference Range & Units 07/01/24 15:18   WBC 3.90 - 12.70 K/uL 5.05   RBC 4.00 - 5.40 M/uL 4.00   Hemoglobin 12.0 - 16.0 g/dL 12.3   Hematocrit 37.0 - 48.5 % 37.1   MCV 82 - 98 fL 93   MCH 27.0 - 31.0 pg 30.8   MCHC 32.0 - 36.0 g/dL 33.2   RDW 11.5 - 14.5 % 11.7   Platelet Count 150 - 450 K/uL 325   MPV 9.2 - 12.9 fL 9.5   Gran % 38.0 - 73.0 % 62.7   Lymph % 18.0 - 48.0 % 22.6   Mono % 4.0 - 15.0 % 10.5   Eos % 0.0 - 8.0 % 2.8   Basophil % 0.0 - 1.9 % 1.0   Immature Granulocytes 0.0 - 0.5 % 0.4   Gran # (ANC) 1.8 - 7.7 K/uL 3.2   Lymph # 1.0 - 4.8 K/uL 1.1   Mono # 0.3 - 1.0 K/uL 0.5   Eos # 0.0 - 0.5 K/uL 0.1   Baso # 0.00 - 0.20 K/uL 0.05   Immature Grans (Abs) 0.00 - 0.04 K/uL 0.02   nRBC 0 /100 WBC 0   Differential Method  Automated   Iron 30 - 160 ug/dL 161 (H)   TIBC 250 - 450 ug/dL 517 (H)   Saturated Iron 20 - 50 % 31   Transferrin 200 - 375 mg/dL 349   Ferritin 20.0 - 300.0 ng/mL 313 (H)   Sodium 136 - 145 mmol/L 136   Potassium 3.5 - 5.1 mmol/L 3.5   Chloride 95 - 110 mmol/L 97   CO2 23 - 29 mmol/L 29   Anion Gap 8 - 16 mmol/L 10   BUN 8 - 23 mg/dL 22   Creatinine 0.5 - 1.4 mg/dL 0.8   eGFR >60 mL/min/1.73 m^2 >60   Glucose 70 - 110 mg/dL 111 (H)   Calcium 8.7 - 10.5 mg/dL 9.2   ALP 55 - 135 U/L 58   PROTEIN TOTAL 6.0 - 8.4 g/dL 7.0   Albumin 3.5 - 5.2 g/dL 3.9   BILIRUBIN TOTAL 0.1 - 1.0 mg/dL 0.3   AST 10 - 40 U/L 21   ALT 10 - 44 U/L 21         Assessment:       1. Moderate episode of recurrent major depressive disorder    2. Essential hypertension    3. Rheumatoid arthritis involving multiple sites with positive rheumatoid factor    4. Seasonal allergic rhinitis due to pollen    5. Acquired hypothyroidism          Plan:     Moderate episode of recurrent major depressive disorder- doing well, cont  meds/counseling.    Essential hypertension- send me BPs in 1 week.    Rheumatoid arthritis involving multiple sites with positive rheumatoid factor- doing well on meds, per Rheum.    Seasonal allergic rhinitis due to pollen- cont flonase, add astelin.  -     azelastine (ASTELIN) 137 mcg (0.1 %) nasal spray; 1 spray (137 mcg total) by Nasal route 2 (two) times daily.  Dispense: 30 mL; Refill: 6    Acquired hypothyroidism- Clinically stable, continue present treatment.  -     TSH; Future; Expected date: 07/25/2024  -     Lipid Panel; Future; Expected date: 07/25/2024    RTC 3 mo for annual.

## 2024-07-25 ENCOUNTER — DOCUMENTATION ONLY (OUTPATIENT)
Dept: PRIMARY CARE CLINIC | Facility: CLINIC | Age: 74
End: 2024-07-25
Payer: MEDICARE

## 2024-07-25 ENCOUNTER — OFFICE VISIT (OUTPATIENT)
Dept: PRIMARY CARE CLINIC | Facility: CLINIC | Age: 74
End: 2024-07-25
Payer: MEDICARE

## 2024-07-25 VITALS
DIASTOLIC BLOOD PRESSURE: 78 MMHG | WEIGHT: 154.13 LBS | HEIGHT: 65 IN | BODY MASS INDEX: 25.68 KG/M2 | HEART RATE: 95 BPM | TEMPERATURE: 98 F | SYSTOLIC BLOOD PRESSURE: 142 MMHG | OXYGEN SATURATION: 95 %

## 2024-07-25 DIAGNOSIS — F33.1 MODERATE EPISODE OF RECURRENT MAJOR DEPRESSIVE DISORDER: Primary | ICD-10-CM

## 2024-07-25 DIAGNOSIS — I10 ESSENTIAL HYPERTENSION: ICD-10-CM

## 2024-07-25 DIAGNOSIS — E03.9 ACQUIRED HYPOTHYROIDISM: ICD-10-CM

## 2024-07-25 DIAGNOSIS — J30.1 SEASONAL ALLERGIC RHINITIS DUE TO POLLEN: ICD-10-CM

## 2024-07-25 DIAGNOSIS — M05.79 RHEUMATOID ARTHRITIS INVOLVING MULTIPLE SITES WITH POSITIVE RHEUMATOID FACTOR: ICD-10-CM

## 2024-07-25 PROCEDURE — 99999 PR PBB SHADOW E&M-EST. PATIENT-LVL IV: CPT | Mod: PBBFAC,,, | Performed by: INTERNAL MEDICINE

## 2024-07-25 PROCEDURE — 99214 OFFICE O/P EST MOD 30 MIN: CPT | Mod: S$PBB,,, | Performed by: INTERNAL MEDICINE

## 2024-07-25 PROCEDURE — 99214 OFFICE O/P EST MOD 30 MIN: CPT | Mod: PBBFAC,PN | Performed by: INTERNAL MEDICINE

## 2024-07-25 RX ORDER — AZELASTINE 1 MG/ML
1 SPRAY, METERED NASAL 2 TIMES DAILY
Qty: 30 ML | Refills: 6 | Status: SHIPPED | OUTPATIENT
Start: 2024-07-25 | End: 2025-07-25

## 2024-08-02 ENCOUNTER — PATIENT MESSAGE (OUTPATIENT)
Dept: PRIMARY CARE CLINIC | Facility: CLINIC | Age: 74
End: 2024-08-02
Payer: MEDICARE

## 2024-08-13 ENCOUNTER — CLINICAL SUPPORT (OUTPATIENT)
Dept: PRIMARY CARE CLINIC | Facility: CLINIC | Age: 74
End: 2024-08-13
Payer: MEDICARE

## 2024-08-13 DIAGNOSIS — F41.9 ANXIETY: Primary | ICD-10-CM

## 2024-08-13 PROCEDURE — 99499 UNLISTED E&M SERVICE: CPT | Mod: S$PBB,,,

## 2024-08-13 NOTE — PROGRESS NOTES
"Individual Psychotherapy (LCSW)  Leticia Piña,  2024  DATE:  2024  TYPE OF VISIT:  In person  LENGTH OF SESSION: 60    Therapeutic Intervention: Met with patient for individual psychotherapy.    Chief complaint/reason for encounter: depression, anxiety, interpersonal problems       Session Content/Presenting Problem:    Patient reports feeling angry today. She has been thinking about her sisters, Alida and Deya, and their current estrangement.  Patient discussed what led up to their estrangement.  Most of the conflict has been with Alida. Alida and Deya are much younger and have always been close. Leticia is 7 years older than Alida. Patient reports feeling betrayed by her sister, Deya, after Deya spoke w/Alida about patient's feelings. They have not spoken since shortly after their mom's  in . Alida has accused patient's spouse of lying and causing them to lose out on some money.lAida had never forgiven pt's  Alpesh after he got in legal and financial trouble.   Patient is tearful as she recounts this history.  She says she wants to "forget all about them."  Her thought is "they do not want anything to do with me."  She describes difficulty defending herself and her . Blames herself for not having been able to prevent some of the present difficulties with her sisters.Patient describes writing 3 different letters which she sent to Rosana and Deya. In the letters she tried to explain her side of things. Says she got no reply from either sister. Feels she has never had the chance to explain and work things out.   Patient recalls an episode of depression for which she was hospitalized.  Says it was due to a medication change; it made her go "berserk".  Alida suggested she needed inpatient care; this was upsetting to Alpesh. He wanted to care for her himself. Patient is hopeful that she can process and then let go of some of the painful thoughts and feelings that are " "bringing her down.   Will continue to explore with patient her painful history with her sisters; will explore her core thoughts regarding the situation and attempt to help her find resolution.  Will review with patient the Situation-Thought -Emotion-Behavior pattern.  Will use CBT to help her identify the nonhelpful thoughts that are causing her continued anger and sadness.             Previous Sessions:  "I am feeling good"  Ready to start gardening.  Pilates is finished.  Going to the local gym. Patient attributes her newfound sense of calm with being more accepting and giving her problems to God.  Has enjoyed recently reconnecting with her old friend. Has gotten much closer to her sister in law since her  passed.  Alpesh is getting involved with the Baptism fair - may get more involved with her Baptism. They used to be very involved when the kids were younger and always enjoyed it.  Not as worried about Alpesh's memory issues; sees issues with his short term memory but also sees how well he remembers things that interest him like history.   Still not seeing the son and dtr in law very often, or the daughter.  They will all be gone for Easter. "I miss them."  Very busy with planning the upcoming road trip she and her spouse are taking.  Looking forward to it. Discussed with patient that she appears to be functioning well; patient agrees and agrees with plan to cease regular sessions with LCSW at this time. She will reach out if needed in the future.     Met with patient; she reports that she is doing much better. She reports having processed much of her feelings regarding her relationship with her sisters. She has been working on finding forgiveness for others and for herself; she says she now believes "I did nothing wrong".  She has processed all she has been through in the last many years caring for sick and dying family members and how much that had affected her. Provided positive feedback to patient on the " actions she took that contributed to the improvement in her mood. Patient reached out to an old friend, she joined a [employer] study group, she increased her exercise activities. Her  has started working 2 days per week which she believes is good for both of them. She feels more content and relaxed. Administered new PHQ and SUSAN with patient; her scores are reflective of her new positive thoughts.    Patient discusses the death of her brother in law; it was difficult but everyone has been coping well.    She is looking forward to a long road trip with her ; they will be driving to [place] and into [place]. This is to celebrate their 50th wedding anniversary. She believes this will be good for both of them. They will leave in May. Patient agrees to see LCSW again before they leave on their trip.       Current symptoms:  Depression: worthlessness/guilt.  Anxiety: denies.  Insomnia:  denies .  Brianda:  denies.  Psychosis: denies .      Risk parameters:  Patient reports no suicidal ideation  Patient reports no homicidal ideation  Patient reports no self-injurious behavior  Patient reports no violent behavior    MENTAL HEALTH STATUS EXAM  General Appearance:  unremarkable, age appropriate   Speech: loud, pressured, rapid      Level of Cooperation: cooperative      Thought Processes: normal and logical   Mood: angry, depressed      Thought Content: normal, no suicidality, no homicidality, delusions, or paranoia   Affect: congruent and appropriate, bright   Orientation: Oriented x3   Attention Span & Concentration: intact   Fund of General Knowledge: intact and appropriate to age and level of education   Judgment & Insight: good     Language  intact       STRENGTHS AND LIABILITIES: Strength: Patient accepts guidance/feedback, Strength: Patient is expressive/articulate., Liability: Patient lacks coping skills.    IMPRESSION:   My diagnostic impression is Anxiety disorders; anxiety, unspecified [F41.9] and Major  Depressive Disorder, Recurrent, Moderate (F33.1), as evidenced by patient report of anhedonia, feelings of guilt and worthlessness, fatigue, nightmares, poor sleep, worry, loneliness.     TREATMENT GOALS (per patient):    To no longer feel angry in relation to her sisters, Alida and Deya, and their fractured relationship.    Treatment plan:  Target symptoms: recurrent depression, anger  Why chosen therapy is appropriate versus another modality: relevant to diagnosis, patient responds to this modality, evidence based practice  Outcome monitoring methods: self-report, observation, checklist/rating scale  Therapeutic intervention type: behavior modifying psychotherapy    Patient's response to intervention:  The patient's response to intervention is motivated.    Progress toward goals and other mental status changes:  The patient's progress toward goals is fair .    Plan: Pt plans to continue individual psychotherapy CBT will be utilized in future individual therapy sessions to increase interaction, insight, and support.     Return to clinic: 1 week August 20, August 27th

## 2024-08-20 ENCOUNTER — PATIENT MESSAGE (OUTPATIENT)
Dept: CARDIOLOGY | Facility: HOSPITAL | Age: 74
End: 2024-08-20
Payer: MEDICARE

## 2024-08-21 ENCOUNTER — SOCIAL WORK (OUTPATIENT)
Dept: PRIMARY CARE CLINIC | Facility: CLINIC | Age: 74
End: 2024-08-21
Payer: MEDICARE

## 2024-08-21 NOTE — PROGRESS NOTES
Discussed patient with psych PAJuli. Medications, history  and recent assessments reviewed.  Patient on medication for treatment of depression. Discussed methods to assist patient with current symptoms.  Will encourage patient to schedule appointment with her psychiatrist.

## 2024-08-22 ENCOUNTER — OFFICE VISIT (OUTPATIENT)
Dept: PHYSICAL MEDICINE AND REHAB | Facility: CLINIC | Age: 74
End: 2024-08-22
Payer: MEDICARE

## 2024-08-22 ENCOUNTER — HOSPITAL ENCOUNTER (OUTPATIENT)
Dept: RADIOLOGY | Facility: HOSPITAL | Age: 74
Discharge: HOME | End: 2024-08-22
Attending: PHYSICAL MEDICINE & REHABILITATION
Payer: MEDICARE

## 2024-08-22 VITALS — BODY MASS INDEX: 25.68 KG/M2 | HEIGHT: 65 IN | RESPIRATION RATE: 12 BRPM | WEIGHT: 154.13 LBS

## 2024-08-22 DIAGNOSIS — M46.1 SI (SACROILIAC) JOINT INFLAMMATION: Primary | ICD-10-CM

## 2024-08-22 DIAGNOSIS — M46.1 SI (SACROILIAC) JOINT INFLAMMATION: ICD-10-CM

## 2024-08-22 DIAGNOSIS — M47.816 LUMBAR FACET ARTHROPATHY: ICD-10-CM

## 2024-08-22 DIAGNOSIS — R29.898 WEAKNESS OF BOTH HIPS: ICD-10-CM

## 2024-08-22 PROCEDURE — 72114 X-RAY EXAM L-S SPINE BENDING: CPT | Mod: TC

## 2024-08-22 PROCEDURE — 99204 OFFICE O/P NEW MOD 45 MIN: CPT | Mod: S$PBB,,, | Performed by: PHYSICAL MEDICINE & REHABILITATION

## 2024-08-22 PROCEDURE — 99999 PR PBB SHADOW E&M-EST. PATIENT-LVL III: CPT | Mod: PBBFAC,,, | Performed by: PHYSICAL MEDICINE & REHABILITATION

## 2024-08-22 PROCEDURE — 99213 OFFICE O/P EST LOW 20 MIN: CPT | Mod: PBBFAC,25 | Performed by: PHYSICAL MEDICINE & REHABILITATION

## 2024-08-22 PROCEDURE — 72114 X-RAY EXAM L-S SPINE BENDING: CPT | Mod: 26,,, | Performed by: RADIOLOGY

## 2024-08-22 NOTE — PROGRESS NOTES
PM&R NEW PATIENT HISTORY & PHYSICAL :    Referring Physician:    Chief Complaint   Patient presents with    Back Pain    Leg Pain       HPI: This is a 74 y.o.  female being seen in clinic today for evaluation of chronic low back/si joint achy pain and tightness and painful tension in her legs.  At night her symptoms can increase. She has occasional numbness at her left lateral thigh.  Stretching and heat have provided some relief.      History obtained from patient    Functional History:  Walking: Not limited  Transfers: Independent  Assistive devices: No  Power mobility: No  Falls: None       Needs help with:  Nothing - all ADLS normal    Cooking   Cleaning  Bathing   Dressing   Toileting     Past family, medical, social, and surgical history reviewed in chart    Review of Systems:     General- denies lethargy, weight change, fever, chills  Head/neck- denies swallowing difficulties  ENT- denies hearing changes  Cardiovascular-denies chest pain  Pulmonary- denies shortness of breath  GI- denies constipation or bowel incontinence  - denies bladder incontinence  Skin- denies wounds or rashes  Musculoskeletal- denies weakness, + pain  Neurologic- +/- numbness and tingling  Psychiatric- +depression and anxiety  Lymphatic-denies swelling  Endocrine- denies hypoglycemic symptoms/DM history  All other pertinent systems negative     Physical Examination:  General: Well developed, well nourished female, NAD  HEENT:NCAT EOMI bilaterally   Pulmonary:Normal respirations    Spinal Examination: CERVICAL  Active ROM is within normal limits.  Inspection: No deformity of spinal alignment.      Spinal Examination: LUMBAR or THORACIC  Active ROM is within normal limits except limited in full ext  Inspection: No deformity of spinal alignment.  No palpable olisthesis.  Palpation: No vertebral tenderness to percussion.  Tt[p at mid lumbar spinous process.Ttp at si joints, very ttp at GT bursas and gluteus musculature, tight  paraspinals  ELVA: negative  Slump test neg  Facet loading +bilaterally  SLR Test (seated and supine):negative to greater than 70 degrees bilaterally      Bilateral Upper and Lower Extremities:  Pulses are 2+ at radial,  bilaterally.  Shoulder/Elbow/Wrist/Hand ROM   Hip/Knee/Ankle ROM wnl. Ttp and tight bands along IT band and TFL  Bilateral Extremities show normal capillary refill.  No signs of cyanosis, rubor, edema, skin changes, or dysvascular changes of appendages.  Nails appear intact.    Neurological Exam:  Cranial Nerves:  II-XII grossly intact    Manual Muscle Testing: (Motor 5=normal)  RIGHT Lower extremity: Hip flexion 5/5, Hip Abduction 4/5, Hip Adduction 4+/5, Knee extension 5/5, Knee flexion 5/5, Ankle dorsiflexion 5/5, Extensor hallucis longus 5/5, Ankle plantarflexion 5/5  LEFT Lower extremity:  Hip flexion 5/5, Hip Abduction 4/5,Hip Adduction 4+/5, Knee extension 5/5, Knee flexion 5/5, Ankle dorsiflexion 5/5, Extensor hallucis longus 5/5, Ankle plantarflexion 5/5    No focal atrophy is noted of either upper or lower extremity.    Bilateral Reflexes:hypo at left patellar, 1+ at right  Jerome's response is absent bilaterally.  No clonus at knee or ankle.    Sensation: tested to light touch  - intact in all four limbs.    Gait: Narrow base and good arm swing.      IMPRESSION/PLAN: This is a 74 y.o.  female with lumbar facet arthropathy, GT bursitis, hip weakness, si joint pain, myofascial pain  Discussed in detail for 30 minutes about diagnosis and treatment plan    Xray lumbar spine  2. Handouts on core/hip strengthening, si joint pain, etc provided  3.  Rec ice>heat, natural anti inflammatory and muscle health supplements  4. Refer to PT=[facility]-core and hip strengthening, pelvic stabilization, myofascial release, dry needle, stretch, ROM, modalities, HEP     Edel Carty M.D.  Physical Medicine and Rehab

## 2024-08-23 ENCOUNTER — PATIENT MESSAGE (OUTPATIENT)
Dept: CARDIOLOGY | Facility: HOSPITAL | Age: 74
End: 2024-08-23
Payer: MEDICARE

## 2024-08-27 ENCOUNTER — PATIENT MESSAGE (OUTPATIENT)
Dept: CARDIOLOGY | Facility: HOSPITAL | Age: 74
End: 2024-08-27
Payer: MEDICARE

## 2024-09-04 DIAGNOSIS — E03.9 ACQUIRED HYPOTHYROIDISM: ICD-10-CM

## 2024-09-04 RX ORDER — LEVOTHYROXINE SODIUM 75 UG/1
75 TABLET ORAL DAILY
Qty: 90 TABLET | Refills: 3 | Status: SHIPPED | OUTPATIENT
Start: 2024-09-04 | End: 2024-09-05 | Stop reason: SDUPTHER

## 2024-09-05 DIAGNOSIS — E03.9 ACQUIRED HYPOTHYROIDISM: ICD-10-CM

## 2024-09-05 RX ORDER — LEVOTHYROXINE SODIUM 75 UG/1
75 TABLET ORAL DAILY
Qty: 90 TABLET | Refills: 3 | Status: SHIPPED | OUTPATIENT
Start: 2024-09-05

## 2024-09-10 DIAGNOSIS — E03.9 ACQUIRED HYPOTHYROIDISM: ICD-10-CM

## 2024-09-10 RX ORDER — LEVOTHYROXINE SODIUM 75 UG/1
75 TABLET ORAL DAILY
Qty: 90 TABLET | Refills: 3 | Status: SHIPPED | OUTPATIENT
Start: 2024-09-10

## 2024-09-16 ENCOUNTER — TELEPHONE (OUTPATIENT)
Dept: PRIMARY CARE CLINIC | Facility: CLINIC | Age: 74
End: 2024-09-16

## 2024-09-16 ENCOUNTER — TELEPHONE (OUTPATIENT)
Dept: PRIMARY CARE CLINIC | Facility: CLINIC | Age: 74
End: 2024-09-16
Payer: MEDICARE

## 2024-09-16 NOTE — TELEPHONE ENCOUNTER
PC with pt - states she has been going to PT for 4 weeks for sciatica     Saturday pt said she had pain to right lower abd radiating to back     Pain is 6/10    Denies any fever, n/v, diarrhea- no urinary sx

## 2024-09-16 NOTE — TELEPHONE ENCOUNTER
L/M offering an LCSW appt. Await c/b.  Also sent msg to [facility] re: pending referral for DES suarez.

## 2024-09-17 ENCOUNTER — TELEPHONE (OUTPATIENT)
Dept: PRIMARY CARE CLINIC | Facility: CLINIC | Age: 74
End: 2024-09-17
Payer: MEDICARE

## 2024-09-17 NOTE — TELEPHONE ENCOUNTER
PC with pt- appt offered appt for today for abd/back pain    Pt states that abd pain has resolved     States that she goes to PT and thinks pain is muscular     Pt would like to hold off on scheduling appt at this time.     Advised pt to contact the clinic with any concerns.

## 2024-09-23 ENCOUNTER — OFFICE VISIT (OUTPATIENT)
Dept: PHYSICAL MEDICINE AND REHAB | Facility: CLINIC | Age: 74
End: 2024-09-23
Payer: MEDICARE

## 2024-09-23 VITALS — RESPIRATION RATE: 13 BRPM | BODY MASS INDEX: 25.68 KG/M2 | WEIGHT: 154.13 LBS | HEIGHT: 65 IN

## 2024-09-23 DIAGNOSIS — M70.62 GREATER TROCHANTERIC BURSITIS OF BOTH HIPS: Primary | ICD-10-CM

## 2024-09-23 DIAGNOSIS — M70.61 GREATER TROCHANTERIC BURSITIS OF BOTH HIPS: Primary | ICD-10-CM

## 2024-09-23 PROCEDURE — 20610 DRAIN/INJ JOINT/BURSA W/O US: CPT | Mod: S$PBB,50,, | Performed by: PHYSICAL MEDICINE & REHABILITATION

## 2024-09-23 PROCEDURE — 20610 DRAIN/INJ JOINT/BURSA W/O US: CPT | Mod: PBBFAC | Performed by: PHYSICAL MEDICINE & REHABILITATION

## 2024-09-23 PROCEDURE — 99999 PR PBB SHADOW E&M-EST. PATIENT-LVL III: CPT | Mod: PBBFAC,,, | Performed by: PHYSICAL MEDICINE & REHABILITATION

## 2024-09-23 PROCEDURE — 99213 OFFICE O/P EST LOW 20 MIN: CPT | Mod: PBBFAC | Performed by: PHYSICAL MEDICINE & REHABILITATION

## 2024-09-23 PROCEDURE — 99214 OFFICE O/P EST MOD 30 MIN: CPT | Mod: S$PBB,25,, | Performed by: PHYSICAL MEDICINE & REHABILITATION

## 2024-09-23 PROCEDURE — 99999PBSHW PR PBB SHADOW TECHNICAL ONLY FILED TO HB: Mod: PBBFAC,,,

## 2024-09-23 RX ORDER — METHYLPREDNISOLONE ACETATE 40 MG/ML
40 INJECTION, SUSPENSION INTRA-ARTICULAR; INTRALESIONAL; INTRAMUSCULAR; SOFT TISSUE
Status: COMPLETED | OUTPATIENT
Start: 2024-09-23 | End: 2024-09-23

## 2024-09-23 RX ADMIN — METHYLPREDNISOLONE ACETATE 40 MG: 40 INJECTION, SUSPENSION INTRA-ARTICULAR; INTRALESIONAL; INTRAMUSCULAR; SOFT TISSUE at 02:09

## 2024-09-23 NOTE — PROGRESS NOTES
PM&R CLINIC NOTE  Chief Complaint   Patient presents with    Hip Pain       HPI: This is a 74 y.o.  female being seen in clinic today for evaluation of increased hip pain/bursitis-worse when trying to lay on her sides (8/10 pain).  She is improving a lot with PT for her low back, but her bursitis isn't responding.    History obtained from patient    Functional History:  Walking: Not limited  Transfers: Independent  Assistive devices: No  Power mobility: No  Falls: None       Past family, medical, social, and surgical history reviewed in chart    Review of Systems:     General- denies lethargy, weight change, fever, chills  Head/neck- denies swallowing difficulties  ENT- denies hearing changes  Cardiovascular-denies chest pain  Pulmonary- denies shortness of breath  GI- denies constipation or bowel incontinence  - denies bladder incontinence  Skin- denies wounds or rashes  Musculoskeletal- denies weakness, + pain  Neurologic- +/- numbness and tingling  Psychiatric- +depression and anxiety  Lymphatic-denies swelling  Endocrine- denies hypoglycemic symptoms/DM history  All other pertinent systems negative     Physical Examination:  General: Well developed, well nourished female, NAD  HEENT:NCAT EOMI bilaterally   Pulmonary:Normal respirations    Spinal Examination: CERVICAL  Active ROM is within normal limits.  Inspection: No deformity of spinal alignment.      Spinal Examination: LUMBAR or THORACIC  Active ROM is within normal limits except limited in full ext  Inspection: No deformity of spinal alignment.  No palpable olisthesis.  Palpation:very ttp at GT bursas, less ttp at si joints  Slr neg    Bilateral Upper and Lower Extremities:  Pulses are 2+ at radial,  bilaterally.  Shoulder/Elbow/Wrist/Hand ROM   Hip/Knee/Ankle ROM wnl. Bilateral Extremities show normal capillary refill.  No signs of cyanosis, rubor, edema, skin changes, or dysvascular changes of appendages.  Nails appear intact.    Neurological  Exam:  Cranial Nerves:  II-XII grossly intact    Manual Muscle Testing: (Motor 5=normal)  RIGHT Lower extremity: Hip flexion 5/5, Hip Abduction 4/5, Hip Adduction 4+/5, Knee extension 5/5, Knee flexion 5/5, Ankle dorsiflexion 5/5, Extensor hallucis longus 5/5, Ankle plantarflexion 5/5  LEFT Lower extremity:  Hip flexion 5/5, Hip Abduction 4/5,Hip Adduction 4+/5, Knee extension 5/5, Knee flexion 5/5, Ankle dorsiflexion 5/5, Extensor hallucis longus 5/5, Ankle plantarflexion 5/5    No focal atrophy is noted of either upper or lower extremity.    Sensation: tested to light touch  - intact in legs    Gait: Narrow base and good arm swing.      IMPRESSION/PLAN: This is a 74 y.o.  female with lumbar facet arthropathy, GT bursitis-bilateral,chroinc si joint pain, myofascial pain  Discussed in detail for 30 minutes about diagnosis and treatment plan    Cont PT  2. GT bursa injections today  3.  Fu prn, if si joint pain persists, will refer to IPM for injections    Edel Carty M.D.  Physical Medicine and Rehab    ,PROCEDURE NOTE    Diagnosis: Greater Trochanter bursitis  Procedure: Greater Trochanter bursa injection-bilateral    Risks and benefits of procedure explained to patient including risks of infection, bleeding, pain, or damage to surrounding tissues. All questions answered. Informed consent obtained prior to proceeding. Areas marked and prepped in sterile fashion. Using a 25 G 1.25  inch needle, a 5 cc mixture of depomedrol 1cc (40mg)and 1% lidocaine was injected into the greater trochanter bursa. Minimal to no bleeding noted. ER and post injection instructions given.    Edel Carty M.D.

## 2024-10-07 DIAGNOSIS — I10 ESSENTIAL HYPERTENSION: ICD-10-CM

## 2024-10-07 RX ORDER — VALSARTAN 160 MG/1
320 TABLET ORAL DAILY
Qty: 90 TABLET | Refills: 3 | Status: SHIPPED | OUTPATIENT
Start: 2024-10-07

## 2024-10-08 ENCOUNTER — LAB VISIT (OUTPATIENT)
Dept: LAB | Facility: HOSPITAL | Age: 74
End: 2024-10-08
Attending: INTERNAL MEDICINE
Payer: MEDICARE

## 2024-10-08 DIAGNOSIS — E03.9 ACQUIRED HYPOTHYROIDISM: ICD-10-CM

## 2024-10-08 PROCEDURE — 80061 LIPID PANEL: CPT | Performed by: INTERNAL MEDICINE

## 2024-10-08 PROCEDURE — 84443 ASSAY THYROID STIM HORMONE: CPT | Performed by: INTERNAL MEDICINE

## 2024-10-08 PROCEDURE — 36415 COLL VENOUS BLD VENIPUNCTURE: CPT | Mod: PO | Performed by: INTERNAL MEDICINE

## 2024-10-09 LAB
CHOLEST SERPL-MCNC: 223 MG/DL (ref 120–199)
CHOLEST/HDLC SERPL: 3.6 {RATIO} (ref 2–5)
HDLC SERPL-MCNC: 62 MG/DL (ref 40–75)
HDLC SERPL: 27.8 % (ref 20–50)
LDLC SERPL CALC-MCNC: 111 MG/DL (ref 63–159)
NONHDLC SERPL-MCNC: 161 MG/DL
TRIGL SERPL-MCNC: 250 MG/DL (ref 30–150)
TSH SERPL DL<=0.005 MIU/L-ACNC: 0.53 UIU/ML (ref 0.4–4)

## 2024-10-14 ENCOUNTER — OFFICE VISIT (OUTPATIENT)
Dept: RHEUMATOLOGY | Facility: CLINIC | Age: 74
End: 2024-10-14
Payer: MEDICARE

## 2024-10-14 VITALS
HEART RATE: 85 BPM | WEIGHT: 155.63 LBS | BODY MASS INDEX: 25.9 KG/M2 | DIASTOLIC BLOOD PRESSURE: 67 MMHG | SYSTOLIC BLOOD PRESSURE: 139 MMHG

## 2024-10-14 DIAGNOSIS — M05.79 RHEUMATOID ARTHRITIS INVOLVING MULTIPLE SITES WITH POSITIVE RHEUMATOID FACTOR: Primary | ICD-10-CM

## 2024-10-14 DIAGNOSIS — M85.80 OSTEOPENIA WITH HIGH RISK OF FRACTURE: ICD-10-CM

## 2024-10-14 DIAGNOSIS — Z79.899 DRUG-INDUCED IMMUNODEFICIENCY: ICD-10-CM

## 2024-10-14 DIAGNOSIS — D84.821 DRUG-INDUCED IMMUNODEFICIENCY: ICD-10-CM

## 2024-10-14 DIAGNOSIS — M35.01 SJOGREN'S SYNDROME WITH KERATOCONJUNCTIVITIS SICCA: ICD-10-CM

## 2024-10-14 DIAGNOSIS — Z51.81 ENCOUNTER FOR MEDICATION MONITORING: ICD-10-CM

## 2024-10-14 DIAGNOSIS — Z78.0 POST-MENOPAUSAL: ICD-10-CM

## 2024-10-14 DIAGNOSIS — M18.0 ARTHRITIS OF CARPOMETACARPAL (CMC) JOINT OF BOTH THUMBS: ICD-10-CM

## 2024-10-14 DIAGNOSIS — M76.31 ILIOTIBIAL BAND SYNDROME OF RIGHT SIDE: ICD-10-CM

## 2024-10-14 PROCEDURE — 99214 OFFICE O/P EST MOD 30 MIN: CPT | Mod: PBBFAC,PO | Performed by: INTERNAL MEDICINE

## 2024-10-14 PROCEDURE — G2211 COMPLEX E/M VISIT ADD ON: HCPCS | Mod: S$PBB,,, | Performed by: INTERNAL MEDICINE

## 2024-10-14 PROCEDURE — 99215 OFFICE O/P EST HI 40 MIN: CPT | Mod: S$PBB,,, | Performed by: INTERNAL MEDICINE

## 2024-10-14 PROCEDURE — 99999 PR PBB SHADOW E&M-EST. PATIENT-LVL IV: CPT | Mod: PBBFAC,,, | Performed by: INTERNAL MEDICINE

## 2024-10-14 RX ORDER — PREDNISONE 10 MG/1
10 TABLET ORAL 2 TIMES DAILY
COMMUNITY
Start: 2024-07-14

## 2024-10-14 NOTE — PROGRESS NOTES
RHEUMATOLOGY CLINIC FOLLOW UP VISIT  Chief complaints, HPI, ROS, EXAM, Assessment & Plans:-  Leticia Strickland a 74 y.o. pleasant female comes in for follow-up visit.  She follows the Rheumatology Clinic for seropositive rheumatoid and Sjogren's syndrome on Rinvoq therapy.  Reports doing well in terms of rheumatoid without any flares since last visit.  Persistent pain of right lower back from iliotibial band syndrome treated by physical medicine rehab specialist and therapist.  Mild improvement with physical therapy so far.  Rheumatological review of system negative otherwise.  Physical examination shows significant carpometacarpal joint tenderness without any active synovitis.  No synovitis of small joints.  Tenderness of right lower back.    1. Rheumatoid arthritis involving multiple sites with positive rheumatoid factor    2. Sjogren's syndrome with keratoconjunctivitis sicca    3. Arthritis of carpometacarpal (CMC) joint of both thumbs    4. Drug-induced immunodeficiency    5. Encounter for medication monitoring    6. Iliotibial band syndrome of right side    7. Post-menopausal    8. Osteopenia with high risk of fracture      Problem List Items Addressed This Visit       Arthritis of carpometacarpal (CMC) joint of both thumbs    Drug-induced immunodeficiency    Encounter for medication monitoring    Iliotibial band syndrome of right side    Osteopenia with high risk of fracture    Relevant Orders    DXA Bone Density Axial Skeleton 1 or more sites    Rheumatoid arthritis involving multiple sites with positive rheumatoid factor - Primary    Sjogren's syndrome with keratoconjunctivitis sicca     Other Visit Diagnoses       Post-menopausal        Relevant Orders    DXA Bone Density Axial Skeleton 1 or more sites            Labs reviewed today:-     Latest Reference Range & Units 10/08/24 15:13   WBC 3.90 - 12.70 K/uL 5.88   RBC 4.00 - 5.40 M/uL 4.06    Hemoglobin 12.0 - 16.0 g/dL 12.5   Hematocrit 37.0 - 48.5 % 37.6   MCV 82 - 98 fL 93   MCH 27.0 - 31.0 pg 30.8   MCHC 32.0 - 36.0 g/dL 33.2   RDW 11.5 - 14.5 % 11.4 (L)   Platelet Count 150 - 450 K/uL 357   MPV 9.2 - 12.9 fL 9.6   Gran % 38.0 - 73.0 % 66.0   Lymph % 18.0 - 48.0 % 19.6   Mono % 4.0 - 15.0 % 10.5   Eos % 0.0 - 8.0 % 2.4   Basophil % 0.0 - 1.9 % 1.0   Immature Granulocytes 0.0 - 0.5 % 0.5   Gran # (ANC) 1.8 - 7.7 K/uL 3.9   Lymph # 1.0 - 4.8 K/uL 1.2   Mono # 0.3 - 1.0 K/uL 0.6   Eos # 0.0 - 0.5 K/uL 0.1   Baso # 0.00 - 0.20 K/uL 0.06   Immature Grans (Abs) 0.00 - 0.04 K/uL 0.03   nRBC 0 /100 WBC 0   Differential Method  Automated   Sed Rate 0 - 20 mm/Hr 27 (H)   Sodium 136 - 145 mmol/L 139   Potassium 3.5 - 5.1 mmol/L 3.9   Chloride 95 - 110 mmol/L 98   CO2 23 - 29 mmol/L 30 (H)   Anion Gap 8 - 16 mmol/L 11   BUN 8 - 23 mg/dL 23   Creatinine 0.5 - 1.4 mg/dL 0.9   eGFR >60 mL/min/1.73 m^2 >60   Glucose 70 - 110 mg/dL 112 (H)   Calcium 8.7 - 10.5 mg/dL 10.1   ALP 55 - 135 U/L 58   PROTEIN TOTAL 6.0 - 8.4 g/dL 7.7   Albumin 3.5 - 5.2 g/dL 4.2   BILIRUBIN TOTAL 0.1 - 1.0 mg/dL 0.3   AST 10 - 40 U/L 22   ALT 10 - 44 U/L 21   CRP 0.0 - 8.2 mg/L 6.8   Cholesterol Total 120 - 199 mg/dL 223 (H)   HDL 40 - 75 mg/dL 62   HDL/Cholesterol Ratio 20.0 - 50.0 % 27.8   Non-HDL Cholesterol mg/dL 161   Total Cholesterol/HDL Ratio 2.0 - 5.0  3.6   Triglycerides 30 - 150 mg/dL 250 (H)   LDL Cholesterol 63.0 - 159.0 mg/dL 111.0   TSH 0.400 - 4.000 uIU/mL 0.532   (L): Data is abnormally low  (H): Data is abnormally high     Latest Reference Range & Units Most Recent   GIOVANNA Neg <1:160  Negative  8/25/10 10:35   GIOVANNA Screen Negative <1:80  Positive !  7/26/23 11:37   GIOVANNA HEP-2 Titer  Positive 1:160 Speckled  3/5/19 13:40   GIOVANNA Titer 1  1:80  7/26/23 11:37   GIOVANNA PATTERN 1  Homogeneous  7/26/23 11:37   ds DNA Ab Negative 1:10  Negative 1:10  7/26/23 11:37   Anti-SSA Antibody 0.00 - 0.99 Ratio 2.15 (H)  7/26/23 11:37   Anti-SSA  Interpretation Negative  Positive !  7/26/23 11:37   Anti-SSB Antibody 0.00 - 0.99 Ratio 2.96 (H)  7/26/23 11:37   Anti-SSB Interpretation Negative  Positive !  7/26/23 11:37   Anti Sm Antibody 0.00 - 0.99 Ratio 0.09  7/26/23 11:37   Anti-Sm Interpretation Negative  Negative  7/26/23 11:37   Anti Sm/RNP Antibody 0.00 - 0.99 Ratio 0.11  7/26/23 11:37   Anti-Sm/RNP Interpretation Negative  Negative  7/26/23 11:37   GIOVANNA Hep-2 Pattern  Speckled !  2/12/08 10:40   Smooth Muscle Ab Negative  Negative 1:40  1/15/20 16:41   Anti-Histone Antibody 0 - 1.0 U 0.7  2/12/08 10:40   Anti-Mitochon Ab IFA Negative  Negative 1:40  1/15/20 16:41   Antigliadin Ab IgG 0 - 9.9 U/mL Less than 10.0  10/22/09 10:23   Antigliadin Abs, IgA 0 - 4.9 U/mL Less than 5.0  10/22/09 10:23   Endomysial Abs, IgA  Negative  10/22/09 10:23   TTG IgA 0 - 10.0 U/mL <1.2  10/22/09 10:23   TTG IgG 0 - 9.0 U/mL <1.2  10/22/09 10:23   Cytoplasmic Neutrophilic Ab <1:20 Titer <1:20  5/27/15 15:23   Perinuclear (P-ANCA) <1:20 Titer <1:20  5/27/15 15:23   CCP Antibodies <5.0 U/mL 21.6 !  2/10/09 10:14   Interpretation  Comment  7/14/17 13:30   Complement (C-3) 50 - 180 mg/dL 98  5/27/15 15:23   Complement (C-4) 11 - 44 mg/dL 22  5/27/15 15:23   IgG 650 - 1600 mg/dL 896  11/6/19 16:46   IgM 50 - 300 mg/dL 134  11/6/19 16:46   IgA 40 - 350 mg/dL 300  11/6/19 16:46   IgE 0 - 100 IU/mL <35  11/6/19 16:46   Rheumatoid Factor 0 - 15 IU/ml Negative  2/10/09 10:14   !: Data is abnormal  (H): Data is abnormally high      Persistent right lower back pain with iliotibial syndrome.  Continue follow-up with physical therapy physical medicine rehab specialist.  Stable seropositive nonerosive rheumatoid on Rinvoq.  Continue.  Stable Sjogren's syndrome.  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 Monitor carefully for infections. Advised to get immediate medical care if any infection. Also advised strict adherence to age appropriate vaccinations and  cancer screenings with PCP.  Hold Rinvoq any infection.  Osteopenia with high fracture risk treated with 1 dose of Reclast by my associate several years ago.  Recent DEXA showed stable results.  Repeat DEXA scan next visit.  I have explained all of the above in detail and the patient understands all of the current recommendation(s). I have answered all questions to the best of my ability and to their complete satisfaction.     # Follow up in about 6 months (around 4/14/2025).      Disclaimer: This note was prepared using voice recognition system and is likely to have sound alike errors and is not proof read.  Please call me with any questions.

## 2024-10-14 NOTE — PATIENT INSTRUCTIONS
Read Fiber Fueled and The Immune System Recover Plan ( books) .   Dr. Andrew Weil's anti-inflammatory diet.

## 2024-10-31 ENCOUNTER — TELEPHONE (OUTPATIENT)
Dept: PRIMARY CARE CLINIC | Facility: CLINIC | Age: 74
End: 2024-10-31
Payer: MEDICARE

## 2024-10-31 ENCOUNTER — PATIENT MESSAGE (OUTPATIENT)
Dept: PRIMARY CARE CLINIC | Facility: CLINIC | Age: 74
End: 2024-10-31
Payer: MEDICARE

## 2024-10-31 ENCOUNTER — PATIENT MESSAGE (OUTPATIENT)
Dept: PHYSICAL MEDICINE AND REHAB | Facility: CLINIC | Age: 74
End: 2024-10-31
Payer: MEDICARE

## 2024-10-31 DIAGNOSIS — E03.9 ACQUIRED HYPOTHYROIDISM: Primary | ICD-10-CM

## 2024-10-31 RX ORDER — METHYLPREDNISOLONE 4 MG/1
TABLET ORAL
Qty: 1 EACH | Refills: 0 | Status: SHIPPED | OUTPATIENT
Start: 2024-10-31 | End: 2024-11-21

## 2024-11-01 ENCOUNTER — LAB VISIT (OUTPATIENT)
Dept: LAB | Facility: HOSPITAL | Age: 74
End: 2024-11-01
Attending: INTERNAL MEDICINE
Payer: MEDICARE

## 2024-11-01 DIAGNOSIS — E03.9 ACQUIRED HYPOTHYROIDISM: ICD-10-CM

## 2024-11-01 PROCEDURE — 84439 ASSAY OF FREE THYROXINE: CPT | Performed by: INTERNAL MEDICINE

## 2024-11-01 PROCEDURE — 36415 COLL VENOUS BLD VENIPUNCTURE: CPT | Mod: PO | Performed by: INTERNAL MEDICINE

## 2024-11-01 PROCEDURE — 84443 ASSAY THYROID STIM HORMONE: CPT | Performed by: INTERNAL MEDICINE

## 2024-11-02 LAB
T4 FREE SERPL-MCNC: 1.28 NG/DL (ref 0.71–1.51)
TSH SERPL DL<=0.005 MIU/L-ACNC: 0.21 UIU/ML (ref 0.4–4)

## 2024-11-04 ENCOUNTER — OFFICE VISIT (OUTPATIENT)
Dept: PRIMARY CARE CLINIC | Facility: CLINIC | Age: 74
End: 2024-11-04
Payer: MEDICARE

## 2024-11-04 VITALS
RESPIRATION RATE: 20 BRPM | WEIGHT: 155.56 LBS | BODY MASS INDEX: 25.92 KG/M2 | TEMPERATURE: 99 F | HEIGHT: 65 IN | OXYGEN SATURATION: 96 % | SYSTOLIC BLOOD PRESSURE: 132 MMHG | DIASTOLIC BLOOD PRESSURE: 76 MMHG | HEART RATE: 93 BPM

## 2024-11-04 DIAGNOSIS — I10 ESSENTIAL HYPERTENSION: ICD-10-CM

## 2024-11-04 DIAGNOSIS — E78.1 HYPERTRIGLYCERIDEMIA: ICD-10-CM

## 2024-11-04 DIAGNOSIS — M05.79 RHEUMATOID ARTHRITIS INVOLVING MULTIPLE SITES WITH POSITIVE RHEUMATOID FACTOR: ICD-10-CM

## 2024-11-04 DIAGNOSIS — E03.9 ACQUIRED HYPOTHYROIDISM: Primary | ICD-10-CM

## 2024-11-04 PROCEDURE — 99999 PR PBB SHADOW E&M-EST. PATIENT-LVL V: CPT | Mod: PBBFAC,,,

## 2024-11-04 PROCEDURE — 99215 OFFICE O/P EST HI 40 MIN: CPT | Mod: PBBFAC,PN | Performed by: FAMILY MEDICINE

## 2024-11-04 RX ORDER — ICOSAPENT ETHYL 1 G/1
2 CAPSULE ORAL 2 TIMES DAILY
Qty: 120 CAPSULE | Refills: 0 | Status: SHIPPED | OUTPATIENT
Start: 2024-11-04 | End: 2024-12-04

## 2024-11-04 RX ORDER — LEVOTHYROXINE SODIUM 100 UG/1
100 TABLET ORAL
Qty: 30 TABLET | Refills: 11 | Status: SHIPPED | OUTPATIENT
Start: 2024-11-04 | End: 2025-11-04

## 2024-11-04 NOTE — PROGRESS NOTES
Primary Care Provider Appointment   [facility], [place]    Patient ID: Leticia Piña is a 74 y.o. female.    Patient Care Team:  Nicole Jasmine MD as PCP - General (Internal Medicine)      ASSESSMENT/PLAN by Problem List:    1. Acquired hypothyroidism  Overview:  Lab Results   Component Value Date    TSH 0.206 (L) 11/01/2024       Latest Reference Range & Units 11/14/23 11:18 10/08/24 15:13   TSH 0.400 - 4.000 uIU/mL 0.757 0.532       Assessment & Plan:  She has been taking 1 1/2  tablets of 75 mcg(112 1/2 mcg) levothyroxine over the last 2 months, since she has been feeling tired.  Most recent TSH is 0.206 with T4 of 1.2.  Advised her to start on 100 mcg cutting tomorrow instead of 112.5 mcg.  Recheck level in 4 months    Orders:  -     levothyroxine (SYNTHROID) 100 MCG tablet; Take 1 tablet (100 mcg total) by mouth before breakfast.  Dispense: 30 tablet; Refill: 11  -     TSH; Future; Expected date: 11/04/2024  -     T4, Free; Future; Expected date: 11/04/2024  -     T3, Free; Future; Expected date: 11/04/2024    2. Hypertriglyceridemia  -     icosapent ethyL (VASCEPA) 1 gram Cap; Take 2 capsules (2 g total) by mouth 2 (two) times a day.  Dispense: 120 capsule; Refill: 0  -     Lipid Panel; Future    3. Mixed hyperlipidemia  Overview:  Lab Results   Component Value Date    CHOL 223 (H) 10/08/2024    CHOL 227 (H) 01/18/2024    CHOL 197 12/12/2023     Lab Results   Component Value Date    HDL 62 10/08/2024    HDL 61 01/18/2024    HDL 60 12/12/2023     Lab Results   Component Value Date    LDLCALC 111.0 10/08/2024    LDLCALC 109.0 01/18/2024    LDLCALC 103.4 12/12/2023     Lab Results   Component Value Date    TRIG 250 (H) 10/08/2024    TRIG 285 (H) 01/18/2024    TRIG 168 (H) 12/12/2023       Lab Results   Component Value Date    CHOLHDL 27.8 10/08/2024    CHOLHDL 26.9 01/18/2024    CHOLHDL 30.5 12/12/2023        Assessment & Plan:  Added Icosopent ethyl( Vascepa) 2 gms  twice a day.  Cut down on sweets, desserts and high starchy foods  Prescription given for 1 month if tolerating , prescribed three-month supply. If any side effects or other problems, contact our office,      4. Essential hypertension  Overview:  BP Readings from Last 3 Encounters:   11/04/24 132/76   10/14/24 139/67   08/02/24 126/78        Assessment & Plan:  Well controlled, continue valsartan 160 mg daily a.m..      5. Rheumatoid arthritis involving multiple sites with positive rheumatoid factor  Assessment & Plan:  Well controlled on the current medications per rheumatologist, continue the same             No follow-ups on file.     Health Maintenance         Date Due Completion Date    RSV Vaccine (Age 60+ and Pregnant patients) (1 - Risk 60-74 years 1-dose series) Never done ---    COVID-19 Vaccine (3 - Pfizer risk series) 04/12/2021 3/15/2021    Influenza Vaccine (1) 09/01/2024 10/19/2023    Mammogram 02/20/2025 2/20/2024    Override on 9/13/2012: Done    Colorectal Cancer Screening 03/07/2026 3/7/2023    DEXA Scan 03/23/2026 3/23/2023    Override on 12/13/2011: Done (Recheck in 3 years)    Lipid Panel 10/08/2029 10/8/2024    TETANUS VACCINE 06/21/2031 6/21/2021            Worry score 2    Advance Care Planning     Date: 11/04/2024               Subjective:     Chief Complaint   Patient presents with    Results     I have reviewed the information entered by the ancillary staff regarding the chief complaint as well as the related history.    HPI   History of Present Illness    CHIEF COMPLAINT:  Leticia presents today for follow-up on various health issues.  ENDOCRINE:  She is currently taking levothyroxine for thyroid management. Her recent TSH level was 0.206, slightly below the normal range. She reports feeling sluggish and needing to take naps during the day, attributing these symptoms to possible thyroid issues. In response, she independently increased her levothyroxine dose to 112.5 mcg without prior  medical consultation.  CARDIOVASCULAR:  She reports a history of elevated triglycerides, with recent values ranging from 125 to 285. She has been taking fish oil supplements for years but recently discontinued use due to uncertainty about its effectiveness and a desire to reduce her overall pill burden. She denies ever taking any other cholesterol-lowering medications. She acknowledges that her total cholesterol has also been increasing and indicates recent dietary changes may have contributed to her cholesterol concerns.  MUSCULOSKELETAL:  She reports attending physical therapy for IT band and SI joint issues. Initial treatments were not very effective, but recent sessions have shown improvement. She had a physical therapy session this morning and reports feeling significant benefit already, expressing hope that her condition is improving.  HEMATOLOGY:  She has a history of iron deficiency and has received iron infusions in the past. She has an upcoming appointment with her hematologist for further evaluation. Extensive testing was performed, but no definitive cause for her iron deficiency was identified. Her healthcare providers have suggested a possible autoimmune etiology.        MEDICATIONS:  Current medications include valsartan, retina cream (nightly for face), trazodone (nightly), hydrochlorothiazide, folic acid, estradiol, Cymbalta 60 mg, vitamin D3, Wellbutrin (300 mg and 150 mg simultaneously), and Tessalon Perles (as needed for cough). Ativan is taken as needed, but she has not used it in weeks. She has been newly prescribed prednisone but has not started taking it yet. Her levothyroxine dosage is being adjusted to 100 mcg.    MEDICAL HISTORY:  She has a history of osteopenia, though her last bone density scan was good. She also has a history of rheumatoid arthritis. She denies having asthma but reports a history of bronchitis in the past that required treatment.          Patient is a/an 74 y.o.  female    For complete problem list, past medical history, surgical history, social history, etc., see appropriate section in the electronic medical record    Review of Systems   Constitutional: Negative fever/chills/wt loss     HENT: Negative for URI/ lymph nodes / thyroid issues.     Eyes: Negative eye pain/blurred vision    Respiratory: Negative CP/SOB/wheezing   Cardiovascular:  Negative CP/palp/orthopnea/LE   Gastrointestinal:  Negative abd.pain/D/C/heartburn  Musculoskeletal: Right sided back pain extending down the lateral thigh   Skin: Negative rashes,itching/bruises.    Neurological:  Negative LOC/ HA /sens & motor changes/ seizures   Psychiatric/Behavioral: stable depression and anxiety    ROS     Recent Fall:  []Yes  [x]No  Activity:  []Vigorous [x]Moderate []Sedentary  Appetite:  [x]Good  []Fair  []Poor  Mood: [x]Stable []Anxious []Depressed   Insomnia: []Yes  [x]No      Objective       Constitutional:       Appearance: Normal appearance. She is normal weight.   HENT:      Head: Normocephalic and atraumatic.      Nose: Nose normal.   Eyes:      Extraocular Movements: Extraocular movements intact.      Conjunctiva/sclera: Conjunctivae normal.      Pupils: Pupils are equal, round, and reactive to light.   Cardiovascular:      Rate and Rhythm: Normal rate and regular rhythm.      Pulses: Normal pulses.   Pulmonary:      Effort: Pulmonary effort is normal.      Breath sounds: Normal breath sounds.   Abdominal:      General: Bowel sounds are normal.      Palpations: Abdomen is soft.   Musculoskeletal:         General: Normal range of motion, normal gait.     Skin:     General: Skin is warm and dry.   Neurological:      Mental Status: She is alert and oriented to person, place, and time. Mental status is at baseline.   Psychiatric:         Mood and Affect: Mood normal.         Behavior: Behavior normal.       Vitals:    11/04/24 1318   BP: 132/76   BP Location: Left arm   Patient Position: Sitting   Pulse: 93  "  Resp: 20   Temp: 98.8 °F (37.1 °C)   TempSrc: Oral   SpO2: 96%   Weight: 70.5 kg (155 lb 8.6 oz)   Height: 5' 5" (1.651 m)     Weight: 70.5 kg (155 lb 8.6 oz)  BP Readings from Last 3 Encounters:   11/04/24 132/76   10/14/24 139/67   08/02/24 126/78       Wt Readings from Last 3 Encounters:   11/04/24 70.5 kg (155 lb 8.6 oz)   10/14/24 70.6 kg (155 lb 10.3 oz)   09/23/24 69.9 kg (154 lb 1.6 oz)     Body mass index is 25.88 kg/m².    Physical Exam     Lab Results   Component Value Date    WBC 5.88 10/08/2024    HGB 12.5 10/08/2024    HCT 37.6 10/08/2024     10/08/2024    CHOL 223 (H) 10/08/2024    TRIG 250 (H) 10/08/2024    HDL 62 10/08/2024    ALT 21 10/08/2024    AST 22 10/08/2024     10/08/2024    K 3.9 10/08/2024    CL 98 10/08/2024    CREATININE 0.9 10/08/2024    BUN 23 10/08/2024    CO2 30 (H) 10/08/2024    TSH 0.206 (L) 11/01/2024    INR 0.9 01/15/2020       This note was generated with the assistance of ambient listening technology. Verbal consent was obtained by the patient and accompanying visitor(s) for the recording of patient appointment to facilitate this note. I attest to having reviewed and edited the generated note for accuracy, though some syntax or spelling errors may persist. Please contact the author of this note for any clarification.     THIS DOCUMENT WAS MADE IN PART WITH VOICE RECOGNITION SOFTWARE.  OCCASIONALLY THIS SOFTWARE WILL MISINTERPRET WORDS OR PHRASES.  "

## 2024-11-04 NOTE — ASSESSMENT & PLAN NOTE
She has been taking 1 1/2  tablets of 75 mcg(112 1/2 mcg) levothyroxine over the last 2 months, since she has been feeling tired.  Most recent TSH is 0.206 with T4 of 1.2.  Advised her to start on 100 mcg cutting tomorrow instead of 112.5 mcg.  Recheck level in 4 months

## 2024-11-04 NOTE — ASSESSMENT & PLAN NOTE
Added Icosopent ethyl( Vascepa) 2 gms twice a day.  Cut down on sweets, desserts and high starchy foods  Prescription given for 1 month if tolerating , prescribed three-month supply. If any side effects or other problems, contact our office,

## 2024-11-04 NOTE — PATIENT INSTRUCTIONS
Take the new medication to lower the triglycerides  Decreased the dose of Levothyroxine from 112 mg to 100 mcg   Blood work prior to your next appt  Take your routine medications as prescribed and if any side effects occur from any of the given medications, please call us. If you are given any new medications, make sure to read the side effects to be aware of the possible side effects.  If your current problems and the symptoms get worse, contact our clinic.  If you have scheduled visits with your primary care office or the specialists, please follow up of  with them as scheduled.

## 2024-11-07 ENCOUNTER — PATIENT MESSAGE (OUTPATIENT)
Dept: PRIMARY CARE CLINIC | Facility: CLINIC | Age: 74
End: 2024-11-07
Payer: MEDICARE

## 2024-11-07 ENCOUNTER — LAB VISIT (OUTPATIENT)
Dept: LAB | Facility: HOSPITAL | Age: 74
End: 2024-11-07
Attending: NURSE PRACTITIONER
Payer: MEDICARE

## 2024-11-07 DIAGNOSIS — D50.8 ACQUIRED IRON DEFICIENCY ANEMIA DUE TO DECREASED ABSORPTION: ICD-10-CM

## 2024-11-07 DIAGNOSIS — Z86.39 HISTORY OF IRON DEFICIENCY: ICD-10-CM

## 2024-11-07 DIAGNOSIS — E03.9 ACQUIRED HYPOTHYROIDISM: Primary | ICD-10-CM

## 2024-11-07 LAB
BASOPHILS # BLD AUTO: 0.07 K/UL (ref 0–0.2)
BASOPHILS NFR BLD: 1.3 % (ref 0–1.9)
DIFFERENTIAL METHOD BLD: ABNORMAL
EOSINOPHIL # BLD AUTO: 0.1 K/UL (ref 0–0.5)
EOSINOPHIL NFR BLD: 2.3 % (ref 0–8)
ERYTHROCYTE [DISTWIDTH] IN BLOOD BY AUTOMATED COUNT: 11.5 % (ref 11.5–14.5)
FERRITIN SERPL-MCNC: 218 NG/ML (ref 20–300)
HCT VFR BLD AUTO: 36.6 % (ref 37–48.5)
HGB BLD-MCNC: 12.2 G/DL (ref 12–16)
IMM GRANULOCYTES # BLD AUTO: 0.01 K/UL (ref 0–0.04)
IMM GRANULOCYTES NFR BLD AUTO: 0.2 % (ref 0–0.5)
LYMPHOCYTES # BLD AUTO: 1.2 K/UL (ref 1–4.8)
LYMPHOCYTES NFR BLD: 21.1 % (ref 18–48)
MCH RBC QN AUTO: 30.5 PG (ref 27–31)
MCHC RBC AUTO-ENTMCNC: 33.3 G/DL (ref 32–36)
MCV RBC AUTO: 92 FL (ref 82–98)
MONOCYTES # BLD AUTO: 0.5 K/UL (ref 0.3–1)
MONOCYTES NFR BLD: 8.9 % (ref 4–15)
NEUTROPHILS # BLD AUTO: 3.7 K/UL (ref 1.8–7.7)
NEUTROPHILS NFR BLD: 66.2 % (ref 38–73)
NRBC BLD-RTO: 0 /100 WBC
PLATELET # BLD AUTO: 351 K/UL (ref 150–450)
PMV BLD AUTO: 9.8 FL (ref 9.2–12.9)
RBC # BLD AUTO: 4 M/UL (ref 4–5.4)
WBC # BLD AUTO: 5.59 K/UL (ref 3.9–12.7)

## 2024-11-07 PROCEDURE — 85025 COMPLETE CBC W/AUTO DIFF WBC: CPT | Performed by: NURSE PRACTITIONER

## 2024-11-07 PROCEDURE — 83540 ASSAY OF IRON: CPT | Performed by: NURSE PRACTITIONER

## 2024-11-07 PROCEDURE — 82728 ASSAY OF FERRITIN: CPT | Performed by: NURSE PRACTITIONER

## 2024-11-07 PROCEDURE — 36415 COLL VENOUS BLD VENIPUNCTURE: CPT | Mod: PO | Performed by: NURSE PRACTITIONER

## 2024-11-14 ENCOUNTER — OFFICE VISIT (OUTPATIENT)
Dept: HEMATOLOGY/ONCOLOGY | Facility: CLINIC | Age: 74
End: 2024-11-14
Payer: MEDICARE

## 2024-11-14 VITALS
HEART RATE: 90 BPM | BODY MASS INDEX: 26.08 KG/M2 | WEIGHT: 156.5 LBS | HEIGHT: 65 IN | SYSTOLIC BLOOD PRESSURE: 137 MMHG | DIASTOLIC BLOOD PRESSURE: 74 MMHG

## 2024-11-14 DIAGNOSIS — Z86.2 HISTORY OF IRON DEFICIENCY ANEMIA: Primary | ICD-10-CM

## 2024-11-14 DIAGNOSIS — M05.79 RHEUMATOID ARTHRITIS INVOLVING MULTIPLE SITES WITH POSITIVE RHEUMATOID FACTOR: ICD-10-CM

## 2024-11-14 DIAGNOSIS — M35.01 SJOGREN'S SYNDROME WITH KERATOCONJUNCTIVITIS SICCA: ICD-10-CM

## 2024-11-14 DIAGNOSIS — R53.83 FATIGUE, UNSPECIFIED TYPE: ICD-10-CM

## 2024-11-14 PROCEDURE — 99213 OFFICE O/P EST LOW 20 MIN: CPT | Mod: PBBFAC,PO | Performed by: NURSE PRACTITIONER

## 2024-11-14 PROCEDURE — 99999 PR PBB SHADOW E&M-EST. PATIENT-LVL III: CPT | Mod: PBBFAC,,, | Performed by: NURSE PRACTITIONER

## 2024-11-14 NOTE — PROGRESS NOTES
Subjective:      Patient ID: Leticia Piña is a 74 y.o. female.    Chief Complaint: chronic fatigue    HPI:  73 y/o female who presents today for f/u of her longstanding chronic on/off anemia for several years.  She states that she takes folic acid and B12 daily.  Denies any previous diagnosis of B12 of folic acid deficiency.  She has had on/off iron deficiency noted with history of IV Injectafer infusion 3/2020 and then again on 2/2021 she received 1 dose Feraheme due to recurrent ID.      She had colonoscopy in 2011 - no polyps found.  Up to date on mammogram and pap.     She complains of acid reflux symptoms for years.  She also has Sjogren's disease and cutaneous lupus erythematous, RA followed by rheumatology.       Most recent hgb 12.3 g/dL and iron is repleated.  Had ERCP done with Dr. Madrigal with stent placement and pathology showing Chronic cholecystitis with cholelithiasis and Rokitansky-Aschoff sinuses   Negative for atypia or malignancy      Had emergency gallbladder surgery removal on 3/26/2020 with noted elevated LFTs with improvement in symptoms post surgery.     Interval History:    9/1/2022 Will need to repeat labs today to assess for recurrent Iron deficiency anemia.   6/21/2021 ERCP Biliary stent removed with biliary sludge and a stone removed.   Is currently being treated with Rinvoq once daily for treatment of RA and is tolerating well.  Denies any s/s of infection.   C/o fatigue.  Denies any abnormal bleeding.  Is not currently taking oral iron daily. Denies f/c/ns or unintentional weight loss.  Denies abnormal lymphadenopathy.      11/3/2022 Presents today to evaluate response of IV Feraheme treatments x 2.  Last received on 9/21/2022.  Awaiting iron and tibc lab results.   Continues with chronic fatigue. Denies any abnormal bleeding.  Continues to take B12 and folate daily. is a 72 year old female who presents today for follow up of her longstanding chronic on/off anemia for several  years.  She states that she takes folic acid and B12 daily.  Denies any previous diagnosis of B12 of folic acid deficiency.  She has had on/off iron deficiency noted with history of IV Injectafer infusion 3/2020 and then again on 2/2021 she received 1 dose Feraheme due to recurrent ID.      She had colonoscopy in 2011 - no polyps found.  Up to date on mammogram and pap.     She complains of acid reflux symptoms for years.  She also has Sjogren's disease and cutaneous lupus erythematous, RA followed by rheumatology.       Most recent hgb 12.3 g/dL and iron is repleated.  Had ERCP done with Dr. Madrigal with stent placement and pathology showing Chronic cholecystitis with cholelithiasis and Rokitansky-Aschoff sinuses   Negative for atypia or malignancy      Had emergency gallbladder surgery removal on 3/26/2020 with noted elevated LFTs with improvement in symptoms post surgery.     Interval History:    9/1/2022 Will need to repeat labs today to assess for recurrent Iron deficiency anemia.   6/21/2021 ERCP Biliary stent removed with biliary sludge and a stone removed.   Is currently being treated with Rinvoq once daily for treatment of RA and is tolerating well.  Denies any s/s of infection.   C/o fatigue.  Denies any abnormal bleeding.  Is not currently taking oral iron daily. Denies f/c/ns or unintentional weight loss.  Denies abnormal lymphadenopathy.      11/3/2022 Presents today to evaluate response of IV Feraheme treatments x 2.  Last received on 9/21/2022.  Awaiting iron and tibc lab results.   Continues with chronic fatigue. Denies any abnormal bleeding.  Continues to take B12 and folate daily.      12/12/2022  EGD scheduled 1/24/2022.  Continues to take B12 and folate daily. Denies any GI symptoms or abnormal blood loss.  Continues treatment for RA - Rinvoqu and plaqunenil.  Currently not taking oral iron.  Has taking oral iron in the past and is unable to tolerate due to constipation     2/6/2023 Last dose  of Feraheme on 12/27/2022 and has been evaluated with EGD.  Presents today to review response. Currently with normocytic anemia- hgb 11.3, mcv 91 iron repleated.  EGDNormal duodenal bulb and second portion of the duodenum.   - Gastritis.  3 cm hiatal hernia. Z-line irregular, 35 cm from the incisors. Normal upper third of esophagus and middle third of esophagus.   Pathology: A.   SMALL BOWEL, BIOPSY:   - Fragments of benign small bowel mucosa, without diagnostic abnormality.   B.   STOMACH, BIOPSY:   - Mild inactive chronic gastritis with reactive mucosal changes and focal intestinal metaplasia.   - No Helicobacter pylori organisms identified by *[facility].   - Negative for malignancy.   C.   GASTROESOPHAGEAL JUNCTION BIOPSY:   - Fragments of benign reactive squamous and gastric-type mucosa with rare   intraepithelial eosinophils and neutrophils and mild chronic active   inflammation, consistent with reflux.   - No intestinal metaplasia identified.   - Negative for dysplasia or malignancy.   COMMENT:   A). Sections show fragments of benign small bowel mucosa with normal villous   architecture. No villous atrophy, crypt hyperplasia, increased   intraepithelial lymphocytes or evidence of celiac sprue identified.   PPI increased from once per day to twice per day.      Interval History:  5/26/2023 Not currently taking oral iron tablets.  Currently taking B12 and folic acid daily.  States that she was diagnosed at the beginning of the year with sleep apnea.  Uses dental device that was made by her dentist that helps her breath right.  RA is currently control and is being followed by rheumatology.  She has no complaints today.  Denies any abnormal bleeding.      Interval History:  11/20/2023  States has been feeling fatigued over the last couple of days with noted slightly low hgb and elevated tibc.  Denies any abnormal bleeding.       Interval History:  4/1/2024  Received Feraheme infusion x 1 on 12/12/2023  presents today to  evaluate response and assess for need for additional IV iron due to ALISSA recurrence  with RA. Has chronic fatigue.  Denies any known abnormal bleeding.     Interval History:  7/8/2024 Presents today to evaluate response of IV iron therapy.  Received 2 doses of IV Feraheme with last dose received on 4/29/2024.  Denies any known abnormal bleeding.  States that her RA is controlled; however she is having issues with pain in her big toe and has messaged her rheumatologists for recommendations.  Currently without anemia or iron deficiency.      Interval History:  11/14/2024 Presents today for follow up. She has been doing well overall. Last IV iron was 4/2024. Continues to struggle with chronic fatigue and recently started seeing a physical therapist for hip pain, which she states is helping. Pt continues taking folate, B12, and Vitamin D. Follows routinely with PCP and rheumatology. Currently without anemia or iron deficiency.     I have reviewed all of the patient's relevant lab work available in the medical record and have utilized this in my evaluation and management recommendations today.      Social History     Socioeconomic History    Marital status:    Tobacco Use    Smoking status: Never    Smokeless tobacco: Never   Substance and Sexual Activity    Alcohol use: Not Currently     Comment: socially     Drug use: No    Sexual activity: Not Currently     Social Drivers of Health     Financial Resource Strain: Low Risk  (7/8/2024)    Overall Financial Resource Strain (CARDIA)     Difficulty of Paying Living Expenses: Not very hard   Food Insecurity: No Food Insecurity (7/8/2024)    Hunger Vital Sign     Worried About Running Out of Food in the Last Year: Never true     Ran Out of Food in the Last Year: Never true   Transportation Needs: No Transportation Needs (9/2/2023)    PRAPARE - Transportation     Lack of Transportation (Medical): No     Lack of Transportation (Non-Medical): No   Physical Activity:  Sufficiently Active (7/8/2024)    Exercise Vital Sign     Days of Exercise per Week: 3 days     Minutes of Exercise per Session: 90 min   Stress: No Stress Concern Present (7/8/2024)    [employer] - Occupational Stress Questionnaire     Feeling of Stress : Not at all   Housing Stability: Low Risk  (9/2/2023)    Housing Stability Vital Sign     Unable to Pay for Housing in the Last Year: No     Number of Places Lived in the Last Year: 1     Unstable Housing in the Last Year: No       Family History   Problem Relation Name Age of Onset    Heart disease Mother      Heart disease Father         Past Surgical History:   Procedure Laterality Date    AUGMENTATION OF BREAST      breast implants      breast surgery for rupture      COLONOSCOPY N/A 3/7/2023    Procedure: COLONOSCOPY;  Surgeon: Susan Escobedo MD;  Location: [facility];  Service: Endoscopy;  Laterality: N/A;    ERCP N/A 03/25/2021    Procedure: ERCP (ENDOSCOPIC RETROGRADE CHOLANGIOPANCREATOGRAPHY);  Surgeon: Preston Madrigal MD;  Location: [facility];  Service: Endoscopy;  Laterality: N/A;    ERCP N/A 06/21/2021    Procedure: ERCP (ENDOSCOPIC RETROGRADE CHOLANGIOPANCREATOGRAPHY);  Surgeon: Preston Madrigal MD;  Location: [facility];  Service: Endoscopy;  Laterality: N/A;    ESOPHAGOGASTRODUODENOSCOPY N/A 01/24/2023    Procedure: ESOPHAGOGASTRODUODENOSCOPY (EGD);  Surgeon: Susan Escobedo MD;  Location: [facility];  Service: Endoscopy;  Laterality: N/A;    LAPAROSCOPIC CHOLECYSTECTOMY N/A 03/26/2021    Procedure: CHOLECYSTECTOMY, LAPAROSCOPIC;  Surgeon: Jose Blue MD;  Location: [facility];  Service: General;  Laterality: N/A;    TONSILLECTOMY, ADENOIDECTOMY      TUBAL LIGATION         Past Medical History:   Diagnosis Date    Allergic rhinitis     Cutaneous lupus erythematosus     drug induced.    Essential hypertension 02/05/2019    GERD (gastroesophageal reflux disease)     Hypothyroid     Insomnia      Mixed anxiety and depressive disorder     Nonrheumatic aortic valve insufficiency 9/4/2023    Nonrheumatic mitral valve regurgitation 9/4/2023    Normal cardiac stress test     11/07    Pulmonary hypertension 9/4/2023    Rheumatoid arthritis(714.0)     Sjogren's syndrome        Review of Systems   Constitutional:  Positive for fatigue. Negative for appetite change and unexpected weight change.   HENT:  Negative for mouth sores.    Eyes:  Negative for visual disturbance.   Respiratory:  Negative for cough and shortness of breath.    Cardiovascular:  Negative for chest pain.   Gastrointestinal:  Negative for abdominal pain and diarrhea.   Genitourinary:  Negative for frequency.   Musculoskeletal:  Positive for myalgias (hip pain). Negative for back pain.   Skin:  Negative for rash.   Neurological:  Negative for headaches.   Hematological:  Negative for adenopathy.   Psychiatric/Behavioral:  The patient is not nervous/anxious.           Medication List with Changes/Refills   Current Medications    AZELASTINE (ASTELIN) 137 MCG (0.1 %) NASAL SPRAY    1 spray (137 mcg total) by Nasal route 2 (two) times daily.    BENZONATATE (TESSALON) 100 MG CAPSULE    Take 2 capsules (200 mg total) by mouth 3 (three) times daily as needed.    BUPROPION (WELLBUTRIN XL) 150 MG TB24 TABLET    Take 1 tablet (150 mg total) by mouth once daily.    BUPROPION (WELLBUTRIN XL) 300 MG 24 HR TABLET    Take 1 tablet (300 mg total) by mouth once daily.    CHOLECALCIFEROL, VITAMIN D3, 5,000 UNIT/ML DROP    Take 1 drop by mouth Daily.    CYANOCOBALAMIN, VITAMIN B-12, 5,000 MCG SUBL    Place under the tongue once daily.    DULOXETINE (CYMBALTA) 60 MG CAPSULE    Take 1 capsule (60 mg total) by mouth once daily.    ESTRADIOL (ESTRACE) 0.01 % (0.1 MG/GRAM) VAGINAL CREAM    1g daily x 2 weeks, then 1g twice weekly    FLUTICASONE PROPIONATE (FLONASE) 50 MCG/ACTUATION NASAL SPRAY    2 sprays (100 mcg total) by Each Nostril route once daily.    FOLIC ACID  (FOLVITE) 1 MG TABLET    Take 1 tablet (1 mg total) by mouth once daily.    HYDROCHLOROTHIAZIDE (HYDRODIURIL) 25 MG TABLET    Take 1 tablet (25 mg total) by mouth once daily.    ICOSAPENT ETHYL (VASCEPA) 1 GRAM CAP    Take 2 capsules (2 g total) by mouth 2 (two) times a day.    LEVOTHYROXINE (SYNTHROID) 100 MCG TABLET    Take 1 tablet (100 mcg total) by mouth before breakfast.    LORAZEPAM (ATIVAN) 1 MG TABLET    Take 1 tablet (1 mg total) by mouth nightly as needed for Anxiety.    METHYLPREDNISOLONE (MEDROL DOSEPACK) 4 MG TABLET    use as directed    OMEPRAZOLE (PRILOSEC) 40 MG CAPSULE    Take 1 capsule (40 mg total) by mouth once daily.    TRAZODONE (DESYREL) 50 MG TABLET    TAKE ONE OR TWO TABLETS BY MOUTH AT BEDTIME AS NEEDED FOR SLEEP    TRETINOIN (RETIN-A) 0.025 % CREAM    Apply topically every evening.    UPADACITINIB (RINVOQ) 15 MG 24 HR TABLET    Take 1 tablet (15 mg total) by mouth once daily. Test claim only    VALSARTAN (DIOVAN) 160 MG TABLET    Take 2 tablets (320 mg total) by mouth once daily.        Objective:     Vitals:    11/14/24 1042   BP: 137/74   Pulse: 90       Physical Exam  Vitals reviewed.   Constitutional:       General: She is not in acute distress.     Appearance: Normal appearance.   HENT:      Head: Normocephalic and atraumatic.      Right Ear: External ear normal.      Left Ear: External ear normal.   Cardiovascular:      Rate and Rhythm: Normal rate and regular rhythm.      Pulses: Normal pulses.      Heart sounds: Normal heart sounds, S1 normal and S2 normal. No murmur heard.  Pulmonary:      Effort: Pulmonary effort is normal.      Breath sounds: Normal breath sounds.   Abdominal:      General: There is no distension.   Musculoskeletal:         General: Normal range of motion.      Cervical back: Normal range of motion.   Lymphadenopathy:      Cervical: No cervical adenopathy.   Skin:     General: Skin is warm and dry.      Capillary Refill: Capillary refill takes less than 2  seconds.      Coloration: Skin is not jaundiced or pale.   Neurological:      General: No focal deficit present.      Mental Status: She is alert and oriented to person, place, and time.   Psychiatric:         Attention and Perception: Attention and perception normal.         Mood and Affect: Mood and affect normal.         Speech: Speech normal.         Behavior: Behavior normal. Behavior is cooperative.         Thought Content: Thought content normal.         Cognition and Memory: Cognition and memory normal.         Judgment: Judgment normal.         Assessment:     Problem List Items Addressed This Visit       Rheumatoid arthritis involving multiple sites with positive rheumatoid factor    Relevant Orders    Comprehensive Metabolic Panel    CBC Auto Differential    Ferritin    Iron and TIBC    Sjogren's syndrome with keratoconjunctivitis sicca    Relevant Orders    Comprehensive Metabolic Panel    CBC Auto Differential    Ferritin    Iron and TIBC    Fatigue    Relevant Orders    Comprehensive Metabolic Panel    CBC Auto Differential    Ferritin    Iron and TIBC     Other Visit Diagnoses       History of iron deficiency anemia    -  Primary    Relevant Orders    Comprehensive Metabolic Panel    CBC Auto Differential    Ferritin    Iron and TIBC            Lab Results   Component Value Date    WBC 5.59 11/07/2024    RBC 4.00 11/07/2024    HGB 12.2 11/07/2024    HCT 36.6 (L) 11/07/2024    MCV 92 11/07/2024    MCH 30.5 11/07/2024    MCHC 33.3 11/07/2024    RDW 11.5 11/07/2024     11/07/2024    MPV 9.8 11/07/2024    GRAN 3.7 11/07/2024    GRAN 66.2 11/07/2024    LYMPH 1.2 11/07/2024    LYMPH 21.1 11/07/2024    MONO 0.5 11/07/2024    MONO 8.9 11/07/2024    EOS 0.1 11/07/2024    BASO 0.07 11/07/2024    EOSINOPHIL 2.3 11/07/2024    BASOPHIL 1.3 11/07/2024      Lab Results   Component Value Date     10/08/2024    K 3.9 10/08/2024    CL 98 10/08/2024    CO2 30 (H) 10/08/2024    BUN 23 10/08/2024     CREATININE 0.9 10/08/2024    CALCIUM 10.1 10/08/2024    ANIONGAP 11 10/08/2024    ESTGFRAFRICA >60 10/14/2021    EGFRNONAA >60 10/14/2021     Lab Results   Component Value Date    ALT 21 10/08/2024    AST 22 10/08/2024    ALKPHOS 58 10/08/2024    BILITOT 0.3 10/08/2024     Lab Results   Component Value Date    FERRITIN 218 11/07/2024         Plan:   History of iron deficiency anemia  -     Comprehensive Metabolic Panel; Future; Expected date: 11/14/2024  -     CBC Auto Differential; Future; Expected date: 11/14/2024  -     Ferritin; Future; Expected date: 11/14/2024  -     Iron and TIBC; Future; Expected date: 11/14/2024    Fatigue, unspecified type  -     Comprehensive Metabolic Panel; Future; Expected date: 11/14/2024  -     CBC Auto Differential; Future; Expected date: 11/14/2024  -     Ferritin; Future; Expected date: 11/14/2024  -     Iron and TIBC; Future; Expected date: 11/14/2024    Sjogren's syndrome with keratoconjunctivitis sicca  -     Comprehensive Metabolic Panel; Future; Expected date: 11/14/2024  -     CBC Auto Differential; Future; Expected date: 11/14/2024  -     Ferritin; Future; Expected date: 11/14/2024  -     Iron and TIBC; Future; Expected date: 11/14/2024    Rheumatoid arthritis involving multiple sites with positive rheumatoid factor  -     Comprehensive Metabolic Panel; Future; Expected date: 11/14/2024  -     CBC Auto Differential; Future; Expected date: 11/14/2024  -     Ferritin; Future; Expected date: 11/14/2024  -     Iron and TIBC; Future; Expected date: 11/14/2024      Med Onc Chart Routing      Follow up with physician    Follow up with RONNY 6 months. Labs prior; VV   Infusion scheduling note   n/a   Injection scheduling note n/a   Labs   Scheduling:  Preferred lab: [facility]  Lab interval:  CBC, CMP, iron studies   Imaging None   n/a   Pharmacy appointment No pharmacy appointment needed      Other referrals       No additional referrals needed  n/a         Continue taking  folic acid and Vitamin B 12. Labs stable, normal hemoglobin and ferritin today, Follow up in 6 months virtually with labs prior (CBC, CMP, iron studies). Continue follow up with rheumatology and primary care. Counseled on sooner return precautions, including shortness of breath, increased fatigue, dizziness, pallor, or any other concerns.     Total time spent on encounter: 30 minutes      Collaborating Provider:  Dr. Rodger Zamudio    Thank You,  REBEKAH Patrick  Benign Hematology

## 2024-12-12 DIAGNOSIS — E78.1 HYPERTRIGLYCERIDEMIA: ICD-10-CM

## 2024-12-12 RX ORDER — ICOSAPENT ETHYL 1 G/1
CAPSULE ORAL
Qty: 120 CAPSULE | Refills: 2 | Status: SHIPPED | OUTPATIENT
Start: 2024-12-12

## 2024-12-18 ENCOUNTER — LAB VISIT (OUTPATIENT)
Dept: LAB | Facility: HOSPITAL | Age: 74
End: 2024-12-18
Attending: INTERNAL MEDICINE
Payer: MEDICARE

## 2024-12-18 DIAGNOSIS — E03.9 ACQUIRED HYPOTHYROIDISM: ICD-10-CM

## 2024-12-18 PROCEDURE — 36415 COLL VENOUS BLD VENIPUNCTURE: CPT | Mod: PO | Performed by: INTERNAL MEDICINE

## 2024-12-18 PROCEDURE — 84443 ASSAY THYROID STIM HORMONE: CPT | Performed by: INTERNAL MEDICINE

## 2024-12-19 LAB — TSH SERPL DL<=0.005 MIU/L-ACNC: 0.42 UIU/ML (ref 0.4–4)

## 2025-01-13 DIAGNOSIS — Z00.00 ENCOUNTER FOR MEDICARE ANNUAL WELLNESS EXAM: ICD-10-CM

## 2025-01-21 DIAGNOSIS — F41.8 ANXIETY ASSOCIATED WITH DEPRESSION: ICD-10-CM

## 2025-01-21 DIAGNOSIS — K21.00 GASTROESOPHAGEAL REFLUX DISEASE WITH ESOPHAGITIS WITHOUT HEMORRHAGE: ICD-10-CM

## 2025-01-22 DIAGNOSIS — F41.8 MIXED ANXIETY AND DEPRESSIVE DISORDER: ICD-10-CM

## 2025-01-22 RX ORDER — DULOXETIN HYDROCHLORIDE 60 MG/1
60 CAPSULE, DELAYED RELEASE ORAL
Qty: 90 CAPSULE | Refills: 1 | Status: SHIPPED | OUTPATIENT
Start: 2025-01-22 | End: 2025-01-24 | Stop reason: SDUPTHER

## 2025-01-22 RX ORDER — LORAZEPAM 1 MG/1
1 TABLET ORAL NIGHTLY PRN
Qty: 30 TABLET | Refills: 2 | Status: SHIPPED | OUTPATIENT
Start: 2025-01-22 | End: 2025-04-22

## 2025-01-22 RX ORDER — OMEPRAZOLE 40 MG/1
40 CAPSULE, DELAYED RELEASE ORAL DAILY
Qty: 90 CAPSULE | Refills: 3 | Status: SHIPPED | OUTPATIENT
Start: 2025-01-22 | End: 2025-01-24 | Stop reason: SDUPTHER

## 2025-01-24 ENCOUNTER — PATIENT MESSAGE (OUTPATIENT)
Dept: PRIMARY CARE CLINIC | Facility: CLINIC | Age: 75
End: 2025-01-24
Payer: MEDICARE

## 2025-01-24 DIAGNOSIS — F41.8 ANXIETY ASSOCIATED WITH DEPRESSION: ICD-10-CM

## 2025-01-24 DIAGNOSIS — I10 ESSENTIAL HYPERTENSION: ICD-10-CM

## 2025-01-24 DIAGNOSIS — G47.00 INSOMNIA, UNSPECIFIED TYPE: ICD-10-CM

## 2025-01-24 DIAGNOSIS — F41.8 MIXED ANXIETY AND DEPRESSIVE DISORDER: ICD-10-CM

## 2025-01-24 DIAGNOSIS — K21.00 GASTROESOPHAGEAL REFLUX DISEASE WITH ESOPHAGITIS WITHOUT HEMORRHAGE: ICD-10-CM

## 2025-01-24 DIAGNOSIS — E03.9 ACQUIRED HYPOTHYROIDISM: ICD-10-CM

## 2025-01-24 RX ORDER — BUPROPION HYDROCHLORIDE 150 MG/1
150 TABLET ORAL DAILY
Qty: 90 TABLET | Refills: 3 | Status: SHIPPED | OUTPATIENT
Start: 2025-01-24 | End: 2025-01-31 | Stop reason: SDUPTHER

## 2025-01-24 RX ORDER — HYDROCHLOROTHIAZIDE 25 MG/1
25 TABLET ORAL DAILY
Qty: 90 TABLET | Refills: 3 | Status: SHIPPED | OUTPATIENT
Start: 2025-01-24 | End: 2025-01-31 | Stop reason: SDUPTHER

## 2025-01-24 RX ORDER — DULOXETIN HYDROCHLORIDE 60 MG/1
60 CAPSULE, DELAYED RELEASE ORAL DAILY
Qty: 90 CAPSULE | Refills: 1 | Status: SHIPPED | OUTPATIENT
Start: 2025-01-24

## 2025-01-24 RX ORDER — OMEPRAZOLE 40 MG/1
40 CAPSULE, DELAYED RELEASE ORAL DAILY
Qty: 90 CAPSULE | Refills: 3 | Status: SHIPPED | OUTPATIENT
Start: 2025-01-24 | End: 2025-01-31 | Stop reason: SDUPTHER

## 2025-01-24 RX ORDER — BUPROPION HYDROCHLORIDE 300 MG/1
300 TABLET ORAL DAILY
Qty: 90 TABLET | Refills: 3 | Status: SHIPPED | OUTPATIENT
Start: 2025-01-24 | End: 2025-01-31 | Stop reason: SDUPTHER

## 2025-01-24 RX ORDER — FLUTICASONE PROPIONATE 50 MCG
2 SPRAY, SUSPENSION (ML) NASAL DAILY
Qty: 16 G | Refills: 2 | Status: SHIPPED | OUTPATIENT
Start: 2025-01-24 | End: 2025-01-31 | Stop reason: SDUPTHER

## 2025-01-24 RX ORDER — TRAZODONE HYDROCHLORIDE 50 MG/1
TABLET ORAL
Qty: 180 TABLET | Refills: 3 | Status: SHIPPED | OUTPATIENT
Start: 2025-01-24 | End: 2025-01-31 | Stop reason: SDUPTHER

## 2025-01-24 RX ORDER — LEVOTHYROXINE SODIUM 100 UG/1
100 TABLET ORAL
Qty: 30 TABLET | Refills: 11 | Status: SHIPPED | OUTPATIENT
Start: 2025-01-24 | End: 2025-01-31 | Stop reason: SDUPTHER

## 2025-01-24 RX ORDER — VALSARTAN 160 MG/1
320 TABLET ORAL DAILY
Qty: 90 TABLET | Refills: 3 | Status: SHIPPED | OUTPATIENT
Start: 2025-01-24 | End: 2025-01-31 | Stop reason: SDUPTHER

## 2025-01-31 DIAGNOSIS — G47.00 INSOMNIA, UNSPECIFIED TYPE: ICD-10-CM

## 2025-01-31 DIAGNOSIS — E03.9 ACQUIRED HYPOTHYROIDISM: ICD-10-CM

## 2025-01-31 DIAGNOSIS — F41.8 ANXIETY ASSOCIATED WITH DEPRESSION: ICD-10-CM

## 2025-01-31 DIAGNOSIS — K21.00 GASTROESOPHAGEAL REFLUX DISEASE WITH ESOPHAGITIS WITHOUT HEMORRHAGE: ICD-10-CM

## 2025-01-31 DIAGNOSIS — I10 ESSENTIAL HYPERTENSION: ICD-10-CM

## 2025-01-31 RX ORDER — TRAZODONE HYDROCHLORIDE 50 MG/1
TABLET ORAL
Qty: 180 TABLET | Refills: 3 | Status: SHIPPED | OUTPATIENT
Start: 2025-01-31

## 2025-01-31 RX ORDER — BUPROPION HYDROCHLORIDE 150 MG/1
150 TABLET ORAL DAILY
Qty: 90 TABLET | Refills: 3 | Status: SHIPPED | OUTPATIENT
Start: 2025-01-31 | End: 2026-01-31

## 2025-01-31 RX ORDER — OMEPRAZOLE 40 MG/1
40 CAPSULE, DELAYED RELEASE ORAL DAILY
Qty: 90 CAPSULE | Refills: 3 | Status: SHIPPED | OUTPATIENT
Start: 2025-01-31 | End: 2026-01-31

## 2025-01-31 RX ORDER — HYDROCHLOROTHIAZIDE 25 MG/1
25 TABLET ORAL DAILY
Qty: 90 TABLET | Refills: 3 | Status: SHIPPED | OUTPATIENT
Start: 2025-01-31

## 2025-01-31 RX ORDER — LEVOTHYROXINE SODIUM 100 UG/1
100 TABLET ORAL
Qty: 30 TABLET | Refills: 11 | Status: SHIPPED | OUTPATIENT
Start: 2025-01-31 | End: 2026-01-31

## 2025-01-31 RX ORDER — FLUTICASONE PROPIONATE 50 MCG
2 SPRAY, SUSPENSION (ML) NASAL DAILY
Qty: 16 G | Refills: 2 | Status: SHIPPED | OUTPATIENT
Start: 2025-01-31 | End: 2026-01-31

## 2025-01-31 RX ORDER — VALSARTAN 160 MG/1
320 TABLET ORAL DAILY
Qty: 90 TABLET | Refills: 3 | Status: SHIPPED | OUTPATIENT
Start: 2025-01-31

## 2025-01-31 RX ORDER — BUPROPION HYDROCHLORIDE 300 MG/1
300 TABLET ORAL DAILY
Qty: 90 TABLET | Refills: 3 | Status: SHIPPED | OUTPATIENT
Start: 2025-01-31 | End: 2026-01-31

## 2025-02-07 NOTE — PROGRESS NOTES
Subjective:      Patient ID: Leticia Piña is a 74 y.o. female.    Chief Complaint: Follow-up (4 month f/u ) and Cough (Post nasal drip)      HPI  Here for f/u medical problems and preventive exam. Joined [employer], working on exercise.  Lots postnasal drip, lots coughing.  Flonase daily, astelin daily, antihist too drying, sudafed daily.  Recent medrol pack didn't help.  No f/c.  No cp/sob/palp.  BMs ok with colace and prn miralax.  Urine normal.  Anxiety/depression doing well on rxs.  To f/u with HemeOnc, q6mo.      The 10-year ASCVD risk score (Harjeet CORONEL, et al., 2019) is: 16.8%    Values used to calculate the score:      Age: 74 years      Sex: Female      Is Non- : No      Diabetic: No      Tobacco smoker: No      Systolic Blood Pressure: 134 mmHg      Is BP treated: Yes      HDL Cholesterol: 55 mg/dL      Total Cholesterol: 198 mg/dL        Advance Care Planning     Date: 02/21/2025    Power of   I initiated the process of voluntary advance care planning today and explained the importance of this process to the patient.  I introduced the concept of advance directives to the patient, as well. Then the patient received detailed information about the importance of designating a Health Care Power of  (HCPOA). She was also instructed to communicate with this person about their wishes for future healthcare, should she become sick and lose decision-making capacity. The patient has not previously appointed a HCPOA. After our discussion, the patient has decided to complete a HCPOA and has appointed her  daughter, son , health care agent:  Yadira Benoit Chad Garlandcarlosion  & health care agent number:  [phone], [phone]. . I encouraged her to communicate with this person about their wishes for future healthcare, should she become sick and lose decision-making capacity.      A total of 10 min was spent on advance care planning, goals of care discussion, emotional  "support, formulating and communicating prognosis and exploring burden/benefit of various approaches of treatment. This discussion occurred on a fully voluntary basis with the verbal consent of the patient and/or family.           Updated/ annual due 10/25:  HM: 2/25 fluvax, 3/21 covid vaccines, 6/19 HAV, 11/19 [id number], 3/17 booster [id number], 12/22 [id number], 6/21 Tdap, 4/13 zoster, 4/22 Shingrix #2, 3/23 BMD now on drug holiday rep 2y, 1/23 EGD, 3/23 Cscope rep 3y, 2/24 MMG, 11/17 Gyn Dr. Jimenez, 3/17 HCV neg.   10/22 Cologuard negative.     Review of Systems   Constitutional:  Negative for chills, diaphoresis and fever.   Respiratory:  Negative for cough and shortness of breath.    Cardiovascular:  Negative for chest pain, palpitations and leg swelling.   Gastrointestinal:  Negative for blood in stool, constipation, diarrhea, nausea and vomiting.   Genitourinary:  Negative for dysuria, frequency and hematuria.   Psychiatric/Behavioral:  The patient is not nervous/anxious.          Objective:   /76   Pulse 73   Temp 96.3 °F (35.7 °C) (Temporal)   Ht 5' 5" (1.651 m)   Wt 69.7 kg (153 lb 10.6 oz)   SpO2 99%   BMI 25.57 kg/m²     Physical Exam  Constitutional:       Appearance: She is well-developed.   Neck:      Thyroid: No thyroid mass.      Vascular: No carotid bruit.   Cardiovascular:      Rate and Rhythm: Normal rate and regular rhythm.      Heart sounds: No murmur heard.     No friction rub. No gallop.   Pulmonary:      Effort: Pulmonary effort is normal.      Breath sounds: Normal breath sounds. No wheezing or rales.   Abdominal:      General: Bowel sounds are normal.      Palpations: Abdomen is soft. There is no mass.      Tenderness: There is no abdominal tenderness.   Musculoskeletal:      Cervical back: Neck supple.   Lymphadenopathy:      Cervical: No cervical adenopathy.   Neurological:      Mental Status: She is alert and oriented to person, place, and time.            Latest Reference Range " & Units 10/08/24 15:13 11/01/24 13:20 12/18/24 14:07 02/12/25 09:34   Cholesterol Total 120 - 199 mg/dL 223 (H)   198   HDL 40 - 75 mg/dL 62   55   HDL/Cholesterol Ratio 20.0 - 50.0 % 27.8   27.8   Non-HDL Cholesterol mg/dL 161   143   Total Cholesterol/HDL Ratio 2.0 - 5.0  3.6   3.6   Triglycerides 30 - 150 mg/dL 250 (H)   118   LDL Cholesterol 63.0 - 159.0 mg/dL 111.0   119.4   TSH 0.400 - 4.000 uIU/mL 0.532 0.206 (L) 0.424 1.227   T3, Free 2.3 - 4.2 pg/mL    2.3   Free T4 0.71 - 1.51 ng/dL  1.28  1.10     Assessment:       1. Essential hypertension    2. Acquired hypothyroidism    3. Moderate episode of recurrent major depressive disorder    4. Anxiety associated with depression    5. Seasonal allergic rhinitis due to pollen    6. Immunocompromised    7. Other iron deficiency anemia    8. Other hyperlipidemia    9. Encounter for screening for cardiovascular disorders    10. Rheumatoid arthritis involving multiple sites with positive rheumatoid factor    11. Pulmonary hypertension    12. HENRI (obstructive sleep apnea)    13. Cough    14. Fever and chills    15. Primary insomnia    16. Insomnia, unspecified type    17. Need for vaccination          Plan:     1. Essential hypertension- stable, cont rx.  Overview:  Valsartan 320mg daily,  HCTZ 25mg daily.    Orders:  -     benzonatate (TESSALON) 100 MG capsule; Take 2 capsules (200 mg total) by mouth 3 (three) times daily as needed.  Dispense: 120 capsule; Refill: 3    2. Acquired hypothyroidism- Clinically stable, continue present treatment.  Overview:  Synthroid 100mcg daily.  Orders:  -     levothyroxine (SYNTHROID) 100 MCG tablet; Take 1 tablet (100 mcg total) by mouth before breakfast.  Dispense: 90 tablet; Refill: 3    3. Moderate episode of recurrent major depressive disorder- doing well, cont rxs.  Overview:  Cymbalta 60mg daily,  Wellbutrin 450mg daily.    4. Anxiety associated with depression- doing well, rare ativan needed.  Overview:  Lorazepam prn.    5.  Seasonal allergic rhinitis due to pollen- start atrovent, IF NOT BETTER 1 WEEK< START singulair.  -     ipratropium (ATROVENT) 21 mcg (0.03 %) nasal spray; 2 sprays by Each Nostril route 2 (two) times daily.  Dispense: 30 mL; Refill: 6    7. Other iron deficiency anemia- f/u with HemeOnc.    8. Other hyperlipidemia, calculates to need statin, check CT calcium score.  -     Lipid Panel; Future; Expected date: 02/21/2025    9. Encounter for screening for cardiovascular disorders  -     CT Calcium Scoring Cardiac; Future; Expected date: 02/21/2025    10. Rheumatoid arthritis involving multiple sites with positive rheumatoid factor,  Immunocompromised- per Rheum.    11. Pulmonary hypertension  Overview:  8/23 ECHO:  Pulmonary Artery: The estimated pulmonary artery systolic pressure is 44 mmHg.     12. HENRI (obstructive sleep apnea)- cont device.  Overview:  Doing well with Jacy dental device.    13. Cough  -     benzonatate (TESSALON) 100 MG capsule; Take 2 capsules (200 mg total) by mouth 3 (three) times daily as needed.  Dispense: 120 capsule; Refill: 3  -     benzonatate (TESSALON) 100 MG capsule; Take 2 capsules (200 mg total) by mouth 3 (three) times daily as needed.  Dispense: 120 capsule; Refill: 3    15. Primary insomnia- doing well, cont rx.  Overview:  Trazodone 100mg qhs.  Rare lorazepam.    RTC 3 mo.

## 2025-02-10 ENCOUNTER — PATIENT MESSAGE (OUTPATIENT)
Dept: RHEUMATOLOGY | Facility: CLINIC | Age: 75
End: 2025-02-10
Payer: MEDICARE

## 2025-02-10 DIAGNOSIS — M05.79 RHEUMATOID ARTHRITIS INVOLVING MULTIPLE SITES WITH POSITIVE RHEUMATOID FACTOR: ICD-10-CM

## 2025-02-10 RX ORDER — UPADACITINIB 15 MG/1
15 TABLET, EXTENDED RELEASE ORAL DAILY
Qty: 30 TABLET | Refills: 11 | Status: SHIPPED | OUTPATIENT
Start: 2025-02-10

## 2025-02-12 ENCOUNTER — LAB VISIT (OUTPATIENT)
Dept: LAB | Facility: HOSPITAL | Age: 75
End: 2025-02-12
Attending: FAMILY MEDICINE
Payer: MEDICARE

## 2025-02-12 DIAGNOSIS — E78.1 HYPERTRIGLYCERIDEMIA: ICD-10-CM

## 2025-02-12 DIAGNOSIS — E03.9 ACQUIRED HYPOTHYROIDISM: ICD-10-CM

## 2025-02-12 LAB
CHOLEST SERPL-MCNC: 198 MG/DL (ref 120–199)
CHOLEST/HDLC SERPL: 3.6 {RATIO} (ref 2–5)
HDLC SERPL-MCNC: 55 MG/DL (ref 40–75)
HDLC SERPL: 27.8 % (ref 20–50)
LDLC SERPL CALC-MCNC: 119.4 MG/DL (ref 63–159)
NONHDLC SERPL-MCNC: 143 MG/DL
T3FREE SERPL-MCNC: 2.3 PG/ML (ref 2.3–4.2)
T4 FREE SERPL-MCNC: 1.1 NG/DL (ref 0.71–1.51)
TRIGL SERPL-MCNC: 118 MG/DL (ref 30–150)
TSH SERPL DL<=0.005 MIU/L-ACNC: 1.23 UIU/ML (ref 0.4–4)

## 2025-02-12 PROCEDURE — 84481 FREE ASSAY (FT-3): CPT | Performed by: FAMILY MEDICINE

## 2025-02-12 PROCEDURE — 84443 ASSAY THYROID STIM HORMONE: CPT | Performed by: FAMILY MEDICINE

## 2025-02-12 PROCEDURE — 84439 ASSAY OF FREE THYROXINE: CPT | Performed by: FAMILY MEDICINE

## 2025-02-12 PROCEDURE — 80061 LIPID PANEL: CPT | Performed by: FAMILY MEDICINE

## 2025-02-12 PROCEDURE — 36415 COLL VENOUS BLD VENIPUNCTURE: CPT | Mod: PO | Performed by: FAMILY MEDICINE

## 2025-02-21 ENCOUNTER — OFFICE VISIT (OUTPATIENT)
Dept: PRIMARY CARE CLINIC | Facility: CLINIC | Age: 75
End: 2025-02-21
Payer: MEDICARE

## 2025-02-21 VITALS
SYSTOLIC BLOOD PRESSURE: 134 MMHG | HEIGHT: 65 IN | HEART RATE: 73 BPM | TEMPERATURE: 96 F | BODY MASS INDEX: 25.61 KG/M2 | DIASTOLIC BLOOD PRESSURE: 76 MMHG | OXYGEN SATURATION: 99 % | WEIGHT: 153.69 LBS

## 2025-02-21 DIAGNOSIS — J30.1 SEASONAL ALLERGIC RHINITIS DUE TO POLLEN: ICD-10-CM

## 2025-02-21 DIAGNOSIS — D84.9 IMMUNOCOMPROMISED: ICD-10-CM

## 2025-02-21 DIAGNOSIS — M05.79 RHEUMATOID ARTHRITIS INVOLVING MULTIPLE SITES WITH POSITIVE RHEUMATOID FACTOR: ICD-10-CM

## 2025-02-21 DIAGNOSIS — I27.20 PULMONARY HYPERTENSION: ICD-10-CM

## 2025-02-21 DIAGNOSIS — R50.9 FEVER AND CHILLS: ICD-10-CM

## 2025-02-21 DIAGNOSIS — D50.8 OTHER IRON DEFICIENCY ANEMIA: ICD-10-CM

## 2025-02-21 DIAGNOSIS — I10 ESSENTIAL HYPERTENSION: Primary | ICD-10-CM

## 2025-02-21 DIAGNOSIS — F33.1 MODERATE EPISODE OF RECURRENT MAJOR DEPRESSIVE DISORDER: ICD-10-CM

## 2025-02-21 DIAGNOSIS — E78.49 OTHER HYPERLIPIDEMIA: ICD-10-CM

## 2025-02-21 DIAGNOSIS — G47.33 OSA (OBSTRUCTIVE SLEEP APNEA): ICD-10-CM

## 2025-02-21 DIAGNOSIS — G47.00 INSOMNIA, UNSPECIFIED TYPE: ICD-10-CM

## 2025-02-21 DIAGNOSIS — R05.9 COUGH: ICD-10-CM

## 2025-02-21 DIAGNOSIS — E03.9 ACQUIRED HYPOTHYROIDISM: ICD-10-CM

## 2025-02-21 DIAGNOSIS — Z13.6 ENCOUNTER FOR SCREENING FOR CARDIOVASCULAR DISORDERS: ICD-10-CM

## 2025-02-21 DIAGNOSIS — F51.01 PRIMARY INSOMNIA: ICD-10-CM

## 2025-02-21 DIAGNOSIS — Z23 NEED FOR VACCINATION: ICD-10-CM

## 2025-02-21 DIAGNOSIS — F41.8 ANXIETY ASSOCIATED WITH DEPRESSION: ICD-10-CM

## 2025-02-21 PROCEDURE — 99215 OFFICE O/P EST HI 40 MIN: CPT | Mod: PBBFAC,PN | Performed by: INTERNAL MEDICINE

## 2025-02-21 RX ORDER — TRAZODONE HYDROCHLORIDE 100 MG/1
TABLET ORAL
Qty: 90 TABLET | Refills: 3 | Status: SHIPPED | OUTPATIENT
Start: 2025-02-21

## 2025-02-21 RX ORDER — LEVOTHYROXINE SODIUM 100 UG/1
100 TABLET ORAL
Qty: 90 TABLET | Refills: 3 | Status: SHIPPED | OUTPATIENT
Start: 2025-02-21 | End: 2026-02-21

## 2025-02-21 RX ORDER — IPRATROPIUM BROMIDE 21 UG/1
2 SPRAY, METERED NASAL 2 TIMES DAILY
Qty: 30 ML | Refills: 6 | Status: SHIPPED | OUTPATIENT
Start: 2025-02-21

## 2025-02-21 RX ORDER — BENZONATATE 100 MG/1
200 CAPSULE ORAL 3 TIMES DAILY PRN
Qty: 120 CAPSULE | Refills: 3 | Status: SHIPPED | OUTPATIENT
Start: 2025-02-21

## 2025-02-21 NOTE — PROGRESS NOTES
Flu vaccine given , tolerated well, no pian ,allergies and medication verified, 15 min wait instructed,. JANEY

## 2025-03-10 ENCOUNTER — PATIENT MESSAGE (OUTPATIENT)
Dept: RHEUMATOLOGY | Facility: CLINIC | Age: 75
End: 2025-03-10
Payer: MEDICARE

## 2025-03-11 ENCOUNTER — CLINICAL SUPPORT (OUTPATIENT)
Dept: RHEUMATOLOGY | Facility: CLINIC | Age: 75
End: 2025-03-11
Payer: MEDICARE

## 2025-03-11 DIAGNOSIS — M05.79 RHEUMATOID ARTHRITIS INVOLVING MULTIPLE SITES WITH POSITIVE RHEUMATOID FACTOR: Primary | ICD-10-CM

## 2025-03-11 PROCEDURE — 99213 OFFICE O/P EST LOW 20 MIN: CPT | Mod: PBBFAC | Performed by: PHARMACIST

## 2025-03-11 PROCEDURE — 99999PBSHW PR PBB SHADOW TECHNICAL ONLY FILED TO HB: Mod: JZ,PBBFAC,,

## 2025-03-11 PROCEDURE — 99999 PR PBB SHADOW E&M-EST. PATIENT-LVL III: CPT | Mod: PBBFAC,,, | Performed by: PHARMACIST

## 2025-03-11 PROCEDURE — 96372 THER/PROPH/DIAG INJ SC/IM: CPT | Mod: PBBFAC | Performed by: PHARMACIST

## 2025-03-11 RX ORDER — KETOROLAC TROMETHAMINE 30 MG/ML
30 INJECTION, SOLUTION INTRAMUSCULAR; INTRAVENOUS
Status: COMPLETED | OUTPATIENT
Start: 2025-03-11 | End: 2025-03-11

## 2025-03-11 RX ADMIN — KETOROLAC TROMETHAMINE 30 MG: 30 INJECTION, SOLUTION INTRAMUSCULAR at 12:03

## 2025-03-11 NOTE — PROGRESS NOTES
"[facility]  Clinical Pharmacy Note    TODAY'S VISIT DATE:  3/11/2025    Reason for visit: treatment discussion      Treatment to be initiated/optimized: Rinvoq     Consent form: No    Pertinent History:  Symptoms of infection (current or past 1 week)? (fever >100.4 F, URI, flu-like symptoms, cough, painful urination, warm/red/painful skin or skin ulcers/wounds, tooth pain): No  Recent Antibiotics use in the last 30 days:no   Dispensing pharmacy/infusion center:  N/A   Current therapy: Rinvoq      Vaccinations:  Lab Results   Component Value Date    HEPBSAB Negative 05/03/2021     No results found for: "HEPBSURFABQU"  No results found for: "HEPAIGG"  No results found for: "VARICELLAZOS", "VARICELLAINT"  No results found for: "MUMPSIGGSCRE", "MUMPSIGGINTE"  No results found for: "RUBEOLAIGGAN", "RUBEOLAINTER"  Immunization History   Administered Date(s) Administered    COVID-19, MRNA, LN-S, PF (Pfizer) (Purple Cap) 02/22/2021, 03/15/2021    Hepatitis A, Adult 06/05/2019    Influenza (FLUAD) - Quadrivalent - Adjuvanted - PF *Preferred* (65+) 10/06/2020, 10/14/2021, 10/06/2022, 10/19/2023    Influenza - Quadrivalent 11/13/2014, 10/27/2016    Influenza - Trivalent - Fluad - Adjuvanted - PF (65 years and older 10/23/2017, 02/21/2025    Influenza - Trivalent - Fluzone High Dose - PF (65 years and older) 11/07/2007, 09/30/2008, 09/23/2009, 10/27/2010, 10/31/2011, 10/05/2015, 10/12/2018, 09/24/2019    PPD Test 03/28/2011    Pneumococcal Conjugate - 13 Valent 12/10/2013, 12/10/2013, 11/27/2019    Pneumococcal Conjugate - 20 Valent 12/08/2022    Pneumococcal Polysaccharide - 23 Valent 11/07/2007, 03/23/2017    Tdap 09/15/2010, 04/10/2013, 06/21/2021    Zoster 04/10/2013    Zoster Recombinant 06/08/2021, 04/20/2022     All Medical History/Surgical History/Family History/Social History/Allergies have been reviewed and updated in EMR    Review of patient's allergies indicates:   Allergen Reactions    " Humira  [adalimumab]      Other reaction(s): drug-induced lupus  Other reaction(s): drug-induced lupus    Sulfa (sulfonamide antibiotics)      Other reaction(s): Hives  Other reaction(s): Rash  Other reaction(s): Hives     Current Medications:   No outpatient medications have been marked as taking for the 3/11/25 encounter (Appointment) with Mariana Lester, PharmD.       Reviewed all current medications including OTC, herbals and supplements.     Labs:   Lab Results   Component Value Date    HEPBSAG Negative 05/03/2021    HEPBCAB Negative 05/03/2021     Lab Results   Component Value Date    TBGOLDPLUS Negative 05/03/2021     Lab Results   Component Value Date    GHCHGHAN90YD 62 06/08/2021    DGMDIUDW98 >2000 (H) 07/26/2023     Lab Results   Component Value Date    WBC 5.59 11/07/2024    HGB 12.2 11/07/2024    HCT 36.6 (L) 11/07/2024    MCV 92 11/07/2024     11/07/2024     Lab Results   Component Value Date    CREATININE 0.9 10/08/2024    ALBUMIN 4.2 10/08/2024    BILITOT 0.3 10/08/2024    ALKPHOS 58 10/08/2024    AST 22 10/08/2024    ALT 21 10/08/2024         Assessment/Plan:  Leticia Piña is a 74 y.o. female that  has a past medical history of Allergic rhinitis, Cutaneous lupus erythematosus, Essential hypertension, GERD (gastroesophageal reflux disease), Hypothyroid, Insomnia, Mixed anxiety and depressive disorder, Nonrheumatic aortic valve insufficiency, Nonrheumatic mitral valve regurgitation, Normal cardiac stress test, Pulmonary hypertension, Rheumatoid arthritis(714.0), and Sjogren's syndrome.    # Recommendations:  - met to discuss next steps w/ Rinvoq PAP process. Pt advised [employer] has informed her they have all they need and that the applications are currently backlogged. Plan to contact [employer] rep to expedited.   56 days supply of vouchers provided.     After obtaining consent, and per orders of Dr. Silva, injection of ketorolac 30 mg/1 mL given by Mariana VERDUZCO  Trevon.    Lot#:[id number]  NDC:  97133-892-42  Exp: 12/31/2025    Patient instructed to remain in clinic for 20 minutes afterwards, and to report any adverse reaction to me immediately.      Mariana Lester, PharmD  Rheumatology Clinical Pharmacist

## 2025-03-13 ENCOUNTER — PATIENT MESSAGE (OUTPATIENT)
Dept: RHEUMATOLOGY | Facility: CLINIC | Age: 75
End: 2025-03-13
Payer: MEDICARE

## 2025-04-07 ENCOUNTER — LAB VISIT (OUTPATIENT)
Dept: LAB | Facility: HOSPITAL | Age: 75
End: 2025-04-07
Attending: INTERNAL MEDICINE
Payer: MEDICARE

## 2025-04-07 DIAGNOSIS — D84.821 DRUG-INDUCED IMMUNODEFICIENCY: ICD-10-CM

## 2025-04-07 DIAGNOSIS — Z79.899 DRUG-INDUCED IMMUNODEFICIENCY: ICD-10-CM

## 2025-04-07 DIAGNOSIS — Z51.81 ENCOUNTER FOR MEDICATION MONITORING: ICD-10-CM

## 2025-04-07 DIAGNOSIS — M35.01 SJOGREN'S SYNDROME WITH KERATOCONJUNCTIVITIS SICCA: ICD-10-CM

## 2025-04-07 DIAGNOSIS — M05.79 RHEUMATOID ARTHRITIS INVOLVING MULTIPLE SITES WITH POSITIVE RHEUMATOID FACTOR: ICD-10-CM

## 2025-04-07 LAB
ABSOLUTE EOSINOPHIL (OHS): 0.12 K/UL
ABSOLUTE MONOCYTE (OHS): 0.44 K/UL (ref 0.3–1)
ABSOLUTE NEUTROPHIL COUNT (OHS): 2.92 K/UL (ref 1.8–7.7)
ALBUMIN SERPL BCP-MCNC: 3.9 G/DL (ref 3.5–5.2)
ALP SERPL-CCNC: 44 UNIT/L (ref 40–150)
ALT SERPL W/O P-5'-P-CCNC: 18 UNIT/L (ref 10–44)
ANION GAP (OHS): 8 MMOL/L (ref 8–16)
AST SERPL-CCNC: 25 UNIT/L (ref 11–45)
BASOPHILS # BLD AUTO: 0.08 K/UL
BASOPHILS NFR BLD AUTO: 1.8 %
BILIRUB SERPL-MCNC: 0.3 MG/DL (ref 0.1–1)
BUN SERPL-MCNC: 22 MG/DL (ref 8–23)
CALCIUM SERPL-MCNC: 9.5 MG/DL (ref 8.7–10.5)
CHLORIDE SERPL-SCNC: 96 MMOL/L (ref 95–110)
CO2 SERPL-SCNC: 34 MMOL/L (ref 23–29)
CREAT SERPL-MCNC: 0.9 MG/DL (ref 0.5–1.4)
CRP SERPL-MCNC: 0.6 MG/L
ERYTHROCYTE [DISTWIDTH] IN BLOOD BY AUTOMATED COUNT: 11.7 % (ref 11.5–14.5)
ERYTHROCYTE [SEDIMENTATION RATE] IN BLOOD BY PHOTOMETRIC METHOD: 3 MM/HR
GFR SERPLBLD CREATININE-BSD FMLA CKD-EPI: >60 ML/MIN/1.73/M2
GLUCOSE SERPL-MCNC: 78 MG/DL (ref 70–110)
HCT VFR BLD AUTO: 35.3 % (ref 37–48.5)
HGB BLD-MCNC: 11.7 GM/DL (ref 12–16)
IMM GRANULOCYTES # BLD AUTO: 0.01 K/UL (ref 0–0.04)
IMM GRANULOCYTES NFR BLD AUTO: 0.2 % (ref 0–0.5)
LYMPHOCYTES # BLD AUTO: 0.94 K/UL (ref 1–4.8)
MCH RBC QN AUTO: 30.1 PG (ref 27–31)
MCHC RBC AUTO-ENTMCNC: 33.1 G/DL (ref 32–36)
MCV RBC AUTO: 91 FL (ref 82–98)
NUCLEATED RBC (/100WBC) (OHS): 0 /100 WBC
PLATELET # BLD AUTO: 312 K/UL (ref 150–450)
PMV BLD AUTO: 10 FL (ref 9.2–12.9)
POTASSIUM SERPL-SCNC: 3.7 MMOL/L (ref 3.5–5.1)
PROT SERPL-MCNC: 7.5 GM/DL (ref 6–8.4)
RBC # BLD AUTO: 3.89 M/UL (ref 4–5.4)
RELATIVE EOSINOPHIL (OHS): 2.7 %
RELATIVE LYMPHOCYTE (OHS): 20.8 % (ref 18–48)
RELATIVE MONOCYTE (OHS): 9.8 % (ref 4–15)
RELATIVE NEUTROPHIL (OHS): 64.7 % (ref 38–73)
SODIUM SERPL-SCNC: 138 MMOL/L (ref 136–145)
WBC # BLD AUTO: 4.51 K/UL (ref 3.9–12.7)

## 2025-04-07 PROCEDURE — 86140 C-REACTIVE PROTEIN: CPT

## 2025-04-07 PROCEDURE — 85652 RBC SED RATE AUTOMATED: CPT

## 2025-04-07 PROCEDURE — 36415 COLL VENOUS BLD VENIPUNCTURE: CPT | Mod: PO

## 2025-04-07 PROCEDURE — 85025 COMPLETE CBC W/AUTO DIFF WBC: CPT

## 2025-04-07 PROCEDURE — 80053 COMPREHEN METABOLIC PANEL: CPT

## 2025-04-14 ENCOUNTER — OFFICE VISIT (OUTPATIENT)
Dept: RHEUMATOLOGY | Facility: CLINIC | Age: 75
End: 2025-04-14
Payer: MEDICARE

## 2025-04-14 ENCOUNTER — HOSPITAL ENCOUNTER (OUTPATIENT)
Dept: RADIOLOGY | Facility: HOSPITAL | Age: 75
Discharge: HOME | End: 2025-04-14
Attending: INTERNAL MEDICINE
Payer: MEDICARE

## 2025-04-14 VITALS
DIASTOLIC BLOOD PRESSURE: 65 MMHG | HEART RATE: 70 BPM | BODY MASS INDEX: 24.84 KG/M2 | WEIGHT: 149.25 LBS | SYSTOLIC BLOOD PRESSURE: 122 MMHG

## 2025-04-14 DIAGNOSIS — Z12.31 ENCOUNTER FOR SCREENING MAMMOGRAM FOR MALIGNANT NEOPLASM OF BREAST: ICD-10-CM

## 2025-04-14 DIAGNOSIS — Z79.899 DRUG-INDUCED IMMUNODEFICIENCY: ICD-10-CM

## 2025-04-14 DIAGNOSIS — M05.79 RHEUMATOID ARTHRITIS INVOLVING MULTIPLE SITES WITH POSITIVE RHEUMATOID FACTOR: Primary | ICD-10-CM

## 2025-04-14 DIAGNOSIS — M18.0 ARTHRITIS OF CARPOMETACARPAL (CMC) JOINT OF BOTH THUMBS: ICD-10-CM

## 2025-04-14 DIAGNOSIS — M35.01 SJOGREN'S SYNDROME WITH KERATOCONJUNCTIVITIS SICCA: ICD-10-CM

## 2025-04-14 DIAGNOSIS — Z51.81 ENCOUNTER FOR MEDICATION MONITORING: ICD-10-CM

## 2025-04-14 DIAGNOSIS — D84.821 DRUG-INDUCED IMMUNODEFICIENCY: ICD-10-CM

## 2025-04-14 PROCEDURE — 99214 OFFICE O/P EST MOD 30 MIN: CPT | Mod: PBBFAC,PO | Performed by: INTERNAL MEDICINE

## 2025-04-14 PROCEDURE — G2211 COMPLEX E/M VISIT ADD ON: HCPCS | Mod: S$PBB,,, | Performed by: INTERNAL MEDICINE

## 2025-04-14 PROCEDURE — 99215 OFFICE O/P EST HI 40 MIN: CPT | Mod: S$PBB,,, | Performed by: INTERNAL MEDICINE

## 2025-04-14 PROCEDURE — 99999 PR PBB SHADOW E&M-EST. PATIENT-LVL IV: CPT | Mod: PBBFAC,,, | Performed by: INTERNAL MEDICINE

## 2025-04-14 PROCEDURE — 77063 BREAST TOMOSYNTHESIS BI: CPT | Mod: TC,PO

## 2025-04-14 PROCEDURE — 77063 BREAST TOMOSYNTHESIS BI: CPT | Mod: 26,,, | Performed by: RADIOLOGY

## 2025-04-14 PROCEDURE — 77067 SCR MAMMO BI INCL CAD: CPT | Mod: 26,,, | Performed by: RADIOLOGY

## 2025-04-14 RX ORDER — TOBRAMYCIN AND DEXAMETHASONE 3; 1 MG/ML; MG/ML
1 SUSPENSION/ DROPS OPHTHALMIC 2 TIMES DAILY
COMMUNITY
Start: 2025-03-31

## 2025-04-14 NOTE — PROGRESS NOTES
RHEUMATOLOGY CLINIC FOLLOW UP VISIT  Chief complaints, HPI, ROS, EXAM, Assessment & Plans:-  Leticia Strickland a 74 y.o. pleasant female comes in for follow-up visit.  She follows the Rheumatology Clinic for seropositive rheumatoid and Sjogren's syndrome on Rinvoq therapy.  Reports doing well in terms of rheumatoid . Flared when she had to d/c rinvoq due to insurance approval problems.  Rheumatological review of system negative otherwise.  Physical examination shows significant carpometacarpal joint tenderness without any active synovitis.  No synovitis of small joints.      1. Rheumatoid arthritis involving multiple sites with positive rheumatoid factor    2. Sjogren's syndrome with keratoconjunctivitis sicca    3. Drug-induced immunodeficiency    4. Encounter for medication monitoring    5. Arthritis of carpometacarpal (CMC) joint of both thumbs      Problem List Items Addressed This Visit       Arthritis of carpometacarpal (CMC) joint of both thumbs    Drug-induced immunodeficiency    Encounter for medication monitoring    Rheumatoid arthritis involving multiple sites with positive rheumatoid factor - Primary    Sjogren's syndrome with keratoconjunctivitis sicca       Labs reviewed today:-     Latest Reference Range & Units 04/07/25 11:23   WBC 3.90 - 12.70 K/uL 4.51   RBC 4.00 - 5.40 M/uL 3.89 (L)   Hemoglobin 12.0 - 16.0 gm/dL 11.7 (L)   Hematocrit 37.0 - 48.5 % 35.3 (L)   MCV 82 - 98 fL 91   MCH 27.0 - 31.0 pg 30.1   MCHC 32.0 - 36.0 g/dL 33.1   RDW 11.5 - 14.5 % 11.7   Platelet Count 150 - 450 K/uL 312   MPV 9.2 - 12.9 fL 10.0   Neut % 38 - 73 % 64.7   Lymph % 18 - 48 % 20.8   Mono % 4 - 15 % 9.8   Eos % <=8 % 2.7   Basophil % <=1.9 % 1.8   Immature Granulocytes 0.0 - 0.5 % 0.2   Gran # (ANC) 1.8 - 7.7 K/uL 2.92   Lymph # 1 - 4.8 K/uL 0.94 (L)   Mono # 0.3 - 1 K/uL 0.44   Eos # <=0.5 K/uL 0.12   Baso # <=0.2 K/uL 0.08   Immature Grans (Abs) 0.00 -  0.04 K/uL 0.01   nRBC <=0 /100 WBC 0   Sed Rate <=36 mm/hr 3   Sodium 136 - 145 mmol/L 138   Potassium 3.5 - 5.1 mmol/L 3.7   Chloride 95 - 110 mmol/L 96   CO2 23 - 29 mmol/L 34 (H)   Anion Gap 8 - 16 mmol/L 8   BUN 8 - 23 mg/dL 22   Creatinine 0.5 - 1.4 mg/dL 0.9   eGFR >60 mL/min/1.73/m2 >60   Glucose 70 - 110 mg/dL 78   Calcium 8.7 - 10.5 mg/dL 9.5   ALP 40 - 150 unit/L 44   PROTEIN TOTAL 6.0 - 8.4 gm/dL 7.5   Albumin 3.5 - 5.2 g/dL 3.9   BILIRUBIN TOTAL 0.1 - 1.0 mg/dL 0.3   AST 11 - 45 unit/L 25   ALT 10 - 44 unit/L 18   CRP <=8.2 mg/L 0.6   (L): Data is abnormally low  (H): Data is abnormally high     Latest Reference Range & Units Most Recent   GIOVANNA Neg <1:160  Negative  8/25/10 10:35   GIOVANNA Screen Negative <1:80  Positive !  7/26/23 11:37   GIOVANNA HEP-2 Titer  Positive 1:160 Speckled  3/5/19 13:40   GIOVANNA Titer 1  1:80  7/26/23 11:37   GIOVANNA PATTERN 1  Homogeneous  7/26/23 11:37   ds DNA Ab Negative 1:10  Negative 1:10  7/26/23 11:37   Anti-SSA Antibody 0.00 - 0.99 Ratio 2.15 (H)  7/26/23 11:37   Anti-SSA Interpretation Negative  Positive !  7/26/23 11:37   Anti-SSB Antibody 0.00 - 0.99 Ratio 2.96 (H)  7/26/23 11:37   Anti-SSB Interpretation Negative  Positive !  7/26/23 11:37   Anti Sm Antibody 0.00 - 0.99 Ratio 0.09  7/26/23 11:37   Anti-Sm Interpretation Negative  Negative  7/26/23 11:37   Anti Sm/RNP Antibody 0.00 - 0.99 Ratio 0.11  7/26/23 11:37   Anti-Sm/RNP Interpretation Negative  Negative  7/26/23 11:37   GIOVANNA Hep-2 Pattern  Speckled !  2/12/08 10:40   Smooth Muscle Ab Negative  Negative 1:40  1/15/20 16:41   Anti-Histone Antibody 0 - 1.0 U 0.7  2/12/08 10:40   Anti-Mitochon Ab IFA Negative  Negative 1:40  1/15/20 16:41   Antigliadin Ab IgG 0 - 9.9 U/mL Less than 10.0  10/22/09 10:23   Antigliadin Abs, IgA 0 - 4.9 U/mL Less than 5.0  10/22/09 10:23   Endomysial Abs, IgA  Negative  10/22/09 10:23   TTG IgA 0 - 10.0 U/mL <1.2  10/22/09 10:23   TTG IgG 0 - 9.0 U/mL <1.2  10/22/09 10:23   Cytoplasmic Neutrophilic  Ab <1:20 Titer <1:20  5/27/15 15:23   Perinuclear (P-ANCA) <1:20 Titer <1:20  5/27/15 15:23   CCP Antibodies <5.0 U/mL 21.6 !  2/10/09 10:14   Interpretation  Comment  7/14/17 13:30   Complement (C-3) 50 - 180 mg/dL 98  5/27/15 15:23   Complement (C-4) 11 - 44 mg/dL 22  5/27/15 15:23   IgG 650 - 1600 mg/dL 896  11/6/19 16:46   IgM 50 - 300 mg/dL 134  11/6/19 16:46   IgA 40 - 350 mg/dL 300  11/6/19 16:46   IgE 0 - 100 IU/mL <35  11/6/19 16:46   Rheumatoid Factor 0 - 15 IU/ml Negative  2/10/09 10:14   !: Data is abnormal  (H): Data is abnormally high      Stable seropositive nonerosive rheumatoid on Rinvoq.  Continue.  Stable Sjogren's syndrome.  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 Monitor carefully for infections. Advised to get immediate medical care if any infection. Also advised strict adherence to age appropriate vaccinations and cancer screenings with PCP.  Hold Rinvoq any infection.  Osteopenia with high fracture risk treated with 1 dose of Reclast by my associate several years ago.  Recent DEXA showed stable results.  Repeat DEXA scan asap.   I have explained all of the above in detail and the patient understands all of the current recommendation(s). I have answered all questions to the best of my ability and to their complete satisfaction.     # Follow up in about 6 months (around 10/14/2025).      Disclaimer: This note was prepared using voice recognition system and is likely to have sound alike errors and is not proof read.  Please call me with any questions.    Answers submitted by the patient for this visit:  Rheumatology Questionnaire (Submitted on 4/13/2025)  fever: No  eye redness: No  mouth sores: No  headaches: No  shortness of breath: No  chest pain: No  trouble swallowing: No  diarrhea: No  constipation: No  unexpected weight change: No  genital sore: No  dysuria: No  During the last 3 days, have you had a skin rash?: No  Bruises or bleeds easily: Yes  cough: No

## 2025-04-30 ENCOUNTER — PATIENT MESSAGE (OUTPATIENT)
Dept: PRIMARY CARE CLINIC | Facility: CLINIC | Age: 75
End: 2025-04-30
Payer: MEDICARE

## 2025-04-30 DIAGNOSIS — J01.10 ACUTE FRONTAL SINUSITIS, RECURRENCE NOT SPECIFIED: Primary | ICD-10-CM

## 2025-04-30 RX ORDER — AMOXICILLIN AND CLAVULANATE POTASSIUM 875; 125 MG/1; MG/1
1 TABLET, FILM COATED ORAL 2 TIMES DAILY
Qty: 14 TABLET | Refills: 0 | Status: SHIPPED | OUTPATIENT
Start: 2025-04-30 | End: 2025-05-07

## 2025-05-13 ENCOUNTER — OFFICE VISIT (OUTPATIENT)
Dept: OTOLARYNGOLOGY | Facility: CLINIC | Age: 75
End: 2025-05-13
Payer: MEDICARE

## 2025-05-13 ENCOUNTER — PATIENT MESSAGE (OUTPATIENT)
Dept: OTOLARYNGOLOGY | Facility: CLINIC | Age: 75
End: 2025-05-13

## 2025-05-13 VITALS — WEIGHT: 149.25 LBS | BODY MASS INDEX: 24.84 KG/M2

## 2025-05-13 DIAGNOSIS — K21.9 GASTROESOPHAGEAL REFLUX DISEASE, UNSPECIFIED WHETHER ESOPHAGITIS PRESENT: ICD-10-CM

## 2025-05-13 DIAGNOSIS — J32.9 CHRONIC SINUSITIS, UNSPECIFIED LOCATION: ICD-10-CM

## 2025-05-13 DIAGNOSIS — R05.3 CHRONIC COUGH: Primary | ICD-10-CM

## 2025-05-13 PROCEDURE — 31575 DIAGNOSTIC LARYNGOSCOPY: CPT | Mod: PBBFAC,PO | Performed by: STUDENT IN AN ORGANIZED HEALTH CARE EDUCATION/TRAINING PROGRAM

## 2025-05-13 PROCEDURE — 99213 OFFICE O/P EST LOW 20 MIN: CPT | Mod: 25,S$PBB,, | Performed by: STUDENT IN AN ORGANIZED HEALTH CARE EDUCATION/TRAINING PROGRAM

## 2025-05-13 PROCEDURE — 99213 OFFICE O/P EST LOW 20 MIN: CPT | Mod: PBBFAC,PO | Performed by: STUDENT IN AN ORGANIZED HEALTH CARE EDUCATION/TRAINING PROGRAM

## 2025-05-13 PROCEDURE — 99999 PR PBB SHADOW E&M-EST. PATIENT-LVL III: CPT | Mod: PBBFAC,,, | Performed by: STUDENT IN AN ORGANIZED HEALTH CARE EDUCATION/TRAINING PROGRAM

## 2025-05-13 PROCEDURE — 31575 DIAGNOSTIC LARYNGOSCOPY: CPT | Mod: S$PBB,,, | Performed by: STUDENT IN AN ORGANIZED HEALTH CARE EDUCATION/TRAINING PROGRAM

## 2025-05-13 NOTE — PROGRESS NOTES
Chief complaint:    Chief Complaint   Patient presents with    Cough     Pt styates se has been coughing off and on x 6 months  pt states she has post nasal drip            Referring Provider:  No referring provider defined for this encounter.      History of present illness:     Ms. Piña is a 74 y.o. presenting for evaluation of sinus issues and cough.     Started 6 months ago  Notes purulent thick PND, sinus drainage, coughing and choking  Worse in the mornings; hacking and feels like she has a smokers coough  Wakes her up at night as well  Not usually productive   Globus at times at night     No SOB, wheeze, CP. No asthma    She sniffs a lot, but no significant rhinorrhea, nasal obstruction. She did have a sinus infection 3 weeks ago, then got a cold. She did have abx at that time. Cough did not change much during the sinus infection, maybe a little worse  She uses flonase, astelin, and sudafed in morning, oral antihistamine at night     She does have heart burn; takes omeprazole daily, takes it in the morning  No indigestion, bloating       History      Past Medical History:   Past Medical History:   Diagnosis Date    Allergic rhinitis     Cutaneous lupus erythematosus     drug induced.    Essential hypertension 02/05/2019    GERD (gastroesophageal reflux disease)     Hypothyroid     Insomnia     Mixed anxiety and depressive disorder     Nonrheumatic aortic valve insufficiency 9/4/2023    Nonrheumatic mitral valve regurgitation 9/4/2023    Normal cardiac stress test     11/07    Pulmonary hypertension 9/4/2023    Rheumatoid arthritis(714.0)     Sjogren's syndrome          Past Surgical History:  Past Surgical History:   Procedure Laterality Date    AUGMENTATION OF BREAST      breast implants      breast surgery for rupture      COLONOSCOPY N/A 3/7/2023    Procedure: COLONOSCOPY;  Surgeon: Susan Escobedo MD;  Location: [facility];  Service: Endoscopy;  Laterality: N/A;    ERCP N/A 03/25/2021     Procedure: ERCP (ENDOSCOPIC RETROGRADE CHOLANGIOPANCREATOGRAPHY);  Surgeon: Preston Madrigal MD;  Location: [facility] ENDO;  Service: Endoscopy;  Laterality: N/A;    ERCP N/A 06/21/2021    Procedure: ERCP (ENDOSCOPIC RETROGRADE CHOLANGIOPANCREATOGRAPHY);  Surgeon: Preston Madrigal MD;  Location: [facility] ENDO;  Service: Endoscopy;  Laterality: N/A;    ESOPHAGOGASTRODUODENOSCOPY N/A 01/24/2023    Procedure: ESOPHAGOGASTRODUODENOSCOPY (EGD);  Surgeon: Susan Escobedo MD;  Location: [facility] ENDO;  Service: Endoscopy;  Laterality: N/A;    LAPAROSCOPIC CHOLECYSTECTOMY N/A 03/26/2021    Procedure: CHOLECYSTECTOMY, LAPAROSCOPIC;  Surgeon: Jose Blue MD;  Location: [facility] OR;  Service: General;  Laterality: N/A;    TONSILLECTOMY, ADENOIDECTOMY      TUBAL LIGATION           Medications: Medication list reviewed. She  has a current medication list which includes the following prescription(s): azelastine, benzonatate, bupropion, bupropion, cholecalciferol (vitamin d3), cyanocobalamin (vitamin b-12), duloxetine, fluticasone propionate, folic acid, hydrochlorothiazide, ipratropium, levothyroxine, lorazepam, omeprazole, tobramycin-dexamethasone 0.3-0.1%, trazodone, tretinoin, rinvoq, and valsartan.     Allergies:   Review of patient's allergies indicates:   Allergen Reactions    Humira  [adalimumab]      Other reaction(s): drug-induced lupus  Other reaction(s): drug-induced lupus    Sulfa (sulfonamide antibiotics)      Other reaction(s): Hives  Other reaction(s): Rash  Other reaction(s): Hives         Family history: family history includes Heart disease in her father and mother.         Social History          Alcohol use:  reports that she does not currently use alcohol.            Tobacco:  reports that she has never smoked. She has never used smokeless tobacco.         Physical Examination      Vitals: Weight 67.7 kg (149 lb 4 oz).      General: Well developed, well nourished, well hydrated.     Voice: no  dysphonia, no dysarthria      Head/Face: Normocephalic, atraumatic. No scars or lesions. Facial musculature equal.     Eyes: No scleral icterus or conjunctival hemorrhage. EOMI. PERRLA.     Ears:     Right ear: No gross deformity. EAC is clear of debris and erythema. TM are intact with a pneumatized middle ear. No signs of retraction, fluid or infection.      Left ear: No gross deformity. EAC is clear of debris and erythema. TM are intact with a pneumatized middle ear. No signs of retraction, fluid or infection.      Nose: No gross deformity or lesions. No purulent discharge. No significant NSD.      Mouth/Oropharynx: Lips without any lesions. No mucosal lesions within the oropharynx. No tonsillar exudate or lesions. Pharyngeal walls symmetrical. Uvula midline. Tongue midline without lesions.     Neck: Trachea midline. No masses. No thyromegaly or nodules palpated.     Lymphatic: No lymphadenopathy in the neck.     Extremities: No cyanosis. Warm and well-perfused.     Skin: No scars or lesions on face or neck.      Neurologic: Moving all extremities without gross abnormality.CN II-XII grossly intact. House-Brackmann 1/6. No signs of nystagmus.          Data reviewed      Review of records:      I reviewed records from the referring provider's office visits, including the history, workup, and/or treatment of this problem thus far.    Images    I have independently reviewed the following imaging:      CT maxillofacial 6/21/21  Sinuses clear  Right sphenoid osteoma (stable)     Procedures:    Procedure -Transnasal fiberoptic laryngoscopy     Surgeon: Thomas Worley M.D. .      Anesthesia: topical 0.05% oxymetazoline with 4% lidocaine      Complications: None.     Description of Procedure: With the patient in the sitting position, topical lidocaine and oxymetazoline was applied to the nose. The scope was passed through the nose. Examination was carried out of the nose, nasopharynx, oropharynx, hypopharynx, and larynx  with findings as noted below. Scope was removed. The patient tolerated the procedure well.      Findings: No masses or lesions in the nose, nasopharynx, oropharynx, hypopharynx, or larynx. Vocal fold abduction and adduction is normal. No pooling of secretions in the piriform sinuses, penetration, or aspiration.     Thick mucoid drainage from left SE recess and middle meatus and from the right middle meatus draining down nasopharynx      Assessment/Plan:    1. Chronic cough    2. Chronic sinusitis, unspecified location    3. Gastroesophageal reflux disease, unspecified whether esophagitis present        Still evidence of sinusitis and thick mucoid drainage  Will treat with second 10d course of abx  Add saline irrigations  Continue oral antihistamine and flonase  Discontinue decongestants; save for acute illness     Possible Laryngopharyngeal Reflux contribution; if cough persists after resolution of sinusitis then consider more aggressive GERD management             Thomas Worley MD  [facility]   [facility] - [address]  [place] [id number]  P: [phone]  F: [phone]

## 2025-05-14 RX ORDER — LEVOFLOXACIN 500 MG/1
500 TABLET, FILM COATED ORAL DAILY
Qty: 10 TABLET | Refills: 0 | Status: SHIPPED | OUTPATIENT
Start: 2025-05-14 | End: 2025-05-24

## 2025-05-20 ENCOUNTER — APPOINTMENT (OUTPATIENT)
Dept: LAB | Facility: HOSPITAL | Age: 75
End: 2025-05-20
Attending: INTERNAL MEDICINE
Payer: MEDICARE

## 2025-05-22 ENCOUNTER — OFFICE VISIT (OUTPATIENT)
Dept: HEMATOLOGY/ONCOLOGY | Facility: CLINIC | Age: 75
End: 2025-05-22
Payer: MEDICARE

## 2025-05-22 DIAGNOSIS — E87.6 HYPOKALEMIA: Primary | ICD-10-CM

## 2025-05-22 DIAGNOSIS — R79.81 ELEVATED CO2 LEVEL: ICD-10-CM

## 2025-05-22 DIAGNOSIS — E61.1 IRON DEFICIENCY: ICD-10-CM

## 2025-05-22 DIAGNOSIS — R09.89 HYPERINFLATION OF LUNGS: ICD-10-CM

## 2025-05-22 DIAGNOSIS — R05.3 CHRONIC COUGH: ICD-10-CM

## 2025-05-22 NOTE — PROGRESS NOTES
Audio Only Telehealth Visit     The patient location is: home  The chief complaint leading to consultation is: fatigue  Visit type: Virtual visit with audio only (telephone)  Total time spent in medical discussion with patient: 25 minutes  Total time spent on date of the encounter:35 minutes       The reason for the audio only service rather than synchronous audio and video virtual visit was related to technical difficulties or patient preference/necessity.       Each patient to whom I provide medical services by telemedicine is:  (1) informed of the relationship between the physician and patient and the respective role of any other health care provider with respect to management of the patient; and (2) notified that they may decline to receive medical services by telemedicine and may withdraw from such care at any time. Patient verbally consented to receive this service via voice-only telephone call.       HPI: Interval History:    9/1/2022 Will need to repeat labs today to assess for recurrent Iron deficiency anemia.   6/21/2021 ERCP Biliary stent removed with biliary sludge and a stone removed.   Is currently being treated with Rinvoq once daily for treatment of RA and is tolerating well.  Denies any s/s of infection.   C/o fatigue.  Denies any abnormal bleeding.  Is not currently taking oral iron daily. Denies f/c/ns or unintentional weight loss.  Denies abnormal lymphadenopathy.      11/3/2022 Presents today to evaluate response of IV Feraheme treatments x 2.  Last received on 9/21/2022.  Awaiting iron and tibc lab results.   Continues with chronic fatigue. Denies any abnormal bleeding.  Continues to take B12 and folate daily. is a 72 year old female who presents today for follow up of her longstanding chronic on/off anemia for several years.  She states that she takes folic acid and B12 daily.  Denies any previous diagnosis of B12 of folic acid deficiency.  She has had on/off iron deficiency noted with history  of IV Injectafer infusion 3/2020 and then again on 2/2021 she received 1 dose Feraheme due to recurrent ID.      She had colonoscopy in 2011 - no polyps found.  Up to date on mammogram and pap.     She complains of acid reflux symptoms for years.  She also has Sjogren's disease and cutaneous lupus erythematous, RA followed by rheumatology.       Most recent hgb 12.3 g/dL and iron is repleated.  Had ERCP done with Dr. Madrigal with stent placement and pathology showing Chronic cholecystitis with cholelithiasis and Rokitansky-Aschoff sinuses   Negative for atypia or malignancy      Had emergency gallbladder surgery removal on 3/26/2020 with noted elevated LFTs with improvement in symptoms post surgery.     Interval History:    9/1/2022 Will need to repeat labs today to assess for recurrent Iron deficiency anemia.   6/21/2021 ERCP Biliary stent removed with biliary sludge and a stone removed.   Is currently being treated with Rinvoq once daily for treatment of RA and is tolerating well.  Denies any s/s of infection.   C/o fatigue.  Denies any abnormal bleeding.  Is not currently taking oral iron daily. Denies f/c/ns or unintentional weight loss.  Denies abnormal lymphadenopathy.      11/3/2022 Presents today to evaluate response of IV Feraheme treatments x 2.  Last received on 9/21/2022.  Awaiting iron and tibc lab results.   Continues with chronic fatigue. Denies any abnormal bleeding.  Continues to take B12 and folate daily.      12/12/2022  EGD scheduled 1/24/2022.  Continues to take B12 and folate daily. Denies any GI symptoms or abnormal blood loss.  Continues treatment for RA - Rinvoqu and plaqunenil.  Currently not taking oral iron.  Has taking oral iron in the past and is unable to tolerate due to constipation     2/6/2023 Last dose of Feraheme on 12/27/2022 and has been evaluated with EGD.  Presents today to review response. Currently with normocytic anemia- hgb 11.3, mcv 91 iron repleated.  EGDNormal  duodenal bulb and second portion of the duodenum.   - Gastritis.  3 cm hiatal hernia. Z-line irregular, 35 cm from the incisors. Normal upper third of esophagus and middle third of esophagus.   Pathology: A.   SMALL BOWEL, BIOPSY:   - Fragments of benign small bowel mucosa, without diagnostic abnormality.   B.   STOMACH, BIOPSY:   - Mild inactive chronic gastritis with reactive mucosal changes and focal intestinal metaplasia.   - No Helicobacter pylori organisms identified by *[facility].   - Negative for malignancy.   C.   GASTROESOPHAGEAL JUNCTION BIOPSY:   - Fragments of benign reactive squamous and gastric-type mucosa with rare   intraepithelial eosinophils and neutrophils and mild chronic active   inflammation, consistent with reflux.   - No intestinal metaplasia identified.   - Negative for dysplasia or malignancy.   COMMENT:   A). Sections show fragments of benign small bowel mucosa with normal villous   architecture. No villous atrophy, crypt hyperplasia, increased   intraepithelial lymphocytes or evidence of celiac sprue identified.   PPI increased from once per day to twice per day.      Interval History:  5/26/2023 Not currently taking oral iron tablets.  Currently taking B12 and folic acid daily.  States that she was diagnosed at the beginning of the year with sleep apnea.  Uses dental device that was made by her dentist that helps her breath right.  RA is currently control and is being followed by rheumatology.  She has no complaints today.  Denies any abnormal bleeding.      Interval History:  11/20/2023  States has been feeling fatigued over the last couple of days with noted slightly low hgb and elevated tibc.  Denies any abnormal bleeding.       Interval History:  4/1/2024  Received Feraheme infusion x 1 on 12/12/2023  presents today to evaluate response and assess for need for additional IV iron due to ALISSA recurrence  with RA. Has chronic fatigue.  Denies any known abnormal bleeding.     Interval  History:  7/8/2024 Presents today to evaluate response of IV iron therapy.  Received 2 doses of IV Feraheme with last dose received on 4/29/2024.  Denies any known abnormal bleeding.  States that her RA is controlled; however she is having issues with pain in her big toe and has messaged her rheumatologists for recommendations.  Currently without anemia or iron deficiency.       Interval History:  11/14/2024 Presents today for follow up. She has been doing well overall. Last IV iron was 4/2024. Continues to struggle with chronic fatigue and recently started seeing a physical therapist for hip pain, which she states is helping. Pt continues taking folate, B12, and Vitamin D. Follows routinely with PCP and rheumatology. Currently without anemia or iron deficiency.        Interval History:  5/22/2025 States that she has some increased fatigue and low potassium.  States that started to eat a banana a day when saw her count was low.  Currently without anemia.  TIBC elevated but other iron indicies are normal.  Currently with sinus infection taking Levaquin daily.  Noted chronically elevated co2, hyperinflated lungs on previous chest xray, and chronic cough.       Answers submitted by the patient for this visit:  Review of Systems Questionnaire (Submitted on 5/22/2025)  appetite change : No  unexpected weight change: No  mouth sores: No  visual disturbance: Yes  cough: Yes  shortness of breath: No  chest pain: No  abdominal pain: No  diarrhea: No  frequency: No  back pain: No  rash: No  headaches: No  adenopathy: No  nervous/ anxious: No       I have reviewed all of the patient's relevant lab work available in the medical record and have utilized this in my evaluation and management recommendations today.      Assessment and plan:  repeat labs in 3 month to assess need for iron repleation.  Refer to pulmonology for elevated co2 - chronically with h/o chest xray showing possible COPD, chronic cough.      Med Onc Chart  Routing      Follow up with physician    Follow up with RONNY 3 months. VV with labs prior   Infusion scheduling note   n/a   Injection scheduling note n/a   Labs   Scheduling:  Preferred lab: [facility]  Lab interval:  cbc, cmp, iron studies   Imaging   N/a   Pharmacy appointment No pharmacy appointment needed      Other referrals No nutrition appointment needed -        Additional referrals needed  refer to pulmonology         Continue B12 5000 mcg SL daily.  Eat 1 banana a day for 1 week and then when outside sweating make sure to drink electrolyte replacement drinks to repleat electrolytes loss through sweat.  Will refer to pulmonology for chronic cough, past chest xary showing hyperinflated lungs, and chronically low co2 levels.  Concentrate on eating foods high in iron.      This service was not originating from a related E/M service provided within the previous 7 days nor will  to an E/M service or procedure within the next 24 hours or my soonest available appointment.  Prevailing standard of care was able to be met in this audio-only visit.

## 2025-07-08 DIAGNOSIS — F41.8 MIXED ANXIETY AND DEPRESSIVE DISORDER: ICD-10-CM

## 2025-07-08 RX ORDER — DULOXETIN HYDROCHLORIDE 60 MG/1
60 CAPSULE, DELAYED RELEASE ORAL
Qty: 90 CAPSULE | Refills: 3 | Status: SHIPPED | OUTPATIENT
Start: 2025-07-08

## 2025-07-25 ENCOUNTER — TELEPHONE (OUTPATIENT)
Dept: PRIMARY CARE CLINIC | Facility: CLINIC | Age: 75
End: 2025-07-25
Payer: MEDICARE

## 2025-08-12 DIAGNOSIS — I10 ESSENTIAL HYPERTENSION: ICD-10-CM

## 2025-08-13 RX ORDER — VALSARTAN 160 MG/1
320 TABLET ORAL
Qty: 90 TABLET | Refills: 7 | Status: SHIPPED | OUTPATIENT
Start: 2025-08-13

## 2025-08-18 ENCOUNTER — PATIENT MESSAGE (OUTPATIENT)
Dept: ADMINISTRATIVE | Facility: HOSPITAL | Age: 75
End: 2025-08-18
Payer: MEDICARE

## 2025-08-18 DIAGNOSIS — F41.8 ANXIETY ASSOCIATED WITH DEPRESSION: ICD-10-CM

## 2025-08-18 RX ORDER — LORAZEPAM 1 MG/1
1 TABLET ORAL NIGHTLY PRN
Qty: 30 TABLET | Refills: 2 | Status: SHIPPED | OUTPATIENT
Start: 2025-08-18 | End: 2025-11-16

## 2025-08-22 ENCOUNTER — PATIENT MESSAGE (OUTPATIENT)
Dept: HEMATOLOGY/ONCOLOGY | Facility: CLINIC | Age: 75
End: 2025-08-22
Payer: MEDICARE

## (undated) DEVICE — SOL NS 1000CC

## (undated) DEVICE — TROCAR ENDOPATH XCEL 5X100MM

## (undated) DEVICE — APPLIER CLIP ENDO MED/LG 10MM

## (undated) DEVICE — SCISSOR 5MMX35CM DIRECT DRIVE

## (undated) DEVICE — SYS IRRIG PRESSURIZED SPIKE

## (undated) DEVICE — SOL 9P NACL IRR PIC IL

## (undated) DEVICE — MANIFOLD 4 PORT

## (undated) DEVICE — ELECTRODE ENDOPATH + II 5X34

## (undated) DEVICE — HANDLE PISTOL GRIP HAND CNTRL

## (undated) DEVICE — CORD LAP 10 DISP

## (undated) DEVICE — GAUZE SPONGE 4X4 12PLY

## (undated) DEVICE — COVER OVERHEAD SURG LT BLUE

## (undated) DEVICE — DISSECTOR EPIX LAPA 5MMX35CM

## (undated) DEVICE — SEE MEDLINE ITEM 157027

## (undated) DEVICE — DRAPE ABDOMINAL TIBURON 14X11

## (undated) DEVICE — SEE MEDLINE ITEM 152622

## (undated) DEVICE — TUBING HEATED INSUFFLATOR

## (undated) DEVICE — NDL PNEUMO INSUFFLATI 120MM

## (undated) DEVICE — ELECTRODE REM PLYHSV RETURN 9

## (undated) DEVICE — TIP GRASPER FENESTRATED DISP

## (undated) DEVICE — NDL SAFETY 25G X 1.5 ECLIPSE

## (undated) DEVICE — SYR 3CC LUER LOC

## (undated) DEVICE — SUT VICRYL+ 27 UR-6 VIOL

## (undated) DEVICE — APPLICATOR CHLORAPREP ORN 26ML

## (undated) DEVICE — SEE MEDLINE ITEM 157117

## (undated) DEVICE — SUT MONOCRYL 4.0 PS2 CP496G

## (undated) DEVICE — CONTAINER SPECIMEN STRL 4OZ

## (undated) DEVICE — ADHESIVE DERMABOND ADVANCED

## (undated) DEVICE — GLOVE SURG BIOGEL LATEX SZ 7.5

## (undated) DEVICE — POSITIONER HEAD DONUT 9IN FOAM

## (undated) DEVICE — SEE MEDLINE ITEM 152739

## (undated) DEVICE — BAG TISS RETRV MONARCH 10MM

## (undated) DEVICE — KIT ANTIFOG

## (undated) DEVICE — SUPPORT ULNA NERVE PROTECTOR

## (undated) DEVICE — CANNULA ENDOPATH XCEL 5X100MM

## (undated) DEVICE — TROCAR ENDOPATH XCEL 11MM 10CM

## (undated) DEVICE — SYR 10CC LUER LOCK